# Patient Record
Sex: FEMALE | Race: WHITE | NOT HISPANIC OR LATINO | Employment: FULL TIME | URBAN - METROPOLITAN AREA
[De-identification: names, ages, dates, MRNs, and addresses within clinical notes are randomized per-mention and may not be internally consistent; named-entity substitution may affect disease eponyms.]

---

## 2017-07-14 ENCOUNTER — OFFICE VISIT (OUTPATIENT)
Dept: URGENT CARE | Facility: CLINIC | Age: 52
End: 2017-07-14
Payer: COMMERCIAL

## 2017-07-14 VITALS
DIASTOLIC BLOOD PRESSURE: 87 MMHG | WEIGHT: 293 LBS | HEART RATE: 84 BPM | OXYGEN SATURATION: 98 % | BODY MASS INDEX: 50.02 KG/M2 | TEMPERATURE: 99 F | SYSTOLIC BLOOD PRESSURE: 133 MMHG | HEIGHT: 64 IN | RESPIRATION RATE: 19 BRPM

## 2017-07-14 DIAGNOSIS — R51.9 FACIAL PAIN: Primary | ICD-10-CM

## 2017-07-14 PROCEDURE — 99213 OFFICE O/P EST LOW 20 MIN: CPT | Mod: S$GLB,,, | Performed by: FAMILY MEDICINE

## 2017-07-14 NOTE — PROGRESS NOTES
Subjective:       Patient ID: Josefina Coon is a 52 y.o. female.    Chief Complaint: Jaw Pain (PATIENT C/O RIGHT SIDE UPPER AND LOWER JAW PAIN x 2 WEEKS. SHE STATES IT IS GETTING PROGRESSIVELY WORSE. SHE ALSO C/O PAIN IN RIGHT EAR AND NECK NOW. SHE IS TAKING OTC MOTRIN EVERY 4 HOURS.)    PATIENT C/O RIGHT SIDED UPPER AND LOWER JAW PAIN x 2 WEEKS THAT IS GETTING PROGRESSIVELY WORSE. SHE NOW ALSO HAS RIGHT EAR AND NECK PAIN. SHE IS TAKING OTC MOTRIN EVERY 4 HOURS.      Review of Systems   Constitution: Negative for chills and fever.   HENT: Positive for ear pain. Negative for headaches and sore throat.    Eyes: Negative for blurred vision.   Cardiovascular: Negative for chest pain.   Respiratory: Negative for shortness of breath.    Skin: Negative for rash.   Musculoskeletal: Positive for joint pain. Negative for back pain.        RIGHT UPPER AND LOWER JAW PAIN x 2 WEEKS   Gastrointestinal: Negative for abdominal pain, diarrhea, nausea and vomiting.   Psychiatric/Behavioral: The patient is not nervous/anxious.        Objective:      Physical Exam   Constitutional: Vital signs are normal.   HENT:   Head: Normocephalic.       Right Ear: Hearing, external ear and ear canal normal.   Left Ear: Hearing, tympanic membrane, external ear and ear canal normal.   Nose: Nose normal.   Mouth/Throat: Uvula is midline, oropharynx is clear and moist and mucous membranes are normal.   Cardiovascular: Regular rhythm.    Pulmonary/Chest: Effort normal and breath sounds normal.   Neurological: She is alert.       Assessment:       1. Facial pain        Plan:         Facial pain    probable right tmj tenderness , ice ,analgesics , fup with pcp and dentist

## 2019-12-10 ENCOUNTER — IMMUNIZATION (OUTPATIENT)
Dept: FAMILY MEDICINE | Facility: CLINIC | Age: 54
End: 2019-12-10
Payer: COMMERCIAL

## 2019-12-10 PROCEDURE — 90471 IMMUNIZATION ADMIN: CPT | Mod: S$GLB,,, | Performed by: NURSE PRACTITIONER

## 2019-12-10 PROCEDURE — 90471 FLU VACCINE (QUAD) GREATER THAN OR EQUAL TO 3YO PRESERVATIVE FREE IM: ICD-10-PCS | Mod: S$GLB,,, | Performed by: NURSE PRACTITIONER

## 2019-12-10 PROCEDURE — 90686 FLU VACCINE (QUAD) GREATER THAN OR EQUAL TO 3YO PRESERVATIVE FREE IM: ICD-10-PCS | Mod: S$GLB,,, | Performed by: NURSE PRACTITIONER

## 2019-12-10 PROCEDURE — 90686 IIV4 VACC NO PRSV 0.5 ML IM: CPT | Mod: S$GLB,,, | Performed by: NURSE PRACTITIONER

## 2020-02-11 ENCOUNTER — OFFICE VISIT (OUTPATIENT)
Dept: CARDIOLOGY | Facility: CLINIC | Age: 55
End: 2020-02-11
Payer: COMMERCIAL

## 2020-02-11 VITALS
BODY MASS INDEX: 50.02 KG/M2 | HEART RATE: 80 BPM | HEIGHT: 64 IN | WEIGHT: 293 LBS | DIASTOLIC BLOOD PRESSURE: 82 MMHG | SYSTOLIC BLOOD PRESSURE: 120 MMHG

## 2020-02-11 DIAGNOSIS — E78.2 MIXED HYPERLIPIDEMIA: Primary | ICD-10-CM

## 2020-02-11 DIAGNOSIS — R07.89 ATYPICAL CHEST PAIN: ICD-10-CM

## 2020-02-11 DIAGNOSIS — I10 ESSENTIAL HYPERTENSION: ICD-10-CM

## 2020-02-11 DIAGNOSIS — R94.31 NONSPECIFIC ABNORMAL ELECTROCARDIOGRAM (ECG) (EKG): ICD-10-CM

## 2020-02-11 DIAGNOSIS — E78.2 MIXED HYPERLIPIDEMIA: ICD-10-CM

## 2020-02-11 DIAGNOSIS — R94.31 ABNORMAL EKG: Primary | ICD-10-CM

## 2020-02-11 PROCEDURE — 99204 OFFICE O/P NEW MOD 45 MIN: CPT | Mod: S$GLB,,, | Performed by: INTERNAL MEDICINE

## 2020-02-11 PROCEDURE — 93000 EKG 12-LEAD: ICD-10-PCS | Mod: S$GLB,,, | Performed by: INTERNAL MEDICINE

## 2020-02-11 PROCEDURE — 93000 ELECTROCARDIOGRAM COMPLETE: CPT | Mod: S$GLB,,, | Performed by: INTERNAL MEDICINE

## 2020-02-11 PROCEDURE — 3008F BODY MASS INDEX DOCD: CPT | Mod: CPTII,S$GLB,, | Performed by: INTERNAL MEDICINE

## 2020-02-11 PROCEDURE — 3074F SYST BP LT 130 MM HG: CPT | Mod: CPTII,S$GLB,, | Performed by: INTERNAL MEDICINE

## 2020-02-11 PROCEDURE — 99204 PR OFFICE/OUTPT VISIT, NEW, LEVL IV, 45-59 MIN: ICD-10-PCS | Mod: S$GLB,,, | Performed by: INTERNAL MEDICINE

## 2020-02-11 PROCEDURE — 3079F DIAST BP 80-89 MM HG: CPT | Mod: CPTII,S$GLB,, | Performed by: INTERNAL MEDICINE

## 2020-02-11 PROCEDURE — 99999 PR PBB SHADOW E&M-EST. PATIENT-LVL III: ICD-10-PCS | Mod: PBBFAC,,, | Performed by: INTERNAL MEDICINE

## 2020-02-11 PROCEDURE — 3079F PR MOST RECENT DIASTOLIC BLOOD PRESSURE 80-89 MM HG: ICD-10-PCS | Mod: CPTII,S$GLB,, | Performed by: INTERNAL MEDICINE

## 2020-02-11 PROCEDURE — 99999 PR PBB SHADOW E&M-EST. PATIENT-LVL III: CPT | Mod: PBBFAC,,, | Performed by: INTERNAL MEDICINE

## 2020-02-11 PROCEDURE — 3008F PR BODY MASS INDEX (BMI) DOCUMENTED: ICD-10-PCS | Mod: CPTII,S$GLB,, | Performed by: INTERNAL MEDICINE

## 2020-02-11 PROCEDURE — 3074F PR MOST RECENT SYSTOLIC BLOOD PRESSURE < 130 MM HG: ICD-10-PCS | Mod: CPTII,S$GLB,, | Performed by: INTERNAL MEDICINE

## 2020-02-11 RX ORDER — METFORMIN HYDROCHLORIDE 500 MG/1
500 TABLET ORAL 2 TIMES DAILY WITH MEALS
COMMUNITY
End: 2021-07-28 | Stop reason: SDUPTHER

## 2020-02-11 RX ORDER — TELMISARTAN AND HYDROCHLORTHIAZIDE 80; 25 MG/1; MG/1
1 TABLET ORAL DAILY
COMMUNITY
End: 2021-05-06

## 2020-02-11 NOTE — PROGRESS NOTES
Subjective:    Patient ID:  Josefina Coon is a 55 y.o. female who presents for evaluation of Consult (Ref by Dr. Raya Echols (PCP)); Abnormal ECG; Family hx (Father; Afib - ); and Leg Swelling (bilateral.  mosly Lt )      Problem List Items Addressed This Visit        Cardiac/Vascular    Essential hypertension    Overview     Dx updated per  IMO Load         Mixed hyperlipidemia      Other Visit Diagnoses     Abnormal EKG    -  Primary          HPI    Referred by Dr. Echols    The patient states that she feels ok in general. Occasionally right sided twinges. Occasionally sharp. Intermittent, a few times a week. Sometimes triggered by stress. No recurrence with exertion.    Palpitations only a few times a week mostly at nigh when it is quiet. Only last for a couple of seconds    Has history of venous insufficiency and leg swelling that persists.    Personal history of heart attack or stroke - None that she is aware of  Family history of heart disease - Father; Afib -     Past Medical History:   Diagnosis Date    Abnormal liver enzymes     Asymptomatic varicose veins     Depressive disorder, not elsewhere classified     Dyslipidemia     Embolism and thrombosis of unspecified site     superficial venous thrombosis    Encounter for long-term (current) use of other medications     Family history of ischemic heart disease     Fatty liver     Generalized anxiety disorder     History of chicken pox     Obstructive sleep apnea (adult) (pediatric)     Type II or unspecified type diabetes mellitus without mention of complication, not stated as uncontrolled     Unspecified essential hypertension     Unspecified venous (peripheral) insufficiency     Unspecified vitamin D deficiency        Past Surgical History:   Procedure Laterality Date     SECTION      VEIN SURGERY      Laser, Dr. Larsen       Family History   Problem Relation Age of Onset    Hyperlipidemia Mother      Hypertension Mother     Diabetes Brother     Cancer Brother         colon    Stroke Maternal Grandmother        Social History     Socioeconomic History    Marital status:      Spouse name: Not on file    Number of children: Not on file    Years of education: Not on file    Highest education level: Not on file   Occupational History    Not on file   Social Needs    Financial resource strain: Not on file    Food insecurity:     Worry: Not on file     Inability: Not on file    Transportation needs:     Medical: Not on file     Non-medical: Not on file   Tobacco Use    Smoking status: Never Smoker    Smokeless tobacco: Never Used   Substance and Sexual Activity    Alcohol use: Yes     Alcohol/week: 0.0 standard drinks     Comment: rare     Drug use: Not on file    Sexual activity: Not on file   Lifestyle    Physical activity:     Days per week: Not on file     Minutes per session: Not on file    Stress: Not on file   Relationships    Social connections:     Talks on phone: Not on file     Gets together: Not on file     Attends Yazdanism service: Not on file     Active member of club or organization: Not on file     Attends meetings of clubs or organizations: Not on file     Relationship status: Not on file   Other Topics Concern    Not on file   Social History Narrative    Not on file       Review of patient's allergies indicates:   Allergen Reactions    Sitagliptin      Other reaction(s): (januvia) abdominal pain    Sulfa (sulfonamide antibiotics)     Byetta  [exenatide] Rash    Lactose        Review of Systems   Constitution: Negative for decreased appetite, fever and malaise/fatigue.   Eyes: Negative for blurred vision.   Cardiovascular: Negative for chest pain, dyspnea on exertion, irregular heartbeat and leg swelling.   Respiratory: Negative for cough, hemoptysis, shortness of breath and wheezing.    Endocrine: Negative for cold intolerance and heat intolerance.  "  Hematologic/Lymphatic: Negative for bleeding problem.   Musculoskeletal: Negative for muscle weakness and myalgias.   Gastrointestinal: Negative for abdominal pain, constipation and diarrhea.   Genitourinary: Negative for bladder incontinence.   Neurological: Negative for dizziness and weakness.   Psychiatric/Behavioral: Negative for depression.        Objective:     Vitals:    02/11/20 1125   BP: 120/82   BP Location: Left arm   Patient Position: Sitting   BP Method: Medium (Manual)   Pulse: 80   Weight: (!) 155.6 kg (343 lb 0.6 oz)   Height: 5' 4" (1.626 m)        Physical Exam   Constitutional: She is oriented to person, place, and time. She appears well-developed and well-nourished. No distress.   HENT:   Head: Normocephalic and atraumatic.   Neck: Neck supple. No JVD present.   Cardiovascular: Normal rate, regular rhythm and normal heart sounds. Exam reveals no gallop and no friction rub.   No murmur heard.  Pulmonary/Chest: Effort normal and breath sounds normal. No respiratory distress. She has no wheezes. She has no rales.   Abdominal: Soft. Bowel sounds are normal. There is no tenderness. There is no rebound and no guarding.   Musculoskeletal: She exhibits no edema or tenderness.   Neurological: She is alert and oriented to person, place, and time.   Skin: Skin is warm and dry.   Psychiatric: Her behavior is normal.           Current Outpatient Medications on File Prior to Visit   Medication Sig    albuterol 90 mcg/actuation inhaler Inhale 2 puffs into the lungs every 6 (six) hours as needed for Wheezing.    dulaglutide (TRULICITY) 1.5 mg/0.5 mL PnIj Inject 1.5 mg into the skin every 7 days.    fexofenadine (ALLEGRA) 180 MG tablet Take 180 mg by mouth once daily.    metFORMIN (GLUCOPHAGE) 500 MG tablet Take 500 mg by mouth 2 (two) times daily with meals.    omeprazole (PRILOSEC OTC) 20 MG tablet Take 1 tablet (20 mg total) by mouth once daily.    pravastatin (PRAVACHOL) 10 MG tablet Take 1 tablet " (10 mg total) by mouth once daily.    telmisartan-hydrochlorothiazide (MICARDIS HCT) 80-25 mg per tablet Take 1 tablet by mouth once daily.    alprazolam (XANAX) 0.25 MG tablet Take 1 tablet (0.25 mg total) by mouth 3 (three) times daily as needed for Anxiety. (Patient not taking: Reported on 2/11/2020)    fluoxetine (PROZAC) 40 MG capsule Take 1 capsule by mouth  once daily (Patient not taking: Reported on 2/11/2020)    lisinopril (PRINIVIL,ZESTRIL) 20 MG tablet Take 1 tablet (20 mg total) by mouth once daily. (Patient not taking: Reported on 2/11/2020)    metformin (GLUCOPHAGE-XR) 750 MG 24 hr tablet Take 1 tablet (750 mg total) by mouth 2 (two) times daily. (Patient not taking: Reported on 2/11/2020)    telmisartan (MICARDIS) 80 MG Tab Take 1 tablet by mouth once daily (Patient not taking: Reported on 2/11/2020)     No current facility-administered medications on file prior to visit.        Lipid Panel:   No results found for: CHOL, HDL, LDLCALC, TRIG, CHOLHDL      The ASCVD Risk score ([place] DC Jr., et al., 2013) failed to calculate for the following reasons:    Cannot find a previous HDL lab    Cannot find a previous total cholesterol lab    All pertinent labs, imaging, and EKGs reviewed.    Assessment:       1. Abnormal EKG    2. Essential hypertension    3. Mixed hyperlipidemia         Plan:     Symptoms of atypical chest pain  BP/Pulse OK today    Echocardiogram   Nuclear stress test   If palpitations worsen, can consider Holter monitor at that time  Educated on pulse checks  Following up for legs in about 6 months with her normal provider, if something changes or there is concern she will let me know  Lipid panel was recently done, will obtain records    Continue other cardiac medications  Mediterranean Diet/Cardiovascular Exercise Program    Patient queried and all questions were answered.    F/u in 6 months to reassess      Signed:    Roni Frias MD  2/11/2020 8:57 AM

## 2020-02-11 NOTE — LETTER
February 11, 2020      Raya Echols MD  [address] [facility] [address]           [facility]  [address] [place] [address]  Phone: [phone]          Patient: Josefina Coon   MR Number: [id number]   YOB: 1965   Date of Visit: 2/11/2020       Dear Dr. Raya Echols:    Thank you for referring Josefina Coon to me for evaluation. Attached you will find relevant portions of my assessment and plan of care.    If you have questions, please do not hesitate to call me. I look forward to following Josefina Coon along with you.    Sincerely,    Roni Frias MD    Enclosure  CC:  No Recipients    If you would like to receive this communication electronically, please contact [email or web address] or [phone] to request more information on [facility] [employer] access.    For providers and/or their staff who would like to refer a patient to [facility], please contact us through our one-stop-shop provider referral line, [facility], at [phone].    If you feel you have received this communication in error or would no longer like to receive these types of communications, please e-mail [email or web address]

## 2020-02-28 ENCOUNTER — TELEPHONE (OUTPATIENT)
Dept: CARDIOLOGY | Facility: CLINIC | Age: 55
End: 2020-02-28

## 2020-02-28 NOTE — TELEPHONE ENCOUNTER
----- Message from Natasha Ng sent at 2/28/2020 12:34 PM CST -----  Contact: Pt   Type: Needs medical advice      Who Called: Pt   Best Call Back Number:  [phone]  Additional Information:  Pt called to get information on the nuclear test she scheduled for on 3/3. Pt wants more details on what to expect after the test is done.       Please advise. Thank you

## 2020-03-03 ENCOUNTER — HOSPITAL ENCOUNTER (OUTPATIENT)
Dept: RADIOLOGY | Facility: HOSPITAL | Age: 55
Discharge: HOME | End: 2020-03-03
Attending: INTERNAL MEDICINE
Payer: COMMERCIAL

## 2020-03-03 ENCOUNTER — CLINICAL SUPPORT (OUTPATIENT)
Dept: CARDIOLOGY | Facility: CLINIC | Age: 55
End: 2020-03-03
Attending: INTERNAL MEDICINE
Payer: COMMERCIAL

## 2020-03-03 VITALS
SYSTOLIC BLOOD PRESSURE: 126 MMHG | BODY MASS INDEX: 50.02 KG/M2 | WEIGHT: 293 LBS | DIASTOLIC BLOOD PRESSURE: 72 MMHG | HEIGHT: 64 IN | HEART RATE: 95 BPM

## 2020-03-03 DIAGNOSIS — R07.89 ATYPICAL CHEST PAIN: ICD-10-CM

## 2020-03-03 DIAGNOSIS — R94.31 ABNORMAL EKG: ICD-10-CM

## 2020-03-03 DIAGNOSIS — I10 ESSENTIAL HYPERTENSION: ICD-10-CM

## 2020-03-03 PROCEDURE — 93306 TTE W/DOPPLER COMPLETE: CPT | Mod: S$GLB,,, | Performed by: INTERNAL MEDICINE

## 2020-03-03 PROCEDURE — 93015 CV STRESS TEST SUPVJ I&R: CPT | Mod: ,,, | Performed by: INTERNAL MEDICINE

## 2020-03-03 PROCEDURE — 78452 STRESS TEST WITH MYOCARDIAL PERFUSION (CUPID ONLY): ICD-10-PCS | Mod: 26,,, | Performed by: INTERNAL MEDICINE

## 2020-03-03 PROCEDURE — 93306 ECHO (CUPID ONLY): ICD-10-PCS | Mod: S$GLB,,, | Performed by: INTERNAL MEDICINE

## 2020-03-03 PROCEDURE — 93015 STRESS TEST WITH MYOCARDIAL PERFUSION (CUPID ONLY): ICD-10-PCS | Mod: ,,, | Performed by: INTERNAL MEDICINE

## 2020-03-03 PROCEDURE — 99999 PR PBB SHADOW E&M-EST. PATIENT-LVL II: ICD-10-PCS | Mod: PBBFAC,,,

## 2020-03-03 PROCEDURE — 99999 PR PBB SHADOW E&M-EST. PATIENT-LVL II: CPT | Mod: PBBFAC,,,

## 2020-03-03 PROCEDURE — 78452 HT MUSCLE IMAGE SPECT MULT: CPT | Mod: 26,,, | Performed by: INTERNAL MEDICINE

## 2020-03-04 LAB
ASCENDING AORTA: 2.44 CM
AV INDEX (PROSTH): 0.55
AV MEAN GRADIENT: 11 MMHG
AV PEAK GRADIENT: 19 MMHG
AV VALVE AREA: 2.07 CM2
AV VELOCITY RATIO: 0.47
BSA FOR ECHO PROCEDURE: 2.65 M2
CV ECHO LV RWT: 0.41 CM
CV STRESS BASE HR: 82 BPM
DIASTOLIC BLOOD PRESSURE: 72 MMHG
DOP CALC AO PEAK VEL: 2.18 M/S
DOP CALC AO VTI: 41.27 CM
DOP CALC LVOT AREA: 3.8 CM2
DOP CALC LVOT DIAMETER: 2.2 CM
DOP CALC LVOT PEAK VEL: 1.02 M/S
DOP CALC LVOT STROKE VOLUME: 85.56 CM3
DOP CALCLVOT PEAK VEL VTI: 22.52 CM
E WAVE DECELERATION TIME: 250.27 MSEC
E/A RATIO: 0.82
E/E' RATIO: 10.2 M/S
ECHO LV POSTERIOR WALL: 1.03 CM (ref 0.6–1.1)
FRACTIONAL SHORTENING: 29 % (ref 28–44)
INTERVENTRICULAR SEPTUM: 0.96 CM (ref 0.6–1.1)
IVRT: 0.1 MSEC
LA MAJOR: 6.14 CM
LA MINOR: 6 CM
LA WIDTH: 3.12 CM
LEFT ATRIUM SIZE: 3.77 CM
LEFT ATRIUM VOLUME INDEX: 24.7 ML/M2
LEFT ATRIUM VOLUME: 60.68 CM3
LEFT INTERNAL DIMENSION IN SYSTOLE: 3.52 CM (ref 2.1–4)
LEFT VENTRICLE DIASTOLIC VOLUME INDEX: 47.58 ML/M2
LEFT VENTRICLE DIASTOLIC VOLUME: 117.04 ML
LEFT VENTRICLE MASS INDEX: 73 G/M2
LEFT VENTRICLE SYSTOLIC VOLUME INDEX: 21 ML/M2
LEFT VENTRICLE SYSTOLIC VOLUME: 51.63 ML
LEFT VENTRICULAR INTERNAL DIMENSION IN DIASTOLE: 4.98 CM (ref 3.5–6)
LEFT VENTRICULAR MASS: 179.57 G
LV LATERAL E/E' RATIO: 10.2 M/S
LV SEPTAL E/E' RATIO: 10.2 M/S
MV PEAK A VEL: 1.24 M/S
MV PEAK E VEL: 1.02 M/S
OHS CV CPX 1 MINUTE RECOVERY HEART RATE: 110 BPM
OHS CV CPX 85 PERCENT MAX PREDICTED HEART RATE MALE: 132
OHS CV CPX MAX PREDICTED HEART RATE: 156
OHS CV CPX PATIENT IS FEMALE: 1
OHS CV CPX PATIENT IS MALE: 0
OHS CV CPX PEAK DIASTOLIC BLOOD PRESSURE: 67 MMHG
OHS CV CPX PEAK HEAR RATE: 123 BPM
OHS CV CPX PEAK RATE PRESSURE PRODUCT: NORMAL
OHS CV CPX PEAK SYSTOLIC BLOOD PRESSURE: 128 MMHG
OHS CV CPX PERCENT MAX PREDICTED HEART RATE ACHIEVED: 79
OHS CV CPX RATE PRESSURE PRODUCT PRESENTING: NORMAL
PISA TR MAX VEL: 2.65 M/S
PULM VEIN S/D RATIO: 1.54
PV PEAK D VEL: 0.46 M/S
PV PEAK S VEL: 0.71 M/S
RA MAJOR: 6.1 CM
RA PRESSURE: 3 MMHG
RA WIDTH: 2.96 CM
RIGHT VENTRICULAR END-DIASTOLIC DIMENSION: 3.66 CM
SINUS: 2.48 CM
STJ: 2.28 CM
STRESS ECHO TARGET HR: 139 BPM
SYSTOLIC BLOOD PRESSURE: 126 MMHG
TDI LATERAL: 0.1 M/S
TDI SEPTAL: 0.1 M/S
TDI: 0.1 M/S
TR MAX PG: 28 MMHG
TRICUSPID ANNULAR PLANE SYSTOLIC EXCURSION: 3.32 CM
TV REST PULMONARY ARTERY PRESSURE: 31 MMHG

## 2021-05-10 ENCOUNTER — PATIENT MESSAGE (OUTPATIENT)
Dept: RESEARCH | Facility: HOSPITAL | Age: 56
End: 2021-05-10

## 2022-09-21 DIAGNOSIS — C50.912 INVASIVE DUCTAL CARCINOMA OF BREAST, FEMALE, LEFT: Primary | ICD-10-CM

## 2022-09-22 NOTE — PROGRESS NOTES
[facility] - Breast Surgery   History and Physical    Subjective:      Josefina Coon is a 57 y.o. female     Menarche at age ***. G***P***. Age at first live birth ***. Did/did not breast feed***. LMP ***. OCP-*** Menopause at ***. HRT-***  Personal history of ovarian/breast. Had/Denies previous breast biopsy. Genetic testing.    Family history cancer:    Smoker Pk/yr quit date  Covid-19 vaccine What arm?   Relevant medical history?    Relevant info       ***      ***Tyrer-Cuzick Score:        Patient Active Problem List   Diagnosis    Obstructive sleep apnea (adult) (pediatric)    Mixed hyperlipidemia    Type II or unspecified type diabetes mellitus without mention of complication, not stated as uncontrolled    Essential hypertension    Depressive disorder, not elsewhere classified    Unspecified venous (peripheral) insufficiency    Asymptomatic varicose veins    Embolism and thrombosis of unspecified site    Abnormal liver enzymes    Fatty liver    Family history of ischemic heart disease    Unspecified vitamin D deficiency    Generalized anxiety disorder    Obesity     Current Outpatient Medications on File Prior to Visit   Medication Sig Dispense Refill    albuterol 90 mcg/actuation inhaler Inhale 2 puffs into the lungs every 6 (six) hours as needed for Wheezing. 18 g 6    buPROPion (WELLBUTRIN XL) 150 MG TB24 tablet TAKE 1 TABLET BY MOUTH EVERY DAY 90 tablet 3    diazePAM (VALIUM) 5 MG tablet Take 1 tablet (5 mg total) by mouth On call Procedure (30 minutes prior to MRI). 2 tablet 0    dulaglutide (TRULICITY) 3 mg/0.5 mL pen injector Inject 3 mg into the skin every 7 days. 4 pen 11    EPINEPHrine (EPIPEN) 0.3 mg/0.3 mL AtIn Inject into the muscle.      ergocalciferol (ERGOCALCIFEROL) 50,000 unit Cap TAKE 1 CAPSULE BY MOUTH ONE TIME PER WEEK 12 capsule 0    metFORMIN (GLUCOPHAGE) 500 MG tablet TAKE 1 TABLET BY MOUTH TWICE A DAY WITH MEALS 180 tablet 1    omeprazole (PRILOSEC OTC) 20 MG tablet Take  1 tablet (20 mg total) by mouth once daily. 30 tablet 6    pravastatin (PRAVACHOL) 10 MG tablet TAKE 1 TABLET BY MOUTH EVERYDAY AT BEDTIME 90 tablet 1    telmisartan-hydrochlorothiazide (MICARDIS HCT) 80-25 mg per tablet TAKE 1 TABLET BY MOUTH EVERY DAY 90 tablet 1     Current Facility-Administered Medications on File Prior to Visit   Medication Dose Route Frequency Provider Last Rate Last Admin    [COMPLETED] LIDOcaine HCL 10 mg/ml (1%) injection 4 mL  4 mL Subcutaneous Once Ellen Douglas MD   4 mL at 22 1023    [COMPLETED] LIDOcaine-EPINEPHrine (PF) 1.5%-1:200,000 injection 10 mL  10 mL Other Once Ellen Douglas MD   10 mL at 22 1023    [COMPLETED] sodium bicarbonate 4.2% 1 mL  1 mL Subcutaneous Once Ellen Douglas MD   1 mL at 22 1022     Review of patient's allergies indicates:   Allergen Reactions    Sitagliptin      Other reaction(s): (januvia) abdominal pain    Sulfa (sulfonamide antibiotics)     Byetta  [exenatide] Rash    Lactose      Past Medical History:   Diagnosis Date    Abnormal liver enzymes     Asymptomatic varicose veins     Depressive disorder, not elsewhere classified     Dyslipidemia     Embolism and thrombosis of unspecified site     superficial venous thrombosis    Encounter for long-term (current) use of other medications     Family history of ischemic heart disease     Fatty liver     Generalized anxiety disorder     History of chicken pox     Obstructive sleep apnea (adult) (pediatric)     Type II or unspecified type diabetes mellitus without mention of complication, not stated as uncontrolled     Unspecified essential hypertension     Unspecified venous (peripheral) insufficiency     Unspecified vitamin D deficiency      Past Surgical History:   Procedure Laterality Date     SECTION      VEIN SURGERY      Laser, Dr. Larsen     Family History   Problem Relation Age of Onset    Hyperlipidemia Mother     Hypertension Mother     Vulvar Cancer Mother      Diabetes Brother     Cancer Brother         colon    Stroke Maternal Grandmother      Social History     Socioeconomic History    Marital status:    Tobacco Use    Smoking status: Never    Smokeless tobacco: Never   Substance and Sexual Activity    Alcohol use: Not Currently     Comment: rare          Review of Systems    ***  No CP, No SOB      Objective:      There were no vitals taken for this visit.    Constitutional: NAD AAOX4  HEENT: EOMI, nonicteric sclera  NECK: no mass, no thyromegaly, no lymphadenopathy  CV: regular rate  PULM: good respiratory effort  ABDOMEN: soft, Nontender, nondistended. No guarding. No rebound.  MUSCULOSKELETAL: Good ROM  SKIN: No rashes or ulcerations seen.   NEUROLOGIC: CN grossly intact. Motor, sensory grossly intact    Breast exam ***    Right: No mass, discharge, dimpling, lymphadenopathy  Left:  No mass, discharge, dimpling, lymphadenopathy      No visits with results within 6 Week(s) from this visit.   Latest known visit with results is:   Lab Visit on 04/08/2022   Component Date Value Ref Range Status    Cholesterol 04/08/2022 207 (H)  120 - 199 mg/dL Final    Comment: The National Cholesterol Education Program (NCEP) has set the  following guidelines (reference ranges) for Cholesterol:  Optimal.....................<200 mg/dL  Borderline High.............200-239 mg/dL  High........................> or = 240 mg/dL      Triglycerides 04/08/2022 294 (H)  30 - 150 mg/dL Final    Comment: The National Cholesterol Education Program (NCEP) has set the  following guidelines (reference values) for triglycerides:  Normal......................<150 mg/dL  Borderline High.............150-199 mg/dL  High........................200-499 mg/dL      HDL 04/08/2022 59  40 - 75 mg/dL Final    Comment: The National Cholesterol Education Program (NCEP) has set the  following guidelines (reference values) for HDL Cholesterol:  Low...............<40 mg/dL  Optimal...........>60 mg/dL      LDL  Cholesterol 04/08/2022 89.2  63.0 - 159.0 mg/dL Final    Comment: The National Cholesterol Education Program (NCEP) has set the  following guidelines (reference values) for LDL Cholesterol:  Optimal.......................<130 mg/dL  Borderline High...............130-159 mg/dL  High..........................160-189 mg/dL  Very High.....................>190 mg/dL      HDL/Cholesterol Ratio 04/08/2022 28.5  20.0 - 50.0 % Final    Total Cholesterol/HDL Ratio 04/08/2022 3.5  2.0 - 5.0 Final    Non-HDL Cholesterol 04/08/2022 148  mg/dL Final    Comment: Risk category and Non-HDL cholesterol goals:  Coronary heart disease (CHD)or equivalent (10-year risk of CHD >20%):  Non-HDL cholesterol goal     <130 mg/dL  Two or more CHD risk factors and 10-year risk of CHD <= 20%:  Non-HDL cholesterol goal     <160 mg/dL  0 to 1 CHD risk factor:  Non-HDL cholesterol goal     <190 mg/dL      WBC 04/08/2022 6.46  3.90 - 12.70 K/uL Final    RBC 04/08/2022 4.18  4.00 - 5.40 M/uL Final    Hemoglobin 04/08/2022 12.4  12.0 - 16.0 g/dL Final    Hematocrit 04/08/2022 38.6  37.0 - 48.5 % Final    MCV 04/08/2022 92  82 - 98 fL Final    MCH 04/08/2022 29.7  27.0 - 31.0 pg Final    MCHC 04/08/2022 32.1  32.0 - 36.0 g/dL Final    RDW 04/08/2022 13.1  11.5 - 14.5 % Final    Platelets 04/08/2022 258  150 - 450 K/uL Final    MPV 04/08/2022 10.5  9.2 - 12.9 fL Final    Sodium 04/08/2022 135 (L)  136 - 145 mmol/L Final    Potassium 04/08/2022 4.4  3.5 - 5.1 mmol/L Final    Chloride 04/08/2022 98  95 - 110 mmol/L Final    CO2 04/08/2022 27  22 - 31 mmol/L Final    Glucose 04/08/2022 287 (H)  70 - 110 mg/dL Final    Comment: The ADA recommends the following guidelines for fasting glucose:    Normal:       less than 100 mg/dL    Prediabetes:  100 mg/dL to 125 mg/dL    Diabetes:     126 mg/dL or higher      BUN 04/08/2022 20 (H)  7 - 18 mg/dL Final    Creatinine 04/08/2022 0.78  0.50 - 1.40 mg/dL Final    Calcium 04/08/2022 9.2  8.4 - 10.2 mg/dL Final     Total Protein 04/08/2022 7.5  6.0 - 8.4 g/dL Final    Albumin 04/08/2022 4.1  3.5 - 5.2 g/dL Final    Total Bilirubin 04/08/2022 0.4  0.2 - 1.3 mg/dL Final    Alkaline Phosphatase 04/08/2022 110  38 - 145 U/L Final    AST 04/08/2022 40 (H)  14 - 36 U/L Final    ALT 04/08/2022 45 (H)  0 - 35 U/L Final    Anion Gap 04/08/2022 10  8 - 16 mmol/L Final    eGFR if African American 04/08/2022 >60  >60 mL/min/1.73 m^2 Final    eGFR if non African American 04/08/2022 >60  >60 mL/min/1.73 m^2 Final    Comment: Calculation used to obtain the estimated glomerular filtration  rate (eGFR) is the CKD-EPI equation.       Hemoglobin A1C 04/08/2022 9.5 (H)  0.0 - 5.6 % Final    Comment: Reference Interval:  5.0 - 5.6 Normal   5.7 - 6.4 High Risk   > 6.5 Diabetic       Hgb A1c results are standardized based on the (NGSP) National   Glycohemoglobin Standardization Program.      Hemoglobin A1C levels are related to mean serum/plasma glucose   during the preceding 2-3 months.          Estimated Avg Glucose 04/08/2022 226 (H)  68 - 131 mg/dL Final    TSH 04/08/2022 1.810  0.400 - 4.000 uIU/mL Final    Comment: Warning:  Heterophilic antibodies in serum or plasma of   certain individuals are known to cause interference with   immunoassays. These antibodies may be present in blood samples   from individuals regularly exposed to animal or who have been   treated with animal products.     Patients taking very high Biotin doses of >300 mcg/day may   cause a negative bias in this assay.      Vit D, 25-Hydroxy 04/08/2022 24 (L)  30 - 96 ng/mL Final    Comment: Vitamin D deficiency.........<10 ng/mL                              Vitamin D insufficiency......10-29 ng/mL       Vitamin D sufficiency........> or equal to 30 ng/mL  Vitamin D toxicity............>100 ng/mL      INSULIN, FASTING 04/08/2022 27 (H)  3 - 25 uIU/mL Final    Comment: INTERPRETIVE INFORMATION: Insulin, Fasting  This test reacts on a nearly equimolar basis with the   analogs  insulin aspart, insulin glargine, and insulin   lispro.  Insulin detemir exhibits approximately 50 percent   cross-reactivity.  Test reactivity with insulin glulisine   is negligible (less than 3 percent). To convert to pmol/L,   multiply uIU/mL by 6.0.  Performed By: [facility]  [address] [place] [address]  : Rhianna Bravo MD      Protein, Urine Random 04/08/2022 6  0 - 11 mg/dL Final    Creatinine, Urine 04/08/2022 98.9  15.0 - 325.0 mg/dL Final    Prot/Creat Ratio, Urine 04/08/2022 60.7  10.0 - 107.0 mg/g Final         Patient Active Problem List   Diagnosis    Obstructive sleep apnea (adult) (pediatric)    Mixed hyperlipidemia    Type II or unspecified type diabetes mellitus without mention of complication, not stated as uncontrolled    Essential hypertension    Depressive disorder, not elsewhere classified    Unspecified venous (peripheral) insufficiency    Asymptomatic varicose veins    Embolism and thrombosis of unspecified site    Abnormal liver enzymes    Fatty liver    Family history of ischemic heart disease    Unspecified vitamin D deficiency    Generalized anxiety disorder    Obesity        High complexity of problems addressed - ***breast cancer, treatment options, expectations, effects of treatment, risks, benefits. Complex data reviewed, independent interpretation of tests, discussion of management with another health care provider.    ***Alternative efficacious methods of treatment of breast cancer were given.  Options including lumpectomy, mastectomy, reconstruction options with advantages/disadvantages of each, provisions assuring coverage of reconstructive surgery costs, how the patient may access reconstructive care including the potential to be transferred to a facility that provides reconstructive care or choosing reconstruction after completion of breast cancer surgery and treatment.  [facility], [facility] breast cancer brochure given.          Imaging and  Chronology:               Assessment/Plan:     ***Cancer Staging   No matching staging information was found for the patient.        ***

## 2022-09-22 NOTE — PROGRESS NOTES
"[facility] - Breast Surgery   History and Physical    Subjective:      Josefina Coon is a 57 y.o. female     No sxs. Found on MMG  HTN DM  BMI 56    Menarche at age 8. . Age at first live birth 34yr. Did not breast feed. LMP 2013. OCP-for a few years in her 20's.Menopause at 48yr, 2013. HRT-None  Personal history of ovarian/breast. Had/Denies previous breast biopsy. Genetic testing.    Family history cancer: Brother: Colon Cancer, "Mom had female cancer in uterus that needed radiation"    Covid-19 vaccine. 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        Patient Active Problem List   Diagnosis    Obstructive sleep apnea (adult) (pediatric)    Mixed hyperlipidemia    Type II or unspecified type diabetes mellitus without mention of complication, not stated as uncontrolled    Essential hypertension    Depressive disorder, not elsewhere classified    Unspecified venous (peripheral) insufficiency    Asymptomatic varicose veins    Embolism and thrombosis of unspecified site    Abnormal liver enzymes    Fatty liver    Family history of ischemic heart disease    Unspecified vitamin D deficiency    Generalized anxiety disorder    Obesity    Invasive ductal carcinoma of breast, female, left     Current Outpatient Medications on File Prior to Visit   Medication Sig Dispense Refill    albuterol 90 mcg/actuation inhaler Inhale 2 puffs into the lungs every 6 (six) hours as needed for Wheezing. 18 g 6    buPROPion (WELLBUTRIN XL) 150 MG TB24 tablet TAKE 1 TABLET BY MOUTH EVERY DAY 90 tablet 3    dulaglutide (TRULICITY) 3 mg/0.5 mL pen injector Inject 3 mg into the skin every 7 days. 4 pen 11    ergocalciferol (ERGOCALCIFEROL) 50,000 unit Cap TAKE 1 CAPSULE BY MOUTH ONE TIME PER WEEK 12 capsule 0    metFORMIN (GLUCOPHAGE) 500 MG tablet TAKE 1 TABLET BY MOUTH TWICE A DAY WITH MEALS 180 tablet 1    omeprazole (PRILOSEC OTC) 20 MG tablet Take 1 tablet (20 mg total) by mouth " once daily. 30 tablet 6    pravastatin (PRAVACHOL) 10 MG tablet TAKE 1 TABLET BY MOUTH EVERYDAY AT BEDTIME 90 tablet 1    telmisartan-hydrochlorothiazide (MICARDIS HCT) 80-25 mg per tablet TAKE 1 TABLET BY MOUTH EVERY DAY 90 tablet 1    diazePAM (VALIUM) 5 MG tablet Take 1 tablet (5 mg total) by mouth On call Procedure (30 minutes prior to MRI). (Patient not taking: No sig reported) 2 tablet 0    EPINEPHrine (EPIPEN) 0.3 mg/0.3 mL AtIn Inject into the muscle.       No current facility-administered medications on file prior to visit.     Review of patient's allergies indicates:   Allergen Reactions    Sitagliptin      Other reaction(s): (januvia) abdominal pain    Sulfa (sulfonamide antibiotics)     Byetta  [exenatide] Rash    Lactose      Past Medical History:   Diagnosis Date    Abnormal liver enzymes     Asymptomatic varicose veins     Depressive disorder, not elsewhere classified     Dyslipidemia     Embolism and thrombosis of unspecified site     superficial venous thrombosis    Encounter for long-term (current) use of other medications     Family history of ischemic heart disease     Fatty liver     Generalized anxiety disorder     History of chicken pox     Obstructive sleep apnea (adult) (pediatric)     Type II or unspecified type diabetes mellitus without mention of complication, not stated as uncontrolled     Unspecified essential hypertension     Unspecified venous (peripheral) insufficiency     Unspecified vitamin D deficiency      Past Surgical History:   Procedure Laterality Date     SECTION      VEIN SURGERY      Laser, Dr. Larsen     Family History   Problem Relation Age of Onset    Hyperlipidemia Mother     Hypertension Mother     Vulvar Cancer Mother     Diabetes Brother     Cancer Brother         colon    Stroke Maternal Grandmother      Social History     Socioeconomic History    Marital status:    Tobacco Use    Smoking status: Never    Smokeless tobacco: Never   Substance and  "Sexual Activity    Alcohol use: Not Currently     Comment: rare          Review of Systems    No CP, No SOB      Objective:      /82 (BP Location: Right arm, Patient Position: Sitting, BP Method: Large (Manual))   Pulse (P) 79   Temp (P) 98.1 °F (36.7 °C) (Temporal)   Resp (P) 18   Ht (P) 5' 4" (1.626 m)   Wt (!) (P) 147.7 kg (325 lb 9.9 oz)   SpO2 (P) 98%   BMI (P) 55.89 kg/m²     Constitutional: NAD AAOX4  HEENT: EOMI, nonicteric sclera  NECK: no mass, no thyromegaly, no lymphadenopathy  CV: regular rate  PULM: good respiratory effort  ABDOMEN: soft, Nontender, nondistended. No guarding. No rebound.  MUSCULOSKELETAL: Good ROM  SKIN: No rashes or ulcerations seen.   NEUROLOGIC: CN grossly intact. Motor, sensory grossly intact    Breast exam   pendulous  Right: No mass, discharge, dimpling, lymphadenopathy  Left:  No mass, discharge, dimpling, lymphadenopathy  Left 0300 lateral posterior, 6cm FN 1.5cm movable area, post biopsy      No visits with results within 6 Week(s) from this visit.   Latest known visit with results is:   Lab Visit on 04/08/2022   Component Date Value Ref Range Status    Cholesterol 04/08/2022 207 (H)  120 - 199 mg/dL Final    Comment: The National Cholesterol Education Program (NCEP) has set the  following guidelines (reference ranges) for Cholesterol:  Optimal.....................<200 mg/dL  Borderline High.............200-239 mg/dL  High........................> or = 240 mg/dL      Triglycerides 04/08/2022 294 (H)  30 - 150 mg/dL Final    Comment: The National Cholesterol Education Program (NCEP) has set the  following guidelines (reference values) for triglycerides:  Normal......................<150 mg/dL  Borderline High.............150-199 mg/dL  High........................200-499 mg/dL      HDL 04/08/2022 59  40 - 75 mg/dL Final    Comment: The National Cholesterol Education Program (NCEP) has set the  following guidelines (reference values) for HDL " Cholesterol:  Low...............<40 mg/dL  Optimal...........>60 mg/dL      LDL Cholesterol 04/08/2022 89.2  63.0 - 159.0 mg/dL Final    Comment: The National Cholesterol Education Program (NCEP) has set the  following guidelines (reference values) for LDL Cholesterol:  Optimal.......................<130 mg/dL  Borderline High...............130-159 mg/dL  High..........................160-189 mg/dL  Very High.....................>190 mg/dL      HDL/Cholesterol Ratio 04/08/2022 28.5  20.0 - 50.0 % Final    Total Cholesterol/HDL Ratio 04/08/2022 3.5  2.0 - 5.0 Final    Non-HDL Cholesterol 04/08/2022 148  mg/dL Final    Comment: Risk category and Non-HDL cholesterol goals:  Coronary heart disease (CHD)or equivalent (10-year risk of CHD >20%):  Non-HDL cholesterol goal     <130 mg/dL  Two or more CHD risk factors and 10-year risk of CHD <= 20%:  Non-HDL cholesterol goal     <160 mg/dL  0 to 1 CHD risk factor:  Non-HDL cholesterol goal     <190 mg/dL      WBC 04/08/2022 6.46  3.90 - 12.70 K/uL Final    RBC 04/08/2022 4.18  4.00 - 5.40 M/uL Final    Hemoglobin 04/08/2022 12.4  12.0 - 16.0 g/dL Final    Hematocrit 04/08/2022 38.6  37.0 - 48.5 % Final    MCV 04/08/2022 92  82 - 98 fL Final    MCH 04/08/2022 29.7  27.0 - 31.0 pg Final    MCHC 04/08/2022 32.1  32.0 - 36.0 g/dL Final    RDW 04/08/2022 13.1  11.5 - 14.5 % Final    Platelets 04/08/2022 258  150 - 450 K/uL Final    MPV 04/08/2022 10.5  9.2 - 12.9 fL Final    Sodium 04/08/2022 135 (L)  136 - 145 mmol/L Final    Potassium 04/08/2022 4.4  3.5 - 5.1 mmol/L Final    Chloride 04/08/2022 98  95 - 110 mmol/L Final    CO2 04/08/2022 27  22 - 31 mmol/L Final    Glucose 04/08/2022 287 (H)  70 - 110 mg/dL Final    Comment: The ADA recommends the following guidelines for fasting glucose:    Normal:       less than 100 mg/dL    Prediabetes:  100 mg/dL to 125 mg/dL    Diabetes:     126 mg/dL or higher      BUN 04/08/2022 20 (H)  7 - 18 mg/dL Final    Creatinine 04/08/2022 0.78   0.50 - 1.40 mg/dL Final    Calcium 04/08/2022 9.2  8.4 - 10.2 mg/dL Final    Total Protein 04/08/2022 7.5  6.0 - 8.4 g/dL Final    Albumin 04/08/2022 4.1  3.5 - 5.2 g/dL Final    Total Bilirubin 04/08/2022 0.4  0.2 - 1.3 mg/dL Final    Alkaline Phosphatase 04/08/2022 110  38 - 145 U/L Final    AST 04/08/2022 40 (H)  14 - 36 U/L Final    ALT 04/08/2022 45 (H)  0 - 35 U/L Final    Anion Gap 04/08/2022 10  8 - 16 mmol/L Final    eGFR if African American 04/08/2022 >60  >60 mL/min/1.73 m^2 Final    eGFR if non African American 04/08/2022 >60  >60 mL/min/1.73 m^2 Final    Comment: Calculation used to obtain the estimated glomerular filtration  rate (eGFR) is the CKD-EPI equation.       Hemoglobin A1C 04/08/2022 9.5 (H)  0.0 - 5.6 % Final    Comment: Reference Interval:  5.0 - 5.6 Normal   5.7 - 6.4 High Risk   > 6.5 Diabetic       Hgb A1c results are standardized based on the (NGSP) National   Glycohemoglobin Standardization Program.      Hemoglobin A1C levels are related to mean serum/plasma glucose   during the preceding 2-3 months.          Estimated Avg Glucose 04/08/2022 226 (H)  68 - 131 mg/dL Final    TSH 04/08/2022 1.810  0.400 - 4.000 uIU/mL Final    Comment: Warning:  Heterophilic antibodies in serum or plasma of   certain individuals are known to cause interference with   immunoassays. These antibodies may be present in blood samples   from individuals regularly exposed to animal or who have been   treated with animal products.     Patients taking very high Biotin doses of >300 mcg/day may   cause a negative bias in this assay.      Vit D, 25-Hydroxy 04/08/2022 24 (L)  30 - 96 ng/mL Final    Comment: Vitamin D deficiency.........<10 ng/mL                              Vitamin D insufficiency......10-29 ng/mL       Vitamin D sufficiency........> or equal to 30 ng/mL  Vitamin D toxicity............>100 ng/mL      INSULIN, FASTING 04/08/2022 27 (H)  3 - 25 uIU/mL Final    Comment: INTERPRETIVE INFORMATION:  Insulin, Fasting  This test reacts on a nearly equimolar basis with the   analogs insulin aspart, insulin glargine, and insulin   lispro.  Insulin detemir exhibits approximately 50 percent   cross-reactivity.  Test reactivity with insulin glulisine   is negligible (less than 3 percent). To convert to pmol/L,   multiply uIU/mL by 6.0.  Performed By: [facility]  [address] [place] [address]  : Rhianna Bravo MD      Protein, Urine Random 04/08/2022 6  0 - 11 mg/dL Final    Creatinine, Urine 04/08/2022 98.9  15.0 - 325.0 mg/dL Final    Prot/Creat Ratio, Urine 04/08/2022 60.7  10.0 - 107.0 mg/g Final         Patient Active Problem List   Diagnosis    Obstructive sleep apnea (adult) (pediatric)    Mixed hyperlipidemia    Type II or unspecified type diabetes mellitus without mention of complication, not stated as uncontrolled    Essential hypertension    Depressive disorder, not elsewhere classified    Unspecified venous (peripheral) insufficiency    Asymptomatic varicose veins    Embolism and thrombosis of unspecified site    Abnormal liver enzymes    Fatty liver    Family history of ischemic heart disease    Unspecified vitamin D deficiency    Generalized anxiety disorder    Obesity    Invasive ductal carcinoma of breast, female, left        High complexity of problems addressed - breast cancer, treatment options, expectations, effects of treatment, risks, benefits. Complex data reviewed, independent interpretation of tests, discussion of management with another health care provider.    Alternative efficacious methods of treatment of breast cancer were given.  Options including lumpectomy, mastectomy, reconstruction options with advantages/disadvantages of each, provisions assuring coverage of reconstructive surgery costs, how the patient may access reconstructive care including the potential to be transferred to a facility that provides reconstructive care or choosing  reconstruction after completion of breast cancer surgery and treatment.  [facility], [facility] breast cancer brochure given.          Imaging and Chronology:     8/24/21 bilat scr MMG  Cat 2    8/25/22 bilat scr MMG  Right neg  Left central post mass    9/8/22 left diag MMG, left US limited   Left 0300 lateral posterior 6cm FN 1.4cm mass  Left axilla LN 2, up to 4mm cortex    9/14/22 left 0300 lateral posterior 6cm FN 1.4cm mass US biopsy  Grade 3 (3,3,2) 1.1cm in biopsy  No LVI  ER 84 AK 28 Her 2 3+ pos Ki 52    Left axilla LN biopsy  Benign fatty tissue, no LN, not concordant      No MRI d/t back    9/21/22 US bilateral complete  Right neg, right LN nml    Left 0300 6cm FN [id number] mass  Borderline LN left axilla    9/21/22 left axilla LN biopsy  Benign LN        Assessment/Plan:      Cancer Staging   Invasive ductal carcinoma of breast, female, left  Staging form: Breast, AJCC 8th Edition  - Clinical stage from 9/23/2022: Stage IA (cT1c, cN0(f), cM0, G3, ER+, AK+, HER2+) - Signed by Maria G Mcduffie MD on 9/23/2022    left 0300 lateral posterior 6cm FN 1.4cm mass US biopsy  Grade 3 (3,3,2) IDC 1.1cm in biopsy  No LVI  ER 84 AK 28 Her 2 3+ pos Ki 52    2 neg left axilla LN biopsy    Triple pos 1.4cm  Discussed with team - plan neoadjuvant chemo    Has all surgical options, BMI 56, DM, HTN  She would like Left mike lump, left SLNB, xrt  Endocrine    Discussed port with NLSA.      Int onc, prehab, genetics    See me midway through chemo with left US.         Saw in [facility] clinic with Dr Jaquez medical oncologist and Dr Trevino radiation oncologist.    I have given her all options - lumpectomy XRT, unilateral or bilateral mastectomy +/- reconstruction. I have explained procedure, risks, benefits, postop expectations. All questions answered. Risks include but are not limited to infection, bleeding, injury to nerves, vessels, numbness, weakness, difficulty lifting arm, swelling of arm (lymphedema), inability to  remove all lesion, residual breast tissue, recurrence of malignancy, need for further procedure, loss of skin flap, seroma (fluid collection), inability to find sentinel lymph node, need for axillary dissection, chronic pain, radioactive substance may be given, allergic reaction, staining of the skin, difficulty with future imaging.    For port:    Have explained procedure, risks, benefits, postop expectations - all questions answered and she wishes to proceed. Risks include but are not limited to - infection, bleeding, malfunction of port, collapse of lung, bleeding/injury to lung, injury to vessel, fragmentation of catheter, clotting of vessel, irregular heartbeat, chronic pain, need to remove.

## 2022-09-23 PROBLEM — C50.912 INVASIVE DUCTAL CARCINOMA OF BREAST, FEMALE, LEFT: Status: ACTIVE | Noted: 2022-09-23

## 2022-09-24 NOTE — PROGRESS NOTES
09/26/2022    [facility]   Radiation Oncology Consultation    ASSESSMENT  This is a 57 y.o. y/o female with with BMI 52 and new diagnosis of Stage IA  (cT1c, N0, M0, Grade 3, ER+/ME+/HER2+) Invasive ductal carcinoma of the LEFT breast. She is seen as part of Multidisciplinary Breast Clinic prior to initiation of therapy for discussion of treatment options.       PLAN    Treatment options were discussed with the patient including adjuvant left breast radiation therapy following surgery and likely neoadjuvant chemotherapy-immunotherapy.  We discussed the goals of treatment to be curative.  The risks, benefits, scheduling, alternatives to and rationale of radiation therapy were explained in detail.    After this discussion, she elected to proceed with breast conservation with lumpectomy and adjuvant RT.    A CT simulation will be performed 3-4 weeks after surgery to begin the planning process for the patient's radiation therapy.    *Sim in bra   She was given our contact information, and she was told that she could call our clinic at any time if she has any questions or concerns.      Radiation Treatment Details:   We plan to treat LEFT breast to a dose of 42.56 Gy in 16 fractions at 2.66 Gy per fraction delivered daily, followed by a boost to the lumpectomy cavity to 10Gy in 4 fractions (high grade)  We will utilize a(n) 3D technique.   We will utilize daily CT or orthogonal image guidance due to the need for accurate daily patient alignment to treat the target volumes accurately and avoid radiation overdose to multiple regional organs at risk since we are treating the patient with complex target volumes with multiple steep isodose gradients.       Chief Complaint   Patient presents with    Breast Cancer       HPI: The patient is a 57 year old female with a new diagnosis of breast cancer. She initially presented due to abnormal mammogram.    8/25/22 Screening mammogram:  LEFT high density  irregularly shaped mass in central region posterior    22 Left Diagnostic mammogram/ultrasound: 14 x 12 x 10mm mass at posterior 3:00 6cmfn; 2 axillary level 1 lymph nodes with slight cortical thickening up to 4mm    22 LEFT 3:00 US-g biopsy: invasive ductal carcinoma, G3, +/+/+, Ki 67 52%  22 LEFT dmitri lymph node excision: negative for lymph node tissue    22 re-biopsy L axillary us-g biopsy: fragments of benign lymph node       Possibility of pregnancy: No  History of prior irradiation: No  History of prior systemic anti-cancer therapy: No  History of collagen vascular disease: No  Implanted electronic device (pacer/defib/nerve stimulator): No      Past Medical History:   Diagnosis Date    Abnormal liver enzymes     Asymptomatic varicose veins     Depressive disorder, not elsewhere classified     Dyslipidemia     Embolism and thrombosis of unspecified site     superficial venous thrombosis    Encounter for long-term (current) use of other medications     Family history of ischemic heart disease     Fatty liver     Generalized anxiety disorder     History of chicken pox     Obstructive sleep apnea (adult) (pediatric)     Type II or unspecified type diabetes mellitus without mention of complication, not stated as uncontrolled     Unspecified essential hypertension     Unspecified venous (peripheral) insufficiency     Unspecified vitamin D deficiency        Past Surgical History:   Procedure Laterality Date     SECTION      VEIN SURGERY      Laser, Dr. Larsen       Social History     Tobacco Use    Smoking status: Never    Smokeless tobacco: Never   Substance Use Topics    Alcohol use: Not Currently     Comment: rare        Cancer-related family history includes Cancer in her brother.    Current Outpatient Medications on File Prior to Visit   Medication Sig Dispense Refill    albuterol 90 mcg/actuation inhaler Inhale 2 puffs into the lungs every 6 (six) hours as needed for Wheezing. 18 g 6     buPROPion (WELLBUTRIN XL) 150 MG TB24 tablet TAKE 1 TABLET BY MOUTH EVERY DAY 90 tablet 3    diazePAM (VALIUM) 5 MG tablet Take 1 tablet (5 mg total) by mouth On call Procedure (30 minutes prior to MRI). (Patient not taking: No sig reported) 2 tablet 0    doxycycline monohydrate 100 mg Tab 1 TABLET BY MOUTH ONCE A DAY FOR 10 DAYS      dulaglutide (TRULICITY) 3 mg/0.5 mL pen injector Inject 3 mg into the skin every 7 days. 4 pen 11    EPINEPHrine (EPIPEN) 0.3 mg/0.3 mL AtIn Inject into the muscle.      ergocalciferol (ERGOCALCIFEROL) 50,000 unit Cap TAKE 1 CAPSULE BY MOUTH ONE TIME PER WEEK 12 capsule 0    metFORMIN (GLUCOPHAGE) 500 MG tablet TAKE 1 TABLET BY MOUTH TWICE A DAY WITH MEALS 180 tablet 1    omeprazole (PRILOSEC OTC) 20 MG tablet Take 1 tablet (20 mg total) by mouth once daily. 30 tablet 6    pravastatin (PRAVACHOL) 10 MG tablet TAKE 1 TABLET BY MOUTH EVERYDAY AT BEDTIME 90 tablet 1    telmisartan-hydrochlorothiazide (MICARDIS HCT) 80-25 mg per tablet TAKE 1 TABLET BY MOUTH EVERY DAY 90 tablet 1    tobramycin-dexamethasone 0.3-0.1% (TOBRADEX) 0.3-0.1 % DrpS INSTILL 1 DROP INTO LEFT EYE 4 TIMES A DAY FOR 10 DAYS       No current facility-administered medications on file prior to visit.       Review of patient's allergies indicates:   Allergen Reactions    Sitagliptin      Other reaction(s): (januvia) abdominal pain    Sulfa (sulfonamide antibiotics)     Byetta  [exenatide] Rash    Lactose        Review of Systems   Constitutional:  Negative for chills, fever and malaise/fatigue.   HENT:  Negative for hearing loss.    Eyes:  Negative for blurred vision and double vision.   Respiratory:  Negative for cough and shortness of breath.    Cardiovascular:  Negative for chest pain.   Gastrointestinal:  Negative for abdominal pain, constipation, diarrhea, nausea and vomiting.   Genitourinary: Negative.    Musculoskeletal:  Negative for back pain, falls and myalgias.   Skin:  Negative for rash.   Neurological:   "Negative for dizziness, sensory change, speech change, focal weakness, loss of consciousness, weakness and headaches.   Endo/Heme/Allergies: Negative.    Psychiatric/Behavioral:  Positive for depression (history). The patient is nervous/anxious (history).       Vital Signs: /82 (BP Location: Right arm, Patient Position: Sitting, BP Method: Large (Manual))   Pulse 78   Temp 98.1 °F (36.7 °C) (Temporal)   Resp 18   Ht 5' 4" (1.626 m)   Wt (!) 147.7 kg (325 lb 9.9 oz)   SpO2 98%   BMI 55.89 kg/m²     ECOG Performance Status: 1 - Ambulates, capable of light work    Physical Exam  Vitals reviewed.   Constitutional:       General: She is not in acute distress.     Appearance: Normal appearance. She is obese. She is not ill-appearing or toxic-appearing.   HENT:      Head: Normocephalic and atraumatic.      Nose: Nose normal.   Eyes:      Extraocular Movements: Extraocular movements intact.      Conjunctiva/sclera: Conjunctivae normal.      Pupils: Pupils are equal, round, and reactive to light.   Pulmonary:      Effort: Pulmonary effort is normal.   Chest:   Breasts:     Right: Normal.      Left: Normal.   Musculoskeletal:         General: Normal range of motion.      Cervical back: Normal range of motion.   Lymphadenopathy:      Upper Body:      Right upper body: No supraclavicular, axillary or pectoral adenopathy.      Left upper body: No supraclavicular, axillary or pectoral adenopathy.   Skin:     General: Skin is warm.   Neurological:      General: No focal deficit present.      Mental Status: She is alert and oriented to person, place, and time. Mental status is at baseline.      Cranial Nerves: No cranial nerve deficit.   Psychiatric:         Mood and Affect: Mood normal.         Behavior: Behavior normal.         Thought Content: Thought content normal.         Judgment: Judgment normal.          Imaging: I have personally reviewed the patient's available images and reports and summarized pertinent " findings above in HPI.     Pathology: I have personally reviewed the patient's available pathology and summarized pertinent findings above in HPI.     This case was discussed with Dr. Mcduffie and Dr. Jaquez.       - Thank you for allowing me to participate in the care of your patient.    Shelia Trevino MD

## 2022-09-26 ENCOUNTER — TUMOR BOARD CONFERENCE (OUTPATIENT)
Dept: SURGERY | Facility: CLINIC | Age: 57
End: 2022-09-26

## 2022-09-26 ENCOUNTER — OFFICE VISIT (OUTPATIENT)
Dept: HEMATOLOGY/ONCOLOGY | Facility: CLINIC | Age: 57
End: 2022-09-26
Payer: COMMERCIAL

## 2022-09-26 ENCOUNTER — PATIENT MESSAGE (OUTPATIENT)
Dept: HEMATOLOGY/ONCOLOGY | Facility: CLINIC | Age: 57
End: 2022-09-26
Payer: COMMERCIAL

## 2022-09-26 ENCOUNTER — OFFICE VISIT (OUTPATIENT)
Dept: RADIATION ONCOLOGY | Facility: CLINIC | Age: 57
End: 2022-09-26
Payer: COMMERCIAL

## 2022-09-26 ENCOUNTER — TELEPHONE (OUTPATIENT)
Dept: HEMATOLOGY/ONCOLOGY | Facility: CLINIC | Age: 57
End: 2022-09-26
Payer: COMMERCIAL

## 2022-09-26 ENCOUNTER — OFFICE VISIT (OUTPATIENT)
Dept: SURGERY | Facility: CLINIC | Age: 57
End: 2022-09-26
Payer: COMMERCIAL

## 2022-09-26 VITALS
TEMPERATURE: 98 F | WEIGHT: 293 LBS | HEART RATE: 78 BPM | OXYGEN SATURATION: 98 % | DIASTOLIC BLOOD PRESSURE: 82 MMHG | HEIGHT: 64 IN | BODY MASS INDEX: 50.02 KG/M2 | SYSTOLIC BLOOD PRESSURE: 122 MMHG | RESPIRATION RATE: 18 BRPM | BODY MASS INDEX: 50.02 KG/M2 | DIASTOLIC BLOOD PRESSURE: 82 MMHG | SYSTOLIC BLOOD PRESSURE: 122 MMHG | SYSTOLIC BLOOD PRESSURE: 122 MMHG | OXYGEN SATURATION: 98 % | WEIGHT: 293 LBS | DIASTOLIC BLOOD PRESSURE: 82 MMHG | TEMPERATURE: 98 F | RESPIRATION RATE: 18 BRPM | HEIGHT: 64 IN | HEART RATE: 78 BPM

## 2022-09-26 DIAGNOSIS — C50.912 INVASIVE DUCTAL CARCINOMA OF BREAST, FEMALE, LEFT: ICD-10-CM

## 2022-09-26 DIAGNOSIS — C50.912 INVASIVE DUCTAL CARCINOMA OF BREAST, FEMALE, LEFT: Primary | ICD-10-CM

## 2022-09-26 DIAGNOSIS — E11.41 DIABETIC MONONEUROPATHY ASSOCIATED WITH TYPE 2 DIABETES MELLITUS: ICD-10-CM

## 2022-09-26 DIAGNOSIS — E66.9 OBESITY, UNSPECIFIED CLASSIFICATION, UNSPECIFIED OBESITY TYPE, UNSPECIFIED WHETHER SERIOUS COMORBIDITY PRESENT: ICD-10-CM

## 2022-09-26 DIAGNOSIS — R74.8 ABNORMAL LIVER ENZYMES: ICD-10-CM

## 2022-09-26 DIAGNOSIS — G47.33 OBSTRUCTIVE SLEEP APNEA (ADULT) (PEDIATRIC): ICD-10-CM

## 2022-09-26 DIAGNOSIS — K76.0 FATTY LIVER: ICD-10-CM

## 2022-09-26 PROCEDURE — 3008F PR BODY MASS INDEX (BMI) DOCUMENTED: ICD-10-PCS | Mod: CPTII,S$GLB,, | Performed by: STUDENT IN AN ORGANIZED HEALTH CARE EDUCATION/TRAINING PROGRAM

## 2022-09-26 PROCEDURE — 3008F BODY MASS INDEX DOCD: CPT | Mod: CPTII,S$GLB,, | Performed by: STUDENT IN AN ORGANIZED HEALTH CARE EDUCATION/TRAINING PROGRAM

## 2022-09-26 PROCEDURE — 3008F PR BODY MASS INDEX (BMI) DOCUMENTED: ICD-10-PCS | Mod: CPTII,S$GLB,, | Performed by: RADIOLOGY

## 2022-09-26 PROCEDURE — 3074F SYST BP LT 130 MM HG: CPT | Mod: CPTII,S$GLB,, | Performed by: SURGERY

## 2022-09-26 PROCEDURE — 3046F PR MOST RECENT HEMOGLOBIN A1C LEVEL > 9.0%: ICD-10-PCS | Mod: CPTII,S$GLB,, | Performed by: STUDENT IN AN ORGANIZED HEALTH CARE EDUCATION/TRAINING PROGRAM

## 2022-09-26 PROCEDURE — 99204 PR OFFICE/OUTPT VISIT, NEW, LEVL IV, 45-59 MIN: ICD-10-PCS | Mod: S$GLB,,, | Performed by: RADIOLOGY

## 2022-09-26 PROCEDURE — 1159F MED LIST DOCD IN RCRD: CPT | Mod: CPTII,S$GLB,, | Performed by: STUDENT IN AN ORGANIZED HEALTH CARE EDUCATION/TRAINING PROGRAM

## 2022-09-26 PROCEDURE — 99999 PR PBB SHADOW E&M-EST. PATIENT-LVL V: CPT | Mod: PBBFAC,,, | Performed by: STUDENT IN AN ORGANIZED HEALTH CARE EDUCATION/TRAINING PROGRAM

## 2022-09-26 PROCEDURE — 3074F PR MOST RECENT SYSTOLIC BLOOD PRESSURE < 130 MM HG: ICD-10-PCS | Mod: CPTII,S$GLB,, | Performed by: SURGERY

## 2022-09-26 PROCEDURE — 99999 PR PBB SHADOW E&M-EST. PATIENT-LVL V: CPT | Mod: PBBFAC,,, | Performed by: SURGERY

## 2022-09-26 PROCEDURE — 3046F HEMOGLOBIN A1C LEVEL >9.0%: CPT | Mod: CPTII,S$GLB,, | Performed by: STUDENT IN AN ORGANIZED HEALTH CARE EDUCATION/TRAINING PROGRAM

## 2022-09-26 PROCEDURE — 3046F HEMOGLOBIN A1C LEVEL >9.0%: CPT | Mod: CPTII,S$GLB,, | Performed by: SURGERY

## 2022-09-26 PROCEDURE — 3079F PR MOST RECENT DIASTOLIC BLOOD PRESSURE 80-89 MM HG: ICD-10-PCS | Mod: CPTII,S$GLB,, | Performed by: SURGERY

## 2022-09-26 PROCEDURE — 3079F PR MOST RECENT DIASTOLIC BLOOD PRESSURE 80-89 MM HG: ICD-10-PCS | Mod: CPTII,S$GLB,, | Performed by: STUDENT IN AN ORGANIZED HEALTH CARE EDUCATION/TRAINING PROGRAM

## 2022-09-26 PROCEDURE — 99999 PR PBB SHADOW E&M-EST. PATIENT-LVL V: ICD-10-PCS | Mod: PBBFAC,,, | Performed by: SURGERY

## 2022-09-26 PROCEDURE — 99204 OFFICE O/P NEW MOD 45 MIN: CPT | Mod: S$GLB,,, | Performed by: RADIOLOGY

## 2022-09-26 PROCEDURE — 1160F PR REVIEW ALL MEDS BY PRESCRIBER/CLIN PHARMACIST DOCUMENTED: ICD-10-PCS | Mod: CPTII,S$GLB,, | Performed by: STUDENT IN AN ORGANIZED HEALTH CARE EDUCATION/TRAINING PROGRAM

## 2022-09-26 PROCEDURE — 99999 PR PBB SHADOW E&M-EST. PATIENT-LVL III: ICD-10-PCS | Mod: PBBFAC,,, | Performed by: RADIOLOGY

## 2022-09-26 PROCEDURE — 99999 PR PBB SHADOW E&M-EST. PATIENT-LVL V: ICD-10-PCS | Mod: PBBFAC,,, | Performed by: STUDENT IN AN ORGANIZED HEALTH CARE EDUCATION/TRAINING PROGRAM

## 2022-09-26 PROCEDURE — 3074F SYST BP LT 130 MM HG: CPT | Mod: CPTII,S$GLB,, | Performed by: STUDENT IN AN ORGANIZED HEALTH CARE EDUCATION/TRAINING PROGRAM

## 2022-09-26 PROCEDURE — 3074F PR MOST RECENT SYSTOLIC BLOOD PRESSURE < 130 MM HG: ICD-10-PCS | Mod: CPTII,S$GLB,, | Performed by: STUDENT IN AN ORGANIZED HEALTH CARE EDUCATION/TRAINING PROGRAM

## 2022-09-26 PROCEDURE — 3046F HEMOGLOBIN A1C LEVEL >9.0%: CPT | Mod: CPTII,S$GLB,, | Performed by: RADIOLOGY

## 2022-09-26 PROCEDURE — 3046F PR MOST RECENT HEMOGLOBIN A1C LEVEL > 9.0%: ICD-10-PCS | Mod: CPTII,S$GLB,, | Performed by: SURGERY

## 2022-09-26 PROCEDURE — 3046F PR MOST RECENT HEMOGLOBIN A1C LEVEL > 9.0%: ICD-10-PCS | Mod: CPTII,S$GLB,, | Performed by: RADIOLOGY

## 2022-09-26 PROCEDURE — 99205 OFFICE O/P NEW HI 60 MIN: CPT | Mod: S$GLB,,, | Performed by: STUDENT IN AN ORGANIZED HEALTH CARE EDUCATION/TRAINING PROGRAM

## 2022-09-26 PROCEDURE — 99205 PR OFFICE/OUTPT VISIT, NEW, LEVL V, 60-74 MIN: ICD-10-PCS | Mod: S$GLB,,, | Performed by: STUDENT IN AN ORGANIZED HEALTH CARE EDUCATION/TRAINING PROGRAM

## 2022-09-26 PROCEDURE — 1160F RVW MEDS BY RX/DR IN RCRD: CPT | Mod: CPTII,S$GLB,, | Performed by: STUDENT IN AN ORGANIZED HEALTH CARE EDUCATION/TRAINING PROGRAM

## 2022-09-26 PROCEDURE — 1159F PR MEDICATION LIST DOCUMENTED IN MEDICAL RECORD: ICD-10-PCS | Mod: CPTII,S$GLB,, | Performed by: STUDENT IN AN ORGANIZED HEALTH CARE EDUCATION/TRAINING PROGRAM

## 2022-09-26 PROCEDURE — 99205 PR OFFICE/OUTPT VISIT, NEW, LEVL V, 60-74 MIN: ICD-10-PCS | Mod: S$GLB,,, | Performed by: SURGERY

## 2022-09-26 PROCEDURE — 99205 OFFICE O/P NEW HI 60 MIN: CPT | Mod: S$GLB,,, | Performed by: SURGERY

## 2022-09-26 PROCEDURE — 99999 PR PBB SHADOW E&M-EST. PATIENT-LVL III: CPT | Mod: PBBFAC,,, | Performed by: RADIOLOGY

## 2022-09-26 PROCEDURE — 3008F BODY MASS INDEX DOCD: CPT | Mod: CPTII,S$GLB,, | Performed by: RADIOLOGY

## 2022-09-26 PROCEDURE — 3074F SYST BP LT 130 MM HG: CPT | Mod: CPTII,S$GLB,, | Performed by: RADIOLOGY

## 2022-09-26 PROCEDURE — 3079F DIAST BP 80-89 MM HG: CPT | Mod: CPTII,S$GLB,, | Performed by: RADIOLOGY

## 2022-09-26 PROCEDURE — 3079F PR MOST RECENT DIASTOLIC BLOOD PRESSURE 80-89 MM HG: ICD-10-PCS | Mod: CPTII,S$GLB,, | Performed by: RADIOLOGY

## 2022-09-26 PROCEDURE — 3079F DIAST BP 80-89 MM HG: CPT | Mod: CPTII,S$GLB,, | Performed by: SURGERY

## 2022-09-26 PROCEDURE — 3074F PR MOST RECENT SYSTOLIC BLOOD PRESSURE < 130 MM HG: ICD-10-PCS | Mod: CPTII,S$GLB,, | Performed by: RADIOLOGY

## 2022-09-26 PROCEDURE — 3079F DIAST BP 80-89 MM HG: CPT | Mod: CPTII,S$GLB,, | Performed by: STUDENT IN AN ORGANIZED HEALTH CARE EDUCATION/TRAINING PROGRAM

## 2022-09-26 RX ORDER — DOXYCYCLINE 100 MG/1
TABLET ORAL
COMMUNITY
Start: 2022-04-21 | End: 2022-09-28

## 2022-09-26 RX ORDER — TOBRAMYCIN AND DEXAMETHASONE 3; 1 MG/ML; MG/ML
SUSPENSION/ DROPS OPHTHALMIC
COMMUNITY
Start: 2022-04-21 | End: 2022-09-28

## 2022-09-26 NOTE — PROGRESS NOTES
Subjective:                                                                                    Consult note   Patient ID:    Name: Josefina Coon  : 1965  MRN: [id number]      HPI:   Josefina Coon is a 57 y.o. female presents for evaluation of Invasive ductal carcinoma of breast, female, left and Breast Cancer    Patient was seen as part of a Multi-D clinic with Radiation Oncology,Surgical Oncology and Medical Oncology. Case was also presented at breast cancer tumor conference.     22 bilateral screening mammogram,,Right neg ,Left central post mass     22 left diag MMG, left US limited .Left 0300 lateral posterior 6cm FN 1.4cm mass , left axilla LN 2, up to 4mm cortex     22 left 0300 lateral posterior 6cm FN 1.4cm mass US biopsy .Grade 3 ,1.1cm in biopsy No LVI , ER 84% MS 28% Her 2 3+ pos Ki 52 %    Left axilla LN biopsy ;Benign fatty tissue, no LN, not concordant No MRI of breasts were done      22 US bilateral complete Right neg, right LN nml , Left 0300 6cm FN [id number] mass Borderline LN left axilla     22 Left axilla LN biopsy benign LN , so eventually Stage IA triple positive Breast cancer    Today, patient presented with her her friend, discussion about neoadjuvant chemotherapy and side effects was done. She denies any symptoms, no breast pain, nipple discharge, no fever or chills, no nausea or vomiting, no back or abdominal pain       Oncology History   Invasive ductal carcinoma of breast, female, left   2022 Initial Diagnosis    Invasive ductal carcinoma of breast, female, left     2022 Cancer Staged    Staging form: Breast, AJCC 8th Edition  - Clinical stage from 2022: Stage IA (cT1c, cN0(f), cM0, G3, ER+, MS+, HER2+)       2022 -  Chemotherapy    Treatment Summary   Plan Name: OP BREAST TRASTUZUMAB PACLITAXEL WEEKLY  Treatment Goal: Curative  Status: Active  Start Date: 2022 (Planned)  End Date: 2023 (Planned)  Provider: Lisa Jaquez  MD  Chemotherapy: PACLitaxeL (TAXOL) 80 mg/m2 = 204 mg in sodium chloride 0.9% 250 mL chemo infusion, 80 mg/m2, Intravenous, Clinic/HOD 1 time, 0 of 12 cycles  pertuzumab (PERJETA) 840 mg in sodium chloride 0.9% 278 mL infusion, 840 mg (original dose ), Intravenous, Clinic/HOD 1 time, 0 of 2 cycles  Dose modification: 840 mg (Cycle 1, Reason: Other (see comments)), 420 mg (Cycle 4, Reason: Other (see comments))  trastuzumab-dkst (OGIVRI) 591 mg in sodium chloride 0.9% 250 mL chemo infusion, 4 mg/kg, Intravenous, Clinic/HOD 1 time, 0 of 25 cycles              Past Medical History:   Diagnosis Date    Abnormal liver enzymes     Asymptomatic varicose veins     Depressive disorder, not elsewhere classified     Dyslipidemia     Embolism and thrombosis of unspecified site     superficial venous thrombosis    Encounter for long-term (current) use of other medications     Family history of ischemic heart disease     Fatty liver     Generalized anxiety disorder     History of chicken pox     Obstructive sleep apnea (adult) (pediatric)     Type II or unspecified type diabetes mellitus without mention of complication, not stated as uncontrolled     Unspecified essential hypertension     Unspecified venous (peripheral) insufficiency     Unspecified vitamin D deficiency        Past Surgical History:   Procedure Laterality Date     SECTION      VEIN SURGERY      Laser, Dr. Larsen       Family History   Problem Relation Age of Onset    Hyperlipidemia Mother     Hypertension Mother     Vulvar Cancer Mother     Diabetes Brother     Cancer Brother         colon    Stroke Maternal Grandmother        Social History     Socioeconomic History    Marital status:    Tobacco Use    Smoking status: Never    Smokeless tobacco: Never   Substance and Sexual Activity    Alcohol use: Not Currently     Comment: rare        Review of patient's allergies indicates:   Allergen Reactions    Sitagliptin      Other reaction(s):  "(januvia) abdominal pain    Sulfa (sulfonamide antibiotics)     Byetta  [exenatide] Rash    Lactose        Review of Systems   Constitutional: Negative for decreased appetite, fever and night sweats.   Eyes:  Negative for blurred vision, double vision, pain and visual disturbance.   Cardiovascular:  Negative for chest pain, leg swelling and palpitations.   Respiratory:  Negative for cough and shortness of breath.    Hematologic/Lymphatic: Negative for adenopathy.   Skin:  Negative for rash.   Musculoskeletal:  Negative for back pain.   Gastrointestinal:  Negative for abdominal pain, diarrhea, melena, nausea and vomiting.   Genitourinary:  Negative for frequency.   Neurological:  Negative for headaches and seizures.   Psychiatric/Behavioral:  The patient is not nervous/anxious.           Objective:     Vitals:    09/26/22 0837   BP: 122/82   BP Location: Right arm   Patient Position: Sitting   BP Method: Large (Manual)   Pulse: 78   Resp: 18   Temp: 98.1 °F (36.7 °C)   TempSrc: Temporal   SpO2: 98%   Weight: (!) 147.7 kg (325 lb 9.9 oz)   Height: 5' 4" (1.626 m)        Physical Exam  Constitutional:       Appearance: Normal appearance.   Eyes:      General: No scleral icterus.  Neck:      Vascular: No carotid bruit.   Cardiovascular:      Rate and Rhythm: Normal rate.      Heart sounds: No murmur heard.  Pulmonary:      Effort: No respiratory distress.   Abdominal:      General: There is no distension.      Palpations: Abdomen is soft.   Musculoskeletal:         General: No swelling or tenderness.      Cervical back: Neck supple.   Lymphadenopathy:      Cervical: No cervical adenopathy.   Skin:     Coloration: Skin is not jaundiced or pale.   Neurological:      General: No focal deficit present.      Mental Status: She is alert and oriented to person, place, and time.   Psychiatric:         Mood and Affect: Mood normal.         Behavior: Behavior normal.             Current Outpatient Medications on File Prior to " Visit   Medication Sig    albuterol 90 mcg/actuation inhaler Inhale 2 puffs into the lungs every 6 (six) hours as needed for Wheezing.    buPROPion (WELLBUTRIN XL) 150 MG TB24 tablet TAKE 1 TABLET BY MOUTH EVERY DAY    diazePAM (VALIUM) 5 MG tablet Take 1 tablet (5 mg total) by mouth On call Procedure (30 minutes prior to MRI). (Patient not taking: No sig reported)    doxycycline monohydrate 100 mg Tab 1 TABLET BY MOUTH ONCE A DAY FOR 10 DAYS    dulaglutide (TRULICITY) 3 mg/0.5 mL pen injector Inject 3 mg into the skin every 7 days.    EPINEPHrine (EPIPEN) 0.3 mg/0.3 mL AtIn Inject into the muscle.    ergocalciferol (ERGOCALCIFEROL) 50,000 unit Cap TAKE 1 CAPSULE BY MOUTH ONE TIME PER WEEK    metFORMIN (GLUCOPHAGE) 500 MG tablet TAKE 1 TABLET BY MOUTH TWICE A DAY WITH MEALS    omeprazole (PRILOSEC OTC) 20 MG tablet Take 1 tablet (20 mg total) by mouth once daily.    pravastatin (PRAVACHOL) 10 MG tablet TAKE 1 TABLET BY MOUTH EVERYDAY AT BEDTIME    telmisartan-hydrochlorothiazide (MICARDIS HCT) 80-25 mg per tablet TAKE 1 TABLET BY MOUTH EVERY DAY    tobramycin-dexamethasone 0.3-0.1% (TOBRADEX) 0.3-0.1 % DrpS INSTILL 1 DROP INTO LEFT EYE 4 TIMES A DAY FOR 10 DAYS     No current facility-administered medications on file prior to visit.       CBC:  Lab Results   Component Value Date    WBC 6.46 04/08/2022    HGB 12.4 04/08/2022    HCT 38.6 04/08/2022    MCV 92 04/08/2022     04/08/2022     CMP:  Sodium   Date Value Ref Range Status   04/08/2022 135 (L) 136 - 145 mmol/L Final   08/08/2015 137 137 - 145 MMOL/L Final     Potassium   Date Value Ref Range Status   04/08/2022 4.4 3.5 - 5.1 mmol/L Final   08/08/2015 4.4 3.5 - 5.1 MMOL/L Final     Chloride   Date Value Ref Range Status   04/08/2022 98 95 - 110 mmol/L Final   08/08/2015 101 98 - 107 MMOL/L Final     CO2   Date Value Ref Range Status   04/08/2022 27 22 - 31 mmol/L Final     Glucose   Date Value Ref Range Status   04/08/2022 287 (H) 70 - 110 mg/dL Final      Comment:     The ADA recommends the following guidelines for fasting glucose:    Normal:       less than 100 mg/dL    Prediabetes:  100 mg/dL to 125 mg/dL    Diabetes:     126 mg/dL or higher       BUN   Date Value Ref Range Status   04/08/2022 20 (H) 7 - 18 mg/dL Final     Creatinine   Date Value Ref Range Status   04/08/2022 0.78 0.50 - 1.40 mg/dL Final   08/08/2015 0.63 0.52 - 1.04 MG/DL Final     Calcium   Date Value Ref Range Status   04/08/2022 9.2 8.4 - 10.2 mg/dL Final     Total Protein   Date Value Ref Range Status   04/08/2022 7.5 6.0 - 8.4 g/dL Final     Albumin   Date Value Ref Range Status   04/08/2022 4.1 3.5 - 5.2 g/dL Final     Total Bilirubin   Date Value Ref Range Status   04/08/2022 0.4 0.2 - 1.3 mg/dL Final     Alkaline Phosphatase   Date Value Ref Range Status   04/08/2022 110 38 - 145 U/L Final     AST   Date Value Ref Range Status   04/08/2022 40 (H) 14 - 36 U/L Final     ALT   Date Value Ref Range Status   04/08/2022 45 (H) 0 - 35 U/L Final     Anion Gap   Date Value Ref Range Status   04/08/2022 10 8 - 16 mmol/L Final     eGFR if    Date Value Ref Range Status   04/08/2022 >60 >60 mL/min/1.73 m^2 Final     eGFR if non    Date Value Ref Range Status   04/08/2022 >60 >60 mL/min/1.73 m^2 Final     Comment:     Calculation used to obtain the estimated glomerular filtration  rate (eGFR) is the CKD-EPI equation.          US Breast Biopsy with Imaging 1st site Left  Addendum: Pathology results demonstrate the following:     LYMPH NODE, LEFT AXILLARY, NEEDLE CORE BIOPSY:    --FRAGMENTS OF BENIGN LYMPH NODE.      This is benign and concordant with imaging findings. Recommend plan per  surgery and oncology teams for known left breast malignancy.  Narrative: Ultrasound Guided Breast Biopsy, LEFT    Patient gave and signed informed consent, and routine time-out confirmed   the procedure.    Site: [LEFT axillary node], infinity clip.    Sterile technique:  ChloraPrep    Anesthesia: Local lidocaine without and with epinephrine    Device: 14-gauge Marquee    3 samples. All samples placed in formalin and sent for pathology   evaluation.    No complications.    Post-biopsy mammogram not performed due to axillary location; ultrasound   showed appropriate position.   Impression:    Successful ultrasound guided biopsy LEFT axillary node with placement of   an infinity-shaped biopsy clip. Pathology pending.        ECOG SCORE    1 - Restricted in strenuous activity-ambulatory and able to carry out work of a light nature          All pertinent labs and imaging reviewed. As well as outside records     Assessment:       1. Invasive ductal carcinoma of breast, female, left    2. Obesity, unspecified classification, unspecified obesity type, unspecified whether serious comorbidity present    3. Abnormal liver enzymes    4. Obstructive sleep apnea (adult) (pediatric)    5. Fatty liver    6. Diabetic mononeuropathy associated with type 2 diabetes mellitus      # Stage IA IDC of Left breast:  -Screening mammogram, no symptoms, has baseline fatty liver with mild elevated LFT's no need for further systematic imaging  - discussion given her cT1c triple positive breast cancer neoadjuvant is consider, given her young age and Ki67%, will proceed with TH, adding P to help with a better pCR, will also plan to get ultrasound mid cycle for monitor   -  will need echocardiogram, navigator, chemo class, IO, port placement this week     # Fatty liver/ elevated LFT: mild elevation, close monitoring while on taxol infusions, might consider holding statins prior   # DM type 2 with neuropathy: baseline neuropathy, will need to monitor closely for worsening during treatment   # Obesity: will monitor closely while starting chemotherapy      Plan:     Invasive ductal carcinoma of breast, female, left  -     Ambulatory referral/consult to Hematology / Oncology  -     Echo; Future  -     Ambulatory  referral/consult to Chemo School; Future; Expected date: 10/03/2022    Obesity, unspecified classification, unspecified obesity type, unspecified whether serious comorbidity present    Abnormal liver enzymes    Obstructive sleep apnea (adult) (pediatric)    Fatty liver    Diabetic mononeuropathy associated with type 2 diabetes mellitus       Patient queried and all questions were answered.    Plan was discussed with the patient  and her at length, and they verbalized understanding.        Recommend COVID guidelines being followed   Recommend  exercise, nutrition and weight management and health maintenance activities and follow-up with PCPs recommendations       F/u in needs echo,port, chemo class and blood work (CBC/CMP/Mag/HbA1c) prior to 1st cycle of chemo     Signed:  Lisa Jaquez MD  Hematology and Oncology  [facility]  [facility] [place] [facility]

## 2022-09-26 NOTE — NURSING
Met with patient and friend in multi disciplinary clinic today.  Explained my role as navigator.  Reviewed plan of care and answered questions. Scheduled Echo.  Will schedule chemo class after port is scheduled.  My contact information was provided and pt was encouraged to call with any questions or concerns.  Patient verbalized understanding.

## 2022-09-26 NOTE — NURSING
Met with the patient to discuss what the patient has learned thus far about her diagnosis and answer all questions.  Follow-up appts made and a folder with NCCN patient guidelines book on Invasive/Noninvasive Breast Cancer given to her along with copies of her imaging, biopsy, and pathology reports. Also given a copy of the [facility] Board of Medical Examiners brochure on Introductory Guide to Breast Cancer Treatment Options.  Contact information given to her for nurse navigation and she was told to call for any questions or concerns.  She verbalized understanding.  Consents signed for left lumpectomy with left sentinel node biopsy.  Post-op instructions given to the patient both written and verbally.  She verbalized understanding.  Referrals placed for IO, Prehab, and mike- ([facility]).  The following was explained in detail. The patient understands that this is a voluntary test.    PURPOSE: This test analyzes a specific gene or genes for genetic changes called mutations. This test will help determine if a person has a significantly increased risk if developing certain tumors due to a mutation in a cancer predisposing gene.    TEST RESULTS & INTERPRETATION: Possible result outcomes include positive, negative, and uncertain. A positive mutation is associated with an increased risk for hereditary cancer. A negative result is interpreted that a mutation was not identified. Uncertain means a genetic change was detected but it is not known if this change is linked to cancer risk.    BENEFITS: The benefits include informed choices about health care such as screening, risk reducing surgeries and preventive medication strategies.    RISKS: Concerns regarding possible health insurance discrimination, most states and the federal government have enacted laws to prohibit genetic discrimination.    LIMITATIONS: This test analyzes only certain important genes associated with a specific  hereditary cancer syndrome. Genetic testing clarifies cancer risks for only those cancers related to the genes analyzed.    FINANCIAL RESPONSIBILITY: Genetic testing of appropriate individuals is typically reimbursed by health insurance. You are responsible for any cost of the genetic test not reimbursed by insurance.     PATIENT INSTRUCTIONS & COLLECTION PROCESS:  Saliva collected.  Place tube into plastic box and sent off through [employer].  Informed patient results can take up to 2-4 weeks.  Will call patient with results.

## 2022-09-27 ENCOUNTER — TELEPHONE (OUTPATIENT)
Dept: SURGERY | Facility: CLINIC | Age: 57
End: 2022-09-27
Payer: COMMERCIAL

## 2022-09-27 NOTE — PROGRESS NOTES
Interdisciplinary Breast Cancer Conference    Josefina Coon    Female    Date Presented to Tumor Board: 09/26/22    Presenting Hospital / Clinic: [facility]    Tumor Laterality: Left    Breast Tumor Site: Central    Presenter: Maria G Mcduffie    Reason for Consultation: Initial Presentation    Specialties Present: Medical Oncology;Radiation Oncology;Surgical Oncology;Pathology;Navigation;Research;Integrative Oncology;Radiology    Patient Status: a new patient              ER: Positive    SD: Positive    Her2: Postive    Cancer Staging   Invasive ductal carcinoma of breast, female, left  Staging form: Breast, AJCC 8th Edition  - Clinical stage from 9/23/2022: Stage IA (cT1c, cN0(f), cM0, G3, ER+, SD+, HER2+) - Signed by Maria G Mcduffie MD on 9/23/2022         Recommended Therapy:  Genetic testing  Neoadjuvant chemotherapy  imaging  Surgery  Radiation   Endocrine Therapy  Recommendations pending final path      Presentation at Cancer Conference: Prospective

## 2022-09-27 NOTE — TELEPHONE ENCOUNTER
Patient called us questioning as to when she needed to have chemo school scheduled.  Instructed her that we will call her as soon as we see when her pre-op nurse appt is.  She will try to have both visits scheduled on the same day, if possible.

## 2022-09-28 PROBLEM — E11.41 DIABETIC MONONEUROPATHY ASSOCIATED WITH TYPE 2 DIABETES MELLITUS: Status: ACTIVE | Noted: 2022-09-28

## 2022-09-29 ENCOUNTER — OFFICE VISIT (OUTPATIENT)
Dept: HEMATOLOGY/ONCOLOGY | Facility: CLINIC | Age: 57
End: 2022-09-29
Payer: COMMERCIAL

## 2022-09-29 ENCOUNTER — TELEPHONE (OUTPATIENT)
Dept: HEMATOLOGY/ONCOLOGY | Facility: CLINIC | Age: 57
End: 2022-09-29
Payer: COMMERCIAL

## 2022-09-29 ENCOUNTER — CLINICAL SUPPORT (OUTPATIENT)
Dept: CARDIOLOGY | Facility: HOSPITAL | Age: 57
End: 2022-09-29
Attending: STUDENT IN AN ORGANIZED HEALTH CARE EDUCATION/TRAINING PROGRAM
Payer: COMMERCIAL

## 2022-09-29 VITALS
BODY MASS INDEX: 48.82 KG/M2 | DIASTOLIC BLOOD PRESSURE: 82 MMHG | SYSTOLIC BLOOD PRESSURE: 121 MMHG | HEART RATE: 70 BPM | WEIGHT: 293 LBS | HEIGHT: 65 IN | TEMPERATURE: 97 F | OXYGEN SATURATION: 100 %

## 2022-09-29 VITALS — BODY MASS INDEX: 47.09 KG/M2 | HEIGHT: 66 IN | WEIGHT: 293 LBS

## 2022-09-29 DIAGNOSIS — F41.1 GENERALIZED ANXIETY DISORDER: ICD-10-CM

## 2022-09-29 DIAGNOSIS — C50.912 INVASIVE DUCTAL CARCINOMA OF BREAST, FEMALE, LEFT: Primary | ICD-10-CM

## 2022-09-29 DIAGNOSIS — R45.89 ANXIETY ABOUT HEALTH: ICD-10-CM

## 2022-09-29 DIAGNOSIS — C50.912 INVASIVE DUCTAL CARCINOMA OF BREAST, FEMALE, LEFT: ICD-10-CM

## 2022-09-29 PROCEDURE — 3079F PR MOST RECENT DIASTOLIC BLOOD PRESSURE 80-89 MM HG: ICD-10-PCS | Mod: CPTII,S$GLB,, | Performed by: NURSE PRACTITIONER

## 2022-09-29 PROCEDURE — 93356 MYOCRD STRAIN IMG SPCKL TRCK: CPT | Mod: PO

## 2022-09-29 PROCEDURE — 3008F PR BODY MASS INDEX (BMI) DOCUMENTED: ICD-10-PCS | Mod: CPTII,S$GLB,, | Performed by: NURSE PRACTITIONER

## 2022-09-29 PROCEDURE — 1160F RVW MEDS BY RX/DR IN RCRD: CPT | Mod: CPTII,S$GLB,, | Performed by: NURSE PRACTITIONER

## 2022-09-29 PROCEDURE — 3079F DIAST BP 80-89 MM HG: CPT | Mod: CPTII,S$GLB,, | Performed by: NURSE PRACTITIONER

## 2022-09-29 PROCEDURE — 93306 ECHO (CUPID ONLY): ICD-10-PCS | Mod: 26,,, | Performed by: INTERNAL MEDICINE

## 2022-09-29 PROCEDURE — 99999 PR PBB SHADOW E&M-EST. PATIENT-LVL IV: ICD-10-PCS | Mod: PBBFAC,,, | Performed by: NURSE PRACTITIONER

## 2022-09-29 PROCEDURE — 1160F PR REVIEW ALL MEDS BY PRESCRIBER/CLIN PHARMACIST DOCUMENTED: ICD-10-PCS | Mod: CPTII,S$GLB,, | Performed by: NURSE PRACTITIONER

## 2022-09-29 PROCEDURE — 3074F PR MOST RECENT SYSTOLIC BLOOD PRESSURE < 130 MM HG: ICD-10-PCS | Mod: CPTII,S$GLB,, | Performed by: NURSE PRACTITIONER

## 2022-09-29 PROCEDURE — 99999 PR PBB SHADOW E&M-EST. PATIENT-LVL IV: CPT | Mod: PBBFAC,,, | Performed by: NURSE PRACTITIONER

## 2022-09-29 PROCEDURE — 93356 MYOCRD STRAIN IMG SPCKL TRCK: CPT | Mod: ,,, | Performed by: INTERNAL MEDICINE

## 2022-09-29 PROCEDURE — 1159F MED LIST DOCD IN RCRD: CPT | Mod: CPTII,S$GLB,, | Performed by: NURSE PRACTITIONER

## 2022-09-29 PROCEDURE — 3008F BODY MASS INDEX DOCD: CPT | Mod: CPTII,S$GLB,, | Performed by: NURSE PRACTITIONER

## 2022-09-29 PROCEDURE — 93356 ECHO (CUPID ONLY): ICD-10-PCS | Mod: ,,, | Performed by: INTERNAL MEDICINE

## 2022-09-29 PROCEDURE — 3074F SYST BP LT 130 MM HG: CPT | Mod: CPTII,S$GLB,, | Performed by: NURSE PRACTITIONER

## 2022-09-29 PROCEDURE — 3046F PR MOST RECENT HEMOGLOBIN A1C LEVEL > 9.0%: ICD-10-PCS | Mod: CPTII,S$GLB,, | Performed by: NURSE PRACTITIONER

## 2022-09-29 PROCEDURE — 93306 TTE W/DOPPLER COMPLETE: CPT | Mod: 26,,, | Performed by: INTERNAL MEDICINE

## 2022-09-29 PROCEDURE — 99215 PR OFFICE/OUTPT VISIT, EST, LEVL V, 40-54 MIN: ICD-10-PCS | Mod: S$GLB,,, | Performed by: NURSE PRACTITIONER

## 2022-09-29 PROCEDURE — 3046F HEMOGLOBIN A1C LEVEL >9.0%: CPT | Mod: CPTII,S$GLB,, | Performed by: NURSE PRACTITIONER

## 2022-09-29 PROCEDURE — 99215 OFFICE O/P EST HI 40 MIN: CPT | Mod: S$GLB,,, | Performed by: NURSE PRACTITIONER

## 2022-09-29 PROCEDURE — 1159F PR MEDICATION LIST DOCUMENTED IN MEDICAL RECORD: ICD-10-PCS | Mod: CPTII,S$GLB,, | Performed by: NURSE PRACTITIONER

## 2022-09-29 NOTE — PROGRESS NOTES
Josefina Coon  57 y.o. is here to seek an integrative approach to discuss side effects related to breast cancer treatment. Josefina Coon  was referred by Dr. 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 She reports she has a lot of anxiety regarding getting her port and starting chemo and she asked about getting xanax to take before her first few chemo's due to anxiety.   She reports she struggles going to bed early due to having things to do around the house. She goes to be around 11 or 12 and then wakes up around 6:30. She reports since the diagnosis she really has started avoiding sugar. She has been trying to eat healthy for a few months and has lost 15 pounds. She has also started walking.   She is  and has twin 23 year olds. One of her sons has autism and the second son is going to come with her to chemo class and help as needed. She has a good support system with her friends. She reports for years she has been so busy and she is now trying to focus on taking time for herself.       7 pillars Assessment    Sleep  How many hours of sleep per night?  7 1/2 hours  Do you have trouble falling asleep, staying asleep or waking up earlier than you need to? yes  Do you have daytime fatigue? no  Do you need medication for sleep? no  Do you use any supplements or other interventions for sleep? yes Magnesium and a Unisom occasionally    Resilience  Rate your current level of stress- high  How do you manage stress? She is trying to make herself walk 20-30 minutes a day    Purpose  Do you feel you have a vision or a life purpose? Yes her kids.     Environment  Any exposures:no known exposures    Spirituality- yes, Rastafari and Scientology    Nutrition   Food allergies or sensitivities: yes Oysters and clams  Do you adhere to a particular type of diet? yes She is gearing towards a Mediterranean diet.   Do you have any concerns with your eating habits? no    Exercise  How would you  describe your physical activity level? moderate  Do you work at a sedentary job? no  What do you do for physical activity? Walk    Past Medical History  Past Medical History:   Diagnosis Date    Abnormal liver enzymes     Asymptomatic varicose veins     Depressive disorder, not elsewhere classified     Dyslipidemia     Embolism and thrombosis of unspecified site     superficial venous thrombosis    Encounter for long-term (current) use of other medications     Family history of ischemic heart disease     Fatty liver     Generalized anxiety disorder     History of chicken pox     Obstructive sleep apnea (adult) (pediatric)     Type II or unspecified type diabetes mellitus without mention of complication, not stated as uncontrolled     Unspecified essential hypertension     Unspecified venous (peripheral) insufficiency     Unspecified vitamin D deficiency       Past Surgical History   Past Surgical History:   Procedure Laterality Date     SECTION      VEIN SURGERY      Laser, Dr. Larsen      Family History   Family History   Problem Relation Age of Onset    Hyperlipidemia Mother     Hypertension Mother     Vulvar Cancer Mother     Diabetes Brother     Cancer Brother         colon    Stroke Maternal Grandmother       Social History  Social History     Socioeconomic History    Marital status:    Tobacco Use    Smoking status: Never    Smokeless tobacco: Never   Substance and Sexual Activity    Alcohol use: Not Currently     Comment: rare     Drug use: Never    Sexual activity: Not Currently        Allergies  Review of patient's allergies indicates:   Allergen Reactions    Sitagliptin      Other reaction(s): (januvia) abdominal pain    Sulfa (sulfonamide antibiotics)     Byetta  [exenatide] Rash    Lactose         Current Medications:    Current Outpatient Medications:     albuterol 90 mcg/actuation inhaler, Inhale 2 puffs into the lungs every 6 (six) hours as needed for Wheezing., Disp: 18 g, Rfl: 6     buPROPion (WELLBUTRIN XL) 150 MG TB24 tablet, TAKE 1 TABLET BY MOUTH EVERY DAY, Disp: 90 tablet, Rfl: 3    dulaglutide (TRULICITY) 3 mg/0.5 mL pen injector, Inject 3 mg into the skin every 7 days., Disp: 4 pen, Rfl: 11    ergocalciferol (ERGOCALCIFEROL) 50,000 unit Cap, TAKE 1 CAPSULE BY MOUTH ONE TIME PER WEEK, Disp: 12 capsule, Rfl: 0    metFORMIN (GLUCOPHAGE) 500 MG tablet, TAKE 1 TABLET BY MOUTH TWICE A DAY WITH MEALS, Disp: 180 tablet, Rfl: 1    omeprazole (PRILOSEC OTC) 20 MG tablet, Take 1 tablet (20 mg total) by mouth once daily., Disp: 30 tablet, Rfl: 6    pravastatin (PRAVACHOL) 10 MG tablet, TAKE 1 TABLET BY MOUTH EVERYDAY AT BEDTIME, Disp: 90 tablet, Rfl: 1    telmisartan-hydrochlorothiazide (MICARDIS HCT) 80-25 mg per tablet, TAKE 1 TABLET BY MOUTH EVERY DAY, Disp: 90 tablet, Rfl: 1    EPINEPHrine (EPIPEN) 0.3 mg/0.3 mL AtIn, Inject into the muscle., Disp: , Rfl:      Review of Systems  Review of Systems   Constitutional: Negative.    Eyes: Negative.    Respiratory: Negative.     Cardiovascular: Negative.    Gastrointestinal: Negative.    Genitourinary: Negative.    Musculoskeletal: Negative.    Skin: Negative.    Neurological: Negative.    Endo/Heme/Allergies: Negative.    Psychiatric/Behavioral:  The patient is nervous/anxious.       Physical Exam      Vitals:    09/29/22 1111   BP: 121/82   Pulse: 70   Temp: 97.1 °F (36.2 °C)     Body mass index is 55.03 kg/m².  Physical Exam  Vitals reviewed.   Constitutional:       Appearance: Normal appearance.   Neurological:      Mental Status: She is alert.   Psychiatric:         Mood and Affect: Mood normal.         Behavior: Behavior normal.      ASSESSMENT :  1. Invasive ductal carcinoma of breast, female, left    2. Anxiety about health    3. Generalized anxiety disorder        PLAN:  Reviewed all information discussed at today's visit and all questions were answered.    Counseled on healthy lifestyle and behavior modifications: Continue to walk, ,  Maintaining a healthy weight, . Also encouraged wide variety of fruits and vegetables  Referral to PT placed by Dr. Mcduffie  Referral to Oncology Psychology  I discussed and recommended the following support services:  Marvin [employer] and Yoga   Music and Relaxation therapy   Meditation  Social workers notified Mrs. Rocha would like the [facility]     Follow up with Integrative Services in 1 month chairside    I spent a total of 46 minutes on the day of the visit.This includes face to face time and non-face to face time preparing to see the patient (eg, review of tests), obtaining and/or reviewing separately obtained history, documenting clinical information in the electronic or other health record, independently interpreting results and communicating results to the patient/family/caregiver, or care coordinator.

## 2022-09-29 NOTE — PROGRESS NOTES
PATIENT: Josefina Coon  MRN: [id number]  DATE: 9/30/2022      Diagnosis:   1. Invasive ductal carcinoma of breast, female, left    2. Diabetic mononeuropathy associated with type 2 diabetes mellitus    3. Abnormal liver enzymes    4. Anxiety about health        Chief Complaint: Breast cancer, chemo school     Subjective:   HPI: Ms. Coon is a 57 y.o. female with HTN, HLD, depression, diabetes type 2 with neuropathy, obesity, fatty liver, who has established care with Dr. Jaquez for a diagnosis of invasive ductal carcinoma of the left breast, triple positivity. She is here today today for discussion/education prior to starting treatment with Paclitaxel/Trastuzumab/Pertuzumab D1, D8, D15 of a 21 day cycle. Accompanied by her son.     Echo was done yesterday, 9/29, awaiting reading. Plans for port placement on 10/4/2022.  Patient is feeling anxious about upcoming appointments, requesting something to use PRN for anxiety. She is compliant with her Wellbutrin daily.   Endorses chronic neuropathy and lower extremity swelling, episodic diarrhea.    Oncologic History:   8/25/22 bilateral screening mammogram,,Right neg ,Left central post mass     9/8/22 left diag MMG, left US limited .Left 0300 lateral posterior 6cm FN 1.4cm mass , left axilla LN 2, up to 4mm cortex     9/14/22 left 0300 lateral posterior 6cm FN 1.4cm mass US biopsy .Grade 3 ,1.1cm in biopsy No LVI , ER 84% OH 28% Her 2 3+ pos Ki 52 %    Left axilla LN biopsy ;Benign fatty tissue, no LN, not concordant No MRI of breasts were done      9/21/22 US bilateral complete Right neg, right LN nml , Left 0300 6cm FN [id number] mass Borderline LN left axilla     9/21/22 Left axilla LN biopsy benign LN , so eventually Stage IA triple positive Breast cancer     Past Medical History:   Past Medical History:   Diagnosis Date    Abnormal liver enzymes     Asymptomatic varicose veins     Cancer     left breast cancer    Depressive disorder, not elsewhere classified      Dyslipidemia     Embolism and thrombosis of unspecified site     superficial venous thrombosis    Encounter for long-term (current) use of other medications     Family history of ischemic heart disease     Fatty liver     Generalized anxiety disorder     History of chicken pox     Obstructive sleep apnea (adult) (pediatric)     Type II or unspecified type diabetes mellitus without mention of complication, not stated as uncontrolled     Unspecified essential hypertension     Unspecified venous (peripheral) insufficiency     Unspecified vitamin D deficiency        Past Surgical HIstory:   Past Surgical History:   Procedure Laterality Date     SECTION      VEIN SURGERY      Laser, Dr. Larsen       Family History:   Family History   Problem Relation Age of Onset    Hyperlipidemia Mother     Hypertension Mother     Vulvar Cancer Mother     Diabetes Brother     Cancer Brother         colon    Stroke Maternal Grandmother        Social History:  reports that she has never smoked. She has never used smokeless tobacco. She reports that she does not currently use alcohol. She reports that she does not use drugs.    Allergies:  Review of patient's allergies indicates:   Allergen Reactions    Sitagliptin      Other reaction(s): (januvia) abdominal pain    Sulfa (sulfonamide antibiotics) Hives    Byetta  [exenatide] Rash    Lactose        Medications:  Current Outpatient Medications   Medication Sig Dispense Refill    albuterol 90 mcg/actuation inhaler Inhale 2 puffs into the lungs every 6 (six) hours as needed for Wheezing. 18 g 6    buPROPion (WELLBUTRIN XL) 150 MG TB24 tablet TAKE 1 TABLET BY MOUTH EVERY DAY 90 tablet 3    [START ON 10/2/2022] chlorhexidine (PERIDEX) 0.12 % solution Use as directed 10 mLs in the mouth or throat 2 (two) times daily.      dulaglutide (TRULICITY) 3 mg/0.5 mL pen injector Inject 3 mg into the skin every 7 days. 4 pen 11    ergocalciferol (ERGOCALCIFEROL) 50,000 unit Cap TAKE 1 CAPSULE BY  MOUTH ONE TIME PER WEEK 12 capsule 0    magnesium 30 mg Tab Take 30 mg by mouth once.      metFORMIN (GLUCOPHAGE) 500 MG tablet TAKE 1 TABLET BY MOUTH TWICE A DAY WITH MEALS 180 tablet 1    [START ON 10/2/2022] mupirocin (BACTROBAN) 2 % ointment 1 g by Nasal route 2 (two) times daily.      omeprazole (PRILOSEC OTC) 20 MG tablet Take 1 tablet (20 mg total) by mouth once daily. 30 tablet 6    pravastatin (PRAVACHOL) 10 MG tablet TAKE 1 TABLET BY MOUTH EVERYDAY AT BEDTIME 90 tablet 1    telmisartan-hydrochlorothiazide (MICARDIS HCT) 80-25 mg per tablet TAKE 1 TABLET BY MOUTH EVERY DAY 90 tablet 1     No current facility-administered medications for this visit.       Review of Systems   Constitutional:  Negative for fatigue and fever.   Cardiovascular:  Positive for leg swelling.   Gastrointestinal:  Negative for blood in stool, constipation and nausea.   Genitourinary:  Negative for difficulty urinating.   Skin:  Negative for rash.   Neurological:  Negative for headaches.   Psychiatric/Behavioral:  The patient is nervous/anxious.      ECOG Performance Status:   ECOG SCORE    0 - Fully active-able to carry on all pre-disease performance without restriction         Objective:      Vitals:   Vitals:    09/30/22 1112   BP: 118/78   Pulse: 75   Resp: 18   Temp: 96.6 °F (35.9 °C)   SpO2: 99%   Weight: (!) 148.5 kg (327 lb 6.1 oz)     BMI: Body mass index is 53.65 kg/m².    Physical Exam  Vitals reviewed.   Constitutional:       General: She is not in acute distress.     Appearance: She is obese. She is not diaphoretic.   HENT:      Head: Normocephalic and atraumatic.   Eyes:      General: No scleral icterus.        Right eye: No discharge.         Left eye: No discharge.   Pulmonary:      Effort: Pulmonary effort is normal. No respiratory distress.   Skin:     Coloration: Skin is not jaundiced.      Findings: No rash.   Neurological:      Mental Status: She is alert and oriented to person, place, and time.   Psychiatric:          Behavior: Behavior normal.       Laboratory Data:  Lab Results   Component Value Date    WBC 5.82 09/30/2022    HGB 12.9 09/30/2022    HCT 39.1 09/30/2022    MCV 91 09/30/2022     09/30/2022        Assessment:       1. Invasive ductal carcinoma of breast, female, left    2. Diabetic mononeuropathy associated with type 2 diabetes mellitus    3. Abnormal liver enzymes    4. Anxiety about health         Plan:   Invasive ductal carcinoma of the breast, left  - cT1c triple positive breast cancer  (ER 84% [place] 28% Her 2 3+ pos Ki 52 %), given her young age and Ki67%, will proceed with TH, adding P to help with a better pCR, will also plan to get ultrasound mid cycle for monitor   -CBC and CMP done today   -Plans to start treatment with THP on 10/6/2022, keep appointments as planned     Chemotherapy education   -Treatment plan of Paclitaxel/Trastuzumab/Pertuzumab D1, D8, D15 of a 21 day cycle discussed with patient and family.  -Handouts provided from [email or web address] on chemotherapy agents.    -Discussed the mechanism of action, potential side effects of this treatment as well as ways to manage them at home. Some of these side effects include but not limited to fever, nausea, vomiting, decreased appetite, fatigue, weakness, cytopenia's, myalgia/arthralgia, constipation, diarrhea, bleeding, headache, nail changes, taste change, hair thinning/loss, peripheral neuropathy, kidney toxicity, or ototoxicity.   -Dietary modifications discussed.  Detail instructions to be provided by dietician.   -Chemotherapy Binder supplied with contact information.   -Discussed follow-up with the physician for toxicity monitoring throughout treatment.    -No refill requests at this time; Prescription for EMLA cream to use one hour prior to port access, prescriptions for Ondansetron and Phenergan sent to pharmacy as well   -The patient and family verbalized understanding. All questions were answered and an informed consent was  obtained.  -Chemo  referral entered today     Diabetes type 2 with neuropathy   -Taking Metformin, Trulicity   -Will need to monitor for worsening neuropathy while undergoing treatment     Abnormal liver enzymes  -Currently taking Pravastatin, may consider holding while getting Taxol  -Monitor closely while on therapy     Anxiety  -Taking Wellbutrin  -Has anticipatory anxiety related to treatment and upcoming appointments  -Will send in Rx for Xanax 0.25 mg po to use for upcoming appointments  -Referral to Onc Psych    Advance Care Planning     Date: 09/30/2022    Power of   I initiated the process of advance care planning today and explained the importance of this process to the patient.  I introduced the concept of advance directives to the patient, as well. Then the patient received detailed information about the importance of designating a Health Care Power of  (HCPOA). She was also instructed to communicate with this person about their wishes for future healthcare, should she become sick and lose decision-making capacity. The patient has not previously appointed a HCPOA. After our discussion, the patient has agreed to take the information packet to review, will bring back to future appointment.  I spent a total time of 5 minutes discussing this issue with the patient.            Route Chart for Scheduling  Med Onc Route Chart for Scheduling    Treatment Plan Information   OP BREAST PACLITAXEL TRASTUZUMAB WEEKLY WITH PERTUZUMAB Q3W (THP)   Lisa Jaquez MD   Upcoming Treatment Dates - OP BREAST PACLITAXEL TRASTUZUMAB WEEKLY WITH PERTUZUMAB Q3W (THP)    10/4/2022       Pre-Medications       diphenhydrAMINE (BENADRYL) 50 mg in sodium chloride 0.9% 50 mL IVPB       famotidine (PF) injection 20 mg       dexamethasone (DECADRON) 10 mg in sodium chloride 0.9% 50 mL IVPB       Chemotherapy       pertuzumab (PERJETA) 840 mg in sodium chloride 0.9% 278 mL infusion       trastuzumab-dkst (OGIVRI)  in sodium chloride 0.9% chemo infusion       PACLitaxeL (TAXOL) 80 mg/m2 in sodium chloride 0.9% 250 mL chemo infusion  10/11/2022       Pre-Medications       diphenhydrAMINE (BENADRYL) 50 mg in sodium chloride 0.9% 50 mL IVPB       famotidine (PF) injection 20 mg       dexamethasone (DECADRON) 10 mg in sodium chloride 0.9% 50 mL IVPB       Chemotherapy       PACLitaxeL (TAXOL) in sodium chloride 0.9% 250 mL chemo infusion       trastuzumab-dkst (OGIVRI) in sodium chloride 0.9% chemo infusion  10/18/2022       Pre-Medications       diphenhydrAMINE (BENADRYL) 50 mg in sodium chloride 0.9% 50 mL IVPB       famotidine (PF) injection 20 mg       dexamethasone (DECADRON) 10 mg in sodium chloride 0.9% 50 mL IVPB       Chemotherapy       trastuzumab-dkst (OGIVRI) in sodium chloride 0.9% chemo infusion       PACLitaxeL (TAXOL) in sodium chloride 0.9% 250 mL chemo infusion  10/25/2022       Pre-Medications       diphenhydrAMINE (BENADRYL) 50 mg in sodium chloride 0.9% 50 mL IVPB       famotidine (PF) injection 20 mg       dexamethasone (DECADRON) 10 mg in sodium chloride 0.9% 50 mL IVPB       Chemotherapy       pertuzumab (PERJETA) 420 mg in sodium chloride 0.9% 264 mL infusion       trastuzumab-dkst (OGIVRI) in sodium chloride 0.9% chemo infusion       PACLitaxeL (TAXOL) 80 mg/m2 in sodium chloride 0.9% 250 mL chemo infusion

## 2022-09-30 ENCOUNTER — DOCUMENTATION ONLY (OUTPATIENT)
Dept: INFUSION THERAPY | Facility: HOSPITAL | Age: 57
End: 2022-09-30
Payer: COMMERCIAL

## 2022-09-30 ENCOUNTER — LAB VISIT (OUTPATIENT)
Dept: LAB | Facility: HOSPITAL | Age: 57
End: 2022-09-30
Attending: STUDENT IN AN ORGANIZED HEALTH CARE EDUCATION/TRAINING PROGRAM
Payer: COMMERCIAL

## 2022-09-30 ENCOUNTER — CLINICAL SUPPORT (OUTPATIENT)
Dept: HEMATOLOGY/ONCOLOGY | Facility: CLINIC | Age: 57
End: 2022-09-30
Payer: COMMERCIAL

## 2022-09-30 DIAGNOSIS — E11.41 DIABETIC MONONEUROPATHY ASSOCIATED WITH TYPE 2 DIABETES MELLITUS: ICD-10-CM

## 2022-09-30 DIAGNOSIS — C50.912 INVASIVE DUCTAL CARCINOMA OF BREAST, FEMALE, LEFT: Primary | ICD-10-CM

## 2022-09-30 DIAGNOSIS — R74.8 ABNORMAL LIVER ENZYMES: ICD-10-CM

## 2022-09-30 DIAGNOSIS — R45.89 ANXIETY ABOUT HEALTH: ICD-10-CM

## 2022-09-30 DIAGNOSIS — C50.912 INVASIVE DUCTAL CARCINOMA OF BREAST, FEMALE, LEFT: ICD-10-CM

## 2022-09-30 DIAGNOSIS — G47.33 OBSTRUCTIVE SLEEP APNEA (ADULT) (PEDIATRIC): ICD-10-CM

## 2022-09-30 DIAGNOSIS — E66.9 OBESITY, UNSPECIFIED CLASSIFICATION, UNSPECIFIED OBESITY TYPE, UNSPECIFIED WHETHER SERIOUS COMORBIDITY PRESENT: ICD-10-CM

## 2022-09-30 DIAGNOSIS — K76.0 FATTY LIVER: ICD-10-CM

## 2022-09-30 LAB
ALBUMIN SERPL BCP-MCNC: 3.7 G/DL (ref 3.5–5.2)
ALP SERPL-CCNC: 89 U/L (ref 55–135)
ALT SERPL W/O P-5'-P-CCNC: 26 U/L (ref 10–44)
ANION GAP SERPL CALC-SCNC: 11 MMOL/L (ref 8–16)
ASCENDING AORTA: 3.36 CM
AST SERPL-CCNC: 19 U/L (ref 10–40)
AV INDEX (PROSTH): 0.61
AV MEAN GRADIENT: 7 MMHG
AV PEAK GRADIENT: 13 MMHG
AV VALVE AREA: 2.4 CM2
AV VELOCITY RATIO: 0.61
BILIRUB SERPL-MCNC: 0.5 MG/DL (ref 0.1–1)
BSA FOR ECHO PROCEDURE: 2.61 M2
BUN SERPL-MCNC: 15 MG/DL (ref 6–20)
CALCIUM SERPL-MCNC: 9.7 MG/DL (ref 8.7–10.5)
CHLORIDE SERPL-SCNC: 102 MMOL/L (ref 95–110)
CO2 SERPL-SCNC: 26 MMOL/L (ref 23–29)
CREAT SERPL-MCNC: 0.8 MG/DL (ref 0.5–1.4)
CV ECHO LV RWT: 0.35 CM
DOP CALC AO PEAK VEL: 1.8 M/S
DOP CALC AO VTI: 38.7 CM
DOP CALC LVOT AREA: 3.9 CM2
DOP CALC LVOT DIAMETER: 2.23 CM
DOP CALC LVOT PEAK VEL: 1.09 M/S
DOP CALC LVOT STROKE VOLUME: 92.91 CM3
DOP CALCLVOT PEAK VEL VTI: 23.8 CM
E WAVE DECELERATION TIME: 145.37 MSEC
E/A RATIO: 0.82
E/E' RATIO: 9.53 M/S
ECHO LV POSTERIOR WALL: 0.93 CM (ref 0.6–1.1)
EJECTION FRACTION: 60 %
ERYTHROCYTE [DISTWIDTH] IN BLOOD BY AUTOMATED COUNT: 13.2 % (ref 11.5–14.5)
EST. GFR  (NO RACE VARIABLE): >60 ML/MIN/1.73 M^2
FRACTIONAL SHORTENING: 30 % (ref 28–44)
GLUCOSE SERPL-MCNC: 147 MG/DL (ref 70–110)
HCT VFR BLD AUTO: 39.1 % (ref 37–48.5)
HGB BLD-MCNC: 12.9 G/DL (ref 12–16)
IMM GRANULOCYTES # BLD AUTO: 0.02 K/UL (ref 0–0.04)
INTERVENTRICULAR SEPTUM: 0.8 CM (ref 0.6–1.1)
LA MAJOR: 5.04 CM
LA MINOR: 4.92 CM
LA WIDTH: 3.8 CM
LEFT ATRIUM SIZE: 3.51 CM
LEFT ATRIUM VOLUME INDEX: 23 ML/M2
LEFT ATRIUM VOLUME: 56.45 CM3
LEFT INTERNAL DIMENSION IN SYSTOLE: 3.79 CM (ref 2.1–4)
LEFT VENTRICLE DIASTOLIC VOLUME INDEX: 57.53 ML/M2
LEFT VENTRICLE DIASTOLIC VOLUME: 140.94 ML
LEFT VENTRICLE MASS INDEX: 70 G/M2
LEFT VENTRICLE SYSTOLIC VOLUME INDEX: 25.1 ML/M2
LEFT VENTRICLE SYSTOLIC VOLUME: 61.57 ML
LEFT VENTRICULAR INTERNAL DIMENSION IN DIASTOLE: 5.39 CM (ref 3.5–6)
LEFT VENTRICULAR MASS: 170.62 G
LV LATERAL E/E' RATIO: 9 M/S
LV SEPTAL E/E' RATIO: 10.13 M/S
LVOT MG: 2.53 MMHG
LVOT MV: 0.74 CM/S
MCH RBC QN AUTO: 30 PG (ref 27–31)
MCHC RBC AUTO-ENTMCNC: 33 G/DL (ref 32–36)
MCV RBC AUTO: 91 FL (ref 82–98)
MV PEAK A VEL: 0.99 M/S
MV PEAK E VEL: 0.81 M/S
MV STENOSIS PRESSURE HALF TIME: 42.16 MS
MV VALVE AREA P 1/2 METHOD: 5.22 CM2
NEUTROPHILS # BLD AUTO: 3.3 K/UL (ref 1.8–7.7)
PISA TR MAX VEL: 2.5 M/S
PLATELET # BLD AUTO: 310 K/UL (ref 150–450)
PMV BLD AUTO: 11.4 FL (ref 9.2–12.9)
POTASSIUM SERPL-SCNC: 4.4 MMOL/L (ref 3.5–5.1)
PROT SERPL-MCNC: 8.1 G/DL (ref 6–8.4)
RA MAJOR: 4.71 CM
RA PRESSURE: 3 MMHG
RBC # BLD AUTO: 4.3 M/UL (ref 4–5.4)
RIGHT VENTRICULAR END-DIASTOLIC DIMENSION: 3.56 CM
RIGHT VENTRICULAR LENGTH IN DIASTOLE (APICAL 4-CHAMBER VIEW): 5.95 CM
RV MID DIAMA: 17.77 CM
RV TISSUE DOPPLER FREE WALL SYSTOLIC VELOCITY 1 (APICAL 4 CHAMBER VIEW): 0.01 CM/S
SINUS: 3.17 CM
SODIUM SERPL-SCNC: 139 MMOL/L (ref 136–145)
STJ: 3.26 CM
TDI LATERAL: 0.09 M/S
TDI SEPTAL: 0.08 M/S
TDI: 0.09 M/S
TR MAX PG: 25 MMHG
TRICUSPID ANNULAR PLANE SYSTOLIC EXCURSION: 2.52 CM
TV REST PULMONARY ARTERY PRESSURE: 28 MMHG
WBC # BLD AUTO: 5.82 K/UL (ref 3.9–12.7)

## 2022-09-30 PROCEDURE — 36415 COLL VENOUS BLD VENIPUNCTURE: CPT | Mod: PN | Performed by: STUDENT IN AN ORGANIZED HEALTH CARE EDUCATION/TRAINING PROGRAM

## 2022-09-30 PROCEDURE — 85027 COMPLETE CBC AUTOMATED: CPT | Mod: PN | Performed by: STUDENT IN AN ORGANIZED HEALTH CARE EDUCATION/TRAINING PROGRAM

## 2022-09-30 PROCEDURE — 99999 PR PBB SHADOW E&M-EST. PATIENT-LVL V: ICD-10-PCS | Mod: PBBFAC,,,

## 2022-09-30 PROCEDURE — 99999 PR PBB SHADOW E&M-EST. PATIENT-LVL V: CPT | Mod: PBBFAC,,,

## 2022-09-30 PROCEDURE — 99215 PR OFFICE/OUTPT VISIT, EST, LEVL V, 40-54 MIN: ICD-10-PCS | Mod: S$GLB,,,

## 2022-09-30 PROCEDURE — 99215 OFFICE O/P EST HI 40 MIN: CPT | Mod: S$GLB,,,

## 2022-09-30 PROCEDURE — 80053 COMPREHEN METABOLIC PANEL: CPT | Mod: PN | Performed by: STUDENT IN AN ORGANIZED HEALTH CARE EDUCATION/TRAINING PROGRAM

## 2022-09-30 RX ORDER — PROMETHAZINE HYDROCHLORIDE 25 MG/1
25 TABLET ORAL EVERY 6 HOURS PRN
Qty: 30 TABLET | Refills: 0 | Status: SHIPPED | OUTPATIENT
Start: 2022-09-30 | End: 2023-07-07

## 2022-09-30 RX ORDER — ALPRAZOLAM 0.25 MG/1
TABLET ORAL
Qty: 15 TABLET | Refills: 0 | Status: SHIPPED | OUTPATIENT
Start: 2022-09-30 | End: 2022-12-19

## 2022-09-30 RX ORDER — ONDANSETRON HYDROCHLORIDE 8 MG/1
8 TABLET, FILM COATED ORAL EVERY 8 HOURS PRN
Qty: 30 TABLET | Refills: 2 | Status: SHIPPED | OUTPATIENT
Start: 2022-09-30 | End: 2023-07-07

## 2022-09-30 RX ORDER — LIDOCAINE AND PRILOCAINE 25; 25 MG/G; MG/G
CREAM TOPICAL
Qty: 30 G | Refills: 3 | Status: SHIPPED | OUTPATIENT
Start: 2022-09-30 | End: 2023-07-07 | Stop reason: SDUPTHER

## 2022-09-30 NOTE — PROGRESS NOTES
Oncology   Chemotherapy School Education  Josefina Coon   1965    Social Service Education   This is a 57 y.o.female with a medical diagnosis of breast cancer.     Current Support System: Pt reports she lives with her two 23 yr old twin boys. Her son Kris was with her today. He was helpful and took notes for his mom today during chemo school. Pt reports she has a lot of friends. 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 Pt also report she has a counselor she has been seeing and will continue to see.     Met with pt for new pt education. Reviewed role of  during cancer treatment. Educated on role of SW on care team and resources available at the cancer center.     Answered all psychosocial/socioeconomic related questions. 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 She is hoping to work as much as she can. She does have short term and long term disability if needed. SW informed of facial counselor here [place]. SW provided her [employer] card.     Patient provided with social contact number and advised to call with questions or make future appointment if further intervention is needed. ADOLFO to follow throughout tx.    Crystal Rosas, AMANDA  09/30/2022  3:06 PM

## 2022-09-30 NOTE — PROGRESS NOTES
Oncology Nutrition   New Patient Education  Josefina Coon   1965    Nutrition Education   This is a 57 y.o.female with a medical diagnosis of breast cancer.     Met w/ pt to discuss current nutritional status and nutrition as it relates to cancer and cancer treatment. Pt currently low nutrition risk. Pt son, Kris, present at time of education. Pt scheduled to start next week, paclitaxel/Trastuzumab and Pertuzumab q3 weeks.     Wt Readings from Last 10 Encounters:   09/30/22 (!) 148.5 kg (327 lb 6.1 oz)   09/29/22 (!) 147.4 kg (325 lb)   09/29/22 (!) 147.7 kg (325 lb 9.9 oz)   09/26/22 (!) 147.7 kg (325 lb 9.9 oz)   09/26/22 (!) 147.7 kg (325 lb 9.9 oz)   09/26/22 (!) (P) 147.7 kg (325 lb 9.9 oz)   09/21/22 (!) 148.3 kg (327 lb)   09/14/22 (!) 148.3 kg (327 lb)   07/18/22 (!) 148.3 kg (327 lb)   04/12/22 (!) 154.2 kg (340 lb)      [x] PMHx reviewed  [x] Labs reviewed    Educated on food safety and common nutrition impact symptoms associated with chemotherapy treatment. Reinforced the importance of good hydration. Handouts provided.    Answered all nutrition related questions.     Patient provided with dietitian contact number and advised to call with questions or make future appointment if further intervention is needed. RD to follow throughout tx prn.    Jerri Capellan RDN, LDN  09/30/2022  3:12 PM

## 2022-10-02 ENCOUNTER — PATIENT MESSAGE (OUTPATIENT)
Dept: HEMATOLOGY/ONCOLOGY | Facility: CLINIC | Age: 57
End: 2022-10-02
Payer: COMMERCIAL

## 2022-10-03 ENCOUNTER — PATIENT MESSAGE (OUTPATIENT)
Dept: PSYCHIATRY | Facility: CLINIC | Age: 57
End: 2022-10-03
Payer: COMMERCIAL

## 2022-10-05 ENCOUNTER — TELEPHONE (OUTPATIENT)
Dept: HEMATOLOGY/ONCOLOGY | Facility: CLINIC | Age: 57
End: 2022-10-05
Payer: COMMERCIAL

## 2022-10-05 ENCOUNTER — TELEPHONE (OUTPATIENT)
Dept: SURGERY | Facility: CLINIC | Age: 57
End: 2022-10-05
Payer: COMMERCIAL

## 2022-10-05 ENCOUNTER — TUMOR BOARD CONFERENCE (OUTPATIENT)
Dept: SURGERY | Facility: CLINIC | Age: 57
End: 2022-10-05
Payer: COMMERCIAL

## 2022-10-05 NOTE — TELEPHONE ENCOUNTER
Called the patient and gave her results of her diagnostic genetic testing from Paul Canela NP/Dr Maria G Mcduffie's instruction.  She verbalized understanding.  Explained that she was negative in all 47 genes tested in the Common Hereditary Cancers group.  Informed her that we will be mailing her a copy of her report, and uploading the results into her medical record.

## 2022-10-05 NOTE — TELEPHONE ENCOUNTER
----- Message from Niesha Ornelas, Patient Care Assistant sent at 10/5/2022  7:59 AM CDT -----  Regarding: raoul neal  Type: Needs Medical Advice  Who Called:  carrington Santos Call Back Number: [phone]    Additional Information: patient is wanting to speak to raoul , please call to further discuss, thank you

## 2022-10-05 NOTE — NURSING
Spoke to pt.  She was asking about her [place] papers.  I told her Alee, Dr. Jaquez's nurse, has them and will give them to her tomorrow at her appt.  Pt verbalized understanding.  Encouraged her to call with any other questions or concerns.

## 2022-10-05 NOTE — PROGRESS NOTES
Interdisciplinary Breast Cancer Conference    Josefina Coon    Female                                                 Clinical Trial Eligibility: Potentially eligible                   Cancer Staging   Invasive ductal carcinoma of breast, female, left  Staging form: Breast, AJCC 8th Edition  - Clinical stage from 9/23/2022: Stage IA (cT1c, cN0(f), cM0, G3, ER+, IN+, HER2+) - Signed by Maria G Mcduffie MD on 9/23/2022

## 2022-10-06 ENCOUNTER — OFFICE VISIT (OUTPATIENT)
Dept: HEMATOLOGY/ONCOLOGY | Facility: CLINIC | Age: 57
End: 2022-10-06
Payer: COMMERCIAL

## 2022-10-06 ENCOUNTER — DOCUMENTATION ONLY (OUTPATIENT)
Dept: PHARMACY | Facility: AMBULARY SURGERY CENTER | Age: 57
End: 2022-10-06
Payer: COMMERCIAL

## 2022-10-06 VITALS
BODY MASS INDEX: 50.02 KG/M2 | HEIGHT: 64 IN | SYSTOLIC BLOOD PRESSURE: 114 MMHG | TEMPERATURE: 97 F | OXYGEN SATURATION: 96 % | WEIGHT: 293 LBS | DIASTOLIC BLOOD PRESSURE: 76 MMHG | HEART RATE: 68 BPM | RESPIRATION RATE: 16 BRPM

## 2022-10-06 DIAGNOSIS — I74.9 EMBOLISM AND THROMBOSIS: ICD-10-CM

## 2022-10-06 DIAGNOSIS — Z79.4 TYPE 2 DIABETES MELLITUS WITH CHRONIC KIDNEY DISEASE, WITH LONG-TERM CURRENT USE OF INSULIN, UNSPECIFIED CKD STAGE: ICD-10-CM

## 2022-10-06 DIAGNOSIS — C50.912 INVASIVE DUCTAL CARCINOMA OF BREAST, FEMALE, LEFT: Primary | ICD-10-CM

## 2022-10-06 DIAGNOSIS — F41.1 GENERALIZED ANXIETY DISORDER: ICD-10-CM

## 2022-10-06 DIAGNOSIS — Z82.49 FAMILY HISTORY OF ISCHEMIC HEART DISEASE: ICD-10-CM

## 2022-10-06 DIAGNOSIS — E11.22 TYPE 2 DIABETES MELLITUS WITH CHRONIC KIDNEY DISEASE, WITH LONG-TERM CURRENT USE OF INSULIN, UNSPECIFIED CKD STAGE: ICD-10-CM

## 2022-10-06 DIAGNOSIS — E11.41 DIABETIC MONONEUROPATHY ASSOCIATED WITH TYPE 2 DIABETES MELLITUS: ICD-10-CM

## 2022-10-06 PROCEDURE — 3078F DIAST BP <80 MM HG: CPT | Mod: CPTII,S$GLB,, | Performed by: STUDENT IN AN ORGANIZED HEALTH CARE EDUCATION/TRAINING PROGRAM

## 2022-10-06 PROCEDURE — 99999 PR PBB SHADOW E&M-EST. PATIENT-LVL III: CPT | Mod: PBBFAC,,, | Performed by: STUDENT IN AN ORGANIZED HEALTH CARE EDUCATION/TRAINING PROGRAM

## 2022-10-06 PROCEDURE — 3078F PR MOST RECENT DIASTOLIC BLOOD PRESSURE < 80 MM HG: ICD-10-PCS | Mod: CPTII,S$GLB,, | Performed by: STUDENT IN AN ORGANIZED HEALTH CARE EDUCATION/TRAINING PROGRAM

## 2022-10-06 PROCEDURE — 3074F SYST BP LT 130 MM HG: CPT | Mod: CPTII,S$GLB,, | Performed by: STUDENT IN AN ORGANIZED HEALTH CARE EDUCATION/TRAINING PROGRAM

## 2022-10-06 PROCEDURE — 3051F PR MOST RECENT HEMOGLOBIN A1C LEVEL 7.0 - < 8.0%: ICD-10-PCS | Mod: CPTII,S$GLB,, | Performed by: STUDENT IN AN ORGANIZED HEALTH CARE EDUCATION/TRAINING PROGRAM

## 2022-10-06 PROCEDURE — 99215 OFFICE O/P EST HI 40 MIN: CPT | Mod: S$GLB,,, | Performed by: STUDENT IN AN ORGANIZED HEALTH CARE EDUCATION/TRAINING PROGRAM

## 2022-10-06 PROCEDURE — 3051F HG A1C>EQUAL 7.0%<8.0%: CPT | Mod: CPTII,S$GLB,, | Performed by: STUDENT IN AN ORGANIZED HEALTH CARE EDUCATION/TRAINING PROGRAM

## 2022-10-06 PROCEDURE — 3008F BODY MASS INDEX DOCD: CPT | Mod: CPTII,S$GLB,, | Performed by: STUDENT IN AN ORGANIZED HEALTH CARE EDUCATION/TRAINING PROGRAM

## 2022-10-06 PROCEDURE — 3074F PR MOST RECENT SYSTOLIC BLOOD PRESSURE < 130 MM HG: ICD-10-PCS | Mod: CPTII,S$GLB,, | Performed by: STUDENT IN AN ORGANIZED HEALTH CARE EDUCATION/TRAINING PROGRAM

## 2022-10-06 PROCEDURE — 99215 PR OFFICE/OUTPT VISIT, EST, LEVL V, 40-54 MIN: ICD-10-PCS | Mod: S$GLB,,, | Performed by: STUDENT IN AN ORGANIZED HEALTH CARE EDUCATION/TRAINING PROGRAM

## 2022-10-06 PROCEDURE — 3008F PR BODY MASS INDEX (BMI) DOCUMENTED: ICD-10-PCS | Mod: CPTII,S$GLB,, | Performed by: STUDENT IN AN ORGANIZED HEALTH CARE EDUCATION/TRAINING PROGRAM

## 2022-10-06 PROCEDURE — 99999 PR PBB SHADOW E&M-EST. PATIENT-LVL III: ICD-10-PCS | Mod: PBBFAC,,, | Performed by: STUDENT IN AN ORGANIZED HEALTH CARE EDUCATION/TRAINING PROGRAM

## 2022-10-06 RX ORDER — DULAGLUTIDE 1.5 MG/.5ML
1.5 INJECTION, SOLUTION SUBCUTANEOUS
COMMUNITY
Start: 2022-10-04 | End: 2022-11-04

## 2022-10-06 NOTE — PROGRESS NOTES
Pharmacy Treatment Plan Review    Patient  Josefina Coon, 57 y.o.  1965    Indication: Breast Cancer     History:  Stage IA IDC of Left breast  cT1c triple positive breast cancer   Neoadjuvant THP    Labs:  CBC  Lab Results   Component Value Date    WBC 5.82 09/30/2022    HGB 12.9 09/30/2022    HCT 39.1 09/30/2022    MCV 91 09/30/2022     09/30/2022       BMP  Lab Results   Component Value Date     09/30/2022     09/30/2022    K 4.3 09/30/2022    K 4.4 09/30/2022    CL 99 09/30/2022     09/30/2022    CO2 25 09/30/2022    CO2 26 09/30/2022    BUN 17 09/30/2022    BUN 15 09/30/2022    CREATININE 0.67 09/30/2022    CREATININE 0.8 09/30/2022    CALCIUM 9.6 09/30/2022    CALCIUM 9.7 09/30/2022    ANIONGAP 14 09/30/2022    ANIONGAP 11 09/30/2022    ESTGFRAFRICA >60 04/08/2022    EGFRNONAA >60 04/08/2022       LFTs  Lab Results   Component Value Date    ALT 31 09/30/2022    ALT 26 09/30/2022    AST 28 09/30/2022    AST 19 09/30/2022    ALKPHOS 94 09/30/2022    ALKPHOS 89 09/30/2022    BILITOT 0.6 09/30/2022    BILITOT 0.5 09/30/2022       The patient is scheduled to start the following infusion:   OP BREAST PACLITAXEL TRASTUZUMAB WEEKLY WITH PERTUZUMAB Q3W (THP)    21-day cycle for 6 cycles of:    -Pertuzumab 840 mg in sodium chloride 0.9% 278 mL, infusion, intravneous, on day 1 of cycle 1; subsequent cycles maintenance dose of 420 mg    -Trastuzumab-dkst 4 mg/kg in sodium chloride 0.9% 250 mL, infusion, intravneous, on day 1 of cycle 1; maintenance dose of 2 mg/kg on days 8 and 15 of cycle 1 followed by days 1, 8, 15 of subsequent cycles    -Paclitaxel 80 mg/m2 in sodium chloride 0.9% 250 mL, infusion, intravneous, on day 1, 8, and 15      Premeds  -Dexamethasone 10 mg IVPB  -Diphenhydramine 50 mg IVPB  -Famotidine 20 mg IV     The treatment plan is per [facility] guidelines    Denis PedrozaD

## 2022-10-06 NOTE — PROGRESS NOTES
Subjective:     Patient ID:    Name: Josefina Coon  : 1965  MRN: [id number]    HPI:   Josefina Coon is a 57 y.o. female presents for evaluation of Follow-up and Breast Cancer    Patient was seen as part of a Multi-D clinic with Radiation Oncology,Surgical Oncology and Medical Oncology. Case was also presented at breast cancer tumor conference.     22 bilateral screening mammogram,,Right neg ,Left central post mass     22 left diag MMG, left US limited .Left 0300 lateral posterior 6cm FN 1.4cm mass , left axilla LN 2, up to 4mm cortex     22 left 0300 lateral posterior 6cm FN 1.4cm mass US biopsy .Grade 3 ,1.1cm in biopsy No LVI , ER 84% GA 28% Her 2 3+ pos Ki 52 %    Left axilla LN biopsy ;Benign fatty tissue, no LN, not concordant No MRI of breasts were done      22 US bilateral complete Right neg, right LN nml , Left 0300 6cm FN [id number] mass Borderline LN left axilla     22 Left axilla LN biopsy benign LN , so eventually Stage IA triple positive Breast cancer    Discussion about neoadjuvant chemotherapy and side effects was done.    Today,patient had some questions about the cold cap therapy and her chemo after chemo class. Ready for the infusion. We discussed that we will need ultrasound mid way since its hard to feel the mass.  She denies any symptoms, no breast pain, nipple discharge, no fever or chills, no nausea or vomiting, no back or abdominal pain     Oncology History   Invasive ductal carcinoma of breast, female, left   2022 Initial Diagnosis    Invasive ductal carcinoma of breast, female, left     2022 Cancer Staged    Staging form: Breast, AJCC 8th Edition  - Clinical stage from 2022: Stage IA (cT1c, cN0(f), cM0, G3, ER+, GA+, HER2+)       2022 - 2022 Chemotherapy    Treatment Summary   Plan Name: OP BREAST TRASTUZUMAB PACLITAXEL WEEKLY  Treatment Goal: Curative  Status: Inactive  Start Date:   End Date:   Provider: Lisa Jaquez  MD  Chemotherapy: PACLitaxeL (TAXOL) 80 mg/m2 = 204 mg in sodium chloride 0.9% 250 mL chemo infusion, 80 mg/m2 = 204 mg, Intravenous, Clinic/HOD 1 time, 0 of 12 cycles  pertuzumab (PERJETA) 840 mg in sodium chloride 0.9% 278 mL infusion, 840 mg (original dose ), Intravenous, Clinic/HOD 1 time, 0 of 17 cycles  Dose modification: 840 mg (Cycle 1, Reason: Other (see comments)), 420 mg (Cycle 4, Reason: Other (see comments))  trastuzumab-dkst (OGIVRI) 591 mg in sodium chloride 0.9% 250 mL chemo infusion, 4 mg/kg = 591 mg, Intravenous, Clinic/HOD 1 time, 0 of 25 cycles       10/7/2022 -  Chemotherapy    Treatment Summary   Plan Name: OP BREAST PACLITAXEL TRASTUZUMAB WEEKLY WITH PERTUZUMAB Q3W (THP)  Treatment Goal: Control  Status: Active  Start Date: 10/7/2022  End Date: 2/3/2023 (Planned)  Provider: Lisa Jaquez MD  Chemotherapy: PACLitaxeL (TAXOL) 80 mg/m2 = 210 mg in sodium chloride 0.9% 250 mL chemo infusion, 80 mg/m2 = 210 mg, Intravenous, Clinic/HOD 1 time, 1 of 6 cycles  Administration: 210 mg (10/7/2022)  pertuzumab (PERJETA) 840 mg in sodium chloride 0.9% 278 mL infusion, 840 mg, Intravenous, Clinic/HOD 1 time, 1 of 6 cycles  Administration: 840 mg (10/7/2022)  trastuzumab-dkst (OGIVRI) 570 mg in sodium chloride 0.9% 250 mL chemo infusion, 593 mg (100 % of original dose 4 mg/kg), Intravenous, Clinic/HOD 1 time, 1 of 6 cycles  Dose modification: 4 mg/kg (original dose 4 mg/kg, Cycle 1, Reason: Other (see comments), Comment: weekly trastuzumab), 2 mg/kg (original dose 2 mg/kg, Cycle 2, Reason: Other (see comments), Comment: weekly maintenance dose), 2 mg/kg (original dose 2 mg/kg, Cycle 1, Reason: Other (see comments), Comment: maintenance weekly dose)  Administration: 570 mg (10/7/2022)              Past Medical History:   Diagnosis Date    Abnormal liver enzymes     Asymptomatic varicose veins     Cancer     left breast cancer    Depressive disorder, not elsewhere classified     Dyslipidemia     Embolism and  thrombosis of unspecified site     superficial venous thrombosis    Encounter for long-term (current) use of other medications     Family history of ischemic heart disease     Fatty liver     Generalized anxiety disorder     History of chicken pox     Obstructive sleep apnea (adult) (pediatric)     Type II or unspecified type diabetes mellitus without mention of complication, not stated as uncontrolled     Unspecified essential hypertension     Unspecified venous (peripheral) insufficiency     Unspecified vitamin D deficiency        Past Surgical History:   Procedure Laterality Date     SECTION      INSERTION OF TUNNELED CENTRAL VENOUS CATHETER (CVC) WITH SUBCUTANEOUS PORT Right 10/4/2022    Procedure: [id number]-M-CATH;  Surgeon: Dominick Ng MD;  Location: [facility] OR;  Service: General;  Laterality: Right;    VEIN SURGERY      Laser, Dr. Larsen       Family History   Problem Relation Age of Onset    Hyperlipidemia Mother     Hypertension Mother     Vulvar Cancer Mother     Diabetes Brother     Cancer Brother         colon    Stroke Maternal Grandmother        Social History     Socioeconomic History    Marital status:    Tobacco Use    Smoking status: Never    Smokeless tobacco: Never   Substance and Sexual Activity    Alcohol use: Not Currently     Comment: rare     Drug use: Never    Sexual activity: Not Currently       Review of patient's allergies indicates:   Allergen Reactions    Sitagliptin      Other reaction(s): (januvia) abdominal pain    Sulfa (sulfonamide antibiotics) Hives    Byetta  [exenatide] Rash    Lactose        Review of Systems   Constitutional: Negative for decreased appetite, fever and night sweats.   Eyes:  Negative for blurred vision, double vision, pain and visual disturbance.   Cardiovascular:  Negative for chest pain, leg swelling and palpitations.   Respiratory:  Negative for cough and shortness of breath.    Hematologic/Lymphatic: Negative for adenopathy.   Skin:  " Negative for rash.   Musculoskeletal:  Negative for back pain.   Gastrointestinal:  Negative for abdominal pain, diarrhea, melena, nausea and vomiting.   Genitourinary:  Negative for frequency.   Neurological:  Negative for headaches and seizures.   Psychiatric/Behavioral:  The patient is not nervous/anxious.           Objective:     Vitals:    10/06/22 1009   BP: 114/76   BP Location: Left arm   Patient Position: Sitting   BP Method: Large (Automatic)   Pulse: 68   Resp: 16   Temp: 97.4 °F (36.3 °C)   TempSrc: Temporal   SpO2: 96%   Weight: (!) 149.7 kg (330 lb 0.5 oz)   Height: 5' 4" (1.626 m)        Physical Exam  Constitutional:       Appearance: Normal appearance.   Eyes:      General: No scleral icterus.  Neck:      Vascular: No carotid bruit.   Cardiovascular:      Rate and Rhythm: Normal rate.      Heart sounds: No murmur heard.  Pulmonary:      Effort: No respiratory distress.   Abdominal:      General: There is no distension.      Palpations: Abdomen is soft.   Musculoskeletal:         General: No swelling or tenderness.      Cervical back: Neck supple.   Lymphadenopathy:      Cervical: No cervical adenopathy.   Skin:     Coloration: Skin is not jaundiced or pale.   Neurological:      General: No focal deficit present.      Mental Status: She is alert and oriented to person, place, and time.   Psychiatric:         Mood and Affect: Mood normal.         Behavior: Behavior normal.             Current Outpatient Medications on File Prior to Visit   Medication Sig    albuterol 90 mcg/actuation inhaler Inhale 2 puffs into the lungs every 6 (six) hours as needed for Wheezing.    ALPRAZolam (XANAX) 0.25 MG tablet Take one tab po 1-2 times daily PRN anxiety    buPROPion (WELLBUTRIN XL) 150 MG TB24 tablet TAKE 1 TABLET BY MOUTH EVERY DAY    dulaglutide (TRULICITY) 3 mg/0.5 mL pen injector Inject 3 mg into the skin every 7 days.    ergocalciferol (ERGOCALCIFEROL) 50,000 unit Cap TAKE 1 CAPSULE BY MOUTH ONE TIME PER " WEEK    HYDROcodone-acetaminophen (NORCO) 5-325 mg per tablet Take 1 tablet by mouth every 6 (six) hours as needed for Pain.    LIDOcaine-prilocaine (EMLA) cream Apply to port site 1 hour prior to port access and cover    magnesium 30 mg Tab Take 30 mg by mouth once.    metFORMIN (GLUCOPHAGE) 500 MG tablet TAKE 1 TABLET BY MOUTH TWICE A DAY WITH MEALS    omeprazole (PRILOSEC OTC) 20 MG tablet Take 1 tablet (20 mg total) by mouth once daily.    ondansetron (ZOFRAN) 8 MG tablet Take 1 tablet (8 mg total) by mouth every 8 (eight) hours as needed for Nausea.    ondansetron (ZOFRAN-ODT) 8 MG TbDL Take 1 tablet (8 mg total) by mouth every 6 (six) hours as needed (nausea).    pravastatin (PRAVACHOL) 10 MG tablet TAKE 1 TABLET BY MOUTH EVERYDAY AT BEDTIME    promethazine (PHENERGAN) 25 MG tablet Take 1 tablet (25 mg total) by mouth every 6 (six) hours as needed for Nausea.    telmisartan-hydrochlorothiazide (MICARDIS HCT) 80-25 mg per tablet TAKE 1 TABLET BY MOUTH EVERY DAY    TRULICITY 1.5 mg/0.5 mL pen injector Inject 1.5 mg into the skin every 7 days.     No current facility-administered medications on file prior to visit.       CBC:  Lab Results   Component Value Date    WBC 5.82 09/30/2022    HGB 12.9 09/30/2022    HCT 39.1 09/30/2022    MCV 91 09/30/2022     09/30/2022     CMP:  Sodium   Date Value Ref Range Status   09/30/2022 138 136 - 145 mmol/L Final   09/30/2022 139 136 - 145 mmol/L Final   08/08/2015 137 137 - 145 MMOL/L Final     Potassium   Date Value Ref Range Status   09/30/2022 4.3 3.5 - 5.1 mmol/L Final   09/30/2022 4.4 3.5 - 5.1 mmol/L Final   08/08/2015 4.4 3.5 - 5.1 MMOL/L Final     Chloride   Date Value Ref Range Status   09/30/2022 99 95 - 110 mmol/L Final   09/30/2022 102 95 - 110 mmol/L Final   08/08/2015 101 98 - 107 MMOL/L Final     CO2   Date Value Ref Range Status   09/30/2022 25 22 - 31 mmol/L Final   09/30/2022 26 23 - 29 mmol/L Final     Glucose   Date Value Ref Range Status    09/30/2022 151 (H) 70 - 110 mg/dL Final     Comment:     The ADA recommends the following guidelines for fasting glucose:    Normal:       less than 100 mg/dL    Prediabetes:  100 mg/dL to 125 mg/dL    Diabetes:     126 mg/dL or higher     09/30/2022 147 (H) 70 - 110 mg/dL Final     BUN   Date Value Ref Range Status   09/30/2022 17 7 - 18 mg/dL Final   09/30/2022 15 6 - 20 mg/dL Final     Creatinine   Date Value Ref Range Status   09/30/2022 0.67 0.50 - 1.40 mg/dL Final   09/30/2022 0.8 0.5 - 1.4 mg/dL Final   08/08/2015 0.63 0.52 - 1.04 MG/DL Final     Calcium   Date Value Ref Range Status   09/30/2022 9.6 8.4 - 10.2 mg/dL Final   09/30/2022 9.7 8.7 - 10.5 mg/dL Final     Total Protein   Date Value Ref Range Status   09/30/2022 7.8 6.0 - 8.4 g/dL Final   09/30/2022 8.1 6.0 - 8.4 g/dL Final     Albumin   Date Value Ref Range Status   09/30/2022 4.3 3.5 - 5.2 g/dL Final   09/30/2022 3.7 3.5 - 5.2 g/dL Final     Total Bilirubin   Date Value Ref Range Status   09/30/2022 0.6 0.2 - 1.3 mg/dL Final   09/30/2022 0.5 0.1 - 1.0 mg/dL Final     Comment:     For infants and newborns, interpretation of results should be based  on gestational age, weight and in agreement with clinical  observations.    Premature Infant recommended reference ranges:  Up to 24 hours.............<8.0 mg/dL  Up to 48 hours............<12.0 mg/dL  3-5 days..................<15.0 mg/dL  6-29 days.................<15.0 mg/dL       Alkaline Phosphatase   Date Value Ref Range Status   09/30/2022 94 38 - 145 U/L Final   09/30/2022 89 55 - 135 U/L Final     AST   Date Value Ref Range Status   09/30/2022 28 14 - 36 U/L Final   09/30/2022 19 10 - 40 U/L Final     ALT   Date Value Ref Range Status   09/30/2022 31 0 - 35 U/L Final   09/30/2022 26 10 - 44 U/L Final     Anion Gap   Date Value Ref Range Status   09/30/2022 14 8 - 16 mmol/L Final   09/30/2022 11 8 - 16 mmol/L Final     eGFR if    Date Value Ref Range Status   04/08/2022 >60 >60  mL/min/1.73 m^2 Final     eGFR if non    Date Value Ref Range Status   04/08/2022 >60 >60 mL/min/1.73 m^2 Final     Comment:     Calculation used to obtain the estimated glomerular filtration  rate (eGFR) is the CKD-EPI equation.          X-Ray Chest AP Portable  Narrative: EXAMINATION:  XR CHEST AP PORTABLE    CLINICAL HISTORY:  s/p Port a Cath placement.    TECHNIQUE:  Single frontal view of the chest was performed.    COMPARISON:  PA and lateral chest radiograph, 09/18/2009.    FINDINGS:  Right-sided port with tip projecting over the mid-lower SVC.    No consolidation, pleural effusion or pneumothorax.    Cardiomediastinal silhouette is within normal limits for size.    No acute osseous abnormality.  Impression: Right-sided port with tip projecting over the mid-lower SVC.    Electronically signed by: Gentry Cain MD  Date:    10/04/2022  Time:    10:04  SURG FL Surgery Fluoro Usage  See OP Notes for results.     IMPRESSION: See OP Notes for results.     This procedure was auto-finalized by: Virtual Radiologist       ECOG SCORE    1 - Restricted in strenuous activity-ambulatory and able to carry out work of a light nature          All pertinent labs and imaging reviewed. As well as outside records     Assessment:       1. Invasive ductal carcinoma of breast, female, left    2. Diabetic mononeuropathy associated with type 2 diabetes mellitus    3. Family history of ischemic heart disease    4. Generalized anxiety disorder    5. Embolism and thrombosis of unspecified site    6. Type 2 diabetes mellitus with chronic kidney disease, with long-term current use of insulin, unspecified CKD stage      # Stage IA IDC of Left breast:  -Screening mammogram, no symptoms, has baseline fatty liver with mild elevated LFT's no need for further systematic imaging  - discussion given her cT1c triple positive breast cancer neoadjuvant is consider, given her young age and Ki67%, will proceed with TH, adding P to  help with a better pCR, will also plan to get ultrasound mid cycle for monitor (3 months)  - echocardiogram with good EF and  port placement , ready for chemo, will monitor closely for neuropathy     # Fatty liver/ elevated LFT: mild elevation, close monitoring while on taxol infusions, might consider holding statins prior   # DM type 2 with neuropathy: baseline neuropathy, will need to monitor closely for worsening during treatment   # Obesity: will monitor closely while starting chemotherapy    # MANUELA: will need to see Dr Long     Plan:     Invasive ductal carcinoma of breast, female, left  -     CBC Oncology; Future; Expected date: 10/06/2022  -     CMP; Future; Expected date: 10/06/2022  -     Magnesium; Future; Expected date: 10/06/2022  -     Cancel: pertuzumab (PERJETA) 840 mg in sodium chloride 0.9% 278 mL infusion  -     Cancel: trastuzumab-dkst (OGIVRI) 599 mg in sodium chloride 0.9% 250 mL chemo infusion  -     Cancel: diphenhydramine (BENADRYL) 50 mg in NS 50 mL IVPB  -     Cancel: famotidine (PF) injection 20 mg  -     Cancel: dexamethasone (DECADRON) 10 mg in sodium chloride 0.9% 50 mL IVPB  -     Cancel: PACLitaxeL (TAXOL) 80 mg/m2 = 210 mg in sodium chloride 0.9% 250 mL chemo infusion  -     Cancel: EPINEPHrine (EPIPEN) 0.3 mg/0.3 mL pen injection 0.3 mg  -     Cancel: diphenhydrAMINE injection 50 mg  -     Cancel: hydrocortisone sodium succinate injection 100 mg  -     Cancel: sodium chloride 0.9% 250 mL flush bag  -     Cancel: sodium chloride 0.9% flush 10 mL    Diabetic mononeuropathy associated with type 2 diabetes mellitus    Family history of ischemic heart disease    Generalized anxiety disorder    Embolism and thrombosis of unspecified site    Type 2 diabetes mellitus with chronic kidney disease, with long-term current use of insulin, unspecified CKD stage    Other orders  -     Cancel: heparin, porcine (PF) 100 unit/mL injection flush 500 Units  -     Cancel: alteplase injection 2 mg  -      trastuzumab-dkst (OGIVRI) 299 mg in sodium chloride 0.9% 250 mL chemo infusion  -     diphenhydramine (BENADRYL) 50 mg in NS 50 mL IVPB  -     famotidine (PF) injection 20 mg  -     dexamethasone (DECADRON) 10 mg in sodium chloride 0.9% 50 mL IVPB  -     PACLitaxeL (TAXOL) 80 mg/m2 = 210 mg in sodium chloride 0.9% 250 mL chemo infusion  -     EPINEPHrine (EPIPEN) 0.3 mg/0.3 mL pen injection 0.3 mg  -     diphenhydrAMINE injection 50 mg  -     hydrocortisone sodium succinate injection 100 mg  -     sodium chloride 0.9% 250 mL flush bag  -     sodium chloride 0.9% flush 10 mL  -     heparin, porcine (PF) 100 unit/mL injection flush 500 Units  -     alteplase injection 2 mg  -     trastuzumab-dkst (OGIVRI) 299 mg in sodium chloride 0.9% 250 mL chemo infusion  -     diphenhydramine (BENADRYL) 50 mg in NS 50 mL IVPB  -     famotidine (PF) injection 20 mg  -     dexamethasone (DECADRON) 10 mg in sodium chloride 0.9% 50 mL IVPB  -     PACLitaxeL (TAXOL) 80 mg/m2 = 210 mg in sodium chloride 0.9% 250 mL chemo infusion  -     EPINEPHrine (EPIPEN) 0.3 mg/0.3 mL pen injection 0.3 mg  -     diphenhydrAMINE injection 50 mg  -     hydrocortisone sodium succinate injection 100 mg  -     sodium chloride 0.9% 250 mL flush bag  -     sodium chloride 0.9% flush 10 mL  -     heparin, porcine (PF) 100 unit/mL injection flush 500 Units  -     alteplase injection 2 mg         Patient queried and all questions were answered.    Plan was discussed with the patient  and her at length, and they verbalized understanding.        Recommend COVID guidelines being followed   Recommend  exercise, nutrition and weight management and health maintenance activities and follow-up with PCPs recommendations     Route Chart for Scheduling    Med Onc Chart Routing      Follow up with physician Other. procced with chemo tomorrow, keep schedules as it is   Follow up with BRANDIE    Infusion scheduling note    Injection scheduling note    Labs CBC, CMP and  magnesium   Lab interval:     Imaging    Pharmacy appointment    Other referrals        Treatment Plan Information   OP BREAST PACLITAXEL TRASTUZUMAB WEEKLY WITH PERTUZUMAB Q3W (THP)   Lisa Jaquez MD   Upcoming Treatment Dates - OP BREAST PACLITAXEL TRASTUZUMAB WEEKLY WITH PERTUZUMAB Q3W (THP)    10/14/2022       Pre-Medications       diphenhydramine (BENADRYL) 50 mg in NS 50 mL IVPB       famotidine (PF) injection 20 mg       dexamethasone (DECADRON) 10 mg in sodium chloride 0.9% 50 mL IVPB       Chemotherapy       PACLitaxeL (TAXOL) 80 mg/m2 = 210 mg in sodium chloride 0.9% 250 mL chemo infusion       trastuzumab-dkst (OGIVRI) 299 mg in sodium chloride 0.9% 250 mL chemo infusion  10/21/2022       Pre-Medications       diphenhydramine (BENADRYL) 50 mg in NS 50 mL IVPB       famotidine (PF) injection 20 mg       dexamethasone (DECADRON) 10 mg in sodium chloride 0.9% 50 mL IVPB       Chemotherapy       trastuzumab-dkst (OGIVRI) 299 mg in sodium chloride 0.9% 250 mL chemo infusion       PACLitaxeL (TAXOL) 80 mg/m2 = 210 mg in sodium chloride 0.9% 250 mL chemo infusion  10/28/2022       Pre-Medications       diphenhydrAMINE (BENADRYL) 50 mg in sodium chloride 0.9% 50 mL IVPB       famotidine (PF) injection 20 mg       dexamethasone (DECADRON) 10 mg in sodium chloride 0.9% 50 mL IVPB       Chemotherapy       pertuzumab (PERJETA) 420 mg in sodium chloride 0.9% 264 mL infusion       trastuzumab-dkst (OGIVRI) in sodium chloride 0.9% chemo infusion       PACLitaxeL (TAXOL) in sodium chloride 0.9% 250 mL chemo infusion  11/4/2022       Pre-Medications       diphenhydrAMINE (BENADRYL) 50 mg in sodium chloride 0.9% 50 mL IVPB       famotidine (PF) injection 20 mg       dexamethasone (DECADRON) 10 mg in sodium chloride 0.9% 50 mL IVPB       Chemotherapy       PACLitaxeL (TAXOL) in sodium chloride 0.9% 250 mL chemo infusion       trastuzumab-dkst (OGIVRI) in sodium chloride 0.9% chemo infusion        Signed:  Lisa Jaquez  MD  Hematology and Oncology  [facility]  [facility] [place] [facility]

## 2022-10-07 ENCOUNTER — PATIENT MESSAGE (OUTPATIENT)
Dept: PSYCHIATRY | Facility: CLINIC | Age: 57
End: 2022-10-07
Payer: COMMERCIAL

## 2022-10-07 ENCOUNTER — INFUSION (OUTPATIENT)
Dept: INFUSION THERAPY | Facility: HOSPITAL | Age: 57
End: 2022-10-07
Attending: STUDENT IN AN ORGANIZED HEALTH CARE EDUCATION/TRAINING PROGRAM
Payer: COMMERCIAL

## 2022-10-07 ENCOUNTER — TELEPHONE (OUTPATIENT)
Dept: PSYCHIATRY | Facility: CLINIC | Age: 57
End: 2022-10-07
Payer: COMMERCIAL

## 2022-10-07 VITALS
SYSTOLIC BLOOD PRESSURE: 117 MMHG | HEART RATE: 90 BPM | DIASTOLIC BLOOD PRESSURE: 75 MMHG | HEIGHT: 64 IN | RESPIRATION RATE: 16 BRPM | BODY MASS INDEX: 50.02 KG/M2 | WEIGHT: 293 LBS

## 2022-10-07 VITALS
TEMPERATURE: 97 F | OXYGEN SATURATION: 99 % | RESPIRATION RATE: 18 BRPM | DIASTOLIC BLOOD PRESSURE: 78 MMHG | SYSTOLIC BLOOD PRESSURE: 118 MMHG | WEIGHT: 293 LBS | HEIGHT: 64 IN | BODY MASS INDEX: 50.02 KG/M2 | HEART RATE: 75 BPM

## 2022-10-07 DIAGNOSIS — C50.912 INVASIVE DUCTAL CARCINOMA OF BREAST, FEMALE, LEFT: Primary | ICD-10-CM

## 2022-10-07 PROCEDURE — 96367 TX/PROPH/DG ADDL SEQ IV INF: CPT | Mod: PN

## 2022-10-07 PROCEDURE — 96417 CHEMO IV INFUS EACH ADDL SEQ: CPT | Mod: PN

## 2022-10-07 PROCEDURE — 96413 CHEMO IV INFUSION 1 HR: CPT | Mod: PN

## 2022-10-07 PROCEDURE — 96415 CHEMO IV INFUSION ADDL HR: CPT | Mod: PN

## 2022-10-07 PROCEDURE — 96375 TX/PRO/DX INJ NEW DRUG ADDON: CPT | Mod: PN

## 2022-10-07 PROCEDURE — 63600175 PHARM REV CODE 636 W HCPCS: Mod: JG,PN | Performed by: STUDENT IN AN ORGANIZED HEALTH CARE EDUCATION/TRAINING PROGRAM

## 2022-10-07 PROCEDURE — 25000003 PHARM REV CODE 250: Mod: PN | Performed by: STUDENT IN AN ORGANIZED HEALTH CARE EDUCATION/TRAINING PROGRAM

## 2022-10-07 RX ORDER — EPINEPHRINE 0.3 MG/.3ML
0.3 INJECTION SUBCUTANEOUS ONCE AS NEEDED
Status: DISCONTINUED | OUTPATIENT
Start: 2022-10-07 | End: 2022-10-07 | Stop reason: HOSPADM

## 2022-10-07 RX ORDER — FAMOTIDINE 10 MG/ML
20 INJECTION INTRAVENOUS
Status: COMPLETED | OUTPATIENT
Start: 2022-10-07 | End: 2022-10-07

## 2022-10-07 RX ORDER — DIPHENHYDRAMINE HYDROCHLORIDE 50 MG/ML
50 INJECTION INTRAMUSCULAR; INTRAVENOUS ONCE AS NEEDED
Status: CANCELLED | OUTPATIENT
Start: 2022-10-21

## 2022-10-07 RX ORDER — FAMOTIDINE 10 MG/ML
20 INJECTION INTRAVENOUS
Status: CANCELLED | OUTPATIENT
Start: 2022-10-21

## 2022-10-07 RX ORDER — HEPARIN 100 UNIT/ML
500 SYRINGE INTRAVENOUS
Status: CANCELLED | OUTPATIENT
Start: 2022-10-07

## 2022-10-07 RX ORDER — DIPHENHYDRAMINE HYDROCHLORIDE 50 MG/ML
50 INJECTION INTRAMUSCULAR; INTRAVENOUS ONCE AS NEEDED
Status: CANCELLED | OUTPATIENT
Start: 2022-10-14

## 2022-10-07 RX ORDER — SODIUM CHLORIDE 0.9 % (FLUSH) 0.9 %
10 SYRINGE (ML) INJECTION
Status: CANCELLED | OUTPATIENT
Start: 2022-10-21

## 2022-10-07 RX ORDER — DIPHENHYDRAMINE HYDROCHLORIDE 50 MG/ML
50 INJECTION INTRAMUSCULAR; INTRAVENOUS ONCE AS NEEDED
Status: CANCELLED | OUTPATIENT
Start: 2022-10-07

## 2022-10-07 RX ORDER — EPINEPHRINE 0.3 MG/.3ML
0.3 INJECTION SUBCUTANEOUS ONCE AS NEEDED
Status: CANCELLED | OUTPATIENT
Start: 2022-10-07

## 2022-10-07 RX ORDER — EPINEPHRINE 0.3 MG/.3ML
0.3 INJECTION SUBCUTANEOUS ONCE AS NEEDED
Status: CANCELLED | OUTPATIENT
Start: 2022-10-21

## 2022-10-07 RX ORDER — FAMOTIDINE 10 MG/ML
20 INJECTION INTRAVENOUS
Status: CANCELLED | OUTPATIENT
Start: 2022-10-07

## 2022-10-07 RX ORDER — HEPARIN 100 UNIT/ML
500 SYRINGE INTRAVENOUS
Status: CANCELLED | OUTPATIENT
Start: 2022-10-14

## 2022-10-07 RX ORDER — SODIUM CHLORIDE 0.9 % (FLUSH) 0.9 %
10 SYRINGE (ML) INJECTION
Status: DISCONTINUED | OUTPATIENT
Start: 2022-10-07 | End: 2022-10-07 | Stop reason: HOSPADM

## 2022-10-07 RX ORDER — SODIUM CHLORIDE 0.9 % (FLUSH) 0.9 %
10 SYRINGE (ML) INJECTION
Status: CANCELLED | OUTPATIENT
Start: 2022-10-07

## 2022-10-07 RX ORDER — FAMOTIDINE 10 MG/ML
20 INJECTION INTRAVENOUS
Status: CANCELLED | OUTPATIENT
Start: 2022-10-14

## 2022-10-07 RX ORDER — SODIUM CHLORIDE 0.9 % (FLUSH) 0.9 %
10 SYRINGE (ML) INJECTION
Status: CANCELLED | OUTPATIENT
Start: 2022-10-14

## 2022-10-07 RX ORDER — HEPARIN 100 UNIT/ML
500 SYRINGE INTRAVENOUS
Status: CANCELLED | OUTPATIENT
Start: 2022-10-21

## 2022-10-07 RX ORDER — EPINEPHRINE 0.3 MG/.3ML
0.3 INJECTION SUBCUTANEOUS ONCE AS NEEDED
Status: CANCELLED | OUTPATIENT
Start: 2022-10-14

## 2022-10-07 RX ORDER — DIPHENHYDRAMINE HYDROCHLORIDE 50 MG/ML
50 INJECTION INTRAMUSCULAR; INTRAVENOUS ONCE AS NEEDED
Status: DISCONTINUED | OUTPATIENT
Start: 2022-10-07 | End: 2022-10-07 | Stop reason: HOSPADM

## 2022-10-07 RX ADMIN — DIPHENHYDRAMINE HYDROCHLORIDE 50 MG: 50 INJECTION, SOLUTION INTRAMUSCULAR; INTRAVENOUS at 02:10

## 2022-10-07 RX ADMIN — FAMOTIDINE 20 MG: 10 INJECTION INTRAVENOUS at 02:10

## 2022-10-07 RX ADMIN — PACLITAXEL 210 MG: 6 INJECTION, SOLUTION, CONCENTRATE INTRAVENOUS at 03:10

## 2022-10-07 RX ADMIN — DEXAMETHASONE SODIUM PHOSPHATE 10 MG: 4 INJECTION INTRA-ARTICULAR; INTRALESIONAL; INTRAMUSCULAR; INTRAVENOUS; SOFT TISSUE at 03:10

## 2022-10-07 RX ADMIN — PERTUZUMAB 840 MG: 30 INJECTION, SOLUTION, CONCENTRATE INTRAVENOUS at 11:10

## 2022-10-07 RX ADMIN — TRASTUZUMAB 570 MG: 150 INJECTION, POWDER, LYOPHILIZED, FOR SOLUTION INTRAVENOUS at 01:10

## 2022-10-07 NOTE — PLAN OF CARE
Problem: Adult Inpatient Plan of Care  Goal: Plan of Care Review  Outcome: Ongoing, Progressing  Flowsheets (Taken 10/7/2022 1034)  Plan of Care Reviewed With:   patient   friend  Goal: Patient-Specific Goal (Individualized)  Outcome: Ongoing, Progressing  Flowsheets (Taken 10/7/2022 1034)  Anxieties, Fears or Concerns: First day of treatment anxiety  Individualized Care Needs: Recliner, warm blanket, education, encouragement  Patient-Specific Goals (Include Timeframe): Free of S/S of reaction with treatment.     Problem: Fatigue  Goal: Improved Activity Tolerance  Outcome: Ongoing, Progressing    Patient to Infusion for Perjeta, Ogivri and Taxol, accompanied by a friend. Treatment plan reviewed with patient. VSS. Cryotherapy initiated with Taxol. Tolerated infusions. Provided with copy of upcoming appointment schedule. Escorted to the front lobby for discharge to home.        CM continuing to follow.

## 2022-10-11 ENCOUNTER — PATIENT MESSAGE (OUTPATIENT)
Dept: HEMATOLOGY/ONCOLOGY | Facility: CLINIC | Age: 57
End: 2022-10-11
Payer: COMMERCIAL

## 2022-10-11 ENCOUNTER — TELEPHONE (OUTPATIENT)
Dept: HEMATOLOGY/ONCOLOGY | Facility: CLINIC | Age: 57
End: 2022-10-11
Payer: COMMERCIAL

## 2022-10-11 NOTE — TELEPHONE ENCOUNTER
----- Message from Livier Cox sent at 10/11/2022 12:15 PM CDT -----  Type: Needs Medical Advice  Who Called:  Patient   Symptoms (please be specific):    How long has patient had these symptoms:    Pharmacy name and phone #:    Best Call Back Number: [phone]  Additional Information: Patient is requesting a call back from Anamaria.

## 2022-10-11 NOTE — NURSING
Spoke to pt.  She has been having pain in her legs and fett mostly at night since she had chemo on 10/7/22.  She has tried Tylenol and Ibuprofen.  She does have diabetes and neuropathy.  She is asking for gabapentin.   Encouraged her to stay hydrated.  Message sent to Dr. Jaquez.  I told her I would get back with her tomorrow.  Pt verbalized understanding.

## 2022-10-12 DIAGNOSIS — E11.41 DIABETIC MONONEUROPATHY ASSOCIATED WITH TYPE 2 DIABETES MELLITUS: ICD-10-CM

## 2022-10-12 DIAGNOSIS — L27.0 DRUG-INDUCED SKIN RASH: ICD-10-CM

## 2022-10-12 DIAGNOSIS — C50.912 INVASIVE DUCTAL CARCINOMA OF BREAST, FEMALE, LEFT: Primary | ICD-10-CM

## 2022-10-12 RX ORDER — TRIAMCINOLONE ACETONIDE 0.25 MG/G
CREAM TOPICAL 2 TIMES DAILY
Qty: 15 G | Refills: 0 | Status: SHIPPED | OUTPATIENT
Start: 2022-10-12 | End: 2022-11-08

## 2022-10-12 RX ORDER — TRAMADOL HYDROCHLORIDE 50 MG/1
50 TABLET ORAL EVERY 12 HOURS PRN
Qty: 60 TABLET | Refills: 0 | Status: SHIPPED | OUTPATIENT
Start: 2022-10-12 | End: 2022-11-11

## 2022-10-14 ENCOUNTER — DOCUMENTATION ONLY (OUTPATIENT)
Dept: INFUSION THERAPY | Facility: HOSPITAL | Age: 57
End: 2022-10-14

## 2022-10-14 ENCOUNTER — OFFICE VISIT (OUTPATIENT)
Dept: HEMATOLOGY/ONCOLOGY | Facility: CLINIC | Age: 57
End: 2022-10-14
Payer: COMMERCIAL

## 2022-10-14 ENCOUNTER — LAB VISIT (OUTPATIENT)
Dept: LAB | Facility: HOSPITAL | Age: 57
End: 2022-10-14
Attending: STUDENT IN AN ORGANIZED HEALTH CARE EDUCATION/TRAINING PROGRAM
Payer: COMMERCIAL

## 2022-10-14 ENCOUNTER — INFUSION (OUTPATIENT)
Dept: INFUSION THERAPY | Facility: HOSPITAL | Age: 57
End: 2022-10-14
Attending: STUDENT IN AN ORGANIZED HEALTH CARE EDUCATION/TRAINING PROGRAM
Payer: COMMERCIAL

## 2022-10-14 VITALS
HEART RATE: 90 BPM | TEMPERATURE: 97 F | DIASTOLIC BLOOD PRESSURE: 74 MMHG | SYSTOLIC BLOOD PRESSURE: 105 MMHG | OXYGEN SATURATION: 98 % | WEIGHT: 293 LBS | RESPIRATION RATE: 18 BRPM | HEIGHT: 64 IN | BODY MASS INDEX: 50.02 KG/M2

## 2022-10-14 VITALS
OXYGEN SATURATION: 98 % | BODY MASS INDEX: 54.57 KG/M2 | DIASTOLIC BLOOD PRESSURE: 76 MMHG | RESPIRATION RATE: 18 BRPM | SYSTOLIC BLOOD PRESSURE: 110 MMHG | TEMPERATURE: 97 F | WEIGHT: 293 LBS | HEART RATE: 83 BPM

## 2022-10-14 DIAGNOSIS — C50.912 INVASIVE DUCTAL CARCINOMA OF BREAST, FEMALE, LEFT: Primary | ICD-10-CM

## 2022-10-14 DIAGNOSIS — C50.912 INVASIVE DUCTAL CARCINOMA OF BREAST, FEMALE, LEFT: ICD-10-CM

## 2022-10-14 DIAGNOSIS — E11.41 DIABETIC MONONEUROPATHY ASSOCIATED WITH TYPE 2 DIABETES MELLITUS: ICD-10-CM

## 2022-10-14 LAB
ALBUMIN SERPL BCP-MCNC: 3.6 G/DL (ref 3.5–5.2)
ALP SERPL-CCNC: 78 U/L (ref 55–135)
ALT SERPL W/O P-5'-P-CCNC: 34 U/L (ref 10–44)
ANION GAP SERPL CALC-SCNC: 8 MMOL/L (ref 8–16)
AST SERPL-CCNC: 22 U/L (ref 10–40)
BILIRUB SERPL-MCNC: 0.6 MG/DL (ref 0.1–1)
BUN SERPL-MCNC: 19 MG/DL (ref 6–20)
CALCIUM SERPL-MCNC: 9.1 MG/DL (ref 8.7–10.5)
CHLORIDE SERPL-SCNC: 103 MMOL/L (ref 95–110)
CO2 SERPL-SCNC: 23 MMOL/L (ref 23–29)
CREAT SERPL-MCNC: 0.8 MG/DL (ref 0.5–1.4)
ERYTHROCYTE [DISTWIDTH] IN BLOOD BY AUTOMATED COUNT: 12.7 % (ref 11.5–14.5)
EST. GFR  (NO RACE VARIABLE): >60 ML/MIN/1.73 M^2
GLUCOSE SERPL-MCNC: 170 MG/DL (ref 70–110)
HCT VFR BLD AUTO: 39.6 % (ref 37–48.5)
HGB BLD-MCNC: 13.2 G/DL (ref 12–16)
IMM GRANULOCYTES # BLD AUTO: 0.01 K/UL (ref 0–0.04)
MAGNESIUM SERPL-MCNC: 1.3 MG/DL (ref 1.6–2.6)
MCH RBC QN AUTO: 30.3 PG (ref 27–31)
MCHC RBC AUTO-ENTMCNC: 33.3 G/DL (ref 32–36)
MCV RBC AUTO: 91 FL (ref 82–98)
NEUTROPHILS # BLD AUTO: 3.3 K/UL (ref 1.8–7.7)
PLATELET # BLD AUTO: 248 K/UL (ref 150–450)
PMV BLD AUTO: 10 FL (ref 9.2–12.9)
POTASSIUM SERPL-SCNC: 4.1 MMOL/L (ref 3.5–5.1)
PROT SERPL-MCNC: 7.6 G/DL (ref 6–8.4)
RBC # BLD AUTO: 4.35 M/UL (ref 4–5.4)
SODIUM SERPL-SCNC: 134 MMOL/L (ref 136–145)
WBC # BLD AUTO: 5.62 K/UL (ref 3.9–12.7)

## 2022-10-14 PROCEDURE — 99999 PR PBB SHADOW E&M-EST. PATIENT-LVL IV: CPT | Mod: PBBFAC,,, | Performed by: NURSE PRACTITIONER

## 2022-10-14 PROCEDURE — 63600175 PHARM REV CODE 636 W HCPCS: Mod: PN | Performed by: STUDENT IN AN ORGANIZED HEALTH CARE EDUCATION/TRAINING PROGRAM

## 2022-10-14 PROCEDURE — 63600175 PHARM REV CODE 636 W HCPCS: Mod: TB,PN | Performed by: NURSE PRACTITIONER

## 2022-10-14 PROCEDURE — 96413 CHEMO IV INFUSION 1 HR: CPT | Mod: PN

## 2022-10-14 PROCEDURE — 3078F PR MOST RECENT DIASTOLIC BLOOD PRESSURE < 80 MM HG: ICD-10-PCS | Mod: CPTII,S$GLB,, | Performed by: NURSE PRACTITIONER

## 2022-10-14 PROCEDURE — 3051F HG A1C>EQUAL 7.0%<8.0%: CPT | Mod: CPTII,S$GLB,, | Performed by: NURSE PRACTITIONER

## 2022-10-14 PROCEDURE — 3051F PR MOST RECENT HEMOGLOBIN A1C LEVEL 7.0 - < 8.0%: ICD-10-PCS | Mod: CPTII,S$GLB,, | Performed by: NURSE PRACTITIONER

## 2022-10-14 PROCEDURE — 96375 TX/PRO/DX INJ NEW DRUG ADDON: CPT | Mod: PN

## 2022-10-14 PROCEDURE — 25000003 PHARM REV CODE 250: Mod: PN | Performed by: NURSE PRACTITIONER

## 2022-10-14 PROCEDURE — 80053 COMPREHEN METABOLIC PANEL: CPT | Mod: PN | Performed by: STUDENT IN AN ORGANIZED HEALTH CARE EDUCATION/TRAINING PROGRAM

## 2022-10-14 PROCEDURE — 96417 CHEMO IV INFUS EACH ADDL SEQ: CPT | Mod: PN

## 2022-10-14 PROCEDURE — 83735 ASSAY OF MAGNESIUM: CPT | Mod: PN | Performed by: STUDENT IN AN ORGANIZED HEALTH CARE EDUCATION/TRAINING PROGRAM

## 2022-10-14 PROCEDURE — 99214 OFFICE O/P EST MOD 30 MIN: CPT | Mod: S$GLB,,, | Performed by: NURSE PRACTITIONER

## 2022-10-14 PROCEDURE — 25000003 PHARM REV CODE 250: Mod: PN | Performed by: STUDENT IN AN ORGANIZED HEALTH CARE EDUCATION/TRAINING PROGRAM

## 2022-10-14 PROCEDURE — 99214 PR OFFICE/OUTPT VISIT, EST, LEVL IV, 30-39 MIN: ICD-10-PCS | Mod: S$GLB,,, | Performed by: NURSE PRACTITIONER

## 2022-10-14 PROCEDURE — 3074F SYST BP LT 130 MM HG: CPT | Mod: CPTII,S$GLB,, | Performed by: NURSE PRACTITIONER

## 2022-10-14 PROCEDURE — 96367 TX/PROPH/DG ADDL SEQ IV INF: CPT | Mod: PN

## 2022-10-14 PROCEDURE — 3078F DIAST BP <80 MM HG: CPT | Mod: CPTII,S$GLB,, | Performed by: NURSE PRACTITIONER

## 2022-10-14 PROCEDURE — 99999 PR PBB SHADOW E&M-EST. PATIENT-LVL IV: ICD-10-PCS | Mod: PBBFAC,,, | Performed by: NURSE PRACTITIONER

## 2022-10-14 PROCEDURE — 85027 COMPLETE CBC AUTOMATED: CPT | Mod: PN | Performed by: STUDENT IN AN ORGANIZED HEALTH CARE EDUCATION/TRAINING PROGRAM

## 2022-10-14 PROCEDURE — 36415 COLL VENOUS BLD VENIPUNCTURE: CPT | Mod: PN | Performed by: STUDENT IN AN ORGANIZED HEALTH CARE EDUCATION/TRAINING PROGRAM

## 2022-10-14 PROCEDURE — 3074F PR MOST RECENT SYSTOLIC BLOOD PRESSURE < 130 MM HG: ICD-10-PCS | Mod: CPTII,S$GLB,, | Performed by: NURSE PRACTITIONER

## 2022-10-14 RX ORDER — EPINEPHRINE 0.3 MG/.3ML
0.3 INJECTION SUBCUTANEOUS ONCE AS NEEDED
Status: DISCONTINUED | OUTPATIENT
Start: 2022-10-14 | End: 2022-10-14 | Stop reason: HOSPADM

## 2022-10-14 RX ORDER — FAMOTIDINE 10 MG/ML
20 INJECTION INTRAVENOUS
Status: COMPLETED | OUTPATIENT
Start: 2022-10-14 | End: 2022-10-14

## 2022-10-14 RX ORDER — SODIUM CHLORIDE 0.9 % (FLUSH) 0.9 %
10 SYRINGE (ML) INJECTION
Status: DISCONTINUED | OUTPATIENT
Start: 2022-10-14 | End: 2022-10-14 | Stop reason: HOSPADM

## 2022-10-14 RX ORDER — MAGNESIUM SULFATE HEPTAHYDRATE 40 MG/ML
2 INJECTION, SOLUTION INTRAVENOUS ONCE
Status: COMPLETED | OUTPATIENT
Start: 2022-10-14 | End: 2022-10-14

## 2022-10-14 RX ORDER — HEPARIN 100 UNIT/ML
500 SYRINGE INTRAVENOUS
Status: DISCONTINUED | OUTPATIENT
Start: 2022-10-14 | End: 2022-10-14 | Stop reason: HOSPADM

## 2022-10-14 RX ORDER — DIPHENHYDRAMINE HYDROCHLORIDE 50 MG/ML
50 INJECTION INTRAMUSCULAR; INTRAVENOUS ONCE AS NEEDED
Status: DISCONTINUED | OUTPATIENT
Start: 2022-10-14 | End: 2022-10-14 | Stop reason: HOSPADM

## 2022-10-14 RX ADMIN — FAMOTIDINE 20 MG: 10 INJECTION INTRAVENOUS at 02:10

## 2022-10-14 RX ADMIN — DIPHENHYDRAMINE HYDROCHLORIDE 50 MG: 50 INJECTION, SOLUTION INTRAMUSCULAR; INTRAVENOUS at 02:10

## 2022-10-14 RX ADMIN — PACLITAXEL 204 MG: 6 INJECTION, SOLUTION INTRAVENOUS at 03:10

## 2022-10-14 RX ADMIN — TRASTUZUMAB 288 MG: 150 INJECTION, POWDER, LYOPHILIZED, FOR SOLUTION INTRAVENOUS at 01:10

## 2022-10-14 RX ADMIN — DEXAMETHASONE SODIUM PHOSPHATE 10 MG: 10 INJECTION, SOLUTION INTRAMUSCULAR; INTRAVENOUS at 02:10

## 2022-10-14 RX ADMIN — MAGNESIUM SULFATE IN WATER 2 G: 40 INJECTION, SOLUTION INTRAVENOUS at 12:10

## 2022-10-14 NOTE — PLAN OF CARE
Problem: Adult Inpatient Plan of Care  Goal: Plan of Care Review  Outcome: Ongoing, Progressing  Flowsheets (Taken 10/14/2022 6202)  Plan of Care Reviewed With: patient   Pt tolerated Ogivri and Taxol infusion well.   No adverse reaction noted.  PAC flushed with NS and de-accessed per protocol.   Pt left clinic in no acute distress.

## 2022-10-14 NOTE — PROGRESS NOTES
ONCOLOGY NUTRITION   FOLLOW UP VISIT        Josefina Coon is a 57 y.o. female.  DATE: 10/14/2022        Oncology Diagnosis: Breast Cancer     REFERRAL FROM:   [] Integrative Oncology   [] Med/Heme Oncology  [] Radiation Oncology  [] Surgical Oncology   [] Infusion Nurse    [x] Routine Nutrition follow up    TREATMENT PLAN:   [] Full treatment plan pending  [x] Chemotherapy  [] Immunotherapy  [] Radiation  [] Concurrent  [] Surgery  [] Treatment complete/post-treatment    ANTHROPOMETRICS:  Wt Readings from Last 10 Encounters:   10/14/22 (!) 144.2 kg (317 lb 14.5 oz)   10/14/22 (!) 144.2 kg (317 lb 14.5 oz)   10/07/22 (!) 148.2 kg (326 lb 11.6 oz)   10/06/22 (!) 149.7 kg (330 lb 0.5 oz)   10/04/22 (!) 147.7 kg (325 lb 9.9 oz)   09/30/22 (!) 148.5 kg (327 lb 6.1 oz)   09/29/22 (!) 147.4 kg (325 lb)   09/29/22 (!) 147.7 kg (325 lb 9.9 oz)   09/26/22 (!) 147.7 kg (325 lb 9.9 oz)   09/26/22 (!) 147.7 kg (325 lb 9.9 oz)      Weight Changes: has decreased 10 pounds over last 2 weeks: 3%, moderate    PHYSICAL EXAM:  Muscle Wasting Observed:  [x] No Deficit   [] Mild Deficit   [] Moderate   [] Severe    INTAKE:  [x] PO Intake [] TF Intake  Current Diet: regular diet  Dietary Patterns:  Eating meals/snacks as tolerated  [] Oral nutritional supplements:     SYMPTOMS/COMPLAINTS:   [] No nutritional concerns at current  [x] Diarrhea                    [] Constipation           [] Nausea                 [] Vomiting                [] Indigestion                [] Reflux              [] Poor Appetite            [] Anorexia                 [] Early Satiety         [] Gas                       [] Bloating                     [] Dry Mouth    [] Mucositis                   [] Mouth Sores           [] Poor Dentition      [] Difficulty chewing  [] Difficulty Swallowing   [] Pain with swallowing [x] Change in taste      [] Change in smell   [] Pain (general)       [] Fatigue                      [] Sleep issues    [] Weight loss  []  other, please specify-     Nutrition Re-Assessment Risk: Low    [x] Labs reviewed   [x] Meds reviewed    Education Provided:   [] No Education Needed at this time  [x] Diarrhea                                              [] Constipation                          [] Nausea/Vomiting  [] Mucositis                [] Dry Mouth    [] Dealing with changes in Taste/Smell  [] Dealing with Poor Appetite   [] Soft/moist Diet      [] Weight Loss/Gain     [] Weight Maintenance                           [] Indigestion/GERD                 [] Gas/Bloating          [] Foods High/ Low in specific nutrients [] Increasing Calories/Protein   [] Milkshake/Smoothies Recipes   [] Nutrition Supplements                        [] Increasing Fluid Intakes         [] Foods that fight cancer    [] Evidence bases resources                 [] Fermented Foods/Probiotics  [] Mediterranean/Plant Based Diet     [] Other, specify                                   [x] Handouts provided: Taste and Smell Changes       [x] Samples provided: Banatrol and Liquid IV     RD NOTE:  RD met w/ pt and pt's son, Kris, at chairside during infusion tx. Pt states she has been eating healthier and has not been snacking which has caused her to lose weight. Pt reports change of taste and diarrhea. Pt states she purchased biotene for change in taste and possible mouth sores. RD encouraged pt to use daily and/or use baking soda mouth wash recipe provided. Pt met w/ NP, Rony Jung, today prior to chemotherapy tx. NP states in her note pt has not been drinking any electrolyte drinks.     RD Goals:   [x] Weight stable                  [] Weight gain                      [] Weight Loss                               [] Continue adequate Kcal/protein   [] Increase Kcal/protein      [] Adjust Tube-feeding Rx   [] Tolerate Tube Feedings             [] Increase tube feedings to goal     [] Tolerate Supplements     [x] Symptom Improvement   [] Understand nutrition Education  [x]  Offer supportive visits   [] other, please specify    RECOMMENDATIONS:  Add 1 packet of Liquid IV to water daily  Add 1-2 packets of banatrol to yogurt, oatmeal, or water for diarrhea  Begin baking soda mouth wash before and after eating  Continue current calorie/protein intake to maintain body weight  Continue using Biotene as needed     Follow up: Next infusion or PRN throughout tx    Jerri Capellan, ESTER, LDN  10/14/2022  3:54 PM

## 2022-10-14 NOTE — PROGRESS NOTES
PATIENT: Josefina Coon  MRN: [id number]  DATE: 10/14/2022      Diagnosis:   1. Invasive ductal carcinoma of breast, female, left    2. Diabetic mononeuropathy associated with type 2 diabetes mellitus        Chief Complaint: Clearance for C1D8    Subjective:   HPI: Ms. Coon is a 57 y.o. female with HTN, HLD, depression, diabetes type 2 with neuropathy, obesity, fatty liver, who has established care with Dr. Jaquez for a diagnosis of invasive ductal carcinoma of the left breast, triple positivity.   She is here today for consideration of C1D8 of Paclitaxel/Trastuzumab/Pertuzumab D1, D8, D15 of a 21 day cycle. Accompanied by her son.   Chemo start:  10/07/22    Echo done, 9/29, EF = 60%      Reports some diarrhea that is easily resolved with Imodium.  Has not been drinking any electrolyte replacement.  Mild arthralgias/myalgias mostly in lower back. Has started Claritin with some benefit; has Tramadol - has not initiated  Chronic neuropathy and lower extremity swelling; advised to establish with Podiatry in light of DM & nail care.  Mild facial rash; improved with triamcinolone.  No fevers, chills, abdominal discomfort, N/V, bleeding, etc.    Oncologic History:   8/25/22 bilateral screening mammogram,,Right neg ,Left central post mass     9/8/22 left diag MMG, left US limited .Left 0300 lateral posterior 6cm FN 1.4cm mass , left axilla LN 2, up to 4mm cortex     9/14/22 left 0300 lateral posterior 6cm FN 1.4cm mass US biopsy .Grade 3 ,1.1cm in biopsy No LVI , ER 84% ME 28% Her 2 3+ pos Ki 52 %    Left axilla LN biopsy ;Benign fatty tissue, no LN, not concordant No MRI of breasts were done      9/21/22 US bilateral complete Right neg, right LN nml , Left 0300 6cm FN [id number] mass Borderline LN left axilla     9/21/22 Left axilla LN biopsy benign LN , so eventually Stage IA triple positive Breast cancer     Past Medical History:   Past Medical History:   Diagnosis Date    Abnormal liver enzymes     Asymptomatic  varicose veins     Cancer     left breast cancer    Depressive disorder, not elsewhere classified     Dyslipidemia     Embolism and thrombosis of unspecified site     superficial venous thrombosis    Encounter for long-term (current) use of other medications     Family history of ischemic heart disease     Fatty liver     Generalized anxiety disorder     History of chicken pox     Obstructive sleep apnea (adult) (pediatric)     Type II or unspecified type diabetes mellitus without mention of complication, not stated as uncontrolled     Unspecified essential hypertension     Unspecified venous (peripheral) insufficiency     Unspecified vitamin D deficiency        Past Surgical HIstory:   Past Surgical History:   Procedure Laterality Date     SECTION      INSERTION OF TUNNELED CENTRAL VENOUS CATHETER (CVC) WITH SUBCUTANEOUS PORT Right 10/4/2022    Procedure: [id number];  Surgeon: Dominick Ng MD;  Location: [facility] OR;  Service: General;  Laterality: Right;    VEIN SURGERY      Laser, Dr. Larsen       Family History:   Family History   Problem Relation Age of Onset    Hyperlipidemia Mother     Hypertension Mother     Vulvar Cancer Mother     Diabetes Brother     Cancer Brother         colon    Stroke Maternal Grandmother        Social History:  reports that she has never smoked. She has never used smokeless tobacco. She reports that she does not currently use alcohol. She reports that she does not use drugs.    Allergies:  Review of patient's allergies indicates:   Allergen Reactions    Sitagliptin      Other reaction(s): (januvia) abdominal pain    Sulfa (sulfonamide antibiotics) Hives    Byetta  [exenatide] Rash    Lactose        Medications:  Current Outpatient Medications   Medication Sig Dispense Refill    albuterol 90 mcg/actuation inhaler Inhale 2 puffs into the lungs every 6 (six) hours as needed for Wheezing. 18 g 6    buPROPion (WELLBUTRIN XL) 150 MG TB24 tablet TAKE 1 TABLET BY MOUTH  EVERY DAY 90 tablet 3    dulaglutide (TRULICITY) 3 mg/0.5 mL pen injector Inject 3 mg into the skin every 7 days. 12 pen 1    ergocalciferol (ERGOCALCIFEROL) 50,000 unit Cap TAKE 1 CAPSULE BY MOUTH ONE TIME PER WEEK 12 capsule 0    LIDOcaine-prilocaine (EMLA) cream Apply to port site 1 hour prior to port access and cover 30 g 3    magnesium 30 mg Tab Take 30 mg by mouth once.      metFORMIN (GLUCOPHAGE) 500 MG tablet TAKE 1 TABLET BY MOUTH TWICE A DAY WITH MEALS 180 tablet 1    omeprazole (PRILOSEC OTC) 20 MG tablet Take 1 tablet (20 mg total) by mouth once daily. 30 tablet 6    pravastatin (PRAVACHOL) 10 MG tablet TAKE 1 TABLET BY MOUTH EVERYDAY AT BEDTIME 90 tablet 1    promethazine (PHENERGAN) 25 MG tablet Take 1 tablet (25 mg total) by mouth every 6 (six) hours as needed for Nausea. 30 tablet 0    telmisartan-hydrochlorothiazide (MICARDIS HCT) 80-25 mg per tablet TAKE 1 TABLET BY MOUTH EVERY DAY 90 tablet 1    triamcinolone acetonide 0.025% (KENALOG) 0.025 % cream Apply topically 2 (two) times daily. Apply to the skin lesions twice a day 15 g 0    ALPRAZolam (XANAX) 0.25 MG tablet Take one tab po 1-2 times daily PRN anxiety (Patient not taking: Reported on 10/14/2022) 15 tablet 0    ondansetron (ZOFRAN) 8 MG tablet Take 1 tablet (8 mg total) by mouth every 8 (eight) hours as needed for Nausea. (Patient not taking: Reported on 10/14/2022) 30 tablet 2    ondansetron (ZOFRAN-ODT) 8 MG TbDL Take 1 tablet (8 mg total) by mouth every 6 (six) hours as needed (nausea). (Patient not taking: Reported on 10/14/2022) 10 tablet 0    traMADoL (ULTRAM) 50 mg tablet Take 1 tablet (50 mg total) by mouth every 12 (twelve) hours as needed for Pain (sever pain). (Patient not taking: Reported on 10/14/2022) 60 tablet 0    TRULICITY 1.5 mg/0.5 mL pen injector Inject 1.5 mg into the skin every 7 days.       No current facility-administered medications for this visit.       Review of Systems   Constitutional:  Negative for fatigue and  fever.   Cardiovascular:  Positive for leg swelling.   Gastrointestinal:  Negative for blood in stool, constipation and nausea.   Genitourinary:  Negative for difficulty urinating.   Musculoskeletal:  Positive for arthralgias and myalgias.   Skin:  Positive for rash.   Neurological:  Negative for headaches.   Psychiatric/Behavioral:  The patient is nervous/anxious.      ECOG Performance Status: 0       Objective:      Vitals:   Weight:  Loss of 12 pounds in 1 week - eating healthy  Vitals:    10/14/22 1007   BP: 110/76   BP Location: Left arm   Patient Position: Sitting   BP Method: Large (Automatic)   Pulse: 83   Resp: 18   Temp: 96.8 °F (36 °C)   TempSrc: Temporal   SpO2: 98%   Weight: (!) 144.2 kg (317 lb 14.5 oz)     BMI: Body mass index is 54.57 kg/m².    Physical Exam  Vitals reviewed.   Constitutional:       General: She is not in acute distress.     Appearance: She is obese. She is not diaphoretic.   HENT:      Head: Normocephalic and atraumatic.      Mouth/Throat:      Pharynx: Oropharynx is clear.   Eyes:      General: No scleral icterus.        Right eye: No discharge.         Left eye: No discharge.      Conjunctiva/sclera: Conjunctivae normal.   Cardiovascular:      Rate and Rhythm: Normal rate and regular rhythm.   Pulmonary:      Effort: Pulmonary effort is normal. No respiratory distress.      Breath sounds: Normal breath sounds.   Abdominal:      General: Bowel sounds are normal.      Palpations: Abdomen is soft.   Musculoskeletal:      Cervical back: Neck supple.      Right lower leg: Edema present.      Left lower leg: Edema present.   Lymphadenopathy:      Cervical: No cervical adenopathy.   Skin:     General: Skin is warm and dry.      Coloration: Skin is not jaundiced.      Findings: Rash (mild, facial) present.   Neurological:      Mental Status: She is alert and oriented to person, place, and time.   Psychiatric:         Behavior: Behavior normal.         Thought Content: Thought content  normal.       Laboratory Data:  Lab Results   Component Value Date    WBC 5.62 10/14/2022    HGB 13.2 10/14/2022    HCT 39.6 10/14/2022    MCV 91 10/14/2022     10/14/2022        Assessment:       1. Invasive ductal carcinoma of breast, female, left    2. Diabetic mononeuropathy associated with type 2 diabetes mellitus         Plan:   Invasive ductal carcinoma of the breast, left  - cT1c triple positive breast cancer  (ER 84% TN 28% Her 2 3+ pos Ki 52 %), given her young age and Ki67%, will proceed with TH, adding P to help with a better pCR, will also plan to get ultrasound mid cycle for monitor   -CBC,CMP, Magnesium done today & reviewed  -Proceed with C1D8 today with magnesium 2 gm IVPB for correction  -Discussed Tx plan with Dr. Jaquez; will discuss with [facility].  -Will discuss Evusheld with Dr. Jaquez.    Diabetes type 2 with neuropathy   -Taking Metformin, Trulicity; follows with Dr. Echols PCP  Lab Results   Component Value Date    HGBA1C 7.8 (H) 09/30/2022   -Will need to monitor for worsening neuropathy while undergoing treatment     Abnormal liver enzymes  -Currently taking Pravastatin   -Monitor closely while on therapy     Anxiety  -Taking Wellbutrin      Advance Care Planning     Date: 09/30/2022    Power of   I initiated the process of advance care planning today and explained the importance of this process to the patient.  I introduced the concept of advance directives to the patient, as well. Then the patient received detailed information about the importance of designating a Health Care Power of  (HCPOA). She was also instructed to communicate with this person about their wishes for future healthcare, should she become sick and lose decision-making capacity. The patient has not previously appointed a HCPOA. After our discussion, the patient has agreed to take the information packet to review, will bring back to future appointment.  I spent a total time of 5 minutes  discussing this issue with the patient.          Route Chart for Scheduling    Med Onc Chart Routing      Follow up with physician . F/u with Dr. valdivia next week 10/21 appt made   Follow up with BRANDIE    Infusion scheduling note Proceed with C1D8 today; added Magnesium today   Injection scheduling note    Labs    Imaging    Pharmacy appointment    Other referrals        Treatment Plan Information   OP BREAST PACLITAXEL TRASTUZUMAB WEEKLY WITH PERTUZUMAB Q3W (THP)   Lisa Valdivia MD   Upcoming Treatment Dates - OP BREAST PACLITAXEL TRASTUZUMAB WEEKLY WITH PERTUZUMAB Q3W (THP)    10/14/2022       Pre-Medications       diphenhydramine (BENADRYL) 50 mg in NS 50 mL IVPB       famotidine (PF) injection 20 mg       dexamethasone (DECADRON) 10 mg in sodium chloride 0.9% 50 mL IVPB       Chemotherapy       PACLitaxeL (TAXOL) 80 mg/m2 = 210 mg in sodium chloride 0.9% 250 mL chemo infusion       trastuzumab-dkst (OGIVRI) in sodium chloride 0.9% chemo infusion       Supportive Care       magnesium sulfate 2 g in sodium chloride 0.9% 50 mL IVPB  10/21/2022       Pre-Medications       diphenhydramine (BENADRYL) 50 mg in NS 50 mL IVPB       famotidine (PF) injection 20 mg       dexamethasone (DECADRON) 10 mg in sodium chloride 0.9% 50 mL IVPB       Chemotherapy       trastuzumab-dkst (OGIVRI) in sodium chloride 0.9% chemo infusion       PACLitaxeL (TAXOL) 80 mg/m2 = 210 mg in sodium chloride 0.9% 250 mL chemo infusion  10/28/2022       Pre-Medications       diphenhydrAMINE (BENADRYL) 50 mg in sodium chloride 0.9% 50 mL IVPB       famotidine (PF) injection 20 mg       dexamethasone (DECADRON) 10 mg in sodium chloride 0.9% 50 mL IVPB       Chemotherapy       pertuzumab (PERJETA) 420 mg in sodium chloride 0.9% 264 mL infusion       trastuzumab-dkst (OGIVRI) in sodium chloride 0.9% chemo infusion       PACLitaxeL (TAXOL) in sodium chloride 0.9% 250 mL chemo infusion  11/4/2022       Pre-Medications       diphenhydrAMINE (BENADRYL) 50 mg  in sodium chloride 0.9% 50 mL IVPB       famotidine (PF) injection 20 mg       dexamethasone (DECADRON) 10 mg in sodium chloride 0.9% 50 mL IVPB       Chemotherapy       PACLitaxeL (TAXOL) in sodium chloride 0.9% 250 mL chemo infusion       trastuzumab-dkst (OGIVRI) in sodium chloride 0.9% chemo infusion

## 2022-10-14 NOTE — PLAN OF CARE
Problem: Adult Inpatient Plan of Care  Goal: Patient-Specific Goal (Individualized)  Outcome: Ongoing, Progressing  Flowsheets (Taken 10/14/2022 1141)  Anxieties, Fears or Concerns: none  Individualized Care Needs: recliner, warm blanket, family at chairside  Patient-Specific Goals (Include Timeframe): no s/sx of rx during tv     Problem: Fatigue  Goal: Improved Activity Tolerance  Intervention: Promote Improved Energy  Flowsheets (Taken 10/14/2022 1141)  Fatigue Management:   frequent rest breaks encouraged   paced activity encouraged  Sleep/Rest Enhancement:   natural light exposure provided   noise level reduced   family presence promoted  Activity Management:   Ambulated -L4   Ambulated to bathroom - L4   Ambulated in rajan - L4   Up in chair - L3

## 2022-10-17 ENCOUNTER — PATIENT MESSAGE (OUTPATIENT)
Dept: HEMATOLOGY/ONCOLOGY | Facility: CLINIC | Age: 57
End: 2022-10-17
Payer: COMMERCIAL

## 2022-10-19 ENCOUNTER — TELEPHONE (OUTPATIENT)
Dept: SURGERY | Facility: CLINIC | Age: 57
End: 2022-10-19
Payer: COMMERCIAL

## 2022-10-19 DIAGNOSIS — C50.912 INVASIVE DUCTAL CARCINOMA OF BREAST, FEMALE, LEFT: Primary | ICD-10-CM

## 2022-10-21 ENCOUNTER — TELEPHONE (OUTPATIENT)
Dept: HEMATOLOGY/ONCOLOGY | Facility: CLINIC | Age: 57
End: 2022-10-21
Payer: COMMERCIAL

## 2022-10-21 ENCOUNTER — LAB VISIT (OUTPATIENT)
Dept: LAB | Facility: HOSPITAL | Age: 57
End: 2022-10-21
Attending: STUDENT IN AN ORGANIZED HEALTH CARE EDUCATION/TRAINING PROGRAM
Payer: COMMERCIAL

## 2022-10-21 ENCOUNTER — INFUSION (OUTPATIENT)
Dept: INFUSION THERAPY | Facility: HOSPITAL | Age: 57
End: 2022-10-21
Attending: STUDENT IN AN ORGANIZED HEALTH CARE EDUCATION/TRAINING PROGRAM
Payer: COMMERCIAL

## 2022-10-21 ENCOUNTER — OFFICE VISIT (OUTPATIENT)
Dept: PSYCHIATRY | Facility: CLINIC | Age: 57
End: 2022-10-21
Payer: COMMERCIAL

## 2022-10-21 ENCOUNTER — OFFICE VISIT (OUTPATIENT)
Dept: HEMATOLOGY/ONCOLOGY | Facility: CLINIC | Age: 57
End: 2022-10-21
Payer: COMMERCIAL

## 2022-10-21 VITALS
HEART RATE: 112 BPM | OXYGEN SATURATION: 98 % | SYSTOLIC BLOOD PRESSURE: 109 MMHG | BODY MASS INDEX: 50.02 KG/M2 | TEMPERATURE: 98 F | WEIGHT: 293 LBS | HEIGHT: 64 IN | RESPIRATION RATE: 18 BRPM | DIASTOLIC BLOOD PRESSURE: 70 MMHG

## 2022-10-21 VITALS
BODY MASS INDEX: 50.02 KG/M2 | DIASTOLIC BLOOD PRESSURE: 73 MMHG | RESPIRATION RATE: 18 BRPM | OXYGEN SATURATION: 98 % | TEMPERATURE: 98 F | SYSTOLIC BLOOD PRESSURE: 107 MMHG | HEART RATE: 99 BPM | WEIGHT: 293 LBS | HEIGHT: 64 IN

## 2022-10-21 DIAGNOSIS — C50.912 INVASIVE DUCTAL CARCINOMA OF BREAST, FEMALE, LEFT: Primary | ICD-10-CM

## 2022-10-21 DIAGNOSIS — R45.89 ANXIETY ABOUT HEALTH: ICD-10-CM

## 2022-10-21 DIAGNOSIS — E11.41 DIABETIC MONONEUROPATHY ASSOCIATED WITH TYPE 2 DIABETES MELLITUS: ICD-10-CM

## 2022-10-21 DIAGNOSIS — F41.1 GENERALIZED ANXIETY DISORDER: Primary | ICD-10-CM

## 2022-10-21 DIAGNOSIS — K76.0 FATTY LIVER: ICD-10-CM

## 2022-10-21 DIAGNOSIS — C50.912 INVASIVE DUCTAL CARCINOMA OF BREAST, FEMALE, LEFT: ICD-10-CM

## 2022-10-21 DIAGNOSIS — E11.22 TYPE 2 DIABETES MELLITUS WITH CHRONIC KIDNEY DISEASE, WITH LONG-TERM CURRENT USE OF INSULIN, UNSPECIFIED CKD STAGE: ICD-10-CM

## 2022-10-21 DIAGNOSIS — R74.8 ABNORMAL LIVER ENZYMES: ICD-10-CM

## 2022-10-21 DIAGNOSIS — L27.0 DRUG-INDUCED SKIN RASH: ICD-10-CM

## 2022-10-21 DIAGNOSIS — Z79.4 TYPE 2 DIABETES MELLITUS WITH CHRONIC KIDNEY DISEASE, WITH LONG-TERM CURRENT USE OF INSULIN, UNSPECIFIED CKD STAGE: ICD-10-CM

## 2022-10-21 DIAGNOSIS — F41.1 GENERALIZED ANXIETY DISORDER: ICD-10-CM

## 2022-10-21 DIAGNOSIS — E83.42 HYPOMAGNESEMIA: ICD-10-CM

## 2022-10-21 LAB
ALBUMIN SERPL BCP-MCNC: 3.5 G/DL (ref 3.5–5.2)
ALP SERPL-CCNC: 77 U/L (ref 55–135)
ALT SERPL W/O P-5'-P-CCNC: 28 U/L (ref 10–44)
ANION GAP SERPL CALC-SCNC: 9 MMOL/L (ref 8–16)
AST SERPL-CCNC: 14 U/L (ref 10–40)
BILIRUB SERPL-MCNC: 0.4 MG/DL (ref 0.1–1)
BUN SERPL-MCNC: 14 MG/DL (ref 6–20)
CALCIUM SERPL-MCNC: 9.5 MG/DL (ref 8.7–10.5)
CHLORIDE SERPL-SCNC: 101 MMOL/L (ref 95–110)
CO2 SERPL-SCNC: 25 MMOL/L (ref 23–29)
CREAT SERPL-MCNC: 0.9 MG/DL (ref 0.5–1.4)
ERYTHROCYTE [DISTWIDTH] IN BLOOD BY AUTOMATED COUNT: 12.9 % (ref 11.5–14.5)
EST. GFR  (NO RACE VARIABLE): >60 ML/MIN/1.73 M^2
GLUCOSE SERPL-MCNC: 172 MG/DL (ref 70–110)
HCT VFR BLD AUTO: 38.7 % (ref 37–48.5)
HGB BLD-MCNC: 13 G/DL (ref 12–16)
IMM GRANULOCYTES # BLD AUTO: 0.03 K/UL (ref 0–0.04)
MAGNESIUM SERPL-MCNC: 1.2 MG/DL (ref 1.6–2.6)
MCH RBC QN AUTO: 30.2 PG (ref 27–31)
MCHC RBC AUTO-ENTMCNC: 33.6 G/DL (ref 32–36)
MCV RBC AUTO: 90 FL (ref 82–98)
NEUTROPHILS # BLD AUTO: 2.2 K/UL (ref 1.8–7.7)
PLATELET # BLD AUTO: 269 K/UL (ref 150–450)
PMV BLD AUTO: 9.9 FL (ref 9.2–12.9)
POTASSIUM SERPL-SCNC: 4.6 MMOL/L (ref 3.5–5.1)
PROT SERPL-MCNC: 7.3 G/DL (ref 6–8.4)
RBC # BLD AUTO: 4.31 M/UL (ref 4–5.4)
SODIUM SERPL-SCNC: 135 MMOL/L (ref 136–145)
WBC # BLD AUTO: 4.72 K/UL (ref 3.9–12.7)

## 2022-10-21 PROCEDURE — 63600175 PHARM REV CODE 636 W HCPCS: Mod: PN | Performed by: STUDENT IN AN ORGANIZED HEALTH CARE EDUCATION/TRAINING PROGRAM

## 2022-10-21 PROCEDURE — 3051F PR MOST RECENT HEMOGLOBIN A1C LEVEL 7.0 - < 8.0%: ICD-10-PCS | Mod: CPTII,S$GLB,, | Performed by: STUDENT IN AN ORGANIZED HEALTH CARE EDUCATION/TRAINING PROGRAM

## 2022-10-21 PROCEDURE — 99999 PR PBB SHADOW E&M-EST. PATIENT-LVL V: ICD-10-PCS | Mod: PBBFAC,,, | Performed by: STUDENT IN AN ORGANIZED HEALTH CARE EDUCATION/TRAINING PROGRAM

## 2022-10-21 PROCEDURE — 96413 CHEMO IV INFUSION 1 HR: CPT | Mod: PN

## 2022-10-21 PROCEDURE — 3074F SYST BP LT 130 MM HG: CPT | Mod: CPTII,S$GLB,, | Performed by: STUDENT IN AN ORGANIZED HEALTH CARE EDUCATION/TRAINING PROGRAM

## 2022-10-21 PROCEDURE — 80053 COMPREHEN METABOLIC PANEL: CPT | Mod: PN | Performed by: STUDENT IN AN ORGANIZED HEALTH CARE EDUCATION/TRAINING PROGRAM

## 2022-10-21 PROCEDURE — 1160F RVW MEDS BY RX/DR IN RCRD: CPT | Mod: CPTII,S$GLB,, | Performed by: STUDENT IN AN ORGANIZED HEALTH CARE EDUCATION/TRAINING PROGRAM

## 2022-10-21 PROCEDURE — 3051F HG A1C>EQUAL 7.0%<8.0%: CPT | Mod: CPTII,S$GLB,, | Performed by: PSYCHOLOGIST

## 2022-10-21 PROCEDURE — 3078F PR MOST RECENT DIASTOLIC BLOOD PRESSURE < 80 MM HG: ICD-10-PCS | Mod: CPTII,S$GLB,, | Performed by: STUDENT IN AN ORGANIZED HEALTH CARE EDUCATION/TRAINING PROGRAM

## 2022-10-21 PROCEDURE — 1160F PR REVIEW ALL MEDS BY PRESCRIBER/CLIN PHARMACIST DOCUMENTED: ICD-10-PCS | Mod: CPTII,S$GLB,, | Performed by: STUDENT IN AN ORGANIZED HEALTH CARE EDUCATION/TRAINING PROGRAM

## 2022-10-21 PROCEDURE — 99999 PR PBB SHADOW E&M-EST. PATIENT-LVL II: ICD-10-PCS | Mod: PBBFAC,,, | Performed by: PSYCHOLOGIST

## 2022-10-21 PROCEDURE — 90791 PSYCH DIAGNOSTIC EVALUATION: CPT | Mod: S$GLB,,, | Performed by: PSYCHOLOGIST

## 2022-10-21 PROCEDURE — 99215 OFFICE O/P EST HI 40 MIN: CPT | Mod: S$GLB,,, | Performed by: STUDENT IN AN ORGANIZED HEALTH CARE EDUCATION/TRAINING PROGRAM

## 2022-10-21 PROCEDURE — 83735 ASSAY OF MAGNESIUM: CPT | Mod: PN | Performed by: STUDENT IN AN ORGANIZED HEALTH CARE EDUCATION/TRAINING PROGRAM

## 2022-10-21 PROCEDURE — 90791 PR PSYCHIATRIC DIAGNOSTIC EVALUATION: ICD-10-PCS | Mod: S$GLB,,, | Performed by: PSYCHOLOGIST

## 2022-10-21 PROCEDURE — 1159F MED LIST DOCD IN RCRD: CPT | Mod: CPTII,S$GLB,, | Performed by: STUDENT IN AN ORGANIZED HEALTH CARE EDUCATION/TRAINING PROGRAM

## 2022-10-21 PROCEDURE — 99215 PR OFFICE/OUTPT VISIT, EST, LEVL V, 40-54 MIN: ICD-10-PCS | Mod: S$GLB,,, | Performed by: STUDENT IN AN ORGANIZED HEALTH CARE EDUCATION/TRAINING PROGRAM

## 2022-10-21 PROCEDURE — 3074F PR MOST RECENT SYSTOLIC BLOOD PRESSURE < 130 MM HG: ICD-10-PCS | Mod: CPTII,S$GLB,, | Performed by: STUDENT IN AN ORGANIZED HEALTH CARE EDUCATION/TRAINING PROGRAM

## 2022-10-21 PROCEDURE — 99999 PR PBB SHADOW E&M-EST. PATIENT-LVL V: CPT | Mod: PBBFAC,,, | Performed by: STUDENT IN AN ORGANIZED HEALTH CARE EDUCATION/TRAINING PROGRAM

## 2022-10-21 PROCEDURE — 3051F PR MOST RECENT HEMOGLOBIN A1C LEVEL 7.0 - < 8.0%: ICD-10-PCS | Mod: CPTII,S$GLB,, | Performed by: PSYCHOLOGIST

## 2022-10-21 PROCEDURE — 99999 PR PBB SHADOW E&M-EST. PATIENT-LVL II: CPT | Mod: PBBFAC,,, | Performed by: PSYCHOLOGIST

## 2022-10-21 PROCEDURE — 3051F HG A1C>EQUAL 7.0%<8.0%: CPT | Mod: CPTII,S$GLB,, | Performed by: STUDENT IN AN ORGANIZED HEALTH CARE EDUCATION/TRAINING PROGRAM

## 2022-10-21 PROCEDURE — 36415 COLL VENOUS BLD VENIPUNCTURE: CPT | Mod: PN | Performed by: STUDENT IN AN ORGANIZED HEALTH CARE EDUCATION/TRAINING PROGRAM

## 2022-10-21 PROCEDURE — 96367 TX/PROPH/DG ADDL SEQ IV INF: CPT | Mod: PN

## 2022-10-21 PROCEDURE — 96417 CHEMO IV INFUS EACH ADDL SEQ: CPT | Mod: PN

## 2022-10-21 PROCEDURE — 85027 COMPLETE CBC AUTOMATED: CPT | Mod: PN | Performed by: STUDENT IN AN ORGANIZED HEALTH CARE EDUCATION/TRAINING PROGRAM

## 2022-10-21 PROCEDURE — 1159F PR MEDICATION LIST DOCUMENTED IN MEDICAL RECORD: ICD-10-PCS | Mod: CPTII,S$GLB,, | Performed by: STUDENT IN AN ORGANIZED HEALTH CARE EDUCATION/TRAINING PROGRAM

## 2022-10-21 PROCEDURE — 96375 TX/PRO/DX INJ NEW DRUG ADDON: CPT | Mod: PN

## 2022-10-21 PROCEDURE — 3078F DIAST BP <80 MM HG: CPT | Mod: CPTII,S$GLB,, | Performed by: STUDENT IN AN ORGANIZED HEALTH CARE EDUCATION/TRAINING PROGRAM

## 2022-10-21 PROCEDURE — 25000003 PHARM REV CODE 250: Mod: PN | Performed by: STUDENT IN AN ORGANIZED HEALTH CARE EDUCATION/TRAINING PROGRAM

## 2022-10-21 RX ORDER — SODIUM CHLORIDE 0.9 % (FLUSH) 0.9 %
10 SYRINGE (ML) INJECTION
Status: DISCONTINUED | OUTPATIENT
Start: 2022-10-21 | End: 2022-10-21 | Stop reason: HOSPADM

## 2022-10-21 RX ORDER — FAMOTIDINE 10 MG/ML
20 INJECTION INTRAVENOUS
Status: COMPLETED | OUTPATIENT
Start: 2022-10-21 | End: 2022-10-21

## 2022-10-21 RX ORDER — EPINEPHRINE 0.3 MG/.3ML
0.3 INJECTION SUBCUTANEOUS ONCE AS NEEDED
Status: DISCONTINUED | OUTPATIENT
Start: 2022-10-21 | End: 2022-10-21 | Stop reason: HOSPADM

## 2022-10-21 RX ORDER — MAGNESIUM SULFATE HEPTAHYDRATE 40 MG/ML
2 INJECTION, SOLUTION INTRAVENOUS ONCE
Status: COMPLETED | OUTPATIENT
Start: 2022-10-21 | End: 2022-10-21

## 2022-10-21 RX ORDER — DIPHENHYDRAMINE HYDROCHLORIDE 50 MG/ML
50 INJECTION INTRAMUSCULAR; INTRAVENOUS ONCE AS NEEDED
Status: DISCONTINUED | OUTPATIENT
Start: 2022-10-21 | End: 2022-10-21 | Stop reason: HOSPADM

## 2022-10-21 RX ADMIN — TRASTUZUMAB 290 MG: 150 INJECTION, POWDER, LYOPHILIZED, FOR SOLUTION INTRAVENOUS at 01:10

## 2022-10-21 RX ADMIN — DEXAMETHASONE SODIUM PHOSPHATE 10 MG: 4 INJECTION INTRA-ARTICULAR; INTRALESIONAL; INTRAMUSCULAR; INTRAVENOUS; SOFT TISSUE at 02:10

## 2022-10-21 RX ADMIN — MAGNESIUM SULFATE IN WATER 2 G: 40 INJECTION, SOLUTION INTRAVENOUS at 12:10

## 2022-10-21 RX ADMIN — PACLITAXEL 204 MG: 6 INJECTION, SOLUTION INTRAVENOUS at 03:10

## 2022-10-21 RX ADMIN — DIPHENHYDRAMINE HYDROCHLORIDE 50 MG: 50 INJECTION, SOLUTION INTRAMUSCULAR; INTRAVENOUS at 02:10

## 2022-10-21 RX ADMIN — FAMOTIDINE 20 MG: 10 INJECTION INTRAVENOUS at 02:10

## 2022-10-21 RX ADMIN — SODIUM CHLORIDE: 0.9 INJECTION, SOLUTION INTRAVENOUS at 12:10

## 2022-10-21 NOTE — PROGRESS NOTES
Dictation #1  MRN:[id number]  CSN:[id number] PSYCHO-ONCOLOGY INTAKE    Diagnostic Interview - CPT 10269    Date: 10/21/2022  Site: [facility]    Evaluation Length (direct face-to-face time):  1 hour    This includes face to face time and non-face to face time preparing to see the patient, obtaining and/or reviewing separately obtained history, documenting clinical information in the electronic or other health record, independently interpreting results and communicating results to the patient/family/caregiver, or care coordinator.     Referral Source: Eugenia Tello FNP-C   Oncologist: Lisa Jaquez MD   PCP: Raya Echols MD    Clinical status of patient: Outpatient    Josefina Coon, a 57 y.o. female, seen for initial evaluation visit.    Josefina Coon reviewed and agreed to informed consent and the limits of confidentiality.    Chief complaint/reason for encounter: adjustment to illness, anxiety    Medical/Surgical History:    Patient Active Problem List   Diagnosis    Obstructive sleep apnea (adult) (pediatric)    Mixed hyperlipidemia    Type 2 diabetes mellitus, with long-term current use of insulin    Essential hypertension    Depressive disorder, not elsewhere classified    Unspecified venous (peripheral) insufficiency    Asymptomatic varicose veins    Embolism and thrombosis of unspecified site    Abnormal liver enzymes    Fatty liver    Family history of ischemic heart disease    Unspecified vitamin D deficiency    Generalized anxiety disorder    Obesity    Invasive ductal carcinoma of breast, female, left    Diabetic mononeuropathy associated with type 2 diabetes mellitus       Health Behaviors:       ETOH Use: Yes (past social)       Tobacco Use: No   Illicit Drug Use:  No     Prescription Misuse:No   Caffeine: minimal   Exercise:The patient engaged in regular activity prior to illness The patient is actively working to return to baseline exercise tolerance.   Advanced  directives:No     Family History:   Psychiatric illness: Yes, brother w/ severe depression, anxiety throughout family     Alcohol/Drug Abuse: No     Suicide: No      Past Psychiatric History:   Inpatient treatment: No     Outpatient treatment: Yes, currently seeing outpt LCSW (since 2018)     Prior substance abuse treatment: No     Suicide Attempts: No     Psychotropic Medications:  Current: Wellbutrin and Xanax       Past: Lexapro     Current medications as per below, allergies reviewed in chart.    Current Outpatient Medications   Medication    albuterol 90 mcg/actuation inhaler    ALPRAZolam (XANAX) 0.25 MG tablet    buPROPion (WELLBUTRIN XL) 150 MG TB24 tablet    dulaglutide (TRULICITY) 3 mg/0.5 mL pen injector    ergocalciferol (ERGOCALCIFEROL) 50,000 unit Cap    LIDOcaine-prilocaine (EMLA) cream    magnesium 30 mg Tab    metFORMIN (GLUCOPHAGE) 500 MG tablet    omeprazole (PRILOSEC OTC) 20 MG tablet    ondansetron (ZOFRAN) 8 MG tablet    ondansetron (ZOFRAN-ODT) 8 MG TbDL    pravastatin (PRAVACHOL) 10 MG tablet    promethazine (PHENERGAN) 25 MG tablet    telmisartan-hydrochlorothiazide (MICARDIS HCT) 80-25 mg per tablet    traMADoL (ULTRAM) 50 mg tablet    triamcinolone acetonide 0.025% (KENALOG) 0.025 % cream    TRULICITY 1.5 mg/0.5 mL pen injector     No current facility-administered medications for this visit.     Facility-Administered Medications Ordered in Other Visits   Medication Frequency    dexAMETHasone (DECADRON) 10 mg in sodium chloride 0.9% 50 mL IVPB 1 time in Clinic/HOD    diphenhydrAMINE (BENADRYL) 50 mg in NS 50 mL IVPB 1 time in Clinic/HOD    diphenhydrAMINE injection 50 mg Once PRN    EPINEPHrine (EPIPEN) 0.3 mg/0.3 mL pen injection 0.3 mg Once PRN    famotidine (PF) injection 20 mg 1 time in Clinic/HOD    hydrocortisone sodium succinate injection 100 mg Once PRN    PACLitaxeL (TAXOL) 80 mg/m2 = 204 mg in sodium chloride 0.9% 250 mL chemo infusion 1 time in Clinic/HOD    sodium chloride 0.9%  flush 10 mL PRN    trastuzumab-dkst (OGIVRI) 290 mg in sodium chloride 0.9% 250 mL chemo infusion 1 time in Clinic/HOD              Social situation/Stressors: Josefina Coon lives with her twin sons (age 23) in [place], [place].  She is a full-time nurse, working w/ infants (presently working remotely).    Josefina Coon is .   The patient reports excellent social support, noting the support of her sister and close friends.  Josefina Coon is an active member of the [employer].  Josefina Coon's hobbies include podcasts and reading.  Additional stressors: brother passed from cancer (2015), mother passed (2017), divorce (2018), concern for loss of hair/body image change    Strengths:Able to vocalize needs, Values and traditions, Motivation, readiness for change, Vocational interests, hobbies and/or talents, Interpersonal relationships and supports available - family, relatives, friends, and Cultural/spiritual/Jehovah's witness and community involvement  Liabilities: N/A    Current Evaluation:     Mental Status Exam: Josefina Coon arrived promptly for the assessment session, meeting during her scheduled infusion.  The patient was fully cooperative throughout the interview and was an adequate historian   Appearance: age appropriate, casually  dressed, well groomed  Behavior/Cooperation: friendly and cooperative  Speech: normal in rate, volume, and tone and appropriate quality, quantity and organization of sentences  Mood: steady  Affect: mood congruent and appropriate  Thought Process: goal-directed, logical  Thought Content: normal, no suicidality, no homicidality, delusions, or paranoia;did not appear to be responding to internal stimuli during the interview.   Orientation: grossly intact  Memory: grossly intact  Attention Span/Concentration: Attends to interview without distraction; reports no difficulty  Fund of Knowledge: above average  Estimate of Intelligence: above average from verbal  skills and history  Cognition: grossly intact  Insight: patient has awareness of illness; good insight into own behavior and behavior of others  Judgment: the patient's behavior is adequate to circumstances      History of present illness:    Oncology History   Invasive ductal carcinoma of breast, female, left   9/23/2022 Initial Diagnosis    Invasive ductal carcinoma of breast, female, left     9/23/2022 Cancer Staged    Staging form: Breast, AJCC 8th Edition  - Clinical stage from 9/23/2022: Stage IA (cT1c, cN0(f), cM0, G3, ER+, AK+, HER2+)       9/29/2022 - 9/29/2022 Chemotherapy    Treatment Summary   Plan Name: OP BREAST TRASTUZUMAB PACLITAXEL WEEKLY  Treatment Goal: Curative  Status: Inactive  Start Date:   End Date:   Provider: Lisa Jaquez MD  Chemotherapy: PACLitaxeL (TAXOL) 80 mg/m2 = 204 mg in sodium chloride 0.9% 250 mL chemo infusion, 80 mg/m2 = 204 mg, Intravenous, Clinic/HOD 1 time, 0 of 12 cycles  pertuzumab (PERJETA) 840 mg in sodium chloride 0.9% 278 mL infusion, 840 mg (original dose ), Intravenous, Clinic/HOD 1 time, 0 of 17 cycles  Dose modification: 840 mg (Cycle 1, Reason: Other (see comments)), 420 mg (Cycle 4, Reason: Other (see comments))  trastuzumab-dkst (OGIVRI) 591 mg in sodium chloride 0.9% 250 mL chemo infusion, 4 mg/kg = 591 mg, Intravenous, Clinic/HOD 1 time, 0 of 25 cycles       10/7/2022 -  Chemotherapy    Treatment Summary   Plan Name: OP BREAST PACLITAXEL TRASTUZUMAB WEEKLY WITH PERTUZUMAB Q3W (THP)  Treatment Goal: Control  Status: Active  Start Date: 10/7/2022  End Date: 12/23/2022 (Planned)  Provider: Lisa Jaquez MD  Chemotherapy: PACLitaxeL (TAXOL) 80 mg/m2 = 210 mg in sodium chloride 0.9% 250 mL chemo infusion, 80 mg/m2 = 210 mg, Intravenous, Clinic/HOD 1 time, 1 of 4 cycles  Administration: 210 mg (10/7/2022), 204 mg (10/14/2022)  pertuzumab (PERJETA) 840 mg in sodium chloride 0.9% 278 mL infusion, 840 mg, Intravenous, Clinic/HOD 1 time, 1 of 4 cycles  Administration:  840 mg (10/7/2022)  trastuzumab-dkst (OGIVRI) 570 mg in sodium chloride 0.9% 250 mL chemo infusion, 593 mg (100 % of original dose 4 mg/kg), Intravenous, Clinic/HOD 1 time, 1 of 4 cycles  Dose modification: 4 mg/kg (original dose 4 mg/kg, Cycle 1, Reason: Other (see comments), Comment: weekly trastuzumab), 2 mg/kg (original dose 2 mg/kg, Cycle 2, Reason: Other (see comments), Comment: weekly maintenance dose), 6 mg/kg (original dose 2 mg/kg, Cycle 2, Reason: MD Discretion, Comment: change to q3w dosing), 2 mg/kg (original dose 2 mg/kg, Cycle 1, Reason: Other (see comments), Comment: maintenance weekly dose)  Administration: 570 mg (10/7/2022), 288 mg (10/14/2022)             Josefina Coon has adjusted to illness with slight difficulty primarily through active coping strategies, focus on alternative activities, focus on work, and focus on family. She has engaged in appropriate information gathering.  The patient has excellent family/friend support.  Her support system is coping well with the diagnosis/treatment/prognosis. Illness-related psychosocial stressors include difficulty meeting family responsibilities and changes in ability to engage in leisure activities.  The patient has a excellent partnership with her [facility] treatment team. The patient reports the following barriers to cancer care:none.     NCCN Distress thermometer:   DISTRESS SCREENING 10/14/2022 10/7/2022 10/6/2022   Distress Score 3 0 - No Distress 1   Practical Problems None of these Work/School;Treatment Decisions None of these   Family Problems None of these - -   Emotional Problems Fears Depression;Fears;Nervousness;Worry Nervousness   Spiritual / Mosque Concerns No No -   Physical Problems Skin Dry/Itchy;Tingling in hands or feet;Pain;Diarrhea Appearance;Eating;Fatigue;Feeling Swollen;Mouth Sores;Nausea;Pain;Tingling in hands or feet None of these        Symptoms:   Mood: denied;  prior depression:situational ; no  SI/HI  Anxiety: Feeling nervous, anxious, or on edge, Restlessness, Irritability, Fear of unknown, and muscle tension; lifelong anxiety  Substance abuse: denied  Cognitive functioning: denied  Health behaviors: noncontributory  Sleep: Time in bed: restful sleep  and no concerns          Assessment - Diagnosis - Goals:       ICD-10-CM ICD-9-CM   1. Generalized anxiety disorder  F41.1 300.02   2. Invasive ductal carcinoma of breast, female, left  C50.912 174.9   3. Anxiety about health  F41.8 300.09         Plan:individual psychotherapy and medication management by physician    Summary and Recommendations  Josefina Coon is a 57 y.o. female referred by Eugenia Tello FNP-C for psychological evaluation and treatment.  Ms. Coon appears to be coping well with her diagnosis and proposed treatment course, at this point in her care.  Patient was encouraged to speak to her primary care physician regarding psychotropic medication options. Mood protective strategies during cancer treatment were discussed.  She is interested in individual therapy for cancer coping skills and will follow up with me for that purpose. She was encouraged to continue with pleasant events scheduling and to continue to adapt coping strategies to  her personal needs.    Return to clinic: 2 months    GOALS:   Adaptive coping  Diaphragmatic breathing  Continued medication management (notified pt of psychiatry NP as needed)  Continued exploration of body image concerns (secondary to tx SE)               Franko Long Psy.D.  Clinical Psychologist  [place] License #[id number]  [place] License #[id number]

## 2022-10-21 NOTE — TELEPHONE ENCOUNTER
LVM for patient to notify her to stop at the lab on the first floor for blood work prior to her appointment with Dr Jaquez this morning. Call back number given.

## 2022-10-21 NOTE — PLAN OF CARE
Problem: Adult Inpatient Plan of Care  Goal: Plan of Care Review  Outcome: Ongoing, Progressing  Flowsheets (Taken 10/21/2022 1305)  Plan of Care Reviewed With:   patient   friend  Goal: Patient-Specific Goal (Individualized)  Outcome: Ongoing, Progressing  Flowsheets (Taken 10/21/2022 1305)  Anxieties, Fears or Concerns: None  Individualized Care Needs: Recliner, warm blanket, conversation  Patient-Specific Goals (Include Timeframe): Free of S/S of reaction with treatment.     Problem: Fatigue  Goal: Improved Activity Tolerance  Outcome: Ongoing, Progressing  Intervention: Promote Improved Energy  Flowsheets (Taken 10/21/2022 1305)  Fatigue Management:   frequent rest breaks encouraged   paced activity encouraged  Sleep/Rest Enhancement: regular sleep/rest pattern promoted  Activity Management: Ambulated -L4       Patient to Infusion for Ogivri, Taxol and Magnesium following appointment with Dr. Jaquez. Accompanied by a friend. Rash to her face. Dr. Jaquez aware. Treatment plan reviewed with patient. VSS. Tolerated treatment. Provided with copy of upcoming appointment schedule. Escorted to the front lobby for discharge to home.

## 2022-10-21 NOTE — Clinical Note
Dionisio Rojas,  Pt requesting info on wig boutique in the next few visits, please.  Have a great weekend,  Jasmeet

## 2022-10-21 NOTE — PROGRESS NOTES
Subjective:     Patient ID:    Name: Josefina Coon  : 1965  MRN: [id number]    HPI:   Josefina Coon is a 57 y.o. female presents for evaluation of Invasive ductal carcinoma of breast, female, left    Patient was initially seen as part of a Multi-D clinic with Radiation Oncology,Surgical Oncology and Medical Oncology. Case was also presented at breast cancer tumor conference.     22 bilateral screening mammogram,,Right neg ,Left central post mass     22 left diag MMG, left US limited .Left 0300 lateral posterior 6cm FN 1.4cm mass , left axilla LN 2, up to 4mm cortex     22 left 0300 lateral posterior 6cm FN 1.4cm mass US biopsy .Grade 3 ,1.1cm in biopsy No LVI , ER 84% NC 28% Her 2 3+ pos Ki 52 %    Left axilla LN biopsy ;Benign fatty tissue, no LN, not concordant No MRI of breasts were done      22 US bilateral complete Right neg, right LN nml , Left 0300 6cm FN [id number] mass Borderline LN left axilla     22 Left axilla LN biopsy benign LN , so eventually Stage IA triple positive Breast cancer    Discussion about neoadjuvant chemotherapy and side effects was done., started Drake-adjuvant chemo TCP on 10/14/22, had skin rash (face and possible scalp) likely taxol related, given creams with improvement.     Today, still has skin rash on her face and lumps in her scalp which improved, proceeding with chemo, will schedule ultrasound after cycle # 3 to monitor her response.    Oncology History   Invasive ductal carcinoma of breast, female, left   2022 Initial Diagnosis    Invasive ductal carcinoma of breast, female, left     2022 Cancer Staged    Staging form: Breast, AJCC 8th Edition  - Clinical stage from 2022: Stage IA (cT1c, cN0(f), cM0, G3, ER+, NC+, HER2+)       2022 - 2022 Chemotherapy    Treatment Summary   Plan Name: OP BREAST TRASTUZUMAB PACLITAXEL WEEKLY  Treatment Goal: Curative  Status: Inactive  Start Date:   End Date:   Provider: Lisa Jaquez  MD  Chemotherapy: PACLitaxeL (TAXOL) 80 mg/m2 = 204 mg in sodium chloride 0.9% 250 mL chemo infusion, 80 mg/m2 = 204 mg, Intravenous, Clinic/HOD 1 time, 0 of 12 cycles  pertuzumab (PERJETA) 840 mg in sodium chloride 0.9% 278 mL infusion, 840 mg (original dose ), Intravenous, Clinic/HOD 1 time, 0 of 17 cycles  Dose modification: 840 mg (Cycle 1, Reason: Other (see comments)), 420 mg (Cycle 4, Reason: Other (see comments))  trastuzumab-dkst (OGIVRI) 591 mg in sodium chloride 0.9% 250 mL chemo infusion, 4 mg/kg = 591 mg, Intravenous, Clinic/HOD 1 time, 0 of 25 cycles       10/7/2022 -  Chemotherapy    Treatment Summary   Plan Name: OP BREAST PACLITAXEL TRASTUZUMAB WEEKLY WITH PERTUZUMAB Q3W (THP)  Treatment Goal: Control  Status: Active  Start Date: 10/7/2022  End Date: 12/23/2022 (Planned)  Provider: Lisa Jaquez MD  Chemotherapy: PACLitaxeL (TAXOL) 80 mg/m2 = 210 mg in sodium chloride 0.9% 250 mL chemo infusion, 80 mg/m2 = 210 mg, Intravenous, Clinic/HOD 1 time, 1 of 4 cycles  Administration: 210 mg (10/7/2022), 204 mg (10/14/2022), 204 mg (10/21/2022)  pertuzumab (PERJETA) 840 mg in sodium chloride 0.9% 278 mL infusion, 840 mg, Intravenous, Clinic/HOD 1 time, 1 of 4 cycles  Administration: 840 mg (10/7/2022)  trastuzumab-dkst (OGIVRI) 570 mg in sodium chloride 0.9% 250 mL chemo infusion, 593 mg (100 % of original dose 4 mg/kg), Intravenous, Clinic/HOD 1 time, 1 of 4 cycles  Dose modification: 4 mg/kg (original dose 4 mg/kg, Cycle 1, Reason: Other (see comments), Comment: weekly trastuzumab), 2 mg/kg (original dose 2 mg/kg, Cycle 2, Reason: Other (see comments), Comment: weekly maintenance dose), 6 mg/kg (original dose 2 mg/kg, Cycle 2, Reason: MD Discretion, Comment: change to q3w dosing), 2 mg/kg (original dose 2 mg/kg, Cycle 1, Reason: Other (see comments), Comment: maintenance weekly dose)  Administration: 570 mg (10/7/2022), 288 mg (10/14/2022), 290 mg (10/21/2022)              Past Medical History:   Diagnosis  Date    Abnormal liver enzymes     Asymptomatic varicose veins     Cancer     left breast cancer    Depressive disorder, not elsewhere classified     Dyslipidemia     Embolism and thrombosis of unspecified site     superficial venous thrombosis    Encounter for long-term (current) use of other medications     Family history of ischemic heart disease     Fatty liver     Generalized anxiety disorder     History of chicken pox     Obstructive sleep apnea (adult) (pediatric)     Type II or unspecified type diabetes mellitus without mention of complication, not stated as uncontrolled     Unspecified essential hypertension     Unspecified venous (peripheral) insufficiency     Unspecified vitamin D deficiency        Past Surgical History:   Procedure Laterality Date     SECTION      INSERTION OF TUNNELED CENTRAL VENOUS CATHETER (CVC) WITH SUBCUTANEOUS PORT Right 10/4/2022    Procedure: [id number];  Surgeon: Dominick Ng MD;  Location: [facility] OR;  Service: General;  Laterality: Right;    VEIN SURGERY      Laser, Dr. Larsen       Family History   Problem Relation Age of Onset    Hyperlipidemia Mother     Hypertension Mother     Vulvar Cancer Mother     Diabetes Brother     Cancer Brother         colon    Stroke Maternal Grandmother        Social History     Socioeconomic History    Marital status:    Tobacco Use    Smoking status: Never    Smokeless tobacco: Never   Substance and Sexual Activity    Alcohol use: Not Currently     Comment: rare     Drug use: Never    Sexual activity: Not Currently       Review of patient's allergies indicates:   Allergen Reactions    Sitagliptin      Other reaction(s): (januvia) abdominal pain    Sulfa (sulfonamide antibiotics) Hives    Byetta  [exenatide] Rash    Lactose        Review of Systems   Constitutional: Negative for decreased appetite, fever and night sweats.   Eyes:  Negative for blurred vision, double vision, pain and visual disturbance.  "  Cardiovascular:  Negative for chest pain, leg swelling and palpitations.   Respiratory:  Negative for cough and shortness of breath.    Hematologic/Lymphatic: Negative for adenopathy.   Skin:  Negative for rash.   Musculoskeletal:  Negative for back pain.   Gastrointestinal:  Negative for abdominal pain, diarrhea, melena, nausea and vomiting.   Genitourinary:  Negative for frequency.   Neurological:  Negative for headaches and seizures.   Psychiatric/Behavioral:  The patient is not nervous/anxious.           Objective:     Vitals:    10/21/22 1117   BP: 109/70   BP Location: Right arm   Patient Position: Sitting   BP Method: Medium (Manual)   Pulse: (!) 112   Resp: 18   Temp: 98.4 °F (36.9 °C)   TempSrc: Temporal   SpO2: 98%   Weight: (!) 145.2 kg (320 lb 1.7 oz)   Height: 5' 4" (1.626 m)        Physical Exam  Constitutional:       Appearance: Normal appearance.   Eyes:      General: No scleral icterus.  Neck:      Vascular: No carotid bruit.   Cardiovascular:      Rate and Rhythm: Normal rate.      Heart sounds: No murmur heard.  Pulmonary:      Effort: No respiratory distress.   Abdominal:      General: There is no distension.      Palpations: Abdomen is soft.   Musculoskeletal:         General: No swelling or tenderness.      Cervical back: Neck supple.   Lymphadenopathy:      Cervical: No cervical adenopathy.   Skin:     Coloration: Skin is not jaundiced or pale.   Neurological:      General: No focal deficit present.      Mental Status: She is alert and oriented to person, place, and time.   Psychiatric:         Mood and Affect: Mood normal.         Behavior: Behavior normal.             Current Outpatient Medications on File Prior to Visit   Medication Sig    albuterol 90 mcg/actuation inhaler Inhale 2 puffs into the lungs every 6 (six) hours as needed for Wheezing.    buPROPion (WELLBUTRIN XL) 150 MG TB24 tablet TAKE 1 TABLET BY MOUTH EVERY DAY    dulaglutide (TRULICITY) 3 mg/0.5 mL pen injector Inject 3 mg " into the skin every 7 days.    ergocalciferol (ERGOCALCIFEROL) 50,000 unit Cap TAKE 1 CAPSULE BY MOUTH ONE TIME PER WEEK    LIDOcaine-prilocaine (EMLA) cream Apply to port site 1 hour prior to port access and cover    magnesium 30 mg Tab Take 30 mg by mouth once.    metFORMIN (GLUCOPHAGE) 500 MG tablet TAKE 1 TABLET BY MOUTH TWICE A DAY WITH MEALS    omeprazole (PRILOSEC OTC) 20 MG tablet Take 1 tablet (20 mg total) by mouth once daily.    pravastatin (PRAVACHOL) 10 MG tablet TAKE 1 TABLET BY MOUTH EVERYDAY AT BEDTIME    promethazine (PHENERGAN) 25 MG tablet Take 1 tablet (25 mg total) by mouth every 6 (six) hours as needed for Nausea.    telmisartan-hydrochlorothiazide (MICARDIS HCT) 80-25 mg per tablet TAKE 1 TABLET BY MOUTH EVERY DAY    triamcinolone acetonide 0.025% (KENALOG) 0.025 % cream Apply topically 2 (two) times daily. Apply to the skin lesions twice a day    ALPRAZolam (XANAX) 0.25 MG tablet Take one tab po 1-2 times daily PRN anxiety (Patient not taking: No sig reported)    ondansetron (ZOFRAN) 8 MG tablet Take 1 tablet (8 mg total) by mouth every 8 (eight) hours as needed for Nausea. (Patient not taking: No sig reported)    ondansetron (ZOFRAN-ODT) 8 MG TbDL Take 1 tablet (8 mg total) by mouth every 6 (six) hours as needed (nausea). (Patient not taking: No sig reported)    traMADoL (ULTRAM) 50 mg tablet Take 1 tablet (50 mg total) by mouth every 12 (twelve) hours as needed for Pain (sever pain). (Patient not taking: No sig reported)    TRULICITY 1.5 mg/0.5 mL pen injector Inject 1.5 mg into the skin every 7 days.     No current facility-administered medications on file prior to visit.       CBC:  Lab Results   Component Value Date    WBC 4.72 10/21/2022    HGB 13.0 10/21/2022    HCT 38.7 10/21/2022    MCV 90 10/21/2022     10/21/2022     CMP:  Sodium   Date Value Ref Range Status   10/21/2022 135 (L) 136 - 145 mmol/L Final   08/08/2015 137 137 - 145 MMOL/L Final     Potassium   Date Value Ref  Range Status   10/21/2022 4.6 3.5 - 5.1 mmol/L Final   08/08/2015 4.4 3.5 - 5.1 MMOL/L Final     Chloride   Date Value Ref Range Status   10/21/2022 101 95 - 110 mmol/L Final   08/08/2015 101 98 - 107 MMOL/L Final     CO2   Date Value Ref Range Status   10/21/2022 25 23 - 29 mmol/L Final     Glucose   Date Value Ref Range Status   10/21/2022 172 (H) 70 - 110 mg/dL Final     BUN   Date Value Ref Range Status   10/21/2022 14 6 - 20 mg/dL Final     Creatinine   Date Value Ref Range Status   10/21/2022 0.9 0.5 - 1.4 mg/dL Final   08/08/2015 0.63 0.52 - 1.04 MG/DL Final     Calcium   Date Value Ref Range Status   10/21/2022 9.5 8.7 - 10.5 mg/dL Final     Total Protein   Date Value Ref Range Status   10/21/2022 7.3 6.0 - 8.4 g/dL Final     Albumin   Date Value Ref Range Status   10/21/2022 3.5 3.5 - 5.2 g/dL Final     Total Bilirubin   Date Value Ref Range Status   10/21/2022 0.4 0.1 - 1.0 mg/dL Final     Comment:     For infants and newborns, interpretation of results should be based  on gestational age, weight and in agreement with clinical  observations.    Premature Infant recommended reference ranges:  Up to 24 hours.............<8.0 mg/dL  Up to 48 hours............<12.0 mg/dL  3-5 days..................<15.0 mg/dL  6-29 days.................<15.0 mg/dL       Alkaline Phosphatase   Date Value Ref Range Status   10/21/2022 77 55 - 135 U/L Final     AST   Date Value Ref Range Status   10/21/2022 14 10 - 40 U/L Final     ALT   Date Value Ref Range Status   10/21/2022 28 10 - 44 U/L Final     Anion Gap   Date Value Ref Range Status   10/21/2022 9 8 - 16 mmol/L Final     eGFR if    Date Value Ref Range Status   04/08/2022 >60 >60 mL/min/1.73 m^2 Final     eGFR if non    Date Value Ref Range Status   04/08/2022 >60 >60 mL/min/1.73 m^2 Final     Comment:     Calculation used to obtain the estimated glomerular filtration  rate (eGFR) is the CKD-EPI equation.          X-Ray Chest AP  Portable  Narrative: EXAMINATION:  XR CHEST AP PORTABLE    CLINICAL HISTORY:  s/p Port a Cath placement.    TECHNIQUE:  Single frontal view of the chest was performed.    COMPARISON:  PA and lateral chest radiograph, 09/18/2009.    FINDINGS:  Right-sided port with tip projecting over the mid-lower SVC.    No consolidation, pleural effusion or pneumothorax.    Cardiomediastinal silhouette is within normal limits for size.    No acute osseous abnormality.  Impression: Right-sided port with tip projecting over the mid-lower SVC.    Electronically signed by: Gentry Cain MD  Date:    10/04/2022  Time:    10:04  SURG FL Surgery Fluoro Usage  See OP Notes for results.     IMPRESSION: See OP Notes for results.     This procedure was auto-finalized by: Virtual Radiologist       ECOG SCORE    1 - Restricted in strenuous activity-ambulatory and able to carry out work of a light nature          All pertinent labs and imaging reviewed. As well as outside records     Assessment:       1. Invasive ductal carcinoma of breast, female, left    2. Diabetic mononeuropathy associated with type 2 diabetes mellitus    3. Drug-induced skin rash    4. Type 2 diabetes mellitus with chronic kidney disease, with long-term current use of insulin, unspecified CKD stage    5. Abnormal liver enzymes    6. Fatty liver    7. Generalized anxiety disorder      # Stage IA IDC of Left breast:  -Screening mammogram, no symptoms, has baseline fatty liver with mild elevated LFT's no need for further systematic imaging  - discussion given her cT1c triple positive breast cancer neoadjuvant is consider, given her young age and Ki67%, will proceed with TH, adding P to help with a better pCR, will also plan to get ultrasound mid cycle for monitor (3 months)  - echocardiogram with good EF and  port placement , ready for chemo,  proceed w D15C1  of THP  - from cycle #2 will be Taxol weekly and HP every 3 weeks     # Fatty liver/ elevated LFT: mild  elevation/resolved, close monitoring while on taxol infusions, might consider holding statins prior   # DM type 2 with neuropathy: baseline neuropathy, will need to monitor closely for worsening during treatment   # Obesity: will monitor closely while starting chemotherapy    # MANUELA: will need to see Dr Long   # Hypomagnesemia: likely due to chemo/diarrhea, cont , will give IV 2g today     Plan:     Invasive ductal carcinoma of breast, female, left  -     US Breast Left Limited; Future; Expected date: 12/05/2022  -     Magnesium; Future; Expected date: 10/21/2022  -     Cancel: trastuzumab-dkst (OGIVRI) 290 mg in sodium chloride 0.9% 250 mL chemo infusion    Diabetic mononeuropathy associated with type 2 diabetes mellitus    Drug-induced skin rash    Type 2 diabetes mellitus with chronic kidney disease, with long-term current use of insulin, unspecified CKD stage    Abnormal liver enzymes    Fatty liver    Generalized anxiety disorder         Patient queried and all questions were answered.    Plan was discussed with the patient  and her friend at length, and they verbalized understanding.        Recommend COVID guidelines being followed     Recommend  exercise, nutrition and weight management and health maintenance activities and follow-up with PCPs recommendations     Route Chart for Scheduling    Med Onc Chart Routing      Follow up with physician 1 week and 3 weeks. with blood work prior, CBC/CMP/mag   Follow up with BRANDIE 2 weeks.   Infusion scheduling note    Injection scheduling note    Labs    Imaging    Pharmacy appointment    Other referrals        Treatment Plan Information   OP BREAST PACLITAXEL TRASTUZUMAB WEEKLY WITH PERTUZUMAB Q3W (THP)   Lisa Jaquez MD   Upcoming Treatment Dates - OP BREAST PACLITAXEL TRASTUZUMAB WEEKLY WITH PERTUZUMAB Q3W (THP)    10/28/2022       Pre-Medications       diphenhydrAMINE (BENADRYL) 50 mg in sodium chloride 0.9% 50 mL IVPB       famotidine (PF) injection 20 mg        dexamethasone (DECADRON) 10 mg in sodium chloride 0.9% 50 mL IVPB       Chemotherapy       pertuzumab (PERJETA) 420 mg in sodium chloride 0.9% 264 mL infusion       trastuzumab-dkst (OGIVRI) in sodium chloride 0.9% chemo infusion       PACLitaxeL (TAXOL) in sodium chloride 0.9% 250 mL chemo infusion  11/4/2022       Pre-Medications       diphenhydrAMINE (BENADRYL) 50 mg in NS 50 mL IVPB       famotidine (PF) injection 20 mg       dexAMETHasone (DECADRON) 10 mg in sodium chloride 0.9% 50 mL IVPB       Chemotherapy       PACLitaxeL (TAXOL) in sodium chloride 0.9% 250 mL chemo infusion  11/11/2022       Pre-Medications       diphenhydrAMINE (BENADRYL) 50 mg in sodium chloride 0.9% 50 mL IVPB       famotidine (PF) injection 20 mg       dexAMETHasone (DECADRON) 10 mg in sodium chloride 0.9% 50 mL IVPB       Chemotherapy       PACLitaxeL (TAXOL) in sodium chloride 0.9% 250 mL chemo infusion  11/18/2022       Pre-Medications       diphenhydrAMINE (BENADRYL) 50 mg in sodium chloride 0.9% 50 mL IVPB       famotidine (PF) injection 20 mg       dexAMETHasone (DECADRON) 10 mg in sodium chloride 0.9% 50 mL IVPB       Chemotherapy       pertuzumab (PERJETA) 420 mg in sodium chloride 0.9% 264 mL infusion       trastuzumab-dkst (OGIVRI) in sodium chloride 0.9% chemo infusion       PACLitaxeL (TAXOL) in sodium chloride 0.9% 250 mL chemo infusion        Signed:  Lisa Jaquez MD  Hematology and Oncology  [facility]  [facility]

## 2022-10-21 NOTE — Clinical Note
Good afternoon,    Pt requesting FU w/ Bernardo'e be moved to match next chemo visit, please  Have a great weekend,  NTT

## 2022-10-23 PROBLEM — L27.0 DRUG-INDUCED SKIN RASH: Status: ACTIVE | Noted: 2022-10-23

## 2022-10-23 PROBLEM — E83.42 HYPOMAGNESEMIA: Status: ACTIVE | Noted: 2022-10-23

## 2022-10-27 ENCOUNTER — TELEPHONE (OUTPATIENT)
Dept: HEMATOLOGY/ONCOLOGY | Facility: CLINIC | Age: 57
End: 2022-10-27
Payer: COMMERCIAL

## 2022-10-27 NOTE — TELEPHONE ENCOUNTER
Lvm to schedule wig fitting as requested.      ----- Message from Livier Cox sent at 10/27/2022 12:24 PM CDT -----  Type: Needs Medical Advice  Who Called:  Patient   Symptoms (please be specific):    How long has patient had these symptoms:    Pharmacy name and phone #:    Best Call Back Number: [phone]  Additional Information: Patient is requesting a call back to schedule an appt. for wig fitting.

## 2022-10-27 NOTE — TELEPHONE ENCOUNTER
LVM for patient to inform NP will not be in clinic tomorrow so I rescheduled her chairside f/u with Eugenia Tello for next chemo cycle and asked her to call me if she has questions.

## 2022-10-28 ENCOUNTER — LAB VISIT (OUTPATIENT)
Dept: LAB | Facility: HOSPITAL | Age: 57
End: 2022-10-28
Attending: STUDENT IN AN ORGANIZED HEALTH CARE EDUCATION/TRAINING PROGRAM
Payer: COMMERCIAL

## 2022-10-28 ENCOUNTER — INFUSION (OUTPATIENT)
Dept: INFUSION THERAPY | Facility: HOSPITAL | Age: 57
End: 2022-10-28
Attending: STUDENT IN AN ORGANIZED HEALTH CARE EDUCATION/TRAINING PROGRAM
Payer: COMMERCIAL

## 2022-10-28 ENCOUNTER — DOCUMENTATION ONLY (OUTPATIENT)
Dept: INFUSION THERAPY | Facility: HOSPITAL | Age: 57
End: 2022-10-28

## 2022-10-28 ENCOUNTER — TELEPHONE (OUTPATIENT)
Dept: HEMATOLOGY/ONCOLOGY | Facility: CLINIC | Age: 57
End: 2022-10-28
Payer: COMMERCIAL

## 2022-10-28 ENCOUNTER — OFFICE VISIT (OUTPATIENT)
Dept: HEMATOLOGY/ONCOLOGY | Facility: CLINIC | Age: 57
End: 2022-10-28
Payer: COMMERCIAL

## 2022-10-28 VITALS
TEMPERATURE: 97 F | OXYGEN SATURATION: 98 % | HEART RATE: 126 BPM | DIASTOLIC BLOOD PRESSURE: 78 MMHG | RESPIRATION RATE: 18 BRPM | SYSTOLIC BLOOD PRESSURE: 110 MMHG | BODY MASS INDEX: 50.02 KG/M2 | HEIGHT: 64 IN | WEIGHT: 293 LBS

## 2022-10-28 VITALS
TEMPERATURE: 98 F | HEIGHT: 64 IN | HEART RATE: 94 BPM | SYSTOLIC BLOOD PRESSURE: 104 MMHG | RESPIRATION RATE: 16 BRPM | DIASTOLIC BLOOD PRESSURE: 73 MMHG | WEIGHT: 293 LBS | BODY MASS INDEX: 50.02 KG/M2 | OXYGEN SATURATION: 98 %

## 2022-10-28 DIAGNOSIS — C50.912 INVASIVE DUCTAL CARCINOMA OF BREAST, FEMALE, LEFT: Primary | ICD-10-CM

## 2022-10-28 DIAGNOSIS — E11.41 DIABETIC MONONEUROPATHY ASSOCIATED WITH TYPE 2 DIABETES MELLITUS: ICD-10-CM

## 2022-10-28 DIAGNOSIS — E11.22 TYPE 2 DIABETES MELLITUS WITH CHRONIC KIDNEY DISEASE, WITH LONG-TERM CURRENT USE OF INSULIN, UNSPECIFIED CKD STAGE: ICD-10-CM

## 2022-10-28 DIAGNOSIS — Z79.4 TYPE 2 DIABETES MELLITUS WITH CHRONIC KIDNEY DISEASE, WITH LONG-TERM CURRENT USE OF INSULIN, UNSPECIFIED CKD STAGE: ICD-10-CM

## 2022-10-28 DIAGNOSIS — L27.0 DRUG-INDUCED SKIN RASH: ICD-10-CM

## 2022-10-28 DIAGNOSIS — C50.912 INVASIVE DUCTAL CARCINOMA OF BREAST, FEMALE, LEFT: ICD-10-CM

## 2022-10-28 DIAGNOSIS — E83.42 HYPOMAGNESEMIA: ICD-10-CM

## 2022-10-28 LAB
ALBUMIN SERPL BCP-MCNC: 3.5 G/DL (ref 3.5–5.2)
ALP SERPL-CCNC: 75 U/L (ref 55–135)
ALT SERPL W/O P-5'-P-CCNC: 33 U/L (ref 10–44)
ANION GAP SERPL CALC-SCNC: 9 MMOL/L (ref 8–16)
AST SERPL-CCNC: 19 U/L (ref 10–40)
BILIRUB SERPL-MCNC: 0.5 MG/DL (ref 0.1–1)
BUN SERPL-MCNC: 13 MG/DL (ref 6–20)
CALCIUM SERPL-MCNC: 9.2 MG/DL (ref 8.7–10.5)
CHLORIDE SERPL-SCNC: 104 MMOL/L (ref 95–110)
CO2 SERPL-SCNC: 20 MMOL/L (ref 23–29)
CREAT SERPL-MCNC: 0.8 MG/DL (ref 0.5–1.4)
ERYTHROCYTE [DISTWIDTH] IN BLOOD BY AUTOMATED COUNT: 13.4 % (ref 11.5–14.5)
EST. GFR  (NO RACE VARIABLE): >60 ML/MIN/1.73 M^2
GLUCOSE SERPL-MCNC: 151 MG/DL (ref 70–110)
HCT VFR BLD AUTO: 37.6 % (ref 37–48.5)
HGB BLD-MCNC: 12.6 G/DL (ref 12–16)
IMM GRANULOCYTES # BLD AUTO: 0.03 K/UL (ref 0–0.04)
MAGNESIUM SERPL-MCNC: 1.5 MG/DL (ref 1.6–2.6)
MCH RBC QN AUTO: 30.4 PG (ref 27–31)
MCHC RBC AUTO-ENTMCNC: 33.5 G/DL (ref 32–36)
MCV RBC AUTO: 91 FL (ref 82–98)
NEUTROPHILS # BLD AUTO: 2.1 K/UL (ref 1.8–7.7)
PLATELET # BLD AUTO: 253 K/UL (ref 150–450)
PMV BLD AUTO: 9.5 FL (ref 9.2–12.9)
POTASSIUM SERPL-SCNC: 3.9 MMOL/L (ref 3.5–5.1)
PROT SERPL-MCNC: 7.3 G/DL (ref 6–8.4)
RBC # BLD AUTO: 4.15 M/UL (ref 4–5.4)
SODIUM SERPL-SCNC: 133 MMOL/L (ref 136–145)
WBC # BLD AUTO: 4.49 K/UL (ref 3.9–12.7)

## 2022-10-28 PROCEDURE — 3008F BODY MASS INDEX DOCD: CPT | Mod: CPTII,S$GLB,, | Performed by: STUDENT IN AN ORGANIZED HEALTH CARE EDUCATION/TRAINING PROGRAM

## 2022-10-28 PROCEDURE — 3051F PR MOST RECENT HEMOGLOBIN A1C LEVEL 7.0 - < 8.0%: ICD-10-PCS | Mod: CPTII,S$GLB,, | Performed by: STUDENT IN AN ORGANIZED HEALTH CARE EDUCATION/TRAINING PROGRAM

## 2022-10-28 PROCEDURE — 96413 CHEMO IV INFUSION 1 HR: CPT | Mod: PN

## 2022-10-28 PROCEDURE — 3074F PR MOST RECENT SYSTOLIC BLOOD PRESSURE < 130 MM HG: ICD-10-PCS | Mod: CPTII,S$GLB,, | Performed by: STUDENT IN AN ORGANIZED HEALTH CARE EDUCATION/TRAINING PROGRAM

## 2022-10-28 PROCEDURE — 1159F MED LIST DOCD IN RCRD: CPT | Mod: CPTII,S$GLB,, | Performed by: STUDENT IN AN ORGANIZED HEALTH CARE EDUCATION/TRAINING PROGRAM

## 2022-10-28 PROCEDURE — 85027 COMPLETE CBC AUTOMATED: CPT | Mod: PN | Performed by: STUDENT IN AN ORGANIZED HEALTH CARE EDUCATION/TRAINING PROGRAM

## 2022-10-28 PROCEDURE — 25000003 PHARM REV CODE 250: Mod: PN | Performed by: STUDENT IN AN ORGANIZED HEALTH CARE EDUCATION/TRAINING PROGRAM

## 2022-10-28 PROCEDURE — 80053 COMPREHEN METABOLIC PANEL: CPT | Mod: PN | Performed by: STUDENT IN AN ORGANIZED HEALTH CARE EDUCATION/TRAINING PROGRAM

## 2022-10-28 PROCEDURE — 3074F SYST BP LT 130 MM HG: CPT | Mod: CPTII,S$GLB,, | Performed by: STUDENT IN AN ORGANIZED HEALTH CARE EDUCATION/TRAINING PROGRAM

## 2022-10-28 PROCEDURE — 3078F DIAST BP <80 MM HG: CPT | Mod: CPTII,S$GLB,, | Performed by: STUDENT IN AN ORGANIZED HEALTH CARE EDUCATION/TRAINING PROGRAM

## 2022-10-28 PROCEDURE — 99999 PR PBB SHADOW E&M-EST. PATIENT-LVL V: ICD-10-PCS | Mod: PBBFAC,,, | Performed by: STUDENT IN AN ORGANIZED HEALTH CARE EDUCATION/TRAINING PROGRAM

## 2022-10-28 PROCEDURE — 96417 CHEMO IV INFUS EACH ADDL SEQ: CPT | Mod: PN

## 2022-10-28 PROCEDURE — 3051F HG A1C>EQUAL 7.0%<8.0%: CPT | Mod: CPTII,S$GLB,, | Performed by: STUDENT IN AN ORGANIZED HEALTH CARE EDUCATION/TRAINING PROGRAM

## 2022-10-28 PROCEDURE — 3078F PR MOST RECENT DIASTOLIC BLOOD PRESSURE < 80 MM HG: ICD-10-PCS | Mod: CPTII,S$GLB,, | Performed by: STUDENT IN AN ORGANIZED HEALTH CARE EDUCATION/TRAINING PROGRAM

## 2022-10-28 PROCEDURE — 96367 TX/PROPH/DG ADDL SEQ IV INF: CPT | Mod: PN

## 2022-10-28 PROCEDURE — 63600175 PHARM REV CODE 636 W HCPCS: Mod: PN | Performed by: STUDENT IN AN ORGANIZED HEALTH CARE EDUCATION/TRAINING PROGRAM

## 2022-10-28 PROCEDURE — 3008F PR BODY MASS INDEX (BMI) DOCUMENTED: ICD-10-PCS | Mod: CPTII,S$GLB,, | Performed by: STUDENT IN AN ORGANIZED HEALTH CARE EDUCATION/TRAINING PROGRAM

## 2022-10-28 PROCEDURE — 1160F PR REVIEW ALL MEDS BY PRESCRIBER/CLIN PHARMACIST DOCUMENTED: ICD-10-PCS | Mod: CPTII,S$GLB,, | Performed by: STUDENT IN AN ORGANIZED HEALTH CARE EDUCATION/TRAINING PROGRAM

## 2022-10-28 PROCEDURE — 83735 ASSAY OF MAGNESIUM: CPT | Mod: PN | Performed by: STUDENT IN AN ORGANIZED HEALTH CARE EDUCATION/TRAINING PROGRAM

## 2022-10-28 PROCEDURE — 99215 PR OFFICE/OUTPT VISIT, EST, LEVL V, 40-54 MIN: ICD-10-PCS | Mod: S$GLB,,, | Performed by: STUDENT IN AN ORGANIZED HEALTH CARE EDUCATION/TRAINING PROGRAM

## 2022-10-28 PROCEDURE — 99215 OFFICE O/P EST HI 40 MIN: CPT | Mod: S$GLB,,, | Performed by: STUDENT IN AN ORGANIZED HEALTH CARE EDUCATION/TRAINING PROGRAM

## 2022-10-28 PROCEDURE — 96375 TX/PRO/DX INJ NEW DRUG ADDON: CPT | Mod: PN

## 2022-10-28 PROCEDURE — 1159F PR MEDICATION LIST DOCUMENTED IN MEDICAL RECORD: ICD-10-PCS | Mod: CPTII,S$GLB,, | Performed by: STUDENT IN AN ORGANIZED HEALTH CARE EDUCATION/TRAINING PROGRAM

## 2022-10-28 PROCEDURE — 99999 PR PBB SHADOW E&M-EST. PATIENT-LVL V: CPT | Mod: PBBFAC,,, | Performed by: STUDENT IN AN ORGANIZED HEALTH CARE EDUCATION/TRAINING PROGRAM

## 2022-10-28 PROCEDURE — 1160F RVW MEDS BY RX/DR IN RCRD: CPT | Mod: CPTII,S$GLB,, | Performed by: STUDENT IN AN ORGANIZED HEALTH CARE EDUCATION/TRAINING PROGRAM

## 2022-10-28 RX ORDER — DIPHENHYDRAMINE HYDROCHLORIDE 50 MG/ML
50 INJECTION INTRAMUSCULAR; INTRAVENOUS ONCE AS NEEDED
Status: CANCELLED | OUTPATIENT
Start: 2022-11-04

## 2022-10-28 RX ORDER — FAMOTIDINE 10 MG/ML
20 INJECTION INTRAVENOUS
Status: CANCELLED | OUTPATIENT
Start: 2022-11-11

## 2022-10-28 RX ORDER — SODIUM CHLORIDE 0.9 % (FLUSH) 0.9 %
10 SYRINGE (ML) INJECTION
Status: DISCONTINUED | OUTPATIENT
Start: 2022-10-28 | End: 2022-10-28 | Stop reason: HOSPADM

## 2022-10-28 RX ORDER — FAMOTIDINE 10 MG/ML
20 INJECTION INTRAVENOUS
Status: CANCELLED | OUTPATIENT
Start: 2022-11-18

## 2022-10-28 RX ORDER — EPINEPHRINE 0.3 MG/.3ML
0.3 INJECTION SUBCUTANEOUS ONCE AS NEEDED
Status: DISCONTINUED | OUTPATIENT
Start: 2022-10-28 | End: 2022-10-28 | Stop reason: HOSPADM

## 2022-10-28 RX ORDER — EPINEPHRINE 0.3 MG/.3ML
0.3 INJECTION SUBCUTANEOUS ONCE AS NEEDED
Status: CANCELLED | OUTPATIENT
Start: 2022-10-28

## 2022-10-28 RX ORDER — DIPHENHYDRAMINE HYDROCHLORIDE 50 MG/ML
50 INJECTION INTRAMUSCULAR; INTRAVENOUS ONCE AS NEEDED
Status: DISCONTINUED | OUTPATIENT
Start: 2022-10-28 | End: 2022-10-28 | Stop reason: HOSPADM

## 2022-10-28 RX ORDER — EPINEPHRINE 0.3 MG/.3ML
0.3 INJECTION SUBCUTANEOUS ONCE AS NEEDED
Status: CANCELLED | OUTPATIENT
Start: 2022-11-18

## 2022-10-28 RX ORDER — DIPHENHYDRAMINE HYDROCHLORIDE 50 MG/ML
50 INJECTION INTRAMUSCULAR; INTRAVENOUS ONCE AS NEEDED
Status: CANCELLED | OUTPATIENT
Start: 2022-11-11

## 2022-10-28 RX ORDER — HEPARIN 100 UNIT/ML
500 SYRINGE INTRAVENOUS
Status: CANCELLED | OUTPATIENT
Start: 2022-10-28

## 2022-10-28 RX ORDER — FAMOTIDINE 10 MG/ML
20 INJECTION INTRAVENOUS
Status: COMPLETED | OUTPATIENT
Start: 2022-10-28 | End: 2022-10-28

## 2022-10-28 RX ORDER — HEPARIN 100 UNIT/ML
500 SYRINGE INTRAVENOUS
Status: CANCELLED | OUTPATIENT
Start: 2022-11-11

## 2022-10-28 RX ORDER — SODIUM CHLORIDE 0.9 % (FLUSH) 0.9 %
10 SYRINGE (ML) INJECTION
Status: CANCELLED | OUTPATIENT
Start: 2022-10-28

## 2022-10-28 RX ORDER — SODIUM CHLORIDE 0.9 % (FLUSH) 0.9 %
10 SYRINGE (ML) INJECTION
Status: CANCELLED | OUTPATIENT
Start: 2022-11-11

## 2022-10-28 RX ORDER — DIPHENHYDRAMINE HYDROCHLORIDE 50 MG/ML
50 INJECTION INTRAMUSCULAR; INTRAVENOUS ONCE AS NEEDED
Status: CANCELLED | OUTPATIENT
Start: 2022-11-18

## 2022-10-28 RX ORDER — SODIUM CHLORIDE 0.9 % (FLUSH) 0.9 %
10 SYRINGE (ML) INJECTION
Status: CANCELLED | OUTPATIENT
Start: 2022-11-18

## 2022-10-28 RX ORDER — HEPARIN 100 UNIT/ML
500 SYRINGE INTRAVENOUS
Status: CANCELLED | OUTPATIENT
Start: 2022-11-04

## 2022-10-28 RX ORDER — FAMOTIDINE 10 MG/ML
20 INJECTION INTRAVENOUS
Status: CANCELLED | OUTPATIENT
Start: 2022-10-28

## 2022-10-28 RX ORDER — HEPARIN 100 UNIT/ML
500 SYRINGE INTRAVENOUS
Status: DISCONTINUED | OUTPATIENT
Start: 2022-10-28 | End: 2022-10-28 | Stop reason: HOSPADM

## 2022-10-28 RX ORDER — FAMOTIDINE 10 MG/ML
20 INJECTION INTRAVENOUS
Status: CANCELLED | OUTPATIENT
Start: 2022-11-04

## 2022-10-28 RX ORDER — EPINEPHRINE 0.3 MG/.3ML
0.3 INJECTION SUBCUTANEOUS ONCE AS NEEDED
Status: CANCELLED | OUTPATIENT
Start: 2022-11-04

## 2022-10-28 RX ORDER — SODIUM CHLORIDE 0.9 % (FLUSH) 0.9 %
10 SYRINGE (ML) INJECTION
Status: CANCELLED | OUTPATIENT
Start: 2022-11-04

## 2022-10-28 RX ORDER — EPINEPHRINE 0.3 MG/.3ML
0.3 INJECTION SUBCUTANEOUS ONCE AS NEEDED
Status: CANCELLED | OUTPATIENT
Start: 2022-11-11

## 2022-10-28 RX ORDER — DIPHENHYDRAMINE HYDROCHLORIDE 50 MG/ML
50 INJECTION INTRAMUSCULAR; INTRAVENOUS ONCE AS NEEDED
Status: CANCELLED | OUTPATIENT
Start: 2022-10-28

## 2022-10-28 RX ORDER — MAGNESIUM SULFATE HEPTAHYDRATE 40 MG/ML
2 INJECTION, SOLUTION INTRAVENOUS ONCE
Status: COMPLETED | OUTPATIENT
Start: 2022-10-28 | End: 2022-10-28

## 2022-10-28 RX ORDER — HEPARIN 100 UNIT/ML
500 SYRINGE INTRAVENOUS
Status: CANCELLED | OUTPATIENT
Start: 2022-11-18

## 2022-10-28 RX ADMIN — SODIUM CHLORIDE: 0.9 INJECTION, SOLUTION INTRAVENOUS at 11:10

## 2022-10-28 RX ADMIN — DIPHENHYDRAMINE HYDROCHLORIDE 50 MG: 50 INJECTION, SOLUTION INTRAMUSCULAR; INTRAVENOUS at 02:10

## 2022-10-28 RX ADMIN — PACLITAXEL 204 MG: 6 INJECTION, SOLUTION INTRAVENOUS at 03:10

## 2022-10-28 RX ADMIN — PERTUZUMAB 420 MG: 30 INJECTION, SOLUTION, CONCENTRATE INTRAVENOUS at 01:10

## 2022-10-28 RX ADMIN — MAGNESIUM SULFATE IN WATER 2 G: 40 INJECTION, SOLUTION INTRAVENOUS at 12:10

## 2022-10-28 RX ADMIN — DEXAMETHASONE SODIUM PHOSPHATE 10 MG: 4 INJECTION INTRA-ARTICULAR; INTRALESIONAL; INTRAMUSCULAR; INTRAVENOUS; SOFT TISSUE at 03:10

## 2022-10-28 RX ADMIN — FAMOTIDINE 20 MG: 10 INJECTION INTRAVENOUS at 02:10

## 2022-10-28 RX ADMIN — TRASTUZUMAB 840 MG: KIT at 02:10

## 2022-10-28 NOTE — PROGRESS NOTES
Subjective:     Patient ID:    Name: Josefina Coon  : 1965  MRN: [id number]    HPI:   Josefina Coon is a 57 y.o. female presents for evaluation of Invasive ductal carcinoma of breast, female, left    Patient was initially seen as part of a Multi-D clinic with Radiation Oncology,Surgical Oncology and Medical Oncology. Case was also presented at breast cancer tumor conference.     22 bilateral screening mammogram,,Right neg ,Left central post mass     22 left diag MMG, left US limited .Left 0300 lateral posterior 6cm FN 1.4cm mass , left axilla LN 2, up to 4mm cortex     22 left 0300 lateral posterior 6cm FN 1.4cm mass US biopsy .Grade 3 ,1.1cm in biopsy No LVI , ER 84% NJ 28% Her 2 3+ pos Ki 52 %    Left axilla LN biopsy ;Benign fatty tissue, no LN, not concordant No MRI of breasts were done      22 US bilateral complete Right neg, right LN nml , Left 0300 6cm FN [id number] mass Borderline LN left axilla     22 Left axilla LN biopsy benign LN , so eventually Stage IA triple positive Breast cancer    Discussion about neoadjuvant chemotherapy and side effects was done., started Drake-adjuvant chemo TCP on 10/14/22, had skin rash (face and possible scalp) likely taxol related, given creams with improvement.     Today, still has skin rash on her face and lumps in her scalp which improved, proceeding with chemo, will schedule ultrasound after cycle # 3 to monitor her response.tolerating chemo well with minimal side effect so far     Oncology History   Invasive ductal carcinoma of breast, female, left   2022 Initial Diagnosis    Invasive ductal carcinoma of breast, female, left     2022 Cancer Staged    Staging form: Breast, AJCC 8th Edition  - Clinical stage from 2022: Stage IA (cT1c, cN0(f), cM0, G3, ER+, NJ+, HER2+)       2022 - 2022 Chemotherapy    Treatment Summary   Plan Name: OP BREAST TRASTUZUMAB PACLITAXEL WEEKLY  Treatment Goal: Curative  Status:  Inactive  Start Date:   End Date:   Provider: Lisa Jaquez MD  Chemotherapy: PACLitaxeL (TAXOL) 80 mg/m2 = 204 mg in sodium chloride 0.9% 250 mL chemo infusion, 80 mg/m2 = 204 mg, Intravenous, Clinic/HOD 1 time, 0 of 12 cycles  pertuzumab (PERJETA) 840 mg in sodium chloride 0.9% 278 mL infusion, 840 mg (original dose ), Intravenous, Clinic/HOD 1 time, 0 of 17 cycles  Dose modification: 840 mg (Cycle 1, Reason: Other (see comments)), 420 mg (Cycle 4, Reason: Other (see comments))  trastuzumab-dkst (OGIVRI) 591 mg in sodium chloride 0.9% 250 mL chemo infusion, 4 mg/kg = 591 mg, Intravenous, Clinic/HOD 1 time, 0 of 25 cycles       10/7/2022 -  Chemotherapy    Treatment Summary   Plan Name: OP BREAST PACLITAXEL TRASTUZUMAB WEEKLY WITH PERTUZUMAB Q3W (THP)  Treatment Goal: Control  Status: Active  Start Date: 10/7/2022  End Date: 12/23/2022 (Planned)  Provider: Lisa Jaquez MD  Chemotherapy: PACLitaxeL (TAXOL) 80 mg/m2 = 210 mg in sodium chloride 0.9% 250 mL chemo infusion, 80 mg/m2 = 210 mg, Intravenous, Clinic/HOD 1 time, 2 of 4 cycles  Administration: 210 mg (10/7/2022), 204 mg (10/14/2022), 204 mg (10/28/2022), 204 mg (10/21/2022)  pertuzumab (PERJETA) 840 mg in sodium chloride 0.9% 278 mL infusion, 840 mg, Intravenous, Clinic/HOD 1 time, 2 of 4 cycles  Administration: 840 mg (10/7/2022), 420 mg (10/28/2022)  trastuzumab-dkst (OGIVRI) 570 mg in sodium chloride 0.9% 250 mL chemo infusion, 593 mg (100 % of original dose 4 mg/kg), Intravenous, Clinic/HOD 1 time, 2 of 4 cycles  Dose modification: 4 mg/kg (original dose 4 mg/kg, Cycle 1, Reason: Other (see comments), Comment: weekly trastuzumab), 2 mg/kg (original dose 2 mg/kg, Cycle 2, Reason: Other (see comments), Comment: weekly maintenance dose), 6 mg/kg (original dose 2 mg/kg, Cycle 2, Reason: MD Discretion, Comment: change to q3w dosing), 2 mg/kg (original dose 2 mg/kg, Cycle 1, Reason: Other (see comments), Comment: maintenance weekly dose)  Administration: 570  mg (10/7/2022), 840 mg (10/28/2022), 288 mg (10/14/2022), 290 mg (10/21/2022)              Past Medical History:   Diagnosis Date    Abnormal liver enzymes     Asymptomatic varicose veins     Cancer     left breast cancer    Depressive disorder, not elsewhere classified     Dyslipidemia     Embolism and thrombosis of unspecified site     superficial venous thrombosis    Encounter for long-term (current) use of other medications     Family history of ischemic heart disease     Fatty liver     Generalized anxiety disorder     History of chicken pox     Obstructive sleep apnea (adult) (pediatric)     Type II or unspecified type diabetes mellitus without mention of complication, not stated as uncontrolled     Unspecified essential hypertension     Unspecified venous (peripheral) insufficiency     Unspecified vitamin D deficiency        Past Surgical History:   Procedure Laterality Date     SECTION      INSERTION OF TUNNELED CENTRAL VENOUS CATHETER (CVC) WITH SUBCUTANEOUS PORT Right 10/4/2022    Procedure: [id number];  Surgeon: Dominick Ng MD;  Location: [facility];  Service: General;  Laterality: Right;    VEIN SURGERY      Laser, Dr. Larsen       Family History   Problem Relation Age of Onset    Hyperlipidemia Mother     Hypertension Mother     Vulvar Cancer Mother     Diabetes Brother     Cancer Brother         colon    Stroke Maternal Grandmother        Social History     Socioeconomic History    Marital status:    Tobacco Use    Smoking status: Never    Smokeless tobacco: Never   Substance and Sexual Activity    Alcohol use: Not Currently     Comment: rare     Drug use: Never    Sexual activity: Not Currently       Review of patient's allergies indicates:   Allergen Reactions    Sitagliptin      Other reaction(s): (januvia) abdominal pain    Sulfa (sulfonamide antibiotics) Hives    Byetta  [exenatide] Rash    Lactose        Review of Systems   Constitutional: Negative for decreased  "appetite, fever and night sweats.   Eyes:  Negative for blurred vision, double vision, pain and visual disturbance.   Cardiovascular:  Negative for chest pain, leg swelling and palpitations.   Respiratory:  Negative for cough and shortness of breath.    Hematologic/Lymphatic: Negative for adenopathy.   Skin:  Negative for rash.   Musculoskeletal:  Negative for back pain.   Gastrointestinal:  Negative for abdominal pain, diarrhea, melena, nausea and vomiting.   Genitourinary:  Negative for frequency.   Neurological:  Negative for headaches and seizures.   Psychiatric/Behavioral:  The patient is not nervous/anxious.           Objective:     Vitals:    10/28/22 1055   BP: 110/78   BP Location: Right arm   Patient Position: Sitting   BP Method: Medium (Manual)   Pulse: (!) 126   Resp: 18   Temp: 97.2 °F (36.2 °C)   TempSrc: Temporal   SpO2: 98%   Weight: (!) 145 kg (319 lb 10.7 oz)   Height: 5' 4" (1.626 m)        Physical Exam  Constitutional:       Appearance: Normal appearance.   Eyes:      General: No scleral icterus.  Neck:      Vascular: No carotid bruit.   Cardiovascular:      Rate and Rhythm: Normal rate.      Heart sounds: No murmur heard.  Pulmonary:      Effort: No respiratory distress.   Abdominal:      General: There is no distension.      Palpations: Abdomen is soft.   Musculoskeletal:         General: No swelling or tenderness.      Cervical back: Neck supple.   Lymphadenopathy:      Cervical: No cervical adenopathy.   Skin:     Coloration: Skin is not jaundiced or pale.   Neurological:      General: No focal deficit present.      Mental Status: She is alert and oriented to person, place, and time.   Psychiatric:         Mood and Affect: Mood normal.         Behavior: Behavior normal.             Current Outpatient Medications on File Prior to Visit   Medication Sig    albuterol 90 mcg/actuation inhaler Inhale 2 puffs into the lungs every 6 (six) hours as needed for Wheezing.    buPROPion (WELLBUTRIN XL) " 150 MG TB24 tablet TAKE 1 TABLET BY MOUTH EVERY DAY    dulaglutide (TRULICITY) 3 mg/0.5 mL pen injector Inject 3 mg into the skin every 7 days.    ergocalciferol (ERGOCALCIFEROL) 50,000 unit Cap TAKE 1 CAPSULE BY MOUTH ONE TIME PER WEEK    LIDOcaine-prilocaine (EMLA) cream Apply to port site 1 hour prior to port access and cover    magnesium 30 mg Tab Take 30 mg by mouth once.    metFORMIN (GLUCOPHAGE) 500 MG tablet TAKE 1 TABLET BY MOUTH TWICE A DAY WITH MEALS    omeprazole (PRILOSEC OTC) 20 MG tablet Take 1 tablet (20 mg total) by mouth once daily.    promethazine (PHENERGAN) 25 MG tablet Take 1 tablet (25 mg total) by mouth every 6 (six) hours as needed for Nausea.    telmisartan-hydrochlorothiazide (MICARDIS HCT) 80-25 mg per tablet TAKE 1 TABLET BY MOUTH EVERY DAY    triamcinolone acetonide 0.025% (KENALOG) 0.025 % cream Apply topically 2 (two) times daily. Apply to the skin lesions twice a day    ALPRAZolam (XANAX) 0.25 MG tablet Take one tab po 1-2 times daily PRN anxiety (Patient not taking: Reported on 10/14/2022)    ondansetron (ZOFRAN) 8 MG tablet Take 1 tablet (8 mg total) by mouth every 8 (eight) hours as needed for Nausea. (Patient not taking: Reported on 10/14/2022)    ondansetron (ZOFRAN-ODT) 8 MG TbDL Take 1 tablet (8 mg total) by mouth every 6 (six) hours as needed (nausea). (Patient not taking: Reported on 10/14/2022)    traMADoL (ULTRAM) 50 mg tablet Take 1 tablet (50 mg total) by mouth every 12 (twelve) hours as needed for Pain (sever pain). (Patient not taking: Reported on 10/14/2022)    TRULICITY 1.5 mg/0.5 mL pen injector Inject 1.5 mg into the skin every 7 days.     No current facility-administered medications on file prior to visit.       CBC:  Lab Results   Component Value Date    WBC 4.49 10/28/2022    HGB 12.6 10/28/2022    HCT 37.6 10/28/2022    MCV 91 10/28/2022     10/28/2022     CMP:  Sodium   Date Value Ref Range Status   10/28/2022 133 (L) 136 - 145 mmol/L Final   08/08/2015  137 137 - 145 MMOL/L Final     Potassium   Date Value Ref Range Status   10/28/2022 3.9 3.5 - 5.1 mmol/L Final   08/08/2015 4.4 3.5 - 5.1 MMOL/L Final     Chloride   Date Value Ref Range Status   10/28/2022 104 95 - 110 mmol/L Final   08/08/2015 101 98 - 107 MMOL/L Final     CO2   Date Value Ref Range Status   10/28/2022 20 (L) 23 - 29 mmol/L Final     Glucose   Date Value Ref Range Status   10/28/2022 151 (H) 70 - 110 mg/dL Final     BUN   Date Value Ref Range Status   10/28/2022 13 6 - 20 mg/dL Final     Creatinine   Date Value Ref Range Status   10/28/2022 0.8 0.5 - 1.4 mg/dL Final   08/08/2015 0.63 0.52 - 1.04 MG/DL Final     Calcium   Date Value Ref Range Status   10/28/2022 9.2 8.7 - 10.5 mg/dL Final     Total Protein   Date Value Ref Range Status   10/28/2022 7.3 6.0 - 8.4 g/dL Final     Albumin   Date Value Ref Range Status   10/28/2022 3.5 3.5 - 5.2 g/dL Final     Total Bilirubin   Date Value Ref Range Status   10/28/2022 0.5 0.1 - 1.0 mg/dL Final     Comment:     For infants and newborns, interpretation of results should be based  on gestational age, weight and in agreement with clinical  observations.    Premature Infant recommended reference ranges:  Up to 24 hours.............<8.0 mg/dL  Up to 48 hours............<12.0 mg/dL  3-5 days..................<15.0 mg/dL  6-29 days.................<15.0 mg/dL       Alkaline Phosphatase   Date Value Ref Range Status   10/28/2022 75 55 - 135 U/L Final     AST   Date Value Ref Range Status   10/28/2022 19 10 - 40 U/L Final     ALT   Date Value Ref Range Status   10/28/2022 33 10 - 44 U/L Final     Anion Gap   Date Value Ref Range Status   10/28/2022 9 8 - 16 mmol/L Final     eGFR if    Date Value Ref Range Status   04/08/2022 >60 >60 mL/min/1.73 m^2 Final     eGFR if non    Date Value Ref Range Status   04/08/2022 >60 >60 mL/min/1.73 m^2 Final     Comment:     Calculation used to obtain the estimated glomerular filtration  rate  (eGFR) is the CKD-EPI equation.          X-Ray Chest AP Portable  Narrative: EXAMINATION:  XR CHEST AP PORTABLE    CLINICAL HISTORY:  s/p Port a Cath placement.    TECHNIQUE:  Single frontal view of the chest was performed.    COMPARISON:  PA and lateral chest radiograph, 09/18/2009.    FINDINGS:  Right-sided port with tip projecting over the mid-lower SVC.    No consolidation, pleural effusion or pneumothorax.    Cardiomediastinal silhouette is within normal limits for size.    No acute osseous abnormality.  Impression: Right-sided port with tip projecting over the mid-lower SVC.    Electronically signed by: Gentry Cain MD  Date:    10/04/2022  Time:    10:04  SURG FL Surgery Fluoro Usage  See OP Notes for results.     IMPRESSION: See OP Notes for results.     This procedure was auto-finalized by: Virtual Radiologist       ECOG SCORE    1 - Restricted in strenuous activity-ambulatory and able to carry out work of a light nature          All pertinent labs and imaging reviewed. As well as outside records     Assessment:       1. Invasive ductal carcinoma of breast, female, left    2. Drug-induced skin rash    3. Hypomagnesemia    4. Diabetic mononeuropathy associated with type 2 diabetes mellitus    5. Type 2 diabetes mellitus with chronic kidney disease, with long-term current use of insulin, unspecified CKD stage        # Stage IA IDC of Left breast:  -Screening mammogram, no symptoms, has baseline fatty liver with mild elevated LFT's no need for further systematic imaging  - discussion given her cT1c triple positive breast cancer neoadjuvant is consider, given her young age and Ki67%, will proceed with TH, adding P to help with a better pCR, will also plan to get ultrasound mid cycle for monitor (3 months)  - echocardiogram with good EF and  port placement   - from cycle #2 will be Taxol weekly and HP every 3 weeks   - proceed w D1C2  of THP, cont with weekly blood work and NP/MD visit     # Fatty liver/  elevated LFT: mild elevation/resolved, close monitoring while on taxol infusions, might consider holding statins   # DM type 2 with neuropathy: baseline neuropathy, will need to monitor closely for worsening during treatment   # Obesity: will monitor closely while starting chemotherapy    # MANUELA: will need to see Dr Long   # Hypomagnesemia: likely due to chemo/diarrhea, cont , will give IV 2g today     Plan:     Invasive ductal carcinoma of breast, female, left  -     US Breast Left Complete; Future; Expected date: 10/28/2022  -     Cancel: pertuzumab (PERJETA) 420 mg in sodium chloride 0.9% 264 mL infusion  -     Cancel: trastuzumab-dkst (OGIVRI) 870 mg in sodium chloride 0.9% 250 mL chemo infusion  -     Cancel: diphenhydrAMINE (BENADRYL) 50 mg in NS 50 mL IVPB  -     Cancel: famotidine (PF) injection 20 mg  -     Cancel: dexAMETHasone (DECADRON) 10 mg in sodium chloride 0.9% 50 mL IVPB  -     Cancel: PACLitaxeL (TAXOL) 80 mg/m2 = 204 mg in sodium chloride 0.9% 250 mL chemo infusion  -     Cancel: EPINEPHrine (EPIPEN) 0.3 mg/0.3 mL pen injection 0.3 mg  -     Cancel: diphenhydrAMINE injection 50 mg  -     Cancel: hydrocortisone sodium succinate injection 100 mg  -     Cancel: sodium chloride 0.9% 250 mL flush bag  -     Cancel: sodium chloride 0.9% flush 10 mL  -     Cancel: heparin, porcine (PF) 100 unit/mL injection flush 500 Units  -     Cancel: alteplase injection 2 mg  -     CBC Oncology; Future; Expected date: 10/28/2022  -     CMP; Future; Expected date: 10/28/2022  -     Magnesium; Future; Expected date: 10/28/2022    Drug-induced skin rash    Hypomagnesemia    Diabetic mononeuropathy associated with type 2 diabetes mellitus    Type 2 diabetes mellitus with chronic kidney disease, with long-term current use of insulin, unspecified CKD stage    Other orders  -     diphenhydrAMINE (BENADRYL) 50 mg in NS 50 mL IVPB  -     famotidine (PF) injection 20 mg  -     dexAMETHasone (DECADRON) 10 mg in sodium  chloride 0.9% 50 mL IVPB  -     PACLitaxeL (TAXOL) 80 mg/m2 = 204 mg in sodium chloride 0.9% 250 mL chemo infusion  -     EPINEPHrine (EPIPEN) 0.3 mg/0.3 mL pen injection 0.3 mg  -     diphenhydrAMINE injection 50 mg  -     hydrocortisone sodium succinate injection 100 mg  -     sodium chloride 0.9% 250 mL flush bag  -     sodium chloride 0.9% flush 10 mL  -     heparin, porcine (PF) 100 unit/mL injection flush 500 Units  -     alteplase injection 2 mg  -     diphenhydrAMINE (BENADRYL) 50 mg in NS 50 mL IVPB  -     famotidine (PF) injection 20 mg  -     dexAMETHasone (DECADRON) 10 mg in sodium chloride 0.9% 50 mL IVPB  -     PACLitaxeL (TAXOL) 80 mg/m2 = 204 mg in sodium chloride 0.9% 250 mL chemo infusion  -     EPINEPHrine (EPIPEN) 0.3 mg/0.3 mL pen injection 0.3 mg  -     diphenhydrAMINE injection 50 mg  -     hydrocortisone sodium succinate injection 100 mg  -     sodium chloride 0.9% 250 mL flush bag  -     sodium chloride 0.9% flush 10 mL  -     heparin, porcine (PF) 100 unit/mL injection flush 500 Units  -     alteplase injection 2 mg  -     magnesium sulfate 2 g in sodium chloride 0.9% 50 mL IVPB  -     pertuzumab (PERJETA) 420 mg in sodium chloride 0.9% 264 mL infusion  -     trastuzumab-dkst (OGIVRI) 870 mg in sodium chloride 0.9% 250 mL chemo infusion  -     diphenhydrAMINE (BENADRYL) 50 mg in NS 50 mL IVPB  -     famotidine (PF) injection 20 mg  -     dexAMETHasone (DECADRON) 10 mg in sodium chloride 0.9% 50 mL IVPB  -     PACLitaxeL (TAXOL) 80 mg/m2 = 204 mg in sodium chloride 0.9% 250 mL chemo infusion  -     EPINEPHrine (EPIPEN) 0.3 mg/0.3 mL pen injection 0.3 mg  -     diphenhydrAMINE injection 50 mg  -     hydrocortisone sodium succinate injection 100 mg  -     sodium chloride 0.9% 250 mL flush bag  -     sodium chloride 0.9% flush 10 mL  -     heparin, porcine (PF) 100 unit/mL injection flush 500 Units  -     alteplase injection 2 mg        Patient queried and all questions were answered.    Plan  was discussed with the patient  and her friend at length, and they verbalized understanding.        Recommend COVID guidelines being followed     Recommend  exercise, nutrition and weight management and health maintenance activities and follow-up with PCPs recommendations     Route Chart for Scheduling    Med Onc Chart Routing      Follow up with physician Other. Schedule ultrasound of the breast prior to 11/18/22 and follow up with me (overbook), blood work weekly (CBC/CMP/Mag)   Follow up with BRANDIE    Infusion scheduling note    Injection scheduling note    Labs    Imaging    Pharmacy appointment    Other referrals        Treatment Plan Information   OP BREAST PACLITAXEL TRASTUZUMAB WEEKLY WITH PERTUZUMAB Q3W (THP)   Lisa Jaquez MD   Upcoming Treatment Dates - OP BREAST PACLITAXEL TRASTUZUMAB WEEKLY WITH PERTUZUMAB Q3W (THP)    11/4/2022       Pre-Medications       diphenhydrAMINE (BENADRYL) 50 mg in NS 50 mL IVPB       famotidine (PF) injection 20 mg       dexAMETHasone (DECADRON) 10 mg in sodium chloride 0.9% 50 mL IVPB       Chemotherapy       PACLitaxeL (TAXOL) 80 mg/m2 = 204 mg in sodium chloride 0.9% 250 mL chemo infusion  11/11/2022       Pre-Medications       diphenhydrAMINE (BENADRYL) 50 mg in NS 50 mL IVPB       famotidine (PF) injection 20 mg       dexAMETHasone (DECADRON) 10 mg in sodium chloride 0.9% 50 mL IVPB       Chemotherapy       PACLitaxeL (TAXOL) 80 mg/m2 = 204 mg in sodium chloride 0.9% 250 mL chemo infusion  11/18/2022       Pre-Medications       diphenhydrAMINE (BENADRYL) 50 mg in NS 50 mL IVPB       famotidine (PF) injection 20 mg       dexAMETHasone (DECADRON) 10 mg in sodium chloride 0.9% 50 mL IVPB       Chemotherapy       pertuzumab (PERJETA) 420 mg in sodium chloride 0.9% 264 mL infusion       trastuzumab-dkst (OGIVRI) 870 mg in sodium chloride 0.9% 250 mL chemo infusion       PACLitaxeL (TAXOL) 80 mg/m2 = 204 mg in sodium chloride 0.9% 250 mL chemo infusion  11/25/2022        Pre-Medications       diphenhydrAMINE (BENADRYL) 50 mg in NS 50 mL IVPB       famotidine (PF) injection 20 mg       dexAMETHasone (DECADRON) 10 mg in sodium chloride 0.9% 50 mL IVPB       Chemotherapy       PACLitaxeL (TAXOL) in sodium chloride 0.9% 250 mL chemo infusion        Signed:  Lisa Jaquez MD  Hematology and Oncology  [facility]  [facility]

## 2022-10-28 NOTE — PROGRESS NOTES
Oncology Nutrition   Chemotherapy Infusion Visit    Nutrition Follow Up   RD met w/ pt at chairside during infusion tx. RD following up from previous visit regarding chemotherapy induced diarrhea and change of taste. Pt reports she has not tried samples of Banatrol RD provided to prevent diarrhea. She states she takes imodium after each loose stool. She reports the imodium does not completely eliminate the diarrhea and she could benefit from taking the Banatrol. Provided pt w/ Banatrol coupon. Pt using biotene for change of taste. She states some foods do not taste good to her and others do. Pt also reports she is not drinking Liquid IV. RD encouraged pt to drink any type of electrolyte drink if she does not like the Liquid IV. Pt VU. Pt weight has been stable for the past few weeks. Pt always pleasant to speak with.     Wt Readings from Last 10 Encounters:   10/28/22 (!) 145 kg (319 lb 10.7 oz)   10/28/22 (!) 145 kg (319 lb 10.7 oz)   10/21/22 (!) 145.2 kg (320 lb 1.7 oz)   10/21/22 (!) 145.2 kg (320 lb 1.7 oz)   10/14/22 (!) 144.2 kg (317 lb 14.5 oz)   10/14/22 (!) 144.2 kg (317 lb 14.5 oz)   10/07/22 (!) 148.2 kg (326 lb 11.6 oz)   10/06/22 (!) 149.7 kg (330 lb 0.5 oz)   10/04/22 (!) 147.7 kg (325 lb 9.9 oz)   09/30/22 (!) 148.5 kg (327 lb 6.1 oz)       All other nutrition questions/concerns addressed as appropriate. Will continue to monitor prn throughout treatment.     Jerri Capellan, THERESAN, LDN  10/28/2022  3:33 PM

## 2022-10-28 NOTE — PLAN OF CARE
Problem: Adult Inpatient Plan of Care  Goal: Plan of Care Review  Outcome: Ongoing, Progressing  Flowsheets (Taken 10/28/2022 1200)  Plan of Care Reviewed With: patient  Goal: Patient-Specific Goal (Individualized)  Outcome: Ongoing, Progressing  Flowsheets (Taken 10/28/2022 1200)  Anxieties, Fears or Concerns: None  Individualized Care Needs: Recliner, warm blanket, conversation  Patient-Specific Goals (Include Timeframe): Free of S/S of reaction with treatment.     Problem: Fatigue  Goal: Improved Activity Tolerance  Outcome: Ongoing, Progressing  Intervention: Promote Improved Energy  Flowsheets (Taken 10/28/2022 1318)  Fatigue Management:   frequent rest breaks encouraged   paced activity encouraged  Sleep/Rest Enhancement: regular sleep/rest pattern promoted  Activity Management: Ambulated -L4    Patient to Infusion for Perjeta, Ogiviri, Taxol and Magnesium following appointment with Dr. Jaquez. Treatment plan reviewed with patient. VSS. Tolerated treatment. Provided with copy of upcoming appointment schedule. Escorted to the front lobby for discharge to home.

## 2022-11-01 ENCOUNTER — TELEPHONE (OUTPATIENT)
Dept: HEMATOLOGY/ONCOLOGY | Facility: CLINIC | Age: 57
End: 2022-11-01
Payer: COMMERCIAL

## 2022-11-01 NOTE — TELEPHONE ENCOUNTER
Return pt call.  Friday pt has med onc appt and infusion and requesting to fit in wig fitting appt.    Moved her lab appt for 8:45 and wig appt for 9am.  Pt confirmed appt times.

## 2022-11-01 NOTE — TELEPHONE ENCOUNTER
----- Message from Livier Cox sent at 11/1/2022 11:15 AM CDT -----  Type: Needs Medical Advice  Who Called:  Patient  Symptoms (please be specific):    How long has patient had these symptoms:    Pharmacy name and phone #:    Best Call Back Number: [phone]  Additional Information: Patient is requesting a call back to schedule an appt. for wig fitting.

## 2022-11-03 ENCOUNTER — DOCUMENTATION ONLY (OUTPATIENT)
Dept: INFUSION THERAPY | Facility: HOSPITAL | Age: 57
End: 2022-11-03
Payer: COMMERCIAL

## 2022-11-03 NOTE — PROGRESS NOTES
PATIENT: Josefina Coon  MRN: [id number]  DATE: 11/4/2022      Diagnosis:   1. Invasive ductal carcinoma of breast, female, left    2. Diabetic mononeuropathy associated with type 2 diabetes mellitus    3. Drug-induced skin rash    4. Anxiety about health    5. Dysuria    6. Hypomagnesemia        Chief Complaint: Establish Care (2 weeks labs, tx)    Subjective:   HPI: Ms. Coon is a 57 y.o. female Ms. Coon is a 57 y.o. female with HTN, HLD, depression, diabetes type 2 with neuropathy, obesity, fatty liver, who has established care with Dr. Jaquez for a diagnosis of invasive ductal carcinoma of the left breast, triple positivity. Undergoing treatment with Paclitaxel/Trastuzumab/Pertuzumab on D1, weekly Paclitaxel on D8, D15 of a 21 day cycle.    She is here today today for consideration of C2D8 of therapy which consists of weekly Paclitaxel.    Endorses chronic neuropathy and lower extremity swelling.   Episodic diarrhea, using Imodium 1-3 times daily with good management.  Rash on face is still present but it does improve some days; using Triamcinolone PRN.   Over the past few weeks she has also noticed occasional burning after urination, unsure if this is external irritation vs. Actual dysuria.   Taking Mg supplements BID at home.   Denies HA, CP, cough, mucositis, abd pain, constipation, N/V, swelling, new lumps or bumps. No fevers, chills.      Oncologic History:   8/25/22 bilateral screening mammogram,,Right neg ,Left central post mass     9/8/22 left diag [facility], left US limited .Left 0300 lateral posterior 6cm FN 1.4cm mass , left axilla LN 2, up to 4mm cortex     9/14/22 left 0300 lateral posterior 6cm FN 1.4cm mass US biopsy .Grade 3 ,1.1cm in biopsy No LVI , ER 84% NE 28% Her 2 3+ pos Ki 52 %    Left axilla LN biopsy ;Benign fatty tissue, no LN, not concordant No MRI of breasts were done      9/21/22 US bilateral complete Right neg, right LN nml , Left 0300 6cm FN [id number] mass Borderline LN left axilla      9/21/22 Left axilla LN biopsy benign LN , so eventually Stage IA triple positive Breast cancer    Oncology History   Invasive ductal carcinoma of breast, female, left   9/23/2022 Initial Diagnosis    Invasive ductal carcinoma of breast, female, left     9/23/2022 Cancer Staged    Staging form: Breast, AJCC 8th Edition  - Clinical stage from 9/23/2022: Stage IA (cT1c, cN0(f), cM0, G3, ER+, OK+, HER2+)       9/29/2022 - 9/29/2022 Chemotherapy    Treatment Summary   Plan Name: OP BREAST TRASTUZUMAB PACLITAXEL WEEKLY  Treatment Goal: Curative  Status: Inactive  Start Date:   End Date:   Provider: Lisa Jaquez MD  Chemotherapy: PACLitaxeL (TAXOL) 80 mg/m2 = 204 mg in sodium chloride 0.9% 250 mL chemo infusion, 80 mg/m2 = 204 mg, Intravenous, Clinic/HOD 1 time, 0 of 12 cycles  pertuzumab (PERJETA) 840 mg in sodium chloride 0.9% 278 mL infusion, 840 mg (original dose ), Intravenous, Clinic/HOD 1 time, 0 of 17 cycles  Dose modification: 840 mg (Cycle 1, Reason: Other (see comments)), 420 mg (Cycle 4, Reason: Other (see comments))  trastuzumab-dkst (OGIVRI) 591 mg in sodium chloride 0.9% 250 mL chemo infusion, 4 mg/kg = 591 mg, Intravenous, Clinic/HOD 1 time, 0 of 25 cycles       10/7/2022 -  Chemotherapy    Treatment Summary   Plan Name: OP BREAST PACLITAXEL TRASTUZUMAB WEEKLY WITH PERTUZUMAB Q3W (THP)  Treatment Goal: Control  Status: Active  Start Date: 10/7/2022  End Date: 12/23/2022 (Planned)  Provider: Lisa Jaquez MD  Chemotherapy: PACLitaxeL (TAXOL) 80 mg/m2 = 210 mg in sodium chloride 0.9% 250 mL chemo infusion, 80 mg/m2 = 210 mg, Intravenous, Clinic/HOD 1 time, 2 of 4 cycles  Administration: 210 mg (10/7/2022), 204 mg (10/14/2022), 204 mg (10/28/2022), 204 mg (10/21/2022)  pertuzumab (PERJETA) 840 mg in sodium chloride 0.9% 278 mL infusion, 840 mg, Intravenous, Clinic/HOD 1 time, 2 of 4 cycles  Administration: 840 mg (10/7/2022), 420 mg (10/28/2022)  trastuzumab-dkst (OGIVRI) 570 mg in sodium chloride 0.9%  250 mL chemo infusion, 593 mg (100 % of original dose 4 mg/kg), Intravenous, Clinic/HOD 1 time, 2 of 4 cycles  Dose modification: 4 mg/kg (original dose 4 mg/kg, Cycle 1, Reason: Other (see comments), Comment: weekly trastuzumab), 2 mg/kg (original dose 2 mg/kg, Cycle 2, Reason: Other (see comments), Comment: weekly maintenance dose), 6 mg/kg (original dose 2 mg/kg, Cycle 2, Reason: MD Discretion, Comment: change to q3w dosing), 2 mg/kg (original dose 2 mg/kg, Cycle 1, Reason: Other (see comments), Comment: maintenance weekly dose)  Administration: 570 mg (10/7/2022), 840 mg (10/28/2022), 288 mg (10/14/2022), 290 mg (10/21/2022)          Past Medical History:   Past Medical History:   Diagnosis Date    Abnormal liver enzymes     Asymptomatic varicose veins     Cancer     left breast cancer    Depressive disorder, not elsewhere classified     Dyslipidemia     Embolism and thrombosis of unspecified site     superficial venous thrombosis    Encounter for long-term (current) use of other medications     Family history of ischemic heart disease     Fatty liver     Generalized anxiety disorder     History of chicken pox     Obstructive sleep apnea (adult) (pediatric)     Type II or unspecified type diabetes mellitus without mention of complication, not stated as uncontrolled     Unspecified essential hypertension     Unspecified venous (peripheral) insufficiency     Unspecified vitamin D deficiency        Past Surgical HIstory:   Past Surgical History:   Procedure Laterality Date     SECTION      INSERTION OF TUNNELED CENTRAL VENOUS CATHETER (CVC) WITH SUBCUTANEOUS PORT Right 10/4/2022    Procedure: [id number];  Surgeon: Dominick Ng MD;  Location: [facility];  Service: General;  Laterality: Right;    VEIN SURGERY      Laser, Dr. aLrsen       Family History:   Family History   Problem Relation Age of Onset    Hyperlipidemia Mother     Hypertension Mother     Vulvar Cancer Mother     Diabetes Brother      Cancer Brother         colon    Stroke Maternal Grandmother        Social History:  reports that she has never smoked. She has never used smokeless tobacco. She reports that she does not currently use alcohol. She reports that she does not use drugs.    Allergies:  Review of patient's allergies indicates:   Allergen Reactions    Sitagliptin      Other reaction(s): (januvia) abdominal pain    Sulfa (sulfonamide antibiotics) Hives    Byetta  [exenatide] Rash    Lactose        Medications:  Current Outpatient Medications   Medication Sig Dispense Refill    albuterol 90 mcg/actuation inhaler Inhale 2 puffs into the lungs every 6 (six) hours as needed for Wheezing. 18 g 6    buPROPion (WELLBUTRIN XL) 150 MG TB24 tablet TAKE 1 TABLET BY MOUTH EVERY DAY 90 tablet 3    dulaglutide (TRULICITY) 3 mg/0.5 mL pen injector Inject 3 mg into the skin every 7 days. 12 pen 1    ergocalciferol (ERGOCALCIFEROL) 50,000 unit Cap TAKE 1 CAPSULE BY MOUTH ONE TIME PER WEEK 12 capsule 0    LIDOcaine-prilocaine (EMLA) cream Apply to port site 1 hour prior to port access and cover 30 g 3    magnesium 30 mg Tab Take 30 mg by mouth once.      metFORMIN (GLUCOPHAGE) 500 MG tablet TAKE 1 TABLET BY MOUTH TWICE A DAY WITH MEALS 180 tablet 1    omeprazole (PRILOSEC OTC) 20 MG tablet Take 1 tablet (20 mg total) by mouth once daily. 30 tablet 6    omeprazole (PRILOSEC) 10 MG capsule omeprazole      pravastatin (PRAVACHOL) 10 MG tablet TAKE 1 TABLET BY MOUTH EVERYDAY AT BEDTIME 90 tablet 1    telmisartan-hydrochlorothiazide (MICARDIS HCT) 80-25 mg per tablet TAKE 1 TABLET BY MOUTH EVERY DAY 90 tablet 1    triamcinolone acetonide 0.025% (KENALOG) 0.025 % cream Apply topically 2 (two) times daily. Apply to the skin lesions twice a day 15 g 0    ALPRAZolam (XANAX) 0.25 MG tablet Take one tab po 1-2 times daily PRN anxiety (Patient not taking: Reported on 10/14/2022) 15 tablet 0    ondansetron (ZOFRAN) 8 MG tablet Take 1 tablet (8 mg total) by mouth  every 8 (eight) hours as needed for Nausea. (Patient not taking: Reported on 10/14/2022) 30 tablet 2    ondansetron (ZOFRAN-ODT) 8 MG TbDL Take 1 tablet (8 mg total) by mouth every 6 (six) hours as needed (nausea). (Patient not taking: Reported on 10/14/2022) 10 tablet 0    promethazine (PHENERGAN) 25 MG tablet Take 1 tablet (25 mg total) by mouth every 6 (six) hours as needed for Nausea. (Patient not taking: Reported on 11/4/2022) 30 tablet 0    traMADoL (ULTRAM) 50 mg tablet Take 1 tablet (50 mg total) by mouth every 12 (twelve) hours as needed for Pain (sever pain). (Patient not taking: Reported on 10/14/2022) 60 tablet 0    TRULICITY 1.5 mg/0.5 mL pen injector Inject 1.5 mg into the skin every 7 days.       No current facility-administered medications for this visit.     Facility-Administered Medications Ordered in Other Visits   Medication Dose Route Frequency Provider Last Rate Last Admin    dexAMETHasone (DECADRON) 10 mg in sodium chloride 0.9% 50 mL IVPB  10 mg Intravenous 1 time in Clinic/HOD Lisa Jaquez MD        diphenhydrAMINE (BENADRYL) 50 mg in NS 50 mL IVPB  50 mg Intravenous 1 time in Clinic/HOD Lisa Jaquez MD        diphenhydrAMINE injection 50 mg  50 mg Intravenous Once PRN Lisa Jaquez MD        EPINEPHrine (EPIPEN) 0.3 mg/0.3 mL pen injection 0.3 mg  0.3 mg Intramuscular Once PRN Lisa Jaquez MD        famotidine (PF) injection 20 mg  20 mg Intravenous 1 time in Clinic/HOD Lisa Jaquez MD        hydrocortisone sodium succinate injection 100 mg  100 mg Intravenous Once PRN Lisa Jaquez MD        magnesium sulfate in dextrose IVPB (premix) 1 g  1 g Intravenous Once Lisa Jaquez MD        PACLitaxeL (TAXOL) 80 mg/m2 = 204 mg in sodium chloride 0.9% 250 mL chemo infusion  80 mg/m2 Intravenous 1 time in Clinic/HOD Lisa Jaquez MD        sodium chloride 0.9% 250 mL flush bag   Intravenous 1 time in Clinic/LAZARA Jaquez MD        sodium chloride 0.9% flush 10 mL  10 mL Intravenous PRN  "Lisa Jaquez MD           Review of Systems   Constitutional:  Negative for chills, fatigue and fever.   HENT:  Negative for trouble swallowing.    Respiratory:  Negative for cough and shortness of breath.    Cardiovascular:  Negative for chest pain and palpitations.   Gastrointestinal:  Positive for diarrhea. Negative for abdominal pain, constipation, nausea and vomiting.   Genitourinary:  Positive for dysuria.   Musculoskeletal:  Negative for arthralgias.   Skin:  Positive for rash.   Neurological:  Negative for headaches.   Hematological:  Negative for adenopathy.     ECOG Performance Status:   ECOG SCORE    0 - Fully active-able to carry on all pre-disease performance without restriction         Objective:      Vitals:   Vitals:    11/04/22 1038   BP: (!) 140/74   BP Location: Left arm   Patient Position: Sitting   BP Method: Medium (Automatic)   Pulse: 81   Resp: 18   SpO2: 97%   Weight: (!) 145.6 kg (320 lb 15.8 oz)   Height: 5' 4" (1.626 m)     BMI: Body mass index is 55.1 kg/m².    Physical Exam  Vitals reviewed.   Constitutional:       General: She is not in acute distress.     Appearance: She is not diaphoretic.   HENT:      Head: Normocephalic and atraumatic.      Mouth/Throat:      Mouth: Mucous membranes are moist.      Pharynx: No oropharyngeal exudate.   Eyes:      General: No scleral icterus.  Cardiovascular:      Rate and Rhythm: Normal rate and regular rhythm.      Heart sounds: Normal heart sounds. No murmur heard.  Pulmonary:      Effort: Pulmonary effort is normal. No respiratory distress.      Breath sounds: No wheezing.   Musculoskeletal:      Cervical back: No tenderness.      Right lower leg: No edema.      Left lower leg: No edema.   Lymphadenopathy:      Cervical: No cervical adenopathy.   Skin:     General: Skin is warm.      Findings: Rash present.   Neurological:      Mental Status: She is alert and oriented to person, place, and time.   Psychiatric:         Behavior: Behavior normal. "       Laboratory Data:  Lab Results   Component Value Date    WBC 5.19 11/04/2022    HGB 12.0 11/04/2022    HCT 35.7 (L) 11/04/2022    MCV 91 11/04/2022     11/04/2022        Assessment:       1. Invasive ductal carcinoma of breast, female, left    2. Diabetic mononeuropathy associated with type 2 diabetes mellitus    3. Drug-induced skin rash    4. Anxiety about health    5. Dysuria    6. Hypomagnesemia         Plan:   Invasive ductal carcinoma of the breast, left  - cT1c triple positive breast cancer  (ER 84% LA 28% Her 2 3+ pos Ki 52 %), given her young age and Ki67%, will proceed with TH, adding P to help with a better pCR, will also plan to get ultrasound mid cycle to monitor (3 months)  -9/26/22 Echo with EF 60%  -Began TH+P on 10/7/2022  -Keep appointment for planned US on 11/14  -Proceed with C2D8 paclitaxel today; add IV mag to infusion   -Follow up with Dr. Jaquez in one and 2 weeks with CBC, CMP, Mg prior to appointment      Diabetes type 2 with neuropathy   -Taking Metformin, Trulicity   -Will need to monitor for worsening neuropathy while undergoing treatment     Anxiety  -Taking Wellbutrin  -Following with Dr. Long     Hypomagnesemia  -Taking po supplements at home  -Mild today; Mg is 1.4  -Will add 1G Mg to infusion today  -Monitor     Drug-induced rash  -Stable to improved  -using Triamcinolone cream PRN  -Monitor     Dysuria   -Will get US and cx today  -Discussed regarding barrier creams, moisturizers, use of susanne bottle for skin irritation   -Will follow up    Patient queried and all questions were answered.  Assessment/Plan reviewed and approved by Dr. Jaquez     Route Chart for Scheduling    Med Onc Chart Routing      Follow up with physician Other. Keep appointment with Dr. Jaquez in 1 and 2 weeks as scheduled   Follow up with BRANDIE    Infusion scheduling note Proceed with C2D8 today; add 1G mag to infusion   Injection scheduling note    Labs   Lab interval:  Get UA/urine cx today for  dysuria   Imaging   US as planned 11/14/22   Pharmacy appointment    Other referrals        Treatment Plan Information   OP BREAST PACLITAXEL TRASTUZUMAB WEEKLY WITH PERTUZUMAB Q3W (THP)   Lisa Jaquez MD   Upcoming Treatment Dates - OP BREAST PACLITAXEL TRASTUZUMAB WEEKLY WITH PERTUZUMAB Q3W (THP)    11/11/2022       Pre-Medications       diphenhydrAMINE (BENADRYL) 50 mg in NS 50 mL IVPB       famotidine (PF) injection 20 mg       dexAMETHasone (DECADRON) 10 mg in sodium chloride 0.9% 50 mL IVPB       Chemotherapy       PACLitaxeL (TAXOL) 80 mg/m2 = 204 mg in sodium chloride 0.9% 250 mL chemo infusion  11/18/2022       Pre-Medications       diphenhydrAMINE (BENADRYL) 50 mg in NS 50 mL IVPB       famotidine (PF) injection 20 mg       dexAMETHasone (DECADRON) 10 mg in sodium chloride 0.9% 50 mL IVPB       Chemotherapy       pertuzumab (PERJETA) 420 mg in sodium chloride 0.9% 264 mL infusion       trastuzumab-dkst (OGIVRI) 870 mg in sodium chloride 0.9% 250 mL chemo infusion       PACLitaxeL (TAXOL) 80 mg/m2 = 204 mg in sodium chloride 0.9% 250 mL chemo infusion  11/25/2022       Pre-Medications       diphenhydrAMINE (BENADRYL) 50 mg in NS 50 mL IVPB       famotidine (PF) injection 20 mg       dexAMETHasone (DECADRON) 10 mg in sodium chloride 0.9% 50 mL IVPB       Chemotherapy       PACLitaxeL (TAXOL) in sodium chloride 0.9% 250 mL chemo infusion  12/2/2022       Pre-Medications       diphenhydrAMINE (BENADRYL) 50 mg in NS 50 mL IVPB       famotidine (PF) injection 20 mg       dexAMETHasone (DECADRON) 10 mg in sodium chloride 0.9% 50 mL IVPB       Chemotherapy       PACLitaxeL (TAXOL) in sodium chloride 0.9% 250 mL chemo infusion

## 2022-11-04 ENCOUNTER — CLINICAL SUPPORT (OUTPATIENT)
Dept: HEMATOLOGY/ONCOLOGY | Facility: CLINIC | Age: 57
End: 2022-11-04
Payer: COMMERCIAL

## 2022-11-04 ENCOUNTER — DOCUMENTATION ONLY (OUTPATIENT)
Dept: INFUSION THERAPY | Facility: HOSPITAL | Age: 57
End: 2022-11-04

## 2022-11-04 ENCOUNTER — LAB VISIT (OUTPATIENT)
Dept: LAB | Facility: HOSPITAL | Age: 57
End: 2022-11-04
Attending: STUDENT IN AN ORGANIZED HEALTH CARE EDUCATION/TRAINING PROGRAM
Payer: COMMERCIAL

## 2022-11-04 ENCOUNTER — INFUSION (OUTPATIENT)
Dept: INFUSION THERAPY | Facility: HOSPITAL | Age: 57
End: 2022-11-04
Attending: STUDENT IN AN ORGANIZED HEALTH CARE EDUCATION/TRAINING PROGRAM
Payer: COMMERCIAL

## 2022-11-04 ENCOUNTER — OFFICE VISIT (OUTPATIENT)
Dept: HEMATOLOGY/ONCOLOGY | Facility: CLINIC | Age: 57
End: 2022-11-04
Payer: COMMERCIAL

## 2022-11-04 VITALS
HEART RATE: 81 BPM | RESPIRATION RATE: 18 BRPM | DIASTOLIC BLOOD PRESSURE: 74 MMHG | BODY MASS INDEX: 50.02 KG/M2 | WEIGHT: 293 LBS | HEIGHT: 64 IN | SYSTOLIC BLOOD PRESSURE: 140 MMHG | OXYGEN SATURATION: 97 %

## 2022-11-04 VITALS
WEIGHT: 293 LBS | SYSTOLIC BLOOD PRESSURE: 98 MMHG | BODY MASS INDEX: 50.02 KG/M2 | OXYGEN SATURATION: 97 % | HEART RATE: 92 BPM | DIASTOLIC BLOOD PRESSURE: 66 MMHG | HEIGHT: 64 IN | RESPIRATION RATE: 18 BRPM

## 2022-11-04 VITALS
SYSTOLIC BLOOD PRESSURE: 98 MMHG | DIASTOLIC BLOOD PRESSURE: 66 MMHG | OXYGEN SATURATION: 97 % | HEART RATE: 92 BPM | RESPIRATION RATE: 16 BRPM

## 2022-11-04 DIAGNOSIS — R30.0 DYSURIA: ICD-10-CM

## 2022-11-04 DIAGNOSIS — C50.912 INVASIVE DUCTAL CARCINOMA OF BREAST, FEMALE, LEFT: ICD-10-CM

## 2022-11-04 DIAGNOSIS — E11.41 DIABETIC MONONEUROPATHY ASSOCIATED WITH TYPE 2 DIABETES MELLITUS: ICD-10-CM

## 2022-11-04 DIAGNOSIS — C50.912 INVASIVE DUCTAL CARCINOMA OF BREAST, FEMALE, LEFT: Primary | ICD-10-CM

## 2022-11-04 DIAGNOSIS — L27.0 DRUG-INDUCED SKIN RASH: ICD-10-CM

## 2022-11-04 DIAGNOSIS — E83.42 HYPOMAGNESEMIA: ICD-10-CM

## 2022-11-04 DIAGNOSIS — F41.1 GENERALIZED ANXIETY DISORDER: ICD-10-CM

## 2022-11-04 DIAGNOSIS — T45.1X5A CHEMOTHERAPY INDUCED DIARRHEA: Primary | ICD-10-CM

## 2022-11-04 DIAGNOSIS — R45.89 ANXIETY ABOUT HEALTH: ICD-10-CM

## 2022-11-04 DIAGNOSIS — K52.1 CHEMOTHERAPY INDUCED DIARRHEA: Primary | ICD-10-CM

## 2022-11-04 LAB
ALBUMIN SERPL BCP-MCNC: 3.3 G/DL (ref 3.5–5.2)
ALP SERPL-CCNC: 78 U/L (ref 55–135)
ALT SERPL W/O P-5'-P-CCNC: 29 U/L (ref 10–44)
ANION GAP SERPL CALC-SCNC: 9 MMOL/L (ref 8–16)
AST SERPL-CCNC: 17 U/L (ref 10–40)
BILIRUB SERPL-MCNC: 0.5 MG/DL (ref 0.1–1)
BUN SERPL-MCNC: 19 MG/DL (ref 6–20)
CALCIUM SERPL-MCNC: 9.2 MG/DL (ref 8.7–10.5)
CHLORIDE SERPL-SCNC: 104 MMOL/L (ref 95–110)
CO2 SERPL-SCNC: 24 MMOL/L (ref 23–29)
CREAT SERPL-MCNC: 0.9 MG/DL (ref 0.5–1.4)
ERYTHROCYTE [DISTWIDTH] IN BLOOD BY AUTOMATED COUNT: 13.6 % (ref 11.5–14.5)
EST. GFR  (NO RACE VARIABLE): >60 ML/MIN/1.73 M^2
GLUCOSE SERPL-MCNC: 155 MG/DL (ref 70–110)
HCT VFR BLD AUTO: 35.7 % (ref 37–48.5)
HGB BLD-MCNC: 12 G/DL (ref 12–16)
IMM GRANULOCYTES # BLD AUTO: 0.05 K/UL (ref 0–0.04)
MAGNESIUM SERPL-MCNC: 1.4 MG/DL (ref 1.6–2.6)
MCH RBC QN AUTO: 30.6 PG (ref 27–31)
MCHC RBC AUTO-ENTMCNC: 33.6 G/DL (ref 32–36)
MCV RBC AUTO: 91 FL (ref 82–98)
NEUTROPHILS # BLD AUTO: 2.2 K/UL (ref 1.8–7.7)
PLATELET # BLD AUTO: 227 K/UL (ref 150–450)
PMV BLD AUTO: 9.4 FL (ref 9.2–12.9)
POTASSIUM SERPL-SCNC: 4.1 MMOL/L (ref 3.5–5.1)
PROT SERPL-MCNC: 6.7 G/DL (ref 6–8.4)
RBC # BLD AUTO: 3.92 M/UL (ref 4–5.4)
SODIUM SERPL-SCNC: 137 MMOL/L (ref 136–145)
WBC # BLD AUTO: 5.19 K/UL (ref 3.9–12.7)

## 2022-11-04 PROCEDURE — 3078F DIAST BP <80 MM HG: CPT | Mod: CPTII,S$GLB,, | Performed by: NURSE PRACTITIONER

## 2022-11-04 PROCEDURE — 1160F PR REVIEW ALL MEDS BY PRESCRIBER/CLIN PHARMACIST DOCUMENTED: ICD-10-PCS | Mod: CPTII,S$GLB,, | Performed by: NURSE PRACTITIONER

## 2022-11-04 PROCEDURE — 3078F PR MOST RECENT DIASTOLIC BLOOD PRESSURE < 80 MM HG: ICD-10-PCS | Mod: CPTII,S$GLB,, | Performed by: NURSE PRACTITIONER

## 2022-11-04 PROCEDURE — 3074F SYST BP LT 130 MM HG: CPT | Mod: CPTII,S$GLB,, | Performed by: NURSE PRACTITIONER

## 2022-11-04 PROCEDURE — 3078F DIAST BP <80 MM HG: CPT | Mod: CPTII,S$GLB,,

## 2022-11-04 PROCEDURE — 85027 COMPLETE CBC AUTOMATED: CPT | Mod: PN | Performed by: STUDENT IN AN ORGANIZED HEALTH CARE EDUCATION/TRAINING PROGRAM

## 2022-11-04 PROCEDURE — 1159F PR MEDICATION LIST DOCUMENTED IN MEDICAL RECORD: ICD-10-PCS | Mod: CPTII,S$GLB,, | Performed by: NURSE PRACTITIONER

## 2022-11-04 PROCEDURE — 99999 PR PBB SHADOW E&M-EST. PATIENT-LVL III: ICD-10-PCS | Mod: PBBFAC,,,

## 2022-11-04 PROCEDURE — 63600175 PHARM REV CODE 636 W HCPCS: Mod: PN

## 2022-11-04 PROCEDURE — 96375 TX/PRO/DX INJ NEW DRUG ADDON: CPT | Mod: PN

## 2022-11-04 PROCEDURE — 3077F SYST BP >= 140 MM HG: CPT | Mod: CPTII,S$GLB,,

## 2022-11-04 PROCEDURE — 1160F RVW MEDS BY RX/DR IN RCRD: CPT | Mod: CPTII,S$GLB,, | Performed by: NURSE PRACTITIONER

## 2022-11-04 PROCEDURE — 1159F MED LIST DOCD IN RCRD: CPT | Mod: CPTII,S$GLB,, | Performed by: NURSE PRACTITIONER

## 2022-11-04 PROCEDURE — 80053 COMPREHEN METABOLIC PANEL: CPT | Mod: PN | Performed by: STUDENT IN AN ORGANIZED HEALTH CARE EDUCATION/TRAINING PROGRAM

## 2022-11-04 PROCEDURE — 3078F PR MOST RECENT DIASTOLIC BLOOD PRESSURE < 80 MM HG: ICD-10-PCS | Mod: CPTII,S$GLB,,

## 2022-11-04 PROCEDURE — 36415 COLL VENOUS BLD VENIPUNCTURE: CPT | Mod: PN | Performed by: STUDENT IN AN ORGANIZED HEALTH CARE EDUCATION/TRAINING PROGRAM

## 2022-11-04 PROCEDURE — 99999 PR PBB SHADOW E&M-EST. PATIENT-LVL IV: CPT | Mod: PBBFAC,,, | Performed by: NURSE PRACTITIONER

## 2022-11-04 PROCEDURE — 63600175 PHARM REV CODE 636 W HCPCS: Mod: PN | Performed by: STUDENT IN AN ORGANIZED HEALTH CARE EDUCATION/TRAINING PROGRAM

## 2022-11-04 PROCEDURE — 99215 PR OFFICE/OUTPT VISIT, EST, LEVL V, 40-54 MIN: ICD-10-PCS | Mod: S$GLB,,,

## 2022-11-04 PROCEDURE — 3051F PR MOST RECENT HEMOGLOBIN A1C LEVEL 7.0 - < 8.0%: ICD-10-PCS | Mod: CPTII,S$GLB,, | Performed by: NURSE PRACTITIONER

## 2022-11-04 PROCEDURE — 96367 TX/PROPH/DG ADDL SEQ IV INF: CPT | Mod: PN

## 2022-11-04 PROCEDURE — 96413 CHEMO IV INFUSION 1 HR: CPT | Mod: PN

## 2022-11-04 PROCEDURE — 99215 PR OFFICE/OUTPT VISIT, EST, LEVL V, 40-54 MIN: ICD-10-PCS | Mod: S$GLB,,, | Performed by: NURSE PRACTITIONER

## 2022-11-04 PROCEDURE — 3008F PR BODY MASS INDEX (BMI) DOCUMENTED: ICD-10-PCS | Mod: CPTII,S$GLB,,

## 2022-11-04 PROCEDURE — 3008F BODY MASS INDEX DOCD: CPT | Mod: CPTII,S$GLB,,

## 2022-11-04 PROCEDURE — 3051F PR MOST RECENT HEMOGLOBIN A1C LEVEL 7.0 - < 8.0%: ICD-10-PCS | Mod: CPTII,S$GLB,,

## 2022-11-04 PROCEDURE — 99999 PR PBB SHADOW E&M-EST. PATIENT-LVL IV: ICD-10-PCS | Mod: PBBFAC,,, | Performed by: NURSE PRACTITIONER

## 2022-11-04 PROCEDURE — 3074F PR MOST RECENT SYSTOLIC BLOOD PRESSURE < 130 MM HG: ICD-10-PCS | Mod: CPTII,S$GLB,, | Performed by: NURSE PRACTITIONER

## 2022-11-04 PROCEDURE — 3051F HG A1C>EQUAL 7.0%<8.0%: CPT | Mod: CPTII,S$GLB,, | Performed by: NURSE PRACTITIONER

## 2022-11-04 PROCEDURE — 25000003 PHARM REV CODE 250: Mod: PN

## 2022-11-04 PROCEDURE — 99215 OFFICE O/P EST HI 40 MIN: CPT | Mod: S$GLB,,,

## 2022-11-04 PROCEDURE — 3051F HG A1C>EQUAL 7.0%<8.0%: CPT | Mod: CPTII,S$GLB,,

## 2022-11-04 PROCEDURE — 25000003 PHARM REV CODE 250: Mod: PN | Performed by: STUDENT IN AN ORGANIZED HEALTH CARE EDUCATION/TRAINING PROGRAM

## 2022-11-04 PROCEDURE — 3077F PR MOST RECENT SYSTOLIC BLOOD PRESSURE >= 140 MM HG: ICD-10-PCS | Mod: CPTII,S$GLB,,

## 2022-11-04 PROCEDURE — 87086 URINE CULTURE/COLONY COUNT: CPT

## 2022-11-04 PROCEDURE — 99215 OFFICE O/P EST HI 40 MIN: CPT | Mod: S$GLB,,, | Performed by: NURSE PRACTITIONER

## 2022-11-04 PROCEDURE — 99999 PR PBB SHADOW E&M-EST. PATIENT-LVL III: CPT | Mod: PBBFAC,,,

## 2022-11-04 PROCEDURE — 83735 ASSAY OF MAGNESIUM: CPT | Mod: PN | Performed by: STUDENT IN AN ORGANIZED HEALTH CARE EDUCATION/TRAINING PROGRAM

## 2022-11-04 RX ORDER — SODIUM CHLORIDE 0.9 % (FLUSH) 0.9 %
10 SYRINGE (ML) INJECTION
Status: DISCONTINUED | OUTPATIENT
Start: 2022-11-04 | End: 2022-11-04 | Stop reason: HOSPADM

## 2022-11-04 RX ORDER — MAGNESIUM SULFATE 1 G/100ML
1 INJECTION INTRAVENOUS ONCE
Status: DISCONTINUED | OUTPATIENT
Start: 2022-11-04 | End: 2022-11-04 | Stop reason: HOSPADM

## 2022-11-04 RX ORDER — OMEPRAZOLE 10 MG/1
CAPSULE, DELAYED RELEASE ORAL
COMMUNITY
End: 2023-07-03

## 2022-11-04 RX ORDER — EPINEPHRINE 0.3 MG/.3ML
0.3 INJECTION SUBCUTANEOUS ONCE AS NEEDED
Status: DISCONTINUED | OUTPATIENT
Start: 2022-11-04 | End: 2022-11-04 | Stop reason: HOSPADM

## 2022-11-04 RX ORDER — FAMOTIDINE 10 MG/ML
20 INJECTION INTRAVENOUS
Status: COMPLETED | OUTPATIENT
Start: 2022-11-04 | End: 2022-11-04

## 2022-11-04 RX ORDER — DIPHENHYDRAMINE HYDROCHLORIDE 50 MG/ML
50 INJECTION INTRAMUSCULAR; INTRAVENOUS ONCE AS NEEDED
Status: DISCONTINUED | OUTPATIENT
Start: 2022-11-04 | End: 2022-11-04 | Stop reason: HOSPADM

## 2022-11-04 RX ADMIN — FAMOTIDINE 20 MG: 10 INJECTION INTRAVENOUS at 12:11

## 2022-11-04 RX ADMIN — MAGNESIUM SULFATE HEPTAHYDRATE 1 G: 500 INJECTION, SOLUTION INTRAMUSCULAR; INTRAVENOUS at 11:11

## 2022-11-04 RX ADMIN — SODIUM CHLORIDE: 0.9 INJECTION, SOLUTION INTRAVENOUS at 11:11

## 2022-11-04 RX ADMIN — DEXAMETHASONE SODIUM PHOSPHATE 10 MG: 10 INJECTION, SOLUTION INTRAMUSCULAR; INTRAVENOUS at 01:11

## 2022-11-04 RX ADMIN — DIPHENHYDRAMINE HYDROCHLORIDE 50 MG: 50 INJECTION, SOLUTION INTRAMUSCULAR; INTRAVENOUS at 12:11

## 2022-11-04 RX ADMIN — PACLITAXEL 204 MG: 6 INJECTION, SOLUTION, CONCENTRATE INTRAVENOUS at 01:11

## 2022-11-04 NOTE — PLAN OF CARE
Problem: Adult Inpatient Plan of Care  Goal: Plan of Care Review  Outcome: Ongoing, Progressing  Flowsheets (Taken 11/4/2022 1200)  Plan of Care Reviewed With: patient  Goal: Patient-Specific Goal (Individualized)  Outcome: Ongoing, Progressing  Flowsheets (Taken 11/4/2022 1200)  Anxieties, Fears or Concerns: None  Individualized Care Needs: Recliner, warm blanket, conversation  Patient-Specific Goals (Include Timeframe): Free of S/S of reaction with treatment.     Problem: Fatigue  Goal: Improved Activity Tolerance  Outcome: Ongoing, Progressing  Intervention: Promote Improved Energy  Flowsheets (Taken 11/4/2022 1200)  Fatigue Management:   frequent rest breaks encouraged   paced activity encouraged  Sleep/Rest Enhancement: regular sleep/rest pattern promoted  Activity Management: Ambulated -L4    Patient to Infusion for Taxol following appointment with the provider. Treatment plan reviewed with patient. VSS. Tolerated treatment. Provided with copy of upcoming appointment schedule. Escorted to the front lobby for discharge to home.

## 2022-11-04 NOTE — PROGRESS NOTES
Josefina Coon  57 y.o. is here to seek an integrative approach to discuss side effects related to breast cancer treatment.    HPI  Mrs. Rocha is here today with her 2 friends, Salena and Ruth. She reports she is sleeping ok and taking melatonin at times. She does get tired around 3 pm and will take a break from work and take a nap. She states she watches TV at night and then stays up later than she should. She reports food does not taste good right now, but she is eating and drinking protein shakes. She continues to work remotely Mondays-Thursdays and then comes to treatment on Fridays. She takes tramadol occasionally for aching joints. She reports her feet will have a feeling of walking on rocks at times and other times they are numb and tingling intermittently. She went to the [employer] today but states she feels like the wigs look silly. She does plan on styling them with her friends who are encouraging.       Pillars Assessment    Sleep  How many hours of sleep per night? 7 hours  Do you have trouble falling asleep, staying asleep or waking up earlier than you need to? no  Do you have daytime fatigue? yes  Do you need medication for sleep? no  Do you use any supplements or other interventions for sleep? yes melatonin as needed    Nutrition   Food allergies or sensitivities: no  Do you adhere to a particular type of diet? no  Do you have any concerns with your eating habits? no    Exercise  How would you describe your physical activity level? Low to moderate  Do you work at a sedentary job? yes      Past Medical History  Past Medical History:   Diagnosis Date    Abnormal liver enzymes     Asymptomatic varicose veins     Cancer     left breast cancer    Depressive disorder, not elsewhere classified     Dyslipidemia     Embolism and thrombosis of unspecified site     superficial venous thrombosis    Encounter for long-term (current) use of other medications     Family history of ischemic heart disease      Fatty liver     Generalized anxiety disorder     History of chicken pox     Obstructive sleep apnea (adult) (pediatric)     Type II or unspecified type diabetes mellitus without mention of complication, not stated as uncontrolled     Unspecified essential hypertension     Unspecified venous (peripheral) insufficiency     Unspecified vitamin D deficiency         Past Surgical History   Past Surgical History:   Procedure Laterality Date     SECTION      INSERTION OF TUNNELED CENTRAL VENOUS CATHETER (CVC) WITH SUBCUTANEOUS PORT Right 10/4/2022    Procedure: [id number];  Surgeon: Dominick Ng MD;  Location: [email or web address] OR;  Service: General;  Laterality: Right;    VEIN SURGERY      Laser, Dr. Larsen        Family History   Family History   Problem Relation Age of Onset    Hyperlipidemia Mother     Hypertension Mother     Vulvar Cancer Mother     Diabetes Brother     Cancer Brother         colon    Stroke Maternal Grandmother         Social History  Social History     Socioeconomic History    Marital status:    Tobacco Use    Smoking status: Never    Smokeless tobacco: Never   Substance and Sexual Activity    Alcohol use: Not Currently     Comment: rare     Drug use: Never    Sexual activity: Not Currently        Allergies  Review of patient's allergies indicates:   Allergen Reactions    Sitagliptin      Other reaction(s): (januvia) abdominal pain    Sulfa (sulfonamide antibiotics) Hives    Byetta  [exenatide] Rash    Lactose         Current Medications:    Current Outpatient Medications:     albuterol 90 mcg/actuation inhaler, Inhale 2 puffs into the lungs every 6 (six) hours as needed for Wheezing., Disp: 18 g, Rfl: 6    ALPRAZolam (XANAX) 0.25 MG tablet, Take one tab po 1-2 times daily PRN anxiety (Patient not taking: Reported on 10/14/2022), Disp: 15 tablet, Rfl: 0    buPROPion (WELLBUTRIN XL) 150 MG TB24 tablet, TAKE 1 TABLET BY MOUTH EVERY DAY, Disp: 90 tablet, Rfl: 3    dulaglutide  (TRULICITY) 3 mg/0.5 mL pen injector, Inject 3 mg into the skin every 7 days., Disp: 12 pen, Rfl: 1    ergocalciferol (ERGOCALCIFEROL) 50,000 unit Cap, TAKE 1 CAPSULE BY MOUTH ONE TIME PER WEEK, Disp: 12 capsule, Rfl: 0    LIDOcaine-prilocaine (EMLA) cream, Apply to port site 1 hour prior to port access and cover, Disp: 30 g, Rfl: 3    magnesium 30 mg Tab, Take 30 mg by mouth once., Disp: , Rfl:     metFORMIN (GLUCOPHAGE) 500 MG tablet, TAKE 1 TABLET BY MOUTH TWICE A DAY WITH MEALS, Disp: 180 tablet, Rfl: 1    omeprazole (PRILOSEC OTC) 20 MG tablet, Take 1 tablet (20 mg total) by mouth once daily., Disp: 30 tablet, Rfl: 6    omeprazole (PRILOSEC) 10 MG capsule, omeprazole, Disp: , Rfl:     ondansetron (ZOFRAN) 8 MG tablet, Take 1 tablet (8 mg total) by mouth every 8 (eight) hours as needed for Nausea. (Patient not taking: Reported on 10/14/2022), Disp: 30 tablet, Rfl: 2    ondansetron (ZOFRAN-ODT) 8 MG TbDL, Take 1 tablet (8 mg total) by mouth every 6 (six) hours as needed (nausea). (Patient not taking: Reported on 10/14/2022), Disp: 10 tablet, Rfl: 0    pravastatin (PRAVACHOL) 10 MG tablet, TAKE 1 TABLET BY MOUTH EVERYDAY AT BEDTIME, Disp: 90 tablet, Rfl: 1    promethazine (PHENERGAN) 25 MG tablet, Take 1 tablet (25 mg total) by mouth every 6 (six) hours as needed for Nausea. (Patient not taking: Reported on 11/4/2022), Disp: 30 tablet, Rfl: 0    telmisartan-hydrochlorothiazide (MICARDIS HCT) 80-25 mg per tablet, TAKE 1 TABLET BY MOUTH EVERY DAY, Disp: 90 tablet, Rfl: 1    traMADoL (ULTRAM) 50 mg tablet, Take 1 tablet (50 mg total) by mouth every 12 (twelve) hours as needed for Pain (sever pain). (Patient not taking: Reported on 10/14/2022), Disp: 60 tablet, Rfl: 0    triamcinolone acetonide 0.025% (KENALOG) 0.025 % cream, Apply topically 2 (two) times daily. Apply to the skin lesions twice a day, Disp: 15 g, Rfl: 0    TRULICITY 1.5 mg/0.5 mL pen injector, Inject 1.5 mg into the skin every 7 days., Disp: , Rfl:    No current facility-administered medications for this visit.    Facility-Administered Medications Ordered in Other Visits:     diphenhydrAMINE injection 50 mg, 50 mg, Intravenous, Once PRN, Lisa Jaquez MD    EPINEPHrine (EPIPEN) 0.3 mg/0.3 mL pen injection 0.3 mg, 0.3 mg, Intramuscular, Once PRN, Lisa Jaquez MD    hydrocortisone sodium succinate injection 100 mg, 100 mg, Intravenous, Once PRN, Lisa Jaquez MD    magnesium sulfate in dextrose IVPB (premix) 1 g, 1 g, Intravenous, Once, Lisa Jaquez MD    sodium chloride 0.9% flush 10 mL, 10 mL, Intravenous, PRN, Lisa Jaquez MD     Review of Systems  Review of Systems   Constitutional:  Positive for malaise/fatigue.   HENT: Negative.     Eyes: Negative.    Respiratory: Negative.     Cardiovascular: Negative.    Gastrointestinal:  Positive for diarrhea.   Genitourinary: Negative.    Musculoskeletal: Negative.    Skin: Negative.    Neurological: Negative.    Endo/Heme/Allergies: Negative.    Psychiatric/Behavioral: Negative.        Physical Exam      Vitals:    11/04/22 1610   BP: 98/66   Pulse: 92   Resp: 16         Physical Exam  Vitals reviewed.   Constitutional:       Appearance: Normal appearance.   Neurological:      Mental Status: She is alert.   Psychiatric:         Mood and Affect: Mood normal.         Behavior: Behavior normal.        ASSESSMENT :  1. Chemotherapy induced diarrhea    2. Generalized anxiety disorder    3. Invasive ductal carcinoma of breast, female, left      PLAN:  Reviewed all information discussed at today's visit and all questions were answered.    Counseled on healthy lifestyle and behavior modifications   Continue to see Nutrition during treatment  I discussed Enterade with THERESA Conteh who states she will get some for Mrs. Rocha to try   Continue to see therapist as needed  Counseled on sleep hygiene-recommended insight timer.   Recommended a walk at the 3 pm time she wants to take a nap   I discussed and recommended the following  support services:  [employer]vin [employer] and Yoga   Music and Relaxation therapy   Meditation    Follow up with Integrative Services in 6 months    I spent a total of 45 minutes on the day of the visit.This includes face to face time and non-face to face time preparing to see the patient (eg, review of tests), obtaining and/or reviewing separately obtained history, documenting clinical information in the electronic or other health record, independently interpreting results and communicating results to the patient/family/caregiver, or care coordinator.

## 2022-11-04 NOTE — PROGRESS NOTES
Oncology   Chemotherapy Infusion Visit    Quick Social Service Status Follow Up   Met w/ pt briefly to follow up on social and emotional needs since initiation of treatment.      SW followed up with pt as planned to provide her with beanies. Pt able to finds beanies she liked. Pt denied any further needs at this time.          Crystal Rosas, KRYSTALW  11/04/2022  2:58 PM

## 2022-11-05 LAB
BACTERIA UR CULT: NORMAL
BACTERIA UR CULT: NORMAL

## 2022-11-11 ENCOUNTER — LAB VISIT (OUTPATIENT)
Dept: LAB | Facility: HOSPITAL | Age: 57
End: 2022-11-11
Attending: STUDENT IN AN ORGANIZED HEALTH CARE EDUCATION/TRAINING PROGRAM
Payer: COMMERCIAL

## 2022-11-11 ENCOUNTER — INFUSION (OUTPATIENT)
Dept: INFUSION THERAPY | Facility: HOSPITAL | Age: 57
End: 2022-11-11
Attending: STUDENT IN AN ORGANIZED HEALTH CARE EDUCATION/TRAINING PROGRAM
Payer: COMMERCIAL

## 2022-11-11 ENCOUNTER — OFFICE VISIT (OUTPATIENT)
Dept: HEMATOLOGY/ONCOLOGY | Facility: CLINIC | Age: 57
End: 2022-11-11
Payer: COMMERCIAL

## 2022-11-11 ENCOUNTER — DOCUMENTATION ONLY (OUTPATIENT)
Dept: INFUSION THERAPY | Facility: HOSPITAL | Age: 57
End: 2022-11-11

## 2022-11-11 VITALS
HEIGHT: 64 IN | SYSTOLIC BLOOD PRESSURE: 92 MMHG | DIASTOLIC BLOOD PRESSURE: 65 MMHG | RESPIRATION RATE: 16 BRPM | TEMPERATURE: 98 F | OXYGEN SATURATION: 97 % | BODY MASS INDEX: 50.02 KG/M2 | HEART RATE: 97 BPM | WEIGHT: 293 LBS

## 2022-11-11 VITALS
BODY MASS INDEX: 50.02 KG/M2 | HEIGHT: 64 IN | RESPIRATION RATE: 16 BRPM | SYSTOLIC BLOOD PRESSURE: 97 MMHG | HEART RATE: 135 BPM | WEIGHT: 293 LBS | TEMPERATURE: 98 F | DIASTOLIC BLOOD PRESSURE: 69 MMHG | OXYGEN SATURATION: 97 %

## 2022-11-11 DIAGNOSIS — K52.1 CHEMOTHERAPY INDUCED DIARRHEA: ICD-10-CM

## 2022-11-11 DIAGNOSIS — F41.1 GENERALIZED ANXIETY DISORDER: ICD-10-CM

## 2022-11-11 DIAGNOSIS — I74.9 EMBOLISM AND THROMBOSIS: ICD-10-CM

## 2022-11-11 DIAGNOSIS — E83.42 HYPOMAGNESEMIA: ICD-10-CM

## 2022-11-11 DIAGNOSIS — E11.41 DIABETIC MONONEUROPATHY ASSOCIATED WITH TYPE 2 DIABETES MELLITUS: ICD-10-CM

## 2022-11-11 DIAGNOSIS — C50.912 INVASIVE DUCTAL CARCINOMA OF BREAST, FEMALE, LEFT: ICD-10-CM

## 2022-11-11 DIAGNOSIS — K76.0 FATTY LIVER: ICD-10-CM

## 2022-11-11 DIAGNOSIS — C50.912 INVASIVE DUCTAL CARCINOMA OF BREAST, FEMALE, LEFT: Primary | ICD-10-CM

## 2022-11-11 DIAGNOSIS — T45.1X5A CHEMOTHERAPY INDUCED DIARRHEA: ICD-10-CM

## 2022-11-11 LAB
ALBUMIN SERPL BCP-MCNC: 3.4 G/DL (ref 3.5–5.2)
ALP SERPL-CCNC: 86 U/L (ref 55–135)
ALT SERPL W/O P-5'-P-CCNC: 38 U/L (ref 10–44)
ANION GAP SERPL CALC-SCNC: 12 MMOL/L (ref 8–16)
AST SERPL-CCNC: 21 U/L (ref 10–40)
BILIRUB SERPL-MCNC: 0.4 MG/DL (ref 0.1–1)
BUN SERPL-MCNC: 19 MG/DL (ref 6–20)
CALCIUM SERPL-MCNC: 9.4 MG/DL (ref 8.7–10.5)
CHLORIDE SERPL-SCNC: 104 MMOL/L (ref 95–110)
CO2 SERPL-SCNC: 21 MMOL/L (ref 23–29)
CREAT SERPL-MCNC: 1.1 MG/DL (ref 0.5–1.4)
ERYTHROCYTE [DISTWIDTH] IN BLOOD BY AUTOMATED COUNT: 13.5 % (ref 11.5–14.5)
EST. GFR  (NO RACE VARIABLE): 57.9 ML/MIN/1.73 M^2
GLUCOSE SERPL-MCNC: 171 MG/DL (ref 70–110)
HCT VFR BLD AUTO: 39 % (ref 37–48.5)
HGB BLD-MCNC: 13.1 G/DL (ref 12–16)
IMM GRANULOCYTES # BLD AUTO: 0.06 K/UL (ref 0–0.04)
MAGNESIUM SERPL-MCNC: 1.3 MG/DL (ref 1.6–2.6)
MCH RBC QN AUTO: 30.3 PG (ref 27–31)
MCHC RBC AUTO-ENTMCNC: 33.6 G/DL (ref 32–36)
MCV RBC AUTO: 90 FL (ref 82–98)
NEUTROPHILS # BLD AUTO: 3.2 K/UL (ref 1.8–7.7)
PLATELET # BLD AUTO: 294 K/UL (ref 150–450)
PMV BLD AUTO: 9.7 FL (ref 9.2–12.9)
POTASSIUM SERPL-SCNC: 4 MMOL/L (ref 3.5–5.1)
PROT SERPL-MCNC: 7 G/DL (ref 6–8.4)
RBC # BLD AUTO: 4.32 M/UL (ref 4–5.4)
SODIUM SERPL-SCNC: 137 MMOL/L (ref 136–145)
WBC # BLD AUTO: 6.72 K/UL (ref 3.9–12.7)

## 2022-11-11 PROCEDURE — 85027 COMPLETE CBC AUTOMATED: CPT | Mod: PN | Performed by: STUDENT IN AN ORGANIZED HEALTH CARE EDUCATION/TRAINING PROGRAM

## 2022-11-11 PROCEDURE — 83735 ASSAY OF MAGNESIUM: CPT | Mod: PN | Performed by: STUDENT IN AN ORGANIZED HEALTH CARE EDUCATION/TRAINING PROGRAM

## 2022-11-11 PROCEDURE — 80053 COMPREHEN METABOLIC PANEL: CPT | Mod: PN | Performed by: STUDENT IN AN ORGANIZED HEALTH CARE EDUCATION/TRAINING PROGRAM

## 2022-11-11 PROCEDURE — 3051F HG A1C>EQUAL 7.0%<8.0%: CPT | Mod: CPTII,S$GLB,, | Performed by: STUDENT IN AN ORGANIZED HEALTH CARE EDUCATION/TRAINING PROGRAM

## 2022-11-11 PROCEDURE — 3008F PR BODY MASS INDEX (BMI) DOCUMENTED: ICD-10-PCS | Mod: CPTII,S$GLB,, | Performed by: STUDENT IN AN ORGANIZED HEALTH CARE EDUCATION/TRAINING PROGRAM

## 2022-11-11 PROCEDURE — 3078F PR MOST RECENT DIASTOLIC BLOOD PRESSURE < 80 MM HG: ICD-10-PCS | Mod: CPTII,S$GLB,, | Performed by: STUDENT IN AN ORGANIZED HEALTH CARE EDUCATION/TRAINING PROGRAM

## 2022-11-11 PROCEDURE — 36415 COLL VENOUS BLD VENIPUNCTURE: CPT | Mod: PN | Performed by: STUDENT IN AN ORGANIZED HEALTH CARE EDUCATION/TRAINING PROGRAM

## 2022-11-11 PROCEDURE — 96367 TX/PROPH/DG ADDL SEQ IV INF: CPT | Mod: PN

## 2022-11-11 PROCEDURE — 96375 TX/PRO/DX INJ NEW DRUG ADDON: CPT | Mod: PN

## 2022-11-11 PROCEDURE — 3074F SYST BP LT 130 MM HG: CPT | Mod: CPTII,S$GLB,, | Performed by: STUDENT IN AN ORGANIZED HEALTH CARE EDUCATION/TRAINING PROGRAM

## 2022-11-11 PROCEDURE — 99215 OFFICE O/P EST HI 40 MIN: CPT | Mod: S$GLB,,, | Performed by: STUDENT IN AN ORGANIZED HEALTH CARE EDUCATION/TRAINING PROGRAM

## 2022-11-11 PROCEDURE — 99999 PR PBB SHADOW E&M-EST. PATIENT-LVL IV: ICD-10-PCS | Mod: PBBFAC,,, | Performed by: STUDENT IN AN ORGANIZED HEALTH CARE EDUCATION/TRAINING PROGRAM

## 2022-11-11 PROCEDURE — 1159F MED LIST DOCD IN RCRD: CPT | Mod: CPTII,S$GLB,, | Performed by: STUDENT IN AN ORGANIZED HEALTH CARE EDUCATION/TRAINING PROGRAM

## 2022-11-11 PROCEDURE — 1159F PR MEDICATION LIST DOCUMENTED IN MEDICAL RECORD: ICD-10-PCS | Mod: CPTII,S$GLB,, | Performed by: STUDENT IN AN ORGANIZED HEALTH CARE EDUCATION/TRAINING PROGRAM

## 2022-11-11 PROCEDURE — 1160F PR REVIEW ALL MEDS BY PRESCRIBER/CLIN PHARMACIST DOCUMENTED: ICD-10-PCS | Mod: CPTII,S$GLB,, | Performed by: STUDENT IN AN ORGANIZED HEALTH CARE EDUCATION/TRAINING PROGRAM

## 2022-11-11 PROCEDURE — 99999 PR PBB SHADOW E&M-EST. PATIENT-LVL IV: CPT | Mod: PBBFAC,,, | Performed by: STUDENT IN AN ORGANIZED HEALTH CARE EDUCATION/TRAINING PROGRAM

## 2022-11-11 PROCEDURE — 25000003 PHARM REV CODE 250: Mod: PN | Performed by: STUDENT IN AN ORGANIZED HEALTH CARE EDUCATION/TRAINING PROGRAM

## 2022-11-11 PROCEDURE — 3008F BODY MASS INDEX DOCD: CPT | Mod: CPTII,S$GLB,, | Performed by: STUDENT IN AN ORGANIZED HEALTH CARE EDUCATION/TRAINING PROGRAM

## 2022-11-11 PROCEDURE — 3078F DIAST BP <80 MM HG: CPT | Mod: CPTII,S$GLB,, | Performed by: STUDENT IN AN ORGANIZED HEALTH CARE EDUCATION/TRAINING PROGRAM

## 2022-11-11 PROCEDURE — 96413 CHEMO IV INFUSION 1 HR: CPT | Mod: PN

## 2022-11-11 PROCEDURE — 1160F RVW MEDS BY RX/DR IN RCRD: CPT | Mod: CPTII,S$GLB,, | Performed by: STUDENT IN AN ORGANIZED HEALTH CARE EDUCATION/TRAINING PROGRAM

## 2022-11-11 PROCEDURE — 99215 PR OFFICE/OUTPT VISIT, EST, LEVL V, 40-54 MIN: ICD-10-PCS | Mod: S$GLB,,, | Performed by: STUDENT IN AN ORGANIZED HEALTH CARE EDUCATION/TRAINING PROGRAM

## 2022-11-11 PROCEDURE — 3051F PR MOST RECENT HEMOGLOBIN A1C LEVEL 7.0 - < 8.0%: ICD-10-PCS | Mod: CPTII,S$GLB,, | Performed by: STUDENT IN AN ORGANIZED HEALTH CARE EDUCATION/TRAINING PROGRAM

## 2022-11-11 PROCEDURE — 63600175 PHARM REV CODE 636 W HCPCS: Mod: PN | Performed by: STUDENT IN AN ORGANIZED HEALTH CARE EDUCATION/TRAINING PROGRAM

## 2022-11-11 PROCEDURE — 3074F PR MOST RECENT SYSTOLIC BLOOD PRESSURE < 130 MM HG: ICD-10-PCS | Mod: CPTII,S$GLB,, | Performed by: STUDENT IN AN ORGANIZED HEALTH CARE EDUCATION/TRAINING PROGRAM

## 2022-11-11 RX ORDER — FAMOTIDINE 10 MG/ML
20 INJECTION INTRAVENOUS
Status: COMPLETED | OUTPATIENT
Start: 2022-11-11 | End: 2022-11-11

## 2022-11-11 RX ORDER — MAGNESIUM SULFATE HEPTAHYDRATE 40 MG/ML
2 INJECTION, SOLUTION INTRAVENOUS ONCE
Status: COMPLETED | OUTPATIENT
Start: 2022-11-11 | End: 2022-11-11

## 2022-11-11 RX ORDER — SODIUM CHLORIDE 0.9 % (FLUSH) 0.9 %
10 SYRINGE (ML) INJECTION
Status: DISCONTINUED | OUTPATIENT
Start: 2022-11-11 | End: 2022-11-11 | Stop reason: HOSPADM

## 2022-11-11 RX ORDER — DIPHENHYDRAMINE HYDROCHLORIDE 50 MG/ML
50 INJECTION INTRAMUSCULAR; INTRAVENOUS ONCE AS NEEDED
Status: DISCONTINUED | OUTPATIENT
Start: 2022-11-11 | End: 2022-11-11 | Stop reason: HOSPADM

## 2022-11-11 RX ORDER — EPINEPHRINE 0.3 MG/.3ML
0.3 INJECTION SUBCUTANEOUS ONCE AS NEEDED
Status: DISCONTINUED | OUTPATIENT
Start: 2022-11-11 | End: 2022-11-11 | Stop reason: HOSPADM

## 2022-11-11 RX ADMIN — FAMOTIDINE 20 MG: 10 INJECTION INTRAVENOUS at 01:11

## 2022-11-11 RX ADMIN — SODIUM CHLORIDE: 0.9 INJECTION, SOLUTION INTRAVENOUS at 11:11

## 2022-11-11 RX ADMIN — PACLITAXEL 204 MG: 6 INJECTION, SOLUTION INTRAVENOUS at 02:11

## 2022-11-11 RX ADMIN — MAGNESIUM SULFATE IN WATER 2 G: 40 INJECTION, SOLUTION INTRAVENOUS at 12:11

## 2022-11-11 RX ADMIN — DIPHENHYDRAMINE HYDROCHLORIDE 50 MG: 50 INJECTION, SOLUTION INTRAMUSCULAR; INTRAVENOUS at 02:11

## 2022-11-11 RX ADMIN — DEXAMETHASONE SODIUM PHOSPHATE 10 MG: 4 INJECTION INTRA-ARTICULAR; INTRALESIONAL; INTRAMUSCULAR; INTRAVENOUS; SOFT TISSUE at 01:11

## 2022-11-11 NOTE — PROGRESS NOTES
Oncology Nutrition   Chemotherapy Infusion Visit    Nutrition Follow Up   RD briefly met w/ pt at chairside during infusion tx. RD received referral from NP, Eugenia Tello, to see pt today due to continual chemotherapy induced diarrhea. Pt continues to have diarrhea despite taking imodium and trying banatrol. RD provided pt with Enterade samples and coupon. RD explained benefits of including Enterade daily. Pt VU. Pt appreciative of RD visit.     Wt Readings from Last 10 Encounters:   11/11/22 (!) 143.9 kg (317 lb 3.9 oz)   11/11/22 (!) 143.9 kg (317 lb 3.9 oz)   11/04/22 (!) 145.6 kg (320 lb 15.8 oz)   11/04/22 (!) 145.6 kg (320 lb 15.8 oz)   10/28/22 (!) 145 kg (319 lb 10.7 oz)   10/28/22 (!) 145 kg (319 lb 10.7 oz)   10/21/22 (!) 145.2 kg (320 lb 1.7 oz)   10/21/22 (!) 145.2 kg (320 lb 1.7 oz)   10/14/22 (!) 144.2 kg (317 lb 14.5 oz)   10/14/22 (!) 144.2 kg (317 lb 14.5 oz)       All other nutrition questions/concerns addressed as appropriate. Will continue to monitor prn throughout treatment.     Jerri Capellan, ESTER, LDN  11/11/2022  2:01 PM

## 2022-11-11 NOTE — PLAN OF CARE
Problem: Adult Inpatient Plan of Care  Goal: Plan of Care Review  Outcome: Ongoing, Progressing  Flowsheets (Taken 11/11/2022 1400)  Plan of Care Reviewed With:   patient   friend  Goal: Patient-Specific Goal (Individualized)  Outcome: Ongoing, Progressing  Flowsheets (Taken 11/11/2022 1400)  Anxieties, Fears or Concerns: None  Individualized Care Needs: Recliner, warm blanket, conversation  Patient-Specific Goals (Include Timeframe): Free of S/S of reaction with treatment.     Problem: Fatigue  Goal: Improved Activity Tolerance  Outcome: Ongoing, Progressing  Intervention: Promote Improved Energy  Flowsheets (Taken 11/11/2022 1400)  Fatigue Management:   frequent rest breaks encouraged   paced activity encouraged  Sleep/Rest Enhancement: regular sleep/rest pattern promoted  Activity Management: Ambulated -L4    Patient to Infusion for Taxol and Magnesium following appointment with the provider. Treatment plan reviewed with patient. VSS. Cryotherapy initiated prior to Taxol. Tolerated treatment. Provided with copy of upcoming appointment schedule. Escorted to the front lobby for discharge to home.

## 2022-11-11 NOTE — PROGRESS NOTES
PATIENT: Josefina Coon  MRN: [id number]  DATE: 11/11/2022      Diagnosis:   1. Invasive ductal carcinoma of breast, female, left    2. Chemotherapy induced diarrhea    3. Diabetic mononeuropathy associated with type 2 diabetes mellitus    4. Hypomagnesemia    5. Generalized anxiety disorder    6. Embolism and thrombosis of unspecified site    7. Fatty liver          Chief Complaint: No chief complaint on file.    Subjective:   HPI: Ms. Coon is a 57 y.o. female Ms. Coon is a 57 y.o. female with HTN, HLD, depression, diabetes type 2 with neuropathy, obesity, fatty liver,  for a diagnosis of invasive ductal carcinoma of the left breast, triple positivity. Undergoing treatment with Paclitaxel/Trastuzumab/Pertuzumab on D1, weekly Paclitaxel on D8, D15 of a 21 day cycle.    She is here today today for consideration of C2D15 of therapy which consists of weekly Paclitaxel.    Episodic diarrhea, using Imodium 1-3 times daily with good management.  Rash on face is still present but it does improve some days; using Triamcinolone PRN.   Over the past few weeks she has also noticed occasional burning after urination, likely external irritation Taking Mg supplements BID at home.   Denies HA, CP, cough, mucositis, abd pain, constipation, N/V, swelling, new lumps or bumps. No fevers, chills.      Oncologic History:   8/25/22 bilateral screening mammogram,,Right neg ,Left central post mass     9/8/22 left diag [facility], left US limited .Left 0300 lateral posterior 6cm FN 1.4cm mass , left axilla LN 2, up to 4mm cortex     9/14/22 left 0300 lateral posterior 6cm FN 1.4cm mass US biopsy .Grade 3 ,1.1cm in biopsy No LVI , ER 84% OK 28% Her 2 3+ pos Ki 52 %    Left axilla LN biopsy ;Benign fatty tissue, no LN, not concordant No MRI of breasts were done      9/21/22 US bilateral complete Right neg, right LN nml , Left 0300 6cm FN [id number] mass Borderline LN left axilla     9/21/22 Left axilla LN biopsy benign LN , so eventually Stage  IA triple positive Breast cancer    Oncology History   Invasive ductal carcinoma of breast, female, left   9/23/2022 Initial Diagnosis    Invasive ductal carcinoma of breast, female, left     9/23/2022 Cancer Staged    Staging form: Breast, AJCC 8th Edition  - Clinical stage from 9/23/2022: Stage IA (cT1c, cN0(f), cM0, G3, ER+, NJ+, HER2+)       9/29/2022 - 9/29/2022 Chemotherapy    Treatment Summary   Plan Name: OP BREAST TRASTUZUMAB PACLITAXEL WEEKLY  Treatment Goal: Curative  Status: Inactive  Start Date:   End Date:   Provider: Lisa Jaquez MD  Chemotherapy: PACLitaxeL (TAXOL) 80 mg/m2 = 204 mg in sodium chloride 0.9% 250 mL chemo infusion, 80 mg/m2 = 204 mg, Intravenous, Clinic/HOD 1 time, 0 of 12 cycles  pertuzumab (PERJETA) 840 mg in sodium chloride 0.9% 278 mL infusion, 840 mg (original dose ), Intravenous, Clinic/HOD 1 time, 0 of 17 cycles  Dose modification: 840 mg (Cycle 1, Reason: Other (see comments)), 420 mg (Cycle 4, Reason: Other (see comments))  trastuzumab-dkst (OGIVRI) 591 mg in sodium chloride 0.9% 250 mL chemo infusion, 4 mg/kg = 591 mg, Intravenous, Clinic/HOD 1 time, 0 of 25 cycles       10/7/2022 -  Chemotherapy    Treatment Summary   Plan Name: OP BREAST PACLITAXEL TRASTUZUMAB WEEKLY WITH PERTUZUMAB Q3W (THP)  Treatment Goal: Control  Status: Active  Start Date: 10/7/2022  End Date: 12/23/2022 (Planned)  Provider: Lisa Jaquez MD  Chemotherapy: PACLitaxeL (TAXOL) 80 mg/m2 = 210 mg in sodium chloride 0.9% 250 mL chemo infusion, 80 mg/m2 = 210 mg, Intravenous, Clinic/HOD 1 time, 2 of 4 cycles  Administration: 210 mg (10/7/2022), 204 mg (10/14/2022), 204 mg (10/28/2022), 204 mg (11/4/2022), 204 mg (10/21/2022)  pertuzumab (PERJETA) 840 mg in sodium chloride 0.9% 278 mL infusion, 840 mg, Intravenous, Clinic/HOD 1 time, 2 of 4 cycles  Administration: 840 mg (10/7/2022), 420 mg (10/28/2022)  trastuzumab-dkst (OGIVRI) 570 mg in sodium chloride 0.9% 250 mL chemo infusion, 593 mg (100 % of original  dose 4 mg/kg), Intravenous, Clinic/HOD 1 time, 2 of 4 cycles  Dose modification: 4 mg/kg (original dose 4 mg/kg, Cycle 1, Reason: Other (see comments), Comment: weekly trastuzumab), 2 mg/kg (original dose 2 mg/kg, Cycle 2, Reason: Other (see comments), Comment: weekly maintenance dose), 6 mg/kg (original dose 2 mg/kg, Cycle 2, Reason: MD Discretion, Comment: change to q3w dosing), 2 mg/kg (original dose 2 mg/kg, Cycle 1, Reason: Other (see comments), Comment: maintenance weekly dose)  Administration: 570 mg (10/7/2022), 840 mg (10/28/2022), 288 mg (10/14/2022), 290 mg (10/21/2022)          Past Medical History:   Past Medical History:   Diagnosis Date    Abnormal liver enzymes     Asymptomatic varicose veins     Cancer     left breast cancer    Depressive disorder, not elsewhere classified     Dyslipidemia     Embolism and thrombosis of unspecified site     superficial venous thrombosis    Encounter for long-term (current) use of other medications     Family history of ischemic heart disease     Fatty liver     Generalized anxiety disorder     History of chicken pox     Obstructive sleep apnea (adult) (pediatric)     Type II or unspecified type diabetes mellitus without mention of complication, not stated as uncontrolled     Unspecified essential hypertension     Unspecified venous (peripheral) insufficiency     Unspecified vitamin D deficiency        Past Surgical HIstory:   Past Surgical History:   Procedure Laterality Date     SECTION      INSERTION OF TUNNELED CENTRAL VENOUS CATHETER (CVC) WITH SUBCUTANEOUS PORT Right 10/4/2022    Procedure: [id number]-CATH;  Surgeon: Dominick Ng MD;  Location: [facility];  Service: General;  Laterality: Right;    VEIN SURGERY      Laser, Dr. Larsen       Family History:   Family History   Problem Relation Age of Onset    Hyperlipidemia Mother     Hypertension Mother     Vulvar Cancer Mother     Diabetes Brother     Cancer Brother         colon    Stroke  Maternal Grandmother        Social History:  reports that she has never smoked. She has never used smokeless tobacco. She reports that she does not currently use alcohol. She reports that she does not use drugs.    Allergies:  Review of patient's allergies indicates:   Allergen Reactions    Sitagliptin      Other reaction(s): (januvia) abdominal pain    Sulfa (sulfonamide antibiotics) Hives    Byetta  [exenatide] Rash    Lactose        Medications:  Current Outpatient Medications   Medication Sig Dispense Refill    albuterol 90 mcg/actuation inhaler Inhale 2 puffs into the lungs every 6 (six) hours as needed for Wheezing. 18 g 6    ALPRAZolam (XANAX) 0.25 MG tablet Take one tab po 1-2 times daily PRN anxiety 15 tablet 0    buPROPion (WELLBUTRIN XL) 150 MG TB24 tablet TAKE 1 TABLET BY MOUTH EVERY DAY 90 tablet 3    dulaglutide (TRULICITY) 3 mg/0.5 mL pen injector Inject 3 mg into the skin every 7 days. 12 pen 1    ergocalciferol (ERGOCALCIFEROL) 50,000 unit Cap TAKE 1 CAPSULE BY MOUTH ONE TIME PER WEEK 12 capsule 0    LIDOcaine-prilocaine (EMLA) cream Apply to port site 1 hour prior to port access and cover 30 g 3    magnesium 30 mg Tab Take 30 mg by mouth once.      metFORMIN (GLUCOPHAGE) 500 MG tablet TAKE 1 TABLET BY MOUTH TWICE A DAY WITH MEALS 180 tablet 1    omeprazole (PRILOSEC OTC) 20 MG tablet Take 1 tablet (20 mg total) by mouth once daily. 30 tablet 6    omeprazole (PRILOSEC) 10 MG capsule omeprazole      ondansetron (ZOFRAN) 8 MG tablet Take 1 tablet (8 mg total) by mouth every 8 (eight) hours as needed for Nausea. 30 tablet 2    pravastatin (PRAVACHOL) 10 MG tablet TAKE 1 TABLET BY MOUTH EVERYDAY AT BEDTIME 90 tablet 1    promethazine (PHENERGAN) 25 MG tablet Take 1 tablet (25 mg total) by mouth every 6 (six) hours as needed for Nausea. 30 tablet 0    telmisartan-hydrochlorothiazide (MICARDIS HCT) 80-25 mg per tablet TAKE 1 TABLET BY MOUTH EVERY DAY 90 tablet 1    traMADoL (ULTRAM) 50 mg tablet Take 1  tablet (50 mg total) by mouth every 12 (twelve) hours as needed for Pain (sever pain). 60 tablet 0    triamcinolone acetonide 0.025% (KENALOG) 0.025 % cream APPLY TOPICALLY 2 (TWO) TIMES DAILY. APPLY TO THE SKIN LESIONS TWICE A DAY 15 g 0     No current facility-administered medications for this visit.     Facility-Administered Medications Ordered in Other Visits   Medication Dose Route Frequency Provider Last Rate Last Admin    dexAMETHasone (DECADRON) 10 mg in sodium chloride 0.9% 50 mL IVPB  10 mg Intravenous 1 time in Clinic/HOD Lisa Jaquez  mL/hr at 11/11/22 1327 10 mg at 11/11/22 1327    diphenhydrAMINE (BENADRYL) 50 mg in NS 50 mL IVPB  50 mg Intravenous 1 time in Clinic/HOD Lisa Jaquez MD        diphenhydrAMINE injection 50 mg  50 mg Intravenous Once PRN Lisa Jaquez MD        EPINEPHrine (EPIPEN) 0.3 mg/0.3 mL pen injection 0.3 mg  0.3 mg Intramuscular Once PRN Lisa Jaquez MD        hydrocortisone sodium succinate injection 100 mg  100 mg Intravenous Once PRN Lisa Jaquez MD        PACLitaxeL (TAXOL) 80 mg/m2 = 204 mg in sodium chloride 0.9% 250 mL chemo infusion  80 mg/m2 Intravenous 1 time in Clinic/HOD Lisa Jaquez MD        sodium chloride 0.9% flush 10 mL  10 mL Intravenous PRN Lisa Jaquez MD           Review of Systems   Constitutional:  Negative for chills, fatigue and fever.   HENT:  Negative for trouble swallowing.    Respiratory:  Negative for cough and shortness of breath.    Cardiovascular:  Negative for chest pain and palpitations.   Gastrointestinal:  Positive for diarrhea. Negative for abdominal pain, constipation, nausea and vomiting.   Genitourinary:  Positive for dysuria.   Musculoskeletal:  Negative for arthralgias.   Skin:  Positive for rash.   Neurological:  Negative for headaches.   Hematological:  Negative for adenopathy.     ECOG Performance Status:   ECOG SCORE    1 - Restricted in strenuous activity-ambulatory and able to carry out work of a light nature    "      Objective:      Vitals:   Vitals:    11/11/22 1053   BP: 92/65   BP Location: Left arm   Patient Position: Sitting   BP Method: Large (Automatic)   Pulse: 97   Resp: 16   Temp: 97.7 °F (36.5 °C)   TempSrc: Temporal   SpO2: 97%   Weight: (!) 143.9 kg (317 lb 3.9 oz)   Height: 5' 4" (1.626 m)     BMI: Body mass index is 54.45 kg/m².    Physical Exam  Vitals reviewed.   Constitutional:       General: She is not in acute distress.     Appearance: She is not diaphoretic.   HENT:      Head: Normocephalic and atraumatic.      Mouth/Throat:      Mouth: Mucous membranes are moist.      Pharynx: No oropharyngeal exudate.   Eyes:      General: No scleral icterus.  Cardiovascular:      Rate and Rhythm: Normal rate and regular rhythm.      Heart sounds: Normal heart sounds. No murmur heard.  Pulmonary:      Effort: Pulmonary effort is normal. No respiratory distress.      Breath sounds: No wheezing.   Musculoskeletal:      Cervical back: No tenderness.      Right lower leg: No edema.      Left lower leg: No edema.   Lymphadenopathy:      Cervical: No cervical adenopathy.   Skin:     General: Skin is warm.      Findings: Rash present.   Neurological:      Mental Status: She is alert and oriented to person, place, and time.   Psychiatric:         Behavior: Behavior normal.       Laboratory Data:  Lab Results   Component Value Date    WBC 6.72 11/11/2022    HGB 13.1 11/11/2022    HCT 39.0 11/11/2022    MCV 90 11/11/2022     11/11/2022        Assessment:       1. Invasive ductal carcinoma of breast, female, left    2. Chemotherapy induced diarrhea    3. Diabetic mononeuropathy associated with type 2 diabetes mellitus    4. Hypomagnesemia    5. Generalized anxiety disorder    6. Embolism and thrombosis of unspecified site    7. Fatty liver      Plan:   Invasive ductal carcinoma of the breast, left  - cT1c triple positive breast cancer  (ER 84% OH 28% Her 2 3+ pos Ki 52 %), given her young age and Ki67%, will proceed with " TH, adding P to help with a better pCR, will also plan to get ultrasound mid cycle to monitor (3 months)  -9/26/22 Echo with EF 60%  -Began TH+P on 10/7/2022  -Schedule US on 11/14  -Proceed with C2D15 paclitaxel today; doing well will cont monitor ultrasound for improvement      Diabetes type 2 with neuropathy   -Taking Metformin, Trulicity   -Will need to monitor for worsening neuropathy while undergoing treatment     Anxiety  -Taking Wellbutrin  -Following with Dr. Long     Hypomagnesemia  -Taking po supplements at home  - will give 2g of Mg today     Drug-induced rash  -Stable to improved  -using Triamcinolone cream PRN  -Monitor     Patient queried and all questions were answered.    Route Chart for Scheduling    Med Onc Chart Routing      Follow up with physician 1 week. proceed with chemo today, needs 2g of Mg   Follow up with BRANDIE    Infusion scheduling note    Injection scheduling note    Labs CBC, CMP and magnesium   Lab interval:  prior to next chemo   Imaging    Pharmacy appointment    Other referrals        Treatment Plan Information   OP BREAST PACLITAXEL TRASTUZUMAB WEEKLY WITH PERTUZUMAB Q3W (THP)   Lisa Jaquez MD   Upcoming Treatment Dates - OP BREAST PACLITAXEL TRASTUZUMAB WEEKLY WITH PERTUZUMAB Q3W (THP)    11/18/2022       Pre-Medications       diphenhydrAMINE (BENADRYL) 50 mg in NS 50 mL IVPB       famotidine (PF) injection 20 mg       dexAMETHasone (DECADRON) 10 mg in sodium chloride 0.9% 50 mL IVPB       Chemotherapy       pertuzumab (PERJETA) 420 mg in sodium chloride 0.9% 264 mL infusion       trastuzumab-dkst (OGIVRI) 870 mg in sodium chloride 0.9% 250 mL chemo infusion       PACLitaxeL (TAXOL) 80 mg/m2 = 204 mg in sodium chloride 0.9% 250 mL chemo infusion  11/25/2022       Pre-Medications       diphenhydrAMINE (BENADRYL) 50 mg in NS 50 mL IVPB       famotidine (PF) injection 20 mg       dexAMETHasone (DECADRON) 10 mg in sodium chloride 0.9% 50 mL IVPB       Chemotherapy        PACLitaxeL (TAXOL) in sodium chloride 0.9% 250 mL chemo infusion  12/2/2022       Pre-Medications       diphenhydrAMINE (BENADRYL) 50 mg in NS 50 mL IVPB       famotidine (PF) injection 20 mg       dexAMETHasone (DECADRON) 10 mg in sodium chloride 0.9% 50 mL IVPB       Chemotherapy       PACLitaxeL (TAXOL) in sodium chloride 0.9% 250 mL chemo infusion  12/9/2022       Pre-Medications       diphenhydrAMINE (BENADRYL) 50 mg in NS 50 mL IVPB       famotidine (PF) injection 20 mg       dexAMETHasone (DECADRON) 10 mg in sodium chloride 0.9% 50 mL IVPB       Chemotherapy       pertuzumab (PERJETA) 420 mg in sodium chloride 0.9% 264 mL infusion       trastuzumab-dkst (OGIVRI) in sodium chloride 0.9% chemo infusion       PACLitaxeL (TAXOL) in sodium chloride 0.9% 250 mL chemo infusion

## 2022-11-18 ENCOUNTER — LAB VISIT (OUTPATIENT)
Dept: LAB | Facility: HOSPITAL | Age: 57
End: 2022-11-18
Attending: STUDENT IN AN ORGANIZED HEALTH CARE EDUCATION/TRAINING PROGRAM
Payer: COMMERCIAL

## 2022-11-18 ENCOUNTER — OFFICE VISIT (OUTPATIENT)
Dept: HEMATOLOGY/ONCOLOGY | Facility: CLINIC | Age: 57
End: 2022-11-18
Payer: COMMERCIAL

## 2022-11-18 ENCOUNTER — INFUSION (OUTPATIENT)
Dept: INFUSION THERAPY | Facility: HOSPITAL | Age: 57
End: 2022-11-18
Attending: STUDENT IN AN ORGANIZED HEALTH CARE EDUCATION/TRAINING PROGRAM
Payer: COMMERCIAL

## 2022-11-18 VITALS
TEMPERATURE: 98 F | HEART RATE: 97 BPM | OXYGEN SATURATION: 100 % | BODY MASS INDEX: 50.02 KG/M2 | WEIGHT: 293 LBS | RESPIRATION RATE: 18 BRPM | DIASTOLIC BLOOD PRESSURE: 76 MMHG | HEIGHT: 64 IN | SYSTOLIC BLOOD PRESSURE: 108 MMHG

## 2022-11-18 VITALS
OXYGEN SATURATION: 100 % | TEMPERATURE: 98 F | HEIGHT: 64 IN | RESPIRATION RATE: 18 BRPM | HEART RATE: 96 BPM | BODY MASS INDEX: 50.02 KG/M2 | SYSTOLIC BLOOD PRESSURE: 114 MMHG | DIASTOLIC BLOOD PRESSURE: 79 MMHG | WEIGHT: 293 LBS

## 2022-11-18 DIAGNOSIS — C50.912 INVASIVE DUCTAL CARCINOMA OF BREAST, FEMALE, LEFT: Primary | ICD-10-CM

## 2022-11-18 DIAGNOSIS — C50.912 INVASIVE DUCTAL CARCINOMA OF BREAST, FEMALE, LEFT: ICD-10-CM

## 2022-11-18 DIAGNOSIS — E83.42 HYPOMAGNESEMIA: ICD-10-CM

## 2022-11-18 DIAGNOSIS — L27.0 DRUG-INDUCED SKIN RASH: ICD-10-CM

## 2022-11-18 DIAGNOSIS — F41.1 GENERALIZED ANXIETY DISORDER: ICD-10-CM

## 2022-11-18 DIAGNOSIS — K52.1 CHEMOTHERAPY INDUCED DIARRHEA: Primary | ICD-10-CM

## 2022-11-18 DIAGNOSIS — E11.41 DIABETIC MONONEUROPATHY ASSOCIATED WITH TYPE 2 DIABETES MELLITUS: ICD-10-CM

## 2022-11-18 DIAGNOSIS — R45.89 ANXIETY ABOUT HEALTH: ICD-10-CM

## 2022-11-18 DIAGNOSIS — T45.1X5A CHEMOTHERAPY INDUCED DIARRHEA: Primary | ICD-10-CM

## 2022-11-18 LAB
ALBUMIN SERPL BCP-MCNC: 3.6 G/DL (ref 3.5–5.2)
ALP SERPL-CCNC: 90 U/L (ref 55–135)
ALT SERPL W/O P-5'-P-CCNC: 37 U/L (ref 10–44)
ANION GAP SERPL CALC-SCNC: 11 MMOL/L (ref 8–16)
AST SERPL-CCNC: 19 U/L (ref 10–40)
BILIRUB SERPL-MCNC: 0.4 MG/DL (ref 0.1–1)
BUN SERPL-MCNC: 13 MG/DL (ref 6–20)
CALCIUM SERPL-MCNC: 9.3 MG/DL (ref 8.7–10.5)
CHLORIDE SERPL-SCNC: 107 MMOL/L (ref 95–110)
CO2 SERPL-SCNC: 19 MMOL/L (ref 23–29)
CREAT SERPL-MCNC: 0.9 MG/DL (ref 0.5–1.4)
ERYTHROCYTE [DISTWIDTH] IN BLOOD BY AUTOMATED COUNT: 14.1 % (ref 11.5–14.5)
EST. GFR  (NO RACE VARIABLE): >60 ML/MIN/1.73 M^2
GLUCOSE SERPL-MCNC: 149 MG/DL (ref 70–110)
HCT VFR BLD AUTO: 35.7 % (ref 37–48.5)
HGB BLD-MCNC: 12.2 G/DL (ref 12–16)
IMM GRANULOCYTES # BLD AUTO: 0.05 K/UL (ref 0–0.04)
MAGNESIUM SERPL-MCNC: 1.3 MG/DL (ref 1.6–2.6)
MCH RBC QN AUTO: 31.1 PG (ref 27–31)
MCHC RBC AUTO-ENTMCNC: 34.2 G/DL (ref 32–36)
MCV RBC AUTO: 91 FL (ref 82–98)
NEUTROPHILS # BLD AUTO: 2.9 K/UL (ref 1.8–7.7)
PLATELET # BLD AUTO: 271 K/UL (ref 150–450)
PMV BLD AUTO: 9.7 FL (ref 9.2–12.9)
POTASSIUM SERPL-SCNC: 4.3 MMOL/L (ref 3.5–5.1)
PROT SERPL-MCNC: 7.3 G/DL (ref 6–8.4)
RBC # BLD AUTO: 3.92 M/UL (ref 4–5.4)
SODIUM SERPL-SCNC: 137 MMOL/L (ref 136–145)
WBC # BLD AUTO: 5.62 K/UL (ref 3.9–12.7)

## 2022-11-18 PROCEDURE — 3008F PR BODY MASS INDEX (BMI) DOCUMENTED: ICD-10-PCS | Mod: CPTII,S$GLB,, | Performed by: STUDENT IN AN ORGANIZED HEALTH CARE EDUCATION/TRAINING PROGRAM

## 2022-11-18 PROCEDURE — 3078F DIAST BP <80 MM HG: CPT | Mod: CPTII,S$GLB,, | Performed by: STUDENT IN AN ORGANIZED HEALTH CARE EDUCATION/TRAINING PROGRAM

## 2022-11-18 PROCEDURE — 99999 PR PBB SHADOW E&M-EST. PATIENT-LVL IV: CPT | Mod: PBBFAC,,, | Performed by: STUDENT IN AN ORGANIZED HEALTH CARE EDUCATION/TRAINING PROGRAM

## 2022-11-18 PROCEDURE — 1159F PR MEDICATION LIST DOCUMENTED IN MEDICAL RECORD: ICD-10-PCS | Mod: CPTII,S$GLB,, | Performed by: STUDENT IN AN ORGANIZED HEALTH CARE EDUCATION/TRAINING PROGRAM

## 2022-11-18 PROCEDURE — 63600175 PHARM REV CODE 636 W HCPCS: Mod: PN | Performed by: STUDENT IN AN ORGANIZED HEALTH CARE EDUCATION/TRAINING PROGRAM

## 2022-11-18 PROCEDURE — 1160F PR REVIEW ALL MEDS BY PRESCRIBER/CLIN PHARMACIST DOCUMENTED: ICD-10-PCS | Mod: CPTII,S$GLB,, | Performed by: STUDENT IN AN ORGANIZED HEALTH CARE EDUCATION/TRAINING PROGRAM

## 2022-11-18 PROCEDURE — 3051F PR MOST RECENT HEMOGLOBIN A1C LEVEL 7.0 - < 8.0%: ICD-10-PCS | Mod: CPTII,S$GLB,, | Performed by: STUDENT IN AN ORGANIZED HEALTH CARE EDUCATION/TRAINING PROGRAM

## 2022-11-18 PROCEDURE — 25000003 PHARM REV CODE 250: Mod: PN | Performed by: STUDENT IN AN ORGANIZED HEALTH CARE EDUCATION/TRAINING PROGRAM

## 2022-11-18 PROCEDURE — 83735 ASSAY OF MAGNESIUM: CPT | Mod: PN | Performed by: STUDENT IN AN ORGANIZED HEALTH CARE EDUCATION/TRAINING PROGRAM

## 2022-11-18 PROCEDURE — 3008F BODY MASS INDEX DOCD: CPT | Mod: CPTII,S$GLB,, | Performed by: STUDENT IN AN ORGANIZED HEALTH CARE EDUCATION/TRAINING PROGRAM

## 2022-11-18 PROCEDURE — 3074F SYST BP LT 130 MM HG: CPT | Mod: CPTII,S$GLB,, | Performed by: STUDENT IN AN ORGANIZED HEALTH CARE EDUCATION/TRAINING PROGRAM

## 2022-11-18 PROCEDURE — 3074F PR MOST RECENT SYSTOLIC BLOOD PRESSURE < 130 MM HG: ICD-10-PCS | Mod: CPTII,S$GLB,, | Performed by: STUDENT IN AN ORGANIZED HEALTH CARE EDUCATION/TRAINING PROGRAM

## 2022-11-18 PROCEDURE — 36415 COLL VENOUS BLD VENIPUNCTURE: CPT | Mod: PN | Performed by: STUDENT IN AN ORGANIZED HEALTH CARE EDUCATION/TRAINING PROGRAM

## 2022-11-18 PROCEDURE — 3078F PR MOST RECENT DIASTOLIC BLOOD PRESSURE < 80 MM HG: ICD-10-PCS | Mod: CPTII,S$GLB,, | Performed by: STUDENT IN AN ORGANIZED HEALTH CARE EDUCATION/TRAINING PROGRAM

## 2022-11-18 PROCEDURE — 85027 COMPLETE CBC AUTOMATED: CPT | Mod: PN | Performed by: STUDENT IN AN ORGANIZED HEALTH CARE EDUCATION/TRAINING PROGRAM

## 2022-11-18 PROCEDURE — 3051F HG A1C>EQUAL 7.0%<8.0%: CPT | Mod: CPTII,S$GLB,, | Performed by: STUDENT IN AN ORGANIZED HEALTH CARE EDUCATION/TRAINING PROGRAM

## 2022-11-18 PROCEDURE — 99215 PR OFFICE/OUTPT VISIT, EST, LEVL V, 40-54 MIN: ICD-10-PCS | Mod: S$GLB,,, | Performed by: STUDENT IN AN ORGANIZED HEALTH CARE EDUCATION/TRAINING PROGRAM

## 2022-11-18 PROCEDURE — 99999 PR PBB SHADOW E&M-EST. PATIENT-LVL IV: ICD-10-PCS | Mod: PBBFAC,,, | Performed by: STUDENT IN AN ORGANIZED HEALTH CARE EDUCATION/TRAINING PROGRAM

## 2022-11-18 PROCEDURE — 96375 TX/PRO/DX INJ NEW DRUG ADDON: CPT | Mod: PN

## 2022-11-18 PROCEDURE — 96417 CHEMO IV INFUS EACH ADDL SEQ: CPT | Mod: PN

## 2022-11-18 PROCEDURE — 1159F MED LIST DOCD IN RCRD: CPT | Mod: CPTII,S$GLB,, | Performed by: STUDENT IN AN ORGANIZED HEALTH CARE EDUCATION/TRAINING PROGRAM

## 2022-11-18 PROCEDURE — 96367 TX/PROPH/DG ADDL SEQ IV INF: CPT | Mod: PN

## 2022-11-18 PROCEDURE — 99215 OFFICE O/P EST HI 40 MIN: CPT | Mod: S$GLB,,, | Performed by: STUDENT IN AN ORGANIZED HEALTH CARE EDUCATION/TRAINING PROGRAM

## 2022-11-18 PROCEDURE — 80053 COMPREHEN METABOLIC PANEL: CPT | Mod: PN | Performed by: STUDENT IN AN ORGANIZED HEALTH CARE EDUCATION/TRAINING PROGRAM

## 2022-11-18 PROCEDURE — 96413 CHEMO IV INFUSION 1 HR: CPT | Mod: PN

## 2022-11-18 PROCEDURE — 1160F RVW MEDS BY RX/DR IN RCRD: CPT | Mod: CPTII,S$GLB,, | Performed by: STUDENT IN AN ORGANIZED HEALTH CARE EDUCATION/TRAINING PROGRAM

## 2022-11-18 RX ORDER — DIPHENHYDRAMINE HYDROCHLORIDE 50 MG/ML
50 INJECTION INTRAMUSCULAR; INTRAVENOUS ONCE AS NEEDED
Status: DISCONTINUED | OUTPATIENT
Start: 2022-11-18 | End: 2022-11-18 | Stop reason: HOSPADM

## 2022-11-18 RX ORDER — HEPARIN 100 UNIT/ML
500 SYRINGE INTRAVENOUS
Status: CANCELLED | OUTPATIENT
Start: 2022-11-25

## 2022-11-18 RX ORDER — MAGNESIUM SULFATE HEPTAHYDRATE 40 MG/ML
2 INJECTION, SOLUTION INTRAVENOUS ONCE
Status: CANCELLED
Start: 2022-11-25 | End: 2022-11-25

## 2022-11-18 RX ORDER — DIPHENHYDRAMINE HYDROCHLORIDE 50 MG/ML
50 INJECTION INTRAMUSCULAR; INTRAVENOUS ONCE AS NEEDED
Status: CANCELLED | OUTPATIENT
Start: 2022-11-25

## 2022-11-18 RX ORDER — FAMOTIDINE 10 MG/ML
20 INJECTION INTRAVENOUS
Status: CANCELLED | OUTPATIENT
Start: 2022-12-02

## 2022-11-18 RX ORDER — EPINEPHRINE 0.3 MG/.3ML
0.3 INJECTION SUBCUTANEOUS ONCE AS NEEDED
Status: DISCONTINUED | OUTPATIENT
Start: 2022-11-18 | End: 2022-11-18 | Stop reason: HOSPADM

## 2022-11-18 RX ORDER — EPINEPHRINE 0.3 MG/.3ML
0.3 INJECTION SUBCUTANEOUS ONCE AS NEEDED
Status: CANCELLED | OUTPATIENT
Start: 2022-11-25

## 2022-11-18 RX ORDER — FAMOTIDINE 10 MG/ML
20 INJECTION INTRAVENOUS
Status: CANCELLED | OUTPATIENT
Start: 2022-11-25

## 2022-11-18 RX ORDER — SODIUM CHLORIDE 0.9 % (FLUSH) 0.9 %
10 SYRINGE (ML) INJECTION
Status: CANCELLED | OUTPATIENT
Start: 2022-12-02

## 2022-11-18 RX ORDER — DIPHENHYDRAMINE HYDROCHLORIDE 50 MG/ML
50 INJECTION INTRAMUSCULAR; INTRAVENOUS ONCE AS NEEDED
Status: CANCELLED | OUTPATIENT
Start: 2022-12-02

## 2022-11-18 RX ORDER — SODIUM CHLORIDE 0.9 % (FLUSH) 0.9 %
10 SYRINGE (ML) INJECTION
Status: CANCELLED | OUTPATIENT
Start: 2022-11-25

## 2022-11-18 RX ORDER — MAGNESIUM SULFATE HEPTAHYDRATE 40 MG/ML
2 INJECTION, SOLUTION INTRAVENOUS ONCE
Status: COMPLETED | OUTPATIENT
Start: 2022-11-18 | End: 2022-11-18

## 2022-11-18 RX ORDER — FAMOTIDINE 10 MG/ML
20 INJECTION INTRAVENOUS
Status: COMPLETED | OUTPATIENT
Start: 2022-11-18 | End: 2022-11-18

## 2022-11-18 RX ORDER — MAGNESIUM SULFATE HEPTAHYDRATE 40 MG/ML
2 INJECTION, SOLUTION INTRAVENOUS ONCE
Status: CANCELLED
Start: 2022-12-02 | End: 2022-12-02

## 2022-11-18 RX ORDER — SODIUM CHLORIDE 0.9 % (FLUSH) 0.9 %
10 SYRINGE (ML) INJECTION
Status: DISCONTINUED | OUTPATIENT
Start: 2022-11-18 | End: 2022-11-18 | Stop reason: HOSPADM

## 2022-11-18 RX ORDER — EPINEPHRINE 0.3 MG/.3ML
0.3 INJECTION SUBCUTANEOUS ONCE AS NEEDED
Status: CANCELLED | OUTPATIENT
Start: 2022-12-02

## 2022-11-18 RX ORDER — HEPARIN 100 UNIT/ML
500 SYRINGE INTRAVENOUS
Status: CANCELLED | OUTPATIENT
Start: 2022-12-02

## 2022-11-18 RX ORDER — TRIAMCINOLONE ACETONIDE 0.25 MG/G
CREAM TOPICAL 2 TIMES DAILY
Qty: 15 G | Refills: 0 | Status: SHIPPED | OUTPATIENT
Start: 2022-11-18 | End: 2023-07-07

## 2022-11-18 RX ADMIN — SODIUM CHLORIDE: 0.9 INJECTION, SOLUTION INTRAVENOUS at 11:11

## 2022-11-18 RX ADMIN — DIPHENHYDRAMINE HYDROCHLORIDE 50 MG: 50 INJECTION, SOLUTION INTRAMUSCULAR; INTRAVENOUS at 02:11

## 2022-11-18 RX ADMIN — TRASTUZUMAB 840 MG: KIT at 02:11

## 2022-11-18 RX ADMIN — PERTUZUMAB 420 MG: 30 INJECTION, SOLUTION, CONCENTRATE INTRAVENOUS at 01:11

## 2022-11-18 RX ADMIN — PACLITAXEL 204 MG: 6 INJECTION, SOLUTION INTRAVENOUS at 03:11

## 2022-11-18 RX ADMIN — DEXAMETHASONE SODIUM PHOSPHATE 10 MG: 4 INJECTION INTRA-ARTICULAR; INTRALESIONAL; INTRAMUSCULAR; INTRAVENOUS; SOFT TISSUE at 03:11

## 2022-11-18 RX ADMIN — FAMOTIDINE 20 MG: 10 INJECTION INTRAVENOUS at 03:11

## 2022-11-18 RX ADMIN — MAGNESIUM SULFATE IN WATER 2 G: 40 INJECTION, SOLUTION INTRAVENOUS at 12:11

## 2022-11-18 NOTE — PLAN OF CARE
Problem: Adult Inpatient Plan of Care  Goal: Plan of Care Review  Outcome: Ongoing, Progressing  Flowsheets (Taken 11/18/2022 1200)  Plan of Care Reviewed With:   patient   son  Goal: Patient-Specific Goal (Individualized)  Outcome: Ongoing, Progressing  Flowsheets (Taken 11/18/2022 1200)  Anxieties, Fears or Concerns: None  Individualized Care Needs: Recliner, warm blanket, conversation  Patient-Specific Goals (Include Timeframe): Free of S/S of reaction with treatment.     Problem: Fatigue  Goal: Improved Activity Tolerance  Outcome: Ongoing, Progressing  Intervention: Promote Improved Energy  Flowsheets (Taken 11/18/2022 1200)  Fatigue Management:   frequent rest breaks encouraged   paced activity encouraged  Sleep/Rest Enhancement: regular sleep/rest pattern promoted  Activity Management: Ambulated -L4    Patient to Infusion for Magnesium, Taxol, Perjeta and Herceptin following appointment with Dr. Jaquez. Accompanied by her son. Treatment plan reviewed with patient. VSS. Tolerated treatment. Provided with copy of upcoming appointment schedule. Escorted to the front lobby for discharge to home.

## 2022-11-18 NOTE — PROGRESS NOTES
PATIENT: Josefina Coon  MRN: [id number]  DATE: 11/19/2022      Diagnosis:   1. Chemotherapy induced diarrhea    2. Invasive ductal carcinoma of breast, female, left    3. Diabetic mononeuropathy associated with type 2 diabetes mellitus    4. Drug-induced skin rash    5. Hypomagnesemia    6. Generalized anxiety disorder    7. Anxiety about health      Chief Complaint: Invasive ductal carcinoma of breast, female, left    Subjective:   HPI: Ms. Coon is a 57 y.o. female Ms. Coon is a 57 y.o. female with HTN, HLD, depression, diabetes type 2 with neuropathy, obesity, fatty liver,  for a diagnosis of invasive ductal carcinoma of the left breast, triple positivity. Undergoing treatment with Paclitaxel/Trastuzumab/Pertuzumab on D1, weekly Paclitaxel on D8, D15 of a 21 day cycle.      She is here today today for consideration of C3D1 of chemotherapy     Improved diarrhea, using Imodium 1-3 times daily with good management.  Rash on face improving, using Triamcinolone PRN.   Denies HA, CP, cough, mucositis, abd pain, constipation, N/V, swelling, new lumps or bumps. No fevers, chills.   Having some nose bleeding, likely due to mucositis/allergy,sinuses   Ultrasound 11/14/22 with decrease in size of the breast mass, reaching out to Dr MORLEY about  moving her schedule and follow amaris earlier      Oncologic History:   8/25/22 bilateral screening mammogram,,Right neg ,Left central post mass     9/8/22 left diag MMG, left US limited .Left 0300 lateral posterior 6cm FN 1.4cm mass , left axilla LN 2, up to 4mm cortex     9/14/22 left 0300 lateral posterior 6cm FN 1.4cm mass US biopsy .Grade 3 ,1.1cm in biopsy No LVI , ER 84% MT 28% Her 2 3+ pos Ki 52 %    Left axilla LN biopsy ;Benign fatty tissue, no LN, not concordant No MRI of breasts were done      9/21/22 US bilateral complete Right neg, right LN nml , Left 0300 6cm FN [id number] mass Borderline LN left axilla     9/21/22 Left axilla LN biopsy benign LN , so eventually Stage IA  triple positive Breast cancer        Oncology History   Invasive ductal carcinoma of breast, female, left   9/23/2022 Initial Diagnosis    Invasive ductal carcinoma of breast, female, left     9/23/2022 Cancer Staged    Staging form: Breast, AJCC 8th Edition  - Clinical stage from 9/23/2022: Stage IA (cT1c, cN0(f), cM0, G3, ER+, IA+, HER2+)       9/29/2022 - 9/29/2022 Chemotherapy    Treatment Summary   Plan Name: OP BREAST TRASTUZUMAB PACLITAXEL WEEKLY  Treatment Goal: Curative  Status: Inactive  Start Date:   End Date:   Provider: Lisa Jaquez MD  Chemotherapy: PACLitaxeL (TAXOL) 80 mg/m2 = 204 mg in sodium chloride 0.9% 250 mL chemo infusion, 80 mg/m2 = 204 mg, Intravenous, Clinic/[facility] 1 time, 0 of 12 cycles  pertuzumab (PERJETA) 840 mg in sodium chloride 0.9% 278 mL infusion, 840 mg (original dose ), Intravenous, Clinic/[facility] 1 time, 0 of 17 cycles  Dose modification: 840 mg (Cycle 1, Reason: Other (see comments)), 420 mg (Cycle 4, Reason: Other (see comments))  trastuzumab-dkst (OGIVRI) 591 mg in sodium chloride 0.9% 250 mL chemo infusion, 4 mg/kg = 591 mg, Intravenous, Clinic/[facility] 1 time, 0 of 25 cycles       10/7/2022 -  Chemotherapy    Treatment Summary   Plan Name: OP BREAST PACLITAXEL TRASTUZUMAB WEEKLY WITH PERTUZUMAB Q3W (THP)  Treatment Goal: Control  Status: Active  Start Date: 10/7/2022  End Date: 12/23/2022 (Planned)  Provider: Lisa Jaquez MD  Chemotherapy: PACLitaxeL (TAXOL) 80 mg/m2 = 210 mg in sodium chloride 0.9% 250 mL chemo infusion, 80 mg/m2 = 210 mg, Intravenous, Clinic/[facility] 1 time, 3 of 4 cycles  Administration: 210 mg (10/7/2022), 204 mg (10/14/2022), 204 mg (10/28/2022), 204 mg (11/4/2022), 204 mg (10/21/2022), 204 mg (11/11/2022), 204 mg (11/18/2022)  pertuzumab (PERJETA) 840 mg in sodium chloride 0.9% 278 mL infusion, 840 mg, Intravenous, Clinic/[facility] 1 time, 3 of 4 cycles  Administration: 840 mg (10/7/2022), 420 mg (10/28/2022), 420 mg (11/18/2022)  trastuzumab-dkst (OGIVRI) 570 mg in  sodium chloride 0.9% 250 mL chemo infusion, 593 mg (100 % of original dose 4 mg/kg), Intravenous, Clinic/[facility] 1 time, 3 of 4 cycles  Dose modification: 4 mg/kg (original dose 4 mg/kg, Cycle 1, Reason: Other (see comments), Comment: weekly trastuzumab), 2 mg/kg (original dose 2 mg/kg, Cycle 2, Reason: Other (see comments), Comment: weekly maintenance dose), 6 mg/kg (original dose 2 mg/kg, Cycle 2, Reason: MD Discretion, Comment: change to q3w dosing), 2 mg/kg (original dose 2 mg/kg, Cycle 1, Reason: Other (see comments), Comment: maintenance weekly dose)  Administration: 570 mg (10/7/2022), 840 mg (10/28/2022), 288 mg (10/14/2022), 290 mg (10/21/2022), 840 mg (2022)          Past Medical History:   Past Medical History:   Diagnosis Date    Abnormal liver enzymes     Asymptomatic varicose veins     Breast cancer 2022    left idc    Cancer     left breast cancer    Depressive disorder, not elsewhere classified     Dyslipidemia     Embolism and thrombosis of unspecified site     superficial venous thrombosis    Encounter for long-term (current) use of other medications     Family history of ischemic heart disease     Fatty liver     Generalized anxiety disorder     History of chicken pox     Obstructive sleep apnea (adult) (pediatric)     Type II or unspecified type diabetes mellitus without mention of complication, not stated as uncontrolled     Unspecified essential hypertension     Unspecified venous (peripheral) insufficiency     Unspecified vitamin D deficiency        Past Surgical HIstory:   Past Surgical History:   Procedure Laterality Date    BREAST BIOPSY Left 2022    idc    BREAST BIOPSY Left 2022    neg ln    BREAST BIOPSY Left 2022    neg ln     SECTION      INSERTION OF TUNNELED CENTRAL VENOUS CATHETER (CVC) WITH SUBCUTANEOUS PORT Right 10/04/2022    Procedure: [id number];  Surgeon: Dominick Ng MD;  Location: [facility] OR;  Service: General;  Laterality:  Right;    VEIN SURGERY      Laser, Dr. Larsen       Family History:   Family History   Problem Relation Age of Onset    Hyperlipidemia Mother     Hypertension Mother     Vulvar Cancer Mother     Diabetes Brother     Cancer Brother         colon    Stroke Maternal Grandmother        Social History:  reports that she has never smoked. She has never used smokeless tobacco. She reports that she does not currently use alcohol. She reports that she does not use drugs.    Allergies:  Review of patient's allergies indicates:   Allergen Reactions    Sitagliptin      Other reaction(s): (januvia) abdominal pain    Sulfa (sulfonamide antibiotics) Hives    Byetta  [exenatide] Rash    Lactose        Medications:  Current Outpatient Medications   Medication Sig Dispense Refill    albuterol 90 mcg/actuation inhaler Inhale 2 puffs into the lungs every 6 (six) hours as needed for Wheezing. 18 g 6    ALPRAZolam (XANAX) 0.25 MG tablet Take one tab po 1-2 times daily PRN anxiety 15 tablet 0    buPROPion (WELLBUTRIN XL) 150 MG TB24 tablet TAKE 1 TABLET BY MOUTH EVERY DAY 90 tablet 3    dulaglutide (TRULICITY) 3 mg/0.5 mL pen injector Inject 3 mg into the skin every 7 days. 12 pen 1    ergocalciferol (ERGOCALCIFEROL) 50,000 unit Cap TAKE 1 CAPSULE BY MOUTH ONE TIME PER WEEK 12 capsule 0    LIDOcaine-prilocaine (EMLA) cream Apply to port site 1 hour prior to port access and cover 30 g 3    magnesium 30 mg Tab Take 30 mg by mouth once.      metFORMIN (GLUCOPHAGE) 500 MG tablet TAKE 1 TABLET BY MOUTH TWICE A DAY WITH MEALS 180 tablet 1    omeprazole (PRILOSEC OTC) 20 MG tablet Take 1 tablet (20 mg total) by mouth once daily. 30 tablet 6    omeprazole (PRILOSEC) 10 MG capsule omeprazole      ondansetron (ZOFRAN) 8 MG tablet Take 1 tablet (8 mg total) by mouth every 8 (eight) hours as needed for Nausea. 30 tablet 2    pravastatin (PRAVACHOL) 10 MG tablet TAKE 1 TABLET BY MOUTH EVERYDAY AT BEDTIME 90 tablet 1    promethazine (PHENERGAN) 25 MG  "tablet Take 1 tablet (25 mg total) by mouth every 6 (six) hours as needed for Nausea. 30 tablet 0    telmisartan-hydrochlorothiazide (MICARDIS HCT) 80-25 mg per tablet TAKE 1 TABLET BY MOUTH EVERY DAY 90 tablet 1    triamcinolone acetonide 0.025% (KENALOG) 0.025 % cream Apply topically 2 (two) times daily. Apply to the skin lesions twice a day 15 g 0     No current facility-administered medications for this visit.       Review of Systems   Constitutional:  Negative for chills, fatigue and fever.   HENT:  Negative for trouble swallowing.    Respiratory:  Negative for cough and shortness of breath.    Cardiovascular:  Negative for chest pain and palpitations.   Gastrointestinal:  Positive for diarrhea. Negative for abdominal pain, constipation, nausea and vomiting.   Genitourinary:  Positive for dysuria.   Musculoskeletal:  Negative for arthralgias.   Skin:  Positive for rash.   Neurological:  Negative for headaches.   Hematological:  Negative for adenopathy.     ECOG Performance Status:   ECOG SCORE    0 - Fully active-able to carry on all pre-disease performance without restriction, 1 - Restricted in strenuous activity-ambulatory and able to carry out work of a light nature         Objective:      Vitals:   Vitals:    11/18/22 1031   BP: 108/76   BP Location: Right arm   Patient Position: Sitting   BP Method: Medium (Manual)   Pulse: 97   Resp: 18   Temp: 97.9 °F (36.6 °C)   TempSrc: Temporal   SpO2: 100%   Weight: (!) 143 kg (315 lb 4.1 oz)   Height: 5' 4" (1.626 m)     BMI: Body mass index is 54.11 kg/m².    Physical Exam  Vitals reviewed.   Constitutional:       General: She is not in acute distress.     Appearance: She is not diaphoretic.   HENT:      Head: Normocephalic and atraumatic.      Mouth/Throat:      Mouth: Mucous membranes are moist.      Pharynx: No oropharyngeal exudate.   Eyes:      General: No scleral icterus.  Cardiovascular:      Rate and Rhythm: Normal rate and regular rhythm.      Heart " sounds: Normal heart sounds. No murmur heard.  Pulmonary:      Effort: Pulmonary effort is normal. No respiratory distress.      Breath sounds: No wheezing.   Musculoskeletal:      Cervical back: No tenderness.      Right lower leg: No edema.      Left lower leg: No edema.   Lymphadenopathy:      Cervical: No cervical adenopathy.   Skin:     General: Skin is warm.      Findings: Rash present.   Neurological:      Mental Status: She is alert and oriented to person, place, and time.   Psychiatric:         Behavior: Behavior normal.       Laboratory Data:  Lab Results   Component Value Date    WBC 5.62 11/18/2022    HGB 12.2 11/18/2022    HCT 35.7 (L) 11/18/2022    MCV 91 11/18/2022     11/18/2022        Assessment:       1. Chemotherapy induced diarrhea    2. Invasive ductal carcinoma of breast, female, left    3. Diabetic mononeuropathy associated with type 2 diabetes mellitus    4. Drug-induced skin rash    5. Hypomagnesemia    6. Generalized anxiety disorder    7. Anxiety about health      Plan:   Invasive ductal carcinoma of the breast, left  - cT1c triple positive breast cancer  (ER 84% DE 28% Her 2 3+ pos Ki 52 %), given her young age and Ki67%, will proceed with TH, adding P to help with a better pCR, will also plan to get ultrasound mid cycle to monitor (3 months)  -9/26/22 Echo with EF 60%  -Began TH+P on 10/7/2022, US on 11/14 with decrease size, good respond, cont   -Proceed with C3D1 chemotherapy  today; doing well will cont monitor ultrasound for improvement      Diabetes type 2 with neuropathy   -Taking Metformin, Trulicity   -Will need to monitor for worsening neuropathy while undergoing treatment     Anxiety  -Taking Wellbutrin  -Following with Dr. Long     Hypomagnesemia  -Taking po supplements at home  - IV 2g of Mg today     Drug-induced rash  -Stable to improved  -using Triamcinolone cream PRN  -Monitor     Patient queried and all questions were answered.    Route Chart for  Scheduling    Med Onc Chart Routing      Follow up with physician 2 weeks and 4 weeks. Alternating bewteen MD/NP, blood work prior   Follow up with BRANDIE 1 week and 3 weeks.   Infusion scheduling note    Injection scheduling note    Labs CBC and CMP   Lab interval:     Imaging    Pharmacy appointment    Other referrals        Treatment Plan Information   OP BREAST PACLITAXEL TRASTUZUMAB WEEKLY WITH PERTUZUMAB Q3W (THP)   Lisa Jaquez MD   Upcoming Treatment Dates - OP BREAST PACLITAXEL TRASTUZUMAB WEEKLY WITH PERTUZUMAB Q3W (THP)    11/25/2022       Pre-Medications       diphenhydrAMINE (BENADRYL) 50 mg in NS 50 mL IVPB       famotidine (PF) injection 20 mg       dexAMETHasone (DECADRON) 10 mg in sodium chloride 0.9% 50 mL IVPB       Chemotherapy       PACLitaxeL (TAXOL) 80 mg/m2 = 204 mg in sodium chloride 0.9% 250 mL chemo infusion  12/2/2022       Pre-Medications       diphenhydrAMINE (BENADRYL) 50 mg in NS 50 mL IVPB       famotidine (PF) injection 20 mg       dexAMETHasone (DECADRON) 10 mg in sodium chloride 0.9% 50 mL IVPB       Chemotherapy       PACLitaxeL (TAXOL) 80 mg/m2 = 204 mg in sodium chloride 0.9% 250 mL chemo infusion  12/9/2022       Pre-Medications       diphenhydrAMINE (BENADRYL) 50 mg in NS 50 mL IVPB       famotidine (PF) injection 20 mg       dexAMETHasone (DECADRON) 10 mg in sodium chloride 0.9% 50 mL IVPB       Chemotherapy       pertuzumab (PERJETA) 420 mg in sodium chloride 0.9% 264 mL infusion       trastuzumab-dkst (OGIVRI) in sodium chloride 0.9% chemo infusion       PACLitaxeL (TAXOL) in sodium chloride 0.9% 250 mL chemo infusion  12/16/2022       Pre-Medications       diphenhydrAMINE (BENADRYL) 50 mg in NS 50 mL IVPB       famotidine (PF) injection 20 mg       dexAMETHasone (DECADRON) 10 mg in sodium chloride 0.9% 50 mL IVPB       Chemotherapy       PACLitaxeL (TAXOL) in sodium chloride 0.9% 250 mL chemo infusion

## 2022-11-19 PROBLEM — R45.89 ANXIETY ABOUT HEALTH: Status: ACTIVE | Noted: 2022-11-19

## 2022-11-19 PROBLEM — F41.8 ANXIETY ABOUT HEALTH: Status: ACTIVE | Noted: 2022-11-19

## 2022-11-21 ENCOUNTER — TELEPHONE (OUTPATIENT)
Dept: HEMATOLOGY/ONCOLOGY | Facility: CLINIC | Age: 57
End: 2022-11-21
Payer: COMMERCIAL

## 2022-11-21 NOTE — TELEPHONE ENCOUNTER
----- Message from Niesha Ornelas, Patient Care Assistant sent at 11/21/2022 11:59 AM CST -----  Type: Needs Medical Advice  Who Called:  Josefina Santos Call Back Number: [phone]    Additional Information: patient states she has infusion on 11/25 and no labs or visit with provider,  she also states that she don't know if the lab order in chart or for regular visit with provider or  the infusion labs,  please call patient to set up with provider as well as labs for infusion, please call to further discuss,. Thank you      LVM for patient to let her know of scheduled lab and md appt for Friday 11/25. Labs at 9:45 am and MD appt at 10:40 am. Call back number given.

## 2022-11-25 ENCOUNTER — OFFICE VISIT (OUTPATIENT)
Dept: HEMATOLOGY/ONCOLOGY | Facility: CLINIC | Age: 57
End: 2022-11-25
Payer: COMMERCIAL

## 2022-11-25 ENCOUNTER — DOCUMENTATION ONLY (OUTPATIENT)
Dept: INFUSION THERAPY | Facility: HOSPITAL | Age: 57
End: 2022-11-25

## 2022-11-25 ENCOUNTER — INFUSION (OUTPATIENT)
Dept: INFUSION THERAPY | Facility: HOSPITAL | Age: 57
End: 2022-11-25
Attending: STUDENT IN AN ORGANIZED HEALTH CARE EDUCATION/TRAINING PROGRAM
Payer: COMMERCIAL

## 2022-11-25 ENCOUNTER — LAB VISIT (OUTPATIENT)
Dept: LAB | Facility: HOSPITAL | Age: 57
End: 2022-11-25
Attending: INTERNAL MEDICINE
Payer: COMMERCIAL

## 2022-11-25 VITALS
SYSTOLIC BLOOD PRESSURE: 112 MMHG | HEIGHT: 64 IN | OXYGEN SATURATION: 97 % | DIASTOLIC BLOOD PRESSURE: 65 MMHG | RESPIRATION RATE: 18 BRPM | TEMPERATURE: 99 F | BODY MASS INDEX: 50.02 KG/M2 | HEART RATE: 90 BPM | WEIGHT: 293 LBS

## 2022-11-25 VITALS
SYSTOLIC BLOOD PRESSURE: 120 MMHG | DIASTOLIC BLOOD PRESSURE: 80 MMHG | HEIGHT: 64 IN | OXYGEN SATURATION: 97 % | TEMPERATURE: 99 F | WEIGHT: 293 LBS | RESPIRATION RATE: 18 BRPM | BODY MASS INDEX: 50.02 KG/M2 | HEART RATE: 111 BPM

## 2022-11-25 DIAGNOSIS — E11.22 TYPE 2 DIABETES MELLITUS WITH CHRONIC KIDNEY DISEASE, WITH LONG-TERM CURRENT USE OF INSULIN, UNSPECIFIED CKD STAGE: ICD-10-CM

## 2022-11-25 DIAGNOSIS — Z79.4 TYPE 2 DIABETES MELLITUS WITH CHRONIC KIDNEY DISEASE, WITH LONG-TERM CURRENT USE OF INSULIN, UNSPECIFIED CKD STAGE: ICD-10-CM

## 2022-11-25 DIAGNOSIS — Z79.899 IMMUNODEFICIENCY DUE TO DRUGS: ICD-10-CM

## 2022-11-25 DIAGNOSIS — C50.912 INVASIVE DUCTAL CARCINOMA OF BREAST, FEMALE, LEFT: ICD-10-CM

## 2022-11-25 DIAGNOSIS — E83.42 HYPOMAGNESEMIA: ICD-10-CM

## 2022-11-25 DIAGNOSIS — K76.0 FATTY LIVER: ICD-10-CM

## 2022-11-25 DIAGNOSIS — G47.33 OBSTRUCTIVE SLEEP APNEA (ADULT) (PEDIATRIC): ICD-10-CM

## 2022-11-25 DIAGNOSIS — R74.8 ABNORMAL LIVER ENZYMES: ICD-10-CM

## 2022-11-25 DIAGNOSIS — C50.912 INVASIVE DUCTAL CARCINOMA OF BREAST, FEMALE, LEFT: Primary | ICD-10-CM

## 2022-11-25 DIAGNOSIS — D84.821 IMMUNODEFICIENCY DUE TO DRUGS: ICD-10-CM

## 2022-11-25 DIAGNOSIS — E11.41 DIABETIC MONONEUROPATHY ASSOCIATED WITH TYPE 2 DIABETES MELLITUS: ICD-10-CM

## 2022-11-25 DIAGNOSIS — L27.0 DRUG-INDUCED SKIN RASH: ICD-10-CM

## 2022-11-25 DIAGNOSIS — E66.9 OBESITY, UNSPECIFIED CLASSIFICATION, UNSPECIFIED OBESITY TYPE, UNSPECIFIED WHETHER SERIOUS COMORBIDITY PRESENT: ICD-10-CM

## 2022-11-25 LAB
ALBUMIN SERPL BCP-MCNC: 3.4 G/DL (ref 3.5–5.2)
ALP SERPL-CCNC: 80 U/L (ref 55–135)
ALT SERPL W/O P-5'-P-CCNC: 32 U/L (ref 10–44)
ANION GAP SERPL CALC-SCNC: 12 MMOL/L (ref 8–16)
AST SERPL-CCNC: 16 U/L (ref 10–40)
BILIRUB SERPL-MCNC: 0.5 MG/DL (ref 0.1–1)
BUN SERPL-MCNC: 12 MG/DL (ref 6–20)
CALCIUM SERPL-MCNC: 9.3 MG/DL (ref 8.7–10.5)
CHLORIDE SERPL-SCNC: 105 MMOL/L (ref 95–110)
CO2 SERPL-SCNC: 21 MMOL/L (ref 23–29)
CREAT SERPL-MCNC: 1 MG/DL (ref 0.5–1.4)
ERYTHROCYTE [DISTWIDTH] IN BLOOD BY AUTOMATED COUNT: 14.4 % (ref 11.5–14.5)
EST. GFR  (NO RACE VARIABLE): >60 ML/MIN/1.73 M^2
GLUCOSE SERPL-MCNC: 144 MG/DL (ref 70–110)
HCT VFR BLD AUTO: 34.9 % (ref 37–48.5)
HGB BLD-MCNC: 11.9 G/DL (ref 12–16)
IMM GRANULOCYTES # BLD AUTO: 0.07 K/UL (ref 0–0.04)
MAGNESIUM SERPL-MCNC: 1.1 MG/DL (ref 1.6–2.6)
MCH RBC QN AUTO: 30.9 PG (ref 27–31)
MCHC RBC AUTO-ENTMCNC: 34.1 G/DL (ref 32–36)
MCV RBC AUTO: 91 FL (ref 82–98)
NEUTROPHILS # BLD AUTO: 2.9 K/UL (ref 1.8–7.7)
PLATELET # BLD AUTO: 251 K/UL (ref 150–450)
PMV BLD AUTO: 9.2 FL (ref 9.2–12.9)
POTASSIUM SERPL-SCNC: 3.9 MMOL/L (ref 3.5–5.1)
PROT SERPL-MCNC: 7.1 G/DL (ref 6–8.4)
RBC # BLD AUTO: 3.85 M/UL (ref 4–5.4)
SODIUM SERPL-SCNC: 138 MMOL/L (ref 136–145)
WBC # BLD AUTO: 5.86 K/UL (ref 3.9–12.7)

## 2022-11-25 PROCEDURE — 3008F BODY MASS INDEX DOCD: CPT | Mod: CPTII,S$GLB,, | Performed by: INTERNAL MEDICINE

## 2022-11-25 PROCEDURE — 83735 ASSAY OF MAGNESIUM: CPT | Mod: PN | Performed by: STUDENT IN AN ORGANIZED HEALTH CARE EDUCATION/TRAINING PROGRAM

## 2022-11-25 PROCEDURE — 25000003 PHARM REV CODE 250: Mod: PN | Performed by: STUDENT IN AN ORGANIZED HEALTH CARE EDUCATION/TRAINING PROGRAM

## 2022-11-25 PROCEDURE — 99999 PR PBB SHADOW E&M-EST. PATIENT-LVL IV: ICD-10-PCS | Mod: PBBFAC,,, | Performed by: INTERNAL MEDICINE

## 2022-11-25 PROCEDURE — 80053 COMPREHEN METABOLIC PANEL: CPT | Mod: PN | Performed by: STUDENT IN AN ORGANIZED HEALTH CARE EDUCATION/TRAINING PROGRAM

## 2022-11-25 PROCEDURE — 3051F PR MOST RECENT HEMOGLOBIN A1C LEVEL 7.0 - < 8.0%: ICD-10-PCS | Mod: CPTII,S$GLB,, | Performed by: INTERNAL MEDICINE

## 2022-11-25 PROCEDURE — 99999 PR PBB SHADOW E&M-EST. PATIENT-LVL IV: CPT | Mod: PBBFAC,,, | Performed by: INTERNAL MEDICINE

## 2022-11-25 PROCEDURE — 99215 PR OFFICE/OUTPT VISIT, EST, LEVL V, 40-54 MIN: ICD-10-PCS | Mod: S$GLB,,, | Performed by: INTERNAL MEDICINE

## 2022-11-25 PROCEDURE — 96368 THER/DIAG CONCURRENT INF: CPT | Mod: PN

## 2022-11-25 PROCEDURE — 3008F PR BODY MASS INDEX (BMI) DOCUMENTED: ICD-10-PCS | Mod: CPTII,S$GLB,, | Performed by: INTERNAL MEDICINE

## 2022-11-25 PROCEDURE — 3074F SYST BP LT 130 MM HG: CPT | Mod: CPTII,S$GLB,, | Performed by: INTERNAL MEDICINE

## 2022-11-25 PROCEDURE — 3079F PR MOST RECENT DIASTOLIC BLOOD PRESSURE 80-89 MM HG: ICD-10-PCS | Mod: CPTII,S$GLB,, | Performed by: INTERNAL MEDICINE

## 2022-11-25 PROCEDURE — 3074F PR MOST RECENT SYSTOLIC BLOOD PRESSURE < 130 MM HG: ICD-10-PCS | Mod: CPTII,S$GLB,, | Performed by: INTERNAL MEDICINE

## 2022-11-25 PROCEDURE — 99215 OFFICE O/P EST HI 40 MIN: CPT | Mod: S$GLB,,, | Performed by: INTERNAL MEDICINE

## 2022-11-25 PROCEDURE — 36415 COLL VENOUS BLD VENIPUNCTURE: CPT | Mod: PN | Performed by: STUDENT IN AN ORGANIZED HEALTH CARE EDUCATION/TRAINING PROGRAM

## 2022-11-25 PROCEDURE — 96367 TX/PROPH/DG ADDL SEQ IV INF: CPT | Mod: PN

## 2022-11-25 PROCEDURE — 63600175 PHARM REV CODE 636 W HCPCS: Mod: PN | Performed by: STUDENT IN AN ORGANIZED HEALTH CARE EDUCATION/TRAINING PROGRAM

## 2022-11-25 PROCEDURE — 3051F HG A1C>EQUAL 7.0%<8.0%: CPT | Mod: CPTII,S$GLB,, | Performed by: INTERNAL MEDICINE

## 2022-11-25 PROCEDURE — 96416 CHEMO PROLONG INFUSE W/PUMP: CPT | Mod: PN

## 2022-11-25 PROCEDURE — 96375 TX/PRO/DX INJ NEW DRUG ADDON: CPT | Mod: PN

## 2022-11-25 PROCEDURE — 85027 COMPLETE CBC AUTOMATED: CPT | Mod: PN | Performed by: STUDENT IN AN ORGANIZED HEALTH CARE EDUCATION/TRAINING PROGRAM

## 2022-11-25 PROCEDURE — 3079F DIAST BP 80-89 MM HG: CPT | Mod: CPTII,S$GLB,, | Performed by: INTERNAL MEDICINE

## 2022-11-25 RX ORDER — EPINEPHRINE 0.3 MG/.3ML
0.3 INJECTION SUBCUTANEOUS ONCE AS NEEDED
Status: DISCONTINUED | OUTPATIENT
Start: 2022-11-25 | End: 2022-11-25 | Stop reason: HOSPADM

## 2022-11-25 RX ORDER — SODIUM CHLORIDE 0.9 % (FLUSH) 0.9 %
10 SYRINGE (ML) INJECTION
Status: DISCONTINUED | OUTPATIENT
Start: 2022-11-25 | End: 2022-11-25 | Stop reason: HOSPADM

## 2022-11-25 RX ORDER — DIPHENHYDRAMINE HYDROCHLORIDE 50 MG/ML
50 INJECTION INTRAMUSCULAR; INTRAVENOUS ONCE AS NEEDED
Status: DISCONTINUED | OUTPATIENT
Start: 2022-11-25 | End: 2022-11-25 | Stop reason: HOSPADM

## 2022-11-25 RX ORDER — MAGNESIUM SULFATE HEPTAHYDRATE 40 MG/ML
2 INJECTION, SOLUTION INTRAVENOUS ONCE
Status: COMPLETED | OUTPATIENT
Start: 2022-11-25 | End: 2022-11-25

## 2022-11-25 RX ORDER — FAMOTIDINE 10 MG/ML
20 INJECTION INTRAVENOUS
Status: COMPLETED | OUTPATIENT
Start: 2022-11-25 | End: 2022-11-25

## 2022-11-25 RX ADMIN — PACLITAXEL 204 MG: 6 INJECTION, SOLUTION INTRAVENOUS at 02:11

## 2022-11-25 RX ADMIN — MAGNESIUM SULFATE HEPTAHYDRATE 2 G: 40 INJECTION, SOLUTION INTRAVENOUS at 12:11

## 2022-11-25 RX ADMIN — DEXAMETHASONE SODIUM PHOSPHATE 10 MG: 4 INJECTION INTRA-ARTICULAR; INTRALESIONAL; INTRAMUSCULAR; INTRAVENOUS; SOFT TISSUE at 01:11

## 2022-11-25 RX ADMIN — MAGNESIUM SULFATE IN WATER 2 G: 40 INJECTION, SOLUTION INTRAVENOUS at 11:11

## 2022-11-25 RX ADMIN — DIPHENHYDRAMINE HYDROCHLORIDE 50 MG: 50 INJECTION, SOLUTION INTRAMUSCULAR; INTRAVENOUS at 01:11

## 2022-11-25 RX ADMIN — FAMOTIDINE 20 MG: 10 INJECTION INTRAVENOUS at 02:11

## 2022-11-25 RX ADMIN — SODIUM CHLORIDE: 0.9 INJECTION, SOLUTION INTRAVENOUS at 11:11

## 2022-11-25 NOTE — PROGRESS NOTES
"Subjective:       Patient ID: Josefina Coon is a 57 y.o. female.    Chief Complaint: Invasive ductal carcinoma of breast, female, left  Diagnosis:   1. Chemotherapy induced diarrhea    2. Invasive ductal carcinoma of breast, female, left    3. Diabetic mononeuropathy associated with type 2 diabetes mellitus    4. Drug-induced skin rash    5. Hypomagnesemia    6. Generalized anxiety disorder    7. Anxiety about health         HPIMs. Coon is a 57 y.o. female Ms. Coon is a 57 y.o. female with HTN, HLD, depression, diabetes type 2 with neuropathy, obesity, fatty liver,  for a diagnosis of invasive ductal carcinoma of the left breast, triple positivity. Undergoing treatment with Paclitaxel/Trastuzumab/Pertuzumab on D1, weekly Paclitaxel on D8, D15 of a 21 day cycle.      She is here today today for consideration of C3D8 of chemotherapy with Taxol   She notes rash on her face and upper chest, and hands, it itches on the hands  Notes diarrhea, She notes that she ate restaurant food on 11/22 and noted some blood in stool on 11/23, Cramps resolved, no more blood in stool, stools are pasty  Taste is off   She denies any nausea, vomiting, constipation, abdominal pain, weight loss or loss of appetite, chest pain, shortness of breath, leg swelling, fatigue, pain, headache, dizziness, or mood changes. Her ECOG PS is zero, she is by herself    She notes that she has tingling and numbness which is intermittent in fingers and feet but not interfering with ADL's  Ultrasound breast on 11/14/2022 revealed  "Decrease in size of left breast 5 mm x 3 mm x 6 mm biopsy-proven malignancy at the 3 o'clock position, consistent with a favorable response to treatment"    Review of Systems   Constitutional:  Negative for appetite change, fatigue and unexpected weight change.   HENT:  Negative for mouth sores.    Eyes:  Negative for visual disturbance.   Respiratory:  Negative for cough and shortness of breath.    Cardiovascular:  Negative " for chest pain.   Gastrointestinal:  Positive for diarrhea. Negative for abdominal pain.   Genitourinary:  Negative for frequency.   Musculoskeletal:  Negative for back pain.   Integumentary:  Negative for rash.   Neurological:  Positive for numbness. Negative for headaches.   Hematological:  Negative for adenopathy.   Psychiatric/Behavioral:  The patient is not nervous/anxious.    All other systems reviewed and are negative.      Objective:      Physical Exam  Vitals reviewed.   Constitutional:       Appearance: She is well-developed.   HENT:      Mouth/Throat:      Pharynx: No oropharyngeal exudate.   Cardiovascular:      Rate and Rhythm: Normal rate.      Heart sounds: Normal heart sounds.   Pulmonary:      Effort: Pulmonary effort is normal.      Breath sounds: Normal breath sounds. No wheezing.   Abdominal:      General: Bowel sounds are normal.      Palpations: Abdomen is soft.      Tenderness: There is no abdominal tenderness.   Musculoskeletal:         General: No tenderness.   Lymphadenopathy:      Cervical: No cervical adenopathy.   Skin:     General: Skin is warm and dry.      Findings: No rash.   Neurological:      Mental Status: She is alert and oriented to person, place, and time.      Coordination: Coordination normal.   Psychiatric:         Thought Content: Thought content normal.         Judgment: Judgment normal.         LABS:  WBC   Date Value Ref Range Status   11/25/2022 5.86 3.90 - 12.70 K/uL Final     Hemoglobin   Date Value Ref Range Status   11/25/2022 11.9 (L) 12.0 - 16.0 g/dL Final     Hematocrit   Date Value Ref Range Status   11/25/2022 34.9 (L) 37.0 - 48.5 % Final     Platelets   Date Value Ref Range Status   11/25/2022 251 150 - 450 K/uL Final     Gran # (ANC)   Date Value Ref Range Status   11/25/2022 2.9 1.8 - 7.7 K/uL Final     Comment:     The ANC is based on a white cell differential from an   automated cell counter. It has not been microscopically   reviewed for the presence of  abnormal cells. Clinical   correlation is required.         Chemistry        Component Value Date/Time     2022 0940     2015 0805    K 3.9 2022 0940    K 4.4 2015 0805     2022 0940     2015 0805    CO2 21 (L) 2022 0940    BUN 12 2022 0940    CREATININE 1.0 2022 0940    CREATININE 0.63 2015 0805     (H) 2022 0940        Component Value Date/Time    CALCIUM 9.3 2022 0940    ALKPHOS 80 2022 0940    AST 16 2022 0940    ALT 32 2022 0940    BILITOT 0.5 2022 0940    ESTGFRAFRICA >60 2022 0834    EGFRNONAA >60 2022 0834        Ma.1    Assessment:       Problem List Items Addressed This Visit       Hypomagnesemia    Immunodeficiency due to drugs    Invasive ductal carcinoma of breast, female, left - Primary    Type 2 diabetes mellitus with chronic kidney disease, with long-term current use of insulin       Plan:              Route Chart for Scheduling    Med Onc Chart Routing      Follow up with physician . As scheduled, but please sure Mag is drawn with labs every week   Follow up with BRANDIE    Infusion scheduling note    Injection scheduling note    Labs    Imaging    Pharmacy appointment    Other referrals          Treatment Plan Information   OP BREAST PACLITAXEL TRASTUZUMAB WEEKLY WITH PERTUZUMAB Q3W (THP)   Lisa Jaquez MD   Upcoming Treatment Dates - OP BREAST PACLITAXEL TRASTUZUMAB WEEKLY WITH PERTUZUMAB Q3W (THP)    2022       Pre-Medications       diphenhydrAMINE (BENADRYL) 50 mg in NS 50 mL IVPB       famotidine (PF) injection 20 mg       dexAMETHasone (DECADRON) 10 mg in sodium chloride 0.9% 50 mL IVPB       Chemotherapy       PACLitaxeL (TAXOL) 80 mg/m2 = 204 mg in sodium chloride 0.9% 250 mL chemo infusion  2022       Pre-Medications       diphenhydrAMINE (BENADRYL) 50 mg in NS 50 mL IVPB       famotidine (PF) injection 20 mg       dexAMETHasone (DECADRON) 10  mg in sodium chloride 0.9% 50 mL IVPB       Chemotherapy       PACLitaxeL (TAXOL) 80 mg/m2 = 204 mg in sodium chloride 0.9% 250 mL chemo infusion  12/9/2022       Pre-Medications       diphenhydrAMINE (BENADRYL) 50 mg in NS 50 mL IVPB       famotidine (PF) injection 20 mg       dexAMETHasone (DECADRON) 10 mg in sodium chloride 0.9% 50 mL IVPB       Chemotherapy       pertuzumab (PERJETA) 420 mg in sodium chloride 0.9% 264 mL infusion       trastuzumab-dkst (OGIVRI) in sodium chloride 0.9% chemo infusion       PACLitaxeL (TAXOL) in sodium chloride 0.9% 250 mL chemo infusion  12/16/2022       Pre-Medications       diphenhydrAMINE (BENADRYL) 50 mg in NS 50 mL IVPB       famotidine (PF) injection 20 mg       dexAMETHasone (DECADRON) 10 mg in sodium chloride 0.9% 50 mL IVPB       Chemotherapy       PACLitaxeL (TAXOL) in sodium chloride 0.9% 250 mL chemo infusion    She will proceed with cycle 3, day 8 of Taxol chemo. Recent ultrasound breast reveals that the tumor is smaller in size. She is scheduled to see Dr. Mcduffie on 11/29/2022   She is scheduled for next treatment of cycle 3, day 15 of Taxol on 12/2/2022    Hypomagnesemia: replace IV to prevent worsening of diarrhea. 4 grams today. Recheck Mag in 1 week      Above care plan was discussed with patient and all questions were addressed to her satisfaction

## 2022-11-25 NOTE — PROGRESS NOTES
Oncology Nutrition   Chemotherapy Infusion Visit    Nutrition Follow Up   RD met with pt at chairside during infusion tx. RD following up with pt regarding Enterade. Pt states she began drinking Enterade and felt like it was helping. Pt goes on to say that she and her family had eaten food from a restaurant one evening and the next day she had diarrhea with blood in her stools. The rest of her family had diarrhea from the meal as well. Pt decided to stop taking the Enterade after this episode but informed RD she purchased some from [employer]. Pt reports she continues to have diarrhea and her Mg levels are low. Pt states she has not been diligent drinking Enterade and Liquid IV daily. She reports her son encourages her to drink these supplements at home. RD discussed ways to incorporate these supplements into her daily regimen.   RD provided pt with handout on foods high in Mg, diarrhea, and fiber. Encouraged pt to choose soluble fiber to help bulk stool. Pt VU.   Pt denies any other nutrition related side effects. Pt states she has an aversion to meat. Pt with 1.8% weight loss in one month, not significant. Pt weight is trending downward.    Wt Readings from Last 10 Encounters:   11/25/22 (!) 142 kg (313 lb 0.9 oz)   11/25/22 (!) 142 kg (313 lb 0.9 oz)   11/18/22 (!) 143 kg (315 lb 4.1 oz)   11/18/22 (!) 143 kg (315 lb 4.1 oz)   11/11/22 (!) 143.9 kg (317 lb 3.9 oz)   11/11/22 (!) 143.9 kg (317 lb 3.9 oz)   11/04/22 (!) 145.6 kg (320 lb 15.8 oz)   11/04/22 (!) 145.6 kg (320 lb 15.8 oz)   10/28/22 (!) 145 kg (319 lb 10.7 oz)   10/28/22 (!) 145 kg (319 lb 10.7 oz)       All other nutrition questions/concerns addressed as appropriate. Will continue to monitor prn throughout treatment.     Jerri Capellan, THERESAN, LDN  11/25/2022  3:53 PM

## 2022-11-25 NOTE — PLAN OF CARE
Tolerated Taxol/Mg infusions well.  No reactions noted.  No questions or concerns at this time.  Ambulated off unit in NAD.

## 2022-11-28 ENCOUNTER — OFFICE VISIT (OUTPATIENT)
Dept: SURGERY | Facility: CLINIC | Age: 57
End: 2022-11-28
Payer: COMMERCIAL

## 2022-11-28 DIAGNOSIS — C50.912 INVASIVE DUCTAL CARCINOMA OF BREAST, FEMALE, LEFT: Primary | ICD-10-CM

## 2022-11-28 PROCEDURE — 3051F PR MOST RECENT HEMOGLOBIN A1C LEVEL 7.0 - < 8.0%: ICD-10-PCS | Mod: CPTII,S$GLB,, | Performed by: SURGERY

## 2022-11-28 PROCEDURE — 99215 OFFICE O/P EST HI 40 MIN: CPT | Mod: S$GLB,,, | Performed by: SURGERY

## 2022-11-28 PROCEDURE — 3051F HG A1C>EQUAL 7.0%<8.0%: CPT | Mod: CPTII,S$GLB,, | Performed by: SURGERY

## 2022-11-28 PROCEDURE — 99215 PR OFFICE/OUTPT VISIT, EST, LEVL V, 40-54 MIN: ICD-10-PCS | Mod: S$GLB,,, | Performed by: SURGERY

## 2022-11-28 PROCEDURE — 1159F MED LIST DOCD IN RCRD: CPT | Mod: CPTII,S$GLB,, | Performed by: SURGERY

## 2022-11-28 PROCEDURE — 1159F PR MEDICATION LIST DOCUMENTED IN MEDICAL RECORD: ICD-10-PCS | Mod: CPTII,S$GLB,, | Performed by: SURGERY

## 2022-11-28 PROCEDURE — 99999 PR PBB SHADOW E&M-EST. PATIENT-LVL V: ICD-10-PCS | Mod: PBBFAC,,, | Performed by: SURGERY

## 2022-11-28 PROCEDURE — 99999 PR PBB SHADOW E&M-EST. PATIENT-LVL V: CPT | Mod: PBBFAC,,, | Performed by: SURGERY

## 2022-11-28 NOTE — NURSING
Consents signed for left mike-lumpectomy with left sentinel node biopsy.  Date set for 2/8/23 if chemo schedule doesn't change. Post op surgery instructions given to her both written and verbally, and she verbalized understanding.  Gave her the phone number and location of the OPP and told her to call for a preop nurse appt.  Referral to Prehab made, and told her it will be done at the [facility] or the [facility].  She currently has bilat lower extremity edema as well and told her to tell the  when they call to schedule her so they can be sure to put her in the right place.  Message relayed to Dr Jaquez regarding her request for an ECHO, and told her she has some SOB and bilat lower extremity edema.  She sees her this Friday for an appt and will discuss.

## 2022-11-30 ENCOUNTER — TELEPHONE (OUTPATIENT)
Dept: HEMATOLOGY/ONCOLOGY | Facility: CLINIC | Age: 57
End: 2022-11-30
Payer: COMMERCIAL

## 2022-11-30 NOTE — TELEPHONE ENCOUNTER
Spoke with patient to confirm appointment with Dr Jaquez on 12/1/22. Patient also reminded that labs are scheduled prior to office visit.

## 2022-12-01 ENCOUNTER — OFFICE VISIT (OUTPATIENT)
Dept: HEMATOLOGY/ONCOLOGY | Facility: CLINIC | Age: 57
End: 2022-12-01
Payer: COMMERCIAL

## 2022-12-01 ENCOUNTER — LAB VISIT (OUTPATIENT)
Dept: LAB | Facility: HOSPITAL | Age: 57
End: 2022-12-01
Attending: STUDENT IN AN ORGANIZED HEALTH CARE EDUCATION/TRAINING PROGRAM
Payer: COMMERCIAL

## 2022-12-01 VITALS
OXYGEN SATURATION: 96 % | HEIGHT: 64 IN | BODY MASS INDEX: 50.02 KG/M2 | TEMPERATURE: 98 F | HEART RATE: 97 BPM | RESPIRATION RATE: 16 BRPM | SYSTOLIC BLOOD PRESSURE: 106 MMHG | WEIGHT: 293 LBS | DIASTOLIC BLOOD PRESSURE: 73 MMHG

## 2022-12-01 DIAGNOSIS — K52.1 CHEMOTHERAPY INDUCED DIARRHEA: ICD-10-CM

## 2022-12-01 DIAGNOSIS — E11.22 TYPE 2 DIABETES MELLITUS WITH CHRONIC KIDNEY DISEASE, WITH LONG-TERM CURRENT USE OF INSULIN, UNSPECIFIED CKD STAGE: ICD-10-CM

## 2022-12-01 DIAGNOSIS — D84.821 IMMUNODEFICIENCY DUE TO DRUGS: ICD-10-CM

## 2022-12-01 DIAGNOSIS — C50.912 INVASIVE DUCTAL CARCINOMA OF BREAST, FEMALE, LEFT: ICD-10-CM

## 2022-12-01 DIAGNOSIS — Z79.4 TYPE 2 DIABETES MELLITUS WITH CHRONIC KIDNEY DISEASE, WITH LONG-TERM CURRENT USE OF INSULIN, UNSPECIFIED CKD STAGE: ICD-10-CM

## 2022-12-01 DIAGNOSIS — C50.912 INVASIVE DUCTAL CARCINOMA OF BREAST, FEMALE, LEFT: Primary | ICD-10-CM

## 2022-12-01 DIAGNOSIS — E11.41 DIABETIC MONONEUROPATHY ASSOCIATED WITH TYPE 2 DIABETES MELLITUS: ICD-10-CM

## 2022-12-01 DIAGNOSIS — L27.0 DRUG-INDUCED SKIN RASH: ICD-10-CM

## 2022-12-01 DIAGNOSIS — Z79.899 IMMUNODEFICIENCY DUE TO DRUGS: ICD-10-CM

## 2022-12-01 DIAGNOSIS — E83.42 HYPOMAGNESEMIA: ICD-10-CM

## 2022-12-01 DIAGNOSIS — T45.1X5A CHEMOTHERAPY INDUCED DIARRHEA: ICD-10-CM

## 2022-12-01 LAB
ALBUMIN SERPL BCP-MCNC: 3.6 G/DL (ref 3.5–5.2)
ALP SERPL-CCNC: 79 U/L (ref 55–135)
ALT SERPL W/O P-5'-P-CCNC: 31 U/L (ref 10–44)
ANION GAP SERPL CALC-SCNC: 11 MMOL/L (ref 8–16)
AST SERPL-CCNC: 18 U/L (ref 10–40)
BILIRUB SERPL-MCNC: 0.5 MG/DL (ref 0.1–1)
BUN SERPL-MCNC: 17 MG/DL (ref 6–20)
CALCIUM SERPL-MCNC: 9.5 MG/DL (ref 8.7–10.5)
CHLORIDE SERPL-SCNC: 102 MMOL/L (ref 95–110)
CO2 SERPL-SCNC: 23 MMOL/L (ref 23–29)
CREAT SERPL-MCNC: 1 MG/DL (ref 0.5–1.4)
ERYTHROCYTE [DISTWIDTH] IN BLOOD BY AUTOMATED COUNT: 14.4 % (ref 11.5–14.5)
EST. GFR  (NO RACE VARIABLE): >60 ML/MIN/1.73 M^2
GLUCOSE SERPL-MCNC: 152 MG/DL (ref 70–110)
HCT VFR BLD AUTO: 35.1 % (ref 37–48.5)
HGB BLD-MCNC: 11.9 G/DL (ref 12–16)
IMM GRANULOCYTES # BLD AUTO: 0.03 K/UL (ref 0–0.04)
MAGNESIUM SERPL-MCNC: 1.3 MG/DL (ref 1.6–2.6)
MCH RBC QN AUTO: 31.3 PG (ref 27–31)
MCHC RBC AUTO-ENTMCNC: 33.9 G/DL (ref 32–36)
MCV RBC AUTO: 92 FL (ref 82–98)
NEUTROPHILS # BLD AUTO: 2.7 K/UL (ref 1.8–7.7)
PLATELET # BLD AUTO: 273 K/UL (ref 150–450)
PMV BLD AUTO: 10 FL (ref 9.2–12.9)
POTASSIUM SERPL-SCNC: 4.1 MMOL/L (ref 3.5–5.1)
PROT SERPL-MCNC: 7.2 G/DL (ref 6–8.4)
RBC # BLD AUTO: 3.8 M/UL (ref 4–5.4)
SODIUM SERPL-SCNC: 136 MMOL/L (ref 136–145)
WBC # BLD AUTO: 5.34 K/UL (ref 3.9–12.7)

## 2022-12-01 PROCEDURE — 1159F PR MEDICATION LIST DOCUMENTED IN MEDICAL RECORD: ICD-10-PCS | Mod: CPTII,S$GLB,, | Performed by: STUDENT IN AN ORGANIZED HEALTH CARE EDUCATION/TRAINING PROGRAM

## 2022-12-01 PROCEDURE — 1160F RVW MEDS BY RX/DR IN RCRD: CPT | Mod: CPTII,S$GLB,, | Performed by: STUDENT IN AN ORGANIZED HEALTH CARE EDUCATION/TRAINING PROGRAM

## 2022-12-01 PROCEDURE — 1159F MED LIST DOCD IN RCRD: CPT | Mod: CPTII,S$GLB,, | Performed by: STUDENT IN AN ORGANIZED HEALTH CARE EDUCATION/TRAINING PROGRAM

## 2022-12-01 PROCEDURE — 3074F SYST BP LT 130 MM HG: CPT | Mod: CPTII,S$GLB,, | Performed by: STUDENT IN AN ORGANIZED HEALTH CARE EDUCATION/TRAINING PROGRAM

## 2022-12-01 PROCEDURE — 3078F PR MOST RECENT DIASTOLIC BLOOD PRESSURE < 80 MM HG: ICD-10-PCS | Mod: CPTII,S$GLB,, | Performed by: STUDENT IN AN ORGANIZED HEALTH CARE EDUCATION/TRAINING PROGRAM

## 2022-12-01 PROCEDURE — 3051F PR MOST RECENT HEMOGLOBIN A1C LEVEL 7.0 - < 8.0%: ICD-10-PCS | Mod: CPTII,S$GLB,, | Performed by: STUDENT IN AN ORGANIZED HEALTH CARE EDUCATION/TRAINING PROGRAM

## 2022-12-01 PROCEDURE — 3074F PR MOST RECENT SYSTOLIC BLOOD PRESSURE < 130 MM HG: ICD-10-PCS | Mod: CPTII,S$GLB,, | Performed by: STUDENT IN AN ORGANIZED HEALTH CARE EDUCATION/TRAINING PROGRAM

## 2022-12-01 PROCEDURE — 85027 COMPLETE CBC AUTOMATED: CPT | Mod: PN | Performed by: STUDENT IN AN ORGANIZED HEALTH CARE EDUCATION/TRAINING PROGRAM

## 2022-12-01 PROCEDURE — 80053 COMPREHEN METABOLIC PANEL: CPT | Mod: PN | Performed by: STUDENT IN AN ORGANIZED HEALTH CARE EDUCATION/TRAINING PROGRAM

## 2022-12-01 PROCEDURE — 99999 PR PBB SHADOW E&M-EST. PATIENT-LVL IV: ICD-10-PCS | Mod: PBBFAC,,, | Performed by: STUDENT IN AN ORGANIZED HEALTH CARE EDUCATION/TRAINING PROGRAM

## 2022-12-01 PROCEDURE — 99999 PR PBB SHADOW E&M-EST. PATIENT-LVL IV: CPT | Mod: PBBFAC,,, | Performed by: STUDENT IN AN ORGANIZED HEALTH CARE EDUCATION/TRAINING PROGRAM

## 2022-12-01 PROCEDURE — 99215 OFFICE O/P EST HI 40 MIN: CPT | Mod: S$GLB,,, | Performed by: STUDENT IN AN ORGANIZED HEALTH CARE EDUCATION/TRAINING PROGRAM

## 2022-12-01 PROCEDURE — 3008F BODY MASS INDEX DOCD: CPT | Mod: CPTII,S$GLB,, | Performed by: STUDENT IN AN ORGANIZED HEALTH CARE EDUCATION/TRAINING PROGRAM

## 2022-12-01 PROCEDURE — 99215 PR OFFICE/OUTPT VISIT, EST, LEVL V, 40-54 MIN: ICD-10-PCS | Mod: S$GLB,,, | Performed by: STUDENT IN AN ORGANIZED HEALTH CARE EDUCATION/TRAINING PROGRAM

## 2022-12-01 PROCEDURE — 3051F HG A1C>EQUAL 7.0%<8.0%: CPT | Mod: CPTII,S$GLB,, | Performed by: STUDENT IN AN ORGANIZED HEALTH CARE EDUCATION/TRAINING PROGRAM

## 2022-12-01 PROCEDURE — 83735 ASSAY OF MAGNESIUM: CPT | Mod: PN | Performed by: STUDENT IN AN ORGANIZED HEALTH CARE EDUCATION/TRAINING PROGRAM

## 2022-12-01 PROCEDURE — 36415 COLL VENOUS BLD VENIPUNCTURE: CPT | Mod: PN | Performed by: STUDENT IN AN ORGANIZED HEALTH CARE EDUCATION/TRAINING PROGRAM

## 2022-12-01 PROCEDURE — 3078F DIAST BP <80 MM HG: CPT | Mod: CPTII,S$GLB,, | Performed by: STUDENT IN AN ORGANIZED HEALTH CARE EDUCATION/TRAINING PROGRAM

## 2022-12-01 PROCEDURE — 1160F PR REVIEW ALL MEDS BY PRESCRIBER/CLIN PHARMACIST DOCUMENTED: ICD-10-PCS | Mod: CPTII,S$GLB,, | Performed by: STUDENT IN AN ORGANIZED HEALTH CARE EDUCATION/TRAINING PROGRAM

## 2022-12-01 PROCEDURE — 3008F PR BODY MASS INDEX (BMI) DOCUMENTED: ICD-10-PCS | Mod: CPTII,S$GLB,, | Performed by: STUDENT IN AN ORGANIZED HEALTH CARE EDUCATION/TRAINING PROGRAM

## 2022-12-01 NOTE — PROGRESS NOTES
PATIENT: Josefina Coon  MRN: [id number]  DATE: 12/2/2022      Diagnosis:   1. Invasive ductal carcinoma of breast, female, left    2. Immunodeficiency due to drugs    3. Type 2 diabetes mellitus with chronic kidney disease, with long-term current use of insulin, unspecified CKD stage    4. Hypomagnesemia    5. Diabetic mononeuropathy associated with type 2 diabetes mellitus    6. Drug-induced skin rash    7. Chemotherapy induced diarrhea      Chief Complaint: Follow-up and Breast Cancer    Subjective:   HPI: Ms. Coon is a 57 y.o. female Ms. Coon is a 57 y.o. female with HTN, HLD, depression, diabetes type 2 with neuropathy, obesity, fatty liver,  for a diagnosis of invasive ductal carcinoma of the left breast, triple positivity. Undergoing treatment with Paclitaxel/Trastuzumab/Pertuzumab on D1, weekly Paclitaxel on D8, D15 of a 21 day cycle.      She is here today today for consideration of C3D15 of chemotherapy     Improved diarrhea, did noticed some fresh blood per rectum on/off  Rash on face improving, using Triamcinolone PRN.   Having some nose bleeding, likely due to mucositis/allergy,sinuses   Ultrasound 11/14/22 with decrease in size of the breast mass, Surgery in Feb/2023   Neuropathy in bilateral hands 5/10, on /off, cont monitoring      Oncologic History:   8/25/22 bilateral screening mammogram,,Right neg ,Left central post mass     9/8/22 left diag MMG, left US limited .Left 0300 lateral posterior 6cm FN 1.4cm mass , left axilla LN 2, up to 4mm cortex     9/14/22 left 0300 lateral posterior 6cm FN 1.4cm mass US biopsy .Grade 3 ,1.1cm in biopsy No LVI , ER 84% VA 28% Her 2 3+ pos Ki 52 %    Left axilla LN biopsy ;Benign fatty tissue, no LN, not concordant No MRI of breasts were done      9/21/22 US bilateral complete Right neg, right LN nml , Left 0300 6cm FN [id number] mass Borderline LN left axilla     9/21/22 Left axilla LN biopsy benign LN , so eventually Stage IA triple positive Breast  cancer    10/7/22: started neoadjuvant chemo with Taxol + dual HER-2 (tranztuzumab and pertuzumab)     Oncology History   Invasive ductal carcinoma of breast, female, left   9/23/2022 Initial Diagnosis    Invasive ductal carcinoma of breast, female, left     9/23/2022 Cancer Staged    Staging form: Breast, AJCC 8th Edition  - Clinical stage from 9/23/2022: Stage IA (cT1c, cN0(f), cM0, G3, ER+, CO+, HER2+)       9/29/2022 - 9/29/2022 Chemotherapy    Treatment Summary   Plan Name: OP BREAST TRASTUZUMAB PACLITAXEL WEEKLY  Treatment Goal: Curative  Status: Inactive  Start Date:   End Date:   Provider: Lisa Jaquez MD  Chemotherapy: PACLitaxeL (TAXOL) 80 mg/m2 = 204 mg in sodium chloride 0.9% 250 mL chemo infusion, 80 mg/m2 = 204 mg, Intravenous, Clinic/[facility] 1 time, 0 of 12 cycles  pertuzumab (PERJETA) 840 mg in sodium chloride 0.9% 278 mL infusion, 840 mg (original dose ), Intravenous, Clinic/[facility] 1 time, 0 of 17 cycles  Dose modification: 840 mg (Cycle 1, Reason: Other (see comments)), 420 mg (Cycle 4, Reason: Other (see comments))  trastuzumab-dkst (OGIVRI) 591 mg in sodium chloride 0.9% 250 mL chemo infusion, 4 mg/kg = 591 mg, Intravenous, Clinic/[facility] 1 time, 0 of 25 cycles       10/7/2022 -  Chemotherapy    Treatment Summary   Plan Name: OP BREAST PACLITAXEL TRASTUZUMAB WEEKLY WITH PERTUZUMAB Q3W (THP)  Treatment Goal: Control  Status: Active  Start Date: 10/7/2022  End Date: 12/23/2022 (Planned)  Provider: Lisa Jaquez MD  Chemotherapy: PACLitaxeL (TAXOL) 80 mg/m2 = 210 mg in sodium chloride 0.9% 250 mL chemo infusion, 80 mg/m2 = 210 mg, Intravenous, Clinic/[facility] 1 time, 3 of 4 cycles  Administration: 210 mg (10/7/2022), 204 mg (10/14/2022), 204 mg (10/28/2022), 204 mg (11/4/2022), 204 mg (10/21/2022), 204 mg (11/11/2022), 204 mg (11/18/2022), 204 mg (11/25/2022)  pertuzumab (PERJETA) 840 mg in sodium chloride 0.9% 278 mL infusion, 840 mg, Intravenous, [facility] 1 time, 3 of 4 cycles  Administration: 840 mg  (10/7/2022), 420 mg (10/28/2022), 420 mg (2022)  trastuzumab-dkst (OGIVRI) 570 mg in sodium chloride 0.9% 250 mL chemo infusion, 593 mg (100 % of original dose 4 mg/kg), Intravenous, Clinic/[facility] 1 time, 3 of 4 cycles  Dose modification: 4 mg/kg (original dose 4 mg/kg, Cycle 1, Reason: Other (see comments), Comment: weekly trastuzumab), 2 mg/kg (original dose 2 mg/kg, Cycle 2, Reason: Other (see comments), Comment: weekly maintenance dose), 6 mg/kg (original dose 2 mg/kg, Cycle 2, Reason: MD Discretion, Comment: change to q3w dosing), 2 mg/kg (original dose 2 mg/kg, Cycle 1, Reason: Other (see comments), Comment: maintenance weekly dose)  Administration: 570 mg (10/7/2022), 840 mg (10/28/2022), 288 mg (10/14/2022), 290 mg (10/21/2022), 840 mg (2022)          Past Medical History:   Past Medical History:   Diagnosis Date    Abnormal liver enzymes     Asymptomatic varicose veins     Breast cancer 2022    left idc    Cancer     left breast cancer    Depressive disorder, not elsewhere classified     Dyslipidemia     Embolism and thrombosis of unspecified site     superficial venous thrombosis    Encounter for long-term (current) use of other medications     Family history of ischemic heart disease     Fatty liver     Generalized anxiety disorder     History of chicken pox     Obstructive sleep apnea (adult) (pediatric)     Type II or unspecified type diabetes mellitus without mention of complication, not stated as uncontrolled     Unspecified essential hypertension     Unspecified venous (peripheral) insufficiency     Unspecified vitamin D deficiency        Past Surgical HIstory:   Past Surgical History:   Procedure Laterality Date    BREAST BIOPSY Left 2022    idc    BREAST BIOPSY Left 2022    neg ln    BREAST BIOPSY Left 2022    neg ln     SECTION      INSERTION OF TUNNELED CENTRAL VENOUS CATHETER (CVC) WITH SUBCUTANEOUS PORT Right 10/04/2022    Procedure:  [id number];  Surgeon: Dominick Ng MD;  Location: [facility] OR;  Service: General;  Laterality: Right;    VEIN SURGERY      Laser, Dr. Larsen       Family History:   Family History   Problem Relation Age of Onset    Hyperlipidemia Mother     Hypertension Mother     Vulvar Cancer Mother     Diabetes Brother     Cancer Brother         colon    Stroke Maternal Grandmother        Social History:  reports that she has never smoked. She has never used smokeless tobacco. She reports that she does not currently use alcohol. She reports that she does not use drugs.    Allergies:  Review of patient's allergies indicates:   Allergen Reactions    Sitagliptin      Other reaction(s): (januvia) abdominal pain    Sulfa (sulfonamide antibiotics) Hives    Byetta  [exenatide] Rash    Lactose        Medications:  Current Outpatient Medications   Medication Sig Dispense Refill    albuterol 90 mcg/actuation inhaler Inhale 2 puffs into the lungs every 6 (six) hours as needed for Wheezing. 18 g 6    ALPRAZolam (XANAX) 0.25 MG tablet Take one tab po 1-2 times daily PRN anxiety 15 tablet 0    buPROPion (WELLBUTRIN XL) 150 MG TB24 tablet TAKE 1 TABLET BY MOUTH EVERY DAY 90 tablet 3    dulaglutide (TRULICITY) 3 mg/0.5 mL pen injector Inject 3 mg into the skin every 7 days. 12 pen 1    ergocalciferol (ERGOCALCIFEROL) 50,000 unit Cap TAKE 1 CAPSULE BY MOUTH ONE TIME PER WEEK 12 capsule 0    LIDOcaine-prilocaine (EMLA) cream Apply to port site 1 hour prior to port access and cover 30 g 3    magnesium 30 mg Tab Take 30 mg by mouth once.      metFORMIN (GLUCOPHAGE) 500 MG tablet TAKE 1 TABLET BY MOUTH TWICE A DAY WITH MEALS 180 tablet 1    omeprazole (PRILOSEC OTC) 20 MG tablet Take 1 tablet (20 mg total) by mouth once daily. 30 tablet 6    omeprazole (PRILOSEC) 10 MG capsule omeprazole      ondansetron (ZOFRAN) 8 MG tablet Take 1 tablet (8 mg total) by mouth every 8 (eight) hours as needed for Nausea. 30 tablet 2    pravastatin  "(PRAVACHOL) 10 MG tablet TAKE 1 TABLET BY MOUTH EVERYDAY AT BEDTIME 90 tablet 1    promethazine (PHENERGAN) 25 MG tablet Take 1 tablet (25 mg total) by mouth every 6 (six) hours as needed for Nausea. 30 tablet 0    telmisartan-hydrochlorothiazide (MICARDIS HCT) 80-25 mg per tablet TAKE 1 TABLET BY MOUTH EVERY DAY 90 tablet 1    triamcinolone acetonide 0.025% (KENALOG) 0.025 % cream Apply topically 2 (two) times daily. Apply to the skin lesions twice a day 15 g 0     No current facility-administered medications for this visit.       Review of Systems   Constitutional:  Negative for chills, fatigue and fever.   HENT:  Negative for trouble swallowing.    Respiratory:  Negative for cough and shortness of breath.    Cardiovascular:  Negative for chest pain and palpitations.   Gastrointestinal:  Positive for diarrhea. Negative for abdominal pain, constipation, nausea and vomiting.   Genitourinary:  Negative for dysuria.   Musculoskeletal:  Negative for arthralgias.   Skin:  Positive for rash.   Neurological:  Negative for headaches.   Hematological:  Negative for adenopathy.     ECOG Performance Status:   ECOG SCORE    0 - Fully active-able to carry on all pre-disease performance without restriction         Objective:      Vitals:   Vitals:    12/01/22 1603   BP: 106/73   BP Location: Right arm   Patient Position: Sitting   BP Method: Medium (Automatic)   Pulse: 97   Resp: 16   Temp: 98.2 °F (36.8 °C)   TempSrc: Temporal   SpO2: 96%   Weight: (!) 140.7 kg (310 lb 3 oz)   Height: 5' 4" (1.626 m)     BMI: Body mass index is 53.24 kg/m².    Physical Exam  Vitals reviewed.   Constitutional:       General: She is not in acute distress.     Appearance: She is not diaphoretic.   HENT:      Head: Normocephalic and atraumatic.      Mouth/Throat:      Mouth: Mucous membranes are moist.      Pharynx: No oropharyngeal exudate.   Eyes:      General: No scleral icterus.  Cardiovascular:      Rate and Rhythm: Normal rate and regular " rhythm.      Heart sounds: Normal heart sounds. No murmur heard.  Pulmonary:      Effort: Pulmonary effort is normal. No respiratory distress.      Breath sounds: No wheezing.   Musculoskeletal:      Cervical back: No tenderness.      Right lower leg: No edema.      Left lower leg: No edema.   Lymphadenopathy:      Cervical: No cervical adenopathy.   Skin:     General: Skin is warm.      Findings: Rash present.   Neurological:      Mental Status: She is alert and oriented to person, place, and time.   Psychiatric:         Behavior: Behavior normal.       Laboratory Data:  Lab Results   Component Value Date    WBC 5.34 12/01/2022    HGB 11.9 (L) 12/01/2022    HCT 35.1 (L) 12/01/2022    MCV 92 12/01/2022     12/01/2022        Assessment:       1. Invasive ductal carcinoma of breast, female, left    2. Immunodeficiency due to drugs    3. Type 2 diabetes mellitus with chronic kidney disease, with long-term current use of insulin, unspecified CKD stage    4. Hypomagnesemia    5. Diabetic mononeuropathy associated with type 2 diabetes mellitus    6. Drug-induced skin rash    7. Chemotherapy induced diarrhea      Plan:   Invasive ductal carcinoma of the breast, left  - cT1c triple positive breast cancer  (ER 84% NC 28% Her 2 3+ pos Ki 52 %), given her young age and Ki67%, will proceed with TH, adding P to help with a better pCR, mid cycle ultrasound with response to chemo, cont   -9/26/22 Echo with EF 60%, repeating in 3 months (Jan/2023)    -Proceed with C3D15 chemotherapy  today; side effect as above      Diabetes type 2 with neuropathy   -Taking Metformin, Trulicity   -Will need to monitor for worsening neuropathy while undergoing treatment  5/10 currently on/off     Anxiety  -Taking Wellbutrin  -Following with Dr. Long     Hypomagnesemia  -Taking po supplements at home  - IV 4g of Mg tomorrow     Drug-induced rash  -Stable to improved  -using Triamcinolone cream PRN  -Monitor     Normocytic anemia: likely due  to chemo and slightly nose/rectal bleeding (mucositis)  - cont monitoring , no indication for transfusion     Patient queried and all questions were answered.    Route Chart for Scheduling    Med Onc Chart Routing      Follow up with physician 2 weeks and 3 weeks. Echo in Jan/2023,   Follow up with BRANDIE 1 week.   Infusion scheduling note Proceed with infusion today   Injection scheduling note    Labs CBC, CMP and magnesium   Lab interval:  weekly   Imaging    Pharmacy appointment    Other referrals        Treatment Plan Information   OP BREAST PACLITAXEL TRASTUZUMAB WEEKLY WITH PERTUZUMAB Q3W (THP)   Lisa Jaquez MD   Upcoming Treatment Dates - OP BREAST PACLITAXEL TRASTUZUMAB WEEKLY WITH PERTUZUMAB Q3W (THP)    12/2/2022       Pre-Medications       diphenhydrAMINE (BENADRYL) 50 mg in NS 50 mL IVPB       famotidine (PF) injection 20 mg       dexAMETHasone (DECADRON) 10 mg in sodium chloride 0.9% 50 mL IVPB       Chemotherapy       PACLitaxeL (TAXOL) 80 mg/m2 = 204 mg in sodium chloride 0.9% 250 mL chemo infusion  12/9/2022       Pre-Medications       diphenhydrAMINE (BENADRYL) 50 mg in NS 50 mL IVPB       famotidine (PF) injection 20 mg       dexAMETHasone (DECADRON) 10 mg in sodium chloride 0.9% 50 mL IVPB       Chemotherapy       pertuzumab (PERJETA) 420 mg in sodium chloride 0.9% 264 mL infusion       trastuzumab-dkst (OGIVRI) in sodium chloride 0.9% chemo infusion       PACLitaxeL (TAXOL) in sodium chloride 0.9% 250 mL chemo infusion  12/16/2022       Pre-Medications       diphenhydrAMINE (BENADRYL) 50 mg in NS 50 mL IVPB       famotidine (PF) injection 20 mg       dexAMETHasone (DECADRON) 10 mg in sodium chloride 0.9% 50 mL IVPB       Chemotherapy       PACLitaxeL (TAXOL) in sodium chloride 0.9% 250 mL chemo infusion  12/23/2022       Pre-Medications       diphenhydrAMINE (BENADRYL) 50 mg in NS 50 mL IVPB       famotidine (PF) injection 20 mg       dexAMETHasone (DECADRON) 10 mg in sodium chloride 0.9% 50 mL  IVPB       Chemotherapy       PACLitaxeL (TAXOL) in sodium chloride 0.9% 250 mL chemo infusion

## 2022-12-02 ENCOUNTER — INFUSION (OUTPATIENT)
Dept: INFUSION THERAPY | Facility: HOSPITAL | Age: 57
End: 2022-12-02
Attending: STUDENT IN AN ORGANIZED HEALTH CARE EDUCATION/TRAINING PROGRAM
Payer: COMMERCIAL

## 2022-12-02 VITALS
HEART RATE: 90 BPM | BODY MASS INDEX: 50.02 KG/M2 | HEIGHT: 64 IN | WEIGHT: 293 LBS | OXYGEN SATURATION: 96 % | TEMPERATURE: 98 F | DIASTOLIC BLOOD PRESSURE: 62 MMHG | SYSTOLIC BLOOD PRESSURE: 111 MMHG | RESPIRATION RATE: 16 BRPM

## 2022-12-02 DIAGNOSIS — E83.42 HYPOMAGNESEMIA: ICD-10-CM

## 2022-12-02 DIAGNOSIS — T45.1X5A CHEMOTHERAPY INDUCED DIARRHEA: ICD-10-CM

## 2022-12-02 DIAGNOSIS — D84.821 IMMUNODEFICIENCY DUE TO DRUGS: ICD-10-CM

## 2022-12-02 DIAGNOSIS — C50.912 INVASIVE DUCTAL CARCINOMA OF BREAST, FEMALE, LEFT: Primary | ICD-10-CM

## 2022-12-02 DIAGNOSIS — K52.1 CHEMOTHERAPY INDUCED DIARRHEA: ICD-10-CM

## 2022-12-02 DIAGNOSIS — Z79.899 IMMUNODEFICIENCY DUE TO DRUGS: ICD-10-CM

## 2022-12-02 DIAGNOSIS — L27.0 DRUG-INDUCED SKIN RASH: ICD-10-CM

## 2022-12-02 PROCEDURE — 63600175 PHARM REV CODE 636 W HCPCS: Mod: PN | Performed by: STUDENT IN AN ORGANIZED HEALTH CARE EDUCATION/TRAINING PROGRAM

## 2022-12-02 PROCEDURE — 96375 TX/PRO/DX INJ NEW DRUG ADDON: CPT | Mod: PN

## 2022-12-02 PROCEDURE — 96366 THER/PROPH/DIAG IV INF ADDON: CPT | Mod: PN

## 2022-12-02 PROCEDURE — 96413 CHEMO IV INFUSION 1 HR: CPT | Mod: PN

## 2022-12-02 PROCEDURE — 96367 TX/PROPH/DG ADDL SEQ IV INF: CPT | Mod: PN

## 2022-12-02 PROCEDURE — 25000003 PHARM REV CODE 250: Mod: PN | Performed by: STUDENT IN AN ORGANIZED HEALTH CARE EDUCATION/TRAINING PROGRAM

## 2022-12-02 RX ORDER — SODIUM CHLORIDE 0.9 % (FLUSH) 0.9 %
10 SYRINGE (ML) INJECTION
Status: DISCONTINUED | OUTPATIENT
Start: 2022-12-02 | End: 2022-12-02 | Stop reason: HOSPADM

## 2022-12-02 RX ORDER — MAGNESIUM SULFATE HEPTAHYDRATE 40 MG/ML
4 INJECTION, SOLUTION INTRAVENOUS ONCE
Status: COMPLETED | OUTPATIENT
Start: 2022-12-02 | End: 2022-12-02

## 2022-12-02 RX ORDER — FAMOTIDINE 10 MG/ML
20 INJECTION INTRAVENOUS
Status: COMPLETED | OUTPATIENT
Start: 2022-12-02 | End: 2022-12-02

## 2022-12-02 RX ORDER — CHOLESTYRAMINE 4 G/9G
4 POWDER, FOR SUSPENSION ORAL
Qty: 270 PACKET | Refills: 3 | Status: SHIPPED | OUTPATIENT
Start: 2022-12-02 | End: 2022-12-30

## 2022-12-02 RX ORDER — MAGNESIUM SULFATE HEPTAHYDRATE 40 MG/ML
4 INJECTION, SOLUTION INTRAVENOUS ONCE
Status: CANCELLED
Start: 2022-12-02 | End: 2022-12-02

## 2022-12-02 RX ORDER — DIPHENHYDRAMINE HYDROCHLORIDE 50 MG/ML
50 INJECTION INTRAMUSCULAR; INTRAVENOUS ONCE AS NEEDED
Status: DISCONTINUED | OUTPATIENT
Start: 2022-12-02 | End: 2022-12-02 | Stop reason: HOSPADM

## 2022-12-02 RX ORDER — EPINEPHRINE 0.3 MG/.3ML
0.3 INJECTION SUBCUTANEOUS ONCE AS NEEDED
Status: DISCONTINUED | OUTPATIENT
Start: 2022-12-02 | End: 2022-12-02 | Stop reason: HOSPADM

## 2022-12-02 RX ADMIN — DIPHENHYDRAMINE HYDROCHLORIDE 50 MG: 50 INJECTION, SOLUTION INTRAMUSCULAR; INTRAVENOUS at 02:12

## 2022-12-02 RX ADMIN — DEXAMETHASONE SODIUM PHOSPHATE 10 MG: 4 INJECTION INTRA-ARTICULAR; INTRALESIONAL; INTRAMUSCULAR; INTRAVENOUS; SOFT TISSUE at 02:12

## 2022-12-02 RX ADMIN — PACLITAXEL 204 MG: 6 INJECTION, SOLUTION INTRAVENOUS at 03:12

## 2022-12-02 RX ADMIN — SODIUM CHLORIDE: 0.9 INJECTION, SOLUTION INTRAVENOUS at 11:12

## 2022-12-02 RX ADMIN — MAGNESIUM SULFATE IN WATER 4 G: 40 INJECTION, SOLUTION INTRAVENOUS at 12:12

## 2022-12-02 RX ADMIN — FAMOTIDINE 20 MG: 10 INJECTION INTRAVENOUS at 02:12

## 2022-12-02 NOTE — PLAN OF CARE
Problem: Adult Inpatient Plan of Care  Goal: Plan of Care Review  Outcome: Ongoing, Progressing  Flowsheets (Taken 12/2/2022 1202)  Plan of Care Reviewed With:   patient   friend  Goal: Patient-Specific Goal (Individualized)  Outcome: Ongoing, Progressing  Flowsheets (Taken 12/2/2022 1202)  Anxieties, Fears or Concerns: Rash, and diarrhea  Individualized Care Needs: Recliner, warm blanket, conversation  Patient-Specific Goals (Include Timeframe): Free of S/S of reaction with treatment.     Problem: Fatigue  Goal: Improved Activity Tolerance  Outcome: Ongoing, Progressing  Intervention: Promote Improved Energy  Flowsheets (Taken 12/2/2022 1202)  Fatigue Management:   frequent rest breaks encouraged   paced activity encouraged  Sleep/Rest Enhancement: regular sleep/rest pattern promoted  Activity Management: Ambulated -L4       Patient to Infusion for Taxol and Magnesium, accompanied by her friend. Treatment plan reviewed with patient. VSS. Tolerated infusion. Provided with copy of upcoming appointment schedule. Escorted to the front lobby for discharge to home.

## 2022-12-09 ENCOUNTER — DOCUMENTATION ONLY (OUTPATIENT)
Dept: INFUSION THERAPY | Facility: HOSPITAL | Age: 57
End: 2022-12-09

## 2022-12-09 ENCOUNTER — OFFICE VISIT (OUTPATIENT)
Dept: HEMATOLOGY/ONCOLOGY | Facility: CLINIC | Age: 57
End: 2022-12-09
Payer: COMMERCIAL

## 2022-12-09 ENCOUNTER — LAB VISIT (OUTPATIENT)
Dept: LAB | Facility: HOSPITAL | Age: 57
End: 2022-12-09
Attending: STUDENT IN AN ORGANIZED HEALTH CARE EDUCATION/TRAINING PROGRAM
Payer: COMMERCIAL

## 2022-12-09 ENCOUNTER — INFUSION (OUTPATIENT)
Dept: INFUSION THERAPY | Facility: HOSPITAL | Age: 57
End: 2022-12-09
Attending: STUDENT IN AN ORGANIZED HEALTH CARE EDUCATION/TRAINING PROGRAM
Payer: COMMERCIAL

## 2022-12-09 VITALS
SYSTOLIC BLOOD PRESSURE: 109 MMHG | DIASTOLIC BLOOD PRESSURE: 81 MMHG | HEIGHT: 64 IN | BODY MASS INDEX: 50.02 KG/M2 | RESPIRATION RATE: 18 BRPM | OXYGEN SATURATION: 97 % | TEMPERATURE: 98 F | HEART RATE: 108 BPM | WEIGHT: 293 LBS

## 2022-12-09 VITALS
SYSTOLIC BLOOD PRESSURE: 99 MMHG | WEIGHT: 293 LBS | DIASTOLIC BLOOD PRESSURE: 70 MMHG | OXYGEN SATURATION: 97 % | RESPIRATION RATE: 18 BRPM | BODY MASS INDEX: 50.02 KG/M2 | TEMPERATURE: 98 F | HEIGHT: 64 IN | HEART RATE: 93 BPM

## 2022-12-09 DIAGNOSIS — E83.42 HYPOMAGNESEMIA: ICD-10-CM

## 2022-12-09 DIAGNOSIS — K52.1 CHEMOTHERAPY INDUCED DIARRHEA: ICD-10-CM

## 2022-12-09 DIAGNOSIS — D84.821 IMMUNODEFICIENCY DUE TO DRUGS: ICD-10-CM

## 2022-12-09 DIAGNOSIS — C50.912 INVASIVE DUCTAL CARCINOMA OF BREAST, FEMALE, LEFT: ICD-10-CM

## 2022-12-09 DIAGNOSIS — E11.9 TYPE 2 DIABETES MELLITUS WITHOUT COMPLICATION, WITHOUT LONG-TERM CURRENT USE OF INSULIN: ICD-10-CM

## 2022-12-09 DIAGNOSIS — T45.1X5A CHEMOTHERAPY INDUCED DIARRHEA: ICD-10-CM

## 2022-12-09 DIAGNOSIS — L27.0 DRUG-INDUCED SKIN RASH: ICD-10-CM

## 2022-12-09 DIAGNOSIS — C50.912 INVASIVE DUCTAL CARCINOMA OF BREAST, FEMALE, LEFT: Primary | ICD-10-CM

## 2022-12-09 DIAGNOSIS — Z79.899 IMMUNODEFICIENCY DUE TO DRUGS: ICD-10-CM

## 2022-12-09 LAB
ALBUMIN SERPL BCP-MCNC: 3.6 G/DL (ref 3.5–5.2)
ALP SERPL-CCNC: 87 U/L (ref 55–135)
ALT SERPL W/O P-5'-P-CCNC: 28 U/L (ref 10–44)
ANION GAP SERPL CALC-SCNC: 12 MMOL/L (ref 8–16)
AST SERPL-CCNC: 16 U/L (ref 10–40)
BILIRUB SERPL-MCNC: 0.6 MG/DL (ref 0.1–1)
BUN SERPL-MCNC: 12 MG/DL (ref 6–20)
CALCIUM SERPL-MCNC: 9.8 MG/DL (ref 8.7–10.5)
CHLORIDE SERPL-SCNC: 103 MMOL/L (ref 95–110)
CO2 SERPL-SCNC: 21 MMOL/L (ref 23–29)
CREAT SERPL-MCNC: 1.3 MG/DL (ref 0.5–1.4)
ERYTHROCYTE [DISTWIDTH] IN BLOOD BY AUTOMATED COUNT: 14.8 % (ref 11.5–14.5)
EST. GFR  (NO RACE VARIABLE): 47.4 ML/MIN/1.73 M^2
GLUCOSE SERPL-MCNC: 154 MG/DL (ref 70–110)
HCT VFR BLD AUTO: 34.7 % (ref 37–48.5)
HGB BLD-MCNC: 11.8 G/DL (ref 12–16)
IMM GRANULOCYTES # BLD AUTO: 0.05 K/UL (ref 0–0.04)
MAGNESIUM SERPL-MCNC: 1.2 MG/DL (ref 1.6–2.6)
MCH RBC QN AUTO: 31.1 PG (ref 27–31)
MCHC RBC AUTO-ENTMCNC: 34 G/DL (ref 32–36)
MCV RBC AUTO: 92 FL (ref 82–98)
NEUTROPHILS # BLD AUTO: 2.8 K/UL (ref 1.8–7.7)
PLATELET # BLD AUTO: 279 K/UL (ref 150–450)
PMV BLD AUTO: 9.7 FL (ref 9.2–12.9)
POTASSIUM SERPL-SCNC: 3.9 MMOL/L (ref 3.5–5.1)
PROT SERPL-MCNC: 7.2 G/DL (ref 6–8.4)
RBC # BLD AUTO: 3.79 M/UL (ref 4–5.4)
SODIUM SERPL-SCNC: 136 MMOL/L (ref 136–145)
WBC # BLD AUTO: 5.59 K/UL (ref 3.9–12.7)

## 2022-12-09 PROCEDURE — 3079F DIAST BP 80-89 MM HG: CPT | Mod: CPTII,S$GLB,, | Performed by: NURSE PRACTITIONER

## 2022-12-09 PROCEDURE — 3074F PR MOST RECENT SYSTOLIC BLOOD PRESSURE < 130 MM HG: ICD-10-PCS | Mod: CPTII,S$GLB,, | Performed by: NURSE PRACTITIONER

## 2022-12-09 PROCEDURE — 36415 COLL VENOUS BLD VENIPUNCTURE: CPT | Mod: PN | Performed by: STUDENT IN AN ORGANIZED HEALTH CARE EDUCATION/TRAINING PROGRAM

## 2022-12-09 PROCEDURE — 63600175 PHARM REV CODE 636 W HCPCS: Mod: PN | Performed by: NURSE PRACTITIONER

## 2022-12-09 PROCEDURE — 96413 CHEMO IV INFUSION 1 HR: CPT | Mod: PN

## 2022-12-09 PROCEDURE — 3051F PR MOST RECENT HEMOGLOBIN A1C LEVEL 7.0 - < 8.0%: ICD-10-PCS | Mod: CPTII,S$GLB,, | Performed by: NURSE PRACTITIONER

## 2022-12-09 PROCEDURE — 99214 OFFICE O/P EST MOD 30 MIN: CPT | Mod: S$GLB,,, | Performed by: NURSE PRACTITIONER

## 2022-12-09 PROCEDURE — 3074F SYST BP LT 130 MM HG: CPT | Mod: CPTII,S$GLB,, | Performed by: NURSE PRACTITIONER

## 2022-12-09 PROCEDURE — 3051F HG A1C>EQUAL 7.0%<8.0%: CPT | Mod: CPTII,S$GLB,, | Performed by: NURSE PRACTITIONER

## 2022-12-09 PROCEDURE — 80053 COMPREHEN METABOLIC PANEL: CPT | Mod: PN | Performed by: STUDENT IN AN ORGANIZED HEALTH CARE EDUCATION/TRAINING PROGRAM

## 2022-12-09 PROCEDURE — 96417 CHEMO IV INFUS EACH ADDL SEQ: CPT | Mod: PN

## 2022-12-09 PROCEDURE — 96375 TX/PRO/DX INJ NEW DRUG ADDON: CPT | Mod: PN

## 2022-12-09 PROCEDURE — 99999 PR PBB SHADOW E&M-EST. PATIENT-LVL IV: CPT | Mod: PBBFAC,,, | Performed by: NURSE PRACTITIONER

## 2022-12-09 PROCEDURE — 3008F BODY MASS INDEX DOCD: CPT | Mod: CPTII,S$GLB,, | Performed by: NURSE PRACTITIONER

## 2022-12-09 PROCEDURE — 99214 PR OFFICE/OUTPT VISIT, EST, LEVL IV, 30-39 MIN: ICD-10-PCS | Mod: S$GLB,,, | Performed by: NURSE PRACTITIONER

## 2022-12-09 PROCEDURE — 96367 TX/PROPH/DG ADDL SEQ IV INF: CPT | Mod: PN

## 2022-12-09 PROCEDURE — 83735 ASSAY OF MAGNESIUM: CPT | Mod: PN | Performed by: STUDENT IN AN ORGANIZED HEALTH CARE EDUCATION/TRAINING PROGRAM

## 2022-12-09 PROCEDURE — 3008F PR BODY MASS INDEX (BMI) DOCUMENTED: ICD-10-PCS | Mod: CPTII,S$GLB,, | Performed by: NURSE PRACTITIONER

## 2022-12-09 PROCEDURE — 99999 PR PBB SHADOW E&M-EST. PATIENT-LVL IV: ICD-10-PCS | Mod: PBBFAC,,, | Performed by: NURSE PRACTITIONER

## 2022-12-09 PROCEDURE — 25000003 PHARM REV CODE 250: Mod: PN | Performed by: NURSE PRACTITIONER

## 2022-12-09 PROCEDURE — 85027 COMPLETE CBC AUTOMATED: CPT | Mod: PN | Performed by: STUDENT IN AN ORGANIZED HEALTH CARE EDUCATION/TRAINING PROGRAM

## 2022-12-09 PROCEDURE — 96366 THER/PROPH/DIAG IV INF ADDON: CPT | Mod: PN

## 2022-12-09 PROCEDURE — 3079F PR MOST RECENT DIASTOLIC BLOOD PRESSURE 80-89 MM HG: ICD-10-PCS | Mod: CPTII,S$GLB,, | Performed by: NURSE PRACTITIONER

## 2022-12-09 RX ORDER — FAMOTIDINE 10 MG/ML
20 INJECTION INTRAVENOUS
Status: COMPLETED | OUTPATIENT
Start: 2022-12-09 | End: 2022-12-09

## 2022-12-09 RX ORDER — EPINEPHRINE 0.3 MG/.3ML
0.3 INJECTION SUBCUTANEOUS ONCE AS NEEDED
Status: CANCELLED | OUTPATIENT
Start: 2022-12-09

## 2022-12-09 RX ORDER — HEPARIN 100 UNIT/ML
500 SYRINGE INTRAVENOUS
Status: DISCONTINUED | OUTPATIENT
Start: 2022-12-09 | End: 2022-12-09 | Stop reason: HOSPADM

## 2022-12-09 RX ORDER — HEPARIN 100 UNIT/ML
500 SYRINGE INTRAVENOUS
Status: CANCELLED | OUTPATIENT
Start: 2022-12-09

## 2022-12-09 RX ORDER — DIPHENHYDRAMINE HYDROCHLORIDE 50 MG/ML
50 INJECTION INTRAMUSCULAR; INTRAVENOUS ONCE AS NEEDED
Status: DISCONTINUED | OUTPATIENT
Start: 2022-12-09 | End: 2022-12-09 | Stop reason: HOSPADM

## 2022-12-09 RX ORDER — EPINEPHRINE 0.3 MG/.3ML
0.3 INJECTION SUBCUTANEOUS ONCE AS NEEDED
Status: DISCONTINUED | OUTPATIENT
Start: 2022-12-09 | End: 2022-12-09 | Stop reason: HOSPADM

## 2022-12-09 RX ORDER — MAGNESIUM SULFATE HEPTAHYDRATE 40 MG/ML
4 INJECTION, SOLUTION INTRAVENOUS ONCE
Status: CANCELLED
Start: 2022-12-09 | End: 2022-12-09

## 2022-12-09 RX ORDER — FAMOTIDINE 10 MG/ML
20 INJECTION INTRAVENOUS
Status: CANCELLED | OUTPATIENT
Start: 2022-12-09

## 2022-12-09 RX ORDER — SODIUM CHLORIDE 0.9 % (FLUSH) 0.9 %
10 SYRINGE (ML) INJECTION
Status: CANCELLED | OUTPATIENT
Start: 2022-12-09

## 2022-12-09 RX ORDER — MAGNESIUM SULFATE HEPTAHYDRATE 40 MG/ML
4 INJECTION, SOLUTION INTRAVENOUS ONCE
Status: COMPLETED | OUTPATIENT
Start: 2022-12-09 | End: 2022-12-09

## 2022-12-09 RX ORDER — DIPHENHYDRAMINE HYDROCHLORIDE 50 MG/ML
50 INJECTION INTRAMUSCULAR; INTRAVENOUS ONCE AS NEEDED
Status: CANCELLED | OUTPATIENT
Start: 2022-12-09

## 2022-12-09 RX ORDER — SODIUM CHLORIDE 0.9 % (FLUSH) 0.9 %
10 SYRINGE (ML) INJECTION
Status: DISCONTINUED | OUTPATIENT
Start: 2022-12-09 | End: 2022-12-09 | Stop reason: HOSPADM

## 2022-12-09 RX ADMIN — FAMOTIDINE 20 MG: 10 INJECTION INTRAVENOUS at 03:12

## 2022-12-09 RX ADMIN — PERTUZUMAB 420 MG: 30 INJECTION, SOLUTION, CONCENTRATE INTRAVENOUS at 01:12

## 2022-12-09 RX ADMIN — SODIUM CHLORIDE: 9 INJECTION, SOLUTION INTRAVENOUS at 11:12

## 2022-12-09 RX ADMIN — TRASTUZUMAB 831 MG: KIT at 02:12

## 2022-12-09 RX ADMIN — MAGNESIUM SULFATE IN WATER 4 G: 40 INJECTION, SOLUTION INTRAVENOUS at 11:12

## 2022-12-09 RX ADMIN — PACLITAXEL 198 MG: 6 INJECTION, SOLUTION INTRAVENOUS at 04:12

## 2022-12-09 RX ADMIN — DEXAMETHASONE SODIUM PHOSPHATE 10 MG: 4 INJECTION INTRA-ARTICULAR; INTRALESIONAL; INTRAMUSCULAR; INTRAVENOUS; SOFT TISSUE at 03:12

## 2022-12-09 RX ADMIN — DIPHENHYDRAMINE HYDROCHLORIDE 50 MG: 50 INJECTION, SOLUTION INTRAMUSCULAR; INTRAVENOUS at 04:12

## 2022-12-09 NOTE — PROGRESS NOTES
ONCOLOGY NUTRITION   FOLLOW UP VISIT        Josefina Coon is a 57 y.o. female.  DATE: 12/09/2022        Oncology Diagnosis: Breast Cancer     REFERRAL FROM:   [] Integrative Oncology   [] Med/Heme Oncology  [] Radiation Oncology  [] Surgical Oncology   [] Infusion Nurse    [x] Routine Nutrition follow up    TREATMENT PLAN:   [] Full treatment plan pending  [x] Chemotherapy  [] Immunotherapy  [] Radiation  [] Concurrent  [] Surgery  [] Treatment complete/post-treatment    ANTHROPOMETRICS:  Wt Readings from Last 10 Encounters:   12/09/22 (!) 138.5 kg (305 lb 5.4 oz)   12/09/22 (!) 138.5 kg (305 lb 5.4 oz)   12/02/22 (!) 140.7 kg (310 lb 3 oz)   12/01/22 (!) 140.7 kg (310 lb 3 oz)   11/28/22 (!) (P) 141.4 kg (311 lb 11.7 oz)   11/25/22 (!) 142 kg (313 lb 0.9 oz)   11/25/22 (!) 142 kg (313 lb 0.9 oz)   11/18/22 (!) 143 kg (315 lb 4.1 oz)   11/18/22 (!) 143 kg (315 lb 4.1 oz)   11/11/22 (!) 143.9 kg (317 lb 3.9 oz)      Weight Changes: has decreased 12 pounds over last 1 month  (3.7%)    PHYSICAL EXAM:  Muscle Wasting Observed:  [x] No Deficit   [] Mild Deficit   [] Moderate   [] Severe    INTAKE:  [x] PO Intake [] TF Intake  Current Diet: regular diet  Dietary Patterns:  Eating meals/snacks as tolerated  [] Oral nutritional supplements: none reported     SYMPTOMS/COMPLAINTS:   [] No nutritional concerns at current  [x] Diarrhea                    [] Constipation           [] Nausea                 [] Vomiting                [] Indigestion                [x] Reflux              [] Poor Appetite            [] Anorexia                 [] Early Satiety         [] Gas                       [] Bloating                     [] Dry Mouth    [] Mucositis                   [] Mouth Sores           [] Poor Dentition      [] Difficulty chewing  [] Difficulty Swallowing   [] Pain with swallowing [] Change in taste      [] Change in smell   [] Pain (general)       [] Fatigue                      [] Sleep issues    [] Weight  loss  [] other, please specify    Nutrition Re-Assessment Risk: Mild    [x] Labs reviewed   [x] Meds reviewed    Education Provided:  [] No Education Needed at this time  [x] Diarrhea                                              [] Constipation                          [] Nausea/Vomiting  [] Mucositis                [] Dry Mouth    [] Dealing with changes in Taste/Smell  [] Dealing with Poor Appetite   [] Soft/moist Diet      [] Weight Loss/Gain     [] Weight Maintenance                           [] Indigestion/GERD                 [] Gas/Bloating          [] Foods High/ Low in specific nutrients [] Increasing Calories/Protein   [] Milkshake/Smoothies Recipes   [] Nutrition Supplements                        [] Increasing Fluid Intakes         [] Foods that fight cancer    [] Evidence bases resources                 [] Fermented Foods/Probiotics  [] Mediterranean/Plant Based Diet     [] Other, specify                                   [] Handouts provided      [] Samples provided     RD NOTE:  RD met with patient and friend at chairside during infusion treatment. Pt continues to struggle with diarrhea and low magnesium despite multiple interventions. Pt states she is not doing the Enterade anymore and could not handle the Banatrol. She does report trying to include more foods from the BRAT diet. Pt is doing Liquid IV. She has magnesium supplements but is scared to take them d/t potential of making diarrhea worse. Discussed medications provided by MD for diarrhea management.     Pt with questions on how to take metamucil for diarrhea and not constipation. Encouraged patient to take Metamucil with 2 ounces of water for help with diarrhea.    Pt asking about a peppermint oil for diarrhea. Explained how peppermint can make acid reflux worse- which patient states she has. RD did ask Dr. Jaquez's nurse if it was okay and she said it was fine. Let patient know.     RD Goals:   [x] Weight stable                  [] Weight  gain                      [] Weight Loss                               [] Continue adequate Kcal/protein   [] Increase Kcal/protein      [] Adjust Tube-feeding Rx   [] Tolerate Tube Feedings             [] Increase tube feedings to goal     [] Tolerate Supplements     [x] Symptom Improvement   [x] Understand nutrition Education  [] Offer supportive visits   [] other, please specify    RECOMMENDATIONS:  BRAT diet  Medication mgmt per MD regarding diarrhea   Metamucil PRN  Include high magnesium foods in the diet as able    Follow up: Next infusion or PRN throughout tx    Eloisa Perales, MS, RD, LDN  12/09/2022  2:50 PM

## 2022-12-09 NOTE — PLAN OF CARE
Problem: Adult Inpatient Plan of Care  Goal: Plan of Care Review  Outcome: Ongoing, Progressing  Flowsheets (Taken 12/9/2022 1314)  Plan of Care Reviewed With:   patient   friend  Goal: Patient-Specific Goal (Individualized)  Outcome: Ongoing, Progressing  Flowsheets (Taken 12/9/2022 1314)  Anxieties, Fears or Concerns: Rash, diarrhea  Individualized Care Needs: Recliner, warm blanket, conversation  Patient-Specific Goals (Include Timeframe): Free of S/S of reaction with treatment.     Problem: Fatigue  Goal: Improved Activity Tolerance  Outcome: Ongoing, Progressing  Intervention: Promote Improved Energy  Flowsheets (Taken 12/9/2022 1130)  Fatigue Management:   frequent rest breaks encouraged   paced activity encouraged  Sleep/Rest Enhancement: regular sleep/rest pattern promoted  Activity Management: Ambulated -L4    Patient to Infusion for Magnesium, Perjeta Ogivri, and Taxol following appointment with the provider. Accompanied by a friend. Treatment plan reviewed with patient. VSS. Tolerated treatment. Provided with copy of upcoming appointment schedule. Escorted to the front lobby for discharge to home.

## 2022-12-09 NOTE — PROGRESS NOTES
PATIENT: Josefina Coon  MRN: [id number]  DATE: 12/9/2022    Diagnosis:   1. Invasive ductal carcinoma of breast, female, left    2. Type 2 diabetes mellitus without complication, without long-term current use of insulin    3. Immunodeficiency due to drugs    4. Hypomagnesemia    5. Drug-induced skin rash    6. Chemotherapy induced diarrhea        Chief Complaint: Treatment clearance; C4D1    Subjective:   HPI: Ms. Coon is a 57 y.o. female with HTN, HLD, depression, diabetes type 2 with neuropathy, obesity, fatty liver, who has established care with Dr. Jaquez for a diagnosis of invasive ductal carcinoma of the left breast, triple positivity.   Undergoing treatment with Paclitaxel/Trastuzumab/Pertuzumab on D1, weekly Paclitaxel on D8, D15 of a 21 day cycle.    She is here today today for consideration of C4D1 of therapy.    Endorses chronic neuropathy and lower extremity swelling.   Episodic diarrhea, using Imodium 1-3 times daily without good results.  12/02 cholestyramine (QUESTRAN) 4 gram packet called in; used once.  Rash on face is still present but it is improving; using Triamcinolone PRN & Aveeno.  Taking Mg supplements BID at home.   Denies HA, CP, cough, mucositis, abd pain, constipation, N/V, swelling, new lumps or bumps. No fevers, chills.      Oncologic History:   8/25/22 bilateral screening mammogram,,Right neg ,Left central post mass     9/8/22 left diag [facility], left US limited .Left 0300 lateral posterior 6cm FN 1.4cm mass , left axilla LN 2, up to 4mm cortex     9/14/22 left 0300 lateral posterior 6cm FN 1.4cm mass US biopsy .Grade 3 ,1.1cm in biopsy No LVI , ER 84% ID 28% Her 2 3+ pos Ki 52 %    Left axilla LN biopsy ;Benign fatty tissue, no LN, not concordant No MRI of breasts were done      9/21/22 US bilateral complete Right neg, right LN nml , Left 0300 6cm FN [id number] mass Borderline LN left axilla     9/21/22 Left axilla LN biopsy benign LN , so eventually Stage IA triple positive Breast  cancer    Oncology History   Invasive ductal carcinoma of breast, female, left   9/23/2022 Initial Diagnosis    Invasive ductal carcinoma of breast, female, left     9/23/2022 Cancer Staged    Staging form: Breast, AJCC 8th Edition  - Clinical stage from 9/23/2022: Stage IA (cT1c, cN0(f), cM0, G3, ER+, OK+, HER2+)     9/29/2022 - 9/29/2022 Chemotherapy    Treatment Summary   Plan Name: OP BREAST TRASTUZUMAB PACLITAXEL WEEKLY  Treatment Goal: Curative  Status: Inactive  Start Date:   End Date:   Provider: Lisa Jaquez MD  Chemotherapy: PACLitaxeL (TAXOL) 80 mg/m2 = 204 mg in sodium chloride 0.9% 250 mL chemo infusion, 80 mg/m2 = 204 mg, Intravenous, Clinic/H[facility] 1 time, 0 of 12 cycles  pertuzumab (PERJETA) 840 mg in sodium chloride 0.9% 278 mL infusion, 840 mg (original dose ), Intravenous, Clinic/H[facility] 1 time, 0 of 17 cycles  Dose modification: 840 mg (Cycle 1, Reason: Other (see comments)), 420 mg (Cycle 4, Reason: Other (see comments))  trastuzumab-dkst (OGIVRI) 591 mg in sodium chloride 0.9% 250 mL chemo infusion, 4 mg/kg = 591 mg, Intravenous, Clinic/HOD 1 time, 0 of 25 cycles     10/7/2022 -  Chemotherapy    Treatment Summary   Plan Name: OP BREAST PACLITAXEL TRASTUZUMAB WEEKLY WITH PERTUZUMAB Q3W (THP)  Treatment Goal: Control  Status: Active  Start Date: 10/7/2022  End Date: 9/29/2023 (Planned)  Provider: Lisa Jaquez MD  Chemotherapy: PACLitaxeL (TAXOL) 80 mg/m2 = 210 mg in sodium chloride 0.9% 250 mL chemo infusion, 80 mg/m2 = 210 mg, Intravenous, Clinic/H[facility] 1 time, 4 of 4 cycles  Administration: 210 mg (10/7/2022), 204 mg (10/14/2022), 204 mg (10/28/2022), 204 mg (11/4/2022), 204 mg (10/21/2022), 204 mg (11/11/2022), 204 mg (11/18/2022), 204 mg (11/25/2022), 204 mg (12/2/2022)  pertuzumab (PERJETA) 840 mg in sodium chloride 0.9% 278 mL infusion, 840 mg, Intravenous, Clinic/[facility] 1 time, 4 of 18 cycles  Administration: 840 mg (10/7/2022), 420 mg (10/28/2022), 420 mg (11/18/2022)  trastuzumab-dkst (OGIVRI) 570  mg in sodium chloride 0.9% 250 mL chemo infusion, 593 mg (100 % of original dose 4 mg/kg), Intravenous, Clinic/HOD 1 time, 4 of 18 cycles  Dose modification: 4 mg/kg (original dose 4 mg/kg, Cycle 1, Reason: Other (see comments), Comment: weekly trastuzumab), 2 mg/kg (original dose 2 mg/kg, Cycle 2, Reason: Other (see comments), Comment: weekly maintenance dose), 6 mg/kg (original dose 2 mg/kg, Cycle 2, Reason: MD Discretion, Comment: change to q3w dosing), 2 mg/kg (original dose 2 mg/kg, Cycle 1, Reason: Other (see comments), Comment: maintenance weekly dose)  Administration: 570 mg (10/7/2022), 840 mg (10/28/2022), 288 mg (10/14/2022), 290 mg (10/21/2022), 840 mg (2022)        Past Medical History:   Past Medical History:   Diagnosis Date    Abnormal liver enzymes     Asymptomatic varicose veins     Breast cancer 2022    left idc    Cancer     left breast cancer    Depressive disorder, not elsewhere classified     Dyslipidemia     Embolism and thrombosis of unspecified site     superficial venous thrombosis    Encounter for long-term (current) use of other medications     Family history of ischemic heart disease     Fatty liver     Generalized anxiety disorder     History of chicken pox     Obstructive sleep apnea (adult) (pediatric)     Type II or unspecified type diabetes mellitus without mention of complication, not stated as uncontrolled     Unspecified essential hypertension     Unspecified venous (peripheral) insufficiency     Unspecified vitamin D deficiency        Past Surgical HIstory:   Past Surgical History:   Procedure Laterality Date    BREAST BIOPSY Left 2022    idc    BREAST BIOPSY Left 2022    neg ln    BREAST BIOPSY Left 2022    neg ln     SECTION      INSERTION OF TUNNELED CENTRAL VENOUS CATHETER (CVC) WITH SUBCUTANEOUS PORT Right 10/04/2022    Procedure: [id number];  Surgeon: Dominick Ng MD;  Location: [facility];  Service: General;  Laterality:  Right;    VEIN SURGERY      Laser, Dr. Larsen       Family History:   Family History   Problem Relation Age of Onset    Hyperlipidemia Mother     Hypertension Mother     Vulvar Cancer Mother     Diabetes Brother     Cancer Brother         colon    Stroke Maternal Grandmother        Social History:  reports that she has never smoked. She has never used smokeless tobacco. She reports that she does not currently use alcohol. She reports that she does not use drugs.    Allergies:  Review of patient's allergies indicates:   Allergen Reactions    Sitagliptin      Other reaction(s): (januvia) abdominal pain    Sulfa (sulfonamide antibiotics) Hives    Byetta  [exenatide] Rash    Lactose        Medications:  Current Outpatient Medications   Medication Sig Dispense Refill    albuterol 90 mcg/actuation inhaler Inhale 2 puffs into the lungs every 6 (six) hours as needed for Wheezing. 18 g 6    ALPRAZolam (XANAX) 0.25 MG tablet Take one tab po 1-2 times daily PRN anxiety 15 tablet 0    buPROPion (WELLBUTRIN XL) 150 MG TB24 tablet TAKE 1 TABLET BY MOUTH EVERY DAY 90 tablet 3    dulaglutide (TRULICITY) 3 mg/0.5 mL pen injector Inject 3 mg into the skin every 7 days. 12 pen 1    ergocalciferol (ERGOCALCIFEROL) 50,000 unit Cap TAKE 1 CAPSULE BY MOUTH ONE TIME PER WEEK 12 capsule 0    LIDOcaine-prilocaine (EMLA) cream Apply to port site 1 hour prior to port access and cover 30 g 3    magnesium 30 mg Tab Take 30 mg by mouth once.      metFORMIN (GLUCOPHAGE) 500 MG tablet TAKE 1 TABLET BY MOUTH TWICE A DAY WITH MEALS 180 tablet 1    omeprazole (PRILOSEC OTC) 20 MG tablet Take 1 tablet (20 mg total) by mouth once daily. 30 tablet 6    omeprazole (PRILOSEC) 10 MG capsule omeprazole      ondansetron (ZOFRAN) 8 MG tablet Take 1 tablet (8 mg total) by mouth every 8 (eight) hours as needed for Nausea. 30 tablet 2    pravastatin (PRAVACHOL) 10 MG tablet TAKE 1 TABLET BY MOUTH EVERYDAY AT BEDTIME 90 tablet 1    promethazine (PHENERGAN) 25 MG  tablet Take 1 tablet (25 mg total) by mouth every 6 (six) hours as needed for Nausea. 30 tablet 0    telmisartan-hydrochlorothiazide (MICARDIS HCT) 80-25 mg per tablet TAKE 1 TABLET BY MOUTH EVERY DAY 90 tablet 1    triamcinolone acetonide 0.025% (KENALOG) 0.025 % cream Apply topically 2 (two) times daily. Apply to the skin lesions twice a day 15 g 0    cholestyramine (QUESTRAN) 4 gram packet Take 1 packet (4 g total) by mouth 3 (three) times daily with meals. (Patient not taking: Reported on 12/9/2022) 270 packet 3     No current facility-administered medications for this visit.     Facility-Administered Medications Ordered in Other Visits   Medication Dose Route Frequency Provider Last Rate Last Admin    alteplase injection 2 mg  2 mg Intra-Catheter PRN Rony Jung NP        dexAMETHasone (DECADRON) 10 mg in sodium chloride 0.9% 50 mL IVPB  10 mg Intravenous 1 time in Clinic/HOD Rony Jung NP        diphenhydrAMINE (BENADRYL) 50 mg in NS 50 mL IVPB  50 mg Intravenous 1 time in Clinic/HOD Rony Jung NP        diphenhydrAMINE injection 50 mg  50 mg Intravenous Once PRN Rony Jung NP        EPINEPHrine (EPIPEN) 0.3 mg/0.3 mL pen injection 0.3 mg  0.3 mg Intramuscular Once PRN Rony Jung NP        famotidine (PF) injection 20 mg  20 mg Intravenous 1 time in Clinic/HOD Rony Jung NP        heparin, porcine (PF) 100 unit/mL injection flush 500 Units  500 Units Intravenous PRN Rony Jung NP        hydrocortisone sodium succinate injection 100 mg  100 mg Intravenous Once PRN Rony Jung NP        magnesium sulfate 4g in water 100 mL IVPB  4 g Intravenous Once Rony Jung NP 50 mL/hr at 12/09/22 1125 4 g at 12/09/22 1125    PACLitaxeL (TAXOL) 80 mg/m2 = 198 mg in sodium chloride 0.9% 250 mL chemo infusion  80 mg/m2 Intravenous 1 time in Clinic/HOD Rony Jung NP        pertuzumab (PERJETA) 420 mg in sodium chloride 0.9% 264 mL infusion  420 mg Intravenous 1 time in  "Clinic/HOD Rony Jung, NENO        sodium chloride 0.9% flush 10 mL  10 mL Intravenous PRN Rony Jung NP        trastuzumab-dkst (OGIVRI) 831 mg in sodium chloride 0.9% 250 mL chemo infusion  6 mg/kg Intravenous 1 time in Clinic/HOD Rony Jung NP           Review of Systems   Constitutional:  Negative for chills, fatigue and fever.   HENT:  Negative for trouble swallowing.    Respiratory:  Negative for cough and shortness of breath.    Cardiovascular:  Negative for chest pain and palpitations.   Gastrointestinal:  Positive for diarrhea. Negative for abdominal pain, constipation, nausea and vomiting.   Musculoskeletal:  Negative for arthralgias.   Skin:  Positive for rash.   Neurological:  Negative for headaches.   Hematological:  Negative for adenopathy.   All other systems reviewed and are negative.    Objective:      Vitals:   Weight:  Loss of 5 pounds in 1 week  Vitals:    12/09/22 1026   BP: 109/81   BP Location: Left arm   Patient Position: Sitting   BP Method: Medium (Automatic)   Pulse: 108   Resp: 18   Temp: 98.1 °F (36.7 °C)   TempSrc: Temporal   SpO2: 97%   Weight: (!) 138.5 kg (305 lb 5.4 oz)   Height: 5' 4" (1.626 m)     BMI: Body mass index is 52.41 kg/m².    Physical Exam  Vitals reviewed.   Constitutional:       General: She is not in acute distress.     Appearance: She is not diaphoretic.   HENT:      Head: Normocephalic and atraumatic.      Mouth/Throat:      Mouth: Mucous membranes are moist.      Pharynx: No oropharyngeal exudate.   Eyes:      General: No scleral icterus.  Cardiovascular:      Rate and Rhythm: Normal rate and regular rhythm.      Heart sounds: Normal heart sounds. No murmur heard.  Pulmonary:      Effort: Pulmonary effort is normal. No respiratory distress.      Breath sounds: No wheezing.   Musculoskeletal:      Cervical back: No tenderness.      Right lower leg: No edema.      Left lower leg: No edema.   Lymphadenopathy:      Cervical: No cervical adenopathy. "   Skin:     General: Skin is warm.      Findings: Rash present.   Neurological:      Mental Status: She is alert and oriented to person, place, and time.   Psychiatric:         Behavior: Behavior normal.       Laboratory Data:  Lab Results   Component Value Date    WBC 5.59 12/09/2022    HGB 11.8 (L) 12/09/2022    HCT 34.7 (L) 12/09/2022    MCV 92 12/09/2022     12/09/2022        Assessment:       1. Invasive ductal carcinoma of breast, female, left    2. Type 2 diabetes mellitus without complication, without long-term current use of insulin    3. Immunodeficiency due to drugs    4. Hypomagnesemia    5. Drug-induced skin rash    6. Chemotherapy induced diarrhea         Plan:   Invasive ductal carcinoma of the breast, left  - cT1c triple positive breast cancer  (ER 84% AR 28% Her 2 3+ pos Ki 52 %), given her young age and Ki67%, will proceed with TH, adding P to help with a better pCR, will also plan to get ultrasound mid cycle to monitor (3 months)  -9/26/22 Echo with EF 60%  -Began TH+P on 10/7/2022    -Proceed with C4D1 today with IV magnesium 4 gm   -continue to use gentle soap & lotion to rash areas  -Follow up with Dr. Jaquez in one week with CBC, CMP, Mg prior to appointment - already scheduled for 12/16/22  -Echo scheduled for 01/18/23, pt aware     Diabetes type 2 with neuropathy   -Taking Metformin, Trulicity   -Will need to monitor for worsening neuropathy while undergoing treatment     Anxiety  -Taking Wellbutrin  -Following with Dr. Long     Hypomagnesemia  -Taking po supplements at home  -Mg is 1.3  -Will add 4G Mg to infusion today  -Monitor     Drug-induced rash  -Stable to improved  -using Triamcinolone cream PRN  -Monitor     Patient queried and all questions were answered.  Assessment/Plan reviewed and approved by Dr. Jaquez     Route Chart for Scheduling    Med Onc Chart Routing      Follow up with physician 1 week. f/u with Dr. Jaquez in 1 week - already made for 12/16 with labs prior    Follow up with BRANDIE    Infusion scheduling note    Injection scheduling note    Labs    Imaging    Pharmacy appointment    Other referrals        Treatment Plan Information   OP BREAST PACLITAXEL TRASTUZUMAB WEEKLY WITH PERTUZUMAB Q3W (THP)   Lisa Jaquez MD   Upcoming Treatment Dates - OP BREAST PACLITAXEL TRASTUZUMAB WEEKLY WITH PERTUZUMAB Q3W (THP)    12/16/2022       Pre-Medications       diphenhydrAMINE (BENADRYL) 50 mg in NS 50 mL IVPB       famotidine (PF) injection 20 mg       dexAMETHasone (DECADRON) 10 mg in sodium chloride 0.9% 50 mL IVPB       Chemotherapy       PACLitaxeL (TAXOL) in sodium chloride 0.9% 250 mL chemo infusion       Supportive Care       magnesium sulfate 2g in water 50mL IVPB (premix)  12/23/2022       Pre-Medications       diphenhydrAMINE (BENADRYL) 50 mg in NS 50 mL IVPB       famotidine (PF) injection 20 mg       dexAMETHasone (DECADRON) 10 mg in sodium chloride 0.9% 50 mL IVPB       Chemotherapy       PACLitaxeL (TAXOL) in sodium chloride 0.9% 250 mL chemo infusion       Supportive Care       magnesium sulfate 2g in water 50mL IVPB (premix)  12/30/2022       Chemotherapy       pertuzumab (PERJETA) 420 mg in sodium chloride 0.9% 264 mL infusion       trastuzumab-dkst (OGIVRI) in sodium chloride 0.9% chemo infusion       Supportive Care       magnesium sulfate 2g in water 50mL IVPB (premix)  1/20/2023       Chemotherapy       pertuzumab (PERJETA) 420 mg in sodium chloride 0.9% 264 mL infusion       trastuzumab-dkst (OGIVRI) in sodium chloride 0.9% chemo infusion       Supportive Care       magnesium sulfate 2g in water 50mL IVPB (premix)

## 2022-12-11 ENCOUNTER — PATIENT MESSAGE (OUTPATIENT)
Dept: HEMATOLOGY/ONCOLOGY | Facility: CLINIC | Age: 57
End: 2022-12-11
Payer: COMMERCIAL

## 2022-12-12 DIAGNOSIS — B37.2 FLEXURAL CANDIDOSIS: Primary | ICD-10-CM

## 2022-12-12 RX ORDER — NYSTATIN 100000 [USP'U]/G
POWDER TOPICAL
Qty: 60 G | Refills: 1 | Status: SHIPPED | OUTPATIENT
Start: 2022-12-12 | End: 2022-12-30 | Stop reason: SDUPTHER

## 2022-12-12 NOTE — TELEPHONE ENCOUNTER
Yes, she needs nystatin powder and if no improvement oral fluconazole. I can start with sending the nystatin powder and then Dr. Jaquez can reassess. Thanks

## 2022-12-15 ENCOUNTER — PATIENT MESSAGE (OUTPATIENT)
Dept: HEMATOLOGY/ONCOLOGY | Facility: CLINIC | Age: 57
End: 2022-12-15
Payer: COMMERCIAL

## 2022-12-16 ENCOUNTER — INFUSION (OUTPATIENT)
Dept: INFUSION THERAPY | Facility: HOSPITAL | Age: 57
End: 2022-12-16
Attending: STUDENT IN AN ORGANIZED HEALTH CARE EDUCATION/TRAINING PROGRAM
Payer: COMMERCIAL

## 2022-12-16 ENCOUNTER — OFFICE VISIT (OUTPATIENT)
Dept: HEMATOLOGY/ONCOLOGY | Facility: CLINIC | Age: 57
End: 2022-12-16
Payer: COMMERCIAL

## 2022-12-16 ENCOUNTER — LAB VISIT (OUTPATIENT)
Dept: LAB | Facility: HOSPITAL | Age: 57
End: 2022-12-16
Attending: STUDENT IN AN ORGANIZED HEALTH CARE EDUCATION/TRAINING PROGRAM
Payer: COMMERCIAL

## 2022-12-16 VITALS
OXYGEN SATURATION: 96 % | HEART RATE: 102 BPM | DIASTOLIC BLOOD PRESSURE: 85 MMHG | HEIGHT: 64 IN | BODY MASS INDEX: 50.02 KG/M2 | SYSTOLIC BLOOD PRESSURE: 120 MMHG | WEIGHT: 293 LBS | TEMPERATURE: 98 F | RESPIRATION RATE: 16 BRPM

## 2022-12-16 VITALS
DIASTOLIC BLOOD PRESSURE: 77 MMHG | WEIGHT: 293 LBS | BODY MASS INDEX: 50.02 KG/M2 | RESPIRATION RATE: 16 BRPM | HEART RATE: 95 BPM | HEIGHT: 64 IN | SYSTOLIC BLOOD PRESSURE: 111 MMHG | OXYGEN SATURATION: 96 % | TEMPERATURE: 98 F

## 2022-12-16 DIAGNOSIS — C50.912 INVASIVE DUCTAL CARCINOMA OF BREAST, FEMALE, LEFT: ICD-10-CM

## 2022-12-16 DIAGNOSIS — C50.912 INVASIVE DUCTAL CARCINOMA OF BREAST, FEMALE, LEFT: Primary | ICD-10-CM

## 2022-12-16 DIAGNOSIS — L27.0 DRUG-INDUCED SKIN RASH: ICD-10-CM

## 2022-12-16 DIAGNOSIS — E11.9 TYPE 2 DIABETES MELLITUS WITHOUT COMPLICATION, WITHOUT LONG-TERM CURRENT USE OF INSULIN: ICD-10-CM

## 2022-12-16 DIAGNOSIS — Z79.899 IMMUNODEFICIENCY DUE TO DRUGS: ICD-10-CM

## 2022-12-16 DIAGNOSIS — E83.42 HYPOMAGNESEMIA: ICD-10-CM

## 2022-12-16 DIAGNOSIS — E11.41 DIABETIC MONONEUROPATHY ASSOCIATED WITH TYPE 2 DIABETES MELLITUS: Primary | ICD-10-CM

## 2022-12-16 DIAGNOSIS — K76.0 FATTY LIVER: ICD-10-CM

## 2022-12-16 DIAGNOSIS — D84.821 IMMUNODEFICIENCY DUE TO DRUGS: ICD-10-CM

## 2022-12-16 LAB
ALBUMIN SERPL BCP-MCNC: 3.3 G/DL (ref 3.5–5.2)
ALP SERPL-CCNC: 90 U/L (ref 55–135)
ALT SERPL W/O P-5'-P-CCNC: 30 U/L (ref 10–44)
ANION GAP SERPL CALC-SCNC: 9 MMOL/L (ref 8–16)
AST SERPL-CCNC: 17 U/L (ref 10–40)
BILIRUB SERPL-MCNC: 0.5 MG/DL (ref 0.1–1)
BUN SERPL-MCNC: 18 MG/DL (ref 6–20)
CALCIUM SERPL-MCNC: 9.5 MG/DL (ref 8.7–10.5)
CHLORIDE SERPL-SCNC: 105 MMOL/L (ref 95–110)
CO2 SERPL-SCNC: 22 MMOL/L (ref 23–29)
CREAT SERPL-MCNC: 1.2 MG/DL (ref 0.5–1.4)
ERYTHROCYTE [DISTWIDTH] IN BLOOD BY AUTOMATED COUNT: 15.3 % (ref 11.5–14.5)
EST. GFR  (NO RACE VARIABLE): 52.1 ML/MIN/1.73 M^2
GLUCOSE SERPL-MCNC: 166 MG/DL (ref 70–110)
HCT VFR BLD AUTO: 33.1 % (ref 37–48.5)
HGB BLD-MCNC: 11.2 G/DL (ref 12–16)
IMM GRANULOCYTES # BLD AUTO: 0.05 K/UL (ref 0–0.04)
MAGNESIUM SERPL-MCNC: 1.4 MG/DL (ref 1.6–2.6)
MCH RBC QN AUTO: 31.5 PG (ref 27–31)
MCHC RBC AUTO-ENTMCNC: 33.8 G/DL (ref 32–36)
MCV RBC AUTO: 93 FL (ref 82–98)
NEUTROPHILS # BLD AUTO: 2.4 K/UL (ref 1.8–7.7)
PLATELET # BLD AUTO: 255 K/UL (ref 150–450)
PMV BLD AUTO: 9.9 FL (ref 9.2–12.9)
POTASSIUM SERPL-SCNC: 4.1 MMOL/L (ref 3.5–5.1)
PROT SERPL-MCNC: 6.8 G/DL (ref 6–8.4)
RBC # BLD AUTO: 3.56 M/UL (ref 4–5.4)
SODIUM SERPL-SCNC: 136 MMOL/L (ref 136–145)
WBC # BLD AUTO: 4.76 K/UL (ref 3.9–12.7)

## 2022-12-16 PROCEDURE — 1159F MED LIST DOCD IN RCRD: CPT | Mod: CPTII,S$GLB,, | Performed by: STUDENT IN AN ORGANIZED HEALTH CARE EDUCATION/TRAINING PROGRAM

## 2022-12-16 PROCEDURE — 3079F PR MOST RECENT DIASTOLIC BLOOD PRESSURE 80-89 MM HG: ICD-10-PCS | Mod: CPTII,S$GLB,, | Performed by: STUDENT IN AN ORGANIZED HEALTH CARE EDUCATION/TRAINING PROGRAM

## 2022-12-16 PROCEDURE — 85027 COMPLETE CBC AUTOMATED: CPT | Mod: PN | Performed by: STUDENT IN AN ORGANIZED HEALTH CARE EDUCATION/TRAINING PROGRAM

## 2022-12-16 PROCEDURE — 1159F PR MEDICATION LIST DOCUMENTED IN MEDICAL RECORD: ICD-10-PCS | Mod: CPTII,S$GLB,, | Performed by: STUDENT IN AN ORGANIZED HEALTH CARE EDUCATION/TRAINING PROGRAM

## 2022-12-16 PROCEDURE — 3074F PR MOST RECENT SYSTOLIC BLOOD PRESSURE < 130 MM HG: ICD-10-PCS | Mod: CPTII,S$GLB,, | Performed by: STUDENT IN AN ORGANIZED HEALTH CARE EDUCATION/TRAINING PROGRAM

## 2022-12-16 PROCEDURE — 1160F PR REVIEW ALL MEDS BY PRESCRIBER/CLIN PHARMACIST DOCUMENTED: ICD-10-PCS | Mod: CPTII,S$GLB,, | Performed by: STUDENT IN AN ORGANIZED HEALTH CARE EDUCATION/TRAINING PROGRAM

## 2022-12-16 PROCEDURE — 96375 TX/PRO/DX INJ NEW DRUG ADDON: CPT | Mod: PN

## 2022-12-16 PROCEDURE — 25000003 PHARM REV CODE 250: Mod: PN | Performed by: STUDENT IN AN ORGANIZED HEALTH CARE EDUCATION/TRAINING PROGRAM

## 2022-12-16 PROCEDURE — 99215 PR OFFICE/OUTPT VISIT, EST, LEVL V, 40-54 MIN: ICD-10-PCS | Mod: S$GLB,,, | Performed by: STUDENT IN AN ORGANIZED HEALTH CARE EDUCATION/TRAINING PROGRAM

## 2022-12-16 PROCEDURE — 3079F DIAST BP 80-89 MM HG: CPT | Mod: CPTII,S$GLB,, | Performed by: STUDENT IN AN ORGANIZED HEALTH CARE EDUCATION/TRAINING PROGRAM

## 2022-12-16 PROCEDURE — 83735 ASSAY OF MAGNESIUM: CPT | Mod: PN | Performed by: STUDENT IN AN ORGANIZED HEALTH CARE EDUCATION/TRAINING PROGRAM

## 2022-12-16 PROCEDURE — 96367 TX/PROPH/DG ADDL SEQ IV INF: CPT | Mod: PN

## 2022-12-16 PROCEDURE — 99999 PR PBB SHADOW E&M-EST. PATIENT-LVL IV: ICD-10-PCS | Mod: PBBFAC,,, | Performed by: STUDENT IN AN ORGANIZED HEALTH CARE EDUCATION/TRAINING PROGRAM

## 2022-12-16 PROCEDURE — 3051F PR MOST RECENT HEMOGLOBIN A1C LEVEL 7.0 - < 8.0%: ICD-10-PCS | Mod: CPTII,S$GLB,, | Performed by: STUDENT IN AN ORGANIZED HEALTH CARE EDUCATION/TRAINING PROGRAM

## 2022-12-16 PROCEDURE — 63600175 PHARM REV CODE 636 W HCPCS: Mod: PN | Performed by: STUDENT IN AN ORGANIZED HEALTH CARE EDUCATION/TRAINING PROGRAM

## 2022-12-16 PROCEDURE — 99999 PR PBB SHADOW E&M-EST. PATIENT-LVL IV: CPT | Mod: PBBFAC,,, | Performed by: STUDENT IN AN ORGANIZED HEALTH CARE EDUCATION/TRAINING PROGRAM

## 2022-12-16 PROCEDURE — 80053 COMPREHEN METABOLIC PANEL: CPT | Mod: PN | Performed by: STUDENT IN AN ORGANIZED HEALTH CARE EDUCATION/TRAINING PROGRAM

## 2022-12-16 PROCEDURE — 3008F PR BODY MASS INDEX (BMI) DOCUMENTED: ICD-10-PCS | Mod: CPTII,S$GLB,, | Performed by: STUDENT IN AN ORGANIZED HEALTH CARE EDUCATION/TRAINING PROGRAM

## 2022-12-16 PROCEDURE — 3074F SYST BP LT 130 MM HG: CPT | Mod: CPTII,S$GLB,, | Performed by: STUDENT IN AN ORGANIZED HEALTH CARE EDUCATION/TRAINING PROGRAM

## 2022-12-16 PROCEDURE — 99215 OFFICE O/P EST HI 40 MIN: CPT | Mod: S$GLB,,, | Performed by: STUDENT IN AN ORGANIZED HEALTH CARE EDUCATION/TRAINING PROGRAM

## 2022-12-16 PROCEDURE — 96413 CHEMO IV INFUSION 1 HR: CPT | Mod: PN

## 2022-12-16 PROCEDURE — 3051F HG A1C>EQUAL 7.0%<8.0%: CPT | Mod: CPTII,S$GLB,, | Performed by: STUDENT IN AN ORGANIZED HEALTH CARE EDUCATION/TRAINING PROGRAM

## 2022-12-16 PROCEDURE — 3008F BODY MASS INDEX DOCD: CPT | Mod: CPTII,S$GLB,, | Performed by: STUDENT IN AN ORGANIZED HEALTH CARE EDUCATION/TRAINING PROGRAM

## 2022-12-16 PROCEDURE — 1160F RVW MEDS BY RX/DR IN RCRD: CPT | Mod: CPTII,S$GLB,, | Performed by: STUDENT IN AN ORGANIZED HEALTH CARE EDUCATION/TRAINING PROGRAM

## 2022-12-16 RX ORDER — DIPHENHYDRAMINE HYDROCHLORIDE 50 MG/ML
50 INJECTION INTRAMUSCULAR; INTRAVENOUS ONCE AS NEEDED
Status: CANCELLED | OUTPATIENT
Start: 2022-12-16

## 2022-12-16 RX ORDER — HEPARIN 100 UNIT/ML
500 SYRINGE INTRAVENOUS
Status: CANCELLED | OUTPATIENT
Start: 2022-12-16

## 2022-12-16 RX ORDER — EPINEPHRINE 0.3 MG/.3ML
0.3 INJECTION SUBCUTANEOUS ONCE AS NEEDED
Status: CANCELLED | OUTPATIENT
Start: 2022-12-16

## 2022-12-16 RX ORDER — HEPARIN 100 UNIT/ML
500 SYRINGE INTRAVENOUS
Status: DISCONTINUED | OUTPATIENT
Start: 2022-12-16 | End: 2022-12-16 | Stop reason: HOSPADM

## 2022-12-16 RX ORDER — MAGNESIUM SULFATE HEPTAHYDRATE 40 MG/ML
2 INJECTION, SOLUTION INTRAVENOUS ONCE
Status: COMPLETED | OUTPATIENT
Start: 2022-12-16 | End: 2022-12-16

## 2022-12-16 RX ORDER — EPINEPHRINE 0.3 MG/.3ML
0.3 INJECTION SUBCUTANEOUS ONCE AS NEEDED
Status: DISCONTINUED | OUTPATIENT
Start: 2022-12-16 | End: 2022-12-16 | Stop reason: HOSPADM

## 2022-12-16 RX ORDER — SODIUM CHLORIDE 0.9 % (FLUSH) 0.9 %
10 SYRINGE (ML) INJECTION
Status: DISCONTINUED | OUTPATIENT
Start: 2022-12-16 | End: 2022-12-16 | Stop reason: HOSPADM

## 2022-12-16 RX ORDER — FAMOTIDINE 10 MG/ML
20 INJECTION INTRAVENOUS
Status: CANCELLED | OUTPATIENT
Start: 2022-12-16

## 2022-12-16 RX ORDER — DIPHENHYDRAMINE HYDROCHLORIDE 50 MG/ML
50 INJECTION INTRAMUSCULAR; INTRAVENOUS ONCE AS NEEDED
Status: DISCONTINUED | OUTPATIENT
Start: 2022-12-16 | End: 2022-12-16 | Stop reason: HOSPADM

## 2022-12-16 RX ORDER — MAGNESIUM SULFATE HEPTAHYDRATE 40 MG/ML
2 INJECTION, SOLUTION INTRAVENOUS ONCE
Status: CANCELLED
Start: 2022-12-16 | End: 2022-12-16

## 2022-12-16 RX ORDER — MUPIROCIN 20 MG/G
OINTMENT TOPICAL 3 TIMES DAILY
Qty: 30 G | Refills: 2 | Status: SHIPPED | OUTPATIENT
Start: 2022-12-16 | End: 2023-01-15

## 2022-12-16 RX ORDER — FAMOTIDINE 10 MG/ML
20 INJECTION INTRAVENOUS
Status: COMPLETED | OUTPATIENT
Start: 2022-12-16 | End: 2022-12-16

## 2022-12-16 RX ORDER — SODIUM CHLORIDE 0.9 % (FLUSH) 0.9 %
10 SYRINGE (ML) INJECTION
Status: CANCELLED | OUTPATIENT
Start: 2022-12-16

## 2022-12-16 RX ADMIN — FAMOTIDINE 20 MG: 10 INJECTION INTRAVENOUS at 12:12

## 2022-12-16 RX ADMIN — SODIUM CHLORIDE: 9 INJECTION, SOLUTION INTRAVENOUS at 11:12

## 2022-12-16 RX ADMIN — DIPHENHYDRAMINE HYDROCHLORIDE 50 MG: 50 INJECTION, SOLUTION INTRAMUSCULAR; INTRAVENOUS at 01:12

## 2022-12-16 RX ADMIN — DEXAMETHASONE SODIUM PHOSPHATE 10 MG: 4 INJECTION INTRA-ARTICULAR; INTRALESIONAL; INTRAMUSCULAR; INTRAVENOUS; SOFT TISSUE at 01:12

## 2022-12-16 RX ADMIN — MAGNESIUM SULFATE IN WATER 2 G: 40 INJECTION, SOLUTION INTRAVENOUS at 11:12

## 2022-12-16 RX ADMIN — PACLITAXEL 198 MG: 6 INJECTION, SOLUTION INTRAVENOUS at 01:12

## 2022-12-16 NOTE — PROGRESS NOTES
PATIENT: Josefina Coon  MRN: [id number]  DATE: 12/24/2022      Diagnosis:   1. Diabetic mononeuropathy associated with type 2 diabetes mellitus    2. Drug-induced skin rash    3. Invasive ductal carcinoma of breast, female, left    4. Type 2 diabetes mellitus without complication, without long-term current use of insulin    5. Immunodeficiency due to drugs    6. Hypomagnesemia    7. Fatty liver        Chief Complaint: Follow-up and Breast Cancer      Subjective:   HPI: Ms. Coon is a 57 y.o. female Ms. Coon is a 57 y.o. female with HTN, HLD, depression, diabetes type 2 with neuropathy, obesity, fatty liver,  for a diagnosis of invasive ductal carcinoma of the left breast, triple positivity. Undergoing treatment with Paclitaxel/Trastuzumab/Pertuzumab on D1, weekly Paclitaxel on D8, D15 of a 21 day cycle.      She is here today today for consideration of C4D8 of chemotherapy     Improved diarrhea, did noticed some fresh blood per rectum on/off  Rash on face improving, using Triamcinolone PRN. But now has rash in her abdomen on nystatin powder   Ultrasound 11/14/22 with decrease in size of the breast mass, Surgery in Feb/2023   Neuropathy in bilateral hands 6/10, on /off, cont monitoring     Oncologic History:   8/25/22 bilateral screening mammogram,,Right neg ,Left central post mass     9/8/22 left diag [facility], left US limited .Left 0300 lateral posterior 6cm FN 1.4cm mass , left axilla LN 2, up to 4mm cortex     9/14/22 left 0300 lateral posterior 6cm FN 1.4cm mass US biopsy .Grade 3 ,1.1cm in biopsy No LVI , ER 84% CT 28% Her 2 3+ pos Ki 52 %    Left axilla LN biopsy ;Benign fatty tissue, no LN, not concordant No MRI of breasts were done      9/21/22 US bilateral complete Right neg, right LN nml , Left 0300 6cm FN [id number] mass Borderline LN left axilla     9/21/22 Left axilla LN biopsy benign LN , so eventually Stage IA triple positive Breast cancer    10/7/22: started neoadjuvant chemo with Taxol + dual HER-2  (tranztuzumab and pertuzumab)     Oncology History   Invasive ductal carcinoma of breast, female, left   9/23/2022 Initial Diagnosis    Invasive ductal carcinoma of breast, female, left     9/23/2022 Cancer Staged    Staging form: Breast, AJCC 8th Edition  - Clinical stage from 9/23/2022: Stage IA (cT1c, cN0(f), cM0, G3, ER+, AZ+, HER2+)       9/29/2022 - 9/29/2022 Chemotherapy    Treatment Summary   Plan Name: OP BREAST TRASTUZUMAB PACLITAXEL WEEKLY  Treatment Goal: Curative  Status: Inactive  Start Date:   End Date:   Provider: Lisa Jaquez MD  Chemotherapy: PACLitaxeL (TAXOL) 80 mg/m2 = 204 mg in sodium chloride 0.9% 250 mL chemo infusion, 80 mg/m2 = 204 mg, Intravenous, Clinic/[facility] 1 time, 0 of 12 cycles  pertuzumab (PERJETA) 840 mg in sodium chloride 0.9% 278 mL infusion, 840 mg (original dose ), Intravenous, Clinic/[facility] 1 time, 0 of 17 cycles  Dose modification: 840 mg (Cycle 1, Reason: Other (see comments)), 420 mg (Cycle 4, Reason: Other (see comments))  trastuzumab-dkst (OGIVRI) 591 mg in sodium chloride 0.9% 250 mL chemo infusion, 4 mg/kg = 591 mg, Intravenous, Clinic/[facility] 1 time, 0 of 25 cycles       10/7/2022 -  Chemotherapy    Treatment Summary   Plan Name: OP BREAST PACLITAXEL TRASTUZUMAB WEEKLY WITH PERTUZUMAB Q3W (THP)  Treatment Goal: Control  Status: Active  Start Date: 10/7/2022  End Date: 9/29/2023 (Planned)  Provider: Lisa Jaquez MD  Chemotherapy: PACLitaxeL (TAXOL) 80 mg/m2 = 210 mg in sodium chloride 0.9% 250 mL chemo infusion, 80 mg/m2 = 210 mg, Intravenous, Clinic/[facility] 1 time, 4 of 4 cycles  Administration: 210 mg (10/7/2022), 204 mg (10/14/2022), 204 mg (10/28/2022), 204 mg (11/4/2022), 204 mg (10/21/2022), 204 mg (11/11/2022), 204 mg (11/18/2022), 204 mg (11/25/2022), 198 mg (12/9/2022), 204 mg (12/2/2022), 198 mg (12/16/2022), 198 mg (12/23/2022)  pertuzumab (PERJETA) 840 mg in sodium chloride 0.9% 278 mL infusion, 840 mg, Intravenous, [facility] 1 time, 4 of 18 cycles  Administration: 840  mg (10/7/2022), 420 mg (10/28/2022), 420 mg (2022), 420 mg (2022)  trastuzumab-dkst (OGIVRI) 570 mg in sodium chloride 0.9% 250 mL chemo infusion, 593 mg (100 % of original dose 4 mg/kg), Intravenous, Clinic/[facility] 1 time, 4 of 18 cycles  Dose modification: 4 mg/kg (original dose 4 mg/kg, Cycle 1, Reason: Other (see comments), Comment: weekly trastuzumab), 2 mg/kg (original dose 2 mg/kg, Cycle 2, Reason: Other (see comments), Comment: weekly maintenance dose), 6 mg/kg (original dose 2 mg/kg, Cycle 2, Reason: MD Discretion, Comment: change to q3w dosing), 2 mg/kg (original dose 2 mg/kg, Cycle 1, Reason: Other (see comments), Comment: maintenance weekly dose)  Administration: 570 mg (10/7/2022), 840 mg (10/28/2022), 288 mg (10/14/2022), 290 mg (10/21/2022), 840 mg (2022), 831 mg (2022)          Past Medical History:   Past Medical History:   Diagnosis Date    Abnormal liver enzymes     Asymptomatic varicose veins     Breast cancer 2022    left idc    Cancer     left breast cancer    Depressive disorder, not elsewhere classified     Dyslipidemia     Embolism and thrombosis of unspecified site     superficial venous thrombosis    Encounter for long-term (current) use of other medications     Family history of ischemic heart disease     Fatty liver     Generalized anxiety disorder     History of chicken pox     Obstructive sleep apnea (adult) (pediatric)     Type II or unspecified type diabetes mellitus without mention of complication, not stated as uncontrolled     Unspecified essential hypertension     Unspecified venous (peripheral) insufficiency     Unspecified vitamin D deficiency        Past Surgical HIstory:   Past Surgical History:   Procedure Laterality Date    BREAST BIOPSY Left 2022    idc    BREAST BIOPSY Left 2022    neg ln    BREAST BIOPSY Left 2022    neg ln     SECTION      INSERTION OF TUNNELED CENTRAL VENOUS CATHETER (CVC) WITH SUBCUTANEOUS PORT Right  10/04/2022    Procedure: [id number];  Surgeon: Dominick Ng MD;  Location: [facility] OR;  Service: General;  Laterality: Right;    VEIN SURGERY      Laser, Dr. Larsen       Family History:   Family History   Problem Relation Age of Onset    Hyperlipidemia Mother     Hypertension Mother     Vulvar Cancer Mother     Diabetes Brother     Cancer Brother         colon    Stroke Maternal Grandmother        Social History:  reports that she has never smoked. She has never used smokeless tobacco. She reports that she does not currently use alcohol. She reports that she does not use drugs.    Allergies:  Review of patient's allergies indicates:   Allergen Reactions    Sitagliptin      Other reaction(s): (januvia) abdominal pain    Sulfa (sulfonamide antibiotics) Hives    Byetta  [exenatide] Rash    Lactose        Medications:  Current Outpatient Medications   Medication Sig Dispense Refill    albuterol 90 mcg/actuation inhaler Inhale 2 puffs into the lungs every 6 (six) hours as needed for Wheezing. 18 g 6    buPROPion (WELLBUTRIN XL) 150 MG TB24 tablet TAKE 1 TABLET BY MOUTH EVERY DAY 90 tablet 3    dulaglutide (TRULICITY) 3 mg/0.5 mL pen injector Inject 3 mg into the skin every 7 days. 12 pen 1    LIDOcaine-prilocaine (EMLA) cream Apply to port site 1 hour prior to port access and cover 30 g 3    magnesium 30 mg Tab Take 30 mg by mouth once.      metFORMIN (GLUCOPHAGE) 500 MG tablet TAKE 1 TABLET BY MOUTH TWICE A DAY WITH MEALS 180 tablet 1    nystatin (MYCOSTATIN) powder Apply to affected area 3 times daily 60 g 1    omeprazole (PRILOSEC OTC) 20 MG tablet Take 1 tablet (20 mg total) by mouth once daily. 30 tablet 6    omeprazole (PRILOSEC) 10 MG capsule omeprazole      ondansetron (ZOFRAN) 8 MG tablet Take 1 tablet (8 mg total) by mouth every 8 (eight) hours as needed for Nausea. 30 tablet 2    pravastatin (PRAVACHOL) 10 MG tablet TAKE 1 TABLET BY MOUTH EVERYDAY AT BEDTIME 90 tablet 1    promethazine  (PHENERGAN) 25 MG tablet Take 1 tablet (25 mg total) by mouth every 6 (six) hours as needed for Nausea. 30 tablet 0    triamcinolone acetonide 0.025% (KENALOG) 0.025 % cream Apply topically 2 (two) times daily. Apply to the skin lesions twice a day 15 g 0    ALPRAZolam (XANAX) 0.25 MG tablet TAKE ONE TABLET BY MOUTH 1-2 TIMES DAILY AS NEEDED ANXIETY 15 tablet 0    cholestyramine (QUESTRAN) 4 gram packet Take 1 packet (4 g total) by mouth 3 (three) times daily with meals. (Patient not taking: Reported on 12/9/2022) 270 packet 3    ergocalciferol (ERGOCALCIFEROL) 50,000 unit Cap TAKE 1 CAPSULE BY MOUTH ONE TIME PER WEEK 12 capsule 0    mupirocin (BACTROBAN) 2 % ointment Apply topically 3 (three) times daily. 30 g 2    nystatin (MYCOSTATIN) ointment Apply topically 3 (three) times daily. 30 g 2    telmisartan-hydrochlorothiazide (MICARDIS HCT) 80-25 mg per tablet TAKE 1 TABLET BY MOUTH EVERY DAY 90 tablet 1     No current facility-administered medications for this visit.       Review of Systems   Constitutional:  Negative for chills, fatigue and fever.   HENT:  Negative for trouble swallowing.    Respiratory:  Negative for cough and shortness of breath.    Cardiovascular:  Negative for chest pain and palpitations.   Gastrointestinal:  Positive for diarrhea. Negative for abdominal pain, constipation, nausea and vomiting.   Genitourinary:  Negative for dysuria.   Musculoskeletal:  Negative for arthralgias.   Skin:  Positive for rash.   Neurological:  Negative for headaches.   Hematological:  Negative for adenopathy.     ECOG Performance Status:   ECOG SCORE    1 - Restricted in strenuous activity-ambulatory and able to carry out work of a light nature         Objective:      Vitals:   Vitals:    12/16/22 1005   BP: 120/85   BP Location: Left arm   Patient Position: Sitting   BP Method: Large (Automatic)   Pulse: 102   Resp: 16   Temp: 98.1 °F (36.7 °C)   TempSrc: Temporal   SpO2: 96%   Weight: (!) 138.9 kg (306 lb 3.5  "oz)   Height: 5' 4" (1.626 m)     BMI: Body mass index is 52.56 kg/m².    Physical Exam  Vitals reviewed.   Constitutional:       General: She is not in acute distress.     Appearance: She is not diaphoretic.   HENT:      Head: Normocephalic and atraumatic.      Mouth/Throat:      Mouth: Mucous membranes are moist.      Pharynx: No oropharyngeal exudate.   Eyes:      General: No scleral icterus.  Cardiovascular:      Rate and Rhythm: Normal rate and regular rhythm.      Heart sounds: Normal heart sounds. No murmur heard.  Pulmonary:      Effort: Pulmonary effort is normal. No respiratory distress.      Breath sounds: No wheezing.   Musculoskeletal:      Cervical back: No tenderness.      Right lower leg: No edema.      Left lower leg: No edema.   Lymphadenopathy:      Cervical: No cervical adenopathy.   Skin:     General: Skin is warm.      Findings: Rash present.   Neurological:      Mental Status: She is alert and oriented to person, place, and time.   Psychiatric:         Behavior: Behavior normal.       Laboratory Data:  Lab Results   Component Value Date    WBC 5.08 12/23/2022    HGB 10.6 (L) 12/23/2022    HCT 30.9 (L) 12/23/2022    MCV 93 12/23/2022     12/23/2022        Assessment:       1. Diabetic mononeuropathy associated with type 2 diabetes mellitus    2. Drug-induced skin rash    3. Invasive ductal carcinoma of breast, female, left    4. Type 2 diabetes mellitus without complication, without long-term current use of insulin    5. Immunodeficiency due to drugs    6. Hypomagnesemia    7. Fatty liver        Plan:   Invasive ductal carcinoma of the breast, left  - cT1c triple positive breast cancer  (ER 84% MS 28% Her 2 3+ pos Ki 52 %), given her young age and Ki67%, will proceed with TH, adding P to help with a better pCR, mid cycle ultrasound with response to chemo, cont   -9/26/22 Echo with EF 60%, repeating in 3 months (Jan/2023)    -Proceed with C4D8 chemotherapy  today; side effect as above "      Diabetes type 2 with neuropathy   -Taking Metformin, Trulicity   -Will need to monitor for worsening neuropathy while undergoing treatment  5/10 currently on/off     Anxiety  -Taking Wellbutrin  -Following with Dr. Long     Hypomagnesemia  -Taking po supplements at home  - IV 2g of Mg prn    Drug-induced rash  -Stable to improved  -using Triamcinolone cream PRN  -Monitor     Normocytic anemia: likely due to chemo and slightly nose/rectal bleeding (mucositis)  - cont monitoring , no indication for transfusion     Patient queried and all questions were answered.    Route Chart for Scheduling    Med Onc Chart Routing      Follow up with physician 1 week. CBC/CMP/Mag   Follow up with BRANDIE 2 weeks.   Infusion scheduling note Proceed with infusion and 2g of Mag   Injection scheduling note    Labs    Imaging    Pharmacy appointment    Other referrals        Treatment Plan Information   OP BREAST PACLITAXEL TRASTUZUMAB WEEKLY WITH PERTUZUMAB Q3W (THP)   Lisa Jaquez MD   Upcoming Treatment Dates - OP BREAST PACLITAXEL TRASTUZUMAB WEEKLY WITH PERTUZUMAB Q3W (THP)    12/30/2022       Chemotherapy       pertuzumab (PERJETA) 420 mg in sodium chloride 0.9% 264 mL infusion       trastuzumab-dkst (OGIVRI) in sodium chloride 0.9% chemo infusion       Supportive Care       magnesium sulfate 2g in water 50mL IVPB (premix)  1/20/2023       Chemotherapy       pertuzumab (PERJETA) 420 mg in sodium chloride 0.9% 264 mL infusion       trastuzumab-dkst (OGIVRI) in sodium chloride 0.9% chemo infusion       Supportive Care       magnesium sulfate 2g in water 50mL IVPB (premix)  2/10/2023       Chemotherapy       pertuzumab (PERJETA) 420 mg in sodium chloride 0.9% 264 mL infusion       trastuzumab-dkst (OGIVRI) in sodium chloride 0.9% chemo infusion       Supportive Care       magnesium sulfate 2g in water 50mL IVPB (premix)  3/3/2023       Chemotherapy       pertuzumab (PERJETA) 420 mg in sodium chloride 0.9% 264 mL infusion        trastuzumab-dkst (OGIVRI) in sodium chloride 0.9% chemo infusion       Supportive Care       magnesium sulfate 2g in water 50mL IVPB (premix)

## 2022-12-16 NOTE — PLAN OF CARE
Problem: Adult Inpatient Plan of Care  Goal: Plan of Care Review  Outcome: Ongoing, Progressing  Flowsheets (Taken 12/16/2022 1130)  Plan of Care Reviewed With:   patient   son  Goal: Patient-Specific Goal (Individualized)  Outcome: Ongoing, Progressing  Flowsheets (Taken 12/16/2022 1130)  Anxieties, Fears or Concerns: None  Individualized Care Needs: Recliner, warm blanket, conversation  Patient-Specific Goals (Include Timeframe): Free of S/S of reaction with treatment.     Problem: Fatigue  Goal: Improved Activity Tolerance  Outcome: Ongoing, Progressing  Intervention: Promote Improved Energy  Flowsheets (Taken 12/16/2022 1130)  Fatigue Management:   frequent rest breaks encouraged   paced activity encouraged  Sleep/Rest Enhancement: regular sleep/rest pattern promoted  Activity Management: Ambulated -L4    Patient to Infusion for Magnesium and Taxol following appointment with the provider. Accompanied by her son. Treatment plan reviewed with patient. VSS. Tolerated treatment. Provided with copy of upcoming appointment schedule. Escorted to the front lobby for discharge to home.

## 2022-12-19 DIAGNOSIS — C50.912 INVASIVE DUCTAL CARCINOMA OF BREAST, FEMALE, LEFT: Primary | ICD-10-CM

## 2022-12-19 DIAGNOSIS — Z91.89 AT RISK FOR LYMPHEDEMA: ICD-10-CM

## 2022-12-19 DIAGNOSIS — R45.89 ANXIETY ABOUT HEALTH: ICD-10-CM

## 2022-12-19 RX ORDER — ALPRAZOLAM 0.25 MG/1
TABLET ORAL
Qty: 15 TABLET | Refills: 0 | Status: SHIPPED | OUTPATIENT
Start: 2022-12-19 | End: 2023-02-14 | Stop reason: SDUPTHER

## 2022-12-20 ENCOUNTER — PATIENT MESSAGE (OUTPATIENT)
Dept: HEMATOLOGY/ONCOLOGY | Facility: CLINIC | Age: 57
End: 2022-12-20
Payer: COMMERCIAL

## 2022-12-20 ENCOUNTER — TELEPHONE (OUTPATIENT)
Dept: HEMATOLOGY/ONCOLOGY | Facility: CLINIC | Age: 57
End: 2022-12-20
Payer: COMMERCIAL

## 2022-12-20 NOTE — TELEPHONE ENCOUNTER
I spoke with the patient and informed her of the need to schedule a pre op PT appt, recommended by Dr. Mcduffie. I explained what the appt is designed to treat and what the consult will consist of. She voiced understanding and verbalized acceptance of appt. Location was reviewed and message sent to portal if she has any further questions.

## 2022-12-21 ENCOUNTER — PATIENT MESSAGE (OUTPATIENT)
Dept: HEMATOLOGY/ONCOLOGY | Facility: CLINIC | Age: 57
End: 2022-12-21
Payer: COMMERCIAL

## 2022-12-21 DIAGNOSIS — C50.912 INVASIVE DUCTAL CARCINOMA OF BREAST, FEMALE, LEFT: ICD-10-CM

## 2022-12-21 DIAGNOSIS — B37.2 FLEXURAL CANDIDOSIS: Primary | ICD-10-CM

## 2022-12-21 RX ORDER — NYSTATIN 100000 U/G
OINTMENT TOPICAL 3 TIMES DAILY
Qty: 30 G | Refills: 2 | Status: SHIPPED | OUTPATIENT
Start: 2022-12-21 | End: 2022-12-30 | Stop reason: SDUPTHER

## 2022-12-23 ENCOUNTER — OFFICE VISIT (OUTPATIENT)
Dept: HEMATOLOGY/ONCOLOGY | Facility: CLINIC | Age: 57
End: 2022-12-23
Payer: COMMERCIAL

## 2022-12-23 ENCOUNTER — INFUSION (OUTPATIENT)
Dept: INFUSION THERAPY | Facility: HOSPITAL | Age: 57
End: 2022-12-23
Attending: STUDENT IN AN ORGANIZED HEALTH CARE EDUCATION/TRAINING PROGRAM
Payer: COMMERCIAL

## 2022-12-23 ENCOUNTER — LAB VISIT (OUTPATIENT)
Dept: LAB | Facility: HOSPITAL | Age: 57
End: 2022-12-23
Attending: STUDENT IN AN ORGANIZED HEALTH CARE EDUCATION/TRAINING PROGRAM
Payer: COMMERCIAL

## 2022-12-23 VITALS
TEMPERATURE: 98 F | RESPIRATION RATE: 16 BRPM | HEIGHT: 64 IN | HEART RATE: 115 BPM | OXYGEN SATURATION: 95 % | SYSTOLIC BLOOD PRESSURE: 118 MMHG | DIASTOLIC BLOOD PRESSURE: 70 MMHG | BODY MASS INDEX: 50.02 KG/M2 | WEIGHT: 293 LBS

## 2022-12-23 VITALS
RESPIRATION RATE: 16 BRPM | HEART RATE: 89 BPM | OXYGEN SATURATION: 95 % | TEMPERATURE: 98 F | SYSTOLIC BLOOD PRESSURE: 103 MMHG | BODY MASS INDEX: 50.02 KG/M2 | HEIGHT: 64 IN | WEIGHT: 293 LBS | DIASTOLIC BLOOD PRESSURE: 75 MMHG

## 2022-12-23 DIAGNOSIS — C50.912 INVASIVE DUCTAL CARCINOMA OF BREAST, FEMALE, LEFT: Primary | ICD-10-CM

## 2022-12-23 DIAGNOSIS — K52.1 CHEMOTHERAPY INDUCED DIARRHEA: ICD-10-CM

## 2022-12-23 DIAGNOSIS — E83.42 HYPOMAGNESEMIA: ICD-10-CM

## 2022-12-23 DIAGNOSIS — T45.1X5A CHEMOTHERAPY INDUCED DIARRHEA: ICD-10-CM

## 2022-12-23 DIAGNOSIS — I74.9 EMBOLISM AND THROMBOSIS: ICD-10-CM

## 2022-12-23 DIAGNOSIS — Z79.899 IMMUNODEFICIENCY DUE TO DRUGS: ICD-10-CM

## 2022-12-23 DIAGNOSIS — F41.1 GENERALIZED ANXIETY DISORDER: ICD-10-CM

## 2022-12-23 DIAGNOSIS — E11.41 DIABETIC MONONEUROPATHY ASSOCIATED WITH TYPE 2 DIABETES MELLITUS: ICD-10-CM

## 2022-12-23 DIAGNOSIS — C50.912 INVASIVE DUCTAL CARCINOMA OF BREAST, FEMALE, LEFT: ICD-10-CM

## 2022-12-23 DIAGNOSIS — D84.821 IMMUNODEFICIENCY DUE TO DRUGS: ICD-10-CM

## 2022-12-23 DIAGNOSIS — L27.0 DRUG-INDUCED SKIN RASH: ICD-10-CM

## 2022-12-23 LAB
ALBUMIN SERPL BCP-MCNC: 3.2 G/DL (ref 3.5–5.2)
ALP SERPL-CCNC: 92 U/L (ref 55–135)
ALT SERPL W/O P-5'-P-CCNC: 29 U/L (ref 10–44)
ANION GAP SERPL CALC-SCNC: 11 MMOL/L (ref 8–16)
AST SERPL-CCNC: 18 U/L (ref 10–40)
BILIRUB SERPL-MCNC: 0.6 MG/DL (ref 0.1–1)
BUN SERPL-MCNC: 16 MG/DL (ref 6–20)
CALCIUM SERPL-MCNC: 9.2 MG/DL (ref 8.7–10.5)
CHLORIDE SERPL-SCNC: 106 MMOL/L (ref 95–110)
CO2 SERPL-SCNC: 21 MMOL/L (ref 23–29)
CREAT SERPL-MCNC: 1 MG/DL (ref 0.5–1.4)
ERYTHROCYTE [DISTWIDTH] IN BLOOD BY AUTOMATED COUNT: 15.7 % (ref 11.5–14.5)
EST. GFR  (NO RACE VARIABLE): >60 ML/MIN/1.73 M^2
GLUCOSE SERPL-MCNC: 149 MG/DL (ref 70–110)
HCT VFR BLD AUTO: 30.9 % (ref 37–48.5)
HGB BLD-MCNC: 10.6 G/DL (ref 12–16)
IMM GRANULOCYTES # BLD AUTO: 0.08 K/UL (ref 0–0.04)
MAGNESIUM SERPL-MCNC: 1.2 MG/DL (ref 1.6–2.6)
MCH RBC QN AUTO: 31.7 PG (ref 27–31)
MCHC RBC AUTO-ENTMCNC: 34.3 G/DL (ref 32–36)
MCV RBC AUTO: 93 FL (ref 82–98)
NEUTROPHILS # BLD AUTO: 2.5 K/UL (ref 1.8–7.7)
PLATELET # BLD AUTO: 251 K/UL (ref 150–450)
PMV BLD AUTO: 9.4 FL (ref 9.2–12.9)
POTASSIUM SERPL-SCNC: 3.8 MMOL/L (ref 3.5–5.1)
PROT SERPL-MCNC: 6.6 G/DL (ref 6–8.4)
RBC # BLD AUTO: 3.34 M/UL (ref 4–5.4)
SODIUM SERPL-SCNC: 138 MMOL/L (ref 136–145)
WBC # BLD AUTO: 5.08 K/UL (ref 3.9–12.7)

## 2022-12-23 PROCEDURE — 85027 COMPLETE CBC AUTOMATED: CPT | Mod: PN | Performed by: STUDENT IN AN ORGANIZED HEALTH CARE EDUCATION/TRAINING PROGRAM

## 2022-12-23 PROCEDURE — 99215 PR OFFICE/OUTPT VISIT, EST, LEVL V, 40-54 MIN: ICD-10-PCS | Mod: S$GLB,,, | Performed by: STUDENT IN AN ORGANIZED HEALTH CARE EDUCATION/TRAINING PROGRAM

## 2022-12-23 PROCEDURE — 99999 PR PBB SHADOW E&M-EST. PATIENT-LVL V: ICD-10-PCS | Mod: PBBFAC,,, | Performed by: STUDENT IN AN ORGANIZED HEALTH CARE EDUCATION/TRAINING PROGRAM

## 2022-12-23 PROCEDURE — 96367 TX/PROPH/DG ADDL SEQ IV INF: CPT | Mod: PN

## 2022-12-23 PROCEDURE — 3078F DIAST BP <80 MM HG: CPT | Mod: CPTII,S$GLB,, | Performed by: STUDENT IN AN ORGANIZED HEALTH CARE EDUCATION/TRAINING PROGRAM

## 2022-12-23 PROCEDURE — 96375 TX/PRO/DX INJ NEW DRUG ADDON: CPT | Mod: PN

## 2022-12-23 PROCEDURE — 1160F RVW MEDS BY RX/DR IN RCRD: CPT | Mod: CPTII,S$GLB,, | Performed by: STUDENT IN AN ORGANIZED HEALTH CARE EDUCATION/TRAINING PROGRAM

## 2022-12-23 PROCEDURE — 80053 COMPREHEN METABOLIC PANEL: CPT | Mod: PN | Performed by: STUDENT IN AN ORGANIZED HEALTH CARE EDUCATION/TRAINING PROGRAM

## 2022-12-23 PROCEDURE — 3051F PR MOST RECENT HEMOGLOBIN A1C LEVEL 7.0 - < 8.0%: ICD-10-PCS | Mod: CPTII,S$GLB,, | Performed by: STUDENT IN AN ORGANIZED HEALTH CARE EDUCATION/TRAINING PROGRAM

## 2022-12-23 PROCEDURE — 99215 OFFICE O/P EST HI 40 MIN: CPT | Mod: S$GLB,,, | Performed by: STUDENT IN AN ORGANIZED HEALTH CARE EDUCATION/TRAINING PROGRAM

## 2022-12-23 PROCEDURE — 1160F PR REVIEW ALL MEDS BY PRESCRIBER/CLIN PHARMACIST DOCUMENTED: ICD-10-PCS | Mod: CPTII,S$GLB,, | Performed by: STUDENT IN AN ORGANIZED HEALTH CARE EDUCATION/TRAINING PROGRAM

## 2022-12-23 PROCEDURE — 1159F PR MEDICATION LIST DOCUMENTED IN MEDICAL RECORD: ICD-10-PCS | Mod: CPTII,S$GLB,, | Performed by: STUDENT IN AN ORGANIZED HEALTH CARE EDUCATION/TRAINING PROGRAM

## 2022-12-23 PROCEDURE — 1159F MED LIST DOCD IN RCRD: CPT | Mod: CPTII,S$GLB,, | Performed by: STUDENT IN AN ORGANIZED HEALTH CARE EDUCATION/TRAINING PROGRAM

## 2022-12-23 PROCEDURE — 25000003 PHARM REV CODE 250: Mod: PN | Performed by: STUDENT IN AN ORGANIZED HEALTH CARE EDUCATION/TRAINING PROGRAM

## 2022-12-23 PROCEDURE — 3074F SYST BP LT 130 MM HG: CPT | Mod: CPTII,S$GLB,, | Performed by: STUDENT IN AN ORGANIZED HEALTH CARE EDUCATION/TRAINING PROGRAM

## 2022-12-23 PROCEDURE — 3078F PR MOST RECENT DIASTOLIC BLOOD PRESSURE < 80 MM HG: ICD-10-PCS | Mod: CPTII,S$GLB,, | Performed by: STUDENT IN AN ORGANIZED HEALTH CARE EDUCATION/TRAINING PROGRAM

## 2022-12-23 PROCEDURE — 99999 PR PBB SHADOW E&M-EST. PATIENT-LVL V: CPT | Mod: PBBFAC,,, | Performed by: STUDENT IN AN ORGANIZED HEALTH CARE EDUCATION/TRAINING PROGRAM

## 2022-12-23 PROCEDURE — 3074F PR MOST RECENT SYSTOLIC BLOOD PRESSURE < 130 MM HG: ICD-10-PCS | Mod: CPTII,S$GLB,, | Performed by: STUDENT IN AN ORGANIZED HEALTH CARE EDUCATION/TRAINING PROGRAM

## 2022-12-23 PROCEDURE — 3008F PR BODY MASS INDEX (BMI) DOCUMENTED: ICD-10-PCS | Mod: CPTII,S$GLB,, | Performed by: STUDENT IN AN ORGANIZED HEALTH CARE EDUCATION/TRAINING PROGRAM

## 2022-12-23 PROCEDURE — 96366 THER/PROPH/DIAG IV INF ADDON: CPT | Mod: PN

## 2022-12-23 PROCEDURE — 63600175 PHARM REV CODE 636 W HCPCS: Mod: PN | Performed by: STUDENT IN AN ORGANIZED HEALTH CARE EDUCATION/TRAINING PROGRAM

## 2022-12-23 PROCEDURE — 83735 ASSAY OF MAGNESIUM: CPT | Mod: PN | Performed by: STUDENT IN AN ORGANIZED HEALTH CARE EDUCATION/TRAINING PROGRAM

## 2022-12-23 PROCEDURE — 96413 CHEMO IV INFUSION 1 HR: CPT | Mod: PN

## 2022-12-23 PROCEDURE — 36415 COLL VENOUS BLD VENIPUNCTURE: CPT | Mod: PN | Performed by: STUDENT IN AN ORGANIZED HEALTH CARE EDUCATION/TRAINING PROGRAM

## 2022-12-23 PROCEDURE — 3051F HG A1C>EQUAL 7.0%<8.0%: CPT | Mod: CPTII,S$GLB,, | Performed by: STUDENT IN AN ORGANIZED HEALTH CARE EDUCATION/TRAINING PROGRAM

## 2022-12-23 PROCEDURE — 3008F BODY MASS INDEX DOCD: CPT | Mod: CPTII,S$GLB,, | Performed by: STUDENT IN AN ORGANIZED HEALTH CARE EDUCATION/TRAINING PROGRAM

## 2022-12-23 RX ORDER — EPINEPHRINE 0.3 MG/.3ML
0.3 INJECTION SUBCUTANEOUS ONCE AS NEEDED
Status: CANCELLED | OUTPATIENT
Start: 2022-12-23

## 2022-12-23 RX ORDER — SODIUM CHLORIDE 0.9 % (FLUSH) 0.9 %
10 SYRINGE (ML) INJECTION
Status: CANCELLED | OUTPATIENT
Start: 2022-12-23

## 2022-12-23 RX ORDER — FAMOTIDINE 10 MG/ML
20 INJECTION INTRAVENOUS
Status: CANCELLED | OUTPATIENT
Start: 2022-12-23

## 2022-12-23 RX ORDER — DIPHENHYDRAMINE HYDROCHLORIDE 50 MG/ML
50 INJECTION INTRAMUSCULAR; INTRAVENOUS ONCE AS NEEDED
Status: CANCELLED | OUTPATIENT
Start: 2022-12-23

## 2022-12-23 RX ORDER — MAGNESIUM SULFATE HEPTAHYDRATE 40 MG/ML
4 INJECTION, SOLUTION INTRAVENOUS ONCE
Status: CANCELLED
Start: 2022-12-23 | End: 2022-12-23

## 2022-12-23 RX ORDER — MAGNESIUM SULFATE HEPTAHYDRATE 40 MG/ML
4 INJECTION, SOLUTION INTRAVENOUS ONCE
Status: COMPLETED | OUTPATIENT
Start: 2022-12-23 | End: 2022-12-23

## 2022-12-23 RX ORDER — SODIUM CHLORIDE 0.9 % (FLUSH) 0.9 %
10 SYRINGE (ML) INJECTION
Status: DISCONTINUED | OUTPATIENT
Start: 2022-12-23 | End: 2022-12-23 | Stop reason: HOSPADM

## 2022-12-23 RX ORDER — DIPHENHYDRAMINE HYDROCHLORIDE 50 MG/ML
50 INJECTION INTRAMUSCULAR; INTRAVENOUS ONCE AS NEEDED
Status: DISCONTINUED | OUTPATIENT
Start: 2022-12-23 | End: 2022-12-23 | Stop reason: HOSPADM

## 2022-12-23 RX ORDER — HEPARIN 100 UNIT/ML
500 SYRINGE INTRAVENOUS
Status: CANCELLED | OUTPATIENT
Start: 2022-12-23

## 2022-12-23 RX ORDER — EPINEPHRINE 0.3 MG/.3ML
0.3 INJECTION SUBCUTANEOUS ONCE AS NEEDED
Status: DISCONTINUED | OUTPATIENT
Start: 2022-12-23 | End: 2022-12-23 | Stop reason: HOSPADM

## 2022-12-23 RX ORDER — FAMOTIDINE 10 MG/ML
20 INJECTION INTRAVENOUS
Status: COMPLETED | OUTPATIENT
Start: 2022-12-23 | End: 2022-12-23

## 2022-12-23 RX ADMIN — MAGNESIUM SULFATE 4 G: 4 INJECTION INTRAVENOUS at 11:12

## 2022-12-23 RX ADMIN — PACLITAXEL 198 MG: 6 INJECTION, SOLUTION INTRAVENOUS at 02:12

## 2022-12-23 RX ADMIN — SODIUM CHLORIDE: 9 INJECTION, SOLUTION INTRAVENOUS at 11:12

## 2022-12-23 RX ADMIN — DIPHENHYDRAMINE HYDROCHLORIDE 50 MG: 50 INJECTION, SOLUTION INTRAMUSCULAR; INTRAVENOUS at 02:12

## 2022-12-23 RX ADMIN — FAMOTIDINE 20 MG: 10 INJECTION INTRAVENOUS at 01:12

## 2022-12-23 RX ADMIN — DEXAMETHASONE SODIUM PHOSPHATE 10 MG: 4 INJECTION INTRA-ARTICULAR; INTRALESIONAL; INTRAMUSCULAR; INTRAVENOUS; SOFT TISSUE at 01:12

## 2022-12-23 NOTE — PLAN OF CARE
Problem: Adult Inpatient Plan of Care  Goal: Plan of Care Review  Outcome: Ongoing, Progressing  Flowsheets (Taken 12/23/2022 1226)  Plan of Care Reviewed With:   patient   friend  Goal: Patient-Specific Goal (Individualized)  Outcome: Ongoing, Progressing  Flowsheets (Taken 12/23/2022 1226)  Anxieties, Fears or Concerns: Last day of Taxol  Individualized Care Needs: Recliner, warm blanket, conversation  Patient-Specific Goals (Include Timeframe): Free of S/S of reaction with treatment.     Problem: Fatigue  Goal: Improved Activity Tolerance  Outcome: Ongoing, Progressing  Intervention: Promote Improved Energy  Flowsheets (Taken 12/23/2022 1226)  Fatigue Management:   frequent rest breaks encouraged   paced activity encouraged  Sleep/Rest Enhancement: regular sleep/rest pattern promoted  Activity Management: Ambulated -L4    Patient to Infusion for Taxol and Magnesium following appointment with the provider. Accompanied by her friend. Treatment plan reviewed with patient. VSS. Tolerated treatment. Provided with copy of upcoming appointment schedule. Escorted to the front lobby to [facility] [place] [facility] then discharge to home.

## 2022-12-23 NOTE — PROGRESS NOTES
PATIENT: Josefina Coon  MRN: [id number]  DATE: 12/24/2022      Diagnosis:   1. Invasive ductal carcinoma of breast, female, left    2. Diabetic mononeuropathy associated with type 2 diabetes mellitus    3. Drug-induced skin rash    4. Generalized anxiety disorder    5. Hypomagnesemia    6. Immunodeficiency due to drugs    7. Embolism and thrombosis of unspecified site    8. Chemotherapy induced diarrhea        Chief Complaint: Follow-up and Breast Cancer    Subjective:   HPI: Ms. Coon is a 57 y.o. female Ms. Coon is a 57 y.o. female with HTN, HLD, depression, diabetes type 2 with neuropathy, obesity, fatty liver,  for a diagnosis of invasive ductal carcinoma of the left breast, triple positivity. Undergoing treatment with Paclitaxel/Trastuzumab/Pertuzumab on D1, weekly Paclitaxel on D8, D15 of a 21 day cycle.      She is here today today for consideration of C4D15 of chemotherapy     Improved diarrhea,   Rash on face improving, using Triamcinolone PRN. Abdominal rash getting nystatin poweder and cream   Having some nose bleeding, likely due to mucositis/allergy,sinuses   Ultrasound 11/14/22 with decrease in size of the breast mass, Surgery in Feb/2023   Neuropathy in bilateral hands 6/10, on /off, cont monitoring     Oncologic History:   8/25/22 bilateral screening mammogram,,Right neg ,Left central post mass     9/8/22 left diag [facility], left US limited .Left 0300 lateral posterior 6cm FN 1.4cm mass , left axilla LN 2, up to 4mm cortex     9/14/22 left 0300 lateral posterior 6cm FN 1.4cm mass US biopsy .Grade 3 ,1.1cm in biopsy No LVI , ER 84% AK 28% Her 2 3+ pos Ki 52 %    Left axilla LN biopsy ;Benign fatty tissue, no LN, not concordant No MRI of breasts were done      9/21/22 US bilateral complete Right neg, right LN nml , Left 0300 6cm FN [id number] mass Borderline LN left axilla     9/21/22 Left axilla LN biopsy benign LN , so eventually Stage IA triple positive Breast cancer    10/7/22: started neoadjuvant  chemo with Taxol + dual HER-2 (tranztuzumab and pertuzumab)     Oncology History   Invasive ductal carcinoma of breast, female, left   9/23/2022 Initial Diagnosis    Invasive ductal carcinoma of breast, female, left     9/23/2022 Cancer Staged    Staging form: Breast, AJCC 8th Edition  - Clinical stage from 9/23/2022: Stage IA (cT1c, cN0(f), cM0, G3, ER+, TN+, HER2+)       9/29/2022 - 9/29/2022 Chemotherapy    Treatment Summary   Plan Name: OP BREAST TRASTUZUMAB PACLITAXEL WEEKLY  Treatment Goal: Curative  Status: Inactive  Start Date:   End Date:   Provider: Lisa Jaquez MD  Chemotherapy: PACLitaxeL (TAXOL) 80 mg/m2 = 204 mg in sodium chloride 0.9% 250 mL chemo infusion, 80 mg/m2 = 204 mg, Intravenous, Clinic/[facility] 1 time, 0 of 12 cycles  pertuzumab (PERJETA) 840 mg in sodium chloride 0.9% 278 mL infusion, 840 mg (original dose ), Intravenous, Clinic/[facility] 1 time, 0 of 17 cycles  Dose modification: 840 mg (Cycle 1, Reason: Other (see comments)), 420 mg (Cycle 4, Reason: Other (see comments))  trastuzumab-dkst (OGIVRI) 591 mg in sodium chloride 0.9% 250 mL chemo infusion, 4 mg/kg = 591 mg, Intravenous, Clinic/[facility] 1 time, 0 of 25 cycles       10/7/2022 -  Chemotherapy    Treatment Summary   Plan Name: OP BREAST PACLITAXEL TRASTUZUMAB WEEKLY WITH PERTUZUMAB Q3W (THP)  Treatment Goal: Control  Status: Active  Start Date: 10/7/2022  End Date: 9/29/2023 (Planned)  Provider: Lisa Jaquez MD  Chemotherapy: PACLitaxeL (TAXOL) 80 mg/m2 = 210 mg in sodium chloride 0.9% 250 mL chemo infusion, 80 mg/m2 = 210 mg, Intravenous, Clinic/[facility] 1 time, 4 of 4 cycles  Administration: 210 mg (10/7/2022), 204 mg (10/14/2022), 204 mg (10/28/2022), 204 mg (11/4/2022), 204 mg (10/21/2022), 204 mg (11/11/2022), 204 mg (11/18/2022), 204 mg (11/25/2022), 198 mg (12/9/2022), 204 mg (12/2/2022), 198 mg (12/16/2022), 198 mg (12/23/2022)  pertuzumab (PERJETA) 840 mg in sodium chloride 0.9% 278 mL infusion, 840 mg, Intravenous, Clinic/\[facility] [place] [facility]\"" 1 time, 4 of  18 cycles  Administration: 840 mg (10/7/2022), 420 mg (10/28/2022), 420 mg (2022), 420 mg (2022)  trastuzumab-dkst (OGIVRI) 570 mg in sodium chloride 0.9% 250 mL chemo infusion, 593 mg (100 % of original dose 4 mg/kg), Intravenous, Clinic/H[facility] 1 time, 4 of 18 cycles  Dose modification: 4 mg/kg (original dose 4 mg/kg, Cycle 1, Reason: Other (see comments), Comment: weekly trastuzumab), 2 mg/kg (original dose 2 mg/kg, Cycle 2, Reason: Other (see comments), Comment: weekly maintenance dose), 6 mg/kg (original dose 2 mg/kg, Cycle 2, Reason: MD Discretion, Comment: change to q3w dosing), 2 mg/kg (original dose 2 mg/kg, Cycle 1, Reason: Other (see comments), Comment: maintenance weekly dose)  Administration: 570 mg (10/7/2022), 840 mg (10/28/2022), 288 mg (10/14/2022), 290 mg (10/21/2022), 840 mg (2022), 831 mg (2022)          Past Medical History:   Past Medical History:   Diagnosis Date    Abnormal liver enzymes     Asymptomatic varicose veins     Breast cancer 2022    left idc    Cancer     left breast cancer    Depressive disorder, not elsewhere classified     Dyslipidemia     Embolism and thrombosis of unspecified site     superficial venous thrombosis    Encounter for long-term (current) use of other medications     Family history of ischemic heart disease     Fatty liver     Generalized anxiety disorder     History of chicken pox     Obstructive sleep apnea (adult) (pediatric)     Type II or unspecified type diabetes mellitus without mention of complication, not stated as uncontrolled     Unspecified essential hypertension     Unspecified venous (peripheral) insufficiency     Unspecified vitamin D deficiency        Past Surgical HIstory:   Past Surgical History:   Procedure Laterality Date    BREAST BIOPSY Left 2022    idc    BREAST BIOPSY Left 2022    neg ln    BREAST BIOPSY Left 2022    neg ln     SECTION      INSERTION OF TUNNELED CENTRAL VENOUS CATHETER (CVC)  WITH SUBCUTANEOUS PORT Right 10/04/2022    Procedure: [id number];  Surgeon: Dominick Ng MD;  Location: [facility] OR;  Service: General;  Laterality: Right;    VEIN SURGERY      Laser, Dr. Larsen       Family History:   Family History   Problem Relation Age of Onset    Hyperlipidemia Mother     Hypertension Mother     Vulvar Cancer Mother     Diabetes Brother     Cancer Brother         colon    Stroke Maternal Grandmother        Social History:  reports that she has never smoked. She has never used smokeless tobacco. She reports that she does not currently use alcohol. She reports that she does not use drugs.    Allergies:  Review of patient's allergies indicates:   Allergen Reactions    Sitagliptin      Other reaction(s): (januvia) abdominal pain    Sulfa (sulfonamide antibiotics) Hives    Byetta  [exenatide] Rash    Lactose        Medications:  Current Outpatient Medications   Medication Sig Dispense Refill    albuterol 90 mcg/actuation inhaler Inhale 2 puffs into the lungs every 6 (six) hours as needed for Wheezing. 18 g 6    ALPRAZolam (XANAX) 0.25 MG tablet TAKE ONE TABLET BY MOUTH 1-2 TIMES DAILY AS NEEDED ANXIETY 15 tablet 0    buPROPion (WELLBUTRIN XL) 150 MG TB24 tablet TAKE 1 TABLET BY MOUTH EVERY DAY 90 tablet 3    dulaglutide (TRULICITY) 3 mg/0.5 mL pen injector Inject 3 mg into the skin every 7 days. 12 pen 1    ergocalciferol (ERGOCALCIFEROL) 50,000 unit Cap TAKE 1 CAPSULE BY MOUTH ONE TIME PER WEEK 12 capsule 0    LIDOcaine-prilocaine (EMLA) cream Apply to port site 1 hour prior to port access and cover 30 g 3    magnesium 30 mg Tab Take 30 mg by mouth once.      metFORMIN (GLUCOPHAGE) 500 MG tablet TAKE 1 TABLET BY MOUTH TWICE A DAY WITH MEALS 180 tablet 1    mupirocin (BACTROBAN) 2 % ointment Apply topically 3 (three) times daily. 30 g 2    nystatin (MYCOSTATIN) ointment Apply topically 3 (three) times daily. 30 g 2    nystatin (MYCOSTATIN) powder Apply to affected area 3 times daily 60 g  1    omeprazole (PRILOSEC OTC) 20 MG tablet Take 1 tablet (20 mg total) by mouth once daily. 30 tablet 6    omeprazole (PRILOSEC) 10 MG capsule omeprazole      ondansetron (ZOFRAN) 8 MG tablet Take 1 tablet (8 mg total) by mouth every 8 (eight) hours as needed for Nausea. 30 tablet 2    pravastatin (PRAVACHOL) 10 MG tablet TAKE 1 TABLET BY MOUTH EVERYDAY AT BEDTIME 90 tablet 1    promethazine (PHENERGAN) 25 MG tablet Take 1 tablet (25 mg total) by mouth every 6 (six) hours as needed for Nausea. 30 tablet 0    telmisartan-hydrochlorothiazide (MICARDIS HCT) 80-25 mg per tablet TAKE 1 TABLET BY MOUTH EVERY DAY 90 tablet 1    triamcinolone acetonide 0.025% (KENALOG) 0.025 % cream Apply topically 2 (two) times daily. Apply to the skin lesions twice a day 15 g 0    cholestyramine (QUESTRAN) 4 gram packet Take 1 packet (4 g total) by mouth 3 (three) times daily with meals. (Patient not taking: Reported on 12/9/2022) 270 packet 3     No current facility-administered medications for this visit.       Review of Systems   Constitutional:  Negative for chills, fatigue and fever.   HENT:  Negative for trouble swallowing.    Respiratory:  Negative for cough and shortness of breath.    Cardiovascular:  Negative for chest pain and palpitations.   Gastrointestinal:  Positive for diarrhea. Negative for abdominal pain, constipation, nausea and vomiting.   Genitourinary:  Negative for dysuria.   Musculoskeletal:  Negative for arthralgias.   Skin:  Positive for rash.   Neurological:  Negative for headaches.   Hematological:  Negative for adenopathy.     ECOG Performance Status:   ECOG SCORE    1 - Restricted in strenuous activity-ambulatory and able to carry out work of a light nature, 0 - Fully active-able to carry on all pre-disease performance without restriction         Objective:      Vitals:   Vitals:    12/23/22 1021   BP: 118/70   BP Location: Right arm   Patient Position: Sitting   BP Method: Large (Manual)   Pulse: (!) 115  "  Resp: 16   Temp: 97.9 °F (36.6 °C)   TempSrc: Temporal   SpO2: 95%   Weight: (!) 138.5 kg (305 lb 5.4 oz)   Height: 5' 4" (1.626 m)     BMI: Body mass index is 52.41 kg/m².    Physical Exam  Vitals reviewed.   Constitutional:       General: She is not in acute distress.     Appearance: She is not diaphoretic.   HENT:      Head: Normocephalic and atraumatic.      Mouth/Throat:      Mouth: Mucous membranes are moist.      Pharynx: No oropharyngeal exudate.   Eyes:      General: No scleral icterus.  Cardiovascular:      Rate and Rhythm: Normal rate and regular rhythm.      Heart sounds: Normal heart sounds. No murmur heard.  Pulmonary:      Effort: Pulmonary effort is normal. No respiratory distress.      Breath sounds: No wheezing.   Musculoskeletal:      Cervical back: No tenderness.      Right lower leg: No edema.      Left lower leg: No edema.   Lymphadenopathy:      Cervical: No cervical adenopathy.   Skin:     General: Skin is warm.      Findings: Rash present.   Neurological:      Mental Status: She is alert and oriented to person, place, and time.   Psychiatric:         Behavior: Behavior normal.       Laboratory Data:  Lab Results   Component Value Date    WBC 5.08 12/23/2022    HGB 10.6 (L) 12/23/2022    HCT 30.9 (L) 12/23/2022    MCV 93 12/23/2022     12/23/2022        Assessment:       1. Invasive ductal carcinoma of breast, female, left    2. Diabetic mononeuropathy associated with type 2 diabetes mellitus    3. Drug-induced skin rash    4. Generalized anxiety disorder    5. Hypomagnesemia    6. Immunodeficiency due to drugs    7. Embolism and thrombosis of unspecified site    8. Chemotherapy induced diarrhea        Plan:   Invasive ductal carcinoma of the breast, left  - cT1c triple positive breast cancer  (ER 84% MN 28% Her 2 3+ pos Ki 52 %), given her young age and Ki67%, will proceed with TH, adding P to help with a better pCR, mid cycle ultrasound with response to chemo, cont   -9/26/22 Echo " with EF 60%, repeating in 3 months (Jan/2023)    -Proceed with C3D15 chemotherapy  today; side effect as above      Diabetes type 2 with neuropathy   -Taking Metformin, Trulicity   -Will need to monitor for worsening neuropathy while undergoing treatment  5/10 currently on/off     Anxiety  -Taking Wellbutrin  -Following with Dr. Long     Hypomagnesemia  -Taking po supplements at home  - IV 4g of Mg    Drug-induced rash  -Stable to improved  -using Triamcinolone cream PRN  -Monitor     Normocytic anemia: likely due to chemo and slightly nose/rectal bleeding (mucositis)  - cont monitoring , no indication for transfusion     Patient queried and all questions were answered.    Route Chart for Scheduling    Med Onc Chart Routing      Follow up with physician Other. 1/20/23 (alternating MD/NP)   Follow up with BRANDIE Other. 2/10/23   Infusion scheduling note    Injection scheduling note    Labs CBC, CMP and magnesium   Lab interval:  prior to each visit   Imaging    Pharmacy appointment    Other referrals          Treatment Plan Information   OP BREAST PACLITAXEL TRASTUZUMAB WEEKLY WITH PERTUZUMAB Q3W (THP)   Lisa Jaquez MD   Upcoming Treatment Dates - OP BREAST PACLITAXEL TRASTUZUMAB WEEKLY WITH PERTUZUMAB Q3W (THP)    12/30/2022       Chemotherapy       pertuzumab (PERJETA) 420 mg in sodium chloride 0.9% 264 mL infusion       trastuzumab-dkst (OGIVRI) in sodium chloride 0.9% chemo infusion       Supportive Care       magnesium sulfate 2g in water 50mL IVPB (premix)  1/20/2023       Chemotherapy       pertuzumab (PERJETA) 420 mg in sodium chloride 0.9% 264 mL infusion       trastuzumab-dkst (OGIVRI) in sodium chloride 0.9% chemo infusion       Supportive Care       magnesium sulfate 2g in water 50mL IVPB (premix)  2/10/2023       Chemotherapy       pertuzumab (PERJETA) 420 mg in sodium chloride 0.9% 264 mL infusion       trastuzumab-dkst (OGIVRI) in sodium chloride 0.9% chemo infusion       Supportive Care        magnesium sulfate 2g in water 50mL IVPB (premix)  3/3/2023       Chemotherapy       pertuzumab (PERJETA) 420 mg in sodium chloride 0.9% 264 mL infusion       trastuzumab-dkst (OGIVRI) in sodium chloride 0.9% chemo infusion       Supportive Care       magnesium sulfate 2g in water 50mL IVPB (premix)

## 2022-12-29 ENCOUNTER — PATIENT MESSAGE (OUTPATIENT)
Dept: HEMATOLOGY/ONCOLOGY | Facility: CLINIC | Age: 57
End: 2022-12-29
Payer: COMMERCIAL

## 2022-12-30 ENCOUNTER — OFFICE VISIT (OUTPATIENT)
Dept: HEMATOLOGY/ONCOLOGY | Facility: CLINIC | Age: 57
End: 2022-12-30
Payer: COMMERCIAL

## 2022-12-30 ENCOUNTER — LAB VISIT (OUTPATIENT)
Dept: LAB | Facility: HOSPITAL | Age: 57
End: 2022-12-30
Attending: STUDENT IN AN ORGANIZED HEALTH CARE EDUCATION/TRAINING PROGRAM
Payer: COMMERCIAL

## 2022-12-30 ENCOUNTER — INFUSION (OUTPATIENT)
Dept: INFUSION THERAPY | Facility: HOSPITAL | Age: 57
End: 2022-12-30
Attending: STUDENT IN AN ORGANIZED HEALTH CARE EDUCATION/TRAINING PROGRAM
Payer: COMMERCIAL

## 2022-12-30 VITALS
HEART RATE: 85 BPM | HEIGHT: 65 IN | BODY MASS INDEX: 48.82 KG/M2 | SYSTOLIC BLOOD PRESSURE: 104 MMHG | DIASTOLIC BLOOD PRESSURE: 66 MMHG | TEMPERATURE: 96 F | WEIGHT: 293 LBS | OXYGEN SATURATION: 99 %

## 2022-12-30 VITALS
RESPIRATION RATE: 18 BRPM | TEMPERATURE: 96 F | SYSTOLIC BLOOD PRESSURE: 108 MMHG | BODY MASS INDEX: 48.82 KG/M2 | WEIGHT: 293 LBS | HEIGHT: 65 IN | HEART RATE: 84 BPM | DIASTOLIC BLOOD PRESSURE: 63 MMHG

## 2022-12-30 DIAGNOSIS — C50.912 INVASIVE DUCTAL CARCINOMA OF BREAST, FEMALE, LEFT: Primary | ICD-10-CM

## 2022-12-30 DIAGNOSIS — Z79.899 IMMUNODEFICIENCY DUE TO DRUGS: ICD-10-CM

## 2022-12-30 DIAGNOSIS — F41.1 GENERALIZED ANXIETY DISORDER: ICD-10-CM

## 2022-12-30 DIAGNOSIS — E11.41 DIABETIC MONONEUROPATHY ASSOCIATED WITH TYPE 2 DIABETES MELLITUS: ICD-10-CM

## 2022-12-30 DIAGNOSIS — L27.0 DRUG-INDUCED SKIN RASH: ICD-10-CM

## 2022-12-30 DIAGNOSIS — B37.2 FLEXURAL CANDIDOSIS: ICD-10-CM

## 2022-12-30 DIAGNOSIS — E83.42 HYPOMAGNESEMIA: ICD-10-CM

## 2022-12-30 DIAGNOSIS — B37.2 YEAST INFECTION OF THE SKIN: ICD-10-CM

## 2022-12-30 DIAGNOSIS — C50.912 INVASIVE DUCTAL CARCINOMA OF BREAST, FEMALE, LEFT: ICD-10-CM

## 2022-12-30 DIAGNOSIS — D84.821 IMMUNODEFICIENCY DUE TO DRUGS: ICD-10-CM

## 2022-12-30 LAB
ALBUMIN SERPL BCP-MCNC: 3.1 G/DL (ref 3.5–5.2)
ALP SERPL-CCNC: 81 U/L (ref 55–135)
ALT SERPL W/O P-5'-P-CCNC: 25 U/L (ref 10–44)
ANION GAP SERPL CALC-SCNC: 12 MMOL/L (ref 8–16)
AST SERPL-CCNC: 16 U/L (ref 10–40)
BILIRUB SERPL-MCNC: 0.6 MG/DL (ref 0.1–1)
BUN SERPL-MCNC: 12 MG/DL (ref 6–20)
CALCIUM SERPL-MCNC: 9.1 MG/DL (ref 8.7–10.5)
CHLORIDE SERPL-SCNC: 105 MMOL/L (ref 95–110)
CO2 SERPL-SCNC: 20 MMOL/L (ref 23–29)
CREAT SERPL-MCNC: 1.3 MG/DL (ref 0.5–1.4)
ERYTHROCYTE [DISTWIDTH] IN BLOOD BY AUTOMATED COUNT: 16.1 % (ref 11.5–14.5)
EST. GFR  (NO RACE VARIABLE): 47.4 ML/MIN/1.73 M^2
GLUCOSE SERPL-MCNC: 140 MG/DL (ref 70–110)
HCT VFR BLD AUTO: 28.3 % (ref 37–48.5)
HGB BLD-MCNC: 9.6 G/DL (ref 12–16)
IMM GRANULOCYTES # BLD AUTO: 0.07 K/UL (ref 0–0.04)
MAGNESIUM SERPL-MCNC: 1.2 MG/DL (ref 1.6–2.6)
MCH RBC QN AUTO: 31.8 PG (ref 27–31)
MCHC RBC AUTO-ENTMCNC: 33.9 G/DL (ref 32–36)
MCV RBC AUTO: 94 FL (ref 82–98)
NEUTROPHILS # BLD AUTO: 2.5 K/UL (ref 1.8–7.7)
PLATELET # BLD AUTO: 288 K/UL (ref 150–450)
PMV BLD AUTO: 9.7 FL (ref 9.2–12.9)
POTASSIUM SERPL-SCNC: 3.7 MMOL/L (ref 3.5–5.1)
PROT SERPL-MCNC: 6.5 G/DL (ref 6–8.4)
RBC # BLD AUTO: 3.02 M/UL (ref 4–5.4)
SODIUM SERPL-SCNC: 137 MMOL/L (ref 136–145)
WBC # BLD AUTO: 4.72 K/UL (ref 3.9–12.7)

## 2022-12-30 PROCEDURE — 99215 PR OFFICE/OUTPT VISIT, EST, LEVL V, 40-54 MIN: ICD-10-PCS | Mod: S$GLB,,,

## 2022-12-30 PROCEDURE — 1159F MED LIST DOCD IN RCRD: CPT | Mod: CPTII,S$GLB,,

## 2022-12-30 PROCEDURE — 25000003 PHARM REV CODE 250: Mod: PN

## 2022-12-30 PROCEDURE — 3074F SYST BP LT 130 MM HG: CPT | Mod: CPTII,S$GLB,,

## 2022-12-30 PROCEDURE — 1159F PR MEDICATION LIST DOCUMENTED IN MEDICAL RECORD: ICD-10-PCS | Mod: CPTII,S$GLB,,

## 2022-12-30 PROCEDURE — 96417 CHEMO IV INFUS EACH ADDL SEQ: CPT | Mod: PN

## 2022-12-30 PROCEDURE — 3008F BODY MASS INDEX DOCD: CPT | Mod: CPTII,S$GLB,,

## 2022-12-30 PROCEDURE — 96413 CHEMO IV INFUSION 1 HR: CPT | Mod: PN

## 2022-12-30 PROCEDURE — 83735 ASSAY OF MAGNESIUM: CPT | Mod: PN | Performed by: STUDENT IN AN ORGANIZED HEALTH CARE EDUCATION/TRAINING PROGRAM

## 2022-12-30 PROCEDURE — 96367 TX/PROPH/DG ADDL SEQ IV INF: CPT | Mod: PN

## 2022-12-30 PROCEDURE — 99999 PR PBB SHADOW E&M-EST. PATIENT-LVL IV: ICD-10-PCS | Mod: PBBFAC,,,

## 2022-12-30 PROCEDURE — 99999 PR PBB SHADOW E&M-EST. PATIENT-LVL IV: CPT | Mod: PBBFAC,,,

## 2022-12-30 PROCEDURE — A4216 STERILE WATER/SALINE, 10 ML: HCPCS | Mod: PN

## 2022-12-30 PROCEDURE — 3078F PR MOST RECENT DIASTOLIC BLOOD PRESSURE < 80 MM HG: ICD-10-PCS | Mod: CPTII,S$GLB,,

## 2022-12-30 PROCEDURE — 80053 COMPREHEN METABOLIC PANEL: CPT | Mod: PN | Performed by: STUDENT IN AN ORGANIZED HEALTH CARE EDUCATION/TRAINING PROGRAM

## 2022-12-30 PROCEDURE — 85027 COMPLETE CBC AUTOMATED: CPT | Mod: PN | Performed by: STUDENT IN AN ORGANIZED HEALTH CARE EDUCATION/TRAINING PROGRAM

## 2022-12-30 PROCEDURE — 3078F DIAST BP <80 MM HG: CPT | Mod: CPTII,S$GLB,,

## 2022-12-30 PROCEDURE — 99215 OFFICE O/P EST HI 40 MIN: CPT | Mod: S$GLB,,,

## 2022-12-30 PROCEDURE — 3008F PR BODY MASS INDEX (BMI) DOCUMENTED: ICD-10-PCS | Mod: CPTII,S$GLB,,

## 2022-12-30 PROCEDURE — 3051F PR MOST RECENT HEMOGLOBIN A1C LEVEL 7.0 - < 8.0%: ICD-10-PCS | Mod: CPTII,S$GLB,,

## 2022-12-30 PROCEDURE — 3074F PR MOST RECENT SYSTOLIC BLOOD PRESSURE < 130 MM HG: ICD-10-PCS | Mod: CPTII,S$GLB,,

## 2022-12-30 PROCEDURE — 63600175 PHARM REV CODE 636 W HCPCS: Mod: TB,PN

## 2022-12-30 PROCEDURE — 3051F HG A1C>EQUAL 7.0%<8.0%: CPT | Mod: CPTII,S$GLB,,

## 2022-12-30 PROCEDURE — 36415 COLL VENOUS BLD VENIPUNCTURE: CPT | Mod: PN | Performed by: STUDENT IN AN ORGANIZED HEALTH CARE EDUCATION/TRAINING PROGRAM

## 2022-12-30 RX ORDER — SODIUM CHLORIDE 0.9 % (FLUSH) 0.9 %
10 SYRINGE (ML) INJECTION
Status: CANCELLED | OUTPATIENT
Start: 2022-12-30

## 2022-12-30 RX ORDER — DIPHENHYDRAMINE HYDROCHLORIDE 50 MG/ML
50 INJECTION INTRAMUSCULAR; INTRAVENOUS ONCE AS NEEDED
Status: CANCELLED | OUTPATIENT
Start: 2022-12-30

## 2022-12-30 RX ORDER — MAGNESIUM SULFATE HEPTAHYDRATE 40 MG/ML
2 INJECTION, SOLUTION INTRAVENOUS ONCE
Status: CANCELLED
Start: 2022-12-30 | End: 2022-12-30

## 2022-12-30 RX ORDER — NYSTATIN 100000 [USP'U]/G
POWDER TOPICAL
Qty: 60 G | Refills: 1 | Status: SHIPPED | OUTPATIENT
Start: 2022-12-30 | End: 2023-07-07 | Stop reason: SDUPTHER

## 2022-12-30 RX ORDER — SODIUM CHLORIDE 0.9 % (FLUSH) 0.9 %
10 SYRINGE (ML) INJECTION
Status: DISCONTINUED | OUTPATIENT
Start: 2022-12-30 | End: 2022-12-30 | Stop reason: HOSPADM

## 2022-12-30 RX ORDER — FLUCONAZOLE 150 MG/1
150 TABLET ORAL DAILY
Qty: 1 TABLET | Refills: 0 | Status: SHIPPED | OUTPATIENT
Start: 2022-12-30 | End: 2022-12-31

## 2022-12-30 RX ORDER — NYSTATIN 100000 U/G
OINTMENT TOPICAL 3 TIMES DAILY
Qty: 30 G | Refills: 2 | Status: SHIPPED | OUTPATIENT
Start: 2022-12-30 | End: 2023-07-14

## 2022-12-30 RX ORDER — EPINEPHRINE 0.3 MG/.3ML
0.3 INJECTION SUBCUTANEOUS ONCE AS NEEDED
Status: DISCONTINUED | OUTPATIENT
Start: 2022-12-30 | End: 2022-12-30 | Stop reason: HOSPADM

## 2022-12-30 RX ORDER — MAGNESIUM SULFATE HEPTAHYDRATE 40 MG/ML
2 INJECTION, SOLUTION INTRAVENOUS ONCE
Status: COMPLETED | OUTPATIENT
Start: 2022-12-30 | End: 2022-12-30

## 2022-12-30 RX ORDER — DIPHENHYDRAMINE HYDROCHLORIDE 50 MG/ML
50 INJECTION INTRAMUSCULAR; INTRAVENOUS ONCE AS NEEDED
Status: DISCONTINUED | OUTPATIENT
Start: 2022-12-30 | End: 2022-12-30 | Stop reason: HOSPADM

## 2022-12-30 RX ORDER — EPINEPHRINE 0.3 MG/.3ML
0.3 INJECTION SUBCUTANEOUS ONCE AS NEEDED
Status: CANCELLED | OUTPATIENT
Start: 2022-12-30

## 2022-12-30 RX ORDER — HEPARIN 100 UNIT/ML
500 SYRINGE INTRAVENOUS
Status: CANCELLED | OUTPATIENT
Start: 2022-12-30

## 2022-12-30 RX ADMIN — PERTUZUMAB 420 MG: 30 INJECTION, SOLUTION, CONCENTRATE INTRAVENOUS at 01:12

## 2022-12-30 RX ADMIN — MAGNESIUM SULFATE IN WATER 2 G: 40 INJECTION, SOLUTION INTRAVENOUS at 11:12

## 2022-12-30 RX ADMIN — Medication 10 ML: at 02:12

## 2022-12-30 RX ADMIN — SODIUM CHLORIDE: 9 INJECTION, SOLUTION INTRAVENOUS at 11:12

## 2022-12-30 RX ADMIN — TRASTUZUMAB 831 MG: KIT at 02:12

## 2022-12-30 NOTE — PLAN OF CARE
Problem: Fatigue  Goal: Improved Activity Tolerance  Intervention: Promote Improved Energy  Flowsheets (Taken 12/30/2022 1200)  Fatigue Management:   activity schedule adjusted   frequent rest breaks encouraged   paced activity encouraged   fatigue-related activity identified  Sleep/Rest Enhancement:   relaxation techniques promoted   regular sleep/rest pattern promoted   natural light exposure provided  Activity Management:   Ambulated -L4   Ambulated to bathroom - L4   Ambulated in rajan - L4   Up in lounge/playroom - L4   Ambulated in room - L4   Up in stretcher chair - L1     Problem: Adult Inpatient Plan of Care  Goal: Patient-Specific Goal (Individualized)  Outcome: Ongoing, Progressing  Flowsheets (Taken 12/30/2022 1200)  Anxieties, Fears or Concerns: none  Individualized Care Needs: recliner, arm blanket, pillow, snacks, tv, dimmed lights  Patient-Specific Goals (Include Timeframe): no s/s of reaction during treatment

## 2022-12-30 NOTE — PROGRESS NOTES
PATIENT: Josefina Coon  MRN: [id number]  DATE: 12/30/2022      Diagnosis:   1. Invasive ductal carcinoma of breast, female, left    2. Diabetic mononeuropathy associated with type 2 diabetes mellitus    3. Drug-induced skin rash    4. Generalized anxiety disorder    5. Hypomagnesemia    6. Immunodeficiency due to drugs    7. Flexural candidosis    8. Yeast infection of the skin          Chief Complaint: Breast Cancer (2 week follow up )      Subjective:   HPI: Ms. Coon is a 57 y.o. female Ms. Coon is a 57 y.o. female with HTN, HLD, depression, diabetes type 2 with neuropathy, obesity, fatty liver, who has established care with Dr. Jaquez for a diagnosis of invasive ductal carcinoma of the left breast, triple positivity. Receiving treatment with Paclitaxel/Trastuzumab/Pertuzumab on D1, weekly Paclitaxel on D8, D15 of a 21 day cycle. Completed weekly Paclitaxel on 12/23/22.  She is here today today for consideration of C5D1 of therapy which consists of Trastuzumab/Pertuzumab every 21 days.    Plans for repeat ECHO 1/18/23  Plans for breast surgery per Dr. Mcduffie in February.   Endorses chronic neuropathy and lower extremity swelling.   Episodic diarrhea, using Imodium 1-3 times daily with good management.  Rash on face is still present but it does improve some days; using Triamcinolone PRN.   Taking OTC Mg supplements at home  Is requesting refills for Nystatin ointment and powder for yeast infection abd fold area.   Denies HA, CP, cough, mucositis, abd pain, constipation, N/V, swelling, new lumps or bumps. No fevers, chills.      Oncologic History:   8/25/22 bilateral screening mammogram,,Right neg ,Left central post mass     9/8/22 left diag [facility], left US limited .Left 0300 lateral posterior 6cm FN 1.4cm mass , left axilla LN 2, up to 4mm cortex     9/14/22 left 0300 lateral posterior 6cm FN 1.4cm mass US biopsy .Grade 3 ,1.1cm in biopsy No LVI , ER 84% FL 28% Her 2 3+ pos Ki 52 %    Left axilla LN biopsy  ;Benign fatty tissue, no LN, not concordant No MRI of breasts were done      9/21/22 US bilateral complete Right neg, right LN nml , Left 0300 6cm FN [id number] mass Borderline LN left axilla     9/21/22 Left axilla LN biopsy benign LN , so eventually Stage IA triple positive Breast cancer    10/7/22: started neoadjuvant chemo with Taxol + dual HER-2 (tranztuzumab and pertuzumab)    Oncology History   Invasive ductal carcinoma of breast, female, left   9/23/2022 Initial Diagnosis    Invasive ductal carcinoma of breast, female, left     9/23/2022 Cancer Staged    Staging form: Breast, AJCC 8th Edition  - Clinical stage from 9/23/2022: Stage IA (cT1c, cN0(f), cM0, G3, ER+, NE+, HER2+)       9/29/2022 - 9/29/2022 Chemotherapy    Treatment Summary   Plan Name: OP BREAST TRASTUZUMAB PACLITAXEL WEEKLY  Treatment Goal: Curative  Status: Inactive  Start Date:   End Date:   Provider: Lisa Jaquez MD  Chemotherapy: PACLitaxeL (TAXOL) 80 mg/m2 = 204 mg in sodium chloride 0.9% 250 mL chemo infusion, 80 mg/m2 = 204 mg, Intravenous, Clinic/HOD 1 time, 0 of 12 cycles  pertuzumab (PERJETA) 840 mg in sodium chloride 0.9% 278 mL infusion, 840 mg (original dose ), Intravenous, Clinic/HOD 1 time, 0 of 17 cycles  Dose modification: 840 mg (Cycle 1, Reason: Other (see comments)), 420 mg (Cycle 4, Reason: Other (see comments))  trastuzumab-dkst (OGIVRI) 591 mg in sodium chloride 0.9% 250 mL chemo infusion, 4 mg/kg = 591 mg, Intravenous, Clinic/HOD 1 time, 0 of 25 cycles       10/7/2022 -  Chemotherapy    Treatment Summary   Plan Name: OP BREAST PACLITAXEL TRASTUZUMAB WEEKLY WITH PERTUZUMAB Q3W (THP)  Treatment Goal: Control  Status: Active  Start Date: 10/7/2022  End Date: 9/29/2023 (Planned)  Provider: Lisa Jaquez MD  Chemotherapy: PACLitaxeL (TAXOL) 80 mg/m2 = 210 mg in sodium chloride 0.9% 250 mL chemo infusion, 80 mg/m2 = 210 mg, Intravenous, Clinic/HOD 1 time, 4 of 4 cycles  Administration: 210 mg (10/7/2022), 204 mg (10/14/2022),  204 mg (10/28/2022), 204 mg (11/4/2022), 204 mg (10/21/2022), 204 mg (11/11/2022), 204 mg (11/18/2022), 204 mg (11/25/2022), 198 mg (12/9/2022), 204 mg (12/2/2022), 198 mg (12/16/2022), 198 mg (12/23/2022)  pertuzumab (PERJETA) 840 mg in sodium chloride 0.9% 278 mL infusion, 840 mg, Intravenous, Clinic/HOD 1 time, 4 of 18 cycles  Administration: 840 mg (10/7/2022), 420 mg (10/28/2022), 420 mg (11/18/2022), 420 mg (12/9/2022)  trastuzumab-dkst (OGIVRI) 570 mg in sodium chloride 0.9% 250 mL chemo infusion, 593 mg (100 % of original dose 4 mg/kg), Intravenous, Clinic/HOD 1 time, 4 of 18 cycles  Dose modification: 4 mg/kg (original dose 4 mg/kg, Cycle 1, Reason: Other (see comments), Comment: weekly trastuzumab), 2 mg/kg (original dose 2 mg/kg, Cycle 2, Reason: Other (see comments), Comment: weekly maintenance dose), 6 mg/kg (original dose 2 mg/kg, Cycle 2, Reason: MD Discretion, Comment: change to q3w dosing), 2 mg/kg (original dose 2 mg/kg, Cycle 1, Reason: Other (see comments), Comment: maintenance weekly dose)  Administration: 570 mg (10/7/2022), 840 mg (10/28/2022), 288 mg (10/14/2022), 290 mg (10/21/2022), 840 mg (11/18/2022), 831 mg (12/9/2022)          Past Medical History:   Past Medical History:   Diagnosis Date    Abnormal liver enzymes     Asymptomatic varicose veins     Breast cancer 09/14/2022    left idc    Cancer     left breast cancer    Depressive disorder, not elsewhere classified     Dyslipidemia     Embolism and thrombosis of unspecified site     superficial venous thrombosis    Encounter for long-term (current) use of other medications     Family history of ischemic heart disease     Fatty liver     Generalized anxiety disorder     History of chicken pox     Obstructive sleep apnea (adult) (pediatric)     Type II or unspecified type diabetes mellitus without mention of complication, not stated as uncontrolled     Unspecified essential hypertension     Unspecified venous (peripheral) insufficiency      Unspecified vitamin D deficiency        Past Surgical HIstory:   Past Surgical History:   Procedure Laterality Date    BREAST BIOPSY Left 2022    idc    BREAST BIOPSY Left 2022    neg ln    BREAST BIOPSY Left 2022    neg ln     SECTION      INSERTION OF TUNNELED CENTRAL VENOUS CATHETER (CVC) WITH SUBCUTANEOUS PORT Right 10/04/2022    Procedure: BEMELCESO-KHTS-M-CATH;  Surgeon: Dominick Ng MD;  Location: [facility] OR;  Service: General;  Laterality: Right;    VEIN SURGERY      Laser, Dr. Larsen       Family History:   Family History   Problem Relation Age of Onset    Hyperlipidemia Mother     Hypertension Mother     Vulvar Cancer Mother     Diabetes Brother     Cancer Brother         colon    Stroke Maternal Grandmother        Social History:  reports that she has never smoked. She has never used smokeless tobacco. She reports that she does not currently use alcohol. She reports that she does not use drugs.    Allergies:  Review of patient's allergies indicates:   Allergen Reactions    Sitagliptin      Other reaction(s): (januvia) abdominal pain    Sulfa (sulfonamide antibiotics) Hives    Byetta  [exenatide] Rash    Lactose        Medications:  Current Outpatient Medications   Medication Sig Dispense Refill    albuterol 90 mcg/actuation inhaler Inhale 2 puffs into the lungs every 6 (six) hours as needed for Wheezing. 18 g 6    ALPRAZolam (XANAX) 0.25 MG tablet TAKE ONE TABLET BY MOUTH 1-2 TIMES DAILY AS NEEDED ANXIETY 15 tablet 0    buPROPion (WELLBUTRIN XL) 150 MG TB24 tablet TAKE 1 TABLET BY MOUTH EVERY DAY 90 tablet 3    dulaglutide (TRULICITY) 3 mg/0.5 mL pen injector Inject 3 mg into the skin every 7 days. 12 pen 1    ergocalciferol (ERGOCALCIFEROL) 50,000 unit Cap TAKE 1 CAPSULE BY MOUTH ONE TIME PER WEEK 12 capsule 0    LIDOcaine-prilocaine (EMLA) cream Apply to port site 1 hour prior to port access and cover 30 g 3    magnesium 30 mg Tab Take 30 mg by mouth once.      metFORMIN  (GLUCOPHAGE) 500 MG tablet TAKE 1 TABLET BY MOUTH TWICE A DAY WITH MEALS 180 tablet 1    mupirocin (BACTROBAN) 2 % ointment Apply topically 3 (three) times daily. 30 g 2    omeprazole (PRILOSEC OTC) 20 MG tablet Take 1 tablet (20 mg total) by mouth once daily. 30 tablet 6    omeprazole (PRILOSEC) 10 MG capsule omeprazole      ondansetron (ZOFRAN) 8 MG tablet Take 1 tablet (8 mg total) by mouth every 8 (eight) hours as needed for Nausea. 30 tablet 2    pravastatin (PRAVACHOL) 10 MG tablet TAKE 1 TABLET BY MOUTH EVERYDAY AT BEDTIME 90 tablet 1    promethazine (PHENERGAN) 25 MG tablet Take 1 tablet (25 mg total) by mouth every 6 (six) hours as needed for Nausea. 30 tablet 0    telmisartan-hydrochlorothiazide (MICARDIS HCT) 80-25 mg per tablet TAKE 1 TABLET BY MOUTH EVERY DAY 90 tablet 1    triamcinolone acetonide 0.025% (KENALOG) 0.025 % cream Apply topically 2 (two) times daily. Apply to the skin lesions twice a day 15 g 0    fluconazole (DIFLUCAN) 150 MG Tab Take 1 tablet (150 mg total) by mouth once daily. for 1 day 1 tablet 0    nystatin (MYCOSTATIN) ointment Apply topically 3 (three) times daily. 30 g 2    nystatin (MYCOSTATIN) powder Apply to affected area 3 times daily 60 g 1     No current facility-administered medications for this visit.       Review of Systems   Constitutional:  Negative for chills, fatigue and fever.   HENT:  Negative for trouble swallowing.    Respiratory:  Negative for cough and shortness of breath.    Cardiovascular:  Negative for chest pain and palpitations.   Gastrointestinal:  Positive for diarrhea. Negative for abdominal pain, constipation, nausea and vomiting.   Genitourinary:  Negative for dysuria.   Musculoskeletal:  Negative for arthralgias.   Skin:  Positive for rash.   Neurological:  Negative for headaches.   Hematological:  Negative for adenopathy.     ECOG Performance Status:   ECOG SCORE    0 - Fully active-able to carry on all pre-disease performance without restriction    "      Objective:      Vitals:   Vitals:    12/30/22 1027   BP: 104/66   BP Location: Left arm   Patient Position: Sitting   BP Method: Large (Manual)   Pulse: 85   Temp: 96.4 °F (35.8 °C)   TempSrc: Temporal   SpO2: 99%   Weight: (!) 138.5 kg (305 lb 3.6 oz)   Height: 5' 4.5" (1.638 m)       BMI: Body mass index is 51.58 kg/m².    Physical Exam  Vitals reviewed.   Constitutional:       General: She is not in acute distress.     Appearance: She is not diaphoretic.   HENT:      Head: Normocephalic and atraumatic.      Mouth/Throat:      Mouth: Mucous membranes are moist.      Pharynx: No oropharyngeal exudate.   Eyes:      General: No scleral icterus.  Cardiovascular:      Rate and Rhythm: Normal rate and regular rhythm.      Heart sounds: Normal heart sounds. No murmur heard.  Pulmonary:      Effort: Pulmonary effort is normal. No respiratory distress.      Breath sounds: No wheezing.   Musculoskeletal:      Cervical back: No tenderness.      Right lower leg: No edema.      Left lower leg: No edema.   Lymphadenopathy:      Cervical: No cervical adenopathy.   Skin:     General: Skin is warm.      Findings: Rash present.   Neurological:      Mental Status: She is alert and oriented to person, place, and time.   Psychiatric:         Behavior: Behavior normal.       Laboratory Data:  Lab Results   Component Value Date    WBC 4.72 12/30/2022    HGB 9.6 (L) 12/30/2022    HCT 28.3 (L) 12/30/2022    MCV 94 12/30/2022     12/30/2022        Assessment:       1. Invasive ductal carcinoma of breast, female, left    2. Diabetic mononeuropathy associated with type 2 diabetes mellitus    3. Drug-induced skin rash    4. Generalized anxiety disorder    5. Hypomagnesemia    6. Immunodeficiency due to drugs    7. Flexural candidosis    8. Yeast infection of the skin         Plan:   Invasive ductal carcinoma of the breast, left  - cT1c triple positive breast cancer  (ER 84% LA 28% Her 2 3+ pos Ki 52 %), given her young age and " Ki67%, will proceed with TH, adding P to help with a better pCR, will also plan to get ultrasound mid cycle to monitor (3 months)  -9/26/22 Echo with EF 60%; next is scheduled for 01/18/23  -Began TH+P on 10/7/2022; completed 12 weeks of Paclitaxel 12/23/22  -Proceed with Trastuzumab/Pertuzumab add IV mag to infusion today    -Plan for surgery per Dr. Mcduffie 2/8/22  -Plan to meet with Rad onc 3/7, Dr. Trevino and Simulation that day   -Follow up with Dr. Jaquez 1/20/23 with CBC, CMP, Mg prior to appointment      Diabetes type 2 with neuropathy   -Taking Metformin, Trulicity   -Per PCP, Monitor     Anxiety  -Taking Wellbutrin  -Following with Dr. Long     Hypomagnesemia  -Taking po supplements at home; will hold off for diarrhea if needed   -Mg is 1.2  -Will add 2G Mg to infusion today  -Monitor     Drug-induced rash  -Stable to improved  -using Triamcinolone cream PRN  -Monitor     Normocytic anemia  -likely due to chemo and slightly nose/rectal bleeding (mucositis)  -Hgb 9.6 g/dL today  -cont monitoring , no indication for transfusion    Yeast infection, skin   -will refill Nystatin powder, ointment today  -Rx for Diflucan 150 mg po x 1 dose     Patient queried and all questions were answered.  Assessment/Plan reviewed and approved by Dr. Jaquez       Route Chart for Scheduling    Med Onc Chart Routing      Follow up with physician 3 weeks. Dr. Jaquez on 1/20/23 with labs prior, treatment same day   Follow up with BRANDIE    Infusion scheduling note Proceed with Trastuzumab/Pertuzumab; Mg 2 gm today   Injection scheduling note    Labs CBC, CMP and magnesium   Lab interval:  on 1/20/23 prior to MD appointment   Imaging    Pharmacy appointment    Other referrals        Treatment Plan Information   OP BREAST PACLITAXEL TRASTUZUMAB WEEKLY WITH PERTUZUMAB Q3W (THP)   Lisa Jaquez MD   Upcoming Treatment Dates - OP BREAST PACLITAXEL TRASTUZUMAB WEEKLY WITH PERTUZUMAB Q3W (THP)    12/30/2022       Chemotherapy        pertuzumab (PERJETA) 420 mg in sodium chloride 0.9% 264 mL infusion       trastuzumab-dkst (OGIVRI) 831 mg in sodium chloride 0.9% 250 mL chemo infusion       Supportive Care       magnesium sulfate 2g in water 50mL IVPB (premix)  1/20/2023       Chemotherapy       pertuzumab (PERJETA) 420 mg in sodium chloride 0.9% 264 mL infusion       trastuzumab-dkst (OGIVRI) in sodium chloride 0.9% chemo infusion       Supportive Care       magnesium sulfate 2g in water 50mL IVPB (premix)  2/10/2023       Chemotherapy       pertuzumab (PERJETA) 420 mg in sodium chloride 0.9% 264 mL infusion       trastuzumab-dkst (OGIVRI) in sodium chloride 0.9% chemo infusion       Supportive Care       magnesium sulfate 2g in water 50mL IVPB (premix)  3/3/2023       Chemotherapy       pertuzumab (PERJETA) 420 mg in sodium chloride 0.9% 264 mL infusion       trastuzumab-dkst (OGIVRI) in sodium chloride 0.9% chemo infusion       Supportive Care       magnesium sulfate 2g in water 50mL IVPB (premix)

## 2022-12-30 NOTE — PLAN OF CARE
Problem: Adult Inpatient Plan of Care  Goal: Plan of Care Review  12/30/2022 1613 by Lilliam Vasquez, RN  Outcome: Ongoing, Progressing  Flowsheets (Taken 12/30/2022 1455)  Plan of Care Reviewed With: patient  12/30/2022 1419 by Lilliam Vasquez, RN  Outcome: Ongoing, Progressing   Pt tolerated her Perjeta and Ogiviri infusion well, NAD. No new c/o voiced. Pt given a schedule and reviewed, pt verbalized understanding. Pt ambulated out of the clinic without difficulty.

## 2023-01-18 ENCOUNTER — CLINICAL SUPPORT (OUTPATIENT)
Dept: CARDIOLOGY | Facility: HOSPITAL | Age: 58
End: 2023-01-18
Attending: STUDENT IN AN ORGANIZED HEALTH CARE EDUCATION/TRAINING PROGRAM
Payer: COMMERCIAL

## 2023-01-18 VITALS — WEIGHT: 293 LBS | HEIGHT: 64 IN | BODY MASS INDEX: 50.02 KG/M2

## 2023-01-18 DIAGNOSIS — C50.912 INVASIVE DUCTAL CARCINOMA OF BREAST, FEMALE, LEFT: ICD-10-CM

## 2023-01-18 LAB
ASCENDING AORTA: 3.29 CM
AV INDEX (PROSTH): 0.71
AV MEAN GRADIENT: 10 MMHG
AV PEAK GRADIENT: 17 MMHG
AV VALVE AREA: 2.15 CM2
AV VELOCITY RATIO: 0.63
BSA FOR ECHO PROCEDURE: 2.5 M2
CV ECHO LV RWT: 0.29 CM
DOP CALC AO PEAK VEL: 2.06 M/S
DOP CALC AO VTI: 37.2 CM
DOP CALC LVOT AREA: 3 CM2
DOP CALC LVOT DIAMETER: 1.97 CM
DOP CALC LVOT PEAK VEL: 1.3 M/S
DOP CALC LVOT STROKE VOLUME: 80.12 CM3
DOP CALCLVOT PEAK VEL VTI: 26.3 CM
E WAVE DECELERATION TIME: 151.99 MSEC
E/A RATIO: 0.84
E/E' RATIO: 11.41 M/S
ECHO LV POSTERIOR WALL: 0.78 CM (ref 0.6–1.1)
EJECTION FRACTION: 65 %
FRACTIONAL SHORTENING: 28 % (ref 28–44)
INTERVENTRICULAR SEPTUM: 0.93 CM (ref 0.6–1.1)
LA MAJOR: 5.9 CM
LA MINOR: 5.94 CM
LA WIDTH: 4.3 CM
LEFT ATRIUM SIZE: 4.25 CM
LEFT ATRIUM VOLUME INDEX: 39.3 ML/M2
LEFT ATRIUM VOLUME: 91.96 CM3
LEFT INTERNAL DIMENSION IN SYSTOLE: 3.85 CM (ref 2.1–4)
LEFT VENTRICLE DIASTOLIC VOLUME INDEX: 59.56 ML/M2
LEFT VENTRICLE DIASTOLIC VOLUME: 139.36 ML
LEFT VENTRICLE MASS INDEX: 71 G/M2
LEFT VENTRICLE SYSTOLIC VOLUME INDEX: 27.3 ML/M2
LEFT VENTRICLE SYSTOLIC VOLUME: 63.91 ML
LEFT VENTRICULAR INTERNAL DIMENSION IN DIASTOLE: 5.37 CM (ref 3.5–6)
LEFT VENTRICULAR MASS: 167.03 G
LV LATERAL E/E' RATIO: 10.78 M/S
LV SEPTAL E/E' RATIO: 12.13 M/S
LVOT MG: 3.76 MMHG
LVOT MV: 0.92 CM/S
MV PEAK A VEL: 1.16 M/S
MV PEAK E VEL: 0.97 M/S
MV STENOSIS PRESSURE HALF TIME: 44.08 MS
MV VALVE AREA P 1/2 METHOD: 4.99 CM2
PISA MRMAX VEL: 4.74 M/S
PISA TR MAX VEL: 2.65 M/S
RA MAJOR: 5.03 CM
RA PRESSURE: 3 MMHG
RA WIDTH: 3.51 CM
RIGHT VENTRICULAR END-DIASTOLIC DIMENSION: 3.5 CM
RIGHT VENTRICULAR LENGTH IN DIASTOLE (APICAL 4-CHAMBER VIEW): 7.73 CM
RV MID DIAMA: 2.17 CM
RV TISSUE DOPPLER FREE WALL SYSTOLIC VELOCITY 1 (APICAL 4 CHAMBER VIEW): 0.01 CM/S
SINUS: 2.8 CM
STJ: 2.69 CM
TDI LATERAL: 0.09 M/S
TDI SEPTAL: 0.08 M/S
TDI: 0.09 M/S
TR MAX PG: 28 MMHG
TRICUSPID ANNULAR PLANE SYSTOLIC EXCURSION: 2.83 CM
TV REST PULMONARY ARTERY PRESSURE: 31 MMHG

## 2023-01-18 PROCEDURE — 93306 TTE W/DOPPLER COMPLETE: CPT | Mod: 26,,, | Performed by: INTERNAL MEDICINE

## 2023-01-18 PROCEDURE — 93306 ECHO (CUPID ONLY): ICD-10-PCS | Mod: 26,,, | Performed by: INTERNAL MEDICINE

## 2023-01-18 PROCEDURE — 93356 ECHO (CUPID ONLY): ICD-10-PCS | Mod: ,,, | Performed by: INTERNAL MEDICINE

## 2023-01-18 PROCEDURE — 93306 TTE W/DOPPLER COMPLETE: CPT | Mod: PO

## 2023-01-18 PROCEDURE — 93356 MYOCRD STRAIN IMG SPCKL TRCK: CPT | Mod: ,,, | Performed by: INTERNAL MEDICINE

## 2023-01-20 ENCOUNTER — CLINICAL SUPPORT (OUTPATIENT)
Dept: REHABILITATION | Facility: HOSPITAL | Age: 58
End: 2023-01-20
Payer: COMMERCIAL

## 2023-01-20 ENCOUNTER — INFUSION (OUTPATIENT)
Dept: INFUSION THERAPY | Facility: HOSPITAL | Age: 58
End: 2023-01-20
Attending: STUDENT IN AN ORGANIZED HEALTH CARE EDUCATION/TRAINING PROGRAM
Payer: COMMERCIAL

## 2023-01-20 ENCOUNTER — OFFICE VISIT (OUTPATIENT)
Dept: HEMATOLOGY/ONCOLOGY | Facility: CLINIC | Age: 58
End: 2023-01-20
Payer: COMMERCIAL

## 2023-01-20 ENCOUNTER — LAB VISIT (OUTPATIENT)
Dept: LAB | Facility: HOSPITAL | Age: 58
End: 2023-01-20
Attending: STUDENT IN AN ORGANIZED HEALTH CARE EDUCATION/TRAINING PROGRAM
Payer: COMMERCIAL

## 2023-01-20 ENCOUNTER — PATIENT MESSAGE (OUTPATIENT)
Dept: REHABILITATION | Facility: HOSPITAL | Age: 58
End: 2023-01-20

## 2023-01-20 VITALS
OXYGEN SATURATION: 97 % | BODY MASS INDEX: 49.57 KG/M2 | TEMPERATURE: 97 F | HEART RATE: 83 BPM | RESPIRATION RATE: 16 BRPM | HEIGHT: 64 IN | WEIGHT: 290.38 LBS | DIASTOLIC BLOOD PRESSURE: 53 MMHG | SYSTOLIC BLOOD PRESSURE: 92 MMHG

## 2023-01-20 VITALS
DIASTOLIC BLOOD PRESSURE: 69 MMHG | HEIGHT: 64 IN | BODY MASS INDEX: 49.57 KG/M2 | OXYGEN SATURATION: 97 % | SYSTOLIC BLOOD PRESSURE: 107 MMHG | RESPIRATION RATE: 16 BRPM | HEART RATE: 72 BPM | WEIGHT: 290.38 LBS | TEMPERATURE: 97 F

## 2023-01-20 DIAGNOSIS — D84.821 IMMUNODEFICIENCY DUE TO DRUGS: ICD-10-CM

## 2023-01-20 DIAGNOSIS — C50.912 INVASIVE DUCTAL CARCINOMA OF BREAST, FEMALE, LEFT: ICD-10-CM

## 2023-01-20 DIAGNOSIS — Z79.899 IMMUNODEFICIENCY DUE TO DRUGS: ICD-10-CM

## 2023-01-20 DIAGNOSIS — C50.912 INVASIVE DUCTAL CARCINOMA OF BREAST, FEMALE, LEFT: Primary | ICD-10-CM

## 2023-01-20 DIAGNOSIS — Z91.89 AT RISK FOR LYMPHEDEMA: ICD-10-CM

## 2023-01-20 DIAGNOSIS — T45.1X5A CHEMOTHERAPY INDUCED DIARRHEA: ICD-10-CM

## 2023-01-20 DIAGNOSIS — K52.1 CHEMOTHERAPY INDUCED DIARRHEA: ICD-10-CM

## 2023-01-20 DIAGNOSIS — I74.9 EMBOLISM AND THROMBOSIS: ICD-10-CM

## 2023-01-20 DIAGNOSIS — E11.9 TYPE 2 DIABETES MELLITUS WITHOUT COMPLICATION, WITHOUT LONG-TERM CURRENT USE OF INSULIN: ICD-10-CM

## 2023-01-20 LAB
ALBUMIN SERPL BCP-MCNC: 3.5 G/DL (ref 3.5–5.2)
ALP SERPL-CCNC: 109 U/L (ref 55–135)
ALT SERPL W/O P-5'-P-CCNC: 18 U/L (ref 10–44)
ANION GAP SERPL CALC-SCNC: 14 MMOL/L (ref 8–16)
AST SERPL-CCNC: 13 U/L (ref 10–40)
BILIRUB SERPL-MCNC: 0.4 MG/DL (ref 0.1–1)
BUN SERPL-MCNC: 27 MG/DL (ref 6–20)
CALCIUM SERPL-MCNC: 9.6 MG/DL (ref 8.7–10.5)
CHLORIDE SERPL-SCNC: 108 MMOL/L (ref 95–110)
CO2 SERPL-SCNC: 16 MMOL/L (ref 23–29)
CREAT SERPL-MCNC: 2 MG/DL (ref 0.5–1.4)
ERYTHROCYTE [DISTWIDTH] IN BLOOD BY AUTOMATED COUNT: 15.9 % (ref 11.5–14.5)
EST. GFR  (NO RACE VARIABLE): 28.1 ML/MIN/1.73 M^2
GLUCOSE SERPL-MCNC: 144 MG/DL (ref 70–110)
HCT VFR BLD AUTO: 30.6 % (ref 37–48.5)
HGB BLD-MCNC: 10.6 G/DL (ref 12–16)
IMM GRANULOCYTES # BLD AUTO: 0.05 K/UL (ref 0–0.04)
MAGNESIUM SERPL-MCNC: 1.1 MG/DL (ref 1.6–2.6)
MCH RBC QN AUTO: 32.5 PG (ref 27–31)
MCHC RBC AUTO-ENTMCNC: 34.6 G/DL (ref 32–36)
MCV RBC AUTO: 94 FL (ref 82–98)
NEUTROPHILS # BLD AUTO: 4.6 K/UL (ref 1.8–7.7)
PLATELET # BLD AUTO: 244 K/UL (ref 150–450)
PMV BLD AUTO: 9.8 FL (ref 9.2–12.9)
POTASSIUM SERPL-SCNC: 3.5 MMOL/L (ref 3.5–5.1)
PROT SERPL-MCNC: 7.4 G/DL (ref 6–8.4)
RBC # BLD AUTO: 3.26 M/UL (ref 4–5.4)
SODIUM SERPL-SCNC: 138 MMOL/L (ref 136–145)
WBC # BLD AUTO: 7.5 K/UL (ref 3.9–12.7)

## 2023-01-20 PROCEDURE — 1160F RVW MEDS BY RX/DR IN RCRD: CPT | Mod: CPTII,S$GLB,, | Performed by: STUDENT IN AN ORGANIZED HEALTH CARE EDUCATION/TRAINING PROGRAM

## 2023-01-20 PROCEDURE — 1160F PR REVIEW ALL MEDS BY PRESCRIBER/CLIN PHARMACIST DOCUMENTED: ICD-10-PCS | Mod: CPTII,S$GLB,, | Performed by: STUDENT IN AN ORGANIZED HEALTH CARE EDUCATION/TRAINING PROGRAM

## 2023-01-20 PROCEDURE — 96413 CHEMO IV INFUSION 1 HR: CPT | Mod: PN

## 2023-01-20 PROCEDURE — 3008F BODY MASS INDEX DOCD: CPT | Mod: CPTII,S$GLB,, | Performed by: STUDENT IN AN ORGANIZED HEALTH CARE EDUCATION/TRAINING PROGRAM

## 2023-01-20 PROCEDURE — 1159F MED LIST DOCD IN RCRD: CPT | Mod: CPTII,S$GLB,, | Performed by: STUDENT IN AN ORGANIZED HEALTH CARE EDUCATION/TRAINING PROGRAM

## 2023-01-20 PROCEDURE — 97110 THERAPEUTIC EXERCISES: CPT | Mod: PN

## 2023-01-20 PROCEDURE — 97162 PT EVAL MOD COMPLEX 30 MIN: CPT | Mod: PN

## 2023-01-20 PROCEDURE — 96417 CHEMO IV INFUS EACH ADDL SEQ: CPT | Mod: PN

## 2023-01-20 PROCEDURE — 99999 PR PBB SHADOW E&M-EST. PATIENT-LVL IV: CPT | Mod: PBBFAC,,, | Performed by: STUDENT IN AN ORGANIZED HEALTH CARE EDUCATION/TRAINING PROGRAM

## 2023-01-20 PROCEDURE — 99215 PR OFFICE/OUTPT VISIT, EST, LEVL V, 40-54 MIN: ICD-10-PCS | Mod: S$GLB,,, | Performed by: STUDENT IN AN ORGANIZED HEALTH CARE EDUCATION/TRAINING PROGRAM

## 2023-01-20 PROCEDURE — 63600175 PHARM REV CODE 636 W HCPCS: Mod: PN | Performed by: STUDENT IN AN ORGANIZED HEALTH CARE EDUCATION/TRAINING PROGRAM

## 2023-01-20 PROCEDURE — 3074F PR MOST RECENT SYSTOLIC BLOOD PRESSURE < 130 MM HG: ICD-10-PCS | Mod: CPTII,S$GLB,, | Performed by: STUDENT IN AN ORGANIZED HEALTH CARE EDUCATION/TRAINING PROGRAM

## 2023-01-20 PROCEDURE — 96368 THER/DIAG CONCURRENT INF: CPT | Mod: PN

## 2023-01-20 PROCEDURE — 1159F PR MEDICATION LIST DOCUMENTED IN MEDICAL RECORD: ICD-10-PCS | Mod: CPTII,S$GLB,, | Performed by: STUDENT IN AN ORGANIZED HEALTH CARE EDUCATION/TRAINING PROGRAM

## 2023-01-20 PROCEDURE — 83735 ASSAY OF MAGNESIUM: CPT | Mod: PN | Performed by: STUDENT IN AN ORGANIZED HEALTH CARE EDUCATION/TRAINING PROGRAM

## 2023-01-20 PROCEDURE — 3078F DIAST BP <80 MM HG: CPT | Mod: CPTII,S$GLB,, | Performed by: STUDENT IN AN ORGANIZED HEALTH CARE EDUCATION/TRAINING PROGRAM

## 2023-01-20 PROCEDURE — 3008F PR BODY MASS INDEX (BMI) DOCUMENTED: ICD-10-PCS | Mod: CPTII,S$GLB,, | Performed by: STUDENT IN AN ORGANIZED HEALTH CARE EDUCATION/TRAINING PROGRAM

## 2023-01-20 PROCEDURE — 3078F PR MOST RECENT DIASTOLIC BLOOD PRESSURE < 80 MM HG: ICD-10-PCS | Mod: CPTII,S$GLB,, | Performed by: STUDENT IN AN ORGANIZED HEALTH CARE EDUCATION/TRAINING PROGRAM

## 2023-01-20 PROCEDURE — 36415 COLL VENOUS BLD VENIPUNCTURE: CPT | Mod: PN | Performed by: STUDENT IN AN ORGANIZED HEALTH CARE EDUCATION/TRAINING PROGRAM

## 2023-01-20 PROCEDURE — 85027 COMPLETE CBC AUTOMATED: CPT | Mod: PN | Performed by: STUDENT IN AN ORGANIZED HEALTH CARE EDUCATION/TRAINING PROGRAM

## 2023-01-20 PROCEDURE — 80053 COMPREHEN METABOLIC PANEL: CPT | Mod: PN | Performed by: STUDENT IN AN ORGANIZED HEALTH CARE EDUCATION/TRAINING PROGRAM

## 2023-01-20 PROCEDURE — 3074F SYST BP LT 130 MM HG: CPT | Mod: CPTII,S$GLB,, | Performed by: STUDENT IN AN ORGANIZED HEALTH CARE EDUCATION/TRAINING PROGRAM

## 2023-01-20 PROCEDURE — 99999 PR PBB SHADOW E&M-EST. PATIENT-LVL IV: ICD-10-PCS | Mod: PBBFAC,,, | Performed by: STUDENT IN AN ORGANIZED HEALTH CARE EDUCATION/TRAINING PROGRAM

## 2023-01-20 PROCEDURE — 99215 OFFICE O/P EST HI 40 MIN: CPT | Mod: S$GLB,,, | Performed by: STUDENT IN AN ORGANIZED HEALTH CARE EDUCATION/TRAINING PROGRAM

## 2023-01-20 PROCEDURE — 97535 SELF CARE MNGMENT TRAINING: CPT | Mod: PN

## 2023-01-20 PROCEDURE — 25000003 PHARM REV CODE 250: Mod: PN | Performed by: STUDENT IN AN ORGANIZED HEALTH CARE EDUCATION/TRAINING PROGRAM

## 2023-01-20 RX ORDER — HEPARIN 100 UNIT/ML
500 SYRINGE INTRAVENOUS
Status: CANCELLED | OUTPATIENT
Start: 2023-01-20

## 2023-01-20 RX ORDER — SODIUM CHLORIDE 0.9 % (FLUSH) 0.9 %
10 SYRINGE (ML) INJECTION
Status: CANCELLED | OUTPATIENT
Start: 2023-01-23

## 2023-01-20 RX ORDER — DIPHENHYDRAMINE HYDROCHLORIDE 50 MG/ML
50 INJECTION INTRAMUSCULAR; INTRAVENOUS ONCE AS NEEDED
Status: CANCELLED | OUTPATIENT
Start: 2023-01-20

## 2023-01-20 RX ORDER — MAGNESIUM SULFATE HEPTAHYDRATE 40 MG/ML
4 INJECTION, SOLUTION INTRAVENOUS ONCE
Status: DISCONTINUED | OUTPATIENT
Start: 2023-01-20 | End: 2023-01-20

## 2023-01-20 RX ORDER — HEPARIN 100 UNIT/ML
500 SYRINGE INTRAVENOUS
Status: CANCELLED | OUTPATIENT
Start: 2023-01-23

## 2023-01-20 RX ORDER — DIPHENHYDRAMINE HYDROCHLORIDE 50 MG/ML
50 INJECTION INTRAMUSCULAR; INTRAVENOUS ONCE AS NEEDED
Status: DISCONTINUED | OUTPATIENT
Start: 2023-01-20 | End: 2023-01-20 | Stop reason: HOSPADM

## 2023-01-20 RX ORDER — SODIUM CHLORIDE 0.9 % (FLUSH) 0.9 %
10 SYRINGE (ML) INJECTION
Status: DISCONTINUED | OUTPATIENT
Start: 2023-01-20 | End: 2023-01-20 | Stop reason: HOSPADM

## 2023-01-20 RX ORDER — SODIUM CHLORIDE 0.9 % (FLUSH) 0.9 %
10 SYRINGE (ML) INJECTION
Status: CANCELLED | OUTPATIENT
Start: 2023-01-20

## 2023-01-20 RX ORDER — EPINEPHRINE 0.3 MG/.3ML
0.3 INJECTION SUBCUTANEOUS ONCE AS NEEDED
Status: DISCONTINUED | OUTPATIENT
Start: 2023-01-20 | End: 2023-01-20 | Stop reason: HOSPADM

## 2023-01-20 RX ORDER — EPINEPHRINE 0.3 MG/.3ML
0.3 INJECTION SUBCUTANEOUS ONCE AS NEEDED
Status: CANCELLED | OUTPATIENT
Start: 2023-01-20

## 2023-01-20 RX ORDER — MAGNESIUM SULFATE HEPTAHYDRATE 40 MG/ML
4 INJECTION, SOLUTION INTRAVENOUS ONCE
Status: CANCELLED
Start: 2023-01-20 | End: 2023-01-20

## 2023-01-20 RX ADMIN — PERTUZUMAB 420 MG: 30 INJECTION, SOLUTION, CONCENTRATE INTRAVENOUS at 03:01

## 2023-01-20 RX ADMIN — TRASTUZUMAB 720 MG: 150 INJECTION, POWDER, LYOPHILIZED, FOR SOLUTION INTRAVENOUS at 04:01

## 2023-01-20 RX ADMIN — MAGNESIUM SULFATE HEPTAHYDRATE: 500 INJECTION, SOLUTION INTRAMUSCULAR; INTRAVENOUS at 03:01

## 2023-01-20 NOTE — PROGRESS NOTES
[facility]  Physical Therapy Initial Evaluation PRE-OP  Lymphedema Therapy    Visit Date: 1/20/2023     Name: Josefina Coon  Clinic Number: [id number]  Therapy Diagnosis:   Encounter Diagnoses   Name Primary?    Invasive ductal carcinoma of breast, female, left     At risk for lymphedema      Physician: Maria G Mcduffie MD  Physician Orders: PT Eval and Treat  Medical Diagnosis from Referral: Invasive Carcinoma of breast, at risk lymphedema  Chart review pertaining to cancer hx:    Cancer Staging   Invasive ductal carcinoma of breast, female, left  Staging form: Breast, AJCC 8th Edition  - Clinical stage from 9/23/2022: Stage IA (cT1c, cN0(f), cM0, G3, ER+, NM+, HER2+) - Signed by Maria G Mcduffie MD on 9/23/2022     left 0300 lateral posterior 6cm FN 1.4cm mass US biopsy  Grade 3 (3,3,2) IDC 1.1cm in biopsy  No LVI  ER 84 NM 28 Her 2 3+ pos Ki 52     2 neg left axilla LN biopsy     Triple pos 1.4cm  Undergoing THP NACT  Last taxol 12/23/22     Has all surgical options, BMI 56, DM, HTN  She would like Left mike lump, left SLNB plan 2/8/23 main OR (pt requested after 2/4/22)     xrt  Endocrine     Pt requests Echo preop. On HP. Mild PAULSON     Prehab for lymphedema prevention and BLE edema (pt request)     2/8/23 main OR left mike lump, left SLNB  Evaluation Date: 1/20/2023  Authorization: pending  Plan of Care Expiration: PT f/u 6-8 weeks post-op  Reassessment Due: 8 weeks post surgery    Visit: 1 / 2  PTA Visit: -- / 5  Time In: 01:00 PM  Time Out: 01:45 PM  Total Billable Time: 45 minutes    Precautions: Standard, cancer and anticipating radiation    Subjective     Pt reports: I have another appointment at 1:40 after this. Hoping you can help me with suggestions with the swelling in my legs too.   Dx: left breast cancer  Surgery date: 02/08/2023  Radiation: 03/07/2023 simulation, start date not determined as of yet.  Chemotherapy: 12 weeks of taxol ended a month ago, doing  immunotherapy every 3 weeks.    Pain  Location: discomfort in hands and feet from neuropathy  Current 3/10, Worst 3/10, Best 3/10   Description:ache and pains.       Past Medical History:   Past Medical History:   Diagnosis Date    Abnormal liver enzymes     Asymptomatic varicose veins     Breast cancer 2022    left idc    Cancer     left breast cancer    Depressive disorder, not elsewhere classified     Dyslipidemia     Embolism and thrombosis of unspecified site     superficial venous thrombosis    Encounter for long-term (current) use of other medications     Family history of ischemic heart disease     Fatty liver     Generalized anxiety disorder     History of chicken pox     Obstructive sleep apnea (adult) (pediatric)     Type II or unspecified type diabetes mellitus without mention of complication, not stated as uncontrolled     Unspecified essential hypertension     Unspecified venous (peripheral) insufficiency     Unspecified vitamin D deficiency        Past Surgical History:  has a past surgical history that includes  section; Vein Surgery; Insertion of tunneled central venous catheter (CVC) with subcutaneous port (Right, 10/04/2022); Breast biopsy (Left, 2022); Breast biopsy (Left, 2022); and Breast biopsy (Left, 2022).    Medications: has a current medication list which includes the following prescription(s): albuterol, alprazolam, bupropion, trulicity, ergocalciferol, lidocaine-prilocaine, magnesium, metformin, nystatin, nystatin, omeprazole, omeprazole, ondansetron, pravastatin, promethazine, telmisartan-hydrochlorothiazide, and triamcinolone acetonide 0.025%.    Allergies:   Review of patient's allergies indicates:   Allergen Reactions    Sitagliptin      Other reaction(s): (januvia) abdominal pain    Sulfa (sulfonamide antibiotics) Hives    Byetta  [exenatide] Rash    Lactose           Hand Dominance: Right  Diet:following recommended diets since cancer. Unable to  tolerate meat currently. Diabetic and avoids sugar  Habitus: overweight    Prior Therapy/Previous treatment included: No PT this calendar year.   DME owned: none  Social History: lives with her sons and her dog  Place of Residence (Steps/Adaptations): 4-5 steps into home with rails.   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  Patient's Goals: what to expect after surgery, learn how can help swelling in legs as well (have had multiple vein procedures)    Objective     Mental Status: Alert/Oriented    Observations  Posture: rounded shoulders posture  Joint Integrity: WFLs bilateral  Skin Integrity: intact bilateral  Edema: none bilateral    Sensation  Light Touch: intact bilateral  Proprioception: intact bilateral    A/PROM  (L) UE: WFLs  (R) UE: WFLs  Limitations:  none    STRENGTH  (L) UE: WFLs  (R) UE: WFLs  Limitations:  none    Baseline Measurements of BUEs  LANDMARK LEFT UE (cm) RIGHT UE (cm)   W + 16 inches 47.0  46.5   W + 12 inches 34.1 36.3   Elbow 27.0 27.1   W + 6 inches 24.9 25.8   W + 4 inches 21.6 22.5   Wrist 16.8 16.8   DPC 19.2 19.9   IP Thumb 6.5 6.6   Chest circumference    Axillary circumference    Arm Length 40.0 cm   Garments recommended:   Jobst  Lite Arm Sleeve with Silicone band, 15-20mmHg size Large/Regular length  Jobst  Lite Gauntlet 15-20mmHg size Medium  Pt to wear compression garments for the arm 2 weeks after sx.     Post Surgical Bra with compression with high axillary support.  (Example would be the Amoena Post Surgical Bra, looks like base on the band girth measure you would be a Size 44-46 )   PT to request orders from referring MD to be placed in your chart for you to print or pick  up from the [facility].     Functional Mobility   Bed mobility: independent   Roll to left: independent   Roll to right: independent   Supine to prone: independent   Scooting to edge of bed: independent   Supine to sit: independent   Sit to supine: independent   Transfers to bed: independent   Transfers to toilet: independent   Sit to stand: independent   Stand pivot: independent   Car transfers: independent     Gait Assessment  AD used: none  Assistance: independent  Distance: community distances  Endurance: WFL     Gait Pattern: WFL       Treatment/Education     Treatment Time In: 01:00 PM  Treatment Time Out: 01:45 PM  Total Treatment time separate from Evaluation: 30 minutes      Self-Care/Home Management to improve behavioral/activity modifications related to ADLs, compensatory training, safety procedures, and adaptive equipment for 20 minutes including:  Garments: PT recommend wearing garments for one year per guidelines for prophylactical assist to decrease risk for lymphedema  Skin care  Weight management  Sleep  Nutrition  Infection prevention      Therapeutic Exercise to develop ROM, flexibility, and posture for 10 minutes including:  Surgical protocol for position recommendations  Diaphragmatic Breathing  Exercises by day, complete with pictures of exercises and description for safe progression of motion    Education: Instructed on general anatomy/physiology, lymphedema information (definitions, signs, symptoms, precautions), role of therapy in multi-disciplinary team, purpose of lymphedema physical therapy and the benefits/risks of treatment, risks of refusing treatment, POC, and goals for therapy were discussed with the pt.    Written Home Exercises Provided: yes.   Exercises were reviewed and Josefina was able to demonstrate them prior to the end of the session. Josefina demonstrated good  understanding of the education provided.     See EMR under Patient Instructions for exercises provided  1/20/2023.    Assessment     Josefina is a 58 y.o. female referred to outpatient physical therapy with a medical diagnosis of left breast cancer with surgical procedure planned on 02/08/2023, radiation simulation scheduled for 03/07/2023. Pt was seen today pre-operatively to assess strength and ROM of BUEs, to take baseline circumferential measurements of BUEs to aid in the early detection of lymphedema post-operatively, and to provide pt education on exercises/precautions post-operative. Pt does not exhibit any UE ROM impairments currently.   Due to time constraint as pt needing to leave early for another appointment, LE assessment not fully performed. Patient would benefit form LE lymphedema assessment, education on management and measurement for compression garments.     This pt will benefit from skilled PT for reassessment of baseline measurements 6-8 week post-operatively and to address future impairments following surgery such as pain, limited ROM, or decreased mobility. Will need to wait until pt is medically cleared to determine if pt is safe for MLD, pneumatic compression pump, or lymphatouch treatments.          Plan of care discussed with patient: Yes  Pt's spiritual, cultural and educational needs considered and patient is agreeable to the plan of care and goals as stated below:     Anticipated barriers for therapy:  pt anticipating radiation after surgery    Medical Necessity is demonstrated by the following:  History  Co-morbidities and personal factors that may impact the plan of care Co-morbidities:   diabetes, high BMI, history of cancer, immunosuppression, and level of undertstanding of current condition    Personal Factors:   level of understanding of current condition     moderate   Examination  Body Structures and Functions, activity limitations and participation restrictions that may impact the plan of care Body Systems:    No UE deficits    Activity limitations:   Mobility  Limited  endurance    Self care  no deficits    Domestic Life  no deficits    Participation Restrictions:   none         low   Clinical Presentation evolving clinical presentation with changing clinical characteristics moderate   Decision Making/ Complexity Score: moderate       GOALS  Short Term Goals: 3 months  Pt to be seen for reassessment in 6-8 weeks after surgery.  Pt will demonstrate 100% knowledge of lymphedema precautions and signs of infection.  Pt to obtain compression garments for prophylactic concerns according to APTA clinical guidelines published in Journal of Physical Therapy.    Long Term Goals: deferred    Plan   PT to request orders for compression garments. PT to send pt message via in basket regarding recommended compression garments.   Plan of Care: 1/20/2023 to 04/20/2023.    Patient to be seen in 6-8 weeks following surgical date for 1-2 visits for reassessment. Patient will benefit from Outpatient Physical Therapy services which may include the following interventions: patient education, HEP, therapeutic exercises, neuromuscular re-education, therapeutic activity, manual therapy, self care/home management, modalities, gait training, decongestive massage, multi-layered bandaging, self massage, self bandaging, and assistance in obtaining appropriate compression garment.      If pt is to undergo XRT, POC must exclude MLD, pneumatic compression pump, and lymphatouch to any radiated area.       Catalina Cerda, PT , CLT

## 2023-01-20 NOTE — PLAN OF CARE
Problem: Adult Inpatient Plan of Care  Goal: Plan of Care Review  Outcome: Ongoing, Progressing  Flowsheets (Taken 1/20/2023 1515)  Plan of Care Reviewed With: patient  Goal: Patient-Specific Goal (Individualized)  Outcome: Ongoing, Progressing  Flowsheets (Taken 1/20/2023 1515)  Anxieties, Fears or Concerns: None  Individualized Care Needs: Recliner, warm blanket, pillow, snacks, tv, dimmed lights, conversation     Problem: Fatigue  Goal: Improved Activity Tolerance  Outcome: Ongoing, Progressing  Intervention: Promote Improved Energy  Flowsheets (Taken 1/20/2023 1515)  Fatigue Management:   frequent rest breaks encouraged   paced activity encouraged  Sleep/Rest Enhancement: regular sleep/rest pattern promoted  Activity Management: Ambulated -L4   Patient to Infusion for Perjeta, Ogivri, and Magnesium w IVFs following appointment with the provider. Treatment plan reviewed with patient. VSS. Tolerated treatment. Provided with copy of upcoming appointment schedule. Escorted to the front lobby for discharge to home.

## 2023-01-20 NOTE — PATIENT INSTRUCTIONS
[facility]  [facility]  (need appointment)  [address] [place] [address] [phone]    [facility]  [address], [place] [address]  [phone]    Garments recommended:   Jobst  Lite Arm Sleeve with Silicone band, 15-20mmHg size Large/Regular length  Jobst  Lite Gauntlet 15-20mmHg size Medium  Pt to wear compression garments for the arm 2 weeks after sx.   Recommend to wear prophylatically up to one year.     Post Surgical Bra with compression with high axillary support.    (Example would be the Amoena Post Surgical Bra, looks like base on the band girth measure you would be a Size 44-46 )       Post Operative Breast Cancer Surgery Exercises    After surgery your shoulder and chest may feel tight and sore. Movement may cause stretching across your chest, axilla (armpit), and the incision site limiting your ability to raise your arm. Exercise will be extremely important in preventing swelling and in helping you regain movement, strength, and use of your arm.     Your post-operative exercise program can be divided into three stages. Your surgeon will inform you when to move to the next stage.     STAGE TIME PURPOSE EXERCISE   I Post-op day 1 to 4  (Drains are in) To prevent and/or reduce swelling - Positioning  - Hand and arm precautions   II Post-op day 5 to 14  (After drains are removed) To provide gentle movement without much stretching  - Shoulder shrugs  - Shoulder retraction   III Post-op day 15-6 wks  (After suture are removed) To stretch and regain full motion - Wall ladder  - Range of motion exercises  - Wand exercises       These exercises are general guideline for use following a mastectomy. Please consult your physician for additional information. Of a tissue expander has been placed, or if you have had reconstruction, you must get approval from your physician before beginning exercises.   Check with  your physician prior to beginning a new stage.  Avoid elbows above shoulders until after post-op day 14.    STAGE 1      Abdominal Breathing Exercises      Get comfortable and relax your neck and shoulders. You can sit or lie down. Place one hand on your upper chest and place the other hand on your belly button. Use your hands to feel the movements as you breathe in and out.  Take a deep breath in through your nose and feel the hand on your stomach move out. Do not let your shoulders move up. The hand on your chest should not move.   Breathe out slow and gentle through your mouth. Pucker your lips as if you were going to whistle or blow out a candle. The hand on your stomach should move in as you breathe out. Breathe out as long as you can until all the air is gone.   To help keep the lymphatic system moving well, practice two breaths every hour using the steps for abdominal breathing exercises.        Positioning    Several times a day, elevate your arm on pillows so your hand is above the level of your heart. This position will allow gravity to drain excess fluids out of your hand and arm. Try to place pillows under involved arm while in sitting position or while laying down.                STAGE 2    Shoulder Shrugs Shoulder Retraction    While sitting with arms supported, shrug both of your shoulder and then relax. Repeat 10 times, 3 times a day.   While sitting or standing, pull both shoulder back, bringing shoulder blades together. Repeat 10 times,   3 times a day.        STAGE 3  Range of Motion Exercises    To retain full motion of your shoulder, it must be moved in all planes, several times a day. Begin arm range of motion exercises with 10 reps of each, 2 times a day. Progress to 3 x 10 reps, as tolerated 2 times a day.    Wand - Shoulder Flexion       Lay on your back in a comfortable position. Raise both arms overhead, then bring back down by your side.        Wand - Shoulder Abduction       Lay on your  back in a comfortable position. Raise both arms out to your side, then bring back down by your side.        Wand - Shoulder External Rotation      Lay on your back in a comfortable position. Position your arms as pictured below, with your elbows bent and your hand in the arm. Slowly lower your hand down, then bring back up.        Wand - Shoulder Flexion      Hold onto a cane or broom with both hands approximately 14 inches apart with arms straight at your side. Raise the cane forward and upwards as far as you can go or until your elbow is near your ear. Then gradually return to the original position.        Wand - Shoulder Abduction      Hold onto the ends of a cane with both hands, then raise the involved arm sideways and upward over your head with the aid of the cane and the good arm. Keep trunk straight! Then gradually return to original position.        Wand - Shoulder External Rotation      Holding onto a cane with both hands approximately 14 inches apart at shoulder level, turn the cane clockwise and then counterclockwise as far as possible.        Wall Climbing - Shoulder Flexion      Stand facing the wall and extend the involved arm directly in front of you so that your fingertips touch the wall (at arm's length away from the wall). Creep up the side of the wall with your fingertips, taking a step toward the wall as you reach higher and higher up the wall. Repeat this procedure going down the wall, but taking a step backward this time. Begin with 5 wall climbs, then progress to 10 reps at a time, 1-2 times a day.        Wall Climbing - Shoulder Abduction     ([email or web address]  [email or web address]-[email or web address]-[email or web address]/) Repeat the above procedure, but this time position yourself perpendicular (at a right angle) to the wall so that the involved arm will extend sideways up the wall. Keep your trunk straight, not leaning toward the wall or shrugging your shoulder. Place a desean (tape) on the wall  each week to see if you are making progress. Begin with 5 wall climbs, then progress to 10 reps at a time, 1-2 times a day.        PRECAUTIONS    As a result of the removal of lymph nodes and vessels, your arm is susceptible to infection and swelling. A hot, reddened or swollen arm denotes the presence of infection and should be brought to the attention of your physician immediately. Try to avoid cuts, scratches, pinpricks, hangnails, sunburns, insect bites, chemical irritation, and irritating detergent.    The following are helpful hints:    Avoid injections, blood draws, blood pressure, and IVs to be done to your involved arm. If you have undergone axillary dissection, then consider using the opposite only for injections, blood draws, blood pressure, and IVs.  Prevent chapping of your hand and arm by applying lotion liberally several times a day. Recommend Eucerin lotion, No massages to affected upper extremity if you have had lymph nodes dissection or radiation to lymph nodes.   Do not cut nails too close to the quick. Do not cut or pick your cuticles. Use cuticle cream or remover.  Avoid carrying heavy objects (over 5 lbs) with your involved arm.   Protect your arm from burns with small electrical appliances such as irons, curling irons, and frying pans.   Wear sunscreen in the sun.  Apply insect repellent when going to areas where you might be exposed to insect bites.   Use an electric razor for shaving.   Wear loose fitting work/rubber gloves when washing dishes, using , or using steel wool.  Wear garden gloves when gardening or arranging thorn flowers.  Do not wear tight jewelry on your affected arm.  Do not wear narrow tight straps on clothing or under garments.    IMPORTANT    If you do cut, burn, or estes the skin, wash the area and use an antiseptic. If it becomes red, warm, or unusually hard or swollen, contact your physician immediately.     If there is swelling without redness, increased  warmth or hardness, the positioning and pumping exercises as described above can help decrease the swelling. Position your hand higher than the elbow, elbow higher than the shoulder, and shoulder higher than your heart. Maintain this position for 30 minutes. Repeat as necessary.     OCCUPATIONAL AND PHYSICAL THERAPY    Most women have enough motion in their involved arm to perform normal activities within 2 months after surgery. Any post-operative swelling or pain has probably disappeared. However; some women may develop persistent stiffness, weakness, pain, or swelling. If this is your experience, call your physician. He or she may recommend that a physical or occupational therapist work with you to minimize your problems.     CONCLUSION    Besides your exercise program, your daily routine at home can be continued and will be beneficial toward your recovery:  Getting dressed every day  Daily grooming  Cooking and light housework  Being active with family and friends    REMINDER  Pace yourself!  Strive for a balance between work and relaxation  Avoid excessive pulling and lifting which may strain your shoulder

## 2023-01-20 NOTE — PROGRESS NOTES
PATIENT: Josefina Coon  MRN: [id number]  DATE: 1/28/2023      Diagnosis:   1. Invasive ductal carcinoma of breast, female, left    2. Immunodeficiency due to drugs    3. Embolism and thrombosis of unspecified site    4. Type 2 diabetes mellitus without complication, without long-term current use of insulin    5. Chemotherapy induced diarrhea        Chief Complaint: Invasive ductal carcinoma of breast (3 week follow up with labs) and Breast Cancer      Subjective:   HPI: Ms. Coon is a 58 y.o. female Ms. Coon is a 57 y.o. female with HTN, HLD, depression, diabetes type 2 with neuropathy, obesity, fatty liver, who has established care with Dr. Jaquez for a diagnosis of invasive ductal carcinoma of the left breast, triple positivity. Receiving treatment with Paclitaxel/Trastuzumab/Pertuzumab on D1, weekly Paclitaxel on D8, D15 of a 21 day cycle. Completed weekly Paclitaxel on 12/23/22.  She is here today today for consideration of C6D1 of therapy which consists of Trastuzumab/Pertuzumab every 21 days.      Plans for breast surgery per Dr. Mcduffie in February.   Endorses chronic neuropathy and lower extremity swelling.   Episodic diarrhea, using Imodium 1-3 times daily with good management.  Rash on face is still present but it does improve some days; using Triamcinolone PRN.   Taking OTC Mg supplements at home  Is requesting refills for Nystatin ointment and powder for yeast infection abd fold area.   Denies HA, CP, cough, mucositis, abd pain, constipation, N/V, swelling, new lumps or bumps. No fevers, chills.      Oncologic History:   8/25/22 bilateral screening mammogram,,Right neg ,Left central post mass     9/8/22 left diag [facility], left US limited .Left 0300 lateral posterior 6cm FN 1.4cm mass , left axilla LN 2, up to 4mm cortex     9/14/22 left 0300 lateral posterior 6cm FN 1.4cm mass US biopsy .Grade 3 ,1.1cm in biopsy No LVI , ER 84% AK 28% Her 2 3+ pos Ki 52 %    Left axilla LN biopsy ;Benign fatty tissue,  no LN, not concordant No MRI of breasts were done      9/21/22 US bilateral complete Right neg, right LN nml , Left 0300 6cm FN [id number] mass Borderline LN left axilla     9/21/22 Left axilla LN biopsy benign LN , so eventually Stage IA triple positive Breast cancer    10/7/22: started neoadjuvant chemo with Taxol + dual HER-2 (tranztuzumab and pertuzumab)    Oncology History   Invasive ductal carcinoma of breast, female, left   9/23/2022 Initial Diagnosis    Invasive ductal carcinoma of breast, female, left     9/23/2022 Cancer Staged    Staging form: Breast, AJCC 8th Edition  - Clinical stage from 9/23/2022: Stage IA (cT1c, cN0(f), cM0, G3, ER+, AL+, HER2+)       9/29/2022 - 9/29/2022 Chemotherapy    Treatment Summary   Plan Name: OP BREAST TRASTUZUMAB PACLITAXEL WEEKLY  Treatment Goal: Curative  Status: Inactive  Start Date:   End Date:   Provider: Lisa Jaquez MD  Chemotherapy: PACLitaxeL (TAXOL) 80 mg/m2 = 204 mg in sodium chloride 0.9% 250 mL chemo infusion, 80 mg/m2 = 204 mg, Intravenous, Clinic/HOD 1 time, 0 of 12 cycles  pertuzumab (PERJETA) 840 mg in sodium chloride 0.9% 278 mL infusion, 840 mg (original dose ), Intravenous, Clinic/HOD 1 time, 0 of 17 cycles  Dose modification: 840 mg (Cycle 1, Reason: Other (see comments)), 420 mg (Cycle 4, Reason: Other (see comments))  trastuzumab-dkst (OGIVRI) 591 mg in sodium chloride 0.9% 250 mL chemo infusion, 4 mg/kg = 591 mg, Intravenous, Clinic/HOD 1 time, 0 of 25 cycles       10/7/2022 -  Chemotherapy    Treatment Summary   Plan Name: OP BREAST PACLITAXEL TRASTUZUMAB WEEKLY WITH PERTUZUMAB Q3W (THP)  Treatment Goal: Control  Status: Active  Start Date: 10/7/2022  End Date: 9/29/2023 (Planned)  Provider: Lisa Jaquez MD  Chemotherapy: PACLitaxeL (TAXOL) 80 mg/m2 = 210 mg in sodium chloride 0.9% 250 mL chemo infusion, 80 mg/m2 = 210 mg, Intravenous, Clinic/HOD 1 time, 4 of 4 cycles  Administration: 210 mg (10/7/2022), 204 mg (10/14/2022), 204 mg (10/28/2022),  204 mg (11/4/2022), 204 mg (10/21/2022), 204 mg (11/11/2022), 204 mg (11/18/2022), 204 mg (11/25/2022), 198 mg (12/9/2022), 204 mg (12/2/2022), 198 mg (12/16/2022), 198 mg (12/23/2022)  pertuzumab (PERJETA) 840 mg in sodium chloride 0.9% 278 mL infusion, 840 mg, Intravenous, Clinic/HOD 1 time, 6 of 18 cycles  Administration: 840 mg (10/7/2022), 420 mg (10/28/2022), 420 mg (11/18/2022), 420 mg (12/9/2022), 420 mg (12/30/2022), 420 mg (1/20/2023)  trastuzumab-dkst (OGIVRI) 570 mg in sodium chloride 0.9% 250 mL chemo infusion, 593 mg (100 % of original dose 4 mg/kg), Intravenous, Clinic/HOD 1 time, 6 of 18 cycles  Dose modification: 4 mg/kg (original dose 4 mg/kg, Cycle 1, Reason: Other (see comments), Comment: weekly trastuzumab), 2 mg/kg (original dose 2 mg/kg, Cycle 2, Reason: Other (see comments), Comment: weekly maintenance dose), 6 mg/kg (original dose 2 mg/kg, Cycle 2, Reason: MD Discretion, Comment: change to q3w dosing), 2 mg/kg (original dose 2 mg/kg, Cycle 1, Reason: Other (see comments), Comment: maintenance weekly dose)  Administration: 570 mg (10/7/2022), 840 mg (10/28/2022), 288 mg (10/14/2022), 290 mg (10/21/2022), 840 mg (11/18/2022), 831 mg (12/9/2022), 831 mg (12/30/2022), 720 mg (1/20/2023)          Past Medical History:   Past Medical History:   Diagnosis Date    Abnormal liver enzymes     Asymptomatic varicose veins     Breast cancer 09/14/2022    left idc    Cancer     left breast cancer    Depressive disorder, not elsewhere classified     Dyslipidemia     Embolism and thrombosis of unspecified site     superficial venous thrombosis    Encounter for long-term (current) use of other medications     Family history of ischemic heart disease     Fatty liver     Generalized anxiety disorder     History of chicken pox     Obstructive sleep apnea (adult) (pediatric)     Type II or unspecified type diabetes mellitus without mention of complication, not stated as uncontrolled     Unspecified essential  hypertension     Unspecified venous (peripheral) insufficiency     Unspecified vitamin D deficiency        Past Surgical HIstory:   Past Surgical History:   Procedure Laterality Date    BREAST BIOPSY Left 2022    idc    BREAST BIOPSY Left 2022    neg ln    BREAST BIOPSY Left 2022    neg ln     SECTION      INSERTION OF TUNNELED CENTRAL VENOUS CATHETER (CVC) WITH SUBCUTANEOUS PORT Right 10/04/2022    Procedure: [id number];  Surgeon: Dominick Ng MD;  Location: [facility] OR;  Service: General;  Laterality: Right;    VEIN SURGERY      Laser, Dr. Larsen       Family History:   Family History   Problem Relation Age of Onset    Hyperlipidemia Mother     Hypertension Mother     Vulvar Cancer Mother     Diabetes Brother     Cancer Brother         colon    Stroke Maternal Grandmother        Social History:  reports that she has never smoked. She has never used smokeless tobacco. She reports that she does not currently use alcohol. She reports that she does not use drugs.    Allergies:  Review of patient's allergies indicates:   Allergen Reactions    Sitagliptin      Other reaction(s): (januvia) abdominal pain    Sulfa (sulfonamide antibiotics) Hives    Byetta  [exenatide] Rash    Lactose        Medications:  Current Outpatient Medications   Medication Sig Dispense Refill    albuterol 90 mcg/actuation inhaler Inhale 2 puffs into the lungs every 6 (six) hours as needed for Wheezing. 18 g 6    ALPRAZolam (XANAX) 0.25 MG tablet TAKE ONE TABLET BY MOUTH 1-2 TIMES DAILY AS NEEDED ANXIETY 15 tablet 0    buPROPion (WELLBUTRIN XL) 150 MG TB24 tablet TAKE 1 TABLET BY MOUTH EVERY DAY 90 tablet 3    ergocalciferol (ERGOCALCIFEROL) 50,000 unit Cap TAKE 1 CAPSULE BY MOUTH ONE TIME PER WEEK 12 capsule 0    LIDOcaine-prilocaine (EMLA) cream Apply to port site 1 hour prior to port access and cover 30 g 3    metFORMIN (GLUCOPHAGE) 500 MG tablet TAKE 1 TABLET BY MOUTH TWICE A DAY WITH MEALS 180 tablet 1     nystatin (MYCOSTATIN) ointment Apply topically 3 (three) times daily. 30 g 2    nystatin (MYCOSTATIN) powder Apply to affected area 3 times daily 60 g 1    omeprazole (PRILOSEC OTC) 20 MG tablet Take 1 tablet (20 mg total) by mouth once daily. 30 tablet 6    pravastatin (PRAVACHOL) 10 MG tablet TAKE 1 TABLET BY MOUTH EVERYDAY AT BEDTIME 90 tablet 1    telmisartan-hydrochlorothiazide (MICARDIS HCT) 80-25 mg per tablet TAKE 1 TABLET BY MOUTH EVERY DAY 90 tablet 1    triamcinolone acetonide 0.025% (KENALOG) 0.025 % cream Apply topically 2 (two) times daily. Apply to the skin lesions twice a day 15 g 0    dulaglutide (TRULICITY) 3 mg/0.5 mL pen injector Inject 3 mg into the skin every 7 days. (Patient not taking: Reported on 1/20/2023) 12 pen 1    magnesium 30 mg Tab Take 30 mg by mouth once.      omeprazole (PRILOSEC) 10 MG capsule omeprazole      ondansetron (ZOFRAN) 8 MG tablet Take 1 tablet (8 mg total) by mouth every 8 (eight) hours as needed for Nausea. (Patient not taking: Reported on 1/20/2023) 30 tablet 2    promethazine (PHENERGAN) 25 MG tablet Take 1 tablet (25 mg total) by mouth every 6 (six) hours as needed for Nausea. (Patient not taking: Reported on 1/20/2023) 30 tablet 0     No current facility-administered medications for this visit.       Review of Systems   Constitutional:  Negative for chills, fatigue and fever.   HENT:  Negative for trouble swallowing.    Respiratory:  Negative for cough and shortness of breath.    Cardiovascular:  Negative for chest pain and palpitations.   Gastrointestinal:  Positive for diarrhea. Negative for abdominal pain, constipation, nausea and vomiting.   Genitourinary:  Negative for dysuria.   Musculoskeletal:  Negative for arthralgias.   Skin:  Positive for rash.   Neurological:  Negative for headaches.   Hematological:  Negative for adenopathy.     ECOG Performance Status:   ECOG SCORE    1 - Restricted in strenuous activity-ambulatory and able to carry out work of a  "light nature         Objective:      Vitals:   Vitals:    01/20/23 1359   BP: 107/69   BP Location: Right arm   Patient Position: Sitting   BP Method: Large (Manual)   Pulse: 72   Resp: 16   Temp: 97 °F (36.1 °C)   TempSrc: Temporal   SpO2: 97%   Weight: 131.7 kg (290 lb 5.5 oz)   Height: 5' 4" (1.626 m)         BMI: Body mass index is 49.84 kg/m².    Physical Exam  Vitals reviewed.   Constitutional:       General: She is not in acute distress.     Appearance: She is not diaphoretic.   HENT:      Head: Normocephalic and atraumatic.      Mouth/Throat:      Mouth: Mucous membranes are moist.      Pharynx: No oropharyngeal exudate.   Eyes:      General: No scleral icterus.  Cardiovascular:      Rate and Rhythm: Normal rate and regular rhythm.      Heart sounds: Normal heart sounds. No murmur heard.  Pulmonary:      Effort: Pulmonary effort is normal. No respiratory distress.      Breath sounds: No wheezing.   Musculoskeletal:      Cervical back: No tenderness.      Right lower leg: No edema.      Left lower leg: No edema.   Lymphadenopathy:      Cervical: No cervical adenopathy.   Skin:     General: Skin is warm.      Findings: Rash present.   Neurological:      Mental Status: She is alert and oriented to person, place, and time.   Psychiatric:         Behavior: Behavior normal.       Laboratory Data:  Lab Results   Component Value Date    WBC 7.50 01/20/2023    HGB 10.6 (L) 01/20/2023    HCT 30.6 (L) 01/20/2023    MCV 94 01/20/2023     01/20/2023        Assessment:       1. Invasive ductal carcinoma of breast, female, left    2. Immunodeficiency due to drugs    3. Embolism and thrombosis of unspecified site    4. Type 2 diabetes mellitus without complication, without long-term current use of insulin    5. Chemotherapy induced diarrhea           Plan:   Invasive ductal carcinoma of the breast, left  - cT1c triple positive breast cancer  (ER 84% OK 28% Her 2 3+ pos Ki 52 %), given her young age and Ki67%, will " proceed with TH, adding P to help with a better pCR, will also plan to get ultrasound mid cycle to monitor (3 months)  -9/26/22 Echo with EF 60%; next is scheduled for 01/18/23  -Began TH+P on 10/7/2022; completed 12 weeks of Paclitaxel 12/23/22  -Proceed with Trastuzumab/Pertuzumab add IV mag to infusion today    -Plan for surgery per Dr. Mcduffie 2/8/22  -Plan to meet with Rad onc 3/7, Dr. Trevino and Simulation that day   - will probably need adjuvant endocrine therapy afterward   -Echo 1/2023 EF: 65% and global longitudinal 17/5%, reach out to Dr Frias, cont monitoring with echo in 3 months      Diabetes type 2 with neuropathy   -Taking Metformin, Trulicity   -Per PCP, Monitor     Anxiety  -Taking Wellbutrin  -Following with Dr. Long     Hypomagnesemia  -Taking po supplements at home; will hold off for diarrhea if needed   -Monitor , replacement as needed     Drug-induced rash  -Stable to improved  -using Triamcinolone cream PRN    Normocytic anemia  -likely due to chemo and slightly nose/rectal bleeding (mucositis)  -Hgb 9.6 g/dL today  -cont monitoring , no indication for transfusion    Yeast infection, skin   -will refill Nystatin powder, ointment today  -Rx for Diflucan 150 mg po x 1 dose     Patient queried and all questions were answered.    Route Chart for Scheduling    Med Onc Chart Routing      Follow up with physician 4 weeks. with next chemo; please push it to 2/17/23, with blood work prior CBC/CMP/Mag, needs blood work on MOnday (CBC/CMP/Mag) and IVF amaris   Follow up with [facility]    Infusion scheduling note Proceed with infusion today, needs IVF (Mag and NL) and repeat blood work on Monday with hydration amaris as well (CBC/CMP/Mag)   Injection scheduling note    Labs    Imaging    Pharmacy appointment    Other referrals        Treatment Plan Information   OP BREAST PACLITAXEL TRASTUZUMAB WEEKLY WITH PERTUZUMAB Q3W (THP)   Lisa Jaquez MD   Upcoming Treatment Dates - OP BREAST PACLITAXEL TRASTUZUMAB  WEEKLY WITH PERTUZUMAB Q3W (THP)    2/10/2023       Chemotherapy       pertuzumab (PERJETA) 420 mg in sodium chloride 0.9% 264 mL infusion       trastuzumab-dkst (OGIVRI) in sodium chloride 0.9% chemo infusion       Supportive Care       magnesium sulfate 2g in water 50mL IVPB (premix)  3/3/2023       Chemotherapy       pertuzumab (PERJETA) 420 mg in sodium chloride 0.9% 264 mL infusion       trastuzumab-dkst (OGIVRI) in sodium chloride 0.9% chemo infusion       Supportive Care       magnesium sulfate 2g in water 50mL IVPB (premix)  3/24/2023       Chemotherapy       pertuzumab (PERJETA) 420 mg in sodium chloride 0.9% 264 mL infusion       trastuzumab-dkst (OGIVRI) in sodium chloride 0.9% chemo infusion       Supportive Care       magnesium sulfate 2g in water 50mL IVPB (premix)  4/14/2023       Chemotherapy       pertuzumab (PERJETA) 420 mg in sodium chloride 0.9% 264 mL infusion       trastuzumab-dkst (OGIVRI) in sodium chloride 0.9% chemo infusion       Supportive Care       magnesium sulfate 2g in water 50mL IVPB (premix)    Supportive Plan Information  IV FLUIDS AND ELECTROLYTES   Lisa Jaquez MD   Upcoming Treatment Dates - IV FLUIDS AND ELECTROLYTES    No upcoming days in selected categories.

## 2023-01-23 ENCOUNTER — DOCUMENTATION ONLY (OUTPATIENT)
Dept: INFUSION THERAPY | Facility: HOSPITAL | Age: 58
End: 2023-01-23

## 2023-01-23 ENCOUNTER — TELEPHONE (OUTPATIENT)
Dept: HEMATOLOGY/ONCOLOGY | Facility: CLINIC | Age: 58
End: 2023-01-23
Payer: COMMERCIAL

## 2023-01-23 ENCOUNTER — PATIENT MESSAGE (OUTPATIENT)
Dept: HEMATOLOGY/ONCOLOGY | Facility: CLINIC | Age: 58
End: 2023-01-23
Payer: COMMERCIAL

## 2023-01-23 ENCOUNTER — INFUSION (OUTPATIENT)
Dept: INFUSION THERAPY | Facility: HOSPITAL | Age: 58
End: 2023-01-23
Attending: STUDENT IN AN ORGANIZED HEALTH CARE EDUCATION/TRAINING PROGRAM
Payer: COMMERCIAL

## 2023-01-23 VITALS
WEIGHT: 290.38 LBS | HEART RATE: 88 BPM | HEIGHT: 64 IN | SYSTOLIC BLOOD PRESSURE: 117 MMHG | RESPIRATION RATE: 16 BRPM | BODY MASS INDEX: 49.57 KG/M2 | DIASTOLIC BLOOD PRESSURE: 62 MMHG

## 2023-01-23 DIAGNOSIS — D84.821 IMMUNODEFICIENCY DUE TO DRUGS: ICD-10-CM

## 2023-01-23 DIAGNOSIS — T45.1X5A CHEMOTHERAPY INDUCED DIARRHEA: ICD-10-CM

## 2023-01-23 DIAGNOSIS — Z79.899 IMMUNODEFICIENCY DUE TO DRUGS: ICD-10-CM

## 2023-01-23 DIAGNOSIS — K52.1 CHEMOTHERAPY INDUCED DIARRHEA: ICD-10-CM

## 2023-01-23 DIAGNOSIS — C50.912 INVASIVE DUCTAL CARCINOMA OF BREAST, FEMALE, LEFT: Primary | ICD-10-CM

## 2023-01-23 PROCEDURE — 63600175 PHARM REV CODE 636 W HCPCS: Mod: PN | Performed by: STUDENT IN AN ORGANIZED HEALTH CARE EDUCATION/TRAINING PROGRAM

## 2023-01-23 PROCEDURE — 25000003 PHARM REV CODE 250: Mod: PN | Performed by: STUDENT IN AN ORGANIZED HEALTH CARE EDUCATION/TRAINING PROGRAM

## 2023-01-23 PROCEDURE — 96365 THER/PROPH/DIAG IV INF INIT: CPT | Mod: PN

## 2023-01-23 RX ORDER — MAGNESIUM SULFATE HEPTAHYDRATE 40 MG/ML
2 INJECTION, SOLUTION INTRAVENOUS ONCE
Status: COMPLETED | OUTPATIENT
Start: 2023-01-23 | End: 2023-01-23

## 2023-01-23 RX ORDER — SODIUM CHLORIDE 0.9 % (FLUSH) 0.9 %
10 SYRINGE (ML) INJECTION
Status: DISCONTINUED | OUTPATIENT
Start: 2023-01-23 | End: 2023-01-23 | Stop reason: HOSPADM

## 2023-01-23 RX ADMIN — MAGNESIUM SULFATE IN WATER 2 G: 40 INJECTION, SOLUTION INTRAVENOUS at 03:01

## 2023-01-23 RX ADMIN — SODIUM CHLORIDE 1000 ML: 9 INJECTION, SOLUTION INTRAVENOUS at 03:01

## 2023-01-23 NOTE — PLAN OF CARE
Problem: Adult Inpatient Plan of Care  Goal: Plan of Care Review  Outcome: Ongoing, Progressing  Flowsheets (Taken 1/23/2023 1528)  Plan of Care Reviewed With: patient  Goal: Patient-Specific Goal (Individualized)  Outcome: Ongoing, Progressing  Flowsheets (Taken 1/23/2023 1528)  Anxieties, Fears or Concerns: NOne  Individualized Care Needs: Recliner, warm blanket, dimmed lights, conversation     Problem: Fatigue  Goal: Improved Activity Tolerance  Outcome: Ongoing, Progressing  Intervention: Promote Improved Energy  Flowsheets (Taken 1/23/2023 1528)  Fatigue Management:   frequent rest breaks encouraged   paced activity encouraged  Sleep/Rest Enhancement: regular sleep/rest pattern promoted  Activity Management: Ambulated -L4   Patient to Infusion for IVFs and Magnesium. Treatment plan reviewed with patient. VSS. Tolerated infusion. Provided with copy of upcoming appointment schedule. Escorted to the front lobby for discharge to home.

## 2023-01-23 NOTE — TELEPHONE ENCOUNTER
Lvm returning pt's call to schedule another lymph assessment for leg. I sent portal message asking her to let me know.       ----- Message from Mir Piper MA sent at 1/20/2023  3:33 PM CST -----  Regarding: lymph  Call patient to see if she want to come 1 more time to address leg lymph before sx, per Catalina.

## 2023-01-23 NOTE — PROGRESS NOTES
Oncology Nutrition   Chemotherapy Infusion Visit    Nutrition Follow Up   RD met with pt at chairside during infusion tx. Pt present for IVF and Mg. Pt continues to experience diarrhea and dehydration. Pt currently using a medication to bulk stool and is also taking imodium. She alternates between medications. RD dicussed, again, the benefits of using Enterade. Pt states she is willing to try it once more. Pt also dining out frequently, encouraged pt to cook at home to prevent any possible causes of food borne illness. Answered all questions to patients satisfaction. Pt with a 15# weight loss in one month.    Wt Readings from Last 10 Encounters:   01/23/23 131.7 kg (290 lb 5.5 oz)   01/20/23 131.7 kg (290 lb 5.5 oz)   01/20/23 131.7 kg (290 lb 5.5 oz)   01/18/23 (!) 138.3 kg (305 lb)   12/30/22 (!) 138.5 kg (305 lb 3.6 oz)   12/30/22 (!) 138.5 kg (305 lb 3.6 oz)   12/23/22 (!) 138.5 kg (305 lb 5.4 oz)   12/23/22 (!) 138.5 kg (305 lb 5.4 oz)   12/16/22 (!) 138.9 kg (306 lb 3.5 oz)   12/16/22 (!) 138.9 kg (306 lb 3.5 oz)       All other nutrition questions/concerns addressed as appropriate. Will continue to monitor prn throughout treatment.     Jerri Capellan, ESTER, LDN  01/23/2023  4:25 PM

## 2023-01-23 NOTE — TELEPHONE ENCOUNTER
I spoke with the patient to see if she wanted to come in 1x more before sx date to address leg lymph and she said she will change her schedule and let me know if she can make 2/6 at 9am. I told her I will hold the slot for her today but will open it tomorrow to others if I don't hear back from her. She voiced understanding.       ----- Message from Mir Piper MA sent at 1/20/2023  3:33 PM CST -----  Regarding: lymph  Call patient to see if she want to come 1 more time to address leg lymph before sx, per Catalina.

## 2023-01-25 DIAGNOSIS — C50.912 INVASIVE DUCTAL CARCINOMA OF BREAST, FEMALE, LEFT: Primary | ICD-10-CM

## 2023-01-25 DIAGNOSIS — Z91.89 AT RISK FOR LYMPHEDEMA: ICD-10-CM

## 2023-01-30 ENCOUNTER — TELEPHONE (OUTPATIENT)
Dept: SURGERY | Facility: CLINIC | Age: 58
End: 2023-01-30
Payer: COMMERCIAL

## 2023-01-30 NOTE — NURSING
Post op instructions given to the patient both verbally and written, for lumpectomy.  She verbalized understanding. Told her about the mike - appt and she already has the appt set up.

## 2023-02-02 ENCOUNTER — DOCUMENTATION ONLY (OUTPATIENT)
Dept: SURGERY | Facility: CLINIC | Age: 58
End: 2023-02-02
Payer: COMMERCIAL

## 2023-02-02 ENCOUNTER — TELEPHONE (OUTPATIENT)
Dept: SURGERY | Facility: CLINIC | Age: 58
End: 2023-02-02
Payer: COMMERCIAL

## 2023-02-02 NOTE — PROGRESS NOTES
Message sent to Dr Roni Frias asking if he can look at her EKG from today and clear her for surgery.

## 2023-02-06 ENCOUNTER — OFFICE VISIT (OUTPATIENT)
Dept: CARDIOLOGY | Facility: CLINIC | Age: 58
End: 2023-02-06
Payer: COMMERCIAL

## 2023-02-06 VITALS
WEIGHT: 293 LBS | SYSTOLIC BLOOD PRESSURE: 109 MMHG | HEART RATE: 82 BPM | HEIGHT: 64 IN | BODY MASS INDEX: 50.02 KG/M2 | DIASTOLIC BLOOD PRESSURE: 71 MMHG

## 2023-02-06 DIAGNOSIS — E78.2 MIXED HYPERLIPIDEMIA: ICD-10-CM

## 2023-02-06 DIAGNOSIS — I10 ESSENTIAL HYPERTENSION: ICD-10-CM

## 2023-02-06 DIAGNOSIS — Z01.810 PRE-OPERATIVE CARDIOVASCULAR EXAMINATION: Primary | ICD-10-CM

## 2023-02-06 DIAGNOSIS — R94.31 ABNORMAL EKG: ICD-10-CM

## 2023-02-06 DIAGNOSIS — Z82.49 FAMILY HISTORY OF ISCHEMIC HEART DISEASE: ICD-10-CM

## 2023-02-06 PROCEDURE — 3044F PR MOST RECENT HEMOGLOBIN A1C LEVEL <7.0%: ICD-10-PCS | Mod: CPTII,S$GLB,, | Performed by: INTERNAL MEDICINE

## 2023-02-06 PROCEDURE — 99999 PR PBB SHADOW E&M-EST. PATIENT-LVL IV: CPT | Mod: PBBFAC,,, | Performed by: INTERNAL MEDICINE

## 2023-02-06 PROCEDURE — 3044F HG A1C LEVEL LT 7.0%: CPT | Mod: CPTII,S$GLB,, | Performed by: INTERNAL MEDICINE

## 2023-02-06 PROCEDURE — 99999 PR PBB SHADOW E&M-EST. PATIENT-LVL IV: ICD-10-PCS | Mod: PBBFAC,,, | Performed by: INTERNAL MEDICINE

## 2023-02-06 PROCEDURE — 99214 OFFICE O/P EST MOD 30 MIN: CPT | Mod: S$GLB,,, | Performed by: INTERNAL MEDICINE

## 2023-02-06 PROCEDURE — 3078F DIAST BP <80 MM HG: CPT | Mod: CPTII,S$GLB,, | Performed by: INTERNAL MEDICINE

## 2023-02-06 PROCEDURE — 1159F PR MEDICATION LIST DOCUMENTED IN MEDICAL RECORD: ICD-10-PCS | Mod: CPTII,S$GLB,, | Performed by: INTERNAL MEDICINE

## 2023-02-06 PROCEDURE — 1160F PR REVIEW ALL MEDS BY PRESCRIBER/CLIN PHARMACIST DOCUMENTED: ICD-10-PCS | Mod: CPTII,S$GLB,, | Performed by: INTERNAL MEDICINE

## 2023-02-06 PROCEDURE — 1160F RVW MEDS BY RX/DR IN RCRD: CPT | Mod: CPTII,S$GLB,, | Performed by: INTERNAL MEDICINE

## 2023-02-06 PROCEDURE — 3008F PR BODY MASS INDEX (BMI) DOCUMENTED: ICD-10-PCS | Mod: CPTII,S$GLB,, | Performed by: INTERNAL MEDICINE

## 2023-02-06 PROCEDURE — 3078F PR MOST RECENT DIASTOLIC BLOOD PRESSURE < 80 MM HG: ICD-10-PCS | Mod: CPTII,S$GLB,, | Performed by: INTERNAL MEDICINE

## 2023-02-06 PROCEDURE — 3074F PR MOST RECENT SYSTOLIC BLOOD PRESSURE < 130 MM HG: ICD-10-PCS | Mod: CPTII,S$GLB,, | Performed by: INTERNAL MEDICINE

## 2023-02-06 PROCEDURE — 1159F MED LIST DOCD IN RCRD: CPT | Mod: CPTII,S$GLB,, | Performed by: INTERNAL MEDICINE

## 2023-02-06 PROCEDURE — 3008F BODY MASS INDEX DOCD: CPT | Mod: CPTII,S$GLB,, | Performed by: INTERNAL MEDICINE

## 2023-02-06 PROCEDURE — 99214 PR OFFICE/OUTPT VISIT, EST, LEVL IV, 30-39 MIN: ICD-10-PCS | Mod: S$GLB,,, | Performed by: INTERNAL MEDICINE

## 2023-02-06 PROCEDURE — 3074F SYST BP LT 130 MM HG: CPT | Mod: CPTII,S$GLB,, | Performed by: INTERNAL MEDICINE

## 2023-02-06 NOTE — PROGRESS NOTES
Subjective:    Patient ID:  Josefina Coon is a 58 y.o. female who presents for evaluation of Hypertension and Hyperlipidemia      Problem List Items Addressed This Visit          Cardiac/Vascular    Essential hypertension    Overview     Dx updated per 2019 IMO Load         Family history of ischemic heart disease    Mixed hyperlipidemia     Other Visit Diagnoses       Pre-operative cardiovascular examination    -  Primary    Abnormal EKG                HPI    Referred by Dr. Mcduffie for preoperative cardiovascular assessment prior to lumpectomy scheduled for Wednesday.    The patient states that she feels OK today.    No chest pain.  No shortness of breath.  Rare palpitations    METs > 4    Past Medical History:   Diagnosis Date    Abnormal liver enzymes     Asymptomatic varicose veins     Breast cancer 2022    left idc    Cancer     left breast cancer    Depressive disorder, not elsewhere classified     Dyslipidemia     Embolism and thrombosis of unspecified site     superficial venous thrombosis    Encounter for long-term (current) use of other medications     Family history of ischemic heart disease     Fatty liver     Generalized anxiety disorder     History of chicken pox     Obstructive sleep apnea (adult) (pediatric)     Type II or unspecified type diabetes mellitus without mention of complication, not stated as uncontrolled     Unspecified essential hypertension     Unspecified venous (peripheral) insufficiency     Unspecified vitamin D deficiency        Past Surgical History:   Procedure Laterality Date    BREAST BIOPSY Left 2022    idc    BREAST BIOPSY Left 2022    neg ln    BREAST BIOPSY Left 2022    neg ln     SECTION      COLONOSCOPY      ESOPHAGOGASTRODUODENOSCOPY      INSERTION OF TUNNELED CENTRAL VENOUS CATHETER (CVC) WITH SUBCUTANEOUS PORT Right 10/04/2022    Procedure: [id number];  Surgeon: Dominick Ng MD;  Location: [facility];  Service: General;  " Laterality: Right;    VEIN SURGERY      Laser, Dr. Larsen       Family History   Problem Relation Age of Onset    Hyperlipidemia Mother     Hypertension Mother     Vulvar Cancer Mother     Dementia Mother     Atrial fibrillation Father     Parkinsonism Father     Diabetes Brother     Cancer Brother         colon    Hypertension Son     Hyperlipidemia Son     Anxiety disorder Son     Stroke Maternal Grandmother        Social History     Socioeconomic History    Marital status:    Tobacco Use    Smoking status: Never    Smokeless tobacco: Never   Substance and Sexual Activity    Alcohol use: Not Currently     Comment: rare     Drug use: Never    Sexual activity: Not Currently       Review of patient's allergies indicates:   Allergen Reactions    Sitagliptin      Other reaction(s): (januvia) abdominal pain    Sulfa (sulfonamide antibiotics) Hives    Byetta  [exenatide] Rash    Lactose        Review of Systems   Constitutional: Negative for decreased appetite, fever and malaise/fatigue.   Eyes:  Negative for blurred vision.   Cardiovascular:  Negative for chest pain, dyspnea on exertion, irregular heartbeat and leg swelling.   Respiratory:  Negative for cough, hemoptysis, shortness of breath and wheezing.    Endocrine: Negative for cold intolerance and heat intolerance.   Hematologic/Lymphatic: Negative for bleeding problem.   Musculoskeletal:  Negative for muscle weakness and myalgias.   Gastrointestinal:  Negative for abdominal pain, constipation and diarrhea.   Genitourinary:  Negative for bladder incontinence.   Neurological:  Negative for dizziness and weakness.   Psychiatric/Behavioral:  Negative for depression.       Objective:     Vitals:    02/06/23 0940   BP: 109/71   BP Location: Right arm   Patient Position: Sitting   BP Method: Large (Automatic)   Pulse: 82   Weight: 134.6 kg (296 lb 11.8 oz)   Height: 5' 4" (1.626 m)        Physical Exam  Vitals and nursing note reviewed.   Constitutional:       " General: She is not in acute distress.     Appearance: She is well-developed.   HENT:      Head: Normocephalic and atraumatic.   Neck:      Vascular: No JVD.   Cardiovascular:      Rate and Rhythm: Normal rate and regular rhythm.      Heart sounds: Normal heart sounds. No murmur heard.    No friction rub. No gallop.   Pulmonary:      Effort: Pulmonary effort is normal. No respiratory distress.      Breath sounds: Normal breath sounds. No wheezing or rales.   Abdominal:      General: Bowel sounds are normal.      Palpations: Abdomen is soft.      Tenderness: There is no abdominal tenderness. There is no guarding or rebound.   Musculoskeletal:         General: No tenderness.      Cervical back: Neck supple.   Skin:     General: Skin is warm and dry.   Neurological:      Mental Status: She is alert and oriented to person, place, and time.   Psychiatric:         Behavior: Behavior normal.           Current Outpatient Medications   Medication Instructions    albuterol 90 mcg/actuation inhaler 2 puffs, Inhalation, Every 6 hours PRN    ALPRAZolam (XANAX) 0.25 MG tablet TAKE ONE TABLET BY MOUTH 1-2 TIMES DAILY AS NEEDED ANXIETY    buPROPion (WELLBUTRIN XL) 150 MG TB24 tablet TAKE 1 TABLET BY MOUTH EVERY DAY    ergocalciferol (ERGOCALCIFEROL) 50,000 unit Cap TAKE 1 CAPSULE BY MOUTH ONE TIME PER WEEK    LIDOcaine-prilocaine (EMLA) cream Apply to port site 1 hour prior to port access and cover    magnesium 30 mg, Oral, Once    metFORMIN (GLUCOPHAGE) 500 MG tablet TAKE 1 TABLET BY MOUTH TWICE A DAY WITH MEALS    nystatin (MYCOSTATIN) ointment Topical (Top), 3 times daily    nystatin (MYCOSTATIN) powder Apply to affected area 3 times daily    omeprazole (PRILOSEC OTC) 20 mg, Oral, Daily    omeprazole (PRILOSEC) 10 MG capsule omeprazole    ondansetron (ZOFRAN) 8 mg, Oral, Every 8 hours PRN    pravastatin (PRAVACHOL) 10 MG tablet TAKE 1 TABLET BY MOUTH EVERYDAY AT BEDTIME    promethazine (PHENERGAN) 25 mg, Oral, Every 6 hours PRN     telmisartan-hydrochlorothiazide (MICARDIS HCT) 80-25 mg per tablet TAKE 1 TABLET BY MOUTH EVERY DAY    triamcinolone acetonide 0.025% (KENALOG) 0.025 % cream Topical (Top), 2 times daily, Apply to the skin lesions twice a day    TRULICITY 3 mg, Subcutaneous, Every 7 days       Lipid Panel:   Lab Results   Component Value Date    CHOL 164 09/30/2022    HDL 47 09/30/2022    LDLCALC 79.4 09/30/2022    TRIG 188 (H) 09/30/2022    CHOLHDL 28.7 09/30/2022         The 10-year ASCVD risk score (Db MIRANDA, et al., 2019) is: 5.9%    Values used to calculate the score:      Age: 58 years      Sex: Female      Is Non- : No      Diabetic: Yes      Tobacco smoker: No      Systolic Blood Pressure: 109 mmHg      Is BP treated: Yes      HDL Cholesterol: 47 mg/dL      Total Cholesterol: 164 mg/dL    All pertinent labs, imaging, and EKGs reviewed.  Patient's most recent EKG tracing was personally interpreted by this provider.    Most Recent Echocardiogram Results  Results for orders placed in visit on 01/18/23    Echo    Interpretation Summary  · The left ventricle is normal in size with normal systolic function.  · The estimated ejection fraction is 65%.  · Indeterminate left ventricular diastolic function.  · Normal right ventricular size with normal right ventricular systolic function.  · Mild left atrial enlargement.  · Mild tricuspid regurgitation.  · Normal central venous pressure (3 mmHg).  · The estimated PA systolic pressure is 31 mmHg.  · The left ventricular global longitudinal strain is -17.5%.        Most Recent EKG  Results for orders placed or performed during the hospital encounter of 02/02/23   EKG 12-lead    Collection Time: 02/02/23 10:14 AM    Narrative    Test Reason : C50.912,    Vent. Rate : 081 BPM     Atrial Rate : 081 BPM     P-R Int : 156 ms          QRS Dur : 104 ms      QT Int : 364 ms       P-R-T Axes : 065 -01 044 degrees     QTc Int : 422 ms    Normal sinus rhythm  Low voltage  QRS  Septal infarct (cited on or before 02-FEB-2023)  Abnormal ECG    Confirmed by Trice Kaufman MD ([id number]) on 2/2/2023 10:47:06 AM    Referred By: VIRGIL SARABIA           Confirmed By:Trice Kaufman MD       No valid procedures specified.   Results for orders placed during the hospital encounter of 03/03/20    Nuclear Stress - Cardiology Interpreted    Interpretation Summary    The perfusion scan is free of evidence from myocardial ischemia or injury.    There is a mild intensity defect in the anteroseptal wall of the left ventricle, secondary to breast attenuation.    Visually estimated ejection fraction is normal at rest.    The EKG portion of this study is negative for ischemia.    The patient reported no chest pain during the stress test.    There are no prior studies for comparison.    No valid procedures specified.    Assessment:       1. Pre-operative cardiovascular examination    2. Essential hypertension    3. Family history of ischemic heart disease    4. Mixed hyperlipidemia    5. Abnormal EKG         Plan:     Symptoms OK today  BP/Pulse OK today  Most recent echocardiogram reviewed personally   METs > 4   No indication for stress testing      Had recent echocardiogram with preserved ejection fraction and no evidence of wall motion abnormality    Recent EKG and recent echocardiogram reviewed personally   Continue telmisartan-hydrochlorothiazide 80-25 mg PO Daily    Continue other cardiac medications  Mediterranean Diet/Cardiovascular Exercise Program    With the above recommendations, the patient is optimized for the above mentioned procedure.    Patient queried and all questions were answered.    F/u in 6-9 months to reassess      Signed:    Roni Frias MD  2/6/2023 7:33 AM

## 2023-02-07 ENCOUNTER — DOCUMENTATION ONLY (OUTPATIENT)
Dept: SURGERY | Facility: CLINIC | Age: 58
End: 2023-02-07
Payer: COMMERCIAL

## 2023-02-17 ENCOUNTER — INFUSION (OUTPATIENT)
Dept: INFUSION THERAPY | Facility: HOSPITAL | Age: 58
End: 2023-02-17
Attending: STUDENT IN AN ORGANIZED HEALTH CARE EDUCATION/TRAINING PROGRAM
Payer: COMMERCIAL

## 2023-02-17 ENCOUNTER — OFFICE VISIT (OUTPATIENT)
Dept: HEMATOLOGY/ONCOLOGY | Facility: CLINIC | Age: 58
End: 2023-02-17
Payer: COMMERCIAL

## 2023-02-17 ENCOUNTER — LAB VISIT (OUTPATIENT)
Dept: LAB | Facility: HOSPITAL | Age: 58
End: 2023-02-17
Attending: STUDENT IN AN ORGANIZED HEALTH CARE EDUCATION/TRAINING PROGRAM
Payer: COMMERCIAL

## 2023-02-17 VITALS
BODY MASS INDEX: 50.02 KG/M2 | SYSTOLIC BLOOD PRESSURE: 115 MMHG | OXYGEN SATURATION: 97 % | HEIGHT: 64 IN | TEMPERATURE: 99 F | RESPIRATION RATE: 16 BRPM | DIASTOLIC BLOOD PRESSURE: 70 MMHG | HEART RATE: 77 BPM | WEIGHT: 293 LBS

## 2023-02-17 VITALS
TEMPERATURE: 98 F | RESPIRATION RATE: 16 BRPM | WEIGHT: 293 LBS | HEART RATE: 73 BPM | BODY MASS INDEX: 50.02 KG/M2 | HEIGHT: 64 IN | OXYGEN SATURATION: 97 % | DIASTOLIC BLOOD PRESSURE: 81 MMHG | SYSTOLIC BLOOD PRESSURE: 113 MMHG

## 2023-02-17 DIAGNOSIS — K76.0 FATTY LIVER: ICD-10-CM

## 2023-02-17 DIAGNOSIS — K52.1 CHEMOTHERAPY INDUCED DIARRHEA: ICD-10-CM

## 2023-02-17 DIAGNOSIS — C50.912 INVASIVE DUCTAL CARCINOMA OF BREAST, FEMALE, LEFT: Primary | ICD-10-CM

## 2023-02-17 DIAGNOSIS — C50.912 INVASIVE DUCTAL CARCINOMA OF BREAST, FEMALE, LEFT: ICD-10-CM

## 2023-02-17 DIAGNOSIS — E11.9 TYPE 2 DIABETES MELLITUS WITHOUT COMPLICATION, WITHOUT LONG-TERM CURRENT USE OF INSULIN: ICD-10-CM

## 2023-02-17 DIAGNOSIS — T45.1X5A CHEMOTHERAPY INDUCED DIARRHEA: ICD-10-CM

## 2023-02-17 DIAGNOSIS — R74.8 ABNORMAL LIVER ENZYMES: ICD-10-CM

## 2023-02-17 LAB
ALBUMIN SERPL BCP-MCNC: 3.3 G/DL (ref 3.5–5.2)
ALP SERPL-CCNC: 135 U/L (ref 55–135)
ALT SERPL W/O P-5'-P-CCNC: 18 U/L (ref 10–44)
ANION GAP SERPL CALC-SCNC: 8 MMOL/L (ref 8–16)
AST SERPL-CCNC: 12 U/L (ref 10–40)
BILIRUB SERPL-MCNC: 0.4 MG/DL (ref 0.1–1)
BUN SERPL-MCNC: 11 MG/DL (ref 6–20)
CALCIUM SERPL-MCNC: 9.2 MG/DL (ref 8.7–10.5)
CHLORIDE SERPL-SCNC: 111 MMOL/L (ref 95–110)
CO2 SERPL-SCNC: 24 MMOL/L (ref 23–29)
CREAT SERPL-MCNC: 1.2 MG/DL (ref 0.5–1.4)
ERYTHROCYTE [DISTWIDTH] IN BLOOD BY AUTOMATED COUNT: 13.4 % (ref 11.5–14.5)
EST. GFR  (NO RACE VARIABLE): 51.8 ML/MIN/1.73 M^2
GLUCOSE SERPL-MCNC: 139 MG/DL (ref 70–110)
HCT VFR BLD AUTO: 31.9 % (ref 37–48.5)
HGB BLD-MCNC: 10.4 G/DL (ref 12–16)
IMM GRANULOCYTES # BLD AUTO: 0.04 K/UL (ref 0–0.04)
MAGNESIUM SERPL-MCNC: 1.3 MG/DL (ref 1.6–2.6)
MCH RBC QN AUTO: 32.7 PG (ref 27–31)
MCHC RBC AUTO-ENTMCNC: 32.6 G/DL (ref 32–36)
MCV RBC AUTO: 100 FL (ref 82–98)
NEUTROPHILS # BLD AUTO: 4.6 K/UL (ref 1.8–7.7)
PLATELET # BLD AUTO: 296 K/UL (ref 150–450)
PMV BLD AUTO: 9.8 FL (ref 9.2–12.9)
POTASSIUM SERPL-SCNC: 3.5 MMOL/L (ref 3.5–5.1)
PROT SERPL-MCNC: 7 G/DL (ref 6–8.4)
RBC # BLD AUTO: 3.18 M/UL (ref 4–5.4)
SODIUM SERPL-SCNC: 143 MMOL/L (ref 136–145)
WBC # BLD AUTO: 7.26 K/UL (ref 3.9–12.7)

## 2023-02-17 PROCEDURE — 96367 TX/PROPH/DG ADDL SEQ IV INF: CPT | Mod: PN

## 2023-02-17 PROCEDURE — 1160F RVW MEDS BY RX/DR IN RCRD: CPT | Mod: CPTII,S$GLB,, | Performed by: STUDENT IN AN ORGANIZED HEALTH CARE EDUCATION/TRAINING PROGRAM

## 2023-02-17 PROCEDURE — 3074F SYST BP LT 130 MM HG: CPT | Mod: CPTII,S$GLB,, | Performed by: STUDENT IN AN ORGANIZED HEALTH CARE EDUCATION/TRAINING PROGRAM

## 2023-02-17 PROCEDURE — 3078F PR MOST RECENT DIASTOLIC BLOOD PRESSURE < 80 MM HG: ICD-10-PCS | Mod: CPTII,S$GLB,, | Performed by: STUDENT IN AN ORGANIZED HEALTH CARE EDUCATION/TRAINING PROGRAM

## 2023-02-17 PROCEDURE — 85027 COMPLETE CBC AUTOMATED: CPT | Mod: PN | Performed by: STUDENT IN AN ORGANIZED HEALTH CARE EDUCATION/TRAINING PROGRAM

## 2023-02-17 PROCEDURE — 96413 CHEMO IV INFUSION 1 HR: CPT | Mod: PN

## 2023-02-17 PROCEDURE — 3044F HG A1C LEVEL LT 7.0%: CPT | Mod: CPTII,S$GLB,, | Performed by: STUDENT IN AN ORGANIZED HEALTH CARE EDUCATION/TRAINING PROGRAM

## 2023-02-17 PROCEDURE — 3008F PR BODY MASS INDEX (BMI) DOCUMENTED: ICD-10-PCS | Mod: CPTII,S$GLB,, | Performed by: STUDENT IN AN ORGANIZED HEALTH CARE EDUCATION/TRAINING PROGRAM

## 2023-02-17 PROCEDURE — 36415 COLL VENOUS BLD VENIPUNCTURE: CPT | Mod: PN | Performed by: STUDENT IN AN ORGANIZED HEALTH CARE EDUCATION/TRAINING PROGRAM

## 2023-02-17 PROCEDURE — 96417 CHEMO IV INFUS EACH ADDL SEQ: CPT | Mod: PN

## 2023-02-17 PROCEDURE — 1159F PR MEDICATION LIST DOCUMENTED IN MEDICAL RECORD: ICD-10-PCS | Mod: CPTII,S$GLB,, | Performed by: STUDENT IN AN ORGANIZED HEALTH CARE EDUCATION/TRAINING PROGRAM

## 2023-02-17 PROCEDURE — 1159F MED LIST DOCD IN RCRD: CPT | Mod: CPTII,S$GLB,, | Performed by: STUDENT IN AN ORGANIZED HEALTH CARE EDUCATION/TRAINING PROGRAM

## 2023-02-17 PROCEDURE — 1160F PR REVIEW ALL MEDS BY PRESCRIBER/CLIN PHARMACIST DOCUMENTED: ICD-10-PCS | Mod: CPTII,S$GLB,, | Performed by: STUDENT IN AN ORGANIZED HEALTH CARE EDUCATION/TRAINING PROGRAM

## 2023-02-17 PROCEDURE — 63600175 PHARM REV CODE 636 W HCPCS: Mod: TB,PN | Performed by: STUDENT IN AN ORGANIZED HEALTH CARE EDUCATION/TRAINING PROGRAM

## 2023-02-17 PROCEDURE — 99215 PR OFFICE/OUTPT VISIT, EST, LEVL V, 40-54 MIN: ICD-10-PCS | Mod: S$GLB,,, | Performed by: STUDENT IN AN ORGANIZED HEALTH CARE EDUCATION/TRAINING PROGRAM

## 2023-02-17 PROCEDURE — 99215 OFFICE O/P EST HI 40 MIN: CPT | Mod: S$GLB,,, | Performed by: STUDENT IN AN ORGANIZED HEALTH CARE EDUCATION/TRAINING PROGRAM

## 2023-02-17 PROCEDURE — 3008F BODY MASS INDEX DOCD: CPT | Mod: CPTII,S$GLB,, | Performed by: STUDENT IN AN ORGANIZED HEALTH CARE EDUCATION/TRAINING PROGRAM

## 2023-02-17 PROCEDURE — 80053 COMPREHEN METABOLIC PANEL: CPT | Mod: PN | Performed by: STUDENT IN AN ORGANIZED HEALTH CARE EDUCATION/TRAINING PROGRAM

## 2023-02-17 PROCEDURE — 25000003 PHARM REV CODE 250: Mod: PN | Performed by: STUDENT IN AN ORGANIZED HEALTH CARE EDUCATION/TRAINING PROGRAM

## 2023-02-17 PROCEDURE — 3078F DIAST BP <80 MM HG: CPT | Mod: CPTII,S$GLB,, | Performed by: STUDENT IN AN ORGANIZED HEALTH CARE EDUCATION/TRAINING PROGRAM

## 2023-02-17 PROCEDURE — 99999 PR PBB SHADOW E&M-EST. PATIENT-LVL V: CPT | Mod: PBBFAC,,, | Performed by: STUDENT IN AN ORGANIZED HEALTH CARE EDUCATION/TRAINING PROGRAM

## 2023-02-17 PROCEDURE — 3074F PR MOST RECENT SYSTOLIC BLOOD PRESSURE < 130 MM HG: ICD-10-PCS | Mod: CPTII,S$GLB,, | Performed by: STUDENT IN AN ORGANIZED HEALTH CARE EDUCATION/TRAINING PROGRAM

## 2023-02-17 PROCEDURE — 96366 THER/PROPH/DIAG IV INF ADDON: CPT | Mod: PN

## 2023-02-17 PROCEDURE — 99999 PR PBB SHADOW E&M-EST. PATIENT-LVL V: ICD-10-PCS | Mod: PBBFAC,,, | Performed by: STUDENT IN AN ORGANIZED HEALTH CARE EDUCATION/TRAINING PROGRAM

## 2023-02-17 PROCEDURE — 3044F PR MOST RECENT HEMOGLOBIN A1C LEVEL <7.0%: ICD-10-PCS | Mod: CPTII,S$GLB,, | Performed by: STUDENT IN AN ORGANIZED HEALTH CARE EDUCATION/TRAINING PROGRAM

## 2023-02-17 PROCEDURE — 83735 ASSAY OF MAGNESIUM: CPT | Mod: PN | Performed by: STUDENT IN AN ORGANIZED HEALTH CARE EDUCATION/TRAINING PROGRAM

## 2023-02-17 RX ORDER — HEPARIN 100 UNIT/ML
500 SYRINGE INTRAVENOUS
Status: CANCELLED | OUTPATIENT
Start: 2023-02-17

## 2023-02-17 RX ORDER — MAGNESIUM SULFATE HEPTAHYDRATE 40 MG/ML
4 INJECTION, SOLUTION INTRAVENOUS ONCE
Status: COMPLETED | OUTPATIENT
Start: 2023-02-17 | End: 2023-02-17

## 2023-02-17 RX ORDER — HEPARIN 100 UNIT/ML
500 SYRINGE INTRAVENOUS
Status: DISCONTINUED | OUTPATIENT
Start: 2023-02-17 | End: 2023-02-17 | Stop reason: HOSPADM

## 2023-02-17 RX ORDER — EPINEPHRINE 0.3 MG/.3ML
0.3 INJECTION SUBCUTANEOUS ONCE AS NEEDED
Status: CANCELLED | OUTPATIENT
Start: 2023-02-17

## 2023-02-17 RX ORDER — MAGNESIUM SULFATE HEPTAHYDRATE 40 MG/ML
4 INJECTION, SOLUTION INTRAVENOUS ONCE
Status: CANCELLED
Start: 2023-02-17 | End: 2023-02-17

## 2023-02-17 RX ORDER — SODIUM CHLORIDE 0.9 % (FLUSH) 0.9 %
10 SYRINGE (ML) INJECTION
Status: CANCELLED | OUTPATIENT
Start: 2023-02-17

## 2023-02-17 RX ORDER — EPINEPHRINE 0.3 MG/.3ML
0.3 INJECTION SUBCUTANEOUS ONCE AS NEEDED
Status: DISCONTINUED | OUTPATIENT
Start: 2023-02-17 | End: 2023-02-17 | Stop reason: HOSPADM

## 2023-02-17 RX ORDER — DIPHENHYDRAMINE HYDROCHLORIDE 50 MG/ML
50 INJECTION INTRAMUSCULAR; INTRAVENOUS ONCE AS NEEDED
Status: DISCONTINUED | OUTPATIENT
Start: 2023-02-17 | End: 2023-02-17 | Stop reason: HOSPADM

## 2023-02-17 RX ORDER — DIPHENHYDRAMINE HYDROCHLORIDE 50 MG/ML
50 INJECTION INTRAMUSCULAR; INTRAVENOUS ONCE AS NEEDED
Status: CANCELLED | OUTPATIENT
Start: 2023-02-17

## 2023-02-17 RX ORDER — SODIUM CHLORIDE 0.9 % (FLUSH) 0.9 %
10 SYRINGE (ML) INJECTION
Status: DISCONTINUED | OUTPATIENT
Start: 2023-02-17 | End: 2023-02-17 | Stop reason: HOSPADM

## 2023-02-17 RX ADMIN — MAGNESIUM SULFATE 4 G: 4 INJECTION INTRAVENOUS at 03:02

## 2023-02-17 RX ADMIN — SODIUM CHLORIDE: 9 INJECTION, SOLUTION INTRAVENOUS at 02:02

## 2023-02-17 RX ADMIN — TRASTUZUMAB 806 MG: KIT at 04:02

## 2023-02-17 RX ADMIN — PERTUZUMAB 420 MG: 30 INJECTION, SOLUTION, CONCENTRATE INTRAVENOUS at 02:02

## 2023-02-17 NOTE — PLAN OF CARE
Pt arrived to clinic today for Perjeta/Ogivri and Potassium infusion and tolerated well. No changes throughout therapy. Pt aware of follow up appointments and side effects of drugs. Discharged to home. NAD.

## 2023-02-17 NOTE — PROGRESS NOTES
PATIENT: Josefina Coon  MRN: [id number]  DATE: 2/26/2023      Diagnosis:   1. Invasive ductal carcinoma of breast, female, left    2. Type 2 diabetes mellitus without complication, without long-term current use of insulin    3. Abnormal liver enzymes    4. Chemotherapy induced diarrhea    5. Fatty liver          Chief Complaint: Follow-up and Breast Cancer      Subjective:   HPI: Ms. Coon is a 58 y.o. female Ms. Coon is a 57 y.o. female with HTN, HLD, depression, diabetes type 2 with neuropathy, obesity, fatty liver, who has established care with Dr. Jaquez for a diagnosis of invasive ductal carcinoma of the left breast, triple positivity. Receiving treatment with Paclitaxel/Trastuzumab/Pertuzumab on D1, weekly Paclitaxel on D8, D15 of a 21 day cycle. Completed weekly Paclitaxel on 12/23/22.  She is here today today for consideration of C7D1 of therapy which consists of Trastuzumab/Pertuzumab every 21 days.      Today, s/p B/L mastectomy with CPR;ypT0,pN0,cM0  discussion about the next step with cont both Transtuzumab/pertuzumab. Patient is doing well, with improvement in a lot of her side effects from chemotherapy      Oncologic History:   8/25/22 bilateral screening mammogram,,Right neg ,Left central post mass     9/8/22 left diag MMG, left US limited .Left 0300 lateral posterior 6cm FN 1.4cm mass , left axilla LN 2, up to 4mm cortex     9/14/22 left 0300 lateral posterior 6cm FN 1.4cm mass US biopsy .Grade 3 ,1.1cm in biopsy No LVI , ER 84% [place] 28% Her 2 3+ pos Ki 52 %    Left axilla LN biopsy ;Benign fatty tissue, no LN, not concordant No MRI of breasts were done      9/21/22 US bilateral complete Right neg, right LN nml , Left 0300 6cm FN [id number] mass Borderline LN left axilla     9/21/22 Left axilla LN biopsy benign LN , so eventually Stage IA triple positive Breast cancer    10/7/22: started neoadjuvant chemo with Taxol + dual HER-2 (tranztuzumab and pertuzumab)  2/2023: s/p B/L mastectomy with  CPR;ypT0,pN0,cM0    Oncology History   Invasive ductal carcinoma of breast, female, left   9/23/2022 Initial Diagnosis    Invasive ductal carcinoma of breast, female, left     9/23/2022 Cancer Staged    Staging form: Breast, AJCC 8th Edition  - Clinical stage from 9/23/2022: Stage IA (cT1c, cN0(f), cM0, G3, ER+, NV+, HER2+)       9/29/2022 - 9/29/2022 Chemotherapy    Treatment Summary   Plan Name: OP BREAST TRASTUZUMAB PACLITAXEL WEEKLY  Treatment Goal: Curative  Status: Inactive  Start Date:   End Date:   Provider: Lisa Jaquez MD  Chemotherapy: PACLitaxeL (TAXOL) 80 mg/m2 = 204 mg in sodium chloride 0.9% 250 mL chemo infusion, 80 mg/m2 = 204 mg, Intravenous, Clinic/HOD 1 time, 0 of 12 cycles  pertuzumab (PERJETA) 840 mg in sodium chloride 0.9% 278 mL infusion, 840 mg (original dose ), Intravenous, Clinic/HOD 1 time, 0 of 17 cycles  Dose modification: 840 mg (Cycle 1, Reason: Other (see comments)), 420 mg (Cycle 4, Reason: Other (see comments))  trastuzumab-dkst (OGIVRI) 591 mg in sodium chloride 0.9% 250 mL chemo infusion, 4 mg/kg = 591 mg, Intravenous, Clinic/HOD 1 time, 0 of 25 cycles       10/7/2022 -  Chemotherapy    Treatment Summary   Plan Name: OP BREAST PACLITAXEL TRASTUZUMAB WEEKLY WITH PERTUZUMAB Q3W (THP)  Treatment Goal: Control  Status: Active  Start Date: 10/7/2022  End Date: 10/6/2023 (Planned)  Provider: Lisa Jaquez MD  Chemotherapy: PACLitaxeL (TAXOL) 80 mg/m2 = 210 mg in sodium chloride 0.9% 250 mL chemo infusion, 80 mg/m2 = 210 mg, Intravenous, Clinic/HOD 1 time, 4 of 4 cycles  Administration: 210 mg (10/7/2022), 204 mg (10/14/2022), 204 mg (10/28/2022), 204 mg (11/4/2022), 204 mg (10/21/2022), 204 mg (11/11/2022), 204 mg (11/18/2022), 204 mg (11/25/2022), 198 mg (12/9/2022), 204 mg (12/2/2022), 198 mg (12/16/2022), 198 mg (12/23/2022)  pertuzumab (PERJETA) 840 mg in sodium chloride 0.9% 278 mL infusion, 840 mg, Intravenous, [facility] 1 time, 7 of 18 cycles  Administration: 840 mg  (10/7/2022), 420 mg (10/28/2022), 420 mg (11/18/2022), 420 mg (12/9/2022), 420 mg (12/30/2022), 420 mg (1/20/2023), 420 mg (2/17/2023)  trastuzumab-dkst (OGIVRI) 570 mg in sodium chloride 0.9% 250 mL chemo infusion, 593 mg (100 % of original dose 4 mg/kg), Intravenous, Clinic/HOD 1 time, 7 of 18 cycles  Dose modification: 4 mg/kg (original dose 4 mg/kg, Cycle 1, Reason: Other (see comments), Comment: weekly trastuzumab), 2 mg/kg (original dose 2 mg/kg, Cycle 2, Reason: Other (see comments), Comment: weekly maintenance dose), 6 mg/kg (original dose 2 mg/kg, Cycle 2, Reason: MD Discretion, Comment: change to q3w dosing), 2 mg/kg (original dose 2 mg/kg, Cycle 1, Reason: Other (see comments), Comment: maintenance weekly dose)  Administration: 570 mg (10/7/2022), 840 mg (10/28/2022), 288 mg (10/14/2022), 290 mg (10/21/2022), 840 mg (11/18/2022), 831 mg (12/9/2022), 831 mg (12/30/2022), 720 mg (1/20/2023), 806 mg (2/17/2023)       2/13/2023 Cancer Staged    Staging form: Breast, AJCC 8th Edition  - Pathologic stage from 2/13/2023: No Stage Recommended (ypT0, pN0(sn), cM0, GX)          Past Medical History:   Past Medical History:   Diagnosis Date    Abnormal liver enzymes     Asymptomatic varicose veins     Breast cancer 09/14/2022    left idc    Cancer     left breast cancer    Depressive disorder, not elsewhere classified     Dyslipidemia     Embolism and thrombosis of unspecified site     superficial venous thrombosis    Encounter for long-term (current) use of other medications     Family history of ischemic heart disease     Fatty liver     Generalized anxiety disorder     History of chicken pox     Obstructive sleep apnea (adult) (pediatric)     Type II or unspecified type diabetes mellitus without mention of complication, not stated as uncontrolled     Unspecified essential hypertension     Unspecified venous (peripheral) insufficiency     Unspecified vitamin D deficiency        Past Surgical HIstory:   Past  Surgical History:   Procedure Laterality Date    BREAST BIOPSY Left 2022    idc    BREAST BIOPSY Left 2022    neg ln    BREAST BIOPSY Left 2022    neg ln     SECTION      COLONOSCOPY      ESOPHAGOGASTRODUODENOSCOPY      INSERTION OF TUNNELED CENTRAL VENOUS CATHETER (CVC) WITH SUBCUTANEOUS PORT Right 10/04/2022    Procedure: [id number];  Surgeon: Dominick Ng MD;  [facility];  Service: General;  Laterality: Right;    LUMPECTOMY, WITH RADAR LOCALIZATION USING TR[place]ON  Left 2023    Procedure: LUMPECTOMY,WITH RADAR LOCALIZATION USING [place] ;  Surgeon: Maria G Mcduffie MD;  Location: [facility] OR;  Service: General;  Laterality: Left;    SENTINEL LYMPH NODE BIOPSY Left 2023    Procedure: BIOPSY, LYMPH NODE, SENTINEL;  Surgeon: Maria G Mcduffie MD;  Location: [facility] OR;  Service: General;  Laterality: Left;    VEIN SURGERY      Laser, Dr. Larsen       Family History:   Family History   Problem Relation Age of Onset    Hyperlipidemia Mother     Hypertension Mother     Vulvar Cancer Mother     Dementia Mother     Atrial fibrillation Father     Parkinsonism Father     Diabetes Brother     Cancer Brother         colon    Hypertension Son     Hyperlipidemia Son     Anxiety disorder Son     Stroke Maternal Grandmother        Social History:  reports that she has never smoked. She has never used smokeless tobacco. She reports that she does not currently use alcohol. She reports that she does not use drugs.    Allergies:  Review of patient's allergies indicates:   Allergen Reactions    Sitagliptin      Other reaction(s): (januvia) abdominal pain    Sulfa (sulfonamide antibiotics) Hives    Byetta  [exenatide] Rash    Lactose        Medications:  Current Outpatient Medications   Medication Sig Dispense Refill    albuterol (PROVENTIL/VENTOLIN HFA) 90 mcg/actuation inhaler Inhale 2 puffs into the lungs every 6 (six) hours as needed for Wheezing. 18 g 6    ALPRAZolam  (XANAX) 0.25 MG tablet TAKE ONE TABLET BY MOUTH 1-2 TIMES DAILY AS NEEDED ANXIETY 15 tablet 0    buPROPion (WELLBUTRIN XL) 150 MG TB24 tablet TAKE 1 TABLET BY MOUTH EVERY DAY 90 tablet 3    dulaglutide (TRULICITY) 0.75 mg/0.5 mL pen injector Inject 0.75 mg into the skin every 7 days. 4 pen 0    dulaglutide (TRULICITY) 3 mg/0.5 mL pen injector Inject 3 mg into the skin every 7 days. (Patient not taking: Reported on 2/20/2023) 12 pen 1    ergocalciferol (ERGOCALCIFEROL) 50,000 unit Cap TAKE 1 CAPSULE BY MOUTH ONE TIME PER WEEK 12 capsule 0    hydroCHLOROthiazide (HYDRODIURIL) 12.5 MG Tab Take 1 tablet (12.5 mg total) by mouth once daily. 90 tablet 0    HYDROcodone-acetaminophen (NORCO)  mg per tablet Take 1 tablet by mouth every 6 (six) hours as needed for Pain. (Patient not taking: Reported on 2/20/2023) 20 tablet 0    LIDOcaine-prilocaine (EMLA) cream Apply to port site 1 hour prior to port access and cover 30 g 3    magnesium 30 mg Tab Take 30 mg by mouth once.      metFORMIN (GLUCOPHAGE) 500 MG tablet TAKE 1 TABLET BY MOUTH TWICE A DAY WITH MEALS 180 tablet 1    nystatin (MYCOSTATIN) ointment Apply topically 3 (three) times daily. (Patient not taking: Reported on 2/20/2023) 30 g 2    nystatin (MYCOSTATIN) powder Apply to affected area 3 times daily (Patient not taking: Reported on 2/20/2023) 60 g 1    omeprazole (PRILOSEC OTC) 20 MG tablet Take 1 tablet (20 mg total) by mouth once daily. 30 tablet 6    omeprazole (PRILOSEC) 10 MG capsule omeprazole      ondansetron (ZOFRAN) 8 MG tablet Take 1 tablet (8 mg total) by mouth every 8 (eight) hours as needed for Nausea. (Patient not taking: Reported on 2/20/2023) 30 tablet 2    pravastatin (PRAVACHOL) 10 MG tablet TAKE 1 TABLET BY MOUTH EVERYDAY AT BEDTIME 90 tablet 1    promethazine (PHENERGAN) 25 MG tablet Take 1 tablet (25 mg total) by mouth every 6 (six) hours as needed for Nausea. (Patient not taking: Reported on 2/20/2023) 30 tablet 0    triamcinolone  "acetonide 0.025% (KENALOG) 0.025 % cream Apply topically 2 (two) times daily. Apply to the skin lesions twice a day (Patient not taking: Reported on 2/20/2023) 15 g 0     No current facility-administered medications for this visit.     Facility-Administered Medications Ordered in Other Visits   Medication Dose Route Frequency Provider Last Rate Last Admin    lactated ringers infusion   Intravenous Continuous Maria G Mcduffie  mL/hr at 02/08/23 0700 New Bag at 02/08/23 0814    midazolam (VERSED) 1 mg/mL injection 2 mg  2 mg Intravenous See admin instructions Maria G Mcduffie MD   2 mg at 02/08/23 0711       Review of Systems   Constitutional:  Negative for chills, fatigue and fever.   HENT:  Negative for trouble swallowing.    Respiratory:  Negative for cough and shortness of breath.    Cardiovascular:  Negative for chest pain and palpitations.   Gastrointestinal:  Positive for diarrhea. Negative for abdominal pain, constipation, nausea and vomiting.   Genitourinary:  Negative for dysuria.   Musculoskeletal:  Negative for arthralgias.   Skin:  Positive for rash.   Neurological:  Negative for headaches.   Hematological:  Negative for adenopathy.     ECOG Performance Status:   ECOG SCORE    1 - Restricted in strenuous activity-ambulatory and able to carry out work of a light nature         Objective:      Vitals:   Vitals:    02/17/23 1331   BP: 115/70   BP Location: Right arm   Patient Position: Sitting   BP Method: Large (Manual)   Pulse: 77   Resp: 16   Temp: 98.6 °F (37 °C)   TempSrc: Temporal   SpO2: 97%   Weight: 134.4 kg (296 lb 4.8 oz)   Height: 5' 3.5" (1.613 m)         BMI: Body mass index is 51.66 kg/m².    Physical Exam  Vitals reviewed.   Constitutional:       General: She is not in acute distress.     Appearance: She is not diaphoretic.   HENT:      Head: Normocephalic and atraumatic.      Mouth/Throat:      Mouth: Mucous membranes are moist.      Pharynx: No oropharyngeal exudate.   Eyes: "      General: No scleral icterus.  Cardiovascular:      Rate and Rhythm: Normal rate and regular rhythm.      Heart sounds: Normal heart sounds. No murmur heard.  Pulmonary:      Effort: Pulmonary effort is normal. No respiratory distress.      Breath sounds: No wheezing.   Chest:      Comments: B/L mastectomy   Musculoskeletal:      Cervical back: No tenderness.      Right lower leg: No edema.      Left lower leg: No edema.   Lymphadenopathy:      Cervical: No cervical adenopathy.   Skin:     General: Skin is warm.      Findings: Rash present.   Neurological:      Mental Status: She is alert and oriented to person, place, and time.   Psychiatric:         Behavior: Behavior normal.       Laboratory Data:  Lab Results   Component Value Date    WBC 7.26 02/17/2023    HGB 10.4 (L) 02/17/2023    HCT 31.9 (L) 02/17/2023     (H) 02/17/2023     02/17/2023        Assessment:       1. Invasive ductal carcinoma of breast, female, left    2. Type 2 diabetes mellitus without complication, without long-term current use of insulin    3. Abnormal liver enzymes    4. Chemotherapy induced diarrhea    5. Fatty liver            Plan:   Invasive ductal carcinoma of the breast, left  - cT1c triple positive breast cancer  (ER 84% NE 28% Her 2 3+ pos Ki 52 %), given her young age and Ki67%, will proceed with TH, adding P to help with a better pCR, will also plan to get ultrasound mid cycle to monitor (3 months)  -9/26/22 Echo with EF 60%; next is scheduled for 01/18/23  -Began TH+P on 10/7/2022; completed 12 weeks of Paclitaxel 12/23/22  - 2/2023; - s/p B/l Mastectom CPR;ypT0,pN0,cM0  -Proceed with Trastuzumab/Pertuzumab add IV mag to infusion today    -Plan to meet with Rad onc 3/7, Dr. Trevino and Simulation that day will probably need adjuvant endocrine therapy after finishing up adjuvant radiation   -Echo 1/2023 EF: 65% and global longitudinal 17.5%, cont monitoring with echo in 3 months      Diabetes type 2 with  neuropathy   -Taking Metformin, Trulicity   -Per PCP, Monitor     Anxiety  -Taking Wellbutrin  -Following with Dr. Long     Hypomagnesemia  -Taking po supplements at home; will hold off for diarrhea if needed   -Monitor , replacement as needed     Drug-induced rash  -Stable to improved  -using Triamcinolone cream PRN    Normocytic anemia  -likely due to chemo and slightly nose/rectal bleeding (mucositis)  -cont monitoring , no indication for transfusion, improving     Patient queried and all questions were answered.    Route Chart for Scheduling    Med Onc Chart Routing      Follow up with physician 3 weeks and 6 weeks. 3/10/23 MD/Chemo, blood work CBC/CMP/Mag 3/7/23 (decoupling), MD only   Follow up with BRANDIE    Infusion scheduling note    Injection scheduling note    Labs CBC, CMP and magnesium   Lab interval: every 3 weeks  few days prior   Imaging    Pharmacy appointment    Other referrals        Treatment Plan Information   OP BREAST PACLITAXEL TRASTUZUMAB WEEKLY WITH PERTUZUMAB Q3W (THP)   Lisa Jaquez MD   Upcoming Treatment Dates - OP BREAST PACLITAXEL TRASTUZUMAB WEEKLY WITH PERTUZUMAB Q3W (THP)    3/10/2023       Chemotherapy       pertuzumab (PERJETA) 420 mg in sodium chloride 0.9% 264 mL infusion       trastuzumab-dkst (OGIVRI) in sodium chloride 0.9% chemo infusion       Supportive Care       magnesium sulfate 2g in water 50mL IVPB (premix)  3/31/2023       Chemotherapy       pertuzumab (PERJETA) 420 mg in sodium chloride 0.9% 264 mL infusion       trastuzumab-dkst (OGIVRI) in sodium chloride 0.9% chemo infusion       Supportive Care       magnesium sulfate 2g in water 50mL IVPB (premix)  4/21/2023       Chemotherapy       pertuzumab (PERJETA) 420 mg in sodium chloride 0.9% 264 mL infusion       trastuzumab-dkst (OGIVRI) in sodium chloride 0.9% chemo infusion       Supportive Care       magnesium sulfate 2g in water 50mL IVPB (premix)  5/12/2023       Chemotherapy       pertuzumab (PERJETA) 420 mg in  sodium chloride 0.9% 264 mL infusion       trastuzumab-dkst (OGIVRI) in sodium chloride 0.9% chemo infusion       Supportive Care       magnesium sulfate 2g in water 50mL IVPB (premix)    Supportive Plan Information  IV FLUIDS AND ELECTROLYTES   Lisa Jaquez MD   Upcoming Treatment Dates - IV FLUIDS AND ELECTROLYTES    No upcoming days in selected categories.    Lisa Jaquez MD  Hematology and Oncology  [facility]  [facility] [place] [facility]

## 2023-02-18 NOTE — PLAN OF CARE
Problem: Adult Inpatient Plan of Care  Goal: Plan of Care Review  Outcome: Ongoing, Progressing  Flowsheets (Taken 2/17/2023 2886)  Plan of Care Reviewed With: patient   Pt tolerated  infusion well.   No adverse reaction noted.  PAC flushed with NS and de-accessed per protocol.   Pt left clinic in no acute distress.

## 2023-02-27 ENCOUNTER — TELEPHONE (OUTPATIENT)
Dept: HEMATOLOGY/ONCOLOGY | Facility: CLINIC | Age: 58
End: 2023-02-27
Payer: COMMERCIAL

## 2023-02-27 DIAGNOSIS — C50.912 INVASIVE DUCTAL CARCINOMA OF BREAST, FEMALE, LEFT: Primary | ICD-10-CM

## 2023-02-27 DIAGNOSIS — Z91.89 AT RISK FOR LYMPHEDEMA: ICD-10-CM

## 2023-02-27 NOTE — TELEPHONE ENCOUNTER
Lvm to schedule post op 8 week PT assessment    ----- Message from Mir Piper MA sent at 1/23/2023  3:27 PM CST -----  Regarding: lymph post op  Call to schedule post op, sx 2/6

## 2023-02-28 ENCOUNTER — TELEPHONE (OUTPATIENT)
Dept: HEMATOLOGY/ONCOLOGY | Facility: CLINIC | Age: 58
End: 2023-02-28
Payer: COMMERCIAL

## 2023-03-01 ENCOUNTER — HOSPITAL ENCOUNTER (OUTPATIENT)
Dept: RADIATION THERAPY | Facility: HOSPITAL | Age: 58
Discharge: HOME | End: 2023-03-01
Attending: RADIOLOGY
Payer: COMMERCIAL

## 2023-03-02 ENCOUNTER — TELEPHONE (OUTPATIENT)
Dept: HEMATOLOGY/ONCOLOGY | Facility: CLINIC | Age: 58
End: 2023-03-02
Payer: COMMERCIAL

## 2023-03-02 NOTE — TELEPHONE ENCOUNTER
I spoke with the patient and informed her of the need to schedule a post op PT appt, recommended by Dr. Mcduffie. I explained what the appt is designed to treat and what the consult will consist of. She voiced understanding and verbalized acceptance of appt. We discussed if she will be getting radiation daily, to ask Rad to work around appt time that 1 day of PT.

## 2023-03-07 ENCOUNTER — HOSPITAL ENCOUNTER (OUTPATIENT)
Dept: RADIATION THERAPY | Facility: HOSPITAL | Age: 58
Discharge: HOME | End: 2023-03-07
Attending: STUDENT IN AN ORGANIZED HEALTH CARE EDUCATION/TRAINING PROGRAM
Payer: COMMERCIAL

## 2023-03-07 ENCOUNTER — OFFICE VISIT (OUTPATIENT)
Dept: RADIATION ONCOLOGY | Facility: CLINIC | Age: 58
End: 2023-03-07
Payer: COMMERCIAL

## 2023-03-07 ENCOUNTER — PATIENT MESSAGE (OUTPATIENT)
Dept: HEMATOLOGY/ONCOLOGY | Facility: CLINIC | Age: 58
End: 2023-03-07
Payer: COMMERCIAL

## 2023-03-07 ENCOUNTER — LAB VISIT (OUTPATIENT)
Dept: LAB | Facility: HOSPITAL | Age: 58
End: 2023-03-07
Attending: STUDENT IN AN ORGANIZED HEALTH CARE EDUCATION/TRAINING PROGRAM
Payer: COMMERCIAL

## 2023-03-07 VITALS
HEART RATE: 73 BPM | HEIGHT: 64 IN | WEIGHT: 289 LBS | OXYGEN SATURATION: 99 % | SYSTOLIC BLOOD PRESSURE: 130 MMHG | RESPIRATION RATE: 20 BRPM | TEMPERATURE: 98 F | DIASTOLIC BLOOD PRESSURE: 73 MMHG | BODY MASS INDEX: 49.34 KG/M2

## 2023-03-07 DIAGNOSIS — C50.912 INVASIVE DUCTAL CARCINOMA OF BREAST, FEMALE, LEFT: Primary | ICD-10-CM

## 2023-03-07 DIAGNOSIS — C50.912 INVASIVE DUCTAL CARCINOMA OF BREAST, FEMALE, LEFT: ICD-10-CM

## 2023-03-07 LAB
ALBUMIN SERPL BCP-MCNC: 3.6 G/DL (ref 3.5–5.2)
ALP SERPL-CCNC: 104 U/L (ref 55–135)
ALT SERPL W/O P-5'-P-CCNC: 11 U/L (ref 10–44)
ANION GAP SERPL CALC-SCNC: 12 MMOL/L (ref 8–16)
AST SERPL-CCNC: 10 U/L (ref 10–40)
BILIRUB SERPL-MCNC: 0.4 MG/DL (ref 0.1–1)
BUN SERPL-MCNC: 20 MG/DL (ref 6–20)
CALCIUM SERPL-MCNC: 9.8 MG/DL (ref 8.7–10.5)
CHLORIDE SERPL-SCNC: 106 MMOL/L (ref 95–110)
CO2 SERPL-SCNC: 21 MMOL/L (ref 23–29)
CREAT SERPL-MCNC: 1.5 MG/DL (ref 0.5–1.4)
ERYTHROCYTE [DISTWIDTH] IN BLOOD BY AUTOMATED COUNT: 12.4 % (ref 11.5–14.5)
EST. GFR  (NO RACE VARIABLE): 39.6 ML/MIN/1.73 M^2
GLUCOSE SERPL-MCNC: 123 MG/DL (ref 70–110)
HCT VFR BLD AUTO: 33.1 % (ref 37–48.5)
HGB BLD-MCNC: 10.8 G/DL (ref 12–16)
IMM GRANULOCYTES # BLD AUTO: 0.04 K/UL (ref 0–0.04)
MAGNESIUM SERPL-MCNC: 1.1 MG/DL (ref 1.6–2.6)
MCH RBC QN AUTO: 32 PG (ref 27–31)
MCHC RBC AUTO-ENTMCNC: 32.6 G/DL (ref 32–36)
MCV RBC AUTO: 98 FL (ref 82–98)
NEUTROPHILS # BLD AUTO: 4.3 K/UL (ref 1.8–7.7)
PLATELET # BLD AUTO: 261 K/UL (ref 150–450)
PMV BLD AUTO: 9.9 FL (ref 9.2–12.9)
POTASSIUM SERPL-SCNC: 3.2 MMOL/L (ref 3.5–5.1)
PROT SERPL-MCNC: 7.4 G/DL (ref 6–8.4)
RBC # BLD AUTO: 3.37 M/UL (ref 4–5.4)
SODIUM SERPL-SCNC: 139 MMOL/L (ref 136–145)
WBC # BLD AUTO: 7.4 K/UL (ref 3.9–12.7)

## 2023-03-07 PROCEDURE — 99999 PR PBB SHADOW E&M-EST. PATIENT-LVL V: CPT | Mod: PBBFAC,,, | Performed by: RADIOLOGY

## 2023-03-07 PROCEDURE — 77334 RADIATION TREATMENT AID(S): CPT | Mod: TC,PN | Performed by: RADIOLOGY

## 2023-03-07 PROCEDURE — 77290 THER RAD SIMULAJ FIELD CPLX: CPT | Mod: TC,PN | Performed by: RADIOLOGY

## 2023-03-07 PROCEDURE — 85027 COMPLETE CBC AUTOMATED: CPT | Mod: PN | Performed by: STUDENT IN AN ORGANIZED HEALTH CARE EDUCATION/TRAINING PROGRAM

## 2023-03-07 PROCEDURE — 3075F SYST BP GE 130 - 139MM HG: CPT | Mod: CPTII,S$GLB,, | Performed by: RADIOLOGY

## 2023-03-07 PROCEDURE — 3008F BODY MASS INDEX DOCD: CPT | Mod: CPTII,S$GLB,, | Performed by: RADIOLOGY

## 2023-03-07 PROCEDURE — 77263 PR  RADIATION THERAPY PLAN COMPLEX: ICD-10-PCS | Mod: ,,, | Performed by: RADIOLOGY

## 2023-03-07 PROCEDURE — 83735 ASSAY OF MAGNESIUM: CPT | Mod: PN | Performed by: STUDENT IN AN ORGANIZED HEALTH CARE EDUCATION/TRAINING PROGRAM

## 2023-03-07 PROCEDURE — 3078F DIAST BP <80 MM HG: CPT | Mod: CPTII,S$GLB,, | Performed by: RADIOLOGY

## 2023-03-07 PROCEDURE — 3075F PR MOST RECENT SYSTOLIC BLOOD PRESS GE 130-139MM HG: ICD-10-PCS | Mod: CPTII,S$GLB,, | Performed by: RADIOLOGY

## 2023-03-07 PROCEDURE — 3044F PR MOST RECENT HEMOGLOBIN A1C LEVEL <7.0%: ICD-10-PCS | Mod: CPTII,S$GLB,, | Performed by: RADIOLOGY

## 2023-03-07 PROCEDURE — 1159F MED LIST DOCD IN RCRD: CPT | Mod: CPTII,S$GLB,, | Performed by: RADIOLOGY

## 2023-03-07 PROCEDURE — 77263 THER RADIOLOGY TX PLNG CPLX: CPT | Mod: ,,, | Performed by: RADIOLOGY

## 2023-03-07 PROCEDURE — 77290 THER RAD SIMULAJ FIELD CPLX: CPT | Mod: 26,,, | Performed by: RADIOLOGY

## 2023-03-07 PROCEDURE — 99214 OFFICE O/P EST MOD 30 MIN: CPT | Mod: 25,S$GLB,, | Performed by: RADIOLOGY

## 2023-03-07 PROCEDURE — 99214 PR OFFICE/OUTPT VISIT, EST, LEVL IV, 30-39 MIN: ICD-10-PCS | Mod: 25,S$GLB,, | Performed by: RADIOLOGY

## 2023-03-07 PROCEDURE — 77290 PR  SET RADN THERAPY FIELD COMPLEX: ICD-10-PCS | Mod: 26,,, | Performed by: RADIOLOGY

## 2023-03-07 PROCEDURE — 77014 HC CT GUIDANCE RADIATION THERAPY FLDS PLACEMENT: CPT | Mod: TC,PN | Performed by: RADIOLOGY

## 2023-03-07 PROCEDURE — 3008F PR BODY MASS INDEX (BMI) DOCUMENTED: ICD-10-PCS | Mod: CPTII,S$GLB,, | Performed by: RADIOLOGY

## 2023-03-07 PROCEDURE — 36415 COLL VENOUS BLD VENIPUNCTURE: CPT | Mod: PN | Performed by: STUDENT IN AN ORGANIZED HEALTH CARE EDUCATION/TRAINING PROGRAM

## 2023-03-07 PROCEDURE — 77334 PR  RADN TREATMENT AID(S) COMPLX: ICD-10-PCS | Mod: 26,,, | Performed by: RADIOLOGY

## 2023-03-07 PROCEDURE — 1159F PR MEDICATION LIST DOCUMENTED IN MEDICAL RECORD: ICD-10-PCS | Mod: CPTII,S$GLB,, | Performed by: RADIOLOGY

## 2023-03-07 PROCEDURE — 77334 RADIATION TREATMENT AID(S): CPT | Mod: 26,,, | Performed by: RADIOLOGY

## 2023-03-07 PROCEDURE — 99999 PR PBB SHADOW E&M-EST. PATIENT-LVL V: ICD-10-PCS | Mod: PBBFAC,,, | Performed by: RADIOLOGY

## 2023-03-07 PROCEDURE — 3044F HG A1C LEVEL LT 7.0%: CPT | Mod: CPTII,S$GLB,, | Performed by: RADIOLOGY

## 2023-03-07 PROCEDURE — 3078F PR MOST RECENT DIASTOLIC BLOOD PRESSURE < 80 MM HG: ICD-10-PCS | Mod: CPTII,S$GLB,, | Performed by: RADIOLOGY

## 2023-03-07 PROCEDURE — 80053 COMPREHEN METABOLIC PANEL: CPT | Mod: PN | Performed by: STUDENT IN AN ORGANIZED HEALTH CARE EDUCATION/TRAINING PROGRAM

## 2023-03-07 NOTE — PROGRESS NOTES
[facility] of Radiation Oncology  Follow Up Visit Note    Diagnosis:  Josefina Coon is a 58 y.o. female with a(n) BMI 52 and diagnosis of Stage IA  (cT1c, N0, M0, Grade 3, ER+/VA+/HER2+) Invasive ductal carcinoma of the LEFT breast. She completed neoadjuvant chemotherapy with THP and lumpectomy/sentinel lymph node biopsy 2/8/23 with pCR.    Oncologic History:  Oncology History   Invasive ductal carcinoma of breast, female, left   9/23/2022 Initial Diagnosis    Invasive ductal carcinoma of breast, female, left     9/23/2022 Cancer Staged    Staging form: Breast, AJCC 8th Edition  - Clinical stage from 9/23/2022: Stage IA (cT1c, cN0(f), cM0, G3, ER+, VA+, HER2+)       9/29/2022 - 9/29/2022 Chemotherapy    Treatment Summary   Plan Name: OP BREAST TRASTUZUMAB PACLITAXEL WEEKLY  Treatment Goal: Curative  Status: Inactive  Start Date:   End Date:   Provider: Lisa Jaquez MD  Chemotherapy: PACLitaxeL (TAXOL) 80 mg/m2 = 204 mg in sodium chloride 0.9% 250 mL chemo infusion, 80 mg/m2 = 204 mg, Intravenous, Clinic/HOD 1 time, 0 of 12 cycles  pertuzumab (PERJETA) 840 mg in sodium chloride 0.9% 278 mL infusion, 840 mg (original dose ), Intravenous, Clinic/HOD 1 time, 0 of 17 cycles  Dose modification: 840 mg (Cycle 1, Reason: Other (see comments)), 420 mg (Cycle 4, Reason: Other (see comments))  trastuzumab-dkst (OGIVRI) 591 mg in sodium chloride 0.9% 250 mL chemo infusion, 4 mg/kg = 591 mg, Intravenous, Clinic/HOD 1 time, 0 of 25 cycles       10/7/2022 -  Chemotherapy    Treatment Summary   Plan Name: OP BREAST PACLITAXEL TRASTUZUMAB WEEKLY WITH PERTUZUMAB Q3W (THP)  Treatment Goal: Control  Status: Active  Start Date: 10/7/2022  End Date: 10/6/2023 (Planned)  Provider: Lisa Jaquez MD  Chemotherapy: PACLitaxeL (TAXOL) 80 mg/m2 = 210 mg in sodium chloride 0.9% 250 mL chemo infusion, 80 mg/m2 = 210 mg, Intravenous, Clinic/HOD 1 time, 4 of 4 cycles  Administration: 210 mg (10/7/2022), 204 mg  (10/14/2022), 204 mg (10/28/2022), 204 mg (11/4/2022), 204 mg (10/21/2022), 204 mg (11/11/2022), 204 mg (11/18/2022), 204 mg (11/25/2022), 198 mg (12/9/2022), 204 mg (12/2/2022), 198 mg (12/16/2022), 198 mg (12/23/2022)  pertuzumab (PERJETA) 840 mg in sodium chloride 0.9% 278 mL infusion, 840 mg, Intravenous, Clinic/HOD 1 time, 7 of 18 cycles  Administration: 840 mg (10/7/2022), 420 mg (10/28/2022), 420 mg (11/18/2022), 420 mg (12/9/2022), 420 mg (12/30/2022), 420 mg (1/20/2023), 420 mg (2/17/2023)  trastuzumab-dkst (OGIVRI) 570 mg in sodium chloride 0.9% 250 mL chemo infusion, 593 mg (100 % of original dose 4 mg/kg), Intravenous, Clinic/HOD 1 time, 7 of 18 cycles  Dose modification: 4 mg/kg (original dose 4 mg/kg, Cycle 1, Reason: Other (see comments), Comment: weekly trastuzumab), 2 mg/kg (original dose 2 mg/kg, Cycle 2, Reason: Other (see comments), Comment: weekly maintenance dose), 6 mg/kg (original dose 2 mg/kg, Cycle 2, Reason: MD Discretion, Comment: change to q3w dosing), 2 mg/kg (original dose 2 mg/kg, Cycle 1, Reason: Other (see comments), Comment: maintenance weekly dose)  Administration: 570 mg (10/7/2022), 840 mg (10/28/2022), 288 mg (10/14/2022), 290 mg (10/21/2022), 840 mg (11/18/2022), 831 mg (12/9/2022), 831 mg (12/30/2022), 720 mg (1/20/2023), 806 mg (2/17/2023)       2/13/2023 Cancer Staged    Staging form: Breast, AJCC 8th Edition  - Pathologic stage from 2/13/2023: No Stage Recommended (ypT0, pN0(sn), cM0, GX)            Interval History  The patient presents today for a regularly scheduled follow up visit.  She was last seen in our clinic on 9/28/22.   Since that time, completed THP with Dr. Jaquez (rash and diarrhea) she underwent definitive surgery on 2/8/23 consisting of lumpectomy and SNLBx.  Pathology demonstrated pCR. She presents today to discuss adjuvant radiation therapy.    Recovering well from systemic therapy and surgery.    Review of Systems   Review of Systems   Constitutional:   Positive for malaise/fatigue. Negative for chills and fever.   Respiratory:  Positive for shortness of breath (mild PAULSON since surgery). Negative for cough.    Gastrointestinal:  Positive for diarrhea (chemo sequelae, episodic). Negative for nausea and vomiting.   Genitourinary:  Positive for dysuria (after urination, +vaginal dryness).     Social History:  Social History     Tobacco Use    Smoking status: Never    Smokeless tobacco: Never   Substance Use Topics    Alcohol use: Not Currently     Comment: rare     Drug use: Never       Family History:  Cancer-related family history includes Cancer in her brother.    Exam:  There were no vitals filed for this visit.  Constitutional: Pleasant 58 y.o. female in no acute distress.  Well nourished. Well groomed.   Physical Exam  Constitutional:       Appearance: Normal appearance. She is obese.   HENT:      Head: Normocephalic and atraumatic.   Pulmonary:      Effort: Pulmonary effort is normal.   Neurological:      General: No focal deficit present.      Mental Status: She is alert and oriented to person, place, and time. Mental status is at baseline.      Cranial Nerves: No cranial nerve deficit.   Psychiatric:         Mood and Affect: Mood normal.         Behavior: Behavior normal.         Thought Content: Thought content normal.         Judgment: Judgment normal.          Assessment:  pCR status post neoadjuvant chemotherapy for left breast cancer  ECOG: (1) Restricted in physically strenuous activity, ambulatory and able to do work of light nature    PLAN  Treatment options were discussed with the patient including adjuvant left breast radiation therapy following surgery and likely neoadjuvant chemotherapy-immunotherapy.  We discussed the goals of treatment to be curative.  The risks, benefits, scheduling, alternatives to and rationale of radiation therapy were explained in detail.    After this discussion, she elected to proceed with breast conservation with  lumpectomy and adjuvant RT.    A CT simulation today to begin the planning process for the patient's radiation therapy.    Informed consent obtained  *Sim in bra   She was given our contact information, and she was told that she could call our clinic at any time if she has any questions or concerns.    Radiation Treatment Details:   We plan to treat LEFT breast to a dose of 42.56 Gy in 16 fractions at 2.66 Gy per fraction delivered daily, followed by a boost to the lumpectomy cavity to 10Gy in 4 fractions (high grade)  We will utilize a(n) 3D technique.   We will utilize daily CT or orthogonal image guidance due to the need for accurate daily patient alignment to treat the target volumes accurately and avoid radiation overdose to multiple regional organs at risk since we are treating the patient with complex target volumes with multiple steep isodose gradients.   Follow up with other providers as directed

## 2023-03-10 ENCOUNTER — INFUSION (OUTPATIENT)
Dept: INFUSION THERAPY | Facility: HOSPITAL | Age: 58
End: 2023-03-10
Attending: STUDENT IN AN ORGANIZED HEALTH CARE EDUCATION/TRAINING PROGRAM
Payer: COMMERCIAL

## 2023-03-10 ENCOUNTER — OFFICE VISIT (OUTPATIENT)
Dept: HEMATOLOGY/ONCOLOGY | Facility: CLINIC | Age: 58
End: 2023-03-10
Payer: COMMERCIAL

## 2023-03-10 VITALS
SYSTOLIC BLOOD PRESSURE: 115 MMHG | TEMPERATURE: 99 F | BODY MASS INDEX: 49.31 KG/M2 | HEIGHT: 64 IN | DIASTOLIC BLOOD PRESSURE: 66 MMHG | WEIGHT: 288.81 LBS | OXYGEN SATURATION: 100 % | RESPIRATION RATE: 16 BRPM | HEART RATE: 70 BPM

## 2023-03-10 VITALS
WEIGHT: 288.81 LBS | OXYGEN SATURATION: 100 % | HEIGHT: 64 IN | DIASTOLIC BLOOD PRESSURE: 79 MMHG | SYSTOLIC BLOOD PRESSURE: 134 MMHG | TEMPERATURE: 95 F | BODY MASS INDEX: 49.31 KG/M2 | HEART RATE: 77 BPM

## 2023-03-10 DIAGNOSIS — D84.821 IMMUNODEFICIENCY DUE TO DRUGS: ICD-10-CM

## 2023-03-10 DIAGNOSIS — L27.0 DRUG-INDUCED SKIN RASH: ICD-10-CM

## 2023-03-10 DIAGNOSIS — E83.42 HYPOMAGNESEMIA: ICD-10-CM

## 2023-03-10 DIAGNOSIS — C50.912 INVASIVE DUCTAL CARCINOMA OF BREAST, FEMALE, LEFT: Primary | ICD-10-CM

## 2023-03-10 DIAGNOSIS — Z79.4 TYPE 2 DIABETES MELLITUS WITH CHRONIC KIDNEY DISEASE, WITH LONG-TERM CURRENT USE OF INSULIN, UNSPECIFIED CKD STAGE: ICD-10-CM

## 2023-03-10 DIAGNOSIS — I42.7 CHEMOTHERAPY INDUCED CARDIOMYOPATHY: ICD-10-CM

## 2023-03-10 DIAGNOSIS — E11.22 TYPE 2 DIABETES MELLITUS WITH CHRONIC KIDNEY DISEASE, WITH LONG-TERM CURRENT USE OF INSULIN, UNSPECIFIED CKD STAGE: ICD-10-CM

## 2023-03-10 DIAGNOSIS — T45.1X5A CHEMOTHERAPY INDUCED CARDIOMYOPATHY: ICD-10-CM

## 2023-03-10 DIAGNOSIS — Z82.49 FAMILY HISTORY OF ISCHEMIC HEART DISEASE: ICD-10-CM

## 2023-03-10 DIAGNOSIS — Z79.899 IMMUNODEFICIENCY DUE TO DRUGS: ICD-10-CM

## 2023-03-10 DIAGNOSIS — Z91.89 AT RISK FOR LYMPHEDEMA: ICD-10-CM

## 2023-03-10 PROCEDURE — 1160F RVW MEDS BY RX/DR IN RCRD: CPT | Mod: CPTII,S$GLB,, | Performed by: STUDENT IN AN ORGANIZED HEALTH CARE EDUCATION/TRAINING PROGRAM

## 2023-03-10 PROCEDURE — 3078F DIAST BP <80 MM HG: CPT | Mod: CPTII,S$GLB,, | Performed by: STUDENT IN AN ORGANIZED HEALTH CARE EDUCATION/TRAINING PROGRAM

## 2023-03-10 PROCEDURE — 3078F PR MOST RECENT DIASTOLIC BLOOD PRESSURE < 80 MM HG: ICD-10-PCS | Mod: CPTII,S$GLB,, | Performed by: STUDENT IN AN ORGANIZED HEALTH CARE EDUCATION/TRAINING PROGRAM

## 2023-03-10 PROCEDURE — 96367 TX/PROPH/DG ADDL SEQ IV INF: CPT | Mod: PN

## 2023-03-10 PROCEDURE — 96417 CHEMO IV INFUS EACH ADDL SEQ: CPT | Mod: PN

## 2023-03-10 PROCEDURE — 1159F PR MEDICATION LIST DOCUMENTED IN MEDICAL RECORD: ICD-10-PCS | Mod: CPTII,S$GLB,, | Performed by: STUDENT IN AN ORGANIZED HEALTH CARE EDUCATION/TRAINING PROGRAM

## 2023-03-10 PROCEDURE — 3008F PR BODY MASS INDEX (BMI) DOCUMENTED: ICD-10-PCS | Mod: CPTII,S$GLB,, | Performed by: STUDENT IN AN ORGANIZED HEALTH CARE EDUCATION/TRAINING PROGRAM

## 2023-03-10 PROCEDURE — 63600175 PHARM REV CODE 636 W HCPCS: Mod: JZ,TB,PN | Performed by: STUDENT IN AN ORGANIZED HEALTH CARE EDUCATION/TRAINING PROGRAM

## 2023-03-10 PROCEDURE — 3075F SYST BP GE 130 - 139MM HG: CPT | Mod: CPTII,S$GLB,, | Performed by: STUDENT IN AN ORGANIZED HEALTH CARE EDUCATION/TRAINING PROGRAM

## 2023-03-10 PROCEDURE — 99999 PR PBB SHADOW E&M-EST. PATIENT-LVL IV: ICD-10-PCS | Mod: PBBFAC,,, | Performed by: STUDENT IN AN ORGANIZED HEALTH CARE EDUCATION/TRAINING PROGRAM

## 2023-03-10 PROCEDURE — 99999 PR PBB SHADOW E&M-EST. PATIENT-LVL IV: CPT | Mod: PBBFAC,,, | Performed by: STUDENT IN AN ORGANIZED HEALTH CARE EDUCATION/TRAINING PROGRAM

## 2023-03-10 PROCEDURE — 96413 CHEMO IV INFUSION 1 HR: CPT | Mod: PN

## 2023-03-10 PROCEDURE — 3008F BODY MASS INDEX DOCD: CPT | Mod: CPTII,S$GLB,, | Performed by: STUDENT IN AN ORGANIZED HEALTH CARE EDUCATION/TRAINING PROGRAM

## 2023-03-10 PROCEDURE — 99215 PR OFFICE/OUTPT VISIT, EST, LEVL V, 40-54 MIN: ICD-10-PCS | Mod: S$GLB,,, | Performed by: STUDENT IN AN ORGANIZED HEALTH CARE EDUCATION/TRAINING PROGRAM

## 2023-03-10 PROCEDURE — 1159F MED LIST DOCD IN RCRD: CPT | Mod: CPTII,S$GLB,, | Performed by: STUDENT IN AN ORGANIZED HEALTH CARE EDUCATION/TRAINING PROGRAM

## 2023-03-10 PROCEDURE — 3044F HG A1C LEVEL LT 7.0%: CPT | Mod: CPTII,S$GLB,, | Performed by: STUDENT IN AN ORGANIZED HEALTH CARE EDUCATION/TRAINING PROGRAM

## 2023-03-10 PROCEDURE — 25000003 PHARM REV CODE 250: Mod: PN | Performed by: STUDENT IN AN ORGANIZED HEALTH CARE EDUCATION/TRAINING PROGRAM

## 2023-03-10 PROCEDURE — 1160F PR REVIEW ALL MEDS BY PRESCRIBER/CLIN PHARMACIST DOCUMENTED: ICD-10-PCS | Mod: CPTII,S$GLB,, | Performed by: STUDENT IN AN ORGANIZED HEALTH CARE EDUCATION/TRAINING PROGRAM

## 2023-03-10 PROCEDURE — 3044F PR MOST RECENT HEMOGLOBIN A1C LEVEL <7.0%: ICD-10-PCS | Mod: CPTII,S$GLB,, | Performed by: STUDENT IN AN ORGANIZED HEALTH CARE EDUCATION/TRAINING PROGRAM

## 2023-03-10 PROCEDURE — 96366 THER/PROPH/DIAG IV INF ADDON: CPT | Mod: PN

## 2023-03-10 PROCEDURE — 99215 OFFICE O/P EST HI 40 MIN: CPT | Mod: S$GLB,,, | Performed by: STUDENT IN AN ORGANIZED HEALTH CARE EDUCATION/TRAINING PROGRAM

## 2023-03-10 PROCEDURE — 3075F PR MOST RECENT SYSTOLIC BLOOD PRESS GE 130-139MM HG: ICD-10-PCS | Mod: CPTII,S$GLB,, | Performed by: STUDENT IN AN ORGANIZED HEALTH CARE EDUCATION/TRAINING PROGRAM

## 2023-03-10 RX ORDER — EPINEPHRINE 0.3 MG/.3ML
0.3 INJECTION SUBCUTANEOUS ONCE AS NEEDED
Status: DISCONTINUED | OUTPATIENT
Start: 2023-03-10 | End: 2023-03-10 | Stop reason: HOSPADM

## 2023-03-10 RX ORDER — SODIUM CHLORIDE 0.9 % (FLUSH) 0.9 %
10 SYRINGE (ML) INJECTION
Status: DISCONTINUED | OUTPATIENT
Start: 2023-03-10 | End: 2023-03-10 | Stop reason: HOSPADM

## 2023-03-10 RX ORDER — DIPHENHYDRAMINE HYDROCHLORIDE 50 MG/ML
50 INJECTION INTRAMUSCULAR; INTRAVENOUS ONCE AS NEEDED
Status: DISCONTINUED | OUTPATIENT
Start: 2023-03-10 | End: 2023-03-10 | Stop reason: HOSPADM

## 2023-03-10 RX ORDER — EPINEPHRINE 0.3 MG/.3ML
0.3 INJECTION SUBCUTANEOUS ONCE AS NEEDED
Status: CANCELLED | OUTPATIENT
Start: 2023-03-10

## 2023-03-10 RX ORDER — SODIUM CHLORIDE 0.9 % (FLUSH) 0.9 %
10 SYRINGE (ML) INJECTION
Status: CANCELLED | OUTPATIENT
Start: 2023-03-10

## 2023-03-10 RX ORDER — HEPARIN 100 UNIT/ML
500 SYRINGE INTRAVENOUS
Status: DISCONTINUED | OUTPATIENT
Start: 2023-03-10 | End: 2023-03-10 | Stop reason: HOSPADM

## 2023-03-10 RX ORDER — HEPARIN 100 UNIT/ML
500 SYRINGE INTRAVENOUS
Status: CANCELLED | OUTPATIENT
Start: 2023-03-10

## 2023-03-10 RX ORDER — DIPHENHYDRAMINE HYDROCHLORIDE 50 MG/ML
50 INJECTION INTRAMUSCULAR; INTRAVENOUS ONCE AS NEEDED
Status: CANCELLED | OUTPATIENT
Start: 2023-03-10

## 2023-03-10 RX ADMIN — POTASSIUM CHLORIDE: 2 INJECTION, SOLUTION, CONCENTRATE INTRAVENOUS at 12:03

## 2023-03-10 RX ADMIN — PERTUZUMAB 420 MG: 30 INJECTION, SOLUTION, CONCENTRATE INTRAVENOUS at 02:03

## 2023-03-10 RX ADMIN — SODIUM CHLORIDE: 9 INJECTION, SOLUTION INTRAVENOUS at 12:03

## 2023-03-10 RX ADMIN — TRASTUZUMAB 750 MG: 150 INJECTION, POWDER, LYOPHILIZED, FOR SOLUTION INTRAVENOUS at 03:03

## 2023-03-10 NOTE — PLAN OF CARE
Problem: Adult Inpatient Plan of Care  Goal: Plan of Care Review  Outcome: Ongoing, Progressing  Flowsheets (Taken 3/10/2023 1200)  Plan of Care Reviewed With: patient  Goal: Patient-Specific Goal (Individualized)  Outcome: Ongoing, Progressing  Flowsheets (Taken 3/10/2023 1200)  Anxieties, Fears or Concerns: None  Individualized Care Needs: Recliner, warm blanket, dimmed lights, conversation     Problem: Fatigue  Goal: Improved Activity Tolerance  Outcome: Ongoing, Progressing  Intervention: Promote Improved Energy  Flowsheets (Taken 3/10/2023 1200)  Fatigue Management:   frequent rest breaks encouraged   paced activity encouraged  Sleep/Rest Enhancement: regular sleep/rest pattern promoted  Activity Management: Ambulated -L4   Patient to Infusion for Perjeta and Ogivri following appointment with Dr. Jaquez. Treatment plan reviewed with patient. VSS. Tolerated treatment. Uses patient portal for upcoming appointment schedule. Escorted to the front lobby for discharge to home.

## 2023-03-10 NOTE — PROGRESS NOTES
PATIENT: Josefina Coon  MRN: [id number]  DATE: 3/18/2023      Diagnosis:   1. Invasive ductal carcinoma of breast, female, left    2. Chemotherapy induced cardiomyopathy    3. At risk for lymphedema    4. Type 2 diabetes mellitus with chronic kidney disease, with long-term current use of insulin, unspecified CKD stage    5. Drug-induced skin rash    6. Family history of ischemic heart disease    7. Hypomagnesemia    8. Immunodeficiency due to drugs          Chief Complaint: Breast Cancer      Subjective:   HPI: Ms. Coon is a 58 y.o. female Ms. Coon is a 57 y.o. female with HTN, HLD, depression, diabetes type 2 with neuropathy, obesity, fatty liver, following up with us for  a diagnosis of invasive ductal carcinoma of the left breast, triple positivity. She is here today today for consideration of C8D1 of therapy which consists of Trastuzumab/Pertuzumab every 21 days.    Today, tolerating Transtuzumab/pertuzumab., well, skin rash improved/resolved, neuropathy mild,   -  Patient is doing well, with improvement in a lot of her side effects from chemotherapy, needs echo every 3 months to monitor cardiomyopathy side effect from chemotherapy       Oncologic History:   8/25/22 bilateral screening mammogram,,Right neg ,Left central post mass     9/8/22 left diag MMG, left US limited .Left 0300 lateral posterior 6cm FN 1.4cm mass , left axilla LN 2, up to 4mm cortex     9/14/22 left 0300 lateral posterior 6cm FN 1.4cm mass US biopsy .Grade 3 ,1.1cm in biopsy No LVI , ER 84% [place] 28% Her 2 3+ pos Ki 52 %    Left axilla LN biopsy ;Benign fatty tissue, no LN, not concordant No MRI of breasts were done      9/21/22 US bilateral complete Right neg, right LN nml , Left 0300 6cm FN [id number] mass Borderline LN left axilla     9/21/22 Left axilla LN biopsy benign LN , so eventually Stage IA triple positive Breast cancer    10/7/22: started neoadjuvant chemo with Taxol + dual HER-2 (tranztuzumab and pertuzumab)    Receiving  treatment with Paclitaxel/Trastuzumab/Pertuzumab on D1, weekly Paclitaxel on D8, D15 of a 21 day cycle. Completed weekly Paclitaxel on 12/23/22.    2/2023: s/p B/L mastectomy with CPR;ypT0,pN0,cM0    Oncology History   Invasive ductal carcinoma of breast, female, left   9/23/2022 Initial Diagnosis    Invasive ductal carcinoma of breast, female, left     9/23/2022 Cancer Staged    Staging form: Breast, AJCC 8th Edition  - Clinical stage from 9/23/2022: Stage IA (cT1c, cN0(f), cM0, G3, ER+, MD+, HER2+)       9/29/2022 - 9/29/2022 Chemotherapy    Treatment Summary   Plan Name: OP BREAST TRASTUZUMAB PACLITAXEL WEEKLY  Treatment Goal: Curative  Status: Inactive  Start Date:   End Date:   Provider: Lisa Jaquez MD  Chemotherapy: PACLitaxeL (TAXOL) 80 mg/m2 = 204 mg in sodium chloride 0.9% 250 mL chemo infusion, 80 mg/m2 = 204 mg, Intravenous, Clinic/HOD 1 time, 0 of 12 cycles  pertuzumab (PERJETA) 840 mg in sodium chloride 0.9% 278 mL infusion, 840 mg (original dose ), Intravenous, Clinic/HOD 1 time, 0 of 17 cycles  Dose modification: 840 mg (Cycle 1, Reason: Other (see comments)), 420 mg (Cycle 4, Reason: Other (see comments))  trastuzumab-dkst (OGIVRI) 591 mg in sodium chloride 0.9% 250 mL chemo infusion, 4 mg/kg = 591 mg, Intravenous, Clinic/HOD 1 time, 0 of 25 cycles       10/7/2022 -  Chemotherapy    Treatment Summary   Plan Name: OP BREAST PACLITAXEL TRASTUZUMAB WEEKLY WITH PERTUZUMAB Q3W (THP)  Treatment Goal: Control  Status: Active  Start Date: 10/7/2022  End Date: 10/6/2023 (Planned)  Provider: Lisa Jaquez MD  Chemotherapy: PACLitaxeL (TAXOL) 80 mg/m2 = 210 mg in sodium chloride 0.9% 250 mL chemo infusion, 80 mg/m2 = 210 mg, Intravenous, Clinic/HOD 1 time, 4 of 4 cycles  Administration: 210 mg (10/7/2022), 204 mg (10/14/2022), 204 mg (10/28/2022), 204 mg (11/4/2022), 204 mg (10/21/2022), 204 mg (11/11/2022), 204 mg (11/18/2022), 204 mg (11/25/2022), 198 mg (12/9/2022), 204 mg (12/2/2022), 198 mg (12/16/2022),  198 mg (12/23/2022)  pertuzumab (PERJETA) 840 mg in sodium chloride 0.9% 278 mL infusion, 840 mg, Intravenous, Clinic/HOD 1 time, 8 of 18 cycles  Administration: 840 mg (10/7/2022), 420 mg (10/28/2022), 420 mg (11/18/2022), 420 mg (12/9/2022), 420 mg (12/30/2022), 420 mg (1/20/2023), 420 mg (2/17/2023), 420 mg (3/10/2023)  trastuzumab-dkst (OGIVRI) 570 mg in sodium chloride 0.9% 250 mL chemo infusion, 593 mg (100 % of original dose 4 mg/kg), Intravenous, Clinic/HOD 1 time, 8 of 18 cycles  Dose modification: 4 mg/kg (original dose 4 mg/kg, Cycle 1, Reason: Other (see comments), Comment: weekly trastuzumab), 2 mg/kg (original dose 2 mg/kg, Cycle 2, Reason: Other (see comments), Comment: weekly maintenance dose), 6 mg/kg (original dose 2 mg/kg, Cycle 2, Reason: MD Discretion, Comment: change to q3w dosing), 2 mg/kg (original dose 2 mg/kg, Cycle 1, Reason: Other (see comments), Comment: maintenance weekly dose)  Administration: 570 mg (10/7/2022), 840 mg (10/28/2022), 288 mg (10/14/2022), 290 mg (10/21/2022), 840 mg (11/18/2022), 831 mg (12/9/2022), 831 mg (12/30/2022), 720 mg (1/20/2023), 806 mg (2/17/2023), 750 mg (3/10/2023)       2/13/2023 Cancer Staged    Staging form: Breast, AJCC 8th Edition  - Pathologic stage from 2/13/2023: No Stage Recommended (ypT0, pN0(sn), cM0, GX)          Past Medical History:   Past Medical History:   Diagnosis Date    Abnormal liver enzymes     Asymptomatic varicose veins     Breast cancer 09/14/2022    left idc    Cancer     left breast cancer    Depressive disorder, not elsewhere classified     Dyslipidemia     Embolism and thrombosis of unspecified site     superficial venous thrombosis    Encounter for long-term (current) use of other medications     Family history of ischemic heart disease     Fatty liver     Generalized anxiety disorder     History of chicken pox     Obstructive sleep apnea (adult) (pediatric)     Type II or unspecified type diabetes mellitus without mention of  complication, not stated as uncontrolled     Unspecified essential hypertension     Unspecified venous (peripheral) insufficiency     Unspecified vitamin D deficiency        Past Surgical HIstory:   Past Surgical History:   Procedure Laterality Date    BREAST BIOPSY Left 2022    idc    BREAST BIOPSY Left 2022    neg ln    BREAST BIOPSY Left 2022    neg ln     SECTION      COLONOSCOPY      ESOPHAGOGASTRODUODENOSCOPY      INSERTION OF TUNNELED CENTRAL VENOUS CATHETER (CVC) WITH SUBCUTANEOUS PORT Right 10/04/2022    Procedure: [id number];  Surgeon: Dominick Ng MD;  Location: [facility] OR;  Service: General;  Laterality: Right;    LUMPECTOMY, WITH RADAR LOCALIZATION USING TR[place]ON  Left 2023    Procedure: LUMPECTOMY,WITH RADAR LOCALIZATION USING TR[place] ;  Surgeon: Maria G Mcduffie MD;  Location: [facility] OR;  Service: General;  Laterality: Left;    SENTINEL LYMPH NODE BIOPSY Left 2023    Procedure: BIOPSY, LYMPH NODE, SENTINEL;  Surgeon: Maria G Mcduffie MD;  Location: [facility] OR;  Service: General;  Laterality: Left;    VEIN SURGERY      Laser, Dr. Larsen       Family History:   Family History   Problem Relation Age of Onset    Hyperlipidemia Mother     Hypertension Mother     Vulvar Cancer Mother     Dementia Mother     Atrial fibrillation Father     Parkinsonism Father     Diabetes Brother     Cancer Brother         colon    Hypertension Son     Hyperlipidemia Son     Anxiety disorder Son     Stroke Maternal Grandmother        Social History:  reports that she has never smoked. She has never used smokeless tobacco. She reports that she does not currently use alcohol. She reports that she does not use drugs.    Allergies:  Review of patient's allergies indicates:   Allergen Reactions    Sitagliptin      Other reaction(s): (januvia) abdominal pain    Sulfa (sulfonamide antibiotics) Hives    Byetta  [exenatide] Rash    Lactose        Medications:  Current Outpatient  Medications   Medication Sig Dispense Refill    albuterol (PROVENTIL/VENTOLIN HFA) 90 mcg/actuation inhaler Inhale 2 puffs into the lungs every 6 (six) hours as needed for Wheezing. 18 g 6    ALPRAZolam (XANAX) 0.25 MG tablet TAKE ONE TABLET BY MOUTH 1-2 TIMES DAILY AS NEEDED ANXIETY 15 tablet 0    buPROPion (WELLBUTRIN XL) 150 MG TB24 tablet TAKE 1 TABLET BY MOUTH EVERY DAY 90 tablet 3    dulaglutide (TRULICITY) 3 mg/0.5 mL pen injector Inject 3 mg into the skin every 7 days. 12 pen 1    ergocalciferol (ERGOCALCIFEROL) 50,000 unit Cap TAKE 1 CAPSULE BY MOUTH ONE TIME PER WEEK 12 capsule 0    hydroCHLOROthiazide (HYDRODIURIL) 12.5 MG Tab Take 1 tablet (12.5 mg total) by mouth once daily. 90 tablet 0    HYDROcodone-acetaminophen (NORCO)  mg per tablet Take 1 tablet by mouth every 6 (six) hours as needed for Pain. 20 tablet 0    LIDOcaine-prilocaine (EMLA) cream Apply to port site 1 hour prior to port access and cover 30 g 3    magnesium 30 mg Tab Take 30 mg by mouth once.      metFORMIN (GLUCOPHAGE) 500 MG tablet TAKE 1 TABLET BY MOUTH TWICE A DAY WITH MEALS 180 tablet 1    nystatin (MYCOSTATIN) ointment Apply topically 3 (three) times daily. 30 g 2    nystatin (MYCOSTATIN) powder Apply to affected area 3 times daily 60 g 1    omeprazole (PRILOSEC OTC) 20 MG tablet Take 1 tablet (20 mg total) by mouth once daily. 30 tablet 6    omeprazole (PRILOSEC) 10 MG capsule omeprazole      ondansetron (ZOFRAN) 8 MG tablet Take 1 tablet (8 mg total) by mouth every 8 (eight) hours as needed for Nausea. 30 tablet 2    pravastatin (PRAVACHOL) 10 MG tablet TAKE 1 TABLET BY MOUTH EVERYDAY AT BEDTIME 90 tablet 1    promethazine (PHENERGAN) 25 MG tablet Take 1 tablet (25 mg total) by mouth every 6 (six) hours as needed for Nausea. 30 tablet 0    triamcinolone acetonide 0.025% (KENALOG) 0.025 % cream Apply topically 2 (two) times daily. Apply to the skin lesions twice a day 15 g 0    dulaglutide (TRULICITY) 1.5 mg/0.5 mL pen  "injector Inject 1.5 mg into the skin every 7 days. 4 pen 0     No current facility-administered medications for this visit.     Facility-Administered Medications Ordered in Other Visits   Medication Dose Route Frequency Provider Last Rate Last Admin    lactated ringers infusion   Intravenous Continuous Maria G Mcduffie  mL/hr at 02/08/23 0700 New Bag at 02/08/23 0814    midazolam (VERSED) 1 mg/mL injection 2 mg  2 mg Intravenous See admin instructions Maria G Mcduffie MD   2 mg at 02/08/23 0711       Review of Systems   Constitutional:  Negative for chills, fatigue and fever.   HENT:  Negative for trouble swallowing.    Respiratory:  Negative for cough and shortness of breath.    Cardiovascular:  Negative for chest pain and palpitations.   Gastrointestinal:  Positive for diarrhea. Negative for abdominal pain, constipation, nausea and vomiting.   Genitourinary:  Negative for dysuria.   Musculoskeletal:  Negative for arthralgias.   Skin:  Positive for rash.   Neurological:  Negative for headaches.   Hematological:  Negative for adenopathy.     ECOG Performance Status:   ECOG SCORE    1 - Restricted in strenuous activity-ambulatory and able to carry out work of a light nature         Objective:      Vitals:   Vitals:    03/10/23 1122   BP: 134/79   BP Location: Left arm   Patient Position: Sitting   BP Method: Medium (Automatic)   Pulse: 77   Temp: (!) 94.8 °F (34.9 °C)   TempSrc: Temporal   SpO2: 100%   Weight: 131 kg (288 lb 12.8 oz)   Height: 5' 4" (1.626 m)         BMI: Body mass index is 49.57 kg/m².    Physical Exam  Vitals reviewed.   Constitutional:       General: She is not in acute distress.     Appearance: She is not diaphoretic.   HENT:      Head: Normocephalic and atraumatic.      Mouth/Throat:      Mouth: Mucous membranes are moist.      Pharynx: No oropharyngeal exudate.   Eyes:      General: No scleral icterus.  Cardiovascular:      Rate and Rhythm: Normal rate and regular rhythm.      " Heart sounds: Normal heart sounds. No murmur heard.  Pulmonary:      Effort: Pulmonary effort is normal. No respiratory distress.      Breath sounds: No wheezing.   Chest:      Comments: B/L mastectomy   Musculoskeletal:      Cervical back: No tenderness.      Right lower leg: No edema.      Left lower leg: No edema.   Lymphadenopathy:      Cervical: No cervical adenopathy.   Skin:     General: Skin is warm.      Findings: Rash present.   Neurological:      Mental Status: She is alert and oriented to person, place, and time.   Psychiatric:         Behavior: Behavior normal.       Laboratory Data:  Lab Results   Component Value Date    WBC 7.40 03/07/2023    HGB 10.8 (L) 03/07/2023    HCT 33.1 (L) 03/07/2023    MCV 98 03/07/2023     03/07/2023        Assessment:       1. Invasive ductal carcinoma of breast, female, left    2. Chemotherapy induced cardiomyopathy    3. At risk for lymphedema    4. Type 2 diabetes mellitus with chronic kidney disease, with long-term current use of insulin, unspecified CKD stage    5. Drug-induced skin rash    6. Family history of ischemic heart disease    7. Hypomagnesemia    8. Immunodeficiency due to drugs              Plan:   Invasive ductal carcinoma of the breast, left  - cT1c triple positive breast cancer  (ER 84% LA 28% Her 2 3+ pos Ki 52 %), given her young age and Ki67%, will proceed with TH, adding P to help with a better pCR, will also plan to get ultrasound mid cycle to monitor (3 months)  -9/26/22 Echo with EF 60%; next is scheduled for 01/18/23  -Began TH+P on 10/7/2022; completed 12 weeks of Paclitaxel 12/23/22  - 2/2023; - s/p B/l Mastectom CPR;ypT0,pN0,cM0  -Proceed with Trastuzumab/Pertuzumab add IV mag to infusion today    - Planning to start adjuvant radiation soon 16 fraction  - will need adjuvant endocrine therapy after finishing up adjuvant radiation   -Echo 1/2023 EF: 65% and global longitudinal 17.5%, cont monitoring with echo in 3 months ; schedule  for  "April /2023     FATIMAH "prerenal" likely due to dehydration: will add IVF to chemo ; close monitoring , off chemo, no adjustment need for HP      Diabetes type 2 with neuropathy   -Taking Metformin, Trulicity , monitoring     Anxiety  -Taking Wellbutrin  -Following with Dr. Long     Hypomagnesemia  -Taking po supplements at home;   -Monitor , replacement as needed     Drug-induced rash: improved     Normocytic anemia  -likely due to chemo and slightly nose/rectal bleeding (mucositis)  -cont monitoring , no indication for transfusion, improving     Patient queried and all questions were answered.    Route Chart for Scheduling  Med Onc Route Chart for Scheduling    Treatment Plan Information   OP BREAST PACLITAXEL TRASTUZUMAB WEEKLY WITH PERTUZUMAB Q3W (THP)   Lisa Jaquez MD   Upcoming Treatment Dates - OP BREAST PACLITAXEL TRASTUZUMAB WEEKLY WITH PERTUZUMAB Q3W (THP)    3/31/2023       Chemotherapy       pertuzumab (PERJETA) 420 mg in sodium chloride 0.9% 264 mL infusion       trastuzumab-dkst (OGIVRI) in sodium chloride 0.9% chemo infusion       Supportive Care       magnesium sulfate 2g in water 50mL IVPB (premix)  4/21/2023       Chemotherapy       pertuzumab (PERJETA) 420 mg in sodium chloride 0.9% 264 mL infusion       trastuzumab-dkst (OGIVRI) in sodium chloride 0.9% chemo infusion       Supportive Care       magnesium sulfate 2g in water 50mL IVPB (premix)  5/12/2023       Chemotherapy       pertuzumab (PERJETA) 420 mg in sodium chloride 0.9% 264 mL infusion       trastuzumab-dkst (OGIVRI) in sodium chloride 0.9% chemo infusion       Supportive Care       magnesium sulfate 2g in water 50mL IVPB (premix)  6/2/2023       Chemotherapy       pertuzumab (PERJETA) 420 mg in sodium chloride 0.9% 264 mL infusion       trastuzumab-dkst (OGIVRI) in sodium chloride 0.9% chemo infusion       Supportive Care       magnesium sulfate 2g in water 50mL IVPB (premix)    Supportive Plan Information  IV FLUIDS AND ELECTROLYTES "   Lisa Jaquez MD   Upcoming Treatment Dates - IV FLUIDS AND ELECTROLYTES    No upcoming days in selected categories.    Lisa Jaquez MD  Hematology and Oncology  [facility]  [facility] [place] [facility]

## 2023-03-13 PROCEDURE — 77293 RESPIRATOR MOTION MGMT SIMUL: CPT | Mod: TC,PN | Performed by: RADIOLOGY

## 2023-03-13 PROCEDURE — 77295 3-D RADIOTHERAPY PLAN: CPT | Mod: TC,PN | Performed by: RADIOLOGY

## 2023-03-13 PROCEDURE — 77300 RADIATION THERAPY DOSE PLAN: CPT | Mod: 26,,, | Performed by: RADIOLOGY

## 2023-03-13 PROCEDURE — 77293 PR RESPIRATORY MOTION MGMT SIMULATION: ICD-10-PCS | Mod: 26,,, | Performed by: RADIOLOGY

## 2023-03-13 PROCEDURE — 77295 3-D RADIOTHERAPY PLAN: CPT | Mod: 26,,, | Performed by: RADIOLOGY

## 2023-03-13 PROCEDURE — 77295 PR 3D RADIOTHERAPY PLAN: ICD-10-PCS | Mod: 26,,, | Performed by: RADIOLOGY

## 2023-03-13 PROCEDURE — 77334 RADIATION TREATMENT AID(S): CPT | Mod: TC,PN | Performed by: RADIOLOGY

## 2023-03-13 PROCEDURE — 77300 PR RADIATION THERAPY,DOSIMETRY PLAN: ICD-10-PCS | Mod: 26,,, | Performed by: RADIOLOGY

## 2023-03-13 PROCEDURE — 77293 RESPIRATOR MOTION MGMT SIMUL: CPT | Mod: 26,,, | Performed by: RADIOLOGY

## 2023-03-13 PROCEDURE — 77300 RADIATION THERAPY DOSE PLAN: CPT | Mod: TC,PN | Performed by: RADIOLOGY

## 2023-03-13 PROCEDURE — 77334 PR  RADN TREATMENT AID(S) COMPLX: ICD-10-PCS | Mod: 26,,, | Performed by: RADIOLOGY

## 2023-03-13 PROCEDURE — 77334 RADIATION TREATMENT AID(S): CPT | Mod: 26,,, | Performed by: RADIOLOGY

## 2023-03-14 ENCOUNTER — DOCUMENTATION ONLY (OUTPATIENT)
Dept: HEMATOLOGY/ONCOLOGY | Facility: CLINIC | Age: 58
End: 2023-03-14
Payer: COMMERCIAL

## 2023-03-14 PROCEDURE — 77412 RADIATION TX DELIVERY LVL 3: CPT | Mod: PN | Performed by: RADIOLOGY

## 2023-03-14 PROCEDURE — 77417 THER RADIOLOGY PORT IMAGE(S): CPT | Mod: PN | Performed by: RADIOLOGY

## 2023-03-14 PROCEDURE — 77387 GUIDANCE FOR RADJ TX DLVR: CPT | Mod: 26,,, | Performed by: RADIOLOGY

## 2023-03-14 PROCEDURE — 77387 PR GUIDANCE FOR RADIATION TREATMENT DELIVERY: ICD-10-PCS | Mod: 26,,, | Performed by: RADIOLOGY

## 2023-03-14 PROCEDURE — 77387 GUIDANCE FOR RADJ TX DLVR: CPT | Mod: TC,PN | Performed by: RADIOLOGY

## 2023-03-14 NOTE — PLAN OF CARE
Completed 1/20 outpatient radiation treatments without difficulty, side effects discussed, miaderm and breast pamphlet discussed. Patient verbalized understanding

## 2023-03-15 PROCEDURE — 77387 GUIDANCE FOR RADJ TX DLVR: CPT | Mod: TC,PN | Performed by: RADIOLOGY

## 2023-03-15 PROCEDURE — 77412 RADIATION TX DELIVERY LVL 3: CPT | Mod: PN | Performed by: RADIOLOGY

## 2023-03-15 PROCEDURE — 77387 GUIDANCE FOR RADJ TX DLVR: CPT | Mod: 26,,, | Performed by: RADIOLOGY

## 2023-03-15 PROCEDURE — 77387 PR GUIDANCE FOR RADIATION TREATMENT DELIVERY: ICD-10-PCS | Mod: 26,,, | Performed by: RADIOLOGY

## 2023-03-17 PROCEDURE — 77387 GUIDANCE FOR RADJ TX DLVR: CPT | Mod: TC,PN | Performed by: RADIOLOGY

## 2023-03-17 PROCEDURE — 77417 THER RADIOLOGY PORT IMAGE(S): CPT | Mod: PN | Performed by: RADIOLOGY

## 2023-03-17 PROCEDURE — 77387 PR GUIDANCE FOR RADIATION TREATMENT DELIVERY: ICD-10-PCS | Mod: 26,,, | Performed by: RADIOLOGY

## 2023-03-17 PROCEDURE — 77387 GUIDANCE FOR RADJ TX DLVR: CPT | Mod: 26,,, | Performed by: RADIOLOGY

## 2023-03-17 PROCEDURE — 77412 RADIATION TX DELIVERY LVL 3: CPT | Mod: PN | Performed by: RADIOLOGY

## 2023-03-18 PROBLEM — Z91.89 AT RISK FOR LYMPHEDEMA: Status: ACTIVE | Noted: 2023-03-18

## 2023-03-18 PROBLEM — I42.7 CHEMOTHERAPY INDUCED CARDIOMYOPATHY: Status: ACTIVE | Noted: 2023-03-18

## 2023-03-18 PROBLEM — T45.1X5A CHEMOTHERAPY INDUCED CARDIOMYOPATHY: Status: ACTIVE | Noted: 2023-03-18

## 2023-03-20 PROCEDURE — 77387 PR GUIDANCE FOR RADIATION TREATMENT DELIVERY: ICD-10-PCS | Mod: 26,,, | Performed by: RADIOLOGY

## 2023-03-20 PROCEDURE — 77412 RADIATION TX DELIVERY LVL 3: CPT | Mod: PN | Performed by: RADIOLOGY

## 2023-03-20 PROCEDURE — 77387 GUIDANCE FOR RADJ TX DLVR: CPT | Mod: 26,,, | Performed by: RADIOLOGY

## 2023-03-20 PROCEDURE — 77387 GUIDANCE FOR RADJ TX DLVR: CPT | Mod: TC,PN | Performed by: RADIOLOGY

## 2023-03-21 PROCEDURE — 77387 GUIDANCE FOR RADJ TX DLVR: CPT | Mod: 26,,, | Performed by: RADIOLOGY

## 2023-03-21 PROCEDURE — 77412 RADIATION TX DELIVERY LVL 3: CPT | Mod: PN | Performed by: RADIOLOGY

## 2023-03-21 PROCEDURE — 77387 PR GUIDANCE FOR RADIATION TREATMENT DELIVERY: ICD-10-PCS | Mod: 26,,, | Performed by: RADIOLOGY

## 2023-03-21 PROCEDURE — 77387 GUIDANCE FOR RADJ TX DLVR: CPT | Mod: TC,PN | Performed by: RADIOLOGY

## 2023-03-21 PROCEDURE — 77336 RADIATION PHYSICS CONSULT: CPT | Mod: PN | Performed by: RADIOLOGY

## 2023-03-22 PROCEDURE — 77387 GUIDANCE FOR RADJ TX DLVR: CPT | Mod: 26,,, | Performed by: RADIOLOGY

## 2023-03-22 PROCEDURE — 77387 PR GUIDANCE FOR RADIATION TREATMENT DELIVERY: ICD-10-PCS | Mod: 26,,, | Performed by: RADIOLOGY

## 2023-03-22 PROCEDURE — 77387 GUIDANCE FOR RADJ TX DLVR: CPT | Mod: TC,PN | Performed by: RADIOLOGY

## 2023-03-22 PROCEDURE — 77412 RADIATION TX DELIVERY LVL 3: CPT | Mod: PN | Performed by: RADIOLOGY

## 2023-03-23 PROCEDURE — 77387 GUIDANCE FOR RADJ TX DLVR: CPT | Mod: 26,,, | Performed by: RADIOLOGY

## 2023-03-23 PROCEDURE — 77412 RADIATION TX DELIVERY LVL 3: CPT | Mod: PN | Performed by: RADIOLOGY

## 2023-03-23 PROCEDURE — 77387 PR GUIDANCE FOR RADIATION TREATMENT DELIVERY: ICD-10-PCS | Mod: 26,,, | Performed by: RADIOLOGY

## 2023-03-23 PROCEDURE — 77387 GUIDANCE FOR RADJ TX DLVR: CPT | Mod: TC,PN | Performed by: RADIOLOGY

## 2023-03-24 ENCOUNTER — DOCUMENTATION ONLY (OUTPATIENT)
Dept: RADIATION ONCOLOGY | Facility: CLINIC | Age: 58
End: 2023-03-24
Payer: COMMERCIAL

## 2023-03-24 PROCEDURE — 77307 TELETHX ISODOSE PLAN CPLX: CPT | Mod: 26,,, | Performed by: RADIOLOGY

## 2023-03-24 PROCEDURE — 77307 TELETHX ISODOSE PLAN CPLX: CPT | Mod: TC,PN | Performed by: RADIOLOGY

## 2023-03-24 PROCEDURE — 77334 RADIATION TREATMENT AID(S): CPT | Mod: TC,PN | Performed by: RADIOLOGY

## 2023-03-24 PROCEDURE — 77412 RADIATION TX DELIVERY LVL 3: CPT | Mod: PN | Performed by: RADIOLOGY

## 2023-03-24 PROCEDURE — 77387 GUIDANCE FOR RADJ TX DLVR: CPT | Mod: TC,PN | Performed by: RADIOLOGY

## 2023-03-24 PROCEDURE — 77334 PR  RADN TREATMENT AID(S) COMPLX: ICD-10-PCS | Mod: 26,,, | Performed by: RADIOLOGY

## 2023-03-24 PROCEDURE — 77387 GUIDANCE FOR RADJ TX DLVR: CPT | Mod: 26,,, | Performed by: RADIOLOGY

## 2023-03-24 PROCEDURE — 77334 RADIATION TREATMENT AID(S): CPT | Mod: 26,,, | Performed by: RADIOLOGY

## 2023-03-24 PROCEDURE — 77307 PR TELETHERAPY ISODOSE PLAN; COMPLEX: ICD-10-PCS | Mod: 26,,, | Performed by: RADIOLOGY

## 2023-03-24 PROCEDURE — 77387 PR GUIDANCE FOR RADIATION TREATMENT DELIVERY: ICD-10-PCS | Mod: 26,,, | Performed by: RADIOLOGY

## 2023-03-24 PROCEDURE — 77417 THER RADIOLOGY PORT IMAGE(S): CPT | Mod: PN | Performed by: RADIOLOGY

## 2023-03-24 NOTE — PLAN OF CARE
Completed 8 of 16 outpatient radiation treatments without difficulty.  All questions/concerns addressed by MD this visit.

## 2023-03-25 ENCOUNTER — PATIENT MESSAGE (OUTPATIENT)
Dept: HEMATOLOGY/ONCOLOGY | Facility: CLINIC | Age: 58
End: 2023-03-25
Payer: COMMERCIAL

## 2023-03-27 DIAGNOSIS — C50.912 INVASIVE DUCTAL CARCINOMA OF BREAST, FEMALE, LEFT: Primary | ICD-10-CM

## 2023-03-27 PROCEDURE — 77387 PR GUIDANCE FOR RADIATION TREATMENT DELIVERY: ICD-10-PCS | Mod: 26,,, | Performed by: RADIOLOGY

## 2023-03-27 PROCEDURE — 77387 GUIDANCE FOR RADJ TX DLVR: CPT | Mod: 26,,, | Performed by: RADIOLOGY

## 2023-03-27 PROCEDURE — 77412 RADIATION TX DELIVERY LVL 3: CPT | Mod: PN | Performed by: RADIOLOGY

## 2023-03-27 PROCEDURE — 77387 GUIDANCE FOR RADJ TX DLVR: CPT | Mod: TC,PN | Performed by: RADIOLOGY

## 2023-03-30 ENCOUNTER — CLINICAL SUPPORT (OUTPATIENT)
Dept: REHABILITATION | Facility: HOSPITAL | Age: 58
End: 2023-03-30
Payer: COMMERCIAL

## 2023-03-30 ENCOUNTER — LAB VISIT (OUTPATIENT)
Dept: LAB | Facility: HOSPITAL | Age: 58
End: 2023-03-30
Attending: STUDENT IN AN ORGANIZED HEALTH CARE EDUCATION/TRAINING PROGRAM
Payer: COMMERCIAL

## 2023-03-30 ENCOUNTER — DOCUMENTATION ONLY (OUTPATIENT)
Dept: RADIATION ONCOLOGY | Facility: CLINIC | Age: 58
End: 2023-03-30
Payer: COMMERCIAL

## 2023-03-30 DIAGNOSIS — C50.912 INVASIVE DUCTAL CARCINOMA OF BREAST, FEMALE, LEFT: ICD-10-CM

## 2023-03-30 DIAGNOSIS — Z91.89 AT RISK FOR LYMPHEDEMA: ICD-10-CM

## 2023-03-30 LAB
ALBUMIN SERPL BCP-MCNC: 3.5 G/DL (ref 3.5–5.2)
ALP SERPL-CCNC: 133 U/L (ref 55–135)
ALT SERPL W/O P-5'-P-CCNC: 18 U/L (ref 10–44)
ANION GAP SERPL CALC-SCNC: 8 MMOL/L (ref 8–16)
AST SERPL-CCNC: 15 U/L (ref 10–40)
BILIRUB SERPL-MCNC: 0.2 MG/DL (ref 0.1–1)
BUN SERPL-MCNC: 13 MG/DL (ref 6–20)
CALCIUM SERPL-MCNC: 9.4 MG/DL (ref 8.7–10.5)
CHLORIDE SERPL-SCNC: 108 MMOL/L (ref 95–110)
CO2 SERPL-SCNC: 23 MMOL/L (ref 23–29)
CREAT SERPL-MCNC: 1.2 MG/DL (ref 0.5–1.4)
ERYTHROCYTE [DISTWIDTH] IN BLOOD BY AUTOMATED COUNT: 12.3 % (ref 11.5–14.5)
EST. GFR  (NO RACE VARIABLE): 51.8 ML/MIN/1.73 M^2
GLUCOSE SERPL-MCNC: 108 MG/DL (ref 70–110)
HCT VFR BLD AUTO: 33.1 % (ref 37–48.5)
HGB BLD-MCNC: 10.8 G/DL (ref 12–16)
IMM GRANULOCYTES # BLD AUTO: 0.02 K/UL (ref 0–0.04)
MAGNESIUM SERPL-MCNC: 1.5 MG/DL (ref 1.6–2.6)
MCH RBC QN AUTO: 31.9 PG (ref 27–31)
MCHC RBC AUTO-ENTMCNC: 32.6 G/DL (ref 32–36)
MCV RBC AUTO: 98 FL (ref 82–98)
NEUTROPHILS # BLD AUTO: 4 K/UL (ref 1.8–7.7)
PLATELET # BLD AUTO: 246 K/UL (ref 150–450)
PMV BLD AUTO: 9.6 FL (ref 9.2–12.9)
POTASSIUM SERPL-SCNC: 3.6 MMOL/L (ref 3.5–5.1)
PROT SERPL-MCNC: 7.5 G/DL (ref 6–8.4)
RBC # BLD AUTO: 3.39 M/UL (ref 4–5.4)
SODIUM SERPL-SCNC: 139 MMOL/L (ref 136–145)
VIT B12 SERPL-MCNC: 211 PG/ML (ref 210–950)
WBC # BLD AUTO: 6.33 K/UL (ref 3.9–12.7)

## 2023-03-30 PROCEDURE — 77387 GUIDANCE FOR RADJ TX DLVR: CPT | Mod: TC,PN | Performed by: RADIOLOGY

## 2023-03-30 PROCEDURE — 85027 COMPLETE CBC AUTOMATED: CPT | Mod: PN | Performed by: STUDENT IN AN ORGANIZED HEALTH CARE EDUCATION/TRAINING PROGRAM

## 2023-03-30 PROCEDURE — 80053 COMPREHEN METABOLIC PANEL: CPT | Mod: PN | Performed by: STUDENT IN AN ORGANIZED HEALTH CARE EDUCATION/TRAINING PROGRAM

## 2023-03-30 PROCEDURE — 77387 PR GUIDANCE FOR RADIATION TREATMENT DELIVERY: ICD-10-PCS | Mod: 26,,, | Performed by: RADIOLOGY

## 2023-03-30 PROCEDURE — 36415 COLL VENOUS BLD VENIPUNCTURE: CPT | Mod: PN | Performed by: STUDENT IN AN ORGANIZED HEALTH CARE EDUCATION/TRAINING PROGRAM

## 2023-03-30 PROCEDURE — 83735 ASSAY OF MAGNESIUM: CPT | Mod: PN | Performed by: STUDENT IN AN ORGANIZED HEALTH CARE EDUCATION/TRAINING PROGRAM

## 2023-03-30 PROCEDURE — 77387 GUIDANCE FOR RADJ TX DLVR: CPT | Mod: 26,,, | Performed by: RADIOLOGY

## 2023-03-30 PROCEDURE — 82607 VITAMIN B-12: CPT | Performed by: STUDENT IN AN ORGANIZED HEALTH CARE EDUCATION/TRAINING PROGRAM

## 2023-03-30 PROCEDURE — 97535 SELF CARE MNGMENT TRAINING: CPT | Mod: PN

## 2023-03-30 PROCEDURE — 77412 RADIATION TX DELIVERY LVL 3: CPT | Mod: PN | Performed by: RADIOLOGY

## 2023-03-30 PROCEDURE — 97164 PT RE-EVAL EST PLAN CARE: CPT | Mod: 59,PN

## 2023-03-30 NOTE — PROGRESS NOTES
[facility]  Physical Therapy  Post-OP Reassessment/Discharge  Lymphedema Therapy    Visit Date: 3/30/2023     Name: Josefina Coon  Clinic Number: [id number]  Therapy Diagnosis:   Encounter Diagnoses   Name Primary?    Invasive ductal carcinoma of breast, female, left     At risk for lymphedema      Physician: Maria G Mcduffie MD  Physician Orders: PT Eval and Treat  Medical Diagnosis from Referral: Invasive Carcinoma of breast, at risk lymphedema  Chart review pertaining to cancer hx:    Cancer Staging   Invasive ductal carcinoma of breast, female, left  Staging form: Breast, AJCC 8th Edition  - Clinical stage from 9/23/2022: Stage IA (cT1c, cN0(f), cM0, G3, ER+, IL+, HER2+) - Signed by Maria G Mcduffie MD on 9/23/2022     left 0300 lateral posterior 6cm FN 1.4cm mass US biopsy  Grade 3 (3,3,2) IDC 1.1cm in biopsy  No LVI  ER 84 IL 28 Her 2 3+ pos Ki 52     2 neg left axilla LN biopsy     Triple pos 1.4cm  Undergoing THP NACT  Last taxol 12/23/22     Has all surgical options, BMI 56, DM, HTN  She would like Left mike lump, left SLNB plan 2/8/23 main OR (pt requested after 2/4/22)     xrt  Endocrine     Pt requests Echo preop. On HP. Mild PAULSON     Prehab for lymphedema prevention and BLE edema (pt request)     2/8/23 main OR left mike lump, left SLNB  Evaluation Date: 01/20/2023  Reassessment: 03/30/2023  Authorization: pending  Plan of Care Expiration: PT f/u 6-8 weeks post-op  Reassessment Due: 8 weeks post surgery    Visit: 1 / 2  PTA Visit: -- / 5  Time In: 10:00 AM  Time Out: 11:00 AM  Total Billable Time: 60 minutes    Precautions: Standard, cancer and undergoing radiation    Subjective     Pt reports: I've been so over whelmed with all the appointments, Still have not received my sleeve from O&P, they said it took a while from waiting on the insurance. Got a call from them, I need to call them back to pick it up. I'm not having any issues with my shoulder motion,  don't feel swollen in my arm.   I would still like to see about treatment for my leg swelling but really want to wait till after radiation. The leg swelling has been there for at least 3 years, has progressively gotten worse, I've seen a vein specialist at [facility] for at least 3 vein procedures to my legs. Tried wearing compression socks but they get so tight and cut into my legs.     Dx: left breast cancer  Surgery date: 2023 Lumpectomy with SLNB 2 nodes  Radiation: Have completed 9 of 20 so far, next appointment 2023  Chemotherapy: 12 weeks of taxol ended a month ago, doing immunotherapy every 3 weeks.    Pain  Location: left breast twinges, sharp pains, incision still a little sensitive, right heel pain started couple weeks ago.   Current 3/10, Worst 3/10, Best 3/10   Description: ache and pains.       Past Medical History:   Past Medical History:   Diagnosis Date    Abnormal liver enzymes     Asymptomatic varicose veins     Breast cancer 2022    left idc    Cancer     left breast cancer    Depressive disorder, not elsewhere classified     Dyslipidemia     Embolism and thrombosis of unspecified site     superficial venous thrombosis    Encounter for long-term (current) use of other medications     Family history of ischemic heart disease     Fatty liver     Generalized anxiety disorder     History of chicken pox     Obstructive sleep apnea (adult) (pediatric)     Type II or unspecified type diabetes mellitus without mention of complication, not stated as uncontrolled     Unspecified essential hypertension     Unspecified venous (peripheral) insufficiency     Unspecified vitamin D deficiency        Past Surgical History:  has a past surgical history that includes  section; Vein Surgery; Insertion of tunneled central venous catheter (CVC) with subcutaneous port (Right, 10/04/2022); Breast biopsy (Left, 2022); Breast biopsy (Left, 2022); Breast biopsy (Left,  09/21/2022); Colonoscopy; Esophagogastroduodenoscopy; lumpectomy, with radar localization using mike  (Left, 2/8/2023); and [place] lymph node biopsy (Left, 2/8/2023).    Medications: has a current medication list which includes the following prescription(s): albuterol, alprazolam, bupropion, trulicity, trulicity, ergocalciferol, hydrochlorothiazide, hydrocodone-acetaminophen, lidocaine-prilocaine, magnesium, metformin, nystatin, nystatin, omeprazole, omeprazole, ondansetron, pravastatin, promethazine, and triamcinolone acetonide 0.025%, and the following Facility-Administered Medications: lactated ringers and midazolam.    Allergies:   Review of patient's allergies indicates:   Allergen Reactions    Sitagliptin      Other reaction(s): (januvia) abdominal pain    Sulfa (sulfonamide antibiotics) Hives    Byetta  [exenatide] Rash    Lactose           Hand Dominance: Right  Diet:following recommended diets since cancer. Unable to tolerate meat currently. Diabetic and avoids sugar  Habitus: overweight    Prior Therapy/Previous treatment included: No PT this calendar year.   DME owned: none  Social History: lives with her sons and her dog  Place of Residence (Steps/Adaptations): 4-5 steps into home with rails.   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    Patient's Goals: Finish radiation and move forward with addressing the swelling in my legs.    Objective   Patient ambulating into clinic with antalgic gait, complaints of new onset heel pain about 3 weeks to R foot.    Mental Status: Alert/Oriented    Observations  Posture: rounded shoulders posture  Joint Integrity:  WFLs bilateral  Skin Integrity: intact bilateral  Edema: none bilateral    Sensation  Light Touch: intact bilateral  Proprioception: intact bilateral    A/PROM  (L) UE: WFLs  (R) UE: WFLs  Limitations:  none    STRENGTH  (L) UE: WFLs  (R) UE: WFLs  Limitations:  none    Baseline Measurements of BUEs  LANDMARK LEFT UE (cm) RIGHT UE (cm) LEFT UE (cm)  03/30/2023  Post-op   Reassess Difference from Prehab   W + 16 inches 47.0  46.5 47.4 +0.4   W + 12 inches 34.1 36.3 34.4 +0.3   Elbow 27.0 27.1 27.5 +0.5   W + 6 inches 24.9 25.8 25.3 +0.4   W + 4 inches 21.6 22.5 22.0 +0.4   Wrist 16.8 16.8 17.2 +0.4   DPC 19.2 19.9 19.5 +0.3   IP Thumb 6.5 6.6 6.9 +0.4   Chest circumference      Axillary circumference      Arm Length 40.0 cm     Garments recommended: Garments ordered, to be picked up from DME provider.  Jobst  Lite Arm Sleeve with Silicone band, 15-20mmHg size Large/Regular length  Jobst  Lite Gauntlet 15-20mmHg size Medium  Pt to wear compression garments for the arm 2 weeks after sx.     Post Surgical Bra with compression with high axillary support.  (Example would be the [place] Post Surgical Bra, looks like base on the band girth measure you would be a Size 44-46 )   PT to request orders from referring MD to be placed in your chart for you to print or  from the Cancer Center.     Functional Mobility   Bed mobility: independent   Roll to left: independent   Roll to right: independent   Supine to prone: independent   Scooting to edge of bed: independent   Supine to sit: independent   Sit to supine: independent   Transfers to bed: independent   Transfers to toilet: independent   Sit to stand: independent   Stand pivot: independent   Car transfers: independent     Gait Assessment  AD used: none  Assistance: independent  Distance: community distances  Endurance: WFL     Gait Pattern: WFL       Treatment/Education     Treatment Time In: 10:00 AM  Treatment Time Out: 11:00 AM  Total Treatment time  separate from Evaluation: 15 minutes      Self-Care/Home Management to improve behavioral/activity modifications related to ADLs, compensatory training, safety procedures, and adaptive equipment for 15 minutes including:  Garments: PT recommend wearing garments for one year per guidelines for prophylactical assist to decrease risk for lymphedema  Skin care  Weight management  Sleep  Nutrition  Infection prevention  Recommended bioflect leggings for LE compression. Provided sizing in writing.    Education: Instructed on general anatomy/physiology, lymphedema information (definitions, signs, symptoms, precautions), role of therapy in multi-disciplinary team, purpose of lymphedema physical therapy and the benefits/risks of treatment, risks of refusing treatment, POC, and goals for therapy were discussed with the pt.    Written Home Exercises Provided: yes.   Exercises were reviewed and Josefina was able to demonstrate them prior to the end of the session. Josefina demonstrated good  understanding of the education provided.     See EMR under Patient Instructions for exercises provided 1/20/2023.    Assessment     Josefina is a 58 y.o. female referred to outpatient physical therapy with a medical diagnosis of left breast cancer with surgical procedure planned on 02/08/2023, undergoing radiation currently. Pt was seen today post-operatively to  re-assess strength and ROM of BUEs, and UE circumferential measurements of BUEs to aid in the early detection of lymphedema post-operatively, and to provide pt education on exercises/precautions post-operative. Pt does not exhibit any UE ROM impairments currently. Pt to receive recommended UE compression garment for prophylatic measures to wear during the day and remove at night while sleeping. Advised pt to continue ROM exercises throughout radiation treatment to maintain tissue pliability. No further recommendations in regards UE lymphedema concerns.    Patient would benefit form LE  lymphedema assessment, education on management and measurement for compression garments however pt wishes to complete radiation treatment prior to beginning treatment to LE's. PT recommending new referral for LE lymphedema when she is ready.        Plan of care discussed with patient: Yes  Pt's spiritual, cultural and educational needs considered and patient is agreeable to the plan of care and goals as stated below:     Anticipated barriers for therapy:  pt anticipating radiation after surgery    Medical Necessity is demonstrated by the following:  History  Co-morbidities and personal factors that may impact the plan of care Co-morbidities:   diabetes, high BMI, history of cancer, immunosuppression, and level of undertstanding of current condition    Personal Factors:   level of understanding of current condition     moderate   Examination  Body Structures and Functions, activity limitations and participation restrictions that may impact the plan of care Body Systems:    No UE deficits    Activity limitations:   Mobility  Limited endurance    Self care  no deficits    Domestic Life  no deficits    Participation Restrictions:   none         low   Clinical Presentation evolving clinical presentation with changing clinical characteristics moderate   Decision Making/ Complexity Score: moderate       GOALS  Short Term Goals: 3 months  MET  Pt to be seen for reassessment in 6-8 weeks after surgery.  Pt will demonstrate 100% knowledge of lymphedema precautions and signs of infection.  Pt to obtain compression garments for prophylactic concerns according to APTA clinical guidelines published in Journal of Physical Therapy. (In progress)    Long Term Goals: deferred    Plan   Discharge PT at this time. Pt to request new orders for PT Lymphedema to address LE swelling after radiation complete.      Catalina Cerda, PT , CLT

## 2023-03-30 NOTE — PLAN OF CARE
Completed 10 of 16 outpatient radiation treatments to the left breast this visit.  No new problems noted.  All questions and concerns addressed by MD this visit.

## 2023-03-30 NOTE — PATIENT INSTRUCTIONS
Recommended compression leggings:  Bioflect leggings   Size 3XL    Can buy on amazon or other online shops.    Recommended called PCP for new PT Lymphedema order to address LE's once she has completed radiation.

## 2023-03-31 ENCOUNTER — OFFICE VISIT (OUTPATIENT)
Dept: HEMATOLOGY/ONCOLOGY | Facility: CLINIC | Age: 58
End: 2023-03-31
Payer: COMMERCIAL

## 2023-03-31 ENCOUNTER — INFUSION (OUTPATIENT)
Dept: INFUSION THERAPY | Facility: HOSPITAL | Age: 58
End: 2023-03-31
Attending: STUDENT IN AN ORGANIZED HEALTH CARE EDUCATION/TRAINING PROGRAM
Payer: COMMERCIAL

## 2023-03-31 VITALS
HEART RATE: 84 BPM | DIASTOLIC BLOOD PRESSURE: 86 MMHG | BODY MASS INDEX: 50.02 KG/M2 | HEIGHT: 64 IN | TEMPERATURE: 97 F | WEIGHT: 293 LBS | SYSTOLIC BLOOD PRESSURE: 131 MMHG | OXYGEN SATURATION: 98 % | RESPIRATION RATE: 16 BRPM

## 2023-03-31 VITALS
SYSTOLIC BLOOD PRESSURE: 157 MMHG | HEIGHT: 64 IN | HEART RATE: 82 BPM | OXYGEN SATURATION: 98 % | BODY MASS INDEX: 50.02 KG/M2 | WEIGHT: 293 LBS | DIASTOLIC BLOOD PRESSURE: 103 MMHG | TEMPERATURE: 97 F | RESPIRATION RATE: 18 BRPM

## 2023-03-31 DIAGNOSIS — I74.9 EMBOLISM AND THROMBOSIS: ICD-10-CM

## 2023-03-31 DIAGNOSIS — C50.912 INVASIVE DUCTAL CARCINOMA OF BREAST, FEMALE, LEFT: Primary | ICD-10-CM

## 2023-03-31 DIAGNOSIS — T45.1X5A CHEMOTHERAPY INDUCED DIARRHEA: ICD-10-CM

## 2023-03-31 DIAGNOSIS — L27.0 DRUG-INDUCED SKIN RASH: ICD-10-CM

## 2023-03-31 DIAGNOSIS — Z79.4 TYPE 2 DIABETES MELLITUS WITH CHRONIC KIDNEY DISEASE, WITH LONG-TERM CURRENT USE OF INSULIN, UNSPECIFIED CKD STAGE: ICD-10-CM

## 2023-03-31 DIAGNOSIS — E83.42 HYPOMAGNESEMIA: ICD-10-CM

## 2023-03-31 DIAGNOSIS — K52.1 CHEMOTHERAPY INDUCED DIARRHEA: ICD-10-CM

## 2023-03-31 DIAGNOSIS — K76.0 FATTY LIVER: ICD-10-CM

## 2023-03-31 DIAGNOSIS — E11.22 TYPE 2 DIABETES MELLITUS WITH CHRONIC KIDNEY DISEASE, WITH LONG-TERM CURRENT USE OF INSULIN, UNSPECIFIED CKD STAGE: ICD-10-CM

## 2023-03-31 DIAGNOSIS — Z79.899 IMMUNODEFICIENCY DUE TO DRUGS: ICD-10-CM

## 2023-03-31 DIAGNOSIS — T45.1X5A CHEMOTHERAPY INDUCED CARDIOMYOPATHY: ICD-10-CM

## 2023-03-31 DIAGNOSIS — I42.7 CHEMOTHERAPY INDUCED CARDIOMYOPATHY: ICD-10-CM

## 2023-03-31 DIAGNOSIS — D84.821 IMMUNODEFICIENCY DUE TO DRUGS: ICD-10-CM

## 2023-03-31 PROCEDURE — 3080F DIAST BP >= 90 MM HG: CPT | Mod: CPTII,S$GLB,, | Performed by: STUDENT IN AN ORGANIZED HEALTH CARE EDUCATION/TRAINING PROGRAM

## 2023-03-31 PROCEDURE — 77387 GUIDANCE FOR RADJ TX DLVR: CPT | Mod: 26,,, | Performed by: RADIOLOGY

## 2023-03-31 PROCEDURE — 3077F SYST BP >= 140 MM HG: CPT | Mod: CPTII,S$GLB,, | Performed by: STUDENT IN AN ORGANIZED HEALTH CARE EDUCATION/TRAINING PROGRAM

## 2023-03-31 PROCEDURE — 3077F PR MOST RECENT SYSTOLIC BLOOD PRESSURE >= 140 MM HG: ICD-10-PCS | Mod: CPTII,S$GLB,, | Performed by: STUDENT IN AN ORGANIZED HEALTH CARE EDUCATION/TRAINING PROGRAM

## 2023-03-31 PROCEDURE — 96367 TX/PROPH/DG ADDL SEQ IV INF: CPT | Mod: PN

## 2023-03-31 PROCEDURE — 77387 PR GUIDANCE FOR RADIATION TREATMENT DELIVERY: ICD-10-PCS | Mod: 26,,, | Performed by: RADIOLOGY

## 2023-03-31 PROCEDURE — 99215 OFFICE O/P EST HI 40 MIN: CPT | Mod: S$GLB,,, | Performed by: STUDENT IN AN ORGANIZED HEALTH CARE EDUCATION/TRAINING PROGRAM

## 2023-03-31 PROCEDURE — 1160F PR REVIEW ALL MEDS BY PRESCRIBER/CLIN PHARMACIST DOCUMENTED: ICD-10-PCS | Mod: CPTII,S$GLB,, | Performed by: STUDENT IN AN ORGANIZED HEALTH CARE EDUCATION/TRAINING PROGRAM

## 2023-03-31 PROCEDURE — 77336 RADIATION PHYSICS CONSULT: CPT | Mod: PN | Performed by: RADIOLOGY

## 2023-03-31 PROCEDURE — 96417 CHEMO IV INFUS EACH ADDL SEQ: CPT | Mod: PN

## 2023-03-31 PROCEDURE — 96413 CHEMO IV INFUSION 1 HR: CPT | Mod: PN

## 2023-03-31 PROCEDURE — 3008F PR BODY MASS INDEX (BMI) DOCUMENTED: ICD-10-PCS | Mod: CPTII,S$GLB,, | Performed by: STUDENT IN AN ORGANIZED HEALTH CARE EDUCATION/TRAINING PROGRAM

## 2023-03-31 PROCEDURE — 63600175 PHARM REV CODE 636 W HCPCS: Mod: PN | Performed by: STUDENT IN AN ORGANIZED HEALTH CARE EDUCATION/TRAINING PROGRAM

## 2023-03-31 PROCEDURE — 3044F HG A1C LEVEL LT 7.0%: CPT | Mod: CPTII,S$GLB,, | Performed by: STUDENT IN AN ORGANIZED HEALTH CARE EDUCATION/TRAINING PROGRAM

## 2023-03-31 PROCEDURE — 1159F PR MEDICATION LIST DOCUMENTED IN MEDICAL RECORD: ICD-10-PCS | Mod: CPTII,S$GLB,, | Performed by: STUDENT IN AN ORGANIZED HEALTH CARE EDUCATION/TRAINING PROGRAM

## 2023-03-31 PROCEDURE — 3044F PR MOST RECENT HEMOGLOBIN A1C LEVEL <7.0%: ICD-10-PCS | Mod: CPTII,S$GLB,, | Performed by: STUDENT IN AN ORGANIZED HEALTH CARE EDUCATION/TRAINING PROGRAM

## 2023-03-31 PROCEDURE — 99999 PR PBB SHADOW E&M-EST. PATIENT-LVL V: CPT | Mod: PBBFAC,,, | Performed by: STUDENT IN AN ORGANIZED HEALTH CARE EDUCATION/TRAINING PROGRAM

## 2023-03-31 PROCEDURE — 3080F PR MOST RECENT DIASTOLIC BLOOD PRESSURE >= 90 MM HG: ICD-10-PCS | Mod: CPTII,S$GLB,, | Performed by: STUDENT IN AN ORGANIZED HEALTH CARE EDUCATION/TRAINING PROGRAM

## 2023-03-31 PROCEDURE — 1159F MED LIST DOCD IN RCRD: CPT | Mod: CPTII,S$GLB,, | Performed by: STUDENT IN AN ORGANIZED HEALTH CARE EDUCATION/TRAINING PROGRAM

## 2023-03-31 PROCEDURE — 99999 PR PBB SHADOW E&M-EST. PATIENT-LVL V: ICD-10-PCS | Mod: PBBFAC,,, | Performed by: STUDENT IN AN ORGANIZED HEALTH CARE EDUCATION/TRAINING PROGRAM

## 2023-03-31 PROCEDURE — 77387 GUIDANCE FOR RADJ TX DLVR: CPT | Mod: TC,PN | Performed by: RADIOLOGY

## 2023-03-31 PROCEDURE — 96372 THER/PROPH/DIAG INJ SC/IM: CPT | Mod: PN,59

## 2023-03-31 PROCEDURE — 99215 PR OFFICE/OUTPT VISIT, EST, LEVL V, 40-54 MIN: ICD-10-PCS | Mod: S$GLB,,, | Performed by: STUDENT IN AN ORGANIZED HEALTH CARE EDUCATION/TRAINING PROGRAM

## 2023-03-31 PROCEDURE — 25000003 PHARM REV CODE 250: Mod: PN | Performed by: STUDENT IN AN ORGANIZED HEALTH CARE EDUCATION/TRAINING PROGRAM

## 2023-03-31 PROCEDURE — 1160F RVW MEDS BY RX/DR IN RCRD: CPT | Mod: CPTII,S$GLB,, | Performed by: STUDENT IN AN ORGANIZED HEALTH CARE EDUCATION/TRAINING PROGRAM

## 2023-03-31 PROCEDURE — 3008F BODY MASS INDEX DOCD: CPT | Mod: CPTII,S$GLB,, | Performed by: STUDENT IN AN ORGANIZED HEALTH CARE EDUCATION/TRAINING PROGRAM

## 2023-03-31 PROCEDURE — 77412 RADIATION TX DELIVERY LVL 3: CPT | Mod: PN | Performed by: RADIOLOGY

## 2023-03-31 RX ORDER — EPINEPHRINE 0.3 MG/.3ML
0.3 INJECTION SUBCUTANEOUS ONCE AS NEEDED
Status: CANCELLED | OUTPATIENT
Start: 2023-03-31

## 2023-03-31 RX ORDER — MAGNESIUM 200 MG
1000 TABLET ORAL DAILY
Qty: 30 TABLET | Refills: 3 | Status: SHIPPED | OUTPATIENT
Start: 2023-03-31 | End: 2023-07-03

## 2023-03-31 RX ORDER — CYANOCOBALAMIN 1000 UG/ML
1000 INJECTION, SOLUTION INTRAMUSCULAR; SUBCUTANEOUS
Status: COMPLETED | OUTPATIENT
Start: 2023-03-31 | End: 2023-03-31

## 2023-03-31 RX ORDER — CYANOCOBALAMIN 1000 UG/ML
1000 INJECTION, SOLUTION INTRAMUSCULAR; SUBCUTANEOUS
Status: DISCONTINUED | OUTPATIENT
Start: 2023-03-31 | End: 2023-07-03

## 2023-03-31 RX ORDER — DIPHENHYDRAMINE HYDROCHLORIDE 50 MG/ML
50 INJECTION INTRAMUSCULAR; INTRAVENOUS ONCE AS NEEDED
Status: DISCONTINUED | OUTPATIENT
Start: 2023-03-31 | End: 2023-03-31 | Stop reason: HOSPADM

## 2023-03-31 RX ORDER — HEPARIN 100 UNIT/ML
500 SYRINGE INTRAVENOUS
Status: CANCELLED | OUTPATIENT
Start: 2023-03-31

## 2023-03-31 RX ORDER — MAGNESIUM SULFATE HEPTAHYDRATE 40 MG/ML
2 INJECTION, SOLUTION INTRAVENOUS ONCE
Status: COMPLETED | OUTPATIENT
Start: 2023-03-31 | End: 2023-03-31

## 2023-03-31 RX ORDER — GABAPENTIN 100 MG/1
100 CAPSULE ORAL NIGHTLY
Qty: 30 CAPSULE | Refills: 0 | Status: SHIPPED | OUTPATIENT
Start: 2023-03-31 | End: 2023-07-14

## 2023-03-31 RX ORDER — DIPHENHYDRAMINE HYDROCHLORIDE 50 MG/ML
50 INJECTION INTRAMUSCULAR; INTRAVENOUS ONCE AS NEEDED
Status: CANCELLED | OUTPATIENT
Start: 2023-03-31

## 2023-03-31 RX ORDER — EPINEPHRINE 0.3 MG/.3ML
0.3 INJECTION SUBCUTANEOUS ONCE AS NEEDED
Status: DISCONTINUED | OUTPATIENT
Start: 2023-03-31 | End: 2023-03-31 | Stop reason: HOSPADM

## 2023-03-31 RX ORDER — MAGNESIUM SULFATE HEPTAHYDRATE 40 MG/ML
2 INJECTION, SOLUTION INTRAVENOUS ONCE
Status: CANCELLED
Start: 2023-03-31 | End: 2023-03-31

## 2023-03-31 RX ORDER — SODIUM CHLORIDE 0.9 % (FLUSH) 0.9 %
10 SYRINGE (ML) INJECTION
Status: DISCONTINUED | OUTPATIENT
Start: 2023-03-31 | End: 2023-03-31 | Stop reason: HOSPADM

## 2023-03-31 RX ORDER — SODIUM CHLORIDE 0.9 % (FLUSH) 0.9 %
10 SYRINGE (ML) INJECTION
Status: CANCELLED | OUTPATIENT
Start: 2023-03-31

## 2023-03-31 RX ADMIN — TRASTUZUMAB 814 MG: KIT at 01:03

## 2023-03-31 RX ADMIN — CYANOCOBALAMIN 1000 MCG: 1000 INJECTION, SOLUTION INTRAMUSCULAR at 01:03

## 2023-03-31 RX ADMIN — SODIUM CHLORIDE: 9 INJECTION, SOLUTION INTRAVENOUS at 10:03

## 2023-03-31 RX ADMIN — MAGNESIUM SULFATE IN WATER 2 G: 40 INJECTION, SOLUTION INTRAVENOUS at 10:03

## 2023-03-31 RX ADMIN — PERTUZUMAB 420 MG: 30 INJECTION, SOLUTION, CONCENTRATE INTRAVENOUS at 12:03

## 2023-03-31 NOTE — PLAN OF CARE
Problem: Adult Inpatient Plan of Care  Goal: Plan of Care Review  Outcome: Ongoing, Progressing  Flowsheets (Taken 3/31/2023 1113)  Plan of Care Reviewed With: patient  Goal: Patient-Specific Goal (Individualized)  Outcome: Ongoing, Progressing  Flowsheets (Taken 3/31/2023 1113)  Anxieties, Fears or Concerns: None  Individualized Care Needs: Recliner, warm blanket, dimmed lights, conversation     Problem: Fatigue (Radiation, External Beam)  Goal: Improved Activity Tolerance  Outcome: Ongoing, Progressing  Intervention: Promote Improved Energy  Flowsheets (Taken 3/31/2023 1113)  Fatigue Management:   frequent rest breaks encouraged   paced activity encouraged  Sleep/Rest Enhancement: regular sleep/rest pattern promoted  Activity Management: Ambulated -L4   Patient to Infusion for Magnesium, Perjeta, and [place] following appointment with the provider. Treatment plan reviewed with patient. VSS. Tolerated treatment. Provided with copy of upcoming appointment schedule. Escorted to the front lobby for discharge to home.

## 2023-03-31 NOTE — PROGRESS NOTES
PATIENT: Josefina Coon  MRN: [id number]  DATE: 3/31/2023      Diagnosis:   1. Invasive ductal carcinoma of breast, female, left    2. Chemotherapy induced cardiomyopathy    3. Type 2 diabetes mellitus with chronic kidney disease, with long-term current use of insulin, unspecified CKD stage    4. Drug-induced skin rash    5. Embolism and thrombosis of unspecified site    6. Immunodeficiency due to drugs    7. Hypomagnesemia    8. Chemotherapy induced diarrhea    9. Fatty liver      Chief Complaint: Invasive ductal carcinoma of breast, female, left (6 week follow up with labs)    Subjective:   HPI: Ms. Coon is a 58 y.o. female Ms. Coon is a 57 y.o. female with HTN, HLD, depression, diabetes type 2 with neuropathy, obesity, fatty liver, following up with us for  a diagnosis of invasive ductal carcinoma of the left breast, triple positivity. She is here today today for consideration of C9D1 of therapy which consists of Trastuzumab/Pertuzumab every 21 days.    Today, tolerating Transtuzumab/pertuzumab.,   - skin rash improved/resolved  - neuropathy 5/10 in feet and finger tips, not improvement, worse at night  - hair is back, slowly, concern about her nutritional status with having diarrhea, spoke about probiotics  - B12 IM injection today and start taking sublingual B12 daily      Oncologic History:   8/25/22 bilateral screening mammogram,,Right neg ,Left central post mass     9/8/22 left diag MMG, left US limited .Left 0300 lateral posterior 6cm FN 1.4cm mass , left axilla LN 2, up to 4mm cortex     9/14/22 left 0300 lateral posterior 6cm FN 1.4cm mass US biopsy .Grade 3 ,1.1cm in biopsy No LVI , ER 84% [place] 28% Her 2 3+ pos Ki 52 %    Left axilla LN biopsy ;Benign fatty tissue, no LN, not concordant No MRI of breasts were done      9/21/22 US bilateral complete Right neg, right LN nml , Left 0300 6cm FN [id number] mass Borderline LN left axilla     9/21/22 Left axilla LN biopsy benign LN , so eventually Stage IA  triple positive Breast cancer    10/7/22: started neoadjuvant chemo with Taxol + dual HER-2 (tranztuzumab and pertuzumab)    Receiving treatment with Paclitaxel/Trastuzumab/Pertuzumab on D1, weekly Paclitaxel on D8, D15 of a 21 day cycle. Completed weekly Paclitaxel on 12/23/22.    2/2023: s/p B/L mastectomy with CPR;ypT0,pN0,cM0    Oncology History   Invasive ductal carcinoma of breast, female, left   9/23/2022 Initial Diagnosis    Invasive ductal carcinoma of breast, female, left     9/23/2022 Cancer Staged    Staging form: Breast, AJCC 8th Edition  - Clinical stage from 9/23/2022: Stage IA (cT1c, cN0(f), cM0, G3, ER+, NJ+, HER2+)       9/29/2022 - 9/29/2022 Chemotherapy    Treatment Summary   Plan Name: OP BREAST TRASTUZUMAB PACLITAXEL WEEKLY  Treatment Goal: Curative  Status: Inactive  Start Date:   End Date:   Provider: Lisa Jaquez MD  Chemotherapy: PACLitaxeL (TAXOL) 80 mg/m2 = 204 mg in sodium chloride 0.9% 250 mL chemo infusion, 80 mg/m2 = 204 mg, Intravenous, Clinic/HOD 1 time, 0 of 12 cycles  pertuzumab (PERJETA) 840 mg in sodium chloride 0.9% 278 mL infusion, 840 mg (original dose ), Intravenous, Clinic/HOD 1 time, 0 of 17 cycles  Dose modification: 840 mg (Cycle 1, Reason: Other (see comments)), 420 mg (Cycle 4, Reason: Other (see comments))  trastuzumab-dkst (OGIVRI) 591 mg in sodium chloride 0.9% 250 mL chemo infusion, 4 mg/kg = 591 mg, Intravenous, Clinic/HOD 1 time, 0 of 25 cycles       10/7/2022 -  Chemotherapy    Treatment Summary   Plan Name: OP BREAST PACLITAXEL TRASTUZUMAB WEEKLY WITH PERTUZUMAB Q3W (THP)  Treatment Goal: Control  Status: Active  Start Date: 10/7/2022  End Date: 10/6/2023 (Planned)  Provider: Lisa Jaquez MD  Chemotherapy: PACLitaxeL (TAXOL) 80 mg/m2 = 210 mg in sodium chloride 0.9% 250 mL chemo infusion, 80 mg/m2 = 210 mg, Intravenous, Clinic/HOD 1 time, 4 of 4 cycles  Administration: 210 mg (10/7/2022), 204 mg (10/14/2022), 204 mg (10/28/2022), 204 mg (11/4/2022), 204 mg  (10/21/2022), 204 mg (11/11/2022), 204 mg (11/18/2022), 204 mg (11/25/2022), 198 mg (12/9/2022), 204 mg (12/2/2022), 198 mg (12/16/2022), 198 mg (12/23/2022)  pertuzumab (PERJETA) 840 mg in sodium chloride 0.9% 278 mL infusion, 840 mg, Intravenous, Clinic/HOD 1 time, 9 of 18 cycles  Administration: 840 mg (10/7/2022), 420 mg (10/28/2022), 420 mg (11/18/2022), 420 mg (12/9/2022), 420 mg (12/30/2022), 420 mg (1/20/2023), 420 mg (2/17/2023), 420 mg (3/10/2023)  trastuzumab-dkst (OGIVRI) 570 mg in sodium chloride 0.9% 250 mL chemo infusion, 593 mg (100 % of original dose 4 mg/kg), Intravenous, Clinic/HOD 1 time, 9 of 18 cycles  Dose modification: 4 mg/kg (original dose 4 mg/kg, Cycle 1, Reason: Other (see comments), Comment: weekly trastuzumab), 2 mg/kg (original dose 2 mg/kg, Cycle 2, Reason: Other (see comments), Comment: weekly maintenance dose), 6 mg/kg (original dose 2 mg/kg, Cycle 2, Reason: MD Discretion, Comment: change to q3w dosing), 2 mg/kg (original dose 2 mg/kg, Cycle 1, Reason: Other (see comments), Comment: maintenance weekly dose)  Administration: 570 mg (10/7/2022), 840 mg (10/28/2022), 288 mg (10/14/2022), 290 mg (10/21/2022), 840 mg (11/18/2022), 831 mg (12/9/2022), 831 mg (12/30/2022), 720 mg (1/20/2023), 806 mg (2/17/2023), 750 mg (3/10/2023)       2/13/2023 Cancer Staged    Staging form: Breast, AJCC 8th Edition  - Pathologic stage from 2/13/2023: No Stage Recommended (ypT0, pN0(sn), cM0, GX)          Past Medical History:   Past Medical History:   Diagnosis Date    Abnormal liver enzymes     Asymptomatic varicose veins     Breast cancer 09/14/2022    left idc    Cancer     left breast cancer    Depressive disorder, not elsewhere classified     Dyslipidemia     Embolism and thrombosis of unspecified site     superficial venous thrombosis    Encounter for long-term (current) use of other medications     Family history of ischemic heart disease     Fatty liver     Generalized anxiety disorder      History of chicken pox     Obstructive sleep apnea (adult) (pediatric)     Type II or unspecified type diabetes mellitus without mention of complication, not stated as uncontrolled     Unspecified essential hypertension     Unspecified venous (peripheral) insufficiency     Unspecified vitamin D deficiency        Past Surgical HIstory:   Past Surgical History:   Procedure Laterality Date    BREAST BIOPSY Left 2022    idc    BREAST BIOPSY Left 2022    neg ln    BREAST BIOPSY Left 2022    neg ln     SECTION      COLONOSCOPY      ESOPHAGOGASTRODUODENOSCOPY      INSERTION OF TUNNELED CENTRAL VENOUS CATHETER (CVC) WITH SUBCUTANEOUS PORT Right 10/04/2022    Procedure: [id number];  Surgeon: Dominick Ng MD;  Location: [facility] OR;  Service: General;  Laterality: Right;    LUMPECTOMY, WITH RADAR LOCALIZATION USING TR[place]ON  Left 2023    Procedure: LUMPECTOMY,WITH RADAR LOCALIZATION USING TR[place] ;  Surgeon: Maria G Mcduffie MD;  Location: [facility] OR;  Service: General;  Laterality: Left;    SENTINEL LYMPH NODE BIOPSY Left 2023    Procedure: BIOPSY, LYMPH NODE, SENTINEL;  Surgeon: Maria G Mcduffie MD;  Location: [facility] OR;  Service: General;  Laterality: Left;    VEIN SURGERY      Laser, Dr. Larsen       Family History:   Family History   Problem Relation Age of Onset    Hyperlipidemia Mother     Hypertension Mother     Vulvar Cancer Mother     Dementia Mother     Atrial fibrillation Father     Parkinsonism Father     Diabetes Brother     Cancer Brother         colon    Hypertension Son     Hyperlipidemia Son     Anxiety disorder Son     Stroke Maternal Grandmother        Social History:  reports that she has never smoked. She has never used smokeless tobacco. She reports that she does not currently use alcohol. She reports that she does not use drugs.    Allergies:  Review of patient's allergies indicates:   Allergen Reactions    Sitagliptin      Other reaction(s):  (januvia) abdominal pain    Sulfa (sulfonamide antibiotics) Hives    Byetta  [exenatide] Rash    Lactose        Medications:  Current Outpatient Medications   Medication Sig Dispense Refill    albuterol (PROVENTIL/VENTOLIN HFA) 90 mcg/actuation inhaler Inhale 2 puffs into the lungs every 6 (six) hours as needed for Wheezing. 18 g 6    ALPRAZolam (XANAX) 0.25 MG tablet TAKE ONE TABLET BY MOUTH 1-2 TIMES DAILY AS NEEDED ANXIETY 15 tablet 0    buPROPion (WELLBUTRIN XL) 150 MG TB24 tablet TAKE 1 TABLET BY MOUTH EVERY DAY 90 tablet 3    dulaglutide (TRULICITY) 1.5 mg/0.5 mL pen injector Inject 1.5 mg into the skin every 7 days. 4 pen 0    dulaglutide (TRULICITY) 3 mg/0.5 mL pen injector Inject 3 mg into the skin every 7 days. 12 pen 1    ergocalciferol (ERGOCALCIFEROL) 50,000 unit Cap TAKE 1 CAPSULE BY MOUTH ONE TIME PER WEEK 12 capsule 0    hydroCHLOROthiazide (HYDRODIURIL) 12.5 MG Tab Take 1 tablet (12.5 mg total) by mouth once daily. 90 tablet 0    HYDROcodone-acetaminophen (NORCO)  mg per tablet Take 1 tablet by mouth every 6 (six) hours as needed for Pain. 20 tablet 0    LIDOcaine-prilocaine (EMLA) cream Apply to port site 1 hour prior to port access and cover 30 g 3    magnesium 30 mg Tab Take 30 mg by mouth once.      metFORMIN (GLUCOPHAGE) 500 MG tablet TAKE 1 TABLET BY MOUTH TWICE A DAY WITH MEALS 180 tablet 1    nystatin (MYCOSTATIN) ointment Apply topically 3 (three) times daily. 30 g 2    nystatin (MYCOSTATIN) powder Apply to affected area 3 times daily 60 g 1    omeprazole (PRILOSEC OTC) 20 MG tablet Take 1 tablet (20 mg total) by mouth once daily. 30 tablet 6    omeprazole (PRILOSEC) 10 MG capsule omeprazole      ondansetron (ZOFRAN) 8 MG tablet Take 1 tablet (8 mg total) by mouth every 8 (eight) hours as needed for Nausea. 30 tablet 2    pravastatin (PRAVACHOL) 10 MG tablet TAKE 1 TABLET BY MOUTH EVERYDAY AT BEDTIME 90 tablet 1    promethazine (PHENERGAN) 25 MG tablet Take 1 tablet (25 mg total) by  mouth every 6 (six) hours as needed for Nausea. 30 tablet 0    triamcinolone acetonide 0.025% (KENALOG) 0.025 % cream Apply topically 2 (two) times daily. Apply to the skin lesions twice a day 15 g 0    cyanocobalamin, vitamin B-12, 1,000 mcg Subl Place 1,000 mcg under the tongue once daily. 30 tablet 3    gabapentin (NEURONTIN) 100 MG capsule Take 1 capsule (100 mg total) by mouth every evening. 30 capsule 0     Current Facility-Administered Medications   Medication Dose Route Frequency Provider Last Rate Last Admin    cyanocobalamin injection 1,000 mcg  1,000 mcg Intramuscular 1 time in Clinic/HOD Lisa Jaquez MD         Facility-Administered Medications Ordered in Other Visits   Medication Dose Route Frequency Provider Last Rate Last Admin    diphenhydrAMINE injection 50 mg  50 mg Intravenous Once PRN Lisa Jaquez MD        EPINEPHrine (EPIPEN) 0.3 mg/0.3 mL pen injection 0.3 mg  0.3 mg Intramuscular Once PRN Lisa Jaquez MD        hydrocortisone sodium succinate injection 100 mg  100 mg Intravenous Once PRN Lisa Jaquez MD        lactated ringers infusion   Intravenous Continuous Maria G Mcduffie  mL/hr at 02/08/23 0700 New Bag at 02/08/23 0814    midazolam (VERSED) 1 mg/mL injection 2 mg  2 mg Intravenous See admin instructions Maria G Mcduffie MD   2 mg at 02/08/23 0711    sodium chloride 0.9% flush 10 mL  10 mL Intravenous PRN Lisa Jaquez MD        trastuzumab-dkst (OGIVRI) 814 mg in sodium chloride 0.9% 323.8 mL chemo infusion  6 mg/kg Intravenous 1 time in Clinic/HOD Lisa Jaquez MD           Review of Systems   Constitutional:  Negative for chills, fatigue and fever.   HENT:  Negative for trouble swallowing.    Respiratory:  Negative for cough and shortness of breath.    Cardiovascular:  Negative for chest pain and palpitations.   Gastrointestinal:  Positive for diarrhea. Negative for abdominal pain, constipation, nausea and vomiting.   Genitourinary:  Negative for dysuria.  "  Musculoskeletal:  Negative for arthralgias.   Skin:  Positive for rash.   Neurological:  Negative for headaches.   Hematological:  Negative for adenopathy.     ECOG Performance Status:   ECOG SCORE    1 - Restricted in strenuous activity-ambulatory and able to carry out work of a light nature, 0 - Fully active-able to carry on all pre-disease performance without restriction         Objective:      Vitals:   Vitals:    03/31/23 0942   BP: (!) 157/103   BP Location: Right arm   Patient Position: Sitting   BP Method: Large (Automatic)   Pulse: 82   Resp: 18   Temp: 97 °F (36.1 °C)   TempSrc: Temporal   SpO2: 98%   Weight: 135.7 kg (299 lb 2.6 oz)   Height: 5' 4" (1.626 m)         BMI: Body mass index is 51.35 kg/m².    Physical Exam  Vitals reviewed.   Constitutional:       General: She is not in acute distress.     Appearance: She is not diaphoretic.   HENT:      Head: Normocephalic and atraumatic.      Mouth/Throat:      Mouth: Mucous membranes are moist.      Pharynx: No oropharyngeal exudate.   Eyes:      General: No scleral icterus.  Cardiovascular:      Rate and Rhythm: Normal rate and regular rhythm.      Heart sounds: Normal heart sounds. No murmur heard.  Pulmonary:      Effort: Pulmonary effort is normal. No respiratory distress.      Breath sounds: No wheezing.   Chest:      Comments: B/L mastectomy   Musculoskeletal:      Cervical back: No tenderness.      Right lower leg: No edema.      Left lower leg: No edema.   Lymphadenopathy:      Cervical: No cervical adenopathy.   Skin:     General: Skin is warm.      Findings: Rash present.   Neurological:      Mental Status: She is alert and oriented to person, place, and time.   Psychiatric:         Behavior: Behavior normal.       Laboratory Data:  Lab Results   Component Value Date    WBC 6.33 03/30/2023    HGB 10.8 (L) 03/30/2023    HCT 33.1 (L) 03/30/2023    MCV 98 03/30/2023     03/30/2023        Assessment:       1. Invasive ductal carcinoma of " breast, female, left    2. Chemotherapy induced cardiomyopathy    3. Type 2 diabetes mellitus with chronic kidney disease, with long-term current use of insulin, unspecified CKD stage    4. Drug-induced skin rash    5. Embolism and thrombosis of unspecified site    6. Immunodeficiency due to drugs    7. Hypomagnesemia    8. Chemotherapy induced diarrhea    9. Fatty liver        Plan:   Invasive ductal carcinoma of the breast, left  - cT1c triple positive breast cancer  (ER 84% ND 28% Her 2 3+ pos Ki 52 %), given her young age and Ki67%, will proceed with TH, adding P to help with a better pCR, will also plan to get ultrasound mid cycle to monitor (3 months)  -9/26/22 Echo with EF 60%; next is scheduled for 01/18/23  -Began TH+P on 10/7/2022; completed 12 weeks of Paclitaxel 12/23/22  - 2/2023; - s/p B/l Mastectom CPR;ypT0,pN0,cM0  -Proceed with Trastuzumab/Pertuzumab and IV mag to infusion   - on adjuvant radiation plan for 16 fraction total   - will need adjuvant endocrine therapy after finishing up adjuvant radiation   -Echo 1/2023 EF: 65% and global longitudinal 17.5%, cont monitoring with echo in 3 months ; schedule for May/2023      Diabetes type 2 with neuropathy   -Taking Metformin, Trulicity  - starting gabapentin 100mg night     Anxiety  -Taking Wellbutrin  -Following with Dr. Long     Hypomagnesemia  -Taking po supplements at home;   -Monitor , replacement as needed     Drug-induced rash: improved     Normocytic anemia  -likely due to chemo and slightly nose/rectal bleeding (mucositis)  -cont monitoring , no indication for transfusion, improving   - B12 low, give IM B12 and will send sublingual     Patient queried and all questions were answered.    Route Chart for Scheduling    Med Onc Chart Routing      Follow up with physician Other. 5/12 with chemo, also needs Echo prior thos 5/12 [facility]   Follow up with [facility] Other. 4/21 with Rony with chemo   Infusion scheduling note    Injection scheduling note     Labs CBC, CMP, ferritin, iron and TIBC, magnesium, vitamin D, TSH, folate and other   Schedulin days prior to infusion  Preferred lab:  Lab interval:     Imaging    Pharmacy appointment    Other referrals           Treatment Plan Information   OP BREAST PACLITAXEL TRASTUZUMAB WEEKLY WITH PERTUZUMAB Q3W (THP)   Lisa Jaquez MD   Upcoming Treatment Dates - OP BREAST PACLITAXEL TRASTUZUMAB WEEKLY WITH PERTUZUMAB Q3W (THP)    2023       Chemotherapy       pertuzumab (PERJETA) 420 mg in sodium chloride 0.9% 264 mL infusion       trastuzumab-dkst (OGIVRI) in sodium chloride 0.9% chemo infusion       Supportive Care       magnesium sulfate 2g in water 50mL IVPB (premix)  2023       Chemotherapy       pertuzumab (PERJETA) 420 mg in sodium chloride 0.9% 264 mL infusion       trastuzumab-dkst (OGIVRI) in sodium chloride 0.9% chemo infusion       Supportive Care       magnesium sulfate 2g in water 50mL IVPB (premix)  2023       Chemotherapy       pertuzumab (PERJETA) 420 mg in sodium chloride 0.9% 264 mL infusion       trastuzumab-dkst (OGIVRI) in sodium chloride 0.9% chemo infusion       Supportive Care       magnesium sulfate 2g in water 50mL IVPB (premix)  2023       Chemotherapy       pertuzumab (PERJETA) 420 mg in sodium chloride 0.9% 264 mL infusion       trastuzumab-dkst (OGIVRI) in sodium chloride 0.9% chemo infusion       Supportive Care       magnesium sulfate 2g in water 50mL IVPB (premix)    Supportive Plan Information  IV FLUIDS AND ELECTROLYTES   Lisa Jaquez MD   Upcoming Treatment Dates - IV FLUIDS AND ELECTROLYTES    No upcoming days in selected categories.    Lisa Jaquez MD  Hematology and Oncology  [facility]  [facility] [place] [facility]

## 2023-04-01 PROCEDURE — 77412 RADIATION TX DELIVERY LVL 3: CPT | Mod: PN | Performed by: RADIOLOGY

## 2023-04-01 PROCEDURE — 77387 GUIDANCE FOR RADJ TX DLVR: CPT | Mod: TC,PN | Performed by: RADIOLOGY

## 2023-04-01 PROCEDURE — 77387 GUIDANCE FOR RADJ TX DLVR: CPT | Mod: 26,,, | Performed by: RADIOLOGY

## 2023-04-01 PROCEDURE — 77387 PR GUIDANCE FOR RADIATION TREATMENT DELIVERY: ICD-10-PCS | Mod: 26,,, | Performed by: RADIOLOGY

## 2023-04-03 ENCOUNTER — HOSPITAL ENCOUNTER (OUTPATIENT)
Dept: RADIATION THERAPY | Facility: HOSPITAL | Age: 58
Discharge: HOME | End: 2023-04-03
Attending: RADIOLOGY
Payer: COMMERCIAL

## 2023-04-03 PROCEDURE — 77417 THER RADIOLOGY PORT IMAGE(S): CPT | Mod: PN | Performed by: RADIOLOGY

## 2023-04-03 PROCEDURE — 77387 GUIDANCE FOR RADJ TX DLVR: CPT | Mod: 26,,, | Performed by: RADIOLOGY

## 2023-04-03 PROCEDURE — 77412 RADIATION TX DELIVERY LVL 3: CPT | Mod: PN | Performed by: RADIOLOGY

## 2023-04-03 PROCEDURE — 77387 GUIDANCE FOR RADJ TX DLVR: CPT | Mod: TC,PN | Performed by: RADIOLOGY

## 2023-04-03 PROCEDURE — 77387 PR GUIDANCE FOR RADIATION TREATMENT DELIVERY: ICD-10-PCS | Mod: 26,,, | Performed by: RADIOLOGY

## 2023-04-04 ENCOUNTER — TELEPHONE (OUTPATIENT)
Dept: HEMATOLOGY/ONCOLOGY | Facility: CLINIC | Age: 58
End: 2023-04-04
Payer: COMMERCIAL

## 2023-04-04 PROCEDURE — 77387 PR GUIDANCE FOR RADIATION TREATMENT DELIVERY: ICD-10-PCS | Mod: 26,,, | Performed by: RADIOLOGY

## 2023-04-04 PROCEDURE — 77387 GUIDANCE FOR RADJ TX DLVR: CPT | Mod: 26,,, | Performed by: RADIOLOGY

## 2023-04-04 PROCEDURE — 77387 GUIDANCE FOR RADJ TX DLVR: CPT | Mod: TC,PN | Performed by: RADIOLOGY

## 2023-04-04 PROCEDURE — 77412 RADIATION TX DELIVERY LVL 3: CPT | Mod: PN | Performed by: RADIOLOGY

## 2023-04-04 NOTE — TELEPHONE ENCOUNTER
Called patient wanting an earlier time,  got an earlier appt scheduled on 04/21 , patient accepted new appt times. ----- Message from Livier Cox sent at 4/4/2023  1:11 PM CDT -----  Regarding: NENO Jung  Type: Needs Medical Advice  Who Called:  Patient   Symptoms (please be specific):    How long has patient had these symptoms:    Pharmacy name and phone #:    Best Call Back Number: [phone]  Additional Information: Patient is requesting a call back to reschedule her appts. on  4/21.

## 2023-04-05 PROCEDURE — 77387 GUIDANCE FOR RADJ TX DLVR: CPT | Mod: TC,PN | Performed by: RADIOLOGY

## 2023-04-05 PROCEDURE — 77387 PR GUIDANCE FOR RADIATION TREATMENT DELIVERY: ICD-10-PCS | Mod: 26,,, | Performed by: RADIOLOGY

## 2023-04-05 PROCEDURE — 77387 GUIDANCE FOR RADJ TX DLVR: CPT | Mod: 26,,, | Performed by: RADIOLOGY

## 2023-04-05 PROCEDURE — 77412 RADIATION TX DELIVERY LVL 3: CPT | Mod: PN | Performed by: RADIOLOGY

## 2023-04-06 ENCOUNTER — DOCUMENTATION ONLY (OUTPATIENT)
Dept: RADIATION ONCOLOGY | Facility: CLINIC | Age: 58
End: 2023-04-06
Payer: COMMERCIAL

## 2023-04-06 PROCEDURE — 77412 RADIATION TX DELIVERY LVL 3: CPT | Mod: PN | Performed by: RADIOLOGY

## 2023-04-06 PROCEDURE — 77387 GUIDANCE FOR RADJ TX DLVR: CPT | Mod: 26,,, | Performed by: RADIOLOGY

## 2023-04-06 PROCEDURE — 77387 PR GUIDANCE FOR RADIATION TREATMENT DELIVERY: ICD-10-PCS | Mod: 26,,, | Performed by: RADIOLOGY

## 2023-04-06 PROCEDURE — 77336 RADIATION PHYSICS CONSULT: CPT | Mod: PN | Performed by: RADIOLOGY

## 2023-04-06 PROCEDURE — 77387 GUIDANCE FOR RADJ TX DLVR: CPT | Mod: TC,PN | Performed by: RADIOLOGY

## 2023-04-06 NOTE — PLAN OF CARE
Completed 16 of 20 outpatient radiation treatments to the left breast without difficulty.  All questions and concerns addressed by MD this visit.

## 2023-04-10 PROCEDURE — 77387 GUIDANCE FOR RADJ TX DLVR: CPT | Mod: 26,,, | Performed by: RADIOLOGY

## 2023-04-10 PROCEDURE — 77412 RADIATION TX DELIVERY LVL 3: CPT | Mod: PN | Performed by: RADIOLOGY

## 2023-04-10 PROCEDURE — 77387 PR GUIDANCE FOR RADIATION TREATMENT DELIVERY: ICD-10-PCS | Mod: 26,,, | Performed by: RADIOLOGY

## 2023-04-10 PROCEDURE — 77417 THER RADIOLOGY PORT IMAGE(S): CPT | Mod: PN | Performed by: RADIOLOGY

## 2023-04-10 PROCEDURE — 77387 GUIDANCE FOR RADJ TX DLVR: CPT | Mod: TC,PN | Performed by: RADIOLOGY

## 2023-04-12 PROCEDURE — 77387 PR GUIDANCE FOR RADIATION TREATMENT DELIVERY: ICD-10-PCS | Mod: 26,,, | Performed by: RADIOLOGY

## 2023-04-12 PROCEDURE — 77387 GUIDANCE FOR RADJ TX DLVR: CPT | Mod: 26,,, | Performed by: RADIOLOGY

## 2023-04-12 PROCEDURE — 77412 RADIATION TX DELIVERY LVL 3: CPT | Mod: PN | Performed by: RADIOLOGY

## 2023-04-12 PROCEDURE — 77387 GUIDANCE FOR RADJ TX DLVR: CPT | Mod: TC,PN | Performed by: RADIOLOGY

## 2023-04-13 ENCOUNTER — DOCUMENTATION ONLY (OUTPATIENT)
Dept: RADIATION ONCOLOGY | Facility: CLINIC | Age: 58
End: 2023-04-13
Payer: COMMERCIAL

## 2023-04-13 PROCEDURE — 77387 PR GUIDANCE FOR RADIATION TREATMENT DELIVERY: ICD-10-PCS | Mod: 26,,, | Performed by: RADIOLOGY

## 2023-04-13 PROCEDURE — 77387 GUIDANCE FOR RADJ TX DLVR: CPT | Mod: 26,,, | Performed by: RADIOLOGY

## 2023-04-13 PROCEDURE — 77412 RADIATION TX DELIVERY LVL 3: CPT | Mod: PN | Performed by: RADIOLOGY

## 2023-04-13 PROCEDURE — 77387 GUIDANCE FOR RADJ TX DLVR: CPT | Mod: TC,PN | Performed by: RADIOLOGY

## 2023-04-13 NOTE — PLAN OF CARE
Completed 19 of 20 outpatient radiation treatments to the left chestwall without difficulty.  Complains of mild to moderate soreness.  All questions/concerns addressed by MD this visit.

## 2023-04-14 ENCOUNTER — DOCUMENTATION ONLY (OUTPATIENT)
Dept: HEMATOLOGY/ONCOLOGY | Facility: CLINIC | Age: 58
End: 2023-04-14
Payer: COMMERCIAL

## 2023-04-14 PROCEDURE — 77387 GUIDANCE FOR RADJ TX DLVR: CPT | Mod: 26,,, | Performed by: RADIOLOGY

## 2023-04-14 PROCEDURE — 77387 GUIDANCE FOR RADJ TX DLVR: CPT | Mod: TC,PN | Performed by: RADIOLOGY

## 2023-04-14 PROCEDURE — 77412 RADIATION TX DELIVERY LVL 3: CPT | Mod: PN | Performed by: RADIOLOGY

## 2023-04-14 PROCEDURE — 77387 PR GUIDANCE FOR RADIATION TREATMENT DELIVERY: ICD-10-PCS | Mod: 26,,, | Performed by: RADIOLOGY

## 2023-04-14 PROCEDURE — 77336 RADIATION PHYSICS CONSULT: CPT | Mod: PN | Performed by: RADIOLOGY

## 2023-04-14 NOTE — PLAN OF CARE
Completed all outpatient radiation treatments without difficulty, denies any questions or concerns. Instructed to call us if needed, patient verbalized understanding

## 2023-04-19 ENCOUNTER — LAB VISIT (OUTPATIENT)
Dept: LAB | Facility: HOSPITAL | Age: 58
End: 2023-04-19
Attending: RADIOLOGY
Payer: COMMERCIAL

## 2023-04-19 DIAGNOSIS — C50.912 INVASIVE DUCTAL CARCINOMA OF BREAST, FEMALE, LEFT: ICD-10-CM

## 2023-04-19 LAB
ALBUMIN SERPL BCP-MCNC: 3.7 G/DL (ref 3.5–5.2)
ALP SERPL-CCNC: 128 U/L (ref 55–135)
ALT SERPL W/O P-5'-P-CCNC: 14 U/L (ref 10–44)
ANION GAP SERPL CALC-SCNC: 12 MMOL/L (ref 8–16)
AST SERPL-CCNC: 11 U/L (ref 10–40)
BILIRUB SERPL-MCNC: 0.3 MG/DL (ref 0.1–1)
BUN SERPL-MCNC: 28 MG/DL (ref 6–20)
CALCIUM SERPL-MCNC: 9.9 MG/DL (ref 8.7–10.5)
CHLORIDE SERPL-SCNC: 107 MMOL/L (ref 95–110)
CO2 SERPL-SCNC: 19 MMOL/L (ref 23–29)
CREAT SERPL-MCNC: 1.7 MG/DL (ref 0.5–1.4)
ERYTHROCYTE [DISTWIDTH] IN BLOOD BY AUTOMATED COUNT: 11.9 % (ref 11.5–14.5)
EST. GFR  (NO RACE VARIABLE): 34.1 ML/MIN/1.73 M^2
GLUCOSE SERPL-MCNC: 134 MG/DL (ref 70–110)
HCT VFR BLD AUTO: 36 % (ref 37–48.5)
HGB BLD-MCNC: 11.8 G/DL (ref 12–16)
IMM GRANULOCYTES # BLD AUTO: 0.02 K/UL (ref 0–0.04)
MAGNESIUM SERPL-MCNC: 1.4 MG/DL (ref 1.6–2.6)
MCH RBC QN AUTO: 30.7 PG (ref 27–31)
MCHC RBC AUTO-ENTMCNC: 32.8 G/DL (ref 32–36)
MCV RBC AUTO: 94 FL (ref 82–98)
NEUTROPHILS # BLD AUTO: 5 K/UL (ref 1.8–7.7)
PLATELET # BLD AUTO: 229 K/UL (ref 150–450)
PMV BLD AUTO: 9.4 FL (ref 9.2–12.9)
POTASSIUM SERPL-SCNC: 3.8 MMOL/L (ref 3.5–5.1)
PROT SERPL-MCNC: 7.8 G/DL (ref 6–8.4)
RBC # BLD AUTO: 3.84 M/UL (ref 4–5.4)
SODIUM SERPL-SCNC: 138 MMOL/L (ref 136–145)
WBC # BLD AUTO: 6.93 K/UL (ref 3.9–12.7)

## 2023-04-19 PROCEDURE — 80053 COMPREHEN METABOLIC PANEL: CPT | Mod: PN | Performed by: STUDENT IN AN ORGANIZED HEALTH CARE EDUCATION/TRAINING PROGRAM

## 2023-04-19 PROCEDURE — 85027 COMPLETE CBC AUTOMATED: CPT | Mod: PN | Performed by: STUDENT IN AN ORGANIZED HEALTH CARE EDUCATION/TRAINING PROGRAM

## 2023-04-19 PROCEDURE — 36415 COLL VENOUS BLD VENIPUNCTURE: CPT | Mod: PN | Performed by: STUDENT IN AN ORGANIZED HEALTH CARE EDUCATION/TRAINING PROGRAM

## 2023-04-19 PROCEDURE — 83735 ASSAY OF MAGNESIUM: CPT | Mod: PN | Performed by: STUDENT IN AN ORGANIZED HEALTH CARE EDUCATION/TRAINING PROGRAM

## 2023-04-21 ENCOUNTER — OFFICE VISIT (OUTPATIENT)
Dept: HEMATOLOGY/ONCOLOGY | Facility: CLINIC | Age: 58
End: 2023-04-21
Payer: COMMERCIAL

## 2023-04-21 ENCOUNTER — OFFICE VISIT (OUTPATIENT)
Dept: RADIATION ONCOLOGY | Facility: CLINIC | Age: 58
End: 2023-04-21
Payer: COMMERCIAL

## 2023-04-21 ENCOUNTER — DOCUMENTATION ONLY (OUTPATIENT)
Dept: INFUSION THERAPY | Facility: HOSPITAL | Age: 58
End: 2023-04-21

## 2023-04-21 ENCOUNTER — INFUSION (OUTPATIENT)
Dept: INFUSION THERAPY | Facility: HOSPITAL | Age: 58
End: 2023-04-21
Attending: STUDENT IN AN ORGANIZED HEALTH CARE EDUCATION/TRAINING PROGRAM
Payer: COMMERCIAL

## 2023-04-21 VITALS
SYSTOLIC BLOOD PRESSURE: 137 MMHG | BODY MASS INDEX: 49.53 KG/M2 | HEART RATE: 80 BPM | RESPIRATION RATE: 16 BRPM | WEIGHT: 290.13 LBS | TEMPERATURE: 97 F | OXYGEN SATURATION: 99 % | DIASTOLIC BLOOD PRESSURE: 94 MMHG | HEIGHT: 64 IN

## 2023-04-21 VITALS
HEART RATE: 81 BPM | RESPIRATION RATE: 16 BRPM | DIASTOLIC BLOOD PRESSURE: 81 MMHG | BODY MASS INDEX: 49.53 KG/M2 | SYSTOLIC BLOOD PRESSURE: 122 MMHG | HEIGHT: 64 IN | WEIGHT: 290.13 LBS | TEMPERATURE: 97 F

## 2023-04-21 VITALS
HEIGHT: 64 IN | SYSTOLIC BLOOD PRESSURE: 137 MMHG | RESPIRATION RATE: 80 BRPM | HEART RATE: 80 BPM | BODY MASS INDEX: 49.53 KG/M2 | OXYGEN SATURATION: 99 % | DIASTOLIC BLOOD PRESSURE: 94 MMHG | WEIGHT: 290.13 LBS | TEMPERATURE: 97 F

## 2023-04-21 DIAGNOSIS — C50.912 INVASIVE DUCTAL CARCINOMA OF BREAST, FEMALE, LEFT: Primary | ICD-10-CM

## 2023-04-21 DIAGNOSIS — T45.1X5A CHEMOTHERAPY INDUCED CARDIOMYOPATHY: ICD-10-CM

## 2023-04-21 DIAGNOSIS — Z79.4 TYPE 2 DIABETES MELLITUS WITH CHRONIC KIDNEY DISEASE, WITH LONG-TERM CURRENT USE OF INSULIN, UNSPECIFIED CKD STAGE: ICD-10-CM

## 2023-04-21 DIAGNOSIS — I42.7 CHEMOTHERAPY INDUCED CARDIOMYOPATHY: ICD-10-CM

## 2023-04-21 DIAGNOSIS — E11.22 TYPE 2 DIABETES MELLITUS WITH CHRONIC KIDNEY DISEASE, WITH LONG-TERM CURRENT USE OF INSULIN, UNSPECIFIED CKD STAGE: ICD-10-CM

## 2023-04-21 PROCEDURE — 63600175 PHARM REV CODE 636 W HCPCS: Mod: PN | Performed by: NURSE PRACTITIONER

## 2023-04-21 PROCEDURE — 3075F PR MOST RECENT SYSTOLIC BLOOD PRESS GE 130-139MM HG: ICD-10-PCS | Mod: CPTII,S$GLB,, | Performed by: NURSE PRACTITIONER

## 2023-04-21 PROCEDURE — 99212 OFFICE O/P EST SF 10 MIN: CPT | Mod: S$GLB,,, | Performed by: RADIOLOGY

## 2023-04-21 PROCEDURE — 3044F PR MOST RECENT HEMOGLOBIN A1C LEVEL <7.0%: ICD-10-PCS | Mod: CPTII,S$GLB,, | Performed by: RADIOLOGY

## 2023-04-21 PROCEDURE — 3080F DIAST BP >= 90 MM HG: CPT | Mod: CPTII,S$GLB,, | Performed by: RADIOLOGY

## 2023-04-21 PROCEDURE — 3080F PR MOST RECENT DIASTOLIC BLOOD PRESSURE >= 90 MM HG: ICD-10-PCS | Mod: CPTII,S$GLB,, | Performed by: RADIOLOGY

## 2023-04-21 PROCEDURE — 99999 PR PBB SHADOW E&M-EST. PATIENT-LVL V: CPT | Mod: PBBFAC,,, | Performed by: NURSE PRACTITIONER

## 2023-04-21 PROCEDURE — 99999 PR PBB SHADOW E&M-EST. PATIENT-LVL V: CPT | Mod: PBBFAC,,, | Performed by: RADIOLOGY

## 2023-04-21 PROCEDURE — 99215 OFFICE O/P EST HI 40 MIN: CPT | Mod: S$GLB,,, | Performed by: NURSE PRACTITIONER

## 2023-04-21 PROCEDURE — 99212 PR OFFICE/OUTPT VISIT, EST, LEVL II, 10-19 MIN: ICD-10-PCS | Mod: S$GLB,,, | Performed by: RADIOLOGY

## 2023-04-21 PROCEDURE — 96367 TX/PROPH/DG ADDL SEQ IV INF: CPT | Mod: PN

## 2023-04-21 PROCEDURE — 3075F SYST BP GE 130 - 139MM HG: CPT | Mod: CPTII,S$GLB,, | Performed by: NURSE PRACTITIONER

## 2023-04-21 PROCEDURE — 96413 CHEMO IV INFUSION 1 HR: CPT | Mod: PN

## 2023-04-21 PROCEDURE — 3044F HG A1C LEVEL LT 7.0%: CPT | Mod: CPTII,S$GLB,, | Performed by: RADIOLOGY

## 2023-04-21 PROCEDURE — 3008F PR BODY MASS INDEX (BMI) DOCUMENTED: ICD-10-PCS | Mod: CPTII,S$GLB,, | Performed by: NURSE PRACTITIONER

## 2023-04-21 PROCEDURE — 3080F PR MOST RECENT DIASTOLIC BLOOD PRESSURE >= 90 MM HG: ICD-10-PCS | Mod: CPTII,S$GLB,, | Performed by: NURSE PRACTITIONER

## 2023-04-21 PROCEDURE — 3044F PR MOST RECENT HEMOGLOBIN A1C LEVEL <7.0%: ICD-10-PCS | Mod: CPTII,S$GLB,, | Performed by: NURSE PRACTITIONER

## 2023-04-21 PROCEDURE — 1159F MED LIST DOCD IN RCRD: CPT | Mod: CPTII,S$GLB,, | Performed by: RADIOLOGY

## 2023-04-21 PROCEDURE — 3008F PR BODY MASS INDEX (BMI) DOCUMENTED: ICD-10-PCS | Mod: CPTII,S$GLB,, | Performed by: RADIOLOGY

## 2023-04-21 PROCEDURE — 3008F BODY MASS INDEX DOCD: CPT | Mod: CPTII,S$GLB,, | Performed by: RADIOLOGY

## 2023-04-21 PROCEDURE — 1159F PR MEDICATION LIST DOCUMENTED IN MEDICAL RECORD: ICD-10-PCS | Mod: CPTII,S$GLB,, | Performed by: RADIOLOGY

## 2023-04-21 PROCEDURE — 99999 PR PBB SHADOW E&M-EST. PATIENT-LVL V: ICD-10-PCS | Mod: PBBFAC,,, | Performed by: RADIOLOGY

## 2023-04-21 PROCEDURE — 99215 PR OFFICE/OUTPT VISIT, EST, LEVL V, 40-54 MIN: ICD-10-PCS | Mod: S$GLB,,, | Performed by: NURSE PRACTITIONER

## 2023-04-21 PROCEDURE — 3075F PR MOST RECENT SYSTOLIC BLOOD PRESS GE 130-139MM HG: ICD-10-PCS | Mod: CPTII,S$GLB,, | Performed by: RADIOLOGY

## 2023-04-21 PROCEDURE — 3075F SYST BP GE 130 - 139MM HG: CPT | Mod: CPTII,S$GLB,, | Performed by: RADIOLOGY

## 2023-04-21 PROCEDURE — 25000003 PHARM REV CODE 250: Mod: PN | Performed by: NURSE PRACTITIONER

## 2023-04-21 PROCEDURE — 3080F DIAST BP >= 90 MM HG: CPT | Mod: CPTII,S$GLB,, | Performed by: NURSE PRACTITIONER

## 2023-04-21 PROCEDURE — 96417 CHEMO IV INFUS EACH ADDL SEQ: CPT | Mod: PN

## 2023-04-21 PROCEDURE — 99999 PR PBB SHADOW E&M-EST. PATIENT-LVL V: ICD-10-PCS | Mod: PBBFAC,,, | Performed by: NURSE PRACTITIONER

## 2023-04-21 PROCEDURE — 3008F BODY MASS INDEX DOCD: CPT | Mod: CPTII,S$GLB,, | Performed by: NURSE PRACTITIONER

## 2023-04-21 PROCEDURE — 3044F HG A1C LEVEL LT 7.0%: CPT | Mod: CPTII,S$GLB,, | Performed by: NURSE PRACTITIONER

## 2023-04-21 RX ORDER — SODIUM CHLORIDE 0.9 % (FLUSH) 0.9 %
10 SYRINGE (ML) INJECTION
Status: CANCELLED | OUTPATIENT
Start: 2023-04-21

## 2023-04-21 RX ORDER — EPINEPHRINE 0.3 MG/.3ML
0.3 INJECTION SUBCUTANEOUS ONCE AS NEEDED
Status: CANCELLED | OUTPATIENT
Start: 2023-04-21

## 2023-04-21 RX ORDER — DIPHENHYDRAMINE HYDROCHLORIDE 50 MG/ML
50 INJECTION INTRAMUSCULAR; INTRAVENOUS ONCE AS NEEDED
Status: DISCONTINUED | OUTPATIENT
Start: 2023-04-21 | End: 2023-04-21 | Stop reason: HOSPADM

## 2023-04-21 RX ORDER — HEPARIN 100 UNIT/ML
500 SYRINGE INTRAVENOUS
Status: CANCELLED | OUTPATIENT
Start: 2023-04-21

## 2023-04-21 RX ORDER — EPINEPHRINE 0.3 MG/.3ML
0.3 INJECTION SUBCUTANEOUS ONCE AS NEEDED
Status: DISCONTINUED | OUTPATIENT
Start: 2023-04-21 | End: 2023-04-21 | Stop reason: HOSPADM

## 2023-04-21 RX ORDER — UBIDECARENONE 75 MG
CAPSULE ORAL
COMMUNITY
Start: 2023-03-31 | End: 2023-07-03

## 2023-04-21 RX ORDER — DIPHENHYDRAMINE HYDROCHLORIDE 50 MG/ML
50 INJECTION INTRAMUSCULAR; INTRAVENOUS ONCE AS NEEDED
Status: CANCELLED | OUTPATIENT
Start: 2023-04-21

## 2023-04-21 RX ORDER — SODIUM CHLORIDE 0.9 % (FLUSH) 0.9 %
10 SYRINGE (ML) INJECTION
Status: DISCONTINUED | OUTPATIENT
Start: 2023-04-21 | End: 2023-04-21 | Stop reason: HOSPADM

## 2023-04-21 RX ADMIN — PERTUZUMAB 420 MG: 30 INJECTION, SOLUTION, CONCENTRATE INTRAVENOUS at 11:04

## 2023-04-21 RX ADMIN — MAGNESIUM SULFATE HEPTAHYDRATE: 500 INJECTION, SOLUTION INTRAMUSCULAR; INTRAVENOUS at 09:04

## 2023-04-21 RX ADMIN — TRASTUZUMAB 750 MG: KIT at 12:04

## 2023-04-21 NOTE — PROGRESS NOTES
PATIENT: Josefina Coon  MRN: [id number]  DATE: 4/21/2023      Diagnosis:   1. Invasive ductal carcinoma of breast, female, left    2. Chemotherapy induced cardiomyopathy    3. Type 2 diabetes mellitus with chronic kidney disease, with long-term current use of insulin, unspecified CKD stage      Chief Complaint: Clearance for C10 HP    Subjective:   HPI: Ms. Coon is a 58 y.o. female Ms. Coon is a 58 y.o. female with HTN, HLD, depression, diabetes type 2 with neuropathy, obesity, fatty liver, following up with us for  a diagnosis of invasive ductal carcinoma of the left breast, triple positivity.   She is here today today for consideration of C10D1 of therapy which consists of Trastuzumab/Pertuzumab every 21 days.    Today, tolerating Transtuzumab/pertuzumab.,   - Diarrhea the last 2 days; states always before coming in - ??? anxiety  - neuropathy 5/10 in feet and finger tips, not improvement, worse at night - antonio right heel  -left breast tenderness where irradiated raw/peeling; Dr. Trevino to evaluate today:  XRT completed 04/13/23  No fevers, chills, abdominal discomfort, N/V, bleeding, no new lumps or bumps, etc.     Oncologic History:   8/25/22 bilateral screening mammogram,,Right neg ,Left central post mass     9/8/22 left diag MMG, left US limited .Left 0300 lateral posterior 6cm FN 1.4cm mass , left axilla LN 2, up to 4mm cortex     9/14/22 left 0300 lateral posterior 6cm FN 1.4cm mass US biopsy .Grade 3 ,1.1cm in biopsy No LVI , ER 84% CA 28% Her 2 3+ pos Ki 52 %    Left axilla LN biopsy ;Benign fatty tissue, no LN, not concordant No MRI of breasts were done      9/21/22 US bilateral complete Right neg, right LN nml , Left 0300 6cm FN [id number] mass Borderline LN left axilla     9/21/22 Left axilla LN biopsy benign LN , so eventually Stage IA triple positive Breast cancer    10/7/22: started neoadjuvant chemo with Taxol + dual HER-2 (tranztuzumab and pertuzumab)    Receiving treatment with  Paclitaxel/Trastuzumab/Pertuzumab on D1, weekly Paclitaxel on D8, D15 of a 21 day cycle. Completed weekly Paclitaxel on 12/23/22.    2/2023: s/p B/L mastectomy with CPR;ypT0,pN0,cM0    Oncology History   Invasive ductal carcinoma of breast, female, left   9/23/2022 Initial Diagnosis    Invasive ductal carcinoma of breast, female, left     9/23/2022 Cancer Staged    Staging form: Breast, AJCC 8th Edition  - Clinical stage from 9/23/2022: Stage IA (cT1c, cN0(f), cM0, G3, ER+, MI+, HER2+)     9/29/2022 - 9/29/2022 Chemotherapy    Treatment Summary   Plan Name: OP BREAST TRASTUZUMAB PACLITAXEL WEEKLY  Treatment Goal: Curative  Status: Inactive  Start Date:   End Date:   Provider: Lisa Jaquez MD  Chemotherapy: PACLitaxeL (TAXOL) 80 mg/m2 = 204 mg in sodium chloride 0.9% 250 mL chemo infusion, 80 mg/m2 = 204 mg, Intravenous, Clinic/HOD 1 time, 0 of 12 cycles  pertuzumab (PERJETA) 840 mg in sodium chloride 0.9% 278 mL infusion, 840 mg (original dose ), Intravenous, Clinic/HOD 1 time, 0 of 17 cycles  Dose modification: 840 mg (Cycle 1, Reason: Other (see comments)), 420 mg (Cycle 4, Reason: Other (see comments))  trastuzumab-dkst (OGIVRI) 591 mg in sodium chloride 0.9% 250 mL chemo infusion, 4 mg/kg = 591 mg, Intravenous, Clinic/HOD 1 time, 0 of 25 cycles     10/7/2022 -  Chemotherapy    Treatment Summary   Plan Name: OP BREAST PACLITAXEL TRASTUZUMAB WEEKLY WITH PERTUZUMAB Q3W (THP)  Treatment Goal: Control  Status: Active  Start Date: 10/7/2022  End Date: 10/6/2023 (Planned)  Provider: Lisa Jaquez MD  Chemotherapy: PACLitaxeL (TAXOL) 80 mg/m2 = 210 mg in sodium chloride 0.9% 250 mL chemo infusion, 80 mg/m2 = 210 mg, Intravenous, Clinic/HOD 1 time, 4 of 4 cycles  Administration: 210 mg (10/7/2022), 204 mg (10/14/2022), 204 mg (10/28/2022), 204 mg (11/4/2022), 204 mg (10/21/2022), 204 mg (11/11/2022), 204 mg (11/18/2022), 204 mg (11/25/2022), 198 mg (12/9/2022), 204 mg (12/2/2022), 198 mg (12/16/2022), 198 mg  (12/23/2022)  pertuzumab (PERJETA) 840 mg in sodium chloride 0.9% 278 mL infusion, 840 mg, Intravenous, Clinic/HOD 1 time, 9 of 18 cycles  Administration: 840 mg (10/7/2022), 420 mg (10/28/2022), 420 mg (11/18/2022), 420 mg (12/9/2022), 420 mg (12/30/2022), 420 mg (1/20/2023), 420 mg (2/17/2023), 420 mg (3/10/2023), 420 mg (3/31/2023)  trastuzumab-dkst (OGIVRI) 570 mg in sodium chloride 0.9% 250 mL chemo infusion, 593 mg (100 % of original dose 4 mg/kg), Intravenous, Clinic/HOD 1 time, 9 of 18 cycles  Dose modification: 4 mg/kg (original dose 4 mg/kg, Cycle 1, Reason: Other (see comments), Comment: weekly trastuzumab), 2 mg/kg (original dose 2 mg/kg, Cycle 2, Reason: Other (see comments), Comment: weekly maintenance dose), 6 mg/kg (original dose 2 mg/kg, Cycle 2, Reason: MD Discretion, Comment: change to q3w dosing), 2 mg/kg (original dose 2 mg/kg, Cycle 1, Reason: Other (see comments), Comment: maintenance weekly dose)  Administration: 570 mg (10/7/2022), 840 mg (10/28/2022), 288 mg (10/14/2022), 290 mg (10/21/2022), 840 mg (11/18/2022), 831 mg (12/9/2022), 831 mg (12/30/2022), 720 mg (1/20/2023), 806 mg (2/17/2023), 750 mg (3/10/2023), 814 mg (3/31/2023)     2/13/2023 Cancer Staged    Staging form: Breast, AJCC 8th Edition  - Pathologic stage from 2/13/2023: No Stage Recommended (ypT0, pN0(sn), cM0, GX)        Past Medical History:   Past Medical History:   Diagnosis Date    Abnormal liver enzymes     Asymptomatic varicose veins     Breast cancer 09/14/2022    left idc    Cancer     left breast cancer    Depressive disorder, not elsewhere classified     Dyslipidemia     Embolism and thrombosis of unspecified site     superficial venous thrombosis    Encounter for long-term (current) use of other medications     Family history of ischemic heart disease     Fatty liver     Generalized anxiety disorder     History of chicken pox     Obstructive sleep apnea (adult) (pediatric)     Type II or unspecified type diabetes  mellitus without mention of complication, not stated as uncontrolled     Unspecified essential hypertension     Unspecified venous (peripheral) insufficiency     Unspecified vitamin D deficiency        Past Surgical HIstory:   Past Surgical History:   Procedure Laterality Date    BREAST BIOPSY Left 2022    idc    BREAST BIOPSY Left 2022    neg ln    BREAST BIOPSY Left 2022    neg ln     SECTION      COLONOSCOPY      ESOPHAGOGASTRODUODENOSCOPY      INSERTION OF TUNNELED CENTRAL VENOUS CATHETER (CVC) WITH SUBCUTANEOUS PORT Right 10/04/2022    Procedure: [id number];  Surgeon: Dominick Ng MD;  Location: [facility] OR;  Service: General;  Laterality: Right;    LUMPECTOMY, WITH RADAR LOCALIZATION USING TR[place]  Left 2023    Procedure: LUMPECTOMY,WITH RADAR LOCALIZATION USING [place] ;  Surgeon: Maria G Mcduffie MD;  Location: [facility] OR;  Service: General;  Laterality: Left;    SENTINEL LYMPH NODE BIOPSY Left 2023    Procedure: BIOPSY, LYMPH NODE, SENTINEL;  Surgeon: Maria G Mcduffie MD;  Location: [facility] OR;  Service: General;  Laterality: Left;    VEIN SURGERY      Laser, Dr. Larsen       Family History:   Family History   Problem Relation Age of Onset    Hyperlipidemia Mother     Hypertension Mother     Vulvar Cancer Mother     Dementia Mother     Atrial fibrillation Father     Parkinsonism Father     Diabetes Brother     Cancer Brother         colon    Hypertension Son     Hyperlipidemia Son     Anxiety disorder Son     Stroke Maternal Grandmother        Social History:  reports that she has never smoked. She has never used smokeless tobacco. She reports that she does not currently use alcohol. She reports that she does not use drugs.    Allergies:  Review of patient's allergies indicates:   Allergen Reactions    Sitagliptin      Other reaction(s): (januvia) abdominal pain    Sulfa (sulfonamide antibiotics) Hives    Byetta  [exenatide] Rash    Lactose         Medications:  Current Outpatient Medications   Medication Sig Dispense Refill    albuterol (PROVENTIL/VENTOLIN HFA) 90 mcg/actuation inhaler Inhale 2 puffs into the lungs every 6 (six) hours as needed for Wheezing. 18 g 6    ALPRAZolam (XANAX) 0.25 MG tablet TAKE ONE TABLET BY MOUTH 1-2 TIMES DAILY AS NEEDED ANXIETY 15 tablet 0    buPROPion (WELLBUTRIN XL) 150 MG TB24 tablet TAKE 1 TABLET BY MOUTH EVERY DAY 90 tablet 3    cyanocobalamin 1,000 mcg/mL injection Inject 1 ml q 2 weeks x 1 month then 1 ml monthly 30 mL 0    cyanocobalamin, vitamin B-12, 1,000 mcg Subl Place 1,000 mcg under the tongue once daily. 30 tablet 3    ergocalciferol (ERGOCALCIFEROL) 50,000 unit Cap TAKE 1 CAPSULE BY MOUTH ONE TIME PER WEEK 12 capsule 0    gabapentin (NEURONTIN) 100 MG capsule Take 1 capsule (100 mg total) by mouth every evening. 30 capsule 0    hydroCHLOROthiazide (HYDRODIURIL) 12.5 MG Tab Take 1 tablet (12.5 mg total) by mouth once daily. 90 tablet 0    HYDROcodone-acetaminophen (NORCO)  mg per tablet Take 1 tablet by mouth every 6 (six) hours as needed for Pain. 20 tablet 0    LIDOcaine-prilocaine (EMLA) cream Apply to port site 1 hour prior to port access and cover 30 g 3    magnesium 30 mg Tab Take 30 mg by mouth once.      metFORMIN (GLUCOPHAGE) 500 MG tablet TAKE 1 TABLET BY MOUTH TWICE A DAY WITH MEALS 180 tablet 1    nystatin (MYCOSTATIN) ointment Apply topically 3 (three) times daily. 30 g 2    nystatin (MYCOSTATIN) powder Apply to affected area 3 times daily 60 g 1    omeprazole (PRILOSEC OTC) 20 MG tablet Take 1 tablet (20 mg total) by mouth once daily. 30 tablet 6    omeprazole (PRILOSEC) 10 MG capsule omeprazole      ondansetron (ZOFRAN) 8 MG tablet Take 1 tablet (8 mg total) by mouth every 8 (eight) hours as needed for Nausea. 30 tablet 2    pravastatin (PRAVACHOL) 10 MG tablet TAKE 1 TABLET BY MOUTH EVERYDAY AT BEDTIME 90 tablet 1    promethazine (PHENERGAN) 25 MG tablet Take 1 tablet (25 mg total) by  "mouth every 6 (six) hours as needed for Nausea. 30 tablet 0    tirzepatide (MOUNJARO) 2.5 mg/0.5 mL PnIj Inject 2.5 mg into the skin every 7 days. 4 pen 0    triamcinolone acetonide 0.025% (KENALOG) 0.025 % cream Apply topically 2 (two) times daily. Apply to the skin lesions twice a day 15 g 0     Current Facility-Administered Medications   Medication Dose Route Frequency Provider Last Rate Last Admin    cyanocobalamin injection 1,000 mcg  1,000 mcg Intramuscular 1 time in Clinic/HOD Lisa Jaquez MD         Facility-Administered Medications Ordered in Other Visits   Medication Dose Route Frequency Provider Last Rate Last Admin    lactated ringers infusion   Intravenous Continuous Maria G Mcduffie  mL/hr at 02/08/23 0700 New Bag at 02/08/23 0814    midazolam (VERSED) 1 mg/mL injection 2 mg  2 mg Intravenous See admin instructions Maria G Mcduffie MD   2 mg at 02/08/23 0711       Review of Systems   Constitutional:  Negative for chills, fatigue and fever.   HENT:  Negative for trouble swallowing.    Respiratory:  Negative for cough and shortness of breath.    Cardiovascular:  Negative for chest pain and palpitations.   Gastrointestinal:  Positive for diarrhea. Negative for abdominal pain, constipation, nausea and vomiting.   Genitourinary:  Negative for dysuria.   Musculoskeletal:  Negative for arthralgias.   Skin:  Negative for rash.   Neurological:  Negative for headaches.   Hematological:  Negative for adenopathy.     Objective:      Vitals:   Vitals:    04/21/23 0845   BP: (!) 137/94   Pulse: 80   Resp: 16   Temp: 97.2 °F (36.2 °C)   TempSrc: Temporal   SpO2: 99%   Weight: 131.6 kg (290 lb 2 oz)   Height: 5' 4" (1.626 m)         BMI: Body mass index is 49.8 kg/m².    Physical Exam  Vitals reviewed.   Constitutional:       General: She is not in acute distress.     Appearance: She is not diaphoretic.   HENT:      Head: Normocephalic and atraumatic.      Mouth/Throat:      Mouth: Mucous membranes are " moist.      Pharynx: No oropharyngeal exudate.   Eyes:      General: No scleral icterus.     Conjunctiva/sclera: Conjunctivae normal.   Cardiovascular:      Rate and Rhythm: Normal rate and regular rhythm.      Heart sounds: Normal heart sounds. No murmur heard.  Pulmonary:      Effort: Pulmonary effort is normal. No respiratory distress.      Breath sounds: No wheezing.   Chest:          Comments: B/L mastectomy   Area of excoriation from irradiation  Abdominal:      General: Bowel sounds are normal.      Palpations: Abdomen is soft.   Musculoskeletal:      Cervical back: Neck supple. No tenderness.      Right lower leg: No edema.      Left lower leg: No edema.   Lymphadenopathy:      Cervical: No cervical adenopathy.      Upper Body:      Right upper body: No supraclavicular or axillary adenopathy.      Left upper body: No supraclavicular or axillary adenopathy.      Lower Body: No right inguinal adenopathy. No left inguinal adenopathy.   Skin:     General: Skin is warm.      Findings: No rash.   Neurological:      Mental Status: She is alert and oriented to person, place, and time.   Psychiatric:         Behavior: Behavior normal.         Thought Content: Thought content normal.       Laboratory Data:  Lab Results   Component Value Date    WBC 6.93 04/19/2023    HGB 11.8 (L) 04/19/2023    HCT 36.0 (L) 04/19/2023    MCV 94 04/19/2023     04/19/2023      CMP  Sodium   Date Value Ref Range Status   04/19/2023 138 136 - 145 mmol/L Final   08/08/2015 137 137 - 145 MMOL/L Final     Potassium   Date Value Ref Range Status   04/19/2023 3.8 3.5 - 5.1 mmol/L Final   08/08/2015 4.4 3.5 - 5.1 MMOL/L Final     Chloride   Date Value Ref Range Status   04/19/2023 107 95 - 110 mmol/L Final   08/08/2015 101 98 - 107 MMOL/L Final     CO2   Date Value Ref Range Status   04/19/2023 19 (L) 23 - 29 mmol/L Final     Glucose   Date Value Ref Range Status   04/19/2023 134 (H) 70 - 110 mg/dL Final     BUN   Date Value Ref Range  Status   04/19/2023 28 (H) 6 - 20 mg/dL Final     Creatinine   Date Value Ref Range Status   04/19/2023 1.7 (H) 0.5 - 1.4 mg/dL Final   08/08/2015 0.63 0.52 - 1.04 MG/DL Final     Calcium   Date Value Ref Range Status   04/19/2023 9.9 8.7 - 10.5 mg/dL Final     Total Protein   Date Value Ref Range Status   04/19/2023 7.8 6.0 - 8.4 g/dL Final     Albumin   Date Value Ref Range Status   04/19/2023 3.7 3.5 - 5.2 g/dL Final     Total Bilirubin   Date Value Ref Range Status   04/19/2023 0.3 0.1 - 1.0 mg/dL Final     Comment:     For infants and newborns, interpretation of results should be based  on gestational age, weight and in agreement with clinical  observations.    Premature Infant recommended reference ranges:  Up to 24 hours.............<8.0 mg/dL  Up to 48 hours............<12.0 mg/dL  3-5 days..................<15.0 mg/dL  6-29 days.................<15.0 mg/dL       Alkaline Phosphatase   Date Value Ref Range Status   04/19/2023 128 55 - 135 U/L Final     AST   Date Value Ref Range Status   04/19/2023 11 10 - 40 U/L Final     ALT   Date Value Ref Range Status   04/19/2023 14 10 - 44 U/L Final     Anion Gap   Date Value Ref Range Status   04/19/2023 12 8 - 16 mmol/L Final     eGFR   Date Value Ref Range Status   04/19/2023 34.1 (A) >60 mL/min/1.73 m^2 Final     Magnesium:  1.4    Assessment:       1. Invasive ductal carcinoma of breast, female, left    2. Chemotherapy induced cardiomyopathy    3. Type 2 diabetes mellitus with chronic kidney disease, with long-term current use of insulin, unspecified CKD stage        Plan:   Invasive ductal carcinoma of the breast, left  - cT1c triple positive breast cancer  (ER 84% [place] 28% Her 2 3+ pos Ki 52 %), given her young age and Ki67%, will proceed with TH, adding P to help with a better pCR, will also plan to get ultrasound mid cycle to monitor (3 months)  -9/26/22 Echo with EF 60%; next is scheduled for 01/18/23  -Began TH+P on 10/7/2022; completed 12 weeks of Paclitaxel  12/23/22  - 2/2023; - s/p B/l Mastectom CPR;ypT0,pN0,cM0  -Proceed with Trastuzumab/Pertuzumab   - on adjuvant radiation plan for 16 fraction total - completed 04/13/23  - will need adjuvant endocrine therapy after finishing up adjuvant radiation   -Echo 1/2023 EF: 65% and global longitudinal 17.5%, cont monitoring with echo in 3 months ; schedule for May 01, 2023   -Proceed with C10 HP today; will add 2 gm Magnesium to 1000 ml NS (hydrate) over 2 hours     Diabetes type 2 with neuropathy   -Taking Metformin, Trulicity  - starting gabapentin 100mg night     Anxiety  -Taking Wellbutrin  -Following with Dr. Long     Hypomagnesemia  -Has not been taking supplements at home due to diarrhea; encouraged decreasing dose to 250 mg  -Monitor, replacement as needed     Drug-induced rash: resolved    Normocytic anemia  -likely due to chemo and slightly nose/rectal bleeding (mucositis)  -cont monitoring , no indication for transfusion, improving   - B12 low, receiving IM    Patient queried and all questions were answered.    Route Chart for Scheduling    Med Onc Chart Routing      Follow up with physician . F/U with Dr. Jaquez on 05/12 with labs on 05/10 already scheduled   Follow up with BRANDIE    Infusion scheduling note Proceed with C10 HP today   Injection scheduling note    Labs    Imaging    Pharmacy appointment    Other referrals           Treatment Plan Information   OP BREAST PACLITAXEL TRASTUZUMAB WEEKLY WITH PERTUZUMAB Q3W (THP)   Lisa Jaquez MD   Upcoming Treatment Dates - OP BREAST PACLITAXEL TRASTUZUMAB WEEKLY WITH PERTUZUMAB Q3W (THP)    4/21/2023       Chemotherapy       pertuzumab (PERJETA) 420 mg in sodium chloride 0.9% 264 mL infusion       trastuzumab-dkst (OGIVRI) in sodium chloride 0.9% chemo infusion       Supportive Care       magnesium sulfate 2g in water 50mL IVPB (premix)  5/12/2023       Chemotherapy       pertuzumab (PERJETA) 420 mg in sodium chloride 0.9% 264 mL infusion       trastuzumab-dkst  (OGIVRI) in sodium chloride 0.9% chemo infusion       Supportive Care       magnesium sulfate 2g in water 50mL IVPB (premix)  6/2/2023       Chemotherapy       pertuzumab (PERJETA) 420 mg in sodium chloride 0.9% 264 mL infusion       trastuzumab-dkst (OGIVRI) in sodium chloride 0.9% chemo infusion       Supportive Care       magnesium sulfate 2g in water 50mL IVPB (premix)  6/23/2023       Chemotherapy       pertuzumab (PERJETA) 420 mg in sodium chloride 0.9% 264 mL infusion       trastuzumab-dkst (OGIVRI) in sodium chloride 0.9% chemo infusion       Supportive Care       magnesium sulfate 2g in water 50mL IVPB (premix)    Supportive Plan Information  IV FLUIDS AND ELECTROLYTES   Lisa Jaquez MD   Upcoming Treatment Dates - IV FLUIDS AND ELECTROLYTES    No upcoming days in selected categories.     46 minutes were spent in coordination of patient's care, record review and counseling.

## 2023-04-21 NOTE — PROGRESS NOTES
[facility] Department of Radiation Oncology  Follow Up Visit Note    Diagnosis:  Josefina Coon is a 58 y.o. female with a(n)  BMI 52 and diagnosis of Stage IA  (cT1c, N0, M0, Grade 3, ER+/NH+/HER2+) Invasive ductal carcinoma of the LEFT breast. She completed neoadjuvant chemotherapy with THP and lumpectomy/sentinel lymph node biopsy 2/8/23 with pCR. On 4/14/23 she completed a course of adjuvant radiation therapy.     Oncologic History:  Oncology History   Invasive ductal carcinoma of breast, female, left   9/23/2022 Initial Diagnosis    Invasive ductal carcinoma of breast, female, left       9/23/2022 Cancer Staged    Staging form: Breast, AJCC 8th Edition  - Clinical stage from 9/23/2022: Stage IA (cT1c, cN0(f), cM0, G3, ER+, NH+, HER2+)       9/29/2022 - 9/29/2022 Chemotherapy    Treatment Summary   Plan Name: OP BREAST TRASTUZUMAB PACLITAXEL WEEKLY  Treatment Goal: Curative  Status: Inactive  Start Date:   End Date:   Provider: Lisa Jaquez MD  Chemotherapy: PACLitaxeL (TAXOL) 80 mg/m2 = 204 mg in sodium chloride 0.9% 250 mL chemo infusion, 80 mg/m2 = 204 mg, Intravenous, Clinic/HOD 1 time, 0 of 12 cycles  pertuzumab (PERJETA) 840 mg in sodium chloride 0.9% 278 mL infusion, 840 mg (original dose ), Intravenous, Clinic/HOD 1 time, 0 of 17 cycles  Dose modification: 840 mg (Cycle 1, Reason: Other (see comments)), 420 mg (Cycle 4, Reason: Other (see comments))  trastuzumab-dkst (OGIVRI) 591 mg in sodium chloride 0.9% 250 mL chemo infusion, 4 mg/kg = 591 mg, Intravenous, Clinic/HOD 1 time, 0 of 25 cycles       10/7/2022 -  Chemotherapy    Treatment Summary   Plan Name: OP BREAST PACLITAXEL TRASTUZUMAB WEEKLY WITH PERTUZUMAB Q3W (THP)  Treatment Goal: Control  Status: Active  Start Date: 10/7/2022  End Date: 10/6/2023 (Planned)  Provider: Lisa Jaquez MD  Chemotherapy: PACLitaxeL (TAXOL) 80 mg/m2 = 210 mg in sodium chloride 0.9% 250 mL chemo infusion, 80 mg/m2 = 210 mg, Intravenous,  Clinic/HOD 1 time, 4 of 4 cycles  Administration: 210 mg (10/7/2022), 204 mg (10/14/2022), 204 mg (10/28/2022), 204 mg (11/4/2022), 204 mg (10/21/2022), 204 mg (11/11/2022), 204 mg (11/18/2022), 204 mg (11/25/2022), 198 mg (12/9/2022), 204 mg (12/2/2022), 198 mg (12/16/2022), 198 mg (12/23/2022)  pertuzumab (PERJETA) 840 mg in sodium chloride 0.9% 278 mL infusion, 840 mg, Intravenous, Clinic/HOD 1 time, 9 of 18 cycles  Administration: 840 mg (10/7/2022), 420 mg (10/28/2022), 420 mg (11/18/2022), 420 mg (12/9/2022), 420 mg (12/30/2022), 420 mg (1/20/2023), 420 mg (2/17/2023), 420 mg (3/10/2023), 420 mg (3/31/2023)  trastuzumab-dkst (OGIVRI) 570 mg in sodium chloride 0.9% 250 mL chemo infusion, 593 mg (100 % of original dose 4 mg/kg), Intravenous, Clinic/HOD 1 time, 9 of 18 cycles  Dose modification: 4 mg/kg (original dose 4 mg/kg, Cycle 1, Reason: Other (see comments), Comment: weekly trastuzumab), 2 mg/kg (original dose 2 mg/kg, Cycle 2, Reason: Other (see comments), Comment: weekly maintenance dose), 6 mg/kg (original dose 2 mg/kg, Cycle 2, Reason: MD Discretion, Comment: change to q3w dosing), 2 mg/kg (original dose 2 mg/kg, Cycle 1, Reason: Other (see comments), Comment: maintenance weekly dose)  Administration: 570 mg (10/7/2022), 840 mg (10/28/2022), 288 mg (10/14/2022), 290 mg (10/21/2022), 840 mg (11/18/2022), 831 mg (12/9/2022), 831 mg (12/30/2022), 720 mg (1/20/2023), 806 mg (2/17/2023), 750 mg (3/10/2023), 814 mg (3/31/2023)       2/13/2023 Cancer Staged    Staging form: Breast, AJCC 8th Edition  - Pathologic stage from 2/13/2023: No Stage Recommended (ypT0, pN0(sn), cM0, GX)            Interval History  The patient presents today for a regularly scheduled follow up visit.  She was last seen in our clinic on 4/13/23 at completion of therapy.   Since that time, she has noted some desquamation in the IM fold and inferior/posterior breast blister which ruptured leaving painful area.      Review of Systems    Review of Systems   Constitutional:  Positive for malaise/fatigue. Negative for chills and fever.   Respiratory:  Negative for cough and shortness of breath.    Cardiovascular:  Positive for chest pain (LL breast).   Skin:  Positive for rash.     Social History:  Social History     Tobacco Use    Smoking status: Never    Smokeless tobacco: Never   Substance Use Topics    Alcohol use: Not Currently     Comment: rare     Drug use: Never       Family History:  Cancer-related family history includes Cancer in her brother.    Exam:  There were no vitals filed for this visit.  Constitutional: Pleasant 58 y.o. female in no acute distress.  Well nourished. Well groomed.   HEENT: Normocephalic and atraumatic   Lungs: No audible wheezing.  Normal effort.   Breast/Chest wall: Treated area with erythema and patches of moist desquamation in the IM fold and inferior breast skin changes, no regional lymphadenopathy.  No s/s infection  Musculoskeletal: No gross MSK deformities. Ambulates   Skin: No rashes appreciated.   Psych: Alert and oriented with appropriate mood and affect.  Neuro:   Grossly normal.      Assessment:  Ongoing acute radiation therapy-related changes w/patches of moist desquamation  No evidence of disease persistence or recurrence in treated region  ECOG: (1) Restricted in physically strenuous activity, ambulatory and able to do work of light nature    Plan:  Continue skincare w/gentle soaps, radiagel sheets, (no silvadene 2/2 allergy)  RTC 1 week for skin check  Follow up with other providers as directed

## 2023-04-21 NOTE — PROGRESS NOTES
Oncology Nutrition   Chemotherapy Infusion Visit    Nutrition Follow Up   Met pt at chairside to f/u on diarrhea/dehydration management. She states it is much more improved - only taking Imodium 1x/week now. She has found what foods are exacerbating diarrhea and avoids those. Usually foods high in sugar are the worst. She enjoys Outshine popsicles which are low in sugar. Her appetite is good and weight is stable. No other questions/concerns.     Wt Readings from Last 10 Encounters:   04/21/23 131.6 kg (290 lb 2 oz)   04/21/23 131.6 kg (290 lb 2 oz)   03/31/23 135.7 kg (299 lb 2.6 oz)   03/31/23 135.7 kg (299 lb 2.6 oz)   03/10/23 131 kg (288 lb 12.8 oz)   03/10/23 131 kg (288 lb 12.8 oz)   03/07/23 131.1 kg (289 lb 0.4 oz)   02/20/23 134.3 kg (296 lb)   02/17/23 134.4 kg (296 lb 4.8 oz)   02/17/23 134.4 kg (296 lb 4.8 oz)       Will continue to monitor prn throughout treatment.     Josefina Leigh, THERESAN, LDN, [facility]  04/21/2023  10:24 AM

## 2023-04-21 NOTE — PLAN OF CARE
Problem: Adult Inpatient Plan of Care  Goal: Plan of Care Review  Outcome: Ongoing, Progressing  Flowsheets (Taken 4/21/2023 0959)  Plan of Care Reviewed With: patient  Goal: Patient-Specific Goal (Individualized)  Outcome: Ongoing, Progressing  Flowsheets (Taken 4/21/2023 0959)  Anxieties, Fears or Concerns: None  Individualized Care Needs: Recliner, warm blanket, dimmed lights, conversation     Problem: Fatigue  Goal: Improved Activity Tolerance  Outcome: Ongoing, Progressing  Intervention: Promote Improved Energy  Flowsheets (Taken 4/21/2023 0959)  Fatigue Management:   frequent rest breaks encouraged   paced activity encouraged  Sleep/Rest Enhancement: regular sleep/rest pattern promoted  Activity Management: Ambulated -L4    Patient to Infusion for Perjeta, Ogivri and Magnesium following appointment with the provider. Treatment plan reviewed with patient. VSS. Tolerated treatment. Provided with copy of upcoming appointment schedule. Escorted to the front lobby for discharge to home.

## 2023-04-27 ENCOUNTER — OFFICE VISIT (OUTPATIENT)
Dept: RADIATION ONCOLOGY | Facility: CLINIC | Age: 58
End: 2023-04-27
Payer: COMMERCIAL

## 2023-04-27 VITALS
DIASTOLIC BLOOD PRESSURE: 88 MMHG | OXYGEN SATURATION: 98 % | RESPIRATION RATE: 18 BRPM | BODY MASS INDEX: 49.57 KG/M2 | WEIGHT: 288.81 LBS | HEART RATE: 80 BPM | SYSTOLIC BLOOD PRESSURE: 128 MMHG | TEMPERATURE: 98 F

## 2023-04-27 DIAGNOSIS — L58.0 ACUTE RADIATION DERMATITIS: ICD-10-CM

## 2023-04-27 DIAGNOSIS — C50.912 INVASIVE DUCTAL CARCINOMA OF BREAST, FEMALE, LEFT: Primary | ICD-10-CM

## 2023-04-27 PROCEDURE — 3079F PR MOST RECENT DIASTOLIC BLOOD PRESSURE 80-89 MM HG: ICD-10-PCS | Mod: CPTII,S$GLB,, | Performed by: RADIOLOGY

## 2023-04-27 PROCEDURE — 3079F DIAST BP 80-89 MM HG: CPT | Mod: CPTII,S$GLB,, | Performed by: RADIOLOGY

## 2023-04-27 PROCEDURE — 3008F BODY MASS INDEX DOCD: CPT | Mod: CPTII,S$GLB,, | Performed by: RADIOLOGY

## 2023-04-27 PROCEDURE — 3008F PR BODY MASS INDEX (BMI) DOCUMENTED: ICD-10-PCS | Mod: CPTII,S$GLB,, | Performed by: RADIOLOGY

## 2023-04-27 PROCEDURE — 99999 PR PBB SHADOW E&M-EST. PATIENT-LVL III: ICD-10-PCS | Mod: PBBFAC,,, | Performed by: RADIOLOGY

## 2023-04-27 PROCEDURE — 99499 UNLISTED E&M SERVICE: CPT | Mod: S$GLB,,, | Performed by: RADIOLOGY

## 2023-04-27 PROCEDURE — 99499 NO LOS: ICD-10-PCS | Mod: S$GLB,,, | Performed by: RADIOLOGY

## 2023-04-27 PROCEDURE — 3074F SYST BP LT 130 MM HG: CPT | Mod: CPTII,S$GLB,, | Performed by: RADIOLOGY

## 2023-04-27 PROCEDURE — 3044F PR MOST RECENT HEMOGLOBIN A1C LEVEL <7.0%: ICD-10-PCS | Mod: CPTII,S$GLB,, | Performed by: RADIOLOGY

## 2023-04-27 PROCEDURE — 3044F HG A1C LEVEL LT 7.0%: CPT | Mod: CPTII,S$GLB,, | Performed by: RADIOLOGY

## 2023-04-27 PROCEDURE — 99999 PR PBB SHADOW E&M-EST. PATIENT-LVL III: CPT | Mod: PBBFAC,,, | Performed by: RADIOLOGY

## 2023-04-27 PROCEDURE — 3074F PR MOST RECENT SYSTOLIC BLOOD PRESSURE < 130 MM HG: ICD-10-PCS | Mod: CPTII,S$GLB,, | Performed by: RADIOLOGY

## 2023-04-27 NOTE — PROGRESS NOTES
Formerly [facility] Department of Radiation Oncology  Follow Up Visit Note    Diagnosis:  Josefina Coon is a 58 y.o. female with a(n)  BMI 52 and diagnosis of Stage IA  (cT1c, N0, M0, Grade 3, ER+/WA+/HER2+) Invasive ductal carcinoma of the LEFT breast. She completed neoadjuvant chemotherapy with THP and lumpectomy/sentinel lymph node biopsy 2/8/23 with pCR. On 4/14/23 she completed a course of adjuvant radiation therapy.     Oncologic History:  Oncology History   Invasive ductal carcinoma of breast, female, left   9/23/2022 Initial Diagnosis    Invasive ductal carcinoma of breast, female, left     9/23/2022 Cancer Staged    Staging form: Breast, AJCC 8th Edition  - Clinical stage from 9/23/2022: Stage IA (cT1c, cN0(f), cM0, G3, ER+, WA+, HER2+)     9/29/2022 - 9/29/2022 Chemotherapy    Treatment Summary   Plan Name: OP BREAST TRASTUZUMAB PACLITAXEL WEEKLY  Treatment Goal: Curative  Status: Inactive  Start Date:   End Date:   Provider: Lisa Jaquez MD  Chemotherapy: PACLitaxeL (TAXOL) 80 mg/m2 = 204 mg in sodium chloride 0.9% 250 mL chemo infusion, 80 mg/m2 = 204 mg, Intravenous, Clinic/HOD 1 time, 0 of 12 cycles  pertuzumab (PERJETA) 840 mg in sodium chloride 0.9% 278 mL infusion, 840 mg (original dose ), Intravenous, Clinic/HOD 1 time, 0 of 17 cycles  Dose modification: 840 mg (Cycle 1, Reason: Other (see comments)), 420 mg (Cycle 4, Reason: Other (see comments))  trastuzumab-dkst (OGIVRI) 591 mg in sodium chloride 0.9% 250 mL chemo infusion, 4 mg/kg = 591 mg, Intravenous, Clinic/HOD 1 time, 0 of 25 cycles     10/7/2022 -  Chemotherapy    Treatment Summary   Plan Name: OP BREAST PACLITAXEL TRASTUZUMAB WEEKLY WITH PERTUZUMAB Q3W (THP)  Treatment Goal: Control  Status: Active  Start Date: 10/7/2022  End Date: 10/6/2023 (Planned)  Provider: Lisa Jaquez MD  Chemotherapy: PACLitaxeL (TAXOL) 80 mg/m2 = 210 mg in sodium chloride 0.9% 250 mL chemo infusion, 80 mg/m2 = 210 mg, Intravenous, Clinic/HOD  1 time, 4 of 4 cycles  Administration: 210 mg (10/7/2022), 204 mg (10/14/2022), 204 mg (10/28/2022), 204 mg (11/4/2022), 204 mg (10/21/2022), 204 mg (11/11/2022), 204 mg (11/18/2022), 204 mg (11/25/2022), 198 mg (12/9/2022), 204 mg (12/2/2022), 198 mg (12/16/2022), 198 mg (12/23/2022)  pertuzumab (PERJETA) 840 mg in sodium chloride 0.9% 278 mL infusion, 840 mg, Intravenous, Clinic/HOD 1 time, 10 of 18 cycles  Administration: 840 mg (10/7/2022), 420 mg (10/28/2022), 420 mg (11/18/2022), 420 mg (12/9/2022), 420 mg (12/30/2022), 420 mg (1/20/2023), 420 mg (2/17/2023), 420 mg (3/10/2023), 420 mg (3/31/2023), 420 mg (4/21/2023)  trastuzumab-dkst (OGIVRI) 570 mg in sodium chloride 0.9% 250 mL chemo infusion, 593 mg (100 % of original dose 4 mg/kg), Intravenous, Clinic/HOD 1 time, 10 of 18 cycles  Dose modification: 4 mg/kg (original dose 4 mg/kg, Cycle 1, Reason: Other (see comments), Comment: weekly trastuzumab), 2 mg/kg (original dose 2 mg/kg, Cycle 2, Reason: Other (see comments), Comment: weekly maintenance dose), 6 mg/kg (original dose 2 mg/kg, Cycle 2, Reason: MD Discretion, Comment: change to q3w dosing), 2 mg/kg (original dose 2 mg/kg, Cycle 1, Reason: Other (see comments), Comment: maintenance weekly dose)  Administration: 570 mg (10/7/2022), 840 mg (10/28/2022), 288 mg (10/14/2022), 290 mg (10/21/2022), 840 mg (11/18/2022), 831 mg (12/9/2022), 831 mg (12/30/2022), 720 mg (1/20/2023), 806 mg (2/17/2023), 750 mg (3/10/2023), 814 mg (3/31/2023), 750 mg (4/21/2023)     2/13/2023 Cancer Staged    Staging form: Breast, AJCC 8th Edition  - Pathologic stage from 2/13/2023: No Stage Recommended (ypT0, pN0(sn), cM0, GX)          Interval History  The patient presents today for a regularly scheduled follow up visit.  She was last seen in our clinic on 4/21/23 at completion of therapy.   At that time, she had some patchy moist desquamation in the IM fold and inferior/posterior breast.  Today, the area is less painful,  though larger.  Using radiagel sheets      Review of Systems   Review of Systems   Constitutional:  Positive for malaise/fatigue. Negative for chills and fever.   Respiratory:  Negative for cough and shortness of breath.    Cardiovascular:  Positive for chest pain (LL breast).   Skin:  Positive for rash.     Social History:  Social History     Tobacco Use    Smoking status: Never    Smokeless tobacco: Never   Substance Use Topics    Alcohol use: Not Currently     Comment: rare     Drug use: Never       Family History:  Cancer-related family history includes Cancer in her brother.    Exam:  Vitals:    04/27/23 1132   BP: 128/88   BP Location: Right arm   Patient Position: Sitting   BP Method: Medium (Automatic)   Pulse: 80   Resp: 18   Temp: 97.9 °F (36.6 °C)   TempSrc: Temporal   SpO2: 98%   Weight: 131 kg (288 lb 12.8 oz)     Constitutional: Pleasant 58 y.o. female in no acute distress.  Well nourished. Well groomed.   HEENT: Normocephalic and atraumatic   Lungs: No audible wheezing.  Normal effort.   Breast/Chest wall: Treated area with erythema and patches of moist desquamation in the IM fold and inferior breast skin changes, all with active re-epithelialization.  No s/s infection  Musculoskeletal: No gross MSK deformities. Ambulates   Skin: No rashes appreciated.   Psych: Alert and oriented with appropriate mood and affect.  Neuro:   Grossly normal.      Assessment:  Ongoing acute radiation therapy-related changes w/healing patches of moist desquamation all with re-epithelialization onging  No evidence of disease persistence or recurrence in treated region  ECOG: (1) Restricted in physically strenuous activity, ambulatory and able to do work of light nature    Plan:  Continue skincare w/gentle soaps, radiagel sheets, (no silvadene 2/2 allergy)  RTC 1 month  Mammogram at 6 months  Follow up with other providers as directed

## 2023-05-01 ENCOUNTER — CLINICAL SUPPORT (OUTPATIENT)
Dept: CARDIOLOGY | Facility: HOSPITAL | Age: 58
End: 2023-05-01
Attending: STUDENT IN AN ORGANIZED HEALTH CARE EDUCATION/TRAINING PROGRAM
Payer: COMMERCIAL

## 2023-05-01 VITALS
SYSTOLIC BLOOD PRESSURE: 128 MMHG | BODY MASS INDEX: 49.17 KG/M2 | DIASTOLIC BLOOD PRESSURE: 88 MMHG | HEIGHT: 64 IN | WEIGHT: 288 LBS

## 2023-05-01 DIAGNOSIS — I42.7 CHEMOTHERAPY INDUCED CARDIOMYOPATHY: ICD-10-CM

## 2023-05-01 DIAGNOSIS — T45.1X5A CHEMOTHERAPY INDUCED CARDIOMYOPATHY: ICD-10-CM

## 2023-05-01 DIAGNOSIS — C50.912 INVASIVE DUCTAL CARCINOMA OF BREAST, FEMALE, LEFT: ICD-10-CM

## 2023-05-01 PROCEDURE — 93306 TTE W/DOPPLER COMPLETE: CPT | Mod: 26,,, | Performed by: INTERNAL MEDICINE

## 2023-05-01 PROCEDURE — 93306 ECHO (CUPID ONLY): ICD-10-PCS | Mod: 26,,, | Performed by: INTERNAL MEDICINE

## 2023-05-01 PROCEDURE — 93356 MYOCRD STRAIN IMG SPCKL TRCK: CPT | Mod: PO

## 2023-05-01 PROCEDURE — 93356 ECHO (CUPID ONLY): ICD-10-PCS | Mod: ,,, | Performed by: INTERNAL MEDICINE

## 2023-05-01 PROCEDURE — 93356 MYOCRD STRAIN IMG SPCKL TRCK: CPT | Mod: ,,, | Performed by: INTERNAL MEDICINE

## 2023-05-02 LAB
ASCENDING AORTA: 3.59 CM
AV INDEX (PROSTH): 0.57
AV MEAN GRADIENT: 8 MMHG
AV PEAK GRADIENT: 15 MMHG
AV VALVE AREA: 2.07 CM2
AV VELOCITY RATIO: 0.54
BSA FOR ECHO PROCEDURE: 2.43 M2
CV ECHO LV RWT: 0.33 CM
DOP CALC AO PEAK VEL: 1.94 M/S
DOP CALC AO VTI: 39 CM
DOP CALC LVOT AREA: 3.6 CM2
DOP CALC LVOT DIAMETER: 2.15 CM
DOP CALC LVOT PEAK VEL: 1.04 M/S
DOP CALC LVOT STROKE VOLUME: 80.56 CM3
DOP CALCLVOT PEAK VEL VTI: 22.2 CM
E WAVE DECELERATION TIME: 209.18 MSEC
E/A RATIO: 0.9
E/E' RATIO: 11.06 M/S
ECHO LV POSTERIOR WALL: 0.92 CM (ref 0.6–1.1)
EJECTION FRACTION: 60 %
FRACTIONAL SHORTENING: 29 % (ref 28–44)
INTERVENTRICULAR SEPTUM: 0.95 CM (ref 0.6–1.1)
IVRT: 119.89 MSEC
LA MAJOR: 6.51 CM
LA MINOR: 6.51 CM
LA WIDTH: 4.7 CM
LEFT ATRIUM SIZE: 4.4 CM
LEFT ATRIUM VOLUME INDEX: 50.2 ML/M2
LEFT ATRIUM VOLUME: 114.43 CM3
LEFT INTERNAL DIMENSION IN SYSTOLE: 3.96 CM (ref 2.1–4)
LEFT VENTRICLE DIASTOLIC VOLUME INDEX: 66.94 ML/M2
LEFT VENTRICLE DIASTOLIC VOLUME: 152.62 ML
LEFT VENTRICLE MASS INDEX: 88 G/M2
LEFT VENTRICLE SYSTOLIC VOLUME INDEX: 30 ML/M2
LEFT VENTRICLE SYSTOLIC VOLUME: 68.39 ML
LEFT VENTRICULAR INTERNAL DIMENSION IN DIASTOLE: 5.58 CM (ref 3.5–6)
LEFT VENTRICULAR MASS: 200.07 G
LV LATERAL E/E' RATIO: 9.4 M/S
LV SEPTAL E/E' RATIO: 13.43 M/S
LVOT MG: 2.33 MMHG
LVOT MV: 0.73 CM/S
MV PEAK A VEL: 1.05 M/S
MV PEAK E VEL: 0.94 M/S
MV STENOSIS PRESSURE HALF TIME: 60.66 MS
MV VALVE AREA P 1/2 METHOD: 3.63 CM2
PISA MRMAX VEL: 5.09 M/S
PISA TR MAX VEL: 2.74 M/S
PULM VEIN S/D RATIO: 1.14
PV PEAK D VEL: 0.42 M/S
PV PEAK S VEL: 0.48 M/S
RA MAJOR: 6.1 CM
RA PRESSURE: 3 MMHG
RA WIDTH: 3.6 CM
RIGHT VENTRICULAR END-DIASTOLIC DIMENSION: 4.63 CM
RIGHT VENTRICULAR LENGTH IN DIASTOLE (APICAL 4-CHAMBER VIEW): 7.52 CM
RV MID DIAMA: 2.92 CM
RV TISSUE DOPPLER FREE WALL SYSTOLIC VELOCITY 1 (APICAL 4 CHAMBER VIEW): 0.02 CM/S
SINUS: 2.47 CM
STJ: 3.5 CM
TDI LATERAL: 0.1 M/S
TDI SEPTAL: 0.07 M/S
TDI: 0.09 M/S
TR MAX PG: 30 MMHG
TRICUSPID ANNULAR PLANE SYSTOLIC EXCURSION: 2.31 CM
TV REST PULMONARY ARTERY PRESSURE: 33 MMHG

## 2023-05-08 ENCOUNTER — PATIENT MESSAGE (OUTPATIENT)
Dept: HEMATOLOGY/ONCOLOGY | Facility: CLINIC | Age: 58
End: 2023-05-08
Payer: COMMERCIAL

## 2023-05-10 ENCOUNTER — LAB VISIT (OUTPATIENT)
Dept: LAB | Facility: HOSPITAL | Age: 58
End: 2023-05-10
Attending: STUDENT IN AN ORGANIZED HEALTH CARE EDUCATION/TRAINING PROGRAM
Payer: COMMERCIAL

## 2023-05-10 DIAGNOSIS — I42.7 CHEMOTHERAPY INDUCED CARDIOMYOPATHY: ICD-10-CM

## 2023-05-10 DIAGNOSIS — C50.912 INVASIVE DUCTAL CARCINOMA OF BREAST, FEMALE, LEFT: ICD-10-CM

## 2023-05-10 DIAGNOSIS — T45.1X5A CHEMOTHERAPY INDUCED CARDIOMYOPATHY: ICD-10-CM

## 2023-05-10 DIAGNOSIS — Z79.4 TYPE 2 DIABETES MELLITUS WITH CHRONIC KIDNEY DISEASE, WITH LONG-TERM CURRENT USE OF INSULIN, UNSPECIFIED CKD STAGE: ICD-10-CM

## 2023-05-10 DIAGNOSIS — L27.0 DRUG-INDUCED SKIN RASH: ICD-10-CM

## 2023-05-10 DIAGNOSIS — E11.22 TYPE 2 DIABETES MELLITUS WITH CHRONIC KIDNEY DISEASE, WITH LONG-TERM CURRENT USE OF INSULIN, UNSPECIFIED CKD STAGE: ICD-10-CM

## 2023-05-10 LAB
25(OH)D3+25(OH)D2 SERPL-MCNC: 24 NG/ML (ref 30–96)
ALBUMIN SERPL BCP-MCNC: 3.3 G/DL (ref 3.5–5.2)
ALP SERPL-CCNC: 114 U/L (ref 55–135)
ALT SERPL W/O P-5'-P-CCNC: 9 U/L (ref 10–44)
ANION GAP SERPL CALC-SCNC: 10 MMOL/L (ref 8–16)
AST SERPL-CCNC: 8 U/L (ref 10–40)
BASOPHILS # BLD AUTO: 0.03 K/UL (ref 0–0.2)
BASOPHILS NFR BLD: 0.4 % (ref 0–1.9)
BILIRUB SERPL-MCNC: 0.7 MG/DL (ref 0.1–1)
BUN SERPL-MCNC: 17 MG/DL (ref 6–20)
CALCIUM SERPL-MCNC: 9.3 MG/DL (ref 8.7–10.5)
CHLORIDE SERPL-SCNC: 103 MMOL/L (ref 95–110)
CO2 SERPL-SCNC: 25 MMOL/L (ref 23–29)
CREAT SERPL-MCNC: 1.5 MG/DL (ref 0.5–1.4)
DIFFERENTIAL METHOD: ABNORMAL
EOSINOPHIL # BLD AUTO: 1.5 K/UL (ref 0–0.5)
EOSINOPHIL NFR BLD: 17.7 % (ref 0–8)
ERYTHROCYTE [DISTWIDTH] IN BLOOD BY AUTOMATED COUNT: 12.2 % (ref 11.5–14.5)
EST. GFR  (NO RACE VARIABLE): 39.6 ML/MIN/1.73 M^2
FERRITIN SERPL-MCNC: 78 NG/ML (ref 20–300)
FOLATE SERPL-MCNC: 5.4 NG/ML (ref 4–24)
GLUCOSE SERPL-MCNC: 127 MG/DL (ref 70–110)
HCT VFR BLD AUTO: 38.8 % (ref 37–48.5)
HGB BLD-MCNC: 12.8 G/DL (ref 12–16)
IMM GRANULOCYTES # BLD AUTO: 0.05 K/UL (ref 0–0.04)
IMM GRANULOCYTES NFR BLD AUTO: 0.6 % (ref 0–0.5)
IRON SERPL-MCNC: 98 UG/DL (ref 30–160)
LYMPHOCYTES # BLD AUTO: 1.7 K/UL (ref 1–4.8)
LYMPHOCYTES NFR BLD: 19.7 % (ref 18–48)
MAGNESIUM SERPL-MCNC: 1.3 MG/DL (ref 1.6–2.6)
MCH RBC QN AUTO: 30.3 PG (ref 27–31)
MCHC RBC AUTO-ENTMCNC: 33 G/DL (ref 32–36)
MCV RBC AUTO: 92 FL (ref 82–98)
MONOCYTES # BLD AUTO: 0.3 K/UL (ref 0.3–1)
MONOCYTES NFR BLD: 3.4 % (ref 4–15)
NEUTROPHILS # BLD AUTO: 5 K/UL (ref 1.8–7.7)
NEUTROPHILS NFR BLD: 58.2 % (ref 38–73)
NRBC BLD-RTO: 0 /100 WBC
PLATELET # BLD AUTO: 230 K/UL (ref 150–450)
PMV BLD AUTO: 9.5 FL (ref 9.2–12.9)
POTASSIUM SERPL-SCNC: 3.3 MMOL/L (ref 3.5–5.1)
PROT SERPL-MCNC: 7.1 G/DL (ref 6–8.4)
RBC # BLD AUTO: 4.22 M/UL (ref 4–5.4)
SATURATED IRON: 25 % (ref 20–50)
SODIUM SERPL-SCNC: 138 MMOL/L (ref 136–145)
TOTAL IRON BINDING CAPACITY: 395 UG/DL (ref 250–450)
TRANSFERRIN SERPL-MCNC: 267 MG/DL (ref 200–375)
WBC # BLD AUTO: 8.57 K/UL (ref 3.9–12.7)

## 2023-05-10 PROCEDURE — 36415 COLL VENOUS BLD VENIPUNCTURE: CPT | Mod: PN | Performed by: STUDENT IN AN ORGANIZED HEALTH CARE EDUCATION/TRAINING PROGRAM

## 2023-05-10 PROCEDURE — 84630 ASSAY OF ZINC: CPT | Performed by: STUDENT IN AN ORGANIZED HEALTH CARE EDUCATION/TRAINING PROGRAM

## 2023-05-10 PROCEDURE — 82306 VITAMIN D 25 HYDROXY: CPT | Performed by: STUDENT IN AN ORGANIZED HEALTH CARE EDUCATION/TRAINING PROGRAM

## 2023-05-10 PROCEDURE — 83735 ASSAY OF MAGNESIUM: CPT | Mod: PN | Performed by: STUDENT IN AN ORGANIZED HEALTH CARE EDUCATION/TRAINING PROGRAM

## 2023-05-10 PROCEDURE — 84466 ASSAY OF TRANSFERRIN: CPT | Performed by: STUDENT IN AN ORGANIZED HEALTH CARE EDUCATION/TRAINING PROGRAM

## 2023-05-10 PROCEDURE — 82746 ASSAY OF FOLIC ACID SERUM: CPT | Performed by: STUDENT IN AN ORGANIZED HEALTH CARE EDUCATION/TRAINING PROGRAM

## 2023-05-10 PROCEDURE — 82728 ASSAY OF FERRITIN: CPT | Performed by: STUDENT IN AN ORGANIZED HEALTH CARE EDUCATION/TRAINING PROGRAM

## 2023-05-10 PROCEDURE — 80053 COMPREHEN METABOLIC PANEL: CPT | Mod: PN | Performed by: STUDENT IN AN ORGANIZED HEALTH CARE EDUCATION/TRAINING PROGRAM

## 2023-05-10 PROCEDURE — 85025 COMPLETE CBC W/AUTO DIFF WBC: CPT | Mod: PN | Performed by: STUDENT IN AN ORGANIZED HEALTH CARE EDUCATION/TRAINING PROGRAM

## 2023-05-12 ENCOUNTER — OFFICE VISIT (OUTPATIENT)
Dept: HEMATOLOGY/ONCOLOGY | Facility: CLINIC | Age: 58
End: 2023-05-12
Payer: COMMERCIAL

## 2023-05-12 ENCOUNTER — INFUSION (OUTPATIENT)
Dept: INFUSION THERAPY | Facility: HOSPITAL | Age: 58
End: 2023-05-12
Attending: STUDENT IN AN ORGANIZED HEALTH CARE EDUCATION/TRAINING PROGRAM
Payer: COMMERCIAL

## 2023-05-12 ENCOUNTER — PATIENT MESSAGE (OUTPATIENT)
Dept: HEMATOLOGY/ONCOLOGY | Facility: CLINIC | Age: 58
End: 2023-05-12

## 2023-05-12 VITALS
TEMPERATURE: 97 F | HEIGHT: 64 IN | DIASTOLIC BLOOD PRESSURE: 82 MMHG | OXYGEN SATURATION: 99 % | SYSTOLIC BLOOD PRESSURE: 118 MMHG | HEART RATE: 83 BPM | WEIGHT: 290.81 LBS | RESPIRATION RATE: 16 BRPM | BODY MASS INDEX: 49.65 KG/M2

## 2023-05-12 VITALS
BODY MASS INDEX: 49.65 KG/M2 | SYSTOLIC BLOOD PRESSURE: 130 MMHG | OXYGEN SATURATION: 99 % | HEIGHT: 64 IN | WEIGHT: 290.81 LBS | HEART RATE: 73 BPM | TEMPERATURE: 97 F | RESPIRATION RATE: 16 BRPM | DIASTOLIC BLOOD PRESSURE: 90 MMHG

## 2023-05-12 DIAGNOSIS — E11.22 TYPE 2 DIABETES MELLITUS WITH CHRONIC KIDNEY DISEASE, WITH LONG-TERM CURRENT USE OF INSULIN, UNSPECIFIED CKD STAGE: ICD-10-CM

## 2023-05-12 DIAGNOSIS — Z79.899 IMMUNODEFICIENCY DUE TO DRUGS: ICD-10-CM

## 2023-05-12 DIAGNOSIS — C50.912 INVASIVE DUCTAL CARCINOMA OF BREAST, FEMALE, LEFT: Primary | ICD-10-CM

## 2023-05-12 DIAGNOSIS — L27.0 DRUG-INDUCED SKIN RASH: ICD-10-CM

## 2023-05-12 DIAGNOSIS — T45.1X5A CHEMOTHERAPY INDUCED CARDIOMYOPATHY: ICD-10-CM

## 2023-05-12 DIAGNOSIS — Z79.4 TYPE 2 DIABETES MELLITUS WITH CHRONIC KIDNEY DISEASE, WITH LONG-TERM CURRENT USE OF INSULIN, UNSPECIFIED CKD STAGE: ICD-10-CM

## 2023-05-12 DIAGNOSIS — I74.9 EMBOLISM AND THROMBOSIS: ICD-10-CM

## 2023-05-12 DIAGNOSIS — D84.821 IMMUNODEFICIENCY DUE TO DRUGS: ICD-10-CM

## 2023-05-12 DIAGNOSIS — I42.7 CHEMOTHERAPY INDUCED CARDIOMYOPATHY: ICD-10-CM

## 2023-05-12 PROCEDURE — 3074F SYST BP LT 130 MM HG: CPT | Mod: CPTII,S$GLB,, | Performed by: STUDENT IN AN ORGANIZED HEALTH CARE EDUCATION/TRAINING PROGRAM

## 2023-05-12 PROCEDURE — 3079F DIAST BP 80-89 MM HG: CPT | Mod: CPTII,S$GLB,, | Performed by: STUDENT IN AN ORGANIZED HEALTH CARE EDUCATION/TRAINING PROGRAM

## 2023-05-12 PROCEDURE — 1159F MED LIST DOCD IN RCRD: CPT | Mod: CPTII,S$GLB,, | Performed by: STUDENT IN AN ORGANIZED HEALTH CARE EDUCATION/TRAINING PROGRAM

## 2023-05-12 PROCEDURE — 96361 HYDRATE IV INFUSION ADD-ON: CPT | Mod: PN

## 2023-05-12 PROCEDURE — 3079F PR MOST RECENT DIASTOLIC BLOOD PRESSURE 80-89 MM HG: ICD-10-PCS | Mod: CPTII,S$GLB,, | Performed by: STUDENT IN AN ORGANIZED HEALTH CARE EDUCATION/TRAINING PROGRAM

## 2023-05-12 PROCEDURE — 96413 CHEMO IV INFUSION 1 HR: CPT | Mod: PN

## 2023-05-12 PROCEDURE — 96417 CHEMO IV INFUS EACH ADDL SEQ: CPT | Mod: PN

## 2023-05-12 PROCEDURE — 63600175 PHARM REV CODE 636 W HCPCS: Mod: PN | Performed by: STUDENT IN AN ORGANIZED HEALTH CARE EDUCATION/TRAINING PROGRAM

## 2023-05-12 PROCEDURE — 1160F PR REVIEW ALL MEDS BY PRESCRIBER/CLIN PHARMACIST DOCUMENTED: ICD-10-PCS | Mod: CPTII,S$GLB,, | Performed by: STUDENT IN AN ORGANIZED HEALTH CARE EDUCATION/TRAINING PROGRAM

## 2023-05-12 PROCEDURE — 25000003 PHARM REV CODE 250: Mod: PN | Performed by: STUDENT IN AN ORGANIZED HEALTH CARE EDUCATION/TRAINING PROGRAM

## 2023-05-12 PROCEDURE — 99999 PR PBB SHADOW E&M-EST. PATIENT-LVL V: ICD-10-PCS | Mod: PBBFAC,,, | Performed by: STUDENT IN AN ORGANIZED HEALTH CARE EDUCATION/TRAINING PROGRAM

## 2023-05-12 PROCEDURE — 1160F RVW MEDS BY RX/DR IN RCRD: CPT | Mod: CPTII,S$GLB,, | Performed by: STUDENT IN AN ORGANIZED HEALTH CARE EDUCATION/TRAINING PROGRAM

## 2023-05-12 PROCEDURE — 3008F BODY MASS INDEX DOCD: CPT | Mod: CPTII,S$GLB,, | Performed by: STUDENT IN AN ORGANIZED HEALTH CARE EDUCATION/TRAINING PROGRAM

## 2023-05-12 PROCEDURE — 3008F PR BODY MASS INDEX (BMI) DOCUMENTED: ICD-10-PCS | Mod: CPTII,S$GLB,, | Performed by: STUDENT IN AN ORGANIZED HEALTH CARE EDUCATION/TRAINING PROGRAM

## 2023-05-12 PROCEDURE — 1159F PR MEDICATION LIST DOCUMENTED IN MEDICAL RECORD: ICD-10-PCS | Mod: CPTII,S$GLB,, | Performed by: STUDENT IN AN ORGANIZED HEALTH CARE EDUCATION/TRAINING PROGRAM

## 2023-05-12 PROCEDURE — 3044F PR MOST RECENT HEMOGLOBIN A1C LEVEL <7.0%: ICD-10-PCS | Mod: CPTII,S$GLB,, | Performed by: STUDENT IN AN ORGANIZED HEALTH CARE EDUCATION/TRAINING PROGRAM

## 2023-05-12 PROCEDURE — 99999 PR PBB SHADOW E&M-EST. PATIENT-LVL V: CPT | Mod: PBBFAC,,, | Performed by: STUDENT IN AN ORGANIZED HEALTH CARE EDUCATION/TRAINING PROGRAM

## 2023-05-12 PROCEDURE — 3074F PR MOST RECENT SYSTOLIC BLOOD PRESSURE < 130 MM HG: ICD-10-PCS | Mod: CPTII,S$GLB,, | Performed by: STUDENT IN AN ORGANIZED HEALTH CARE EDUCATION/TRAINING PROGRAM

## 2023-05-12 PROCEDURE — 99215 OFFICE O/P EST HI 40 MIN: CPT | Mod: S$GLB,,, | Performed by: STUDENT IN AN ORGANIZED HEALTH CARE EDUCATION/TRAINING PROGRAM

## 2023-05-12 PROCEDURE — 99215 PR OFFICE/OUTPT VISIT, EST, LEVL V, 40-54 MIN: ICD-10-PCS | Mod: S$GLB,,, | Performed by: STUDENT IN AN ORGANIZED HEALTH CARE EDUCATION/TRAINING PROGRAM

## 2023-05-12 PROCEDURE — 96367 TX/PROPH/DG ADDL SEQ IV INF: CPT | Mod: PN

## 2023-05-12 PROCEDURE — 3044F HG A1C LEVEL LT 7.0%: CPT | Mod: CPTII,S$GLB,, | Performed by: STUDENT IN AN ORGANIZED HEALTH CARE EDUCATION/TRAINING PROGRAM

## 2023-05-12 RX ORDER — EPINEPHRINE 0.3 MG/.3ML
0.3 INJECTION SUBCUTANEOUS ONCE AS NEEDED
Status: CANCELLED | OUTPATIENT
Start: 2023-05-12

## 2023-05-12 RX ORDER — HEPARIN 100 UNIT/ML
500 SYRINGE INTRAVENOUS
Status: CANCELLED | OUTPATIENT
Start: 2023-05-12

## 2023-05-12 RX ORDER — DIPHENHYDRAMINE HYDROCHLORIDE 50 MG/ML
50 INJECTION INTRAMUSCULAR; INTRAVENOUS ONCE AS NEEDED
Status: CANCELLED | OUTPATIENT
Start: 2023-05-12

## 2023-05-12 RX ORDER — MAGNESIUM SULFATE HEPTAHYDRATE 40 MG/ML
2 INJECTION, SOLUTION INTRAVENOUS ONCE
Status: COMPLETED | OUTPATIENT
Start: 2023-05-12 | End: 2023-05-12

## 2023-05-12 RX ORDER — POTASSIUM CHLORIDE 750 MG/1
10 TABLET, EXTENDED RELEASE ORAL DAILY
Qty: 5 TABLET | Refills: 0 | Status: SHIPPED | OUTPATIENT
Start: 2023-05-12 | End: 2023-05-17

## 2023-05-12 RX ORDER — SODIUM CHLORIDE 0.9 % (FLUSH) 0.9 %
10 SYRINGE (ML) INJECTION
Status: CANCELLED | OUTPATIENT
Start: 2023-05-12

## 2023-05-12 RX ORDER — SODIUM CHLORIDE 0.9 % (FLUSH) 0.9 %
10 SYRINGE (ML) INJECTION
Status: DISCONTINUED | OUTPATIENT
Start: 2023-05-12 | End: 2023-05-12 | Stop reason: HOSPADM

## 2023-05-12 RX ORDER — HEPARIN 100 UNIT/ML
500 SYRINGE INTRAVENOUS
Status: DISCONTINUED | OUTPATIENT
Start: 2023-05-12 | End: 2023-05-12 | Stop reason: HOSPADM

## 2023-05-12 RX ORDER — ANASTROZOLE 1 MG/1
1 TABLET ORAL DAILY
Qty: 90 TABLET | Refills: 3 | Status: SHIPPED | OUTPATIENT
Start: 2023-05-12 | End: 2024-01-22 | Stop reason: SDUPTHER

## 2023-05-12 RX ORDER — EPINEPHRINE 0.3 MG/.3ML
0.3 INJECTION SUBCUTANEOUS ONCE AS NEEDED
Status: DISCONTINUED | OUTPATIENT
Start: 2023-05-12 | End: 2023-05-12 | Stop reason: HOSPADM

## 2023-05-12 RX ORDER — DIPHENHYDRAMINE HYDROCHLORIDE 50 MG/ML
50 INJECTION INTRAMUSCULAR; INTRAVENOUS ONCE AS NEEDED
Status: DISCONTINUED | OUTPATIENT
Start: 2023-05-12 | End: 2023-05-12 | Stop reason: HOSPADM

## 2023-05-12 RX ADMIN — PERTUZUMAB 420 MG: 30 INJECTION, SOLUTION, CONCENTRATE INTRAVENOUS at 12:05

## 2023-05-12 RX ADMIN — SODIUM CHLORIDE 1000 ML: 9 INJECTION, SOLUTION INTRAVENOUS at 11:05

## 2023-05-12 RX ADMIN — MAGNESIUM SULFATE IN WATER 2 G: 40 INJECTION, SOLUTION INTRAVENOUS at 11:05

## 2023-05-12 RX ADMIN — TRASTUZUMAB 750 MG: 150 INJECTION, POWDER, LYOPHILIZED, FOR SOLUTION INTRAVENOUS at 02:05

## 2023-05-12 NOTE — PLAN OF CARE
Problem: Fatigue  Goal: Improved Activity Tolerance  Outcome: Ongoing, Progressing  Intervention: Promote Improved Energy  Flowsheets (Taken 5/12/2023 1130)  Fatigue Management:   frequent rest breaks encouraged   paced activity encouraged  Sleep/Rest Enhancement: regular sleep/rest pattern promoted  Activity Management: Ambulated -L4     Problem: Adult Inpatient Plan of Care  Goal: Plan of Care Review  Outcome: Ongoing, Progressing  Flowsheets (Taken 5/12/2023 1130)  Plan of Care Reviewed With: patient  Goal: Patient-Specific Goal (Individualized)  Outcome: Ongoing, Progressing  Flowsheets (Taken 5/12/2023 1130)  Anxieties, Fears or Concerns: None  Individualized Care Needs: Recliner, warm blanket, conversation   Patient to Infusion for Perjeta, Ogivri and Magnesium following appointment with Dr. Skelton. Treatment plan reviewed with patient. VSS. Tolerated treatment. Provided with copy of upcoming appointment schedule. Escorted to the front lobby for discharge to home in no distress.

## 2023-05-12 NOTE — PROGRESS NOTES
PATIENT: Josefina Coon  MRN: [id number]  DATE: 5/22/2023      Diagnosis:   1. Invasive ductal carcinoma of breast, female, left    2. Chemotherapy induced cardiomyopathy    3. Type 2 diabetes mellitus with chronic kidney disease, with long-term current use of insulin, unspecified CKD stage    4. Drug-induced skin rash    5. Embolism and thrombosis of unspecified site    6. Immunodeficiency due to drugs        Chief Complaint: Clearance for C11 HP    Subjective:   HPI: Ms. Coon is a 58 y.o. female Ms. Coon is a 58 y.o. female with HTN, HLD, depression, diabetes type 2 with neuropathy, obesity, fatty liver, following up with us for  a diagnosis of invasive ductal carcinoma of the left breast, triple positivity.     She is here today today for consideration of C11D1 of therapy which consists of Trastuzumab/Pertuzumab every 21 days.    Today, tolerating Transtuzumab/pertuzumab.,   - neuropathy 5/10 in feet and finger tips,stable   -  XRT completed 04/13/23  No fevers, chills, abdominal discomfort, N/V, bleeding, no new lumps or bumps, etc.  -Tuesday and wed nausae,vomiting for 2 days, diarrhea   - Discussed her Echo results     Oncologic History:   8/25/22 bilateral screening mammogram,,Right neg ,Left central post mass     9/8/22 left diag MMG, left US limited .Left 0300 lateral posterior 6cm FN 1.4cm mass , left axilla LN 2, up to 4mm cortex     9/14/22 left 0300 lateral posterior 6cm FN 1.4cm mass US biopsy .Grade 3 ,1.1cm in biopsy No LVI , ER 84% SD 28% Her 2 3+ pos Ki 52 %    Left axilla LN biopsy ;Benign fatty tissue, no LN, not concordant No MRI of breasts were done      9/21/22 US bilateral complete Right neg, right LN nml , Left 0300 6cm FN [id number] mass Borderline LN left axilla     9/21/22 Left axilla LN biopsy benign LN , so eventually Stage IA triple positive Breast cancer    10/7/22: started neoadjuvant chemo with Taxol + dual HER-2 (tranztuzumab and pertuzumab)    Receiving treatment with  Paclitaxel/Trastuzumab/Pertuzumab on D1, weekly Paclitaxel on D8, D15 of a 21 day cycle. Completed weekly Paclitaxel on 12/23/22.    2/2023: s/p B/L mastectomy with CPR;ypT0,pN0,cM0    Oncology History   Invasive ductal carcinoma of breast, female, left   9/23/2022 Initial Diagnosis    Invasive ductal carcinoma of breast, female, left       9/23/2022 Cancer Staged    Staging form: Breast, AJCC 8th Edition  - Clinical stage from 9/23/2022: Stage IA (cT1c, cN0(f), cM0, G3, ER+, DE+, HER2+)       9/29/2022 - 9/29/2022 Chemotherapy    Treatment Summary   Plan Name: OP BREAST TRASTUZUMAB PACLITAXEL WEEKLY  Treatment Goal: Curative  Status: Inactive  Start Date:   End Date:   Provider: Lisa Jaquez MD  Chemotherapy: PACLitaxeL (TAXOL) 80 mg/m2 = 204 mg in sodium chloride 0.9% 250 mL chemo infusion, 80 mg/m2 = 204 mg, Intravenous, Clinic/HOD 1 time, 0 of 12 cycles  pertuzumab (PERJETA) 840 mg in sodium chloride 0.9% 278 mL infusion, 840 mg (original dose ), Intravenous, Clinic/HOD 1 time, 0 of 17 cycles  Dose modification: 840 mg (Cycle 1, Reason: Other (see comments)), 420 mg (Cycle 4, Reason: Other (see comments))  trastuzumab-dkst (OGIVRI) 591 mg in sodium chloride 0.9% 250 mL chemo infusion, 4 mg/kg = 591 mg, Intravenous, Clinic/HOD 1 time, 0 of 25 cycles       10/7/2022 -  Chemotherapy    Treatment Summary   Plan Name: OP BREAST PACLITAXEL TRASTUZUMAB WEEKLY WITH PERTUZUMAB Q3W (THP)  Treatment Goal: Control  Status: Active  Start Date: 10/7/2022  End Date: 10/6/2023 (Planned)  Provider: Lisa Jaquez MD  Chemotherapy: PACLitaxeL (TAXOL) 80 mg/m2 = 210 mg in sodium chloride 0.9% 250 mL chemo infusion, 80 mg/m2 = 210 mg, Intravenous, Clinic/HOD 1 time, 4 of 4 cycles  Administration: 210 mg (10/7/2022), 204 mg (10/14/2022), 204 mg (10/28/2022), 204 mg (11/4/2022), 204 mg (10/21/2022), 204 mg (11/11/2022), 204 mg (11/18/2022), 204 mg (11/25/2022), 198 mg (12/9/2022), 204 mg (12/2/2022), 198 mg (12/16/2022), 198 mg  (12/23/2022)  pertuzumab (PERJETA) 840 mg in sodium chloride 0.9% 278 mL infusion, 840 mg, Intravenous, Clinic/HOD 1 time, 11 of 18 cycles  Administration: 840 mg (10/7/2022), 420 mg (10/28/2022), 420 mg (11/18/2022), 420 mg (12/9/2022), 420 mg (12/30/2022), 420 mg (1/20/2023), 420 mg (2/17/2023), 420 mg (3/10/2023), 420 mg (3/31/2023), 420 mg (4/21/2023), 420 mg (5/12/2023)  trastuzumab-dkst (OGIVRI) 570 mg in sodium chloride 0.9% 250 mL chemo infusion, 593 mg (100 % of original dose 4 mg/kg), Intravenous, Clinic/HOD 1 time, 11 of 18 cycles  Dose modification: 4 mg/kg (original dose 4 mg/kg, Cycle 1, Reason: Other (see comments), Comment: weekly trastuzumab), 2 mg/kg (original dose 2 mg/kg, Cycle 2, Reason: Other (see comments), Comment: weekly maintenance dose), 6 mg/kg (original dose 2 mg/kg, Cycle 2, Reason: MD Discretion, Comment: change to q3w dosing), 2 mg/kg (original dose 2 mg/kg, Cycle 1, Reason: Other (see comments), Comment: maintenance weekly dose)  Administration: 570 mg (10/7/2022), 840 mg (10/28/2022), 288 mg (10/14/2022), 290 mg (10/21/2022), 840 mg (11/18/2022), 831 mg (12/9/2022), 831 mg (12/30/2022), 720 mg (1/20/2023), 806 mg (2/17/2023), 750 mg (3/10/2023), 814 mg (3/31/2023), 750 mg (4/21/2023), 750 mg (5/12/2023)       2/13/2023 Cancer Staged    Staging form: Breast, AJCC 8th Edition  - Pathologic stage from 2/13/2023: No Stage Recommended (ypT0, pN0(sn), cM0, GX)       3/14/2023 - 4/14/2023 Radiation Therapy    Treating physician: Shelia Trevino  Total Dose: 52.56 Gy (42.56Gy/16 fractions to whole breast; 10Gy/5 fraction boost)  Fractions: 20  Treatment Site Ref. ID Energy Dose/Fx (Gy) #Fx Dose Correction (Gy) Total Dose (Gy) Start Date End Date Elapsed Days   3D Breast_L NormEZCalc 18X 2.66 16 / 16 0 42.56 3/14/2023 4/6/2023 23   3d BrstBst_L NormBst 18X 2.5 4 / 4 0 10 4/10/2023 4/14/2023 4         Past Medical History:   Past Medical History:   Diagnosis Date    Abnormal liver enzymes      Asymptomatic varicose veins     Breast cancer 2022    left idc    Cancer     left breast cancer    Depressive disorder, not elsewhere classified     Dyslipidemia     Embolism and thrombosis of unspecified site     superficial venous thrombosis    Encounter for long-term (current) use of other medications     Family history of ischemic heart disease     Fatty liver     Generalized anxiety disorder     History of chicken pox     Obstructive sleep apnea (adult) (pediatric)     Type II or unspecified type diabetes mellitus without mention of complication, not stated as uncontrolled     Unspecified essential hypertension     Unspecified venous (peripheral) insufficiency     Unspecified vitamin D deficiency        Past Surgical HIstory:   Past Surgical History:   Procedure Laterality Date    BREAST BIOPSY Left 2022    idc    BREAST BIOPSY Left 2022    neg ln    BREAST BIOPSY Left 2022    neg ln     SECTION      COLONOSCOPY      ESOPHAGOGASTRODUODENOSCOPY      INSERTION OF TUNNELED CENTRAL VENOUS CATHETER (CVC) WITH SUBCUTANEOUS PORT Right 10/04/2022    Procedure: [id number]-M-CATH;  Surgeon: Dominick Ng MD;  Location: [facility] OR;  Service: General;  Laterality: Right;    LUMPECTOMY, WITH RADAR LOCALIZATION USING TRENTON  Left 2023    Procedure: LUMPECTOMY,WITH RADAR LOCALIZATION USING TR[place]ON ;  Surgeon: Maria G Mcduffie MD;  Location: [facility] OR;  Service: General;  Laterality: Left;    SENTINEL LYMPH NODE BIOPSY Left 2023    Procedure: BIOPSY, LYMPH NODE, SENTINEL;  Surgeon: Maria G Mcduffie MD;  Location: [facility] OR;  Service: General;  Laterality: Left;    VEIN SURGERY      Laser, Dr. Larsen       Family History:   Family History   Problem Relation Age of Onset    Hyperlipidemia Mother     Hypertension Mother     Vulvar Cancer Mother     Dementia Mother     Atrial fibrillation Father     Parkinsonism Father     Diabetes Brother     Cancer Brother         colon     Hypertension Son     Hyperlipidemia Son     Anxiety disorder Son     Stroke Maternal Grandmother        Social History:  reports that she has never smoked. She has never used smokeless tobacco. She reports that she does not currently use alcohol. She reports that she does not use drugs.    Allergies:  Review of patient's allergies indicates:   Allergen Reactions    Sitagliptin      Other reaction(s): (januvia) abdominal pain    Sulfa (sulfonamide antibiotics) Hives    Byetta  [exenatide] Rash    Lactose        Medications:  Current Outpatient Medications   Medication Sig Dispense Refill    albuterol (PROVENTIL/VENTOLIN HFA) 90 mcg/actuation inhaler Inhale 2 puffs into the lungs every 6 (six) hours as needed for Wheezing. 18 g 6    ALPRAZolam (XANAX) 0.25 MG tablet TAKE ONE TABLET BY MOUTH 1-2 TIMES DAILY AS NEEDED ANXIETY 15 tablet 0    buPROPion (WELLBUTRIN XL) 150 MG TB24 tablet TAKE 1 TABLET BY MOUTH EVERY DAY 90 tablet 3    cyanocobalamin 1,000 mcg/mL injection Inject 1 ml q 2 weeks x 1 month then 1 ml monthly 30 mL 0    dulaglutide (TRULICITY SUBQ) Inject into the skin.      ergocalciferol (ERGOCALCIFEROL) 50,000 unit Cap TAKE 1 CAPSULE BY MOUTH ONE TIME PER WEEK 12 capsule 0    LIDOcaine-prilocaine (EMLA) cream Apply to port site 1 hour prior to port access and cover 30 g 3    metFORMIN (GLUCOPHAGE) 500 MG tablet TAKE 1 TABLET BY MOUTH TWICE A DAY WITH MEALS 180 tablet 1    omeprazole (PRILOSEC OTC) 20 MG tablet Take 1 tablet (20 mg total) by mouth once daily. 30 tablet 6    pravastatin (PRAVACHOL) 10 MG tablet TAKE 1 TABLET BY MOUTH EVERYDAY AT BEDTIME 90 tablet 1    anastrozole (ARIMIDEX) 1 mg Tab Take 1 tablet (1 mg total) by mouth once daily. 90 tablet 3    cyanocobalamin 500 MCG tablet PLACE 1,000 MCG (2 TABLETS) UNDER THE TONGUE ONCE DAILY.      cyanocobalamin, vitamin B-12, 1,000 mcg Subl Place 1,000 mcg under the tongue once daily. (Patient not taking: Reported on 5/12/2023) 30 tablet 3    gabapentin  (NEURONTIN) 100 MG capsule Take 1 capsule (100 mg total) by mouth every evening. (Patient not taking: Reported on 5/12/2023) 30 capsule 0    hydroCHLOROthiazide (HYDRODIURIL) 12.5 MG Tab TAKE 1 TABLET BY MOUTH EVERY DAY 90 tablet 0    HYDROcodone-acetaminophen (NORCO)  mg per tablet Take 1 tablet by mouth every 6 (six) hours as needed for Pain. (Patient not taking: Reported on 5/12/2023) 20 tablet 0    magnesium 30 mg Tab Take 30 mg by mouth once.      nystatin (MYCOSTATIN) ointment Apply topically 3 (three) times daily. (Patient not taking: Reported on 5/12/2023) 30 g 2    nystatin (MYCOSTATIN) powder Apply to affected area 3 times daily (Patient not taking: Reported on 5/12/2023) 60 g 1    omeprazole (PRILOSEC) 10 MG capsule omeprazole      ondansetron (ZOFRAN) 8 MG tablet Take 1 tablet (8 mg total) by mouth every 8 (eight) hours as needed for Nausea. (Patient not taking: Reported on 5/12/2023) 30 tablet 2    promethazine (PHENERGAN) 25 MG tablet Take 1 tablet (25 mg total) by mouth every 6 (six) hours as needed for Nausea. (Patient not taking: Reported on 5/12/2023) 30 tablet 0    tirzepatide (MOUNJARO) 2.5 mg/0.5 mL PnIj Inject 2.5 mg into the skin every 7 days. (Patient not taking: Reported on 5/12/2023) 4 pen 0    triamcinolone acetonide 0.025% (KENALOG) 0.025 % cream Apply topically 2 (two) times daily. Apply to the skin lesions twice a day (Patient not taking: Reported on 5/12/2023) 15 g 0     Current Facility-Administered Medications   Medication Dose Route Frequency Provider Last Rate Last Admin    cyanocobalamin injection 1,000 mcg  1,000 mcg Intramuscular 1 time in Clinic/HOD Lisa Jaquez MD         Facility-Administered Medications Ordered in Other Visits   Medication Dose Route Frequency Provider Last Rate Last Admin    lactated ringers infusion   Intravenous Continuous Maria G Mcduffie  mL/hr at 02/08/23 0700 New Bag at 02/08/23 0814    midazolam (VERSED) 1 mg/mL injection 2 mg  2 mg  "Intravenous See admin instructions Maria G Mcduffie MD   2 mg at 02/08/23 0711       Review of Systems   Constitutional:  Negative for chills, fatigue and fever.   HENT:  Negative for trouble swallowing.    Respiratory:  Negative for cough and shortness of breath.    Cardiovascular:  Negative for chest pain and palpitations.   Gastrointestinal:  Positive for diarrhea. Negative for abdominal pain, constipation, nausea and vomiting.   Genitourinary:  Negative for dysuria.   Musculoskeletal:  Negative for arthralgias.   Skin:  Negative for rash.   Neurological:  Negative for headaches.   Hematological:  Negative for adenopathy.     Objective:      Vitals:   Vitals:    05/12/23 1003   BP: 118/82   BP Location: Right arm   Patient Position: Sitting   BP Method: Large (Manual)   Pulse: 83   Resp: 16   Temp: 96.6 °F (35.9 °C)   TempSrc: Temporal   SpO2: 99%   Weight: 131.9 kg (290 lb 12.6 oz)   Height: 5' 4" (1.626 m)         BMI: Body mass index is 49.91 kg/m².    Physical Exam  Vitals reviewed.   Constitutional:       General: She is not in acute distress.     Appearance: She is not diaphoretic.   HENT:      Head: Normocephalic and atraumatic.      Mouth/Throat:      Mouth: Mucous membranes are moist.      Pharynx: No oropharyngeal exudate.   Eyes:      General: No scleral icterus.     Conjunctiva/sclera: Conjunctivae normal.   Cardiovascular:      Rate and Rhythm: Normal rate and regular rhythm.      Heart sounds: Normal heart sounds. No murmur heard.  Pulmonary:      Effort: Pulmonary effort is normal. No respiratory distress.      Breath sounds: No wheezing.   Chest:          Comments: B/L mastectomy   Area of excoriation from irradiation  Abdominal:      General: Bowel sounds are normal.      Palpations: Abdomen is soft.   Musculoskeletal:      Cervical back: Neck supple. No tenderness.      Right lower leg: No edema.      Left lower leg: No edema.   Lymphadenopathy:      Cervical: No cervical adenopathy.      " Upper Body:      Right upper body: No supraclavicular or axillary adenopathy.      Left upper body: No supraclavicular or axillary adenopathy.      Lower Body: No right inguinal adenopathy. No left inguinal adenopathy.   Skin:     General: Skin is warm.      Findings: No rash.   Neurological:      Mental Status: She is alert and oriented to person, place, and time.   Psychiatric:         Behavior: Behavior normal.         Thought Content: Thought content normal.       Laboratory Data:  Lab Results   Component Value Date    WBC 8.57 05/10/2023    HGB 12.8 05/10/2023    HCT 38.8 05/10/2023    MCV 92 05/10/2023     05/10/2023      CMP  Sodium   Date Value Ref Range Status   05/10/2023 138 136 - 145 mmol/L Final   08/08/2015 137 137 - 145 MMOL/L Final     Potassium   Date Value Ref Range Status   05/10/2023 3.3 (L) 3.5 - 5.1 mmol/L Final   08/08/2015 4.4 3.5 - 5.1 MMOL/L Final     Chloride   Date Value Ref Range Status   05/10/2023 103 95 - 110 mmol/L Final   08/08/2015 101 98 - 107 MMOL/L Final     CO2   Date Value Ref Range Status   05/10/2023 25 23 - 29 mmol/L Final     Glucose   Date Value Ref Range Status   05/10/2023 127 (H) 70 - 110 mg/dL Final     BUN   Date Value Ref Range Status   05/10/2023 17 6 - 20 mg/dL Final     Creatinine   Date Value Ref Range Status   05/10/2023 1.5 (H) 0.5 - 1.4 mg/dL Final   08/08/2015 0.63 0.52 - 1.04 MG/DL Final     Calcium   Date Value Ref Range Status   05/10/2023 9.3 8.7 - 10.5 mg/dL Final     Total Protein   Date Value Ref Range Status   05/10/2023 7.1 6.0 - 8.4 g/dL Final     Albumin   Date Value Ref Range Status   05/10/2023 3.3 (L) 3.5 - 5.2 g/dL Final     Total Bilirubin   Date Value Ref Range Status   05/10/2023 0.7 0.1 - 1.0 mg/dL Final     Comment:     For infants and newborns, interpretation of results should be based  on gestational age, weight and in agreement with clinical  observations.    Premature Infant recommended reference ranges:  Up to 24  hours.............<8.0 mg/dL  Up to 48 hours............<12.0 mg/dL  3-5 days..................<15.0 mg/dL  6-29 days.................<15.0 mg/dL       Alkaline Phosphatase   Date Value Ref Range Status   05/10/2023 114 55 - 135 U/L Final     AST   Date Value Ref Range Status   05/10/2023 8 (L) 10 - 40 U/L Final     ALT   Date Value Ref Range Status   05/10/2023 9 (L) 10 - 44 U/L Final     Anion Gap   Date Value Ref Range Status   05/10/2023 10 8 - 16 mmol/L Final     eGFR   Date Value Ref Range Status   05/10/2023 39.6 (A) >60 mL/min/1.73 m^2 Final       Assessment:       1. Invasive ductal carcinoma of breast, female, left    2. Chemotherapy induced cardiomyopathy    3. Type 2 diabetes mellitus with chronic kidney disease, with long-term current use of insulin, unspecified CKD stage    4. Drug-induced skin rash    5. Embolism and thrombosis of unspecified site    6. Immunodeficiency due to drugs      Plan:   Invasive ductal carcinoma of the breast, left  - cT1c triple positive breast cancer  (ER 84% MS 28% Her 2 3+ pos Ki 52 %), given her young age and Ki67%, will proceed with TH, adding P to help with a better pCR, will also plan to get ultrasound mid cycle to monitor (3 months)  -9/26/22 Echo with EF 60%; next is scheduled for 01/18/23  -Began TH+P on 10/7/2022; completed 12 weeks of Paclitaxel 12/23/22  - 2/2023; - s/p B/l Mastectom CPR;ypT0,pN0,cM0  -Proceed with Trastuzumab/Pertuzumab   - on adjuvant radiation plan for 16 fraction total - completed 04/13/23  - will need adjuvant endocrine therapy after finishing up adjuvant radiation   -Echo 1/2023 EF: 65% and global longitudinal 17.5%, cont monitoring with echo in 3 months ; schedule for May 01, 2023   -Proceed with C11 HP today; will add 2 gm Magnesium to 1000 ml NS (hydrate) over 2 hours  - last period  around~ 50,, post menopausal  , will start anastrazole      Diabetes type 2 with neuropathy   -Taking Metformin, Trulicity  - on gabapentin 100mg night      Anxiety  -Taking Wellbutrin  -Following with Dr. Long     Hypomagnesemia  -Has not been taking supplements at home due to diarrhea; encouraged decreasing dose to 250 mg  -Monitor, replacement as needed     Drug-induced rash: resolved    Normocytic anemia  -likely due to chemo and slightly nose/rectal bleeding (mucositis)  -cont monitoring , no indication for transfusion, improving   - B12 low, receiving IM    Patient queried and all questions were answered.    Route Chart for Scheduling    Med Onc Chart Routing      Follow up with physician 3 weeks and 6 weeks.   Follow up with BRANDIE    Infusion scheduling note Needs IV 2g of Mag   Injection scheduling note    Labs CBC, CMP and magnesium   Schedulin day prior to infusion  Preferred lab:  Lab interval:     Imaging    Pharmacy appointment    Other referrals           Treatment Plan Information   OP BREAST PACLITAXEL TRASTUZUMAB WEEKLY WITH PERTUZUMAB Q3W (THP)   Lisa Jaquez MD   Upcoming Treatment Dates - OP BREAST PACLITAXEL TRASTUZUMAB WEEKLY WITH PERTUZUMAB Q3W (THP)    2023       Chemotherapy       pertuzumab (PERJETA) 420 mg in sodium chloride 0.9% 264 mL infusion       trastuzumab-dkst (OGIVRI) in sodium chloride 0.9% chemo infusion       Supportive Care       magnesium sulfate 2 g in sodium chloride 0.9% 1,000 mL  2023       Chemotherapy       pertuzumab (PERJETA) 420 mg in sodium chloride 0.9% 264 mL infusion       trastuzumab-dkst (OGIVRI) in sodium chloride 0.9% chemo infusion       Supportive Care       magnesium sulfate 2 g in sodium chloride 0.9% 1,000 mL  2023       Chemotherapy       pertuzumab (PERJETA) 420 mg in sodium chloride 0.9% 264 mL infusion       trastuzumab-dkst (OGIVRI) in sodium chloride 0.9% chemo infusion       Supportive Care       magnesium sulfate 2 g in sodium chloride 0.9% 1,000 mL  2023       Chemotherapy       pertuzumab (PERJETA) 420 mg in sodium chloride 0.9% 264 mL infusion       trastuzumab-dkst  (OGIVRI) in sodium chloride 0.9% chemo infusion       Supportive Care       magnesium sulfate 2 g in sodium chloride 0.9% 1,000 mL    Supportive Plan Information  IV FLUIDS AND ELECTROLYTES   Lisa Jaquez MD   Upcoming Treatment Dates - IV FLUIDS AND ELECTROLYTES    No upcoming days in selected categories.     45 minutes were spent in coordination of patient's care, record review and counseling.

## 2023-05-15 LAB — ZINC SERPL-MCNC: 49 UG/DL (ref 60–130)

## 2023-05-29 ENCOUNTER — OFFICE VISIT (OUTPATIENT)
Dept: CARDIOLOGY | Facility: CLINIC | Age: 58
End: 2023-05-29
Payer: COMMERCIAL

## 2023-05-29 VITALS
WEIGHT: 291.69 LBS | BODY MASS INDEX: 49.8 KG/M2 | HEIGHT: 64 IN | HEART RATE: 81 BPM | DIASTOLIC BLOOD PRESSURE: 80 MMHG | SYSTOLIC BLOOD PRESSURE: 125 MMHG

## 2023-05-29 DIAGNOSIS — I83.90 ASYMPTOMATIC VARICOSE VEINS: ICD-10-CM

## 2023-05-29 DIAGNOSIS — R00.2 PALPITATIONS: ICD-10-CM

## 2023-05-29 DIAGNOSIS — C50.912 INVASIVE DUCTAL CARCINOMA OF BREAST, FEMALE, LEFT: ICD-10-CM

## 2023-05-29 DIAGNOSIS — I10 ESSENTIAL HYPERTENSION: ICD-10-CM

## 2023-05-29 DIAGNOSIS — E78.2 MIXED HYPERLIPIDEMIA: ICD-10-CM

## 2023-05-29 DIAGNOSIS — R60.1 GENERALIZED EDEMA: ICD-10-CM

## 2023-05-29 PROBLEM — T45.1X5A CHEMOTHERAPY INDUCED CARDIOMYOPATHY: Status: RESOLVED | Noted: 2023-03-18 | Resolved: 2023-05-29

## 2023-05-29 PROBLEM — I42.7 CHEMOTHERAPY INDUCED CARDIOMYOPATHY: Status: RESOLVED | Noted: 2023-03-18 | Resolved: 2023-05-29

## 2023-05-29 PROCEDURE — 99999 PR PBB SHADOW E&M-EST. PATIENT-LVL IV: ICD-10-PCS | Mod: PBBFAC,,,

## 2023-05-29 PROCEDURE — 3079F DIAST BP 80-89 MM HG: CPT | Mod: CPTII,S$GLB,,

## 2023-05-29 PROCEDURE — 1159F PR MEDICATION LIST DOCUMENTED IN MEDICAL RECORD: ICD-10-PCS | Mod: CPTII,S$GLB,,

## 2023-05-29 PROCEDURE — 3008F PR BODY MASS INDEX (BMI) DOCUMENTED: ICD-10-PCS | Mod: CPTII,S$GLB,,

## 2023-05-29 PROCEDURE — 3044F PR MOST RECENT HEMOGLOBIN A1C LEVEL <7.0%: ICD-10-PCS | Mod: CPTII,S$GLB,,

## 2023-05-29 PROCEDURE — 3008F BODY MASS INDEX DOCD: CPT | Mod: CPTII,S$GLB,,

## 2023-05-29 PROCEDURE — 99214 OFFICE O/P EST MOD 30 MIN: CPT | Mod: S$GLB,,,

## 2023-05-29 PROCEDURE — 3079F PR MOST RECENT DIASTOLIC BLOOD PRESSURE 80-89 MM HG: ICD-10-PCS | Mod: CPTII,S$GLB,,

## 2023-05-29 PROCEDURE — 1159F MED LIST DOCD IN RCRD: CPT | Mod: CPTII,S$GLB,,

## 2023-05-29 PROCEDURE — 3074F SYST BP LT 130 MM HG: CPT | Mod: CPTII,S$GLB,,

## 2023-05-29 PROCEDURE — 3044F HG A1C LEVEL LT 7.0%: CPT | Mod: CPTII,S$GLB,,

## 2023-05-29 PROCEDURE — 3074F PR MOST RECENT SYSTOLIC BLOOD PRESSURE < 130 MM HG: ICD-10-PCS | Mod: CPTII,S$GLB,,

## 2023-05-29 PROCEDURE — 99999 PR PBB SHADOW E&M-EST. PATIENT-LVL IV: CPT | Mod: PBBFAC,,,

## 2023-05-29 PROCEDURE — 99214 PR OFFICE/OUTPT VISIT, EST, LEVL IV, 30-39 MIN: ICD-10-PCS | Mod: S$GLB,,,

## 2023-05-29 RX ORDER — HYDROCHLOROTHIAZIDE 12.5 MG/1
25 TABLET ORAL DAILY
Qty: 90 TABLET | Refills: 0 | Status: SHIPPED | OUTPATIENT
Start: 2023-05-29 | End: 2023-07-03

## 2023-05-29 NOTE — PROGRESS NOTES
Subjective:    Patient ID:  Josefina Coon is a 58 y.o. female patient here for evaluation Hyperlipidemia and Hypertension    History of Present Illness:     Mrs. Coon is a 58 year old F who follows with Dr. Frias here today for her echo results. Her LVEF has been stable, no clinic signs or symptoms of HF. She had mild LAE on echo in January and her most recent echo she had severe LAE. She does report remote history of palpitations on and off for years that have never been identified. Strong family history of afib.   BP has been well controlled.   Taking 25 mg of HCTZ daily for edema of LE due to venous insuff.       Most Recent Echocardiogram Results  Results for orders placed in visit on 05/01/23    Echo    Interpretation Summary  · The left ventricle is normal in size with normal systolic function.  · The estimated ejection fraction is 60%.  · Normal left ventricular diastolic function.  · Normal right ventricular size with normal right ventricular systolic function.  · Severe left atrial enlargement.  · Mild tricuspid regurgitation.  · Normal central venous pressure (3 mmHg).  · The estimated PA systolic pressure is 33 mmHg.  · The left ventricular global longitudinal strain is -17.1%.    Most Recent Nuclear Stress Test Results  Results for orders placed during the hospital encounter of 03/03/20    Nuclear Stress - Cardiology Interpreted    Interpretation Summary    The perfusion scan is free of evidence from myocardial ischemia or injury.    There is a mild intensity defect in the anteroseptal wall of the left ventricle, secondary to breast attenuation.    Visually estimated ejection fraction is normal at rest.    The EKG portion of this study is negative for ischemia.    The patient reported no chest pain during the stress test.    There are no prior studies for comparison.    REVIEW OF SYSTEMS: As noted in HPI   CARDIOVASCULAR: +palps. No recent chest pain, arm, neck, or jaw pain.  RESPIRATORY: No  recent fever, cough chills, SOB.  : No blood in the urine  GI: No nausea, vomiting, or blood in stool.   MUSCULOSKELETAL: No myalgias or falls.   NEURO: No syncope, lightheadedness, or dizziness.  EYES: No sudden changes in vision.     Past Medical History:   Diagnosis Date    Abnormal liver enzymes     Asymptomatic varicose veins     Breast cancer 2022    left idc    Cancer     left breast cancer    Depressive disorder, not elsewhere classified     Dyslipidemia     Embolism and thrombosis of unspecified site     superficial venous thrombosis    Encounter for long-term (current) use of other medications     Family history of ischemic heart disease     Fatty liver     Generalized anxiety disorder     History of chicken pox     Obstructive sleep apnea (adult) (pediatric)     Type II or unspecified type diabetes mellitus without mention of complication, not stated as uncontrolled     Unspecified essential hypertension     Unspecified venous (peripheral) insufficiency     Unspecified vitamin D deficiency      Past Surgical History:   Procedure Laterality Date    BREAST BIOPSY Left 2022    idc    BREAST BIOPSY Left 2022    neg ln    BREAST BIOPSY Left 2022    neg ln     SECTION      COLONOSCOPY      ESOPHAGOGASTRODUODENOSCOPY      INSERTION OF TUNNELED CENTRAL VENOUS CATHETER (CVC) WITH SUBCUTANEOUS PORT Right 10/04/2022    Procedure: [id number];  Surgeon: Dominick Ng MD;  Location: [facility] OR;  Service: General;  Laterality: Right;    LUMPECTOMY, WITH RADAR LOCALIZATION USING [place]  Left 2023    Procedure: LUMPECTOMY,WITH RADAR LOCALIZATION USING [place] ;  Surgeon: Maria G Mcduffie MD;  Location: [facility] OR;  Service: General;  Laterality: Left;    SENTINEL LYMPH NODE BIOPSY Left 2023    Procedure: BIOPSY, LYMPH NODE, SENTINEL;  Surgeon: Maria G Mcduffie MD;  Location: [facility] OR;  Service: General;  Laterality: Left;    VEIN SURGERY      Laser, Dr. Larsen      Social History     Tobacco Use    Smoking status: Never    Smokeless tobacco: Never   Substance Use Topics    Alcohol use: Not Currently     Comment: rare     Drug use: Never         Objective      Vitals:    05/29/23 1427   BP: 125/80   Pulse: 81       LAST EKG  Results for orders placed or performed during the hospital encounter of 02/02/23   EKG 12-lead    Collection Time: 02/02/23 10:14 AM    Narrative    Test Reason : C50.912,    Vent. Rate : 081 BPM     Atrial Rate : 081 BPM     P-R Int : 156 ms          QRS Dur : 104 ms      QT Int : 364 ms       P-R-T Axes : 065 -01 044 degrees     QTc Int : 422 ms    Normal sinus rhythm  Low voltage QRS  Septal infarct (cited on or before 02-FEB-2023)  Abnormal ECG    Confirmed by Trice Kaufman MD ([id number]) on 2/2/2023 10:47:06 AM    Referred By: VIRGIL SARABIA           Confirmed By:Trice Kaufman MD     LIPIDS - LAST 2   Lab Results   Component Value Date    CHOL 164 09/30/2022    CHOL 207 (H) 04/08/2022    HDL 47 09/30/2022    HDL 59 04/08/2022    LDLCALC 79.4 09/30/2022    LDLCALC 89.2 04/08/2022    TRIG 188 (H) 09/30/2022    TRIG 294 (H) 04/08/2022    CHOLHDL 28.7 09/30/2022    CHOLHDL 28.5 04/08/2022     CARDIAC PROFILE - LAST 2  No results found for: BNP, CPK, CPKMB, LDH, TROPONINI   CBC - LAST 2  Lab Results   Component Value Date    WBC 8.57 05/10/2023    WBC 6.93 04/19/2023    RBC 4.22 05/10/2023    RBC 3.84 (L) 04/19/2023    HGB 12.8 05/10/2023    HGB 11.8 (L) 04/19/2023    HCT 38.8 05/10/2023    HCT 36.0 (L) 04/19/2023     05/10/2023     04/19/2023     Lab Results   Component Value Date    LABPT 14.1 02/02/2023    LABPT 13.8 09/30/2022    INR 1.1 02/02/2023    INR 1.1 09/30/2022    APTT 28.2 02/02/2023    APTT 27.9 09/30/2022     CHEMISTRY - LAST 2  Lab Results   Component Value Date     05/10/2023     04/19/2023    K 3.3 (L) 05/10/2023    K 3.8 04/19/2023     05/10/2023     04/19/2023    CO2 25 05/10/2023    CO2 19 (L) 04/19/2023     ANIONGAP 10 05/10/2023    ANIONGAP 12 04/19/2023    BUN 17 05/10/2023    BUN 28 (H) 04/19/2023    CREATININE 1.5 (H) 05/10/2023    CREATININE 1.7 (H) 04/19/2023     (H) 05/10/2023     (H) 04/19/2023    CALCIUM 9.3 05/10/2023    CALCIUM 9.9 04/19/2023    MG 1.3 (L) 05/10/2023    MG 1.4 (L) 04/19/2023    ALBUMIN 3.3 (L) 05/10/2023    ALBUMIN 3.7 04/19/2023    PROT 7.1 05/10/2023    PROT 7.8 04/19/2023    ALKPHOS 114 05/10/2023    ALKPHOS 128 04/19/2023    ALT 9 (L) 05/10/2023    ALT 14 04/19/2023    AST 8 (L) 05/10/2023    AST 11 04/19/2023    BILITOT 0.7 05/10/2023    BILITOT 0.3 04/19/2023      ENDOCRINE - LAST 2  Lab Results   Component Value Date    HGBA1C 6.4 (H) 02/02/2023    HGBA1C 7.8 (H) 09/30/2022    TSH 1.810 04/08/2022    TSH 1.600 07/30/2021      PHYSICAL EXAM  CONSTITUTIONAL: Well built, well nourished in no apparent distress  NECK: no carotid bruit, no JVD  LUNGS: CTA  CHEST WALL: no tenderness  HEART: regular rate and rhythm, S1, S2 normal, no murmur, click, rub or gallop   ABDOMEN: soft, non-tender; bowel sounds normal; no masses,  no organomegaly  EXTREMITIES: Extremities normal, no edema, no calf tenderness noted  NEURO: AAO X 3    I HAVE REVIEWED :    The vital signs, most recent cardiac testing, and most recent pertinent non-cardiology provider notes.    Current Outpatient Medications   Medication Instructions    albuterol (PROVENTIL/VENTOLIN HFA) 90 mcg/actuation inhaler 2 puffs, Inhalation, Every 6 hours PRN    ALPRAZolam (XANAX) 0.25 MG tablet TAKE ONE TABLET BY MOUTH 1-2 TIMES DAILY AS NEEDED ANXIETY    anastrozole (ARIMIDEX) 1 mg, Oral, Daily    buPROPion (WELLBUTRIN XL) 150 MG TB24 tablet TAKE 1 TABLET BY MOUTH EVERY DAY    cyanocobalamin (vitamin B-12) 1,000 mcg, Sublingual, Daily    cyanocobalamin 1,000 mcg/mL injection Inject 1 ml q 2 weeks x 1 month then 1 ml monthly    cyanocobalamin 500 MCG tablet PLACE 1,000 MCG (2 TABLETS) UNDER THE TONGUE ONCE DAILY.    dulaglutide  (TRULICITY SUBQ) Subcutaneous    ergocalciferol (ERGOCALCIFEROL) 50,000 unit Cap TAKE 1 CAPSULE BY MOUTH ONE TIME PER WEEK    gabapentin (NEURONTIN) 100 mg, Oral, Nightly    hydroCHLOROthiazide (HYDRODIURIL) 25 mg, Oral, Daily    HYDROcodone-acetaminophen (NORCO)  mg per tablet 1 tablet, Oral, Every 6 hours PRN    LIDOcaine-prilocaine (EMLA) cream Apply to port site 1 hour prior to port access and cover    magnesium 30 mg, Oral, Once    metFORMIN (GLUCOPHAGE) 500 MG tablet TAKE 1 TABLET BY MOUTH TWICE A DAY WITH MEALS    MOUNJARO 2.5 mg, Subcutaneous, Every 7 days    nystatin (MYCOSTATIN) ointment Topical (Top), 3 times daily    nystatin (MYCOSTATIN) powder Apply to affected area 3 times daily    omeprazole (PRILOSEC OTC) 20 mg, Oral, Daily    omeprazole (PRILOSEC) 10 MG capsule omeprazole    ondansetron (ZOFRAN) 8 mg, Oral, Every 8 hours PRN    pravastatin (PRAVACHOL) 10 MG tablet TAKE 1 TABLET BY MOUTH EVERYDAY AT BEDTIME    promethazine (PHENERGAN) 25 mg, Oral, Every 6 hours PRN    triamcinolone acetonide 0.025% (KENALOG) 0.025 % cream Topical (Top), 2 times daily, Apply to the skin lesions twice a day      Assessment & Plan     Palpitations  Hx of palps   Fam hx of a fib   Now with severe LAE on echo   Will obtain 30 day holter monitor to rule out occult a fib   Keep elytes maintained     Asymptomatic varicose veins  Stable on HCTZ 25 mg daily     Essential hypertension  BP well controlled on HCTZ 25 mg daily   Low Na diet     Mixed hyperlipidemia  Continue pravastatin 10 mg daily     Invasive ductal carcinoma of breast, female, left  Serial echos q3 months due to Trastuzumab/Pertuzumab   LVEF stable           Follow up in about 6 months (around 11/29/2023).     Naomi Peters, PA-C [facility]   Office: [phone]

## 2023-05-29 NOTE — ASSESSMENT & PLAN NOTE
Hx of palps   Fam hx of a fib   Now with severe LAE on echo   Will obtain 30 day holter monitor to rule out occult a fib   Keep elytes maintained

## 2023-05-31 ENCOUNTER — LAB VISIT (OUTPATIENT)
Dept: LAB | Facility: HOSPITAL | Age: 58
End: 2023-05-31
Attending: STUDENT IN AN ORGANIZED HEALTH CARE EDUCATION/TRAINING PROGRAM
Payer: COMMERCIAL

## 2023-05-31 DIAGNOSIS — C50.912 INVASIVE DUCTAL CARCINOMA OF BREAST, FEMALE, LEFT: ICD-10-CM

## 2023-05-31 LAB
ALBUMIN SERPL BCP-MCNC: 3.7 G/DL (ref 3.5–5.2)
ALP SERPL-CCNC: 144 U/L (ref 55–135)
ALT SERPL W/O P-5'-P-CCNC: 13 U/L (ref 10–44)
ANION GAP SERPL CALC-SCNC: 13 MMOL/L (ref 8–16)
AST SERPL-CCNC: 12 U/L (ref 10–40)
BASOPHILS # BLD AUTO: 0.04 K/UL (ref 0–0.2)
BASOPHILS NFR BLD: 0.7 % (ref 0–1.9)
BILIRUB SERPL-MCNC: 0.3 MG/DL (ref 0.1–1)
BUN SERPL-MCNC: 21 MG/DL (ref 6–20)
CALCIUM SERPL-MCNC: 9.8 MG/DL (ref 8.7–10.5)
CHLORIDE SERPL-SCNC: 104 MMOL/L (ref 95–110)
CO2 SERPL-SCNC: 22 MMOL/L (ref 23–29)
CREAT SERPL-MCNC: 1.5 MG/DL (ref 0.5–1.4)
DIFFERENTIAL METHOD: ABNORMAL
EOSINOPHIL # BLD AUTO: 0.5 K/UL (ref 0–0.5)
EOSINOPHIL NFR BLD: 8.4 % (ref 0–8)
ERYTHROCYTE [DISTWIDTH] IN BLOOD BY AUTOMATED COUNT: 12.7 % (ref 11.5–14.5)
EST. GFR  (NO RACE VARIABLE): 39.6 ML/MIN/1.73 M^2
GLUCOSE SERPL-MCNC: 128 MG/DL (ref 70–110)
HCT VFR BLD AUTO: 37.7 % (ref 37–48.5)
HGB BLD-MCNC: 12.2 G/DL (ref 12–16)
IMM GRANULOCYTES # BLD AUTO: 0.03 K/UL (ref 0–0.04)
IMM GRANULOCYTES NFR BLD AUTO: 0.5 % (ref 0–0.5)
LYMPHOCYTES # BLD AUTO: 1.4 K/UL (ref 1–4.8)
LYMPHOCYTES NFR BLD: 23.5 % (ref 18–48)
MAGNESIUM SERPL-MCNC: 1.5 MG/DL (ref 1.6–2.6)
MCH RBC QN AUTO: 30.1 PG (ref 27–31)
MCHC RBC AUTO-ENTMCNC: 32.4 G/DL (ref 32–36)
MCV RBC AUTO: 93 FL (ref 82–98)
MONOCYTES # BLD AUTO: 0.4 K/UL (ref 0.3–1)
MONOCYTES NFR BLD: 6.1 % (ref 4–15)
NEUTROPHILS # BLD AUTO: 3.6 K/UL (ref 1.8–7.7)
NEUTROPHILS NFR BLD: 60.8 % (ref 38–73)
NRBC BLD-RTO: 0 /100 WBC
PLATELET # BLD AUTO: 251 K/UL (ref 150–450)
PMV BLD AUTO: 9.4 FL (ref 9.2–12.9)
POTASSIUM SERPL-SCNC: 3.5 MMOL/L (ref 3.5–5.1)
PROT SERPL-MCNC: 7.8 G/DL (ref 6–8.4)
RBC # BLD AUTO: 4.05 M/UL (ref 4–5.4)
SODIUM SERPL-SCNC: 139 MMOL/L (ref 136–145)
WBC # BLD AUTO: 5.92 K/UL (ref 3.9–12.7)

## 2023-05-31 PROCEDURE — 85025 COMPLETE CBC W/AUTO DIFF WBC: CPT | Mod: PN | Performed by: STUDENT IN AN ORGANIZED HEALTH CARE EDUCATION/TRAINING PROGRAM

## 2023-05-31 PROCEDURE — 36415 COLL VENOUS BLD VENIPUNCTURE: CPT | Mod: PN | Performed by: STUDENT IN AN ORGANIZED HEALTH CARE EDUCATION/TRAINING PROGRAM

## 2023-05-31 PROCEDURE — 80053 COMPREHEN METABOLIC PANEL: CPT | Mod: PN | Performed by: STUDENT IN AN ORGANIZED HEALTH CARE EDUCATION/TRAINING PROGRAM

## 2023-05-31 PROCEDURE — 83735 ASSAY OF MAGNESIUM: CPT | Mod: PN | Performed by: STUDENT IN AN ORGANIZED HEALTH CARE EDUCATION/TRAINING PROGRAM

## 2023-06-02 ENCOUNTER — OFFICE VISIT (OUTPATIENT)
Dept: HEMATOLOGY/ONCOLOGY | Facility: CLINIC | Age: 58
End: 2023-06-02
Payer: COMMERCIAL

## 2023-06-02 ENCOUNTER — INFUSION (OUTPATIENT)
Dept: INFUSION THERAPY | Facility: HOSPITAL | Age: 58
End: 2023-06-02
Attending: STUDENT IN AN ORGANIZED HEALTH CARE EDUCATION/TRAINING PROGRAM
Payer: COMMERCIAL

## 2023-06-02 ENCOUNTER — OFFICE VISIT (OUTPATIENT)
Dept: RADIATION ONCOLOGY | Facility: CLINIC | Age: 58
End: 2023-06-02
Payer: COMMERCIAL

## 2023-06-02 ENCOUNTER — DOCUMENTATION ONLY (OUTPATIENT)
Dept: INFUSION THERAPY | Facility: HOSPITAL | Age: 58
End: 2023-06-02
Payer: COMMERCIAL

## 2023-06-02 VITALS
SYSTOLIC BLOOD PRESSURE: 120 MMHG | OXYGEN SATURATION: 99 % | HEIGHT: 64 IN | HEART RATE: 102 BPM | RESPIRATION RATE: 16 BRPM | DIASTOLIC BLOOD PRESSURE: 80 MMHG | BODY MASS INDEX: 49.04 KG/M2 | TEMPERATURE: 97 F | WEIGHT: 287.25 LBS

## 2023-06-02 VITALS
OXYGEN SATURATION: 99 % | TEMPERATURE: 97 F | RESPIRATION RATE: 16 BRPM | HEART RATE: 74 BPM | WEIGHT: 287.25 LBS | SYSTOLIC BLOOD PRESSURE: 122 MMHG | HEIGHT: 64 IN | BODY MASS INDEX: 49.04 KG/M2 | DIASTOLIC BLOOD PRESSURE: 68 MMHG

## 2023-06-02 DIAGNOSIS — K52.1 CHEMOTHERAPY INDUCED DIARRHEA: ICD-10-CM

## 2023-06-02 DIAGNOSIS — D84.821 IMMUNODEFICIENCY DUE TO DRUGS: ICD-10-CM

## 2023-06-02 DIAGNOSIS — C50.912 INVASIVE DUCTAL CARCINOMA OF BREAST, FEMALE, LEFT: Primary | ICD-10-CM

## 2023-06-02 DIAGNOSIS — Z79.4 TYPE 2 DIABETES MELLITUS WITH CHRONIC KIDNEY DISEASE, WITH LONG-TERM CURRENT USE OF INSULIN, UNSPECIFIED CKD STAGE: ICD-10-CM

## 2023-06-02 DIAGNOSIS — E11.22 TYPE 2 DIABETES MELLITUS WITH CHRONIC KIDNEY DISEASE, WITH LONG-TERM CURRENT USE OF INSULIN, UNSPECIFIED CKD STAGE: ICD-10-CM

## 2023-06-02 DIAGNOSIS — Z79.899 IMMUNODEFICIENCY DUE TO DRUGS: ICD-10-CM

## 2023-06-02 DIAGNOSIS — E83.42 HYPOMAGNESEMIA: ICD-10-CM

## 2023-06-02 DIAGNOSIS — T45.1X5A CHEMOTHERAPY INDUCED DIARRHEA: ICD-10-CM

## 2023-06-02 DIAGNOSIS — L27.0 DRUG-INDUCED SKIN RASH: ICD-10-CM

## 2023-06-02 DIAGNOSIS — I42.7 CHEMOTHERAPY INDUCED CARDIOMYOPATHY: ICD-10-CM

## 2023-06-02 DIAGNOSIS — T45.1X5A CHEMOTHERAPY INDUCED CARDIOMYOPATHY: ICD-10-CM

## 2023-06-02 PROBLEM — L58.0 ACUTE RADIATION DERMATITIS: Status: RESOLVED | Noted: 2023-04-27 | Resolved: 2023-06-02

## 2023-06-02 PROCEDURE — 99999 PR PBB SHADOW E&M-EST. PATIENT-LVL I: CPT | Mod: PBBFAC,,, | Performed by: RADIOLOGY

## 2023-06-02 PROCEDURE — 3044F PR MOST RECENT HEMOGLOBIN A1C LEVEL <7.0%: ICD-10-PCS | Mod: CPTII,S$GLB,, | Performed by: RADIOLOGY

## 2023-06-02 PROCEDURE — 99999 PR PBB SHADOW E&M-EST. PATIENT-LVL V: CPT | Mod: PBBFAC,,, | Performed by: STUDENT IN AN ORGANIZED HEALTH CARE EDUCATION/TRAINING PROGRAM

## 2023-06-02 PROCEDURE — 25000003 PHARM REV CODE 250: Mod: PN | Performed by: STUDENT IN AN ORGANIZED HEALTH CARE EDUCATION/TRAINING PROGRAM

## 2023-06-02 PROCEDURE — 1159F PR MEDICATION LIST DOCUMENTED IN MEDICAL RECORD: ICD-10-PCS | Mod: CPTII,S$GLB,, | Performed by: STUDENT IN AN ORGANIZED HEALTH CARE EDUCATION/TRAINING PROGRAM

## 2023-06-02 PROCEDURE — 63600175 PHARM REV CODE 636 W HCPCS: Mod: JZ,JG,PN | Performed by: STUDENT IN AN ORGANIZED HEALTH CARE EDUCATION/TRAINING PROGRAM

## 2023-06-02 PROCEDURE — 99999 PR PBB SHADOW E&M-EST. PATIENT-LVL V: ICD-10-PCS | Mod: PBBFAC,,, | Performed by: STUDENT IN AN ORGANIZED HEALTH CARE EDUCATION/TRAINING PROGRAM

## 2023-06-02 PROCEDURE — 96417 CHEMO IV INFUS EACH ADDL SEQ: CPT | Mod: PN

## 2023-06-02 PROCEDURE — 3074F PR MOST RECENT SYSTOLIC BLOOD PRESSURE < 130 MM HG: ICD-10-PCS | Mod: CPTII,S$GLB,, | Performed by: STUDENT IN AN ORGANIZED HEALTH CARE EDUCATION/TRAINING PROGRAM

## 2023-06-02 PROCEDURE — 99499 UNLISTED E&M SERVICE: CPT | Mod: S$GLB,,, | Performed by: RADIOLOGY

## 2023-06-02 PROCEDURE — 3008F PR BODY MASS INDEX (BMI) DOCUMENTED: ICD-10-PCS | Mod: CPTII,S$GLB,, | Performed by: STUDENT IN AN ORGANIZED HEALTH CARE EDUCATION/TRAINING PROGRAM

## 2023-06-02 PROCEDURE — 96413 CHEMO IV INFUSION 1 HR: CPT | Mod: PN

## 2023-06-02 PROCEDURE — 99215 PR OFFICE/OUTPT VISIT, EST, LEVL V, 40-54 MIN: ICD-10-PCS | Mod: S$GLB,,, | Performed by: STUDENT IN AN ORGANIZED HEALTH CARE EDUCATION/TRAINING PROGRAM

## 2023-06-02 PROCEDURE — 99215 OFFICE O/P EST HI 40 MIN: CPT | Mod: S$GLB,,, | Performed by: STUDENT IN AN ORGANIZED HEALTH CARE EDUCATION/TRAINING PROGRAM

## 2023-06-02 PROCEDURE — 3074F SYST BP LT 130 MM HG: CPT | Mod: CPTII,S$GLB,, | Performed by: STUDENT IN AN ORGANIZED HEALTH CARE EDUCATION/TRAINING PROGRAM

## 2023-06-02 PROCEDURE — 3044F PR MOST RECENT HEMOGLOBIN A1C LEVEL <7.0%: ICD-10-PCS | Mod: CPTII,S$GLB,, | Performed by: STUDENT IN AN ORGANIZED HEALTH CARE EDUCATION/TRAINING PROGRAM

## 2023-06-02 PROCEDURE — 1160F RVW MEDS BY RX/DR IN RCRD: CPT | Mod: CPTII,S$GLB,, | Performed by: STUDENT IN AN ORGANIZED HEALTH CARE EDUCATION/TRAINING PROGRAM

## 2023-06-02 PROCEDURE — 3044F HG A1C LEVEL LT 7.0%: CPT | Mod: CPTII,S$GLB,, | Performed by: RADIOLOGY

## 2023-06-02 PROCEDURE — 99499 NO LOS: ICD-10-PCS | Mod: S$GLB,,, | Performed by: RADIOLOGY

## 2023-06-02 PROCEDURE — 99999 PR PBB SHADOW E&M-EST. PATIENT-LVL I: ICD-10-PCS | Mod: PBBFAC,,, | Performed by: RADIOLOGY

## 2023-06-02 PROCEDURE — 3008F BODY MASS INDEX DOCD: CPT | Mod: CPTII,S$GLB,, | Performed by: STUDENT IN AN ORGANIZED HEALTH CARE EDUCATION/TRAINING PROGRAM

## 2023-06-02 PROCEDURE — 96367 TX/PROPH/DG ADDL SEQ IV INF: CPT | Mod: PN

## 2023-06-02 PROCEDURE — 3044F HG A1C LEVEL LT 7.0%: CPT | Mod: CPTII,S$GLB,, | Performed by: STUDENT IN AN ORGANIZED HEALTH CARE EDUCATION/TRAINING PROGRAM

## 2023-06-02 PROCEDURE — 1160F PR REVIEW ALL MEDS BY PRESCRIBER/CLIN PHARMACIST DOCUMENTED: ICD-10-PCS | Mod: CPTII,S$GLB,, | Performed by: STUDENT IN AN ORGANIZED HEALTH CARE EDUCATION/TRAINING PROGRAM

## 2023-06-02 PROCEDURE — 1159F MED LIST DOCD IN RCRD: CPT | Mod: CPTII,S$GLB,, | Performed by: STUDENT IN AN ORGANIZED HEALTH CARE EDUCATION/TRAINING PROGRAM

## 2023-06-02 PROCEDURE — 3079F DIAST BP 80-89 MM HG: CPT | Mod: CPTII,S$GLB,, | Performed by: STUDENT IN AN ORGANIZED HEALTH CARE EDUCATION/TRAINING PROGRAM

## 2023-06-02 PROCEDURE — 3079F PR MOST RECENT DIASTOLIC BLOOD PRESSURE 80-89 MM HG: ICD-10-PCS | Mod: CPTII,S$GLB,, | Performed by: STUDENT IN AN ORGANIZED HEALTH CARE EDUCATION/TRAINING PROGRAM

## 2023-06-02 RX ORDER — EPINEPHRINE 0.3 MG/.3ML
0.3 INJECTION SUBCUTANEOUS ONCE AS NEEDED
Status: CANCELLED | OUTPATIENT
Start: 2023-06-02

## 2023-06-02 RX ORDER — DICYCLOMINE HYDROCHLORIDE 10 MG/1
10 CAPSULE ORAL
Qty: 60 CAPSULE | Refills: 0 | Status: SHIPPED | OUTPATIENT
Start: 2023-06-02 | End: 2023-06-19

## 2023-06-02 RX ORDER — HEPARIN 100 UNIT/ML
500 SYRINGE INTRAVENOUS
Status: DISCONTINUED | OUTPATIENT
Start: 2023-06-02 | End: 2023-06-02 | Stop reason: HOSPADM

## 2023-06-02 RX ORDER — DIPHENHYDRAMINE HYDROCHLORIDE 50 MG/ML
50 INJECTION INTRAMUSCULAR; INTRAVENOUS ONCE AS NEEDED
Status: DISCONTINUED | OUTPATIENT
Start: 2023-06-02 | End: 2023-06-02 | Stop reason: HOSPADM

## 2023-06-02 RX ORDER — MAGNESIUM SULFATE HEPTAHYDRATE 40 MG/ML
2 INJECTION, SOLUTION INTRAVENOUS ONCE
Status: COMPLETED | OUTPATIENT
Start: 2023-06-02 | End: 2023-06-02

## 2023-06-02 RX ORDER — EPINEPHRINE 0.3 MG/.3ML
0.3 INJECTION SUBCUTANEOUS ONCE AS NEEDED
Status: DISCONTINUED | OUTPATIENT
Start: 2023-06-02 | End: 2023-06-02 | Stop reason: HOSPADM

## 2023-06-02 RX ORDER — SODIUM CHLORIDE 0.9 % (FLUSH) 0.9 %
10 SYRINGE (ML) INJECTION
Status: DISCONTINUED | OUTPATIENT
Start: 2023-06-02 | End: 2023-06-02 | Stop reason: HOSPADM

## 2023-06-02 RX ORDER — SODIUM CHLORIDE 0.9 % (FLUSH) 0.9 %
10 SYRINGE (ML) INJECTION
Status: CANCELLED | OUTPATIENT
Start: 2023-06-02

## 2023-06-02 RX ORDER — HEPARIN 100 UNIT/ML
500 SYRINGE INTRAVENOUS
Status: CANCELLED | OUTPATIENT
Start: 2023-06-02

## 2023-06-02 RX ORDER — DIPHENHYDRAMINE HYDROCHLORIDE 50 MG/ML
50 INJECTION INTRAMUSCULAR; INTRAVENOUS ONCE AS NEEDED
Status: CANCELLED | OUTPATIENT
Start: 2023-06-02

## 2023-06-02 RX ORDER — SODIUM CHLORIDE 9 MG/ML
INJECTION, SOLUTION INTRAVENOUS ONCE
Status: DISCONTINUED | OUTPATIENT
Start: 2023-06-02 | End: 2023-06-02 | Stop reason: HOSPADM

## 2023-06-02 RX ADMIN — PERTUZUMAB 420 MG: 30 INJECTION, SOLUTION, CONCENTRATE INTRAVENOUS at 12:06

## 2023-06-02 RX ADMIN — MAGNESIUM SULFATE IN WATER 2 G: 40 INJECTION, SOLUTION INTRAVENOUS at 01:06

## 2023-06-02 RX ADMIN — TRASTUZUMAB 750 MG: 150 INJECTION, POWDER, LYOPHILIZED, FOR SOLUTION INTRAVENOUS at 02:06

## 2023-06-02 NOTE — PROGRESS NOTES
[facility] Department of Radiation Oncology  Follow Up Visit Note    Diagnosis:  Josefina Coon is a 58 y.o. female with a(n)  BMI 52 and diagnosis of Stage IA  (cT1c, N0, M0, Grade 3, ER+/AR+/HER2+) Invasive ductal carcinoma of the LEFT breast. She completed neoadjuvant chemotherapy with THP and lumpectomy/sentinel lymph node biopsy 2/8/23 with pCR. On 4/14/23 she completed a course of adjuvant radiation therapy.     Oncologic History:  Oncology History   Invasive ductal carcinoma of breast, female, left   9/23/2022 Initial Diagnosis    Invasive ductal carcinoma of breast, female, left     9/23/2022 Cancer Staged    Staging form: Breast, AJCC 8th Edition  - Clinical stage from 9/23/2022: Stage IA (cT1c, cN0(f), cM0, G3, ER+, AR+, HER2+)     9/29/2022 - 9/29/2022 Chemotherapy    Treatment Summary   Plan Name: OP BREAST TRASTUZUMAB PACLITAXEL WEEKLY  Treatment Goal: Curative  Status: Inactive  Start Date:   End Date:   Provider: Lisa Jaquez MD  Chemotherapy: PACLitaxeL (TAXOL) 80 mg/m2 = 204 mg in sodium chloride 0.9% 250 mL chemo infusion, 80 mg/m2 = 204 mg, Intravenous, Clinic/HOD 1 time, 0 of 12 cycles  pertuzumab (PERJETA) 840 mg in sodium chloride 0.9% 278 mL infusion, 840 mg (original dose ), Intravenous, Clinic/HOD 1 time, 0 of 17 cycles  Dose modification: 840 mg (Cycle 1, Reason: Other (see comments)), 420 mg (Cycle 4, Reason: Other (see comments))  trastuzumab-dkst (OGIVRI) 591 mg in sodium chloride 0.9% 250 mL chemo infusion, 4 mg/kg = 591 mg, Intravenous, Clinic/HOD 1 time, 0 of 25 cycles     10/7/2022 -  Chemotherapy    Treatment Summary   Plan Name: OP BREAST PACLITAXEL TRASTUZUMAB WEEKLY WITH PERTUZUMAB Q3W (THP)  Treatment Goal: Control  Status: Active  Start Date: 10/7/2022  End Date: 10/6/2023 (Planned)  Provider: Lisa Jaquez MD  Chemotherapy: PACLitaxeL (TAXOL) 80 mg/m2 = 210 mg in sodium chloride 0.9% 250 mL chemo infusion, 80 mg/m2 = 210 mg, Intravenous, Clinic/HOD  1 time, 4 of 4 cycles  Administration: 210 mg (10/7/2022), 204 mg (10/14/2022), 204 mg (10/28/2022), 204 mg (11/4/2022), 204 mg (10/21/2022), 204 mg (11/11/2022), 204 mg (11/18/2022), 204 mg (11/25/2022), 198 mg (12/9/2022), 204 mg (12/2/2022), 198 mg (12/16/2022), 198 mg (12/23/2022)  pertuzumab (PERJETA) 840 mg in sodium chloride 0.9% 278 mL infusion, 840 mg, Intravenous, Clinic/HOD 1 time, 11 of 18 cycles  Administration: 840 mg (10/7/2022), 420 mg (10/28/2022), 420 mg (11/18/2022), 420 mg (12/9/2022), 420 mg (12/30/2022), 420 mg (1/20/2023), 420 mg (2/17/2023), 420 mg (3/10/2023), 420 mg (3/31/2023), 420 mg (4/21/2023), 420 mg (5/12/2023)  trastuzumab-dkst (OGIVRI) 570 mg in sodium chloride 0.9% 250 mL chemo infusion, 593 mg (100 % of original dose 4 mg/kg), Intravenous, Clinic/HOD 1 time, 11 of 18 cycles  Dose modification: 4 mg/kg (original dose 4 mg/kg, Cycle 1, Reason: Other (see comments), Comment: weekly trastuzumab), 2 mg/kg (original dose 2 mg/kg, Cycle 2, Reason: Other (see comments), Comment: weekly maintenance dose), 6 mg/kg (original dose 2 mg/kg, Cycle 2, Reason: MD Discretion, Comment: change to q3w dosing), 2 mg/kg (original dose 2 mg/kg, Cycle 1, Reason: Other (see comments), Comment: maintenance weekly dose)  Administration: 570 mg (10/7/2022), 840 mg (10/28/2022), 288 mg (10/14/2022), 290 mg (10/21/2022), 840 mg (11/18/2022), 831 mg (12/9/2022), 831 mg (12/30/2022), 720 mg (1/20/2023), 806 mg (2/17/2023), 750 mg (3/10/2023), 814 mg (3/31/2023), 750 mg (4/21/2023), 750 mg (5/12/2023)     2/13/2023 Cancer Staged    Staging form: Breast, AJCC 8th Edition  - Pathologic stage from 2/13/2023: No Stage Recommended (ypT0, pN0(sn), cM0, GX)     3/14/2023 - 4/14/2023 Radiation Therapy    Treating physician: Shelia Trevino  Total Dose: 52.56 Gy (42.56Gy/16 fractions to whole breast; 10Gy/5 fraction boost)  Fractions: 20  Treatment Site Ref. ID Energy Dose/Fx (Gy) #Fx Dose Correction (Gy) Total Dose  (Gy) Start Date End Date Elapsed Days   3D Breast_L NormEZCalc 18X 2.66 16 / 16 0 42.56 3/14/2023 4/6/2023 23   3d BrstBst_L NormBst 18X 2.5 4 / 4 0 10 4/10/2023 4/14/2023 4           Interval History  The patient presents today for a regularly scheduled follow up visit.  She was last seen in our clinic on 4/27/23.   At that time, she had some patchy moist desquamation in the IM fold and inferior/posterior breast, managed with radiagel sheets.  Skin doing much better/completely healed now. Ongoing Herceptin/Perjeta     Review of Systems   Review of Systems   Constitutional:  Negative for chills, fever and malaise/fatigue.   Eyes:  Negative for double vision.   Respiratory:  Negative for cough and shortness of breath.    Cardiovascular:  Negative for chest pain.   Skin:  Negative for rash.   Neurological:  Positive for dizziness (balance feels off, chronic). Negative for weakness.     Social History:  Social History     Tobacco Use    Smoking status: Never    Smokeless tobacco: Never   Substance Use Topics    Alcohol use: Not Currently     Comment: rare     Drug use: Never       Family History:  Cancer-related family history includes Cancer in her brother.    Exam:  There were no vitals filed for this visit.    Constitutional: Pleasant 58 y.o. female in no acute distress.  Well nourished. Well groomed.   HEENT: Normocephalic and atraumatic   Lungs: No audible wheezing.  Normal effort.   Breast/Chest wall: No significant skin changes within treatment field.  No masses  Musculoskeletal: No gross MSK deformities. Ambulates   Skin: No rashes appreciated.   Psych: Alert and oriented with appropriate mood and affect.  Neuro:   Grossly normal.      Assessment:  Resolution of acute radiation therapy related toxicities  No evidence of disease persistence or recurrence in treated region  ECOG: (1) Restricted in physically strenuous activity, ambulatory and able to do work of light nature    Plan:  RTC after mammogram in  10/2023  Continue skincare/sun protections  Ongoing systemic therapy per Dr. Jaquez  Follow up with other providers as directed

## 2023-06-02 NOTE — PROGRESS NOTES
Oncology Nutrition       Weight Check   Chart reviewed. Weight check completed on pt.     CBW: 287 lbs 4.2oz    Wt Readings from Last 10 Encounters:   06/02/23 130.3 kg (287 lb 4.2 oz)   06/02/23 130.3 kg (287 lb 4.2 oz)   05/29/23 132.3 kg (291 lb 10.7 oz)   05/22/23 131.9 kg (290 lb 12.6 oz)   05/12/23 131.9 kg (290 lb 12.6 oz)   05/12/23 131.9 kg (290 lb 12.6 oz)   05/01/23 130.6 kg (288 lb)   04/27/23 131 kg (288 lb 12.8 oz)   04/21/23 131.6 kg (290 lb 2 oz)   04/21/23 131.6 kg (290 lb 2 oz)        RD plan of care: Weight is currently fluctuating a few pounds but stable. No significant change at this time. Per nursing nutrition risk report, pt denies any nutritional concerns or challenges at this time. RD to continue to monitor prn; no nutritional interventions are needed at this time.     Josefina Leigh, ESTER, LDN, CNSC  06/02/2023  3:23 PM

## 2023-06-02 NOTE — PROGRESS NOTES
PATIENT: Josefina Coon  MRN: [id number]  DATE: 6/18/2023      Diagnosis:   1. Invasive ductal carcinoma of breast, female, left    2. Chemotherapy induced cardiomyopathy    3. Type 2 diabetes mellitus with chronic kidney disease, with long-term current use of insulin, unspecified CKD stage    4. Drug-induced skin rash    5. Immunodeficiency due to drugs    6. Chemotherapy induced diarrhea    7. Hypomagnesemia          Chief Complaint: Clearance for C12 HP    Subjective:   HPI: Ms. Coon is a 58 y.o. female Ms. Coon is a 58 y.o. female with HTN, HLD, depression, diabetes type 2 with neuropathy, obesity, fatty liver, following up with us for  a diagnosis of invasive ductal carcinoma of the left breast, triple positivity.     She is here today today for consideration of C11D1 of therapy which consists of Trastuzumab/Pertuzumab every 21 days.    Today, tolerating Transtuzumab/pertuzumab.,   - neuropathy 5/10 in feet and finger tips,stable   -  XRT completed 04/13/23  No fevers, chills, abdominal discomfort, N/V, bleeding, no new lumps or bumps, etc.    Oncologic History:   8/25/22 bilateral screening mammogram,,Right neg ,Left central post mass     9/8/22 left diag [facility], left US limited .Left 0300 lateral posterior 6cm FN 1.4cm mass , left axilla LN 2, up to 4mm cortex     9/14/22 left 0300 lateral posterior 6cm FN 1.4cm mass US biopsy .Grade 3 ,1.1cm in biopsy No LVI , ER 84% MN 28% Her 2 3+ pos Ki 52 %    Left axilla LN biopsy ;Benign fatty tissue, no LN, not concordant No MRI of breasts were done      9/21/22 US bilateral complete Right neg, right LN nml , Left 0300 6cm FN [id number] mass Borderline LN left axilla     9/21/22 Left axilla LN biopsy benign LN , so eventually Stage IA triple positive Breast cancer    10/7/22: started neoadjuvant chemo with Taxol + dual HER-2 (tranztuzumab and pertuzumab)    Receiving treatment with Paclitaxel/Trastuzumab/Pertuzumab on D1, weekly Paclitaxel on D8, D15 of a 21 day  cycle. Completed weekly Paclitaxel on 12/23/22.    2/2023: s/p B/L mastectomy with CPR;ypT0,pN0,cM0    Oncology History   Invasive ductal carcinoma of breast, female, left   9/23/2022 Initial Diagnosis    Invasive ductal carcinoma of breast, female, left     9/23/2022 Cancer Staged    Staging form: Breast, AJCC 8th Edition  - Clinical stage from 9/23/2022: Stage IA (cT1c, cN0(f), cM0, G3, ER+, MD+, HER2+)     9/29/2022 - 9/29/2022 Chemotherapy    Treatment Summary   Plan Name: OP BREAST TRASTUZUMAB PACLITAXEL WEEKLY  Treatment Goal: Curative  Status: Inactive  Start Date:   End Date:   Provider: Lisa Jaquez MD  Chemotherapy: PACLitaxeL (TAXOL) 80 mg/m2 = 204 mg in sodium chloride 0.9% 250 mL chemo infusion, 80 mg/m2 = 204 mg, Intravenous, Clinic/HOD 1 time, 0 of 12 cycles  pertuzumab (PERJETA) 840 mg in sodium chloride 0.9% 278 mL infusion, 840 mg (original dose ), Intravenous, Clinic/HOD 1 time, 0 of 17 cycles  Dose modification: 840 mg (Cycle 1, Reason: Other (see comments)), 420 mg (Cycle 4, Reason: Other (see comments))  trastuzumab-dkst (OGIVRI) 591 mg in sodium chloride 0.9% 250 mL chemo infusion, 4 mg/kg = 591 mg, Intravenous, Clinic/HOD 1 time, 0 of 25 cycles     10/7/2022 -  Chemotherapy    Treatment Summary   Plan Name: OP BREAST PACLITAXEL TRASTUZUMAB WEEKLY WITH PERTUZUMAB Q3W (THP)  Treatment Goal: Control  Status: Active  Start Date: 10/7/2022  End Date: 10/6/2023 (Planned)  Provider: Lisa Jaquez MD  Chemotherapy: PACLitaxeL (TAXOL) 80 mg/m2 = 210 mg in sodium chloride 0.9% 250 mL chemo infusion, 80 mg/m2 = 210 mg, Intravenous, Clinic/HOD 1 time, 4 of 4 cycles  Administration: 210 mg (10/7/2022), 204 mg (10/14/2022), 204 mg (10/28/2022), 204 mg (11/4/2022), 204 mg (10/21/2022), 204 mg (11/11/2022), 204 mg (11/18/2022), 204 mg (11/25/2022), 198 mg (12/9/2022), 204 mg (12/2/2022), 198 mg (12/16/2022), 198 mg (12/23/2022)  pertuzumab (PERJETA) 840 mg in sodium chloride 0.9% 278 mL infusion, 840 mg,  Intravenous, Clinic/HOD 1 time, 12 of 18 cycles  Administration: 840 mg (10/7/2022), 420 mg (10/28/2022), 420 mg (11/18/2022), 420 mg (12/9/2022), 420 mg (12/30/2022), 420 mg (1/20/2023), 420 mg (2/17/2023), 420 mg (3/10/2023), 420 mg (3/31/2023), 420 mg (4/21/2023), 420 mg (5/12/2023), 420 mg (6/2/2023)  trastuzumab-dkst (OGIVRI) 570 mg in sodium chloride 0.9% 250 mL chemo infusion, 593 mg (100 % of original dose 4 mg/kg), Intravenous, Clinic/HOD 1 time, 12 of 18 cycles  Dose modification: 4 mg/kg (original dose 4 mg/kg, Cycle 1, Reason: Other (see comments), Comment: weekly trastuzumab), 2 mg/kg (original dose 2 mg/kg, Cycle 2, Reason: Other (see comments), Comment: weekly maintenance dose), 6 mg/kg (original dose 2 mg/kg, Cycle 2, Reason: MD Discretion, Comment: change to q3w dosing), 2 mg/kg (original dose 2 mg/kg, Cycle 1, Reason: Other (see comments), Comment: maintenance weekly dose)  Administration: 570 mg (10/7/2022), 840 mg (10/28/2022), 288 mg (10/14/2022), 290 mg (10/21/2022), 840 mg (11/18/2022), 831 mg (12/9/2022), 831 mg (12/30/2022), 720 mg (1/20/2023), 806 mg (2/17/2023), 750 mg (3/10/2023), 814 mg (3/31/2023), 750 mg (4/21/2023), 750 mg (5/12/2023), 750 mg (6/2/2023)     2/13/2023 Cancer Staged    Staging form: Breast, AJCC 8th Edition  - Pathologic stage from 2/13/2023: No Stage Recommended (ypT0, pN0(sn), cM0, GX)     3/14/2023 - 4/14/2023 Radiation Therapy    Treating physician: Shelia Trevino  Total Dose: 52.56 Gy (42.56Gy/16 fractions to whole breast; 10Gy/5 fraction boost)  Fractions: 20  Treatment Site Ref. ID Energy Dose/Fx (Gy) #Fx Dose Correction (Gy) Total Dose (Gy) Start Date End Date Elapsed Days   3D Breast_L NormEZCalc 18X 2.66 16 / 16 0 42.56 3/14/2023 4/6/2023 23   3d BrstBst_L NormBst 18X 2.5 4 / 4 0 10 4/10/2023 4/14/2023 4         Past Medical History:   Past Medical History:   Diagnosis Date    Abnormal liver enzymes     Asymptomatic varicose veins     Breast cancer  2022    left idc    Cancer     left breast cancer    Depressive disorder, not elsewhere classified     Dyslipidemia     Embolism and thrombosis of unspecified site     superficial venous thrombosis    Encounter for long-term (current) use of other medications     Family history of ischemic heart disease     Fatty liver     Generalized anxiety disorder     History of chicken pox     Obstructive sleep apnea (adult) (pediatric)     Type II or unspecified type diabetes mellitus without mention of complication, not stated as uncontrolled     Unspecified essential hypertension     Unspecified venous (peripheral) insufficiency     Unspecified vitamin D deficiency        Past Surgical HIstory:   Past Surgical History:   Procedure Laterality Date    BREAST BIOPSY Left 2022    idc    BREAST BIOPSY Left 2022    neg ln    BREAST BIOPSY Left 2022    neg ln     SECTION      COLONOSCOPY      ESOPHAGOGASTRODUODENOSCOPY      INSERTION OF TUNNELED CENTRAL VENOUS CATHETER (CVC) WITH SUBCUTANEOUS PORT Right 10/04/2022    Procedure: HOCOOLGKO-QHJR-B-CATH;  Surgeon: Dominick Ng MD;  Location: [facility] OR;  Service: General;  Laterality: Right;    LUMPECTOMY, WITH RADAR LOCALIZATION USING TRENTON  Left 2023    Procedure: LUMPECTOMY,WITH RADAR LOCALIZATION USING TR[place]ON ;  Surgeon: Maria G Mcduffie MD;  Location: [facility] OR;  Service: General;  Laterality: Left;    SENTINEL LYMPH NODE BIOPSY Left 2023    Procedure: BIOPSY, LYMPH NODE, SENTINEL;  Surgeon: Maria G Mcduffie MD;  Location: [facility] OR;  Service: General;  Laterality: Left;    VEIN SURGERY      Laser, Dr. Larsen       Family History:   Family History   Problem Relation Age of Onset    Hyperlipidemia Mother     Hypertension Mother     Vulvar Cancer Mother     Dementia Mother     Atrial fibrillation Father     Parkinsonism Father     Diabetes Brother     Cancer Brother         colon    Hypertension Son     Hyperlipidemia Son      Anxiety disorder Son     Stroke Maternal Grandmother        Social History:  reports that she has never smoked. She has never used smokeless tobacco. She reports that she does not currently use alcohol. She reports that she does not use drugs.    Allergies:  Review of patient's allergies indicates:   Allergen Reactions    Sitagliptin      Other reaction(s): (januvia) abdominal pain    Sulfa (sulfonamide antibiotics) Hives    Byetta  [exenatide] Rash    Lactose        Medications:  Current Outpatient Medications   Medication Sig Dispense Refill    albuterol (PROVENTIL/VENTOLIN HFA) 90 mcg/actuation inhaler Inhale 2 puffs into the lungs every 6 (six) hours as needed for Wheezing. 18 g 6    ALPRAZolam (XANAX) 0.25 MG tablet TAKE ONE TABLET BY MOUTH 1-2 TIMES DAILY AS NEEDED ANXIETY 15 tablet 0    anastrozole (ARIMIDEX) 1 mg Tab Take 1 tablet (1 mg total) by mouth once daily. 90 tablet 3    buPROPion (WELLBUTRIN XL) 150 MG TB24 tablet TAKE 1 TABLET BY MOUTH EVERY DAY 90 tablet 3    cyanocobalamin 1,000 mcg/mL injection Inject 1 ml q 2 weeks x 1 month then 1 ml monthly 30 mL 0    cyanocobalamin 500 MCG tablet PLACE 1,000 MCG (2 TABLETS) UNDER THE TONGUE ONCE DAILY.      ergocalciferol (ERGOCALCIFEROL) 50,000 unit Cap TAKE 1 CAPSULE BY MOUTH ONE TIME PER WEEK 12 capsule 0    gabapentin (NEURONTIN) 100 MG capsule Take 1 capsule (100 mg total) by mouth every evening. 30 capsule 0    hydroCHLOROthiazide (HYDRODIURIL) 12.5 MG Tab Take 2 tablets (25 mg total) by mouth once daily. 90 tablet 0    LIDOcaine-prilocaine (EMLA) cream Apply to port site 1 hour prior to port access and cover 30 g 3    magnesium 30 mg Tab Take 30 mg by mouth once.      metFORMIN (GLUCOPHAGE) 500 MG tablet TAKE 1 TABLET BY MOUTH TWICE A DAY WITH MEALS 180 tablet 1    nystatin (MYCOSTATIN) ointment Apply topically 3 (three) times daily. 30 g 2    nystatin (MYCOSTATIN) powder Apply to affected area 3 times daily 60 g 1    omeprazole (PRILOSEC OTC) 20 MG  tablet Take 1 tablet (20 mg total) by mouth once daily. 30 tablet 6    omeprazole (PRILOSEC) 10 MG capsule omeprazole      pravastatin (PRAVACHOL) 10 MG tablet TAKE 1 TABLET BY MOUTH EVERYDAY AT BEDTIME 90 tablet 1    tirzepatide (MOUNJARO) 5 mg/0.5 mL PnIj Inject 5 mg into the skin every 7 days. 4 pen 1    triamcinolone acetonide 0.025% (KENALOG) 0.025 % cream Apply topically 2 (two) times daily. Apply to the skin lesions twice a day 15 g 0    cyanocobalamin, vitamin B-12, 1,000 mcg Subl Place 1,000 mcg under the tongue once daily. (Patient not taking: Reported on 5/29/2023) 30 tablet 3    dicyclomine (BENTYL) 10 MG capsule Take 1 capsule (10 mg total) by mouth 4 (four) times daily before meals and nightly. 60 capsule 0    dulaglutide (TRULICITY SUBQ) Inject into the skin.      HYDROcodone-acetaminophen (NORCO)  mg per tablet Take 1 tablet by mouth every 6 (six) hours as needed for Pain. (Patient not taking: Reported on 6/2/2023) 20 tablet 0    ondansetron (ZOFRAN) 8 MG tablet Take 1 tablet (8 mg total) by mouth every 8 (eight) hours as needed for Nausea. (Patient not taking: Reported on 5/29/2023) 30 tablet 2    promethazine (PHENERGAN) 25 MG tablet Take 1 tablet (25 mg total) by mouth every 6 (six) hours as needed for Nausea. (Patient not taking: Reported on 5/29/2023) 30 tablet 0     Current Facility-Administered Medications   Medication Dose Route Frequency Provider Last Rate Last Admin    cyanocobalamin injection 1,000 mcg  1,000 mcg Intramuscular 1 time in Clinic/HOD Lisa Jaquez MD         Facility-Administered Medications Ordered in Other Visits   Medication Dose Route Frequency Provider Last Rate Last Admin    lactated ringers infusion   Intravenous Continuous Maria G Mcduffie  mL/hr at 02/08/23 0700 New Bag at 02/08/23 0814    midazolam (VERSED) 1 mg/mL injection 2 mg  2 mg Intravenous See admin instructions Maria G Mcduffie MD   2 mg at 02/08/23 0711       Review of Systems  "  Constitutional:  Negative for chills, fatigue and fever.   HENT:  Negative for trouble swallowing.    Respiratory:  Negative for cough and shortness of breath.    Cardiovascular:  Negative for chest pain and palpitations.   Gastrointestinal:  Positive for diarrhea. Negative for abdominal pain, constipation, nausea and vomiting.   Genitourinary:  Negative for dysuria.   Musculoskeletal:  Negative for arthralgias.   Skin:  Negative for rash.   Neurological:  Negative for headaches.   Hematological:  Negative for adenopathy.     Objective:      Vitals:   Vitals:    06/02/23 1008   BP: 120/80   Pulse: 102   Resp: 16   Temp: 97.1 °F (36.2 °C)   TempSrc: Temporal   SpO2: 99%   Weight: 130.3 kg (287 lb 4.2 oz)   Height: 5' 4" (1.626 m)         BMI: Body mass index is 49.31 kg/m².    Physical Exam  Vitals reviewed.   Constitutional:       General: She is not in acute distress.     Appearance: She is not diaphoretic.   HENT:      Head: Normocephalic and atraumatic.      Mouth/Throat:      Mouth: Mucous membranes are moist.      Pharynx: No oropharyngeal exudate.   Eyes:      General: No scleral icterus.     Conjunctiva/sclera: Conjunctivae normal.   Cardiovascular:      Rate and Rhythm: Normal rate and regular rhythm.      Heart sounds: Normal heart sounds. No murmur heard.  Pulmonary:      Effort: Pulmonary effort is normal. No respiratory distress.      Breath sounds: No wheezing.   Chest:          Comments: B/L mastectomy   Area of excoriation from irradiation  Abdominal:      General: Bowel sounds are normal.      Palpations: Abdomen is soft.   Musculoskeletal:      Cervical back: Neck supple. No tenderness.      Right lower leg: No edema.      Left lower leg: No edema.   Lymphadenopathy:      Cervical: No cervical adenopathy.      Upper Body:      Right upper body: No supraclavicular or axillary adenopathy.      Left upper body: No supraclavicular or axillary adenopathy.      Lower Body: No right inguinal adenopathy. " No left inguinal adenopathy.   Skin:     General: Skin is warm.      Findings: No rash.   Neurological:      Mental Status: She is alert and oriented to person, place, and time.   Psychiatric:         Behavior: Behavior normal.         Thought Content: Thought content normal.       Laboratory Data:  Lab Results   Component Value Date    WBC 5.92 05/31/2023    HGB 12.2 05/31/2023    HCT 37.7 05/31/2023    MCV 93 05/31/2023     05/31/2023      CMP  Sodium   Date Value Ref Range Status   05/31/2023 139 136 - 145 mmol/L Final   08/08/2015 137 137 - 145 MMOL/L Final     Potassium   Date Value Ref Range Status   05/31/2023 3.5 3.5 - 5.1 mmol/L Final   08/08/2015 4.4 3.5 - 5.1 MMOL/L Final     Chloride   Date Value Ref Range Status   05/31/2023 104 95 - 110 mmol/L Final   08/08/2015 101 98 - 107 MMOL/L Final     CO2   Date Value Ref Range Status   05/31/2023 22 (L) 23 - 29 mmol/L Final     Glucose   Date Value Ref Range Status   05/31/2023 128 (H) 70 - 110 mg/dL Final     BUN   Date Value Ref Range Status   05/31/2023 21 (H) 6 - 20 mg/dL Final     Creatinine   Date Value Ref Range Status   05/31/2023 1.5 (H) 0.5 - 1.4 mg/dL Final   08/08/2015 0.63 0.52 - 1.04 MG/DL Final     Calcium   Date Value Ref Range Status   05/31/2023 9.8 8.7 - 10.5 mg/dL Final     Total Protein   Date Value Ref Range Status   05/31/2023 7.8 6.0 - 8.4 g/dL Final     Albumin   Date Value Ref Range Status   05/31/2023 3.7 3.5 - 5.2 g/dL Final     Total Bilirubin   Date Value Ref Range Status   05/31/2023 0.3 0.1 - 1.0 mg/dL Final     Comment:     For infants and newborns, interpretation of results should be based  on gestational age, weight and in agreement with clinical  observations.    Premature Infant recommended reference ranges:  Up to 24 hours.............<8.0 mg/dL  Up to 48 hours............<12.0 mg/dL  3-5 days..................<15.0 mg/dL  6-29 days.................<15.0 mg/dL       Alkaline Phosphatase   Date Value Ref Range Status    05/31/2023 144 (H) 55 - 135 U/L Final     AST   Date Value Ref Range Status   05/31/2023 12 10 - 40 U/L Final     ALT   Date Value Ref Range Status   05/31/2023 13 10 - 44 U/L Final     Anion Gap   Date Value Ref Range Status   05/31/2023 13 8 - 16 mmol/L Final     eGFR   Date Value Ref Range Status   05/31/2023 39.6 (A) >60 mL/min/1.73 m^2 Final       Assessment:       1. Invasive ductal carcinoma of breast, female, left    2. Chemotherapy induced cardiomyopathy    3. Type 2 diabetes mellitus with chronic kidney disease, with long-term current use of insulin, unspecified CKD stage    4. Drug-induced skin rash    5. Immunodeficiency due to drugs    6. Chemotherapy induced diarrhea    7. Hypomagnesemia        Plan:   Invasive ductal carcinoma of the breast, left  - cT1c triple positive breast cancer  (ER 84% NM 28% Her 2 3+ pos Ki 52 %), given her young age and Ki67%, will proceed with TH, adding P to help with a better pCR, will also plan to get ultrasound mid cycle to monitor (3 months)  -9/26/22 Echo with EF 60%; next is scheduled for 01/18/23  -Began TH+P on 10/7/2022; completed 12 weeks of Paclitaxel 12/23/22  - 2/2023; - s/p B/l Mastectom CPR;ypT0,pN0,cM0  -Proceed with Trastuzumab/Pertuzumab   - on adjuvant radiation plan for 16 fraction total - completed 04/13/23  - will need adjuvant endocrine therapy after finishing up adjuvant radiation   -Echo 1/2023 EF: 65% and global longitudinal 17.5%, cont monitoring with echo in 3 months ; schedule for May 01, 2023   -Proceed with C12 HP today; will add 2 gm Magnesium to 1000 ml NS (hydrate) over 2 hours  - last period  around~ 50,, post menopausal , on anastrazole      Diabetes type 2 with neuropathy   -Taking Metformin, Trulicity  - on gabapentin 100mg night     Anxiety  -Taking Wellbutrin  -Following with Dr. Long     Hypomagnesemia  -Monitor, replacement as needed     Drug-induced rash: resolved    Normocytic anemia  -likely due to chemo and slightly  nose/rectal bleeding (mucositis)  -cont monitoring , no indication for transfusion, improving   - B12 low, receiving IM    Patient queried and all questions were answered.    Route Chart for Scheduling    Med Onc Chart Routing      Follow up with physician . Keep amaris as they are   Follow up with [facility]    Infusion scheduling note    Injection scheduling note    Labs    Imaging    Pharmacy appointment    Other referrals            Treatment Plan Information   OP BREAST PACLITAXEL TRASTUZUMAB WEEKLY WITH PERTUZUMAB Q3W (THP)   Lisa Jaquez MD   Upcoming Treatment Dates - OP BREAST PACLITAXEL TRASTUZUMAB WEEKLY WITH PERTUZUMAB Q3W (THP)    6/23/2023       Chemotherapy       pertuzumab (PERJETA) 420 mg in sodium chloride 0.9% 264 mL infusion       trastuzumab-dkst (OGIVRI) in sodium chloride 0.9% chemo infusion       Supportive Care       magnesium sulfate 2 g in sodium chloride 0.9% 1,000 mL  7/14/2023       Chemotherapy       pertuzumab (PERJETA) 420 mg in sodium chloride 0.9% 264 mL infusion       trastuzumab-dkst (OGIVRI) in sodium chloride 0.9% chemo infusion       Supportive Care       magnesium sulfate 2 g in sodium chloride 0.9% 1,000 mL  8/4/2023       Chemotherapy       pertuzumab (PERJETA) 420 mg in sodium chloride 0.9% 264 mL infusion       trastuzumab-dkst (OGIVRI) in sodium chloride 0.9% chemo infusion       Supportive Care       magnesium sulfate 2 g in sodium chloride 0.9% 1,000 mL  8/25/2023       Chemotherapy       pertuzumab (PERJETA) 420 mg in sodium chloride 0.9% 264 mL infusion       trastuzumab-dkst (OGIVRI) in sodium chloride 0.9% chemo infusion       Supportive Care       magnesium sulfate 2 g in sodium chloride 0.9% 1,000 mL    Supportive Plan Information  IV FLUIDS AND ELECTROLYTES   Lisa Jaquez MD   Upcoming Treatment Dates - IV FLUIDS AND ELECTROLYTES    No upcoming days in selected categories.     42 minutes were spent in coordination of patient's care, record review and  counseling.

## 2023-06-02 NOTE — PLAN OF CARE
Problem: Fatigue  Goal: Improved Activity Tolerance  Outcome: Ongoing, Progressing  Intervention: Promote Improved Energy  Flowsheets (Taken 6/2/2023 1230)  Fatigue Management:   frequent rest breaks encouraged   paced activity encouraged  Sleep/Rest Enhancement: regular sleep/rest pattern promoted  Activity Management: Ambulated -L4     Problem: Adult Inpatient Plan of Care  Goal: Plan of Care Review  Outcome: Ongoing, Progressing  Flowsheets (Taken 6/2/2023 1230)  Plan of Care Reviewed With: patient  Goal: Patient-Specific Goal (Individualized)  Outcome: Ongoing, Progressing  Flowsheets (Taken 6/2/2023 1230)  Anxieties, Fears or Concerns: None  Individualized Care Needs: Recliner, warm blanket, converstion    Patient to Infusion for Perjeta, Ogivri, and Magnesium following appointment with the provider. Treatment plan reviewed with patient. VSS. Tolerated treatment. Provided with copy of upcoming appointment schedule. Escorted to the front lobby in no distress for discharge to home.

## 2023-06-06 ENCOUNTER — PATIENT MESSAGE (OUTPATIENT)
Dept: DERMATOLOGY | Facility: CLINIC | Age: 58
End: 2023-06-06
Payer: COMMERCIAL

## 2023-06-21 ENCOUNTER — LAB VISIT (OUTPATIENT)
Dept: LAB | Facility: HOSPITAL | Age: 58
End: 2023-06-21
Attending: STUDENT IN AN ORGANIZED HEALTH CARE EDUCATION/TRAINING PROGRAM
Payer: COMMERCIAL

## 2023-06-21 DIAGNOSIS — C50.912 INVASIVE DUCTAL CARCINOMA OF BREAST, FEMALE, LEFT: ICD-10-CM

## 2023-06-21 LAB
ALBUMIN SERPL BCP-MCNC: 3.7 G/DL (ref 3.5–5.2)
ALP SERPL-CCNC: 135 U/L (ref 55–135)
ALT SERPL W/O P-5'-P-CCNC: 13 U/L (ref 10–44)
ANION GAP SERPL CALC-SCNC: 14 MMOL/L (ref 8–16)
AST SERPL-CCNC: 13 U/L (ref 10–40)
BASOPHILS # BLD AUTO: 0.05 K/UL (ref 0–0.2)
BASOPHILS NFR BLD: 0.7 % (ref 0–1.9)
BILIRUB SERPL-MCNC: 0.5 MG/DL (ref 0.1–1)
BUN SERPL-MCNC: 28 MG/DL (ref 6–20)
CALCIUM SERPL-MCNC: 9.9 MG/DL (ref 8.7–10.5)
CHLORIDE SERPL-SCNC: 104 MMOL/L (ref 95–110)
CO2 SERPL-SCNC: 21 MMOL/L (ref 23–29)
CREAT SERPL-MCNC: 2 MG/DL (ref 0.5–1.4)
DIFFERENTIAL METHOD: ABNORMAL
EOSINOPHIL # BLD AUTO: 0.3 K/UL (ref 0–0.5)
EOSINOPHIL NFR BLD: 3.6 % (ref 0–8)
ERYTHROCYTE [DISTWIDTH] IN BLOOD BY AUTOMATED COUNT: 12.7 % (ref 11.5–14.5)
EST. GFR  (NO RACE VARIABLE): 28.1 ML/MIN/1.73 M^2
GLUCOSE SERPL-MCNC: 122 MG/DL (ref 70–110)
HCT VFR BLD AUTO: 36.6 % (ref 37–48.5)
HGB BLD-MCNC: 12.2 G/DL (ref 12–16)
IMM GRANULOCYTES # BLD AUTO: 0.04 K/UL (ref 0–0.04)
IMM GRANULOCYTES NFR BLD AUTO: 0.5 % (ref 0–0.5)
LYMPHOCYTES # BLD AUTO: 1.5 K/UL (ref 1–4.8)
LYMPHOCYTES NFR BLD: 19.9 % (ref 18–48)
MAGNESIUM SERPL-MCNC: 1.4 MG/DL (ref 1.6–2.6)
MCH RBC QN AUTO: 29.9 PG (ref 27–31)
MCHC RBC AUTO-ENTMCNC: 33.3 G/DL (ref 32–36)
MCV RBC AUTO: 90 FL (ref 82–98)
MONOCYTES # BLD AUTO: 0.4 K/UL (ref 0.3–1)
MONOCYTES NFR BLD: 5.8 % (ref 4–15)
NEUTROPHILS # BLD AUTO: 5.2 K/UL (ref 1.8–7.7)
NEUTROPHILS NFR BLD: 69.5 % (ref 38–73)
NRBC BLD-RTO: 0 /100 WBC
PLATELET # BLD AUTO: 236 K/UL (ref 150–450)
PMV BLD AUTO: 9.6 FL (ref 9.2–12.9)
POTASSIUM SERPL-SCNC: 3.2 MMOL/L (ref 3.5–5.1)
PROT SERPL-MCNC: 7.9 G/DL (ref 6–8.4)
RBC # BLD AUTO: 4.08 M/UL (ref 4–5.4)
SODIUM SERPL-SCNC: 139 MMOL/L (ref 136–145)
WBC # BLD AUTO: 7.53 K/UL (ref 3.9–12.7)

## 2023-06-21 PROCEDURE — 36415 COLL VENOUS BLD VENIPUNCTURE: CPT | Mod: PN | Performed by: STUDENT IN AN ORGANIZED HEALTH CARE EDUCATION/TRAINING PROGRAM

## 2023-06-21 PROCEDURE — 80053 COMPREHEN METABOLIC PANEL: CPT | Mod: PN | Performed by: STUDENT IN AN ORGANIZED HEALTH CARE EDUCATION/TRAINING PROGRAM

## 2023-06-21 PROCEDURE — 83735 ASSAY OF MAGNESIUM: CPT | Mod: PN | Performed by: STUDENT IN AN ORGANIZED HEALTH CARE EDUCATION/TRAINING PROGRAM

## 2023-06-21 PROCEDURE — 85025 COMPLETE CBC W/AUTO DIFF WBC: CPT | Mod: PN | Performed by: STUDENT IN AN ORGANIZED HEALTH CARE EDUCATION/TRAINING PROGRAM

## 2023-06-23 ENCOUNTER — HOSPITAL ENCOUNTER (OUTPATIENT)
Dept: RADIOLOGY | Facility: HOSPITAL | Age: 58
Discharge: HOME | End: 2023-06-23
Attending: STUDENT IN AN ORGANIZED HEALTH CARE EDUCATION/TRAINING PROGRAM
Payer: COMMERCIAL

## 2023-06-23 ENCOUNTER — OFFICE VISIT (OUTPATIENT)
Dept: HEMATOLOGY/ONCOLOGY | Facility: CLINIC | Age: 58
End: 2023-06-23
Payer: COMMERCIAL

## 2023-06-23 ENCOUNTER — INFUSION (OUTPATIENT)
Dept: INFUSION THERAPY | Facility: HOSPITAL | Age: 58
End: 2023-06-23
Attending: STUDENT IN AN ORGANIZED HEALTH CARE EDUCATION/TRAINING PROGRAM
Payer: COMMERCIAL

## 2023-06-23 VITALS
RESPIRATION RATE: 16 BRPM | WEIGHT: 286.38 LBS | BODY MASS INDEX: 48.89 KG/M2 | TEMPERATURE: 97 F | HEIGHT: 64 IN | SYSTOLIC BLOOD PRESSURE: 126 MMHG | OXYGEN SATURATION: 96 % | HEART RATE: 77 BPM | DIASTOLIC BLOOD PRESSURE: 74 MMHG

## 2023-06-23 VITALS
HEART RATE: 76 BPM | DIASTOLIC BLOOD PRESSURE: 82 MMHG | TEMPERATURE: 97 F | WEIGHT: 286.38 LBS | RESPIRATION RATE: 16 BRPM | OXYGEN SATURATION: 96 % | SYSTOLIC BLOOD PRESSURE: 130 MMHG | BODY MASS INDEX: 48.89 KG/M2 | HEIGHT: 64 IN

## 2023-06-23 DIAGNOSIS — K52.1 CHEMOTHERAPY INDUCED DIARRHEA: ICD-10-CM

## 2023-06-23 DIAGNOSIS — C50.912 INVASIVE DUCTAL CARCINOMA OF BREAST, FEMALE, LEFT: Primary | ICD-10-CM

## 2023-06-23 DIAGNOSIS — T45.1X5A CHEMOTHERAPY INDUCED DIARRHEA: ICD-10-CM

## 2023-06-23 DIAGNOSIS — Z79.899 IMMUNODEFICIENCY DUE TO DRUGS: ICD-10-CM

## 2023-06-23 DIAGNOSIS — E55.9 VITAMIN D DEFICIENCY: ICD-10-CM

## 2023-06-23 DIAGNOSIS — N17.9 AKI (ACUTE KIDNEY INJURY): ICD-10-CM

## 2023-06-23 DIAGNOSIS — C50.912 INVASIVE DUCTAL CARCINOMA OF BREAST, FEMALE, LEFT: ICD-10-CM

## 2023-06-23 DIAGNOSIS — I42.7 CHEMOTHERAPY INDUCED CARDIOMYOPATHY: ICD-10-CM

## 2023-06-23 DIAGNOSIS — D84.821 IMMUNODEFICIENCY DUE TO DRUGS: ICD-10-CM

## 2023-06-23 DIAGNOSIS — T45.1X5A CHEMOTHERAPY INDUCED CARDIOMYOPATHY: ICD-10-CM

## 2023-06-23 PROCEDURE — 3060F PR POS MICROALBUMINURIA RESULT DOCUMENTED/REVIEW: ICD-10-PCS | Mod: CPTII,S$GLB,, | Performed by: STUDENT IN AN ORGANIZED HEALTH CARE EDUCATION/TRAINING PROGRAM

## 2023-06-23 PROCEDURE — 1159F PR MEDICATION LIST DOCUMENTED IN MEDICAL RECORD: ICD-10-PCS | Mod: CPTII,S$GLB,, | Performed by: STUDENT IN AN ORGANIZED HEALTH CARE EDUCATION/TRAINING PROGRAM

## 2023-06-23 PROCEDURE — 3008F BODY MASS INDEX DOCD: CPT | Mod: CPTII,S$GLB,, | Performed by: STUDENT IN AN ORGANIZED HEALTH CARE EDUCATION/TRAINING PROGRAM

## 2023-06-23 PROCEDURE — 77080 DXA BONE DENSITY AXIAL SKELETON 1 OR MORE SITES: ICD-10-PCS | Mod: 26,,, | Performed by: RADIOLOGY

## 2023-06-23 PROCEDURE — 99999 PR PBB SHADOW E&M-EST. PATIENT-LVL V: CPT | Mod: PBBFAC,,, | Performed by: STUDENT IN AN ORGANIZED HEALTH CARE EDUCATION/TRAINING PROGRAM

## 2023-06-23 PROCEDURE — 96367 TX/PROPH/DG ADDL SEQ IV INF: CPT | Mod: PN

## 2023-06-23 PROCEDURE — 77080 DXA BONE DENSITY AXIAL: CPT | Mod: TC,PO

## 2023-06-23 PROCEDURE — 3074F SYST BP LT 130 MM HG: CPT | Mod: CPTII,S$GLB,, | Performed by: STUDENT IN AN ORGANIZED HEALTH CARE EDUCATION/TRAINING PROGRAM

## 2023-06-23 PROCEDURE — 96413 CHEMO IV INFUSION 1 HR: CPT | Mod: PN

## 2023-06-23 PROCEDURE — 99215 PR OFFICE/OUTPT VISIT, EST, LEVL V, 40-54 MIN: ICD-10-PCS | Mod: S$GLB,,, | Performed by: STUDENT IN AN ORGANIZED HEALTH CARE EDUCATION/TRAINING PROGRAM

## 2023-06-23 PROCEDURE — 3044F PR MOST RECENT HEMOGLOBIN A1C LEVEL <7.0%: ICD-10-PCS | Mod: CPTII,S$GLB,, | Performed by: STUDENT IN AN ORGANIZED HEALTH CARE EDUCATION/TRAINING PROGRAM

## 2023-06-23 PROCEDURE — 3066F PR DOCUMENTATION OF TREATMENT FOR NEPHROPATHY: ICD-10-PCS | Mod: CPTII,S$GLB,, | Performed by: STUDENT IN AN ORGANIZED HEALTH CARE EDUCATION/TRAINING PROGRAM

## 2023-06-23 PROCEDURE — 3074F PR MOST RECENT SYSTOLIC BLOOD PRESSURE < 130 MM HG: ICD-10-PCS | Mod: CPTII,S$GLB,, | Performed by: STUDENT IN AN ORGANIZED HEALTH CARE EDUCATION/TRAINING PROGRAM

## 2023-06-23 PROCEDURE — 1160F RVW MEDS BY RX/DR IN RCRD: CPT | Mod: CPTII,S$GLB,, | Performed by: STUDENT IN AN ORGANIZED HEALTH CARE EDUCATION/TRAINING PROGRAM

## 2023-06-23 PROCEDURE — 3008F PR BODY MASS INDEX (BMI) DOCUMENTED: ICD-10-PCS | Mod: CPTII,S$GLB,, | Performed by: STUDENT IN AN ORGANIZED HEALTH CARE EDUCATION/TRAINING PROGRAM

## 2023-06-23 PROCEDURE — 77080 DXA BONE DENSITY AXIAL: CPT | Mod: 26,,, | Performed by: RADIOLOGY

## 2023-06-23 PROCEDURE — 3044F HG A1C LEVEL LT 7.0%: CPT | Mod: CPTII,S$GLB,, | Performed by: STUDENT IN AN ORGANIZED HEALTH CARE EDUCATION/TRAINING PROGRAM

## 2023-06-23 PROCEDURE — 3066F NEPHROPATHY DOC TX: CPT | Mod: CPTII,S$GLB,, | Performed by: STUDENT IN AN ORGANIZED HEALTH CARE EDUCATION/TRAINING PROGRAM

## 2023-06-23 PROCEDURE — 3060F POS MICROALBUMINURIA REV: CPT | Mod: CPTII,S$GLB,, | Performed by: STUDENT IN AN ORGANIZED HEALTH CARE EDUCATION/TRAINING PROGRAM

## 2023-06-23 PROCEDURE — 63600175 PHARM REV CODE 636 W HCPCS: Mod: JZ,JG,PN | Performed by: STUDENT IN AN ORGANIZED HEALTH CARE EDUCATION/TRAINING PROGRAM

## 2023-06-23 PROCEDURE — 3078F PR MOST RECENT DIASTOLIC BLOOD PRESSURE < 80 MM HG: ICD-10-PCS | Mod: CPTII,S$GLB,, | Performed by: STUDENT IN AN ORGANIZED HEALTH CARE EDUCATION/TRAINING PROGRAM

## 2023-06-23 PROCEDURE — 1159F MED LIST DOCD IN RCRD: CPT | Mod: CPTII,S$GLB,, | Performed by: STUDENT IN AN ORGANIZED HEALTH CARE EDUCATION/TRAINING PROGRAM

## 2023-06-23 PROCEDURE — 25000003 PHARM REV CODE 250: Mod: PN | Performed by: STUDENT IN AN ORGANIZED HEALTH CARE EDUCATION/TRAINING PROGRAM

## 2023-06-23 PROCEDURE — 1160F PR REVIEW ALL MEDS BY PRESCRIBER/CLIN PHARMACIST DOCUMENTED: ICD-10-PCS | Mod: CPTII,S$GLB,, | Performed by: STUDENT IN AN ORGANIZED HEALTH CARE EDUCATION/TRAINING PROGRAM

## 2023-06-23 PROCEDURE — 96417 CHEMO IV INFUS EACH ADDL SEQ: CPT | Mod: PN

## 2023-06-23 PROCEDURE — A4216 STERILE WATER/SALINE, 10 ML: HCPCS | Mod: PN | Performed by: STUDENT IN AN ORGANIZED HEALTH CARE EDUCATION/TRAINING PROGRAM

## 2023-06-23 PROCEDURE — 99999 PR PBB SHADOW E&M-EST. PATIENT-LVL V: ICD-10-PCS | Mod: PBBFAC,,, | Performed by: STUDENT IN AN ORGANIZED HEALTH CARE EDUCATION/TRAINING PROGRAM

## 2023-06-23 PROCEDURE — 3078F DIAST BP <80 MM HG: CPT | Mod: CPTII,S$GLB,, | Performed by: STUDENT IN AN ORGANIZED HEALTH CARE EDUCATION/TRAINING PROGRAM

## 2023-06-23 PROCEDURE — 99215 OFFICE O/P EST HI 40 MIN: CPT | Mod: S$GLB,,, | Performed by: STUDENT IN AN ORGANIZED HEALTH CARE EDUCATION/TRAINING PROGRAM

## 2023-06-23 RX ORDER — SODIUM CHLORIDE 9 MG/ML
INJECTION, SOLUTION INTRAVENOUS
Status: DISCONTINUED | OUTPATIENT
Start: 2023-06-23 | End: 2023-06-23 | Stop reason: HOSPADM

## 2023-06-23 RX ORDER — SODIUM CHLORIDE 0.9 % (FLUSH) 0.9 %
10 SYRINGE (ML) INJECTION
Status: DISCONTINUED | OUTPATIENT
Start: 2023-06-23 | End: 2023-06-23 | Stop reason: HOSPADM

## 2023-06-23 RX ORDER — MAGNESIUM SULFATE HEPTAHYDRATE 40 MG/ML
2 INJECTION, SOLUTION INTRAVENOUS ONCE
Status: COMPLETED | OUTPATIENT
Start: 2023-06-23 | End: 2023-06-23

## 2023-06-23 RX ORDER — EPINEPHRINE 0.3 MG/.3ML
0.3 INJECTION SUBCUTANEOUS ONCE AS NEEDED
Status: CANCELLED | OUTPATIENT
Start: 2023-06-23

## 2023-06-23 RX ORDER — DIPHENHYDRAMINE HYDROCHLORIDE 50 MG/ML
50 INJECTION INTRAMUSCULAR; INTRAVENOUS ONCE AS NEEDED
Status: CANCELLED | OUTPATIENT
Start: 2023-06-23

## 2023-06-23 RX ORDER — DIPHENHYDRAMINE HYDROCHLORIDE 50 MG/ML
50 INJECTION INTRAMUSCULAR; INTRAVENOUS ONCE AS NEEDED
Status: DISCONTINUED | OUTPATIENT
Start: 2023-06-23 | End: 2023-06-23 | Stop reason: HOSPADM

## 2023-06-23 RX ORDER — SODIUM CHLORIDE 9 MG/ML
INJECTION, SOLUTION INTRAVENOUS
Status: CANCELLED
Start: 2023-06-23

## 2023-06-23 RX ORDER — HEPARIN 100 UNIT/ML
500 SYRINGE INTRAVENOUS
Status: CANCELLED | OUTPATIENT
Start: 2023-06-23

## 2023-06-23 RX ORDER — EPINEPHRINE 0.3 MG/.3ML
0.3 INJECTION SUBCUTANEOUS ONCE AS NEEDED
Status: DISCONTINUED | OUTPATIENT
Start: 2023-06-23 | End: 2023-06-23 | Stop reason: HOSPADM

## 2023-06-23 RX ORDER — SODIUM CHLORIDE 0.9 % (FLUSH) 0.9 %
10 SYRINGE (ML) INJECTION
Status: CANCELLED | OUTPATIENT
Start: 2023-06-23

## 2023-06-23 RX ADMIN — TRASTUZUMAB 750 MG: 150 INJECTION, POWDER, LYOPHILIZED, FOR SOLUTION INTRAVENOUS at 12:06

## 2023-06-23 RX ADMIN — SODIUM CHLORIDE: 9 INJECTION, SOLUTION INTRAVENOUS at 11:06

## 2023-06-23 RX ADMIN — Medication 10 ML: at 01:06

## 2023-06-23 RX ADMIN — MAGNESIUM SULFATE IN WATER 2 G: 40 INJECTION, SOLUTION INTRAVENOUS at 11:06

## 2023-06-23 RX ADMIN — PERTUZUMAB 420 MG: 30 INJECTION, SOLUTION, CONCENTRATE INTRAVENOUS at 11:06

## 2023-06-23 NOTE — PROGRESS NOTES
PATIENT: Josefina Coon  MRN: [id number]  DATE: 6/23/2023      Diagnosis:   1. Invasive ductal carcinoma of breast, female, left    2. FATIMAH (acute kidney injury)    3. Vitamin D deficiency    4. Immunodeficiency due to drugs    5. Chemotherapy induced cardiomyopathy    6. Chemotherapy induced diarrhea        Chief Complaint: Clearance for C13 HP    Subjective:   HPI: Ms. Coon is a 58 y.o. female Ms. Coon is a 58 y.o. female with HTN, HLD, depression, diabetes type 2 with neuropathy, obesity, fatty liver, following up with us for  a diagnosis of invasive ductal carcinoma of the left breast, triple positivity.     She is here today today for consideration of C13D1 of therapy which consists of Trastuzumab/Pertuzumab every 21 days.    Today, tolerating Transtuzumab/pertuzumab.,   - XRT completed 04/13/23  - No fevers, chills, abdominal discomfort, N/V, bleeding, no new lumps or bumps, etc.  - Diarrhea consistent, worsening in the morning, we discussed her FATIMAH and concerning     Oncologic History:   8/25/22 bilateral screening mammogram,,Right neg ,Left central post mass     9/8/22 left diag MMG, left US limited .Left 0300 lateral posterior 6cm FN 1.4cm mass , left axilla LN 2, up to 4mm cortex     9/14/22 left 0300 lateral posterior 6cm FN 1.4cm mass US biopsy .Grade 3 ,1.1cm in biopsy No LVI , ER 84% HI 28% Her 2 3+ pos Ki 52 %    Left axilla LN biopsy ;Benign fatty tissue, no LN, not concordant No MRI of breasts were done      9/21/22 US bilateral complete Right neg, right LN nml , Left 0300 6cm FN [id number] mass Borderline LN left axilla     9/21/22 Left axilla LN biopsy benign LN , so eventually Stage IA triple positive Breast cancer    10/7/22: started neoadjuvant chemo with Taxol + dual HER-2 (tranztuzumab and pertuzumab)    Receiving treatment with Paclitaxel/Trastuzumab/Pertuzumab on D1, weekly Paclitaxel on D8, D15 of a 21 day cycle. Completed weekly Paclitaxel on 12/23/22.    2/2023: s/p B/L mastectomy  with CPR;ypT0,pN0,cM0    Oncology History   Invasive ductal carcinoma of breast, female, left   9/23/2022 Initial Diagnosis    Invasive ductal carcinoma of breast, female, left     9/23/2022 Cancer Staged    Staging form: Breast, AJCC 8th Edition  - Clinical stage from 9/23/2022: Stage IA (cT1c, cN0(f), cM0, G3, ER+, SD+, HER2+)     9/29/2022 - 9/29/2022 Chemotherapy    Treatment Summary   Plan Name: OP BREAST TRASTUZUMAB PACLITAXEL WEEKLY  Treatment Goal: Curative  Status: Inactive  Start Date:   End Date:   Provider: Lisa Jaquez MD  Chemotherapy: PACLitaxeL (TAXOL) 80 mg/m2 = 204 mg in sodium chloride 0.9% 250 mL chemo infusion, 80 mg/m2 = 204 mg, Intravenous, Clinic/HOD 1 time, 0 of 12 cycles  pertuzumab (PERJETA) 840 mg in sodium chloride 0.9% 278 mL infusion, 840 mg (original dose ), Intravenous, Clinic/HOD 1 time, 0 of 17 cycles  Dose modification: 840 mg (Cycle 1, Reason: Other (see comments)), 420 mg (Cycle 4, Reason: Other (see comments))  trastuzumab-dkst (OGIVRI) 591 mg in sodium chloride 0.9% 250 mL chemo infusion, 4 mg/kg = 591 mg, Intravenous, Clinic/HOD 1 time, 0 of 25 cycles     10/7/2022 -  Chemotherapy    Treatment Summary   Plan Name: OP BREAST PACLITAXEL TRASTUZUMAB WEEKLY WITH PERTUZUMAB Q3W (THP)  Treatment Goal: Control  Status: Active  Start Date: 10/7/2022  End Date: 10/6/2023 (Planned)  Provider: Lisa Jaquez MD  Chemotherapy: PACLitaxeL (TAXOL) 80 mg/m2 = 210 mg in sodium chloride 0.9% 250 mL chemo infusion, 80 mg/m2 = 210 mg, Intravenous, Clinic/HOD 1 time, 4 of 4 cycles  Administration: 210 mg (10/7/2022), 204 mg (10/14/2022), 204 mg (10/28/2022), 204 mg (11/4/2022), 204 mg (10/21/2022), 204 mg (11/11/2022), 204 mg (11/18/2022), 204 mg (11/25/2022), 198 mg (12/9/2022), 204 mg (12/2/2022), 198 mg (12/16/2022), 198 mg (12/23/2022)  pertuzumab (PERJETA) 840 mg in sodium chloride 0.9% 278 mL infusion, 840 mg, Intravenous, [facility] 1 time, 13 of 18 cycles  Administration: 840 mg  (10/7/2022), 420 mg (10/28/2022), 420 mg (11/18/2022), 420 mg (12/9/2022), 420 mg (12/30/2022), 420 mg (1/20/2023), 420 mg (2/17/2023), 420 mg (3/10/2023), 420 mg (3/31/2023), 420 mg (4/21/2023), 420 mg (5/12/2023), 420 mg (6/2/2023)  trastuzumab-dkst (OGIVRI) 570 mg in sodium chloride 0.9% 250 mL chemo infusion, 593 mg (100 % of original dose 4 mg/kg), Intravenous, Clinic/[facility] 1 time, 13 of 18 cycles  Dose modification: 4 mg/kg (original dose 4 mg/kg, Cycle 1, Reason: Other (see comments), Comment: weekly trastuzumab), 2 mg/kg (original dose 2 mg/kg, Cycle 2, Reason: Other (see comments), Comment: weekly maintenance dose), 6 mg/kg (original dose 2 mg/kg, Cycle 2, Reason: MD Discretion, Comment: change to q3w dosing), 2 mg/kg (original dose 2 mg/kg, Cycle 1, Reason: Other (see comments), Comment: maintenance weekly dose)  Administration: 570 mg (10/7/2022), 840 mg (10/28/2022), 288 mg (10/14/2022), 290 mg (10/21/2022), 840 mg (11/18/2022), 831 mg (12/9/2022), 831 mg (12/30/2022), 720 mg (1/20/2023), 806 mg (2/17/2023), 750 mg (3/10/2023), 814 mg (3/31/2023), 750 mg (4/21/2023), 750 mg (5/12/2023), 750 mg (6/2/2023)     2/13/2023 Cancer Staged    Staging form: Breast, AJCC 8th Edition  - Pathologic stage from 2/13/2023: No Stage Recommended (ypT0, pN0(sn), cM0, GX)     3/14/2023 - 4/14/2023 Radiation Therapy    Treating physician: Shelia Trevino  Total Dose: 52.56 Gy (42.56Gy/16 fractions to whole breast; 10Gy/5 fraction boost)  Fractions: 20  Treatment Site Ref. ID Energy Dose/Fx (Gy) #Fx Dose Correction (Gy) Total Dose (Gy) Start Date End Date Elapsed Days   3D Breast_L NormEZCalc 18X 2.66 16 / 16 0 42.56 3/14/2023 4/6/2023 23   3d BrstBst_L NormBst 18X 2.5 4 / 4 0 10 4/10/2023 4/14/2023 4         Past Medical History:   Past Medical History:   Diagnosis Date    Abnormal liver enzymes     Asymptomatic varicose veins     Breast cancer 09/14/2022    left idc    Cancer     left breast cancer    Depressive disorder,  not elsewhere classified     Dyslipidemia     Embolism and thrombosis of unspecified site     superficial venous thrombosis    Encounter for long-term (current) use of other medications     Family history of ischemic heart disease     Fatty liver     Generalized anxiety disorder     History of chicken pox     Obstructive sleep apnea (adult) (pediatric)     Type II or unspecified type diabetes mellitus without mention of complication, not stated as uncontrolled     Unspecified essential hypertension     Unspecified venous (peripheral) insufficiency     Unspecified vitamin D deficiency        Past Surgical HIstory:   Past Surgical History:   Procedure Laterality Date    BREAST BIOPSY Left 2022    idc    BREAST BIOPSY Left 2022    neg ln    BREAST BIOPSY Left 2022    neg ln     SECTION      COLONOSCOPY      ESOPHAGOGASTRODUODENOSCOPY      INSERTION OF TUNNELED CENTRAL VENOUS CATHETER (CVC) WITH SUBCUTANEOUS PORT Right 10/04/2022    Procedure: [id number];  Surgeon: Dominick Ng MD;  Location: [facility] OR;  Service: General;  Laterality: Right;    LUMPECTOMY, WITH RADAR LOCALIZATION USING TRENTNO  Left 2023    Procedure: LUMPECTOMY,WITH RADAR LOCALIZATION USING TRENTON ;  Surgeon: Maria G Mcduffie MD;  Location: [facility] OR;  Service: General;  Laterality: Left;    SENTINEL LYMPH NODE BIOPSY Left 2023    Procedure: BIOPSY, LYMPH NODE, SENTINEL;  Surgeon: Maria G cMduffie MD;  Location: [facility] OR;  Service: General;  Laterality: Left;    VEIN SURGERY      Laser, Dr. Larsen       Family History:   Family History   Problem Relation Age of Onset    Hyperlipidemia Mother     Hypertension Mother     Vulvar Cancer Mother     Dementia Mother     Atrial fibrillation Father     Parkinsonism Father     Diabetes Brother     Cancer Brother         colon    Hypertension Son     Hyperlipidemia Son     Anxiety disorder Son     Stroke Maternal Grandmother        Social History:  reports  that she has never smoked. She has never used smokeless tobacco. She reports that she does not currently use alcohol. She reports that she does not use drugs.    Allergies:  Review of patient's allergies indicates:   Allergen Reactions    Sitagliptin      Other reaction(s): (januvia) abdominal pain    Sulfa (sulfonamide antibiotics) Hives    Byetta  [exenatide] Rash    Lactose        Medications:  Current Outpatient Medications   Medication Sig Dispense Refill    albuterol (PROVENTIL/VENTOLIN HFA) 90 mcg/actuation inhaler Inhale 2 puffs into the lungs every 6 (six) hours as needed for Wheezing. 18 g 6    ALPRAZolam (XANAX) 0.25 MG tablet TAKE ONE TABLET BY MOUTH 1-2 TIMES DAILY AS NEEDED ANXIETY 15 tablet 0    anastrozole (ARIMIDEX) 1 mg Tab Take 1 tablet (1 mg total) by mouth once daily. 90 tablet 3    buPROPion (WELLBUTRIN XL) 150 MG TB24 tablet TAKE 1 TABLET BY MOUTH EVERY DAY 90 tablet 3    cyanocobalamin 1,000 mcg/mL injection Inject 1 ml q 2 weeks x 1 month then 1 ml monthly 30 mL 0    dicyclomine (BENTYL) 10 MG capsule TAKE 1 CAPSULE BY MOUTH 4 TIMES DAILY BEFORE MEALS AND NIGHTLY. 60 capsule 0    ergocalciferol (ERGOCALCIFEROL) 50,000 unit Cap TAKE 1 CAPSULE BY MOUTH ONE TIME PER WEEK 12 capsule 0    hydroCHLOROthiazide (HYDRODIURIL) 12.5 MG Tab Take 2 tablets (25 mg total) by mouth once daily. 90 tablet 0    LIDOcaine-prilocaine (EMLA) cream Apply to port site 1 hour prior to port access and cover 30 g 3    magnesium 30 mg Tab Take 30 mg by mouth once.      metFORMIN (GLUCOPHAGE) 500 MG tablet TAKE 1 TABLET BY MOUTH TWICE A DAY WITH MEALS 180 tablet 1    omeprazole (PRILOSEC OTC) 20 MG tablet Take 1 tablet (20 mg total) by mouth once daily. 30 tablet 6    pravastatin (PRAVACHOL) 10 MG tablet TAKE 1 TABLET BY MOUTH EVERYDAY AT BEDTIME 90 tablet 1    tirzepatide (MOUNJARO) 5 mg/0.5 mL PnIj Inject 5 mg into the skin every 7 days. 4 pen 1    cyanocobalamin 500 MCG tablet PLACE 1,000 MCG (2 TABLETS) UNDER THE  TONGUE ONCE DAILY.      cyanocobalamin, vitamin B-12, 1,000 mcg Subl Place 1,000 mcg under the tongue once daily. 30 tablet 3    dulaglutide (TRULICITY SUBQ) Inject into the skin.      gabapentin (NEURONTIN) 100 MG capsule Take 1 capsule (100 mg total) by mouth every evening. 30 capsule 0    HYDROcodone-acetaminophen (NORCO)  mg per tablet Take 1 tablet by mouth every 6 (six) hours as needed for Pain. 20 tablet 0    nystatin (MYCOSTATIN) ointment Apply topically 3 (three) times daily. 30 g 2    nystatin (MYCOSTATIN) powder Apply to affected area 3 times daily 60 g 1    omeprazole (PRILOSEC) 10 MG capsule omeprazole      ondansetron (ZOFRAN) 8 MG tablet Take 1 tablet (8 mg total) by mouth every 8 (eight) hours as needed for Nausea. 30 tablet 2    promethazine (PHENERGAN) 25 MG tablet Take 1 tablet (25 mg total) by mouth every 6 (six) hours as needed for Nausea. 30 tablet 0    triamcinolone acetonide 0.025% (KENALOG) 0.025 % cream Apply topically 2 (two) times daily. Apply to the skin lesions twice a day 15 g 0     Current Facility-Administered Medications   Medication Dose Route Frequency Provider Last Rate Last Admin    cyanocobalamin injection 1,000 mcg  1,000 mcg Intramuscular 1 time in Clinic/HOD Lisa Jaquez MD         Facility-Administered Medications Ordered in Other Visits   Medication Dose Route Frequency Provider Last Rate Last Admin    0.9%  NaCl infusion   Intravenous 1 time in Clinic/HOD Lisa Jaquez MD        diphenhydrAMINE injection 50 mg  50 mg Intravenous Once PRN Lisa Jaquez MD        EPINEPHrine (EPIPEN) 0.3 mg/0.3 mL pen injection 0.3 mg  0.3 mg Intramuscular Once PRN Lisa Jaquez MD        hydrocortisone sodium succinate injection 100 mg  100 mg Intravenous Once PRN Lisa Jaquez MD        lactated ringers infusion   Intravenous Continuous Maria G Mcduffie  mL/hr at 02/08/23 0700 New Bag at 02/08/23 0814    midazolam (VERSED) 1 mg/mL injection 2 mg  2 mg Intravenous See  "admin instructions Maria G Mcduffie MD   2 mg at 02/08/23 0711    sodium chloride 0.9% 250 mL flush bag   Intravenous 1 time in Clinic/HOD Lisa Jaquez MD        sodium chloride 0.9% flush 10 mL  10 mL Intravenous PRN Lisa Jaquez MD           Review of Systems   Constitutional:  Negative for chills, fatigue and fever.   HENT:  Negative for trouble swallowing.    Respiratory:  Negative for cough and shortness of breath.    Cardiovascular:  Negative for chest pain and palpitations.   Gastrointestinal:  Positive for diarrhea. Negative for abdominal pain, constipation, nausea and vomiting.   Genitourinary:  Negative for dysuria.   Musculoskeletal:  Negative for arthralgias.   Skin:  Negative for rash.   Neurological:  Negative for headaches.   Hematological:  Negative for adenopathy.     Objective:      Vitals:   Vitals:    06/23/23 1000   BP: 126/74   BP Location: Right arm   Patient Position: Sitting   BP Method: Large (Manual)   Pulse: 77   Resp: 16   Temp: 97 °F (36.1 °C)   TempSrc: Temporal   SpO2: 96%   Weight: 129.9 kg (286 lb 6 oz)   Height: 5' 4" (1.626 m)         BMI: Body mass index is 49.16 kg/m².    Physical Exam  Vitals reviewed.   Constitutional:       General: She is not in acute distress.     Appearance: She is not diaphoretic.   HENT:      Head: Normocephalic and atraumatic.      Mouth/Throat:      Mouth: Mucous membranes are moist.      Pharynx: No oropharyngeal exudate.   Eyes:      General: No scleral icterus.     Conjunctiva/sclera: Conjunctivae normal.   Cardiovascular:      Rate and Rhythm: Normal rate and regular rhythm.      Heart sounds: Normal heart sounds. No murmur heard.  Pulmonary:      Effort: Pulmonary effort is normal. No respiratory distress.      Breath sounds: No wheezing.   Chest:          Comments: B/L mastectomy   Area of excoriation from irradiation  Abdominal:      General: Bowel sounds are normal.      Palpations: Abdomen is soft.   Musculoskeletal:      Cervical back: " Neck supple. No tenderness.      Right lower leg: No edema.      Left lower leg: No edema.   Lymphadenopathy:      Cervical: No cervical adenopathy.      Upper Body:      Right upper body: No supraclavicular or axillary adenopathy.      Left upper body: No supraclavicular or axillary adenopathy.      Lower Body: No right inguinal adenopathy. No left inguinal adenopathy.   Skin:     General: Skin is warm.      Findings: No rash.   Neurological:      Mental Status: She is alert and oriented to person, place, and time.   Psychiatric:         Behavior: Behavior normal.         Thought Content: Thought content normal.       Laboratory Data:  Lab Results   Component Value Date    WBC 7.53 06/21/2023    HGB 12.2 06/21/2023    HCT 36.6 (L) 06/21/2023    MCV 90 06/21/2023     06/21/2023      CMP  Sodium   Date Value Ref Range Status   06/23/2023 138 136 - 145 mmol/L Final   08/08/2015 137 137 - 145 MMOL/L Final     Potassium   Date Value Ref Range Status   06/23/2023 3.2 (L) 3.5 - 5.1 mmol/L Final     Comment:     Anion Gap reference range revised on 4/28/2023 08/08/2015 4.4 3.5 - 5.1 MMOL/L Final     Chloride   Date Value Ref Range Status   06/23/2023 100 95 - 110 mmol/L Final   08/08/2015 101 98 - 107 MMOL/L Final     CO2   Date Value Ref Range Status   06/23/2023 28 22 - 31 mmol/L Final     Glucose   Date Value Ref Range Status   06/23/2023 135 (H) 70 - 110 mg/dL Final     Comment:     The ADA recommends the following guidelines for fasting glucose:    Normal:       less than 100 mg/dL    Prediabetes:  100 mg/dL to 125 mg/dL    Diabetes:     126 mg/dL or higher       BUN   Date Value Ref Range Status   06/23/2023 20 (H) 7 - 18 mg/dL Final     Creatinine   Date Value Ref Range Status   06/23/2023 1.61 (H) 0.50 - 1.40 mg/dL Final   08/08/2015 0.63 0.52 - 1.04 MG/DL Final     Calcium   Date Value Ref Range Status   06/23/2023 9.4 8.4 - 10.2 mg/dL Final     Total Protein   Date Value Ref Range Status   06/23/2023 7.7  6.0 - 8.4 g/dL Final     Albumin   Date Value Ref Range Status   06/23/2023 4.3 3.5 - 5.2 g/dL Final     Total Bilirubin   Date Value Ref Range Status   06/23/2023 0.3 0.2 - 1.3 mg/dL Final     Alkaline Phosphatase   Date Value Ref Range Status   06/23/2023 132 38 - 145 U/L Final     AST   Date Value Ref Range Status   06/23/2023 22 14 - 36 U/L Final     ALT   Date Value Ref Range Status   06/23/2023 21 0 - 35 U/L Final     Anion Gap   Date Value Ref Range Status   06/23/2023 10 mmol/L Final     Comment:     Anion Gap reference range revised on 4/28/2023     eGFR   Date Value Ref Range Status   06/23/2023 36 (A) >60 mL/min/1.73 m^2 Final       Assessment:       1. Invasive ductal carcinoma of breast, female, left    2. FATIMAH (acute kidney injury)    3. Vitamin D deficiency    4. Immunodeficiency due to drugs    5. Chemotherapy induced cardiomyopathy    6. Chemotherapy induced diarrhea          Plan:   Invasive ductal carcinoma of the breast, left  - cT1c triple positive breast cancer  (ER 84% RI 28% Her 2 3+ pos Ki 52 %), given her young age and Ki67%, will proceed with TH, adding P to help with a better pCR, will also plan to get ultrasound mid cycle to monitor (3 months)  -9/26/22 Echo with EF 60%; next is scheduled for 01/18/23  -Began TH+P on 10/7/2022; completed 12 weeks of Paclitaxel 12/23/22  - 2/2023; - s/p B/l Mastectom CPR;ypT0,pN0,cM0  -Proceed with Trastuzumab/Pertuzumab   - on adjuvant radiation plan for 16 fraction total - completed 04/13/23  - will need adjuvant endocrine therapy after finishing up adjuvant radiation   -Echo 5/2023 EF: 60% and global longitudinal 17.0%, cont monitoring with echo in 3 months ;   -Proceed with C13 HP today; will add 2 gm Magnesium to 1000 ml NS (hydrate) over 2 hours  - last period  around~ 50,, post menopausal , on anastrazole  June 2nd /2023 , getting DEXA scan, cont taking Vit D, hold off  calcium for now given her FATIMAH     FATIMAH possible dehydration:  -will give 1L of NS  and schedule a hydration day next week and repeat blood work prior CBC/CMP/Mag  - instruct her to stop HCTZ and Mag to stop the diarrhea      Diabetes type 2 with neuropathy   -Taking Metformin, Trulicity  - on gabapentin 100mg night     Anxiety  -Taking Wellbutrin  -Following with Dr. Long     Hypomagnesemia  -Monitor, replacement as needed     Patient queried and all questions were answered.    Route Chart for Scheduling    Med Onc Chart Routing      Follow up with physician . DEXA scan prior 8/4 and 8/25 with me and 10/5 with Dr Vásquez, please link vit D to any blood work amaris   Follow up with [facility] . 9/15   Infusion scheduling note   proceed with inufion today, need 2g of Mag an 1L of NS, please schedule hydration on 7/3/23, will need CBC/CMP/Mag   Injection scheduling note    Labs CBC, CMP, vitamin D and magnesium   Scheduling:  Preferred lab:  Lab interval:     Imaging    Pharmacy appointment    Other referrals            Treatment Plan Information   OP BREAST PACLITAXEL TRASTUZUMAB WEEKLY WITH PERTUZUMAB Q3W (THP)   Lisa Jaquez MD   Upcoming Treatment Dates - OP BREAST PACLITAXEL TRASTUZUMAB WEEKLY WITH PERTUZUMAB Q3W (THP)    7/14/2023       Chemotherapy       pertuzumab (PERJETA) 420 mg in sodium chloride 0.9% 264 mL infusion       trastuzumab-dkst (OGIVRI) in sodium chloride 0.9% chemo infusion       Supportive Care       magnesium sulfate 2 g in sodium chloride 0.9% 1,000 mL  8/4/2023       Chemotherapy       pertuzumab (PERJETA) 420 mg in sodium chloride 0.9% 264 mL infusion       trastuzumab-dkst (OGIVRI) in sodium chloride 0.9% chemo infusion       Supportive Care       magnesium sulfate 2 g in sodium chloride 0.9% 1,000 mL  8/25/2023       Chemotherapy       pertuzumab (PERJETA) 420 mg in sodium chloride 0.9% 264 mL infusion       trastuzumab-dkst (OGIVRI) in sodium chloride 0.9% chemo infusion       Supportive Care       magnesium sulfate 2 g in sodium chloride 0.9% 1,000 mL  9/15/2023        Chemotherapy       pertuzumab (PERJETA) 420 mg in sodium chloride 0.9% 264 mL infusion       trastuzumab-dkst (OGIVRI) in sodium chloride 0.9% chemo infusion       Supportive Care       magnesium sulfate 2 g in sodium chloride 0.9% 1,000 mL    Supportive Plan Information  IV FLUIDS AND ELECTROLYTES   Lisa Jaquez MD   Upcoming Treatment Dates - IV FLUIDS AND ELECTROLYTES    No upcoming days in selected categories.     42 minutes were spent in coordination of patient's care, record review and counseling.

## 2023-06-23 NOTE — PLAN OF CARE
Problem: Adult Inpatient Plan of Care  Goal: Patient-Specific Goal (Individualized)  6/23/2023 1649 by Lilliam Vasquez, RN  Flowsheets (Taken 6/23/2023 1100)  Anxieties, Fears or Concerns: none  Individualized Care Needs: recliner, warm blanket, conversation     Problem: Adult Inpatient Plan of Care  Goal: Plan of Care Review  6/23/2023 1649 by Lilliam Vasquez, RN  Outcome: Ongoing, Progressing  Flowsheets (Taken 6/23/2023 1350)  Plan of Care Reviewed With: patient   Pt tolerated her Perjeta/Ogivri/Mag infusions well, NAD. No new c/o voiced. Pt given a schedule and reviewed, pt verbalized understanding. Pt ambulated out of the clinic without difficulty accompanied by her son.

## 2023-06-23 NOTE — PLAN OF CARE
Problem: Fatigue  Goal: Improved Activity Tolerance  Intervention: Promote Improved Energy  Flowsheets (Taken 6/23/2023 1100)  Fatigue Management:   activity schedule adjusted   frequent rest breaks encouraged   paced activity encouraged   fatigue-related activity identified  Sleep/Rest Enhancement:   relaxation techniques promoted   natural light exposure provided   regular sleep/rest pattern promoted  Activity Management:   Ambulated -L4   Ambulated to bathroom - L4   Ambulated in rajan - L4   Up in stretcher chair - L1

## 2023-06-26 ENCOUNTER — PATIENT MESSAGE (OUTPATIENT)
Dept: HEMATOLOGY/ONCOLOGY | Facility: CLINIC | Age: 58
End: 2023-06-26
Payer: COMMERCIAL

## 2023-06-27 ENCOUNTER — TELEPHONE (OUTPATIENT)
Dept: HEMATOLOGY/ONCOLOGY | Facility: CLINIC | Age: 58
End: 2023-06-27
Payer: COMMERCIAL

## 2023-06-27 DIAGNOSIS — C50.912 INVASIVE DUCTAL CARCINOMA OF BREAST, FEMALE, LEFT: Primary | ICD-10-CM

## 2023-06-27 DIAGNOSIS — E83.42 HYPOMAGNESEMIA: ICD-10-CM

## 2023-06-27 DIAGNOSIS — N17.9 AKI (ACUTE KIDNEY INJURY): ICD-10-CM

## 2023-06-27 NOTE — TELEPHONE ENCOUNTER
----- Message from Livier Cox sent at 6/27/2023 11:55 AM CDT -----  Type: Needs Medical Advice  Who Called:  Patient   Symptoms (please be specific):    How long has patient had these symptoms:    Pharmacy name and phone #:    Best Call Back Number: [phone]  Additional Information: Patient is requesting a call back regarding orders for labs to be put in before her appt. with Dr. Jaquez on 7/14.     Spoke with patient, labs are scheduled per her request.

## 2023-06-29 ENCOUNTER — HOSPITAL ENCOUNTER (OUTPATIENT)
Dept: RADIOLOGY | Facility: HOSPITAL | Age: 58
Discharge: HOME | End: 2023-06-29
Attending: STUDENT IN AN ORGANIZED HEALTH CARE EDUCATION/TRAINING PROGRAM
Payer: COMMERCIAL

## 2023-06-29 DIAGNOSIS — C50.912 INVASIVE DUCTAL CARCINOMA OF BREAST, FEMALE, LEFT: ICD-10-CM

## 2023-06-29 DIAGNOSIS — N17.9 AKI (ACUTE KIDNEY INJURY): ICD-10-CM

## 2023-06-29 PROCEDURE — 76770 US EXAM ABDO BACK WALL COMP: CPT | Mod: 26,,, | Performed by: RADIOLOGY

## 2023-06-29 PROCEDURE — 76770 US EXAM ABDO BACK WALL COMP: CPT | Mod: TC,PO

## 2023-06-29 PROCEDURE — 76770 US RETROPERITONEAL COMPLETE: ICD-10-PCS | Mod: 26,,, | Performed by: RADIOLOGY

## 2023-07-01 DIAGNOSIS — Z79.4 TYPE 2 DIABETES MELLITUS WITH CHRONIC KIDNEY DISEASE, WITH LONG-TERM CURRENT USE OF INSULIN, UNSPECIFIED CKD STAGE: ICD-10-CM

## 2023-07-01 DIAGNOSIS — C50.912 INVASIVE DUCTAL CARCINOMA OF BREAST, FEMALE, LEFT: ICD-10-CM

## 2023-07-01 DIAGNOSIS — E11.22 TYPE 2 DIABETES MELLITUS WITH CHRONIC KIDNEY DISEASE, WITH LONG-TERM CURRENT USE OF INSULIN, UNSPECIFIED CKD STAGE: ICD-10-CM

## 2023-07-01 DIAGNOSIS — T45.1X5A CHEMOTHERAPY INDUCED CARDIOMYOPATHY: ICD-10-CM

## 2023-07-01 DIAGNOSIS — I42.7 CHEMOTHERAPY INDUCED CARDIOMYOPATHY: ICD-10-CM

## 2023-07-03 ENCOUNTER — CLINICAL SUPPORT (OUTPATIENT)
Dept: CARDIOLOGY | Facility: HOSPITAL | Age: 58
End: 2023-07-03
Payer: COMMERCIAL

## 2023-07-03 ENCOUNTER — INFUSION (OUTPATIENT)
Dept: INFUSION THERAPY | Facility: HOSPITAL | Age: 58
End: 2023-07-03
Attending: STUDENT IN AN ORGANIZED HEALTH CARE EDUCATION/TRAINING PROGRAM
Payer: COMMERCIAL

## 2023-07-03 VITALS
BODY MASS INDEX: 47.38 KG/M2 | SYSTOLIC BLOOD PRESSURE: 116 MMHG | WEIGHT: 284.38 LBS | DIASTOLIC BLOOD PRESSURE: 81 MMHG | TEMPERATURE: 98 F | RESPIRATION RATE: 18 BRPM | HEIGHT: 65 IN | HEART RATE: 74 BPM

## 2023-07-03 DIAGNOSIS — K52.1 CHEMOTHERAPY INDUCED DIARRHEA: ICD-10-CM

## 2023-07-03 DIAGNOSIS — Z79.899 IMMUNODEFICIENCY DUE TO DRUGS: Primary | ICD-10-CM

## 2023-07-03 DIAGNOSIS — R60.1 GENERALIZED EDEMA: ICD-10-CM

## 2023-07-03 DIAGNOSIS — T45.1X5A CHEMOTHERAPY INDUCED DIARRHEA: ICD-10-CM

## 2023-07-03 DIAGNOSIS — D84.821 IMMUNODEFICIENCY DUE TO DRUGS: Primary | ICD-10-CM

## 2023-07-03 DIAGNOSIS — I10 ESSENTIAL HYPERTENSION: ICD-10-CM

## 2023-07-03 PROCEDURE — 96360 HYDRATION IV INFUSION INIT: CPT | Mod: PN

## 2023-07-03 PROCEDURE — 25000003 PHARM REV CODE 250: Mod: PN | Performed by: STUDENT IN AN ORGANIZED HEALTH CARE EDUCATION/TRAINING PROGRAM

## 2023-07-03 PROCEDURE — 93272 ECG/REVIEW INTERPRET ONLY: CPT | Mod: ,,, | Performed by: INTERNAL MEDICINE

## 2023-07-03 PROCEDURE — 93270 REMOTE 30 DAY ECG REV/REPORT: CPT | Mod: PO

## 2023-07-03 PROCEDURE — 63600175 PHARM REV CODE 636 W HCPCS: Mod: PN | Performed by: STUDENT IN AN ORGANIZED HEALTH CARE EDUCATION/TRAINING PROGRAM

## 2023-07-03 PROCEDURE — 93272 CARDIAC EVENT MONITOR (CUPID ONLY): ICD-10-PCS | Mod: ,,, | Performed by: INTERNAL MEDICINE

## 2023-07-03 PROCEDURE — 96361 HYDRATE IV INFUSION ADD-ON: CPT | Mod: PN

## 2023-07-03 RX ORDER — HEPARIN 100 UNIT/ML
500 SYRINGE INTRAVENOUS
Status: DISCONTINUED | OUTPATIENT
Start: 2023-07-03 | End: 2023-07-03 | Stop reason: HOSPADM

## 2023-07-03 RX ORDER — HYDROCHLOROTHIAZIDE 12.5 MG/1
25 TABLET ORAL DAILY
Qty: 90 TABLET | Refills: 0 | Status: SHIPPED | OUTPATIENT
Start: 2023-07-03 | End: 2023-07-07

## 2023-07-03 RX ORDER — SODIUM CHLORIDE 0.9 % (FLUSH) 0.9 %
10 SYRINGE (ML) INJECTION
Status: DISCONTINUED | OUTPATIENT
Start: 2023-07-03 | End: 2023-07-03 | Stop reason: HOSPADM

## 2023-07-03 RX ORDER — UBIDECARENONE 75 MG
CAPSULE ORAL
Qty: 200 TABLET | Refills: 1 | Status: SHIPPED | OUTPATIENT
Start: 2023-07-03 | End: 2023-07-03

## 2023-07-03 RX ADMIN — MAGNESIUM SULFATE HEPTAHYDRATE: 500 INJECTION, SOLUTION INTRAMUSCULAR; INTRAVENOUS at 12:07

## 2023-07-04 DIAGNOSIS — I48.91 ATRIAL FIBRILLATION, UNSPECIFIED TYPE: Primary | ICD-10-CM

## 2023-07-04 DIAGNOSIS — I10 ESSENTIAL HYPERTENSION: ICD-10-CM

## 2023-07-04 DIAGNOSIS — R60.1 GENERALIZED EDEMA: ICD-10-CM

## 2023-07-05 ENCOUNTER — PATIENT MESSAGE (OUTPATIENT)
Dept: CARDIOLOGY | Facility: CLINIC | Age: 58
End: 2023-07-05
Payer: COMMERCIAL

## 2023-07-05 RX ORDER — HYDROCHLOROTHIAZIDE 12.5 MG/1
25 TABLET ORAL DAILY
Qty: 180 TABLET | Refills: 1 | OUTPATIENT
Start: 2023-07-05

## 2023-07-05 NOTE — TELEPHONE ENCOUNTER
"Received alert from monitor company stating pt in new onset afib. Spoke with pt who felt like she was having palpitations, tired and just "felt off." Please advise.  "

## 2023-07-06 ENCOUNTER — PATIENT MESSAGE (OUTPATIENT)
Dept: HEMATOLOGY/ONCOLOGY | Facility: CLINIC | Age: 58
End: 2023-07-06
Payer: COMMERCIAL

## 2023-07-06 NOTE — TELEPHONE ENCOUNTER
Please advise: pt concerned about taking the Eliquis with her reduced kidney function from chemotherapy and with her current treatments of Prejeta and Herceptin. She has been referred to nephrology: she's been told she has 49% function up from 29%

## 2023-07-07 ENCOUNTER — TELEPHONE (OUTPATIENT)
Dept: HEMATOLOGY/ONCOLOGY | Facility: CLINIC | Age: 58
End: 2023-07-07
Payer: COMMERCIAL

## 2023-07-07 ENCOUNTER — OFFICE VISIT (OUTPATIENT)
Dept: NEPHROLOGY | Facility: CLINIC | Age: 58
End: 2023-07-07
Payer: COMMERCIAL

## 2023-07-07 VITALS
HEIGHT: 65 IN | OXYGEN SATURATION: 98 % | HEART RATE: 72 BPM | BODY MASS INDEX: 49.85 KG/M2 | DIASTOLIC BLOOD PRESSURE: 82 MMHG | SYSTOLIC BLOOD PRESSURE: 124 MMHG

## 2023-07-07 DIAGNOSIS — Z79.4 TYPE 2 DIABETES MELLITUS WITH STAGE 3B CHRONIC KIDNEY DISEASE, WITH LONG-TERM CURRENT USE OF INSULIN: ICD-10-CM

## 2023-07-07 DIAGNOSIS — C50.912 INVASIVE DUCTAL CARCINOMA OF BREAST, FEMALE, LEFT: ICD-10-CM

## 2023-07-07 DIAGNOSIS — B37.2 FLEXURAL CANDIDOSIS: ICD-10-CM

## 2023-07-07 DIAGNOSIS — N18.32 CHRONIC KIDNEY DISEASE, STAGE 3B: ICD-10-CM

## 2023-07-07 DIAGNOSIS — B37.2 YEAST INFECTION OF THE SKIN: ICD-10-CM

## 2023-07-07 DIAGNOSIS — E83.42 HYPOMAGNESEMIA: ICD-10-CM

## 2023-07-07 DIAGNOSIS — E11.22 TYPE 2 DIABETES MELLITUS WITH STAGE 3B CHRONIC KIDNEY DISEASE, WITH LONG-TERM CURRENT USE OF INSULIN: ICD-10-CM

## 2023-07-07 DIAGNOSIS — I10 ESSENTIAL HYPERTENSION: ICD-10-CM

## 2023-07-07 DIAGNOSIS — N18.32 TYPE 2 DIABETES MELLITUS WITH STAGE 3B CHRONIC KIDNEY DISEASE, WITH LONG-TERM CURRENT USE OF INSULIN: ICD-10-CM

## 2023-07-07 DIAGNOSIS — D84.821 IMMUNODEFICIENCY DUE TO DRUGS: ICD-10-CM

## 2023-07-07 DIAGNOSIS — E11.41 DIABETIC MONONEUROPATHY ASSOCIATED WITH TYPE 2 DIABETES MELLITUS: Primary | ICD-10-CM

## 2023-07-07 DIAGNOSIS — I74.9 EMBOLISM AND THROMBOSIS: ICD-10-CM

## 2023-07-07 DIAGNOSIS — Z79.899 IMMUNODEFICIENCY DUE TO DRUGS: ICD-10-CM

## 2023-07-07 PROCEDURE — 3008F PR BODY MASS INDEX (BMI) DOCUMENTED: ICD-10-PCS | Mod: CPTII,S$GLB,, | Performed by: INTERNAL MEDICINE

## 2023-07-07 PROCEDURE — 3044F HG A1C LEVEL LT 7.0%: CPT | Mod: CPTII,S$GLB,, | Performed by: INTERNAL MEDICINE

## 2023-07-07 PROCEDURE — 99205 PR OFFICE/OUTPT VISIT, NEW, LEVL V, 60-74 MIN: ICD-10-PCS | Mod: S$GLB,,, | Performed by: INTERNAL MEDICINE

## 2023-07-07 PROCEDURE — 3079F PR MOST RECENT DIASTOLIC BLOOD PRESSURE 80-89 MM HG: ICD-10-PCS | Mod: CPTII,S$GLB,, | Performed by: INTERNAL MEDICINE

## 2023-07-07 PROCEDURE — 3074F SYST BP LT 130 MM HG: CPT | Mod: CPTII,S$GLB,, | Performed by: INTERNAL MEDICINE

## 2023-07-07 PROCEDURE — 4010F PR ACE/ARB THEARPY RXD/TAKEN: ICD-10-PCS | Mod: CPTII,S$GLB,, | Performed by: INTERNAL MEDICINE

## 2023-07-07 PROCEDURE — 4010F ACE/ARB THERAPY RXD/TAKEN: CPT | Mod: CPTII,S$GLB,, | Performed by: INTERNAL MEDICINE

## 2023-07-07 PROCEDURE — 3060F PR POS MICROALBUMINURIA RESULT DOCUMENTED/REVIEW: ICD-10-PCS | Mod: CPTII,S$GLB,, | Performed by: INTERNAL MEDICINE

## 2023-07-07 PROCEDURE — 1159F PR MEDICATION LIST DOCUMENTED IN MEDICAL RECORD: ICD-10-PCS | Mod: CPTII,S$GLB,, | Performed by: INTERNAL MEDICINE

## 2023-07-07 PROCEDURE — 99205 OFFICE O/P NEW HI 60 MIN: CPT | Mod: S$GLB,,, | Performed by: INTERNAL MEDICINE

## 2023-07-07 PROCEDURE — 1160F PR REVIEW ALL MEDS BY PRESCRIBER/CLIN PHARMACIST DOCUMENTED: ICD-10-PCS | Mod: CPTII,S$GLB,, | Performed by: INTERNAL MEDICINE

## 2023-07-07 PROCEDURE — 99999 PR PBB SHADOW E&M-EST. PATIENT-LVL IV: CPT | Mod: PBBFAC,,, | Performed by: INTERNAL MEDICINE

## 2023-07-07 PROCEDURE — 3079F DIAST BP 80-89 MM HG: CPT | Mod: CPTII,S$GLB,, | Performed by: INTERNAL MEDICINE

## 2023-07-07 PROCEDURE — 3066F NEPHROPATHY DOC TX: CPT | Mod: CPTII,S$GLB,, | Performed by: INTERNAL MEDICINE

## 2023-07-07 PROCEDURE — 1159F MED LIST DOCD IN RCRD: CPT | Mod: CPTII,S$GLB,, | Performed by: INTERNAL MEDICINE

## 2023-07-07 PROCEDURE — 3044F PR MOST RECENT HEMOGLOBIN A1C LEVEL <7.0%: ICD-10-PCS | Mod: CPTII,S$GLB,, | Performed by: INTERNAL MEDICINE

## 2023-07-07 PROCEDURE — 3008F BODY MASS INDEX DOCD: CPT | Mod: CPTII,S$GLB,, | Performed by: INTERNAL MEDICINE

## 2023-07-07 PROCEDURE — 1160F RVW MEDS BY RX/DR IN RCRD: CPT | Mod: CPTII,S$GLB,, | Performed by: INTERNAL MEDICINE

## 2023-07-07 PROCEDURE — 3066F PR DOCUMENTATION OF TREATMENT FOR NEPHROPATHY: ICD-10-PCS | Mod: CPTII,S$GLB,, | Performed by: INTERNAL MEDICINE

## 2023-07-07 PROCEDURE — 99999 PR PBB SHADOW E&M-EST. PATIENT-LVL IV: ICD-10-PCS | Mod: PBBFAC,,, | Performed by: INTERNAL MEDICINE

## 2023-07-07 PROCEDURE — 3074F PR MOST RECENT SYSTOLIC BLOOD PRESSURE < 130 MM HG: ICD-10-PCS | Mod: CPTII,S$GLB,, | Performed by: INTERNAL MEDICINE

## 2023-07-07 PROCEDURE — 3060F POS MICROALBUMINURIA REV: CPT | Mod: CPTII,S$GLB,, | Performed by: INTERNAL MEDICINE

## 2023-07-07 RX ORDER — PANCRELIPASE 30000; 6000; 19000 [USP'U]/1; [USP'U]/1; [USP'U]/1
1 CAPSULE, DELAYED RELEASE PELLETS ORAL
Qty: 90 CAPSULE | Refills: 11 | Status: SHIPPED | OUTPATIENT
Start: 2023-07-07 | End: 2024-07-06

## 2023-07-07 RX ORDER — NYSTATIN 100000 [USP'U]/G
POWDER TOPICAL
Qty: 60 G | Refills: 1 | Status: SHIPPED | OUTPATIENT
Start: 2023-07-07 | End: 2024-07-06

## 2023-07-07 RX ORDER — LIDOCAINE AND PRILOCAINE 25; 25 MG/G; MG/G
CREAM TOPICAL
Qty: 30 G | Refills: 3 | Status: SHIPPED | OUTPATIENT
Start: 2023-07-07 | End: 2023-11-22

## 2023-07-07 RX ORDER — DAPAGLIFLOZIN 5 MG/1
5 TABLET, FILM COATED ORAL DAILY
Qty: 90 TABLET | Refills: 3 | Status: SHIPPED | OUTPATIENT
Start: 2023-07-07 | End: 2024-07-06

## 2023-07-07 NOTE — TELEPHONE ENCOUNTER
Called pt to let her know Emla cream was approved.  Pt verbalized understanding.  Also linked labs to lab appt for next week per pt request.

## 2023-07-07 NOTE — PROGRESS NOTES
Subjective:       Patient ID: Josefina Coon is a 58 y.o. White female who presents for initial evaluation of elevated sr cr referred by PCP, dr Jaquez.     Renal function per chart review with sr cr baseline of 0.8 mg/dL prior to starting chemo in 2021, with increased baseline of sr cr 1.3 during chemo and after up to 2 mg/dL. Now sr cr of 1.4 mg/dL. June 2023: Renal US R 11.2 cma nd L 10.3 cm.    - Recent dx of Breast CA in Sept 2022 on chemo with taxol/herceptin in October to December 2022 and radiation. Complicated with diarrhea post chemo.  - PAD on eliquis  - DM type 2 diagnosed in 2010's with initial HgbA1c of 6%. Review of hemoglobin A1C show moderate control with average HgbA1c between 6-9%. Never hospitalized for DKA. With proteinuria,  currently not on RASSi.   - HTN diagnosed about 10 years ago. Patient reports good adherence to the current regiment, which includes none. They are following low salt diet. Previously on Micardis and HCTZ and stopped due to hypotension. Never hospitalized for uncontrolled HTN or hypotension/syncopal episodes.  - Chronic use of NSAIDs: every other day for years    Denies nephrolithiasis or fam Hx of renal disease.      Review of Systems   Constitutional:  Negative for chills, fatigue and fever.   HENT:  Negative for hearing loss, trouble swallowing and voice change.    Respiratory:  Negative for cough, shortness of breath and wheezing.    Cardiovascular:  Positive for leg swelling. Negative for chest pain and palpitations.   Gastrointestinal:  Positive for abdominal pain and diarrhea. Negative for abdominal distention and constipation.   Endocrine: Negative for polydipsia, polyphagia and polyuria.   Genitourinary:  Positive for frequency. Negative for dysuria, flank pain and hematuria.   Musculoskeletal:  Positive for arthralgias and myalgias. Negative for back pain.   Skin:  Negative for rash and wound.   Neurological:  Positive for dizziness and light-headedness.  "Negative for syncope and weakness.   Hematological:  Negative for adenopathy. Does not bruise/bleed easily.     The past medical, family and social histories were reviewed for this encounter.     /82 (BP Location: Right arm, Patient Position: Sitting, BP Method: Large (Manual))   Pulse 72   Ht 5' 4.5" (1.638 m)   SpO2 98%   BMI 49.85 kg/m²     Objective:      Physical Exam  Vitals and nursing note reviewed.   Constitutional:       Appearance: Normal appearance. She is obese. She is not ill-appearing.   HENT:      Head: Normocephalic and atraumatic.      Mouth/Throat:      Mouth: Mucous membranes are moist.      Pharynx: Oropharynx is clear.   Eyes:      Extraocular Movements: Extraocular movements intact.      Conjunctiva/sclera: Conjunctivae normal.   Neck:      Vascular: No carotid bruit.   Cardiovascular:      Rate and Rhythm: Normal rate and regular rhythm.      Heart sounds: Normal heart sounds.   Pulmonary:      Effort: Pulmonary effort is normal. No respiratory distress.      Breath sounds: Normal breath sounds.   Abdominal:      General: Bowel sounds are normal. There is no distension.      Palpations: Abdomen is soft.      Tenderness: There is no abdominal tenderness.   Musculoskeletal:         General: Normal range of motion.      Cervical back: Normal range of motion. No tenderness.      Right lower leg: Edema present.      Left lower leg: Edema present.      Comments: Chronic venostasis   Lymphadenopathy:      Cervical: No cervical adenopathy.   Skin:     General: Skin is warm and dry.      Capillary Refill: Capillary refill takes less than 2 seconds.      Coloration: Skin is not jaundiced.   Neurological:      General: No focal deficit present.      Mental Status: She is alert and oriented to person, place, and time.   Psychiatric:         Mood and Affect: Mood normal.         Behavior: Behavior normal.         Judgment: Judgment normal.       Assessment:       1. Diabetic mononeuropathy " associated with type 2 diabetes mellitus    2. Invasive ductal carcinoma of breast, female, left    3. Chronic kidney disease, stage 3b    4. Essential hypertension    5. Hypomagnesemia    6. Embolism and thrombosis of unspecified site    7. Immunodeficiency due to drugs    8. Type 2 diabetes mellitus with stage 3b chronic kidney disease, with long-term current use of insulin        Plan:   Return to clinic in 3 months    CKD stage G3bA2 in a setting of DM and chemo  Baseline creatinine is 1.4 mg/dL with increased proteinuria  Lab Results   Component Value Date    CREATININE 1.4 07/03/2023      - Euvolemic   - Pt understands importance of blood pressure and glycemic control and is aware that uncontrolled HTN and DM leads to progression of CKD   - On SGLT2i for cario-renal protection   - Complete avoidance of NSAIDs   - Low salt diet with < 2000 mg/day   - Dose adjust medications to GFR of 30 - 45   - Avoid nephrotoxins including IV contrast    HTN   - BP well controlled: continue current diet, avoid hypotension and dehydration    DM   - DM without diabetic retinopathy: well controlled with most recent HgbA1c of 6%, continue yearly optho checks   - Start Farxiga   - Hold Kerendia    Chronic diarrhea   - Will follow with GI   - Metamucil once a day   - Start Creon    Breast CA   - On hormone therapy    Hypomagnesemia   - Stop PPI   - IV infusion with heme    SHPT  Lab Results   Component Value Date    CALCIUM 9.6 07/03/2023    - Cont Vit D    DVT   - On Eliquis   - Ok to take Vit K    Med changes:   Start Creon  Start Farxiga    I have personally review notes from referring physician, laboratory findings and appropriate images.  I have spent more then 60 min on this encounter with 50% of my time spent on face to face patient interaction.

## 2023-07-12 ENCOUNTER — PATIENT MESSAGE (OUTPATIENT)
Dept: NEPHROLOGY | Facility: CLINIC | Age: 58
End: 2023-07-12
Payer: COMMERCIAL

## 2023-07-12 ENCOUNTER — LAB VISIT (OUTPATIENT)
Dept: LAB | Facility: HOSPITAL | Age: 58
End: 2023-07-12
Attending: INTERNAL MEDICINE
Payer: COMMERCIAL

## 2023-07-12 ENCOUNTER — PATIENT MESSAGE (OUTPATIENT)
Dept: HEMATOLOGY/ONCOLOGY | Facility: CLINIC | Age: 58
End: 2023-07-12
Payer: COMMERCIAL

## 2023-07-12 DIAGNOSIS — C50.912 INVASIVE DUCTAL CARCINOMA OF BREAST, FEMALE, LEFT: Primary | ICD-10-CM

## 2023-07-12 DIAGNOSIS — E83.42 HYPOMAGNESEMIA: ICD-10-CM

## 2023-07-12 DIAGNOSIS — E11.41 DIABETIC MONONEUROPATHY ASSOCIATED WITH TYPE 2 DIABETES MELLITUS: ICD-10-CM

## 2023-07-12 DIAGNOSIS — Z79.4 TYPE 2 DIABETES MELLITUS WITH STAGE 3B CHRONIC KIDNEY DISEASE, WITH LONG-TERM CURRENT USE OF INSULIN: ICD-10-CM

## 2023-07-12 DIAGNOSIS — I10 ESSENTIAL HYPERTENSION: ICD-10-CM

## 2023-07-12 DIAGNOSIS — E11.22 TYPE 2 DIABETES MELLITUS WITH STAGE 3B CHRONIC KIDNEY DISEASE, WITH LONG-TERM CURRENT USE OF INSULIN: ICD-10-CM

## 2023-07-12 DIAGNOSIS — N17.9 AKI (ACUTE KIDNEY INJURY): ICD-10-CM

## 2023-07-12 DIAGNOSIS — I74.9 EMBOLISM AND THROMBOSIS: ICD-10-CM

## 2023-07-12 DIAGNOSIS — N18.32 TYPE 2 DIABETES MELLITUS WITH STAGE 3B CHRONIC KIDNEY DISEASE, WITH LONG-TERM CURRENT USE OF INSULIN: ICD-10-CM

## 2023-07-12 DIAGNOSIS — C50.912 INVASIVE DUCTAL CARCINOMA OF BREAST, FEMALE, LEFT: ICD-10-CM

## 2023-07-12 DIAGNOSIS — N18.32 CHRONIC KIDNEY DISEASE, STAGE 3B: Primary | ICD-10-CM

## 2023-07-12 DIAGNOSIS — N18.32 CHRONIC KIDNEY DISEASE, STAGE 3B: ICD-10-CM

## 2023-07-12 DIAGNOSIS — Z79.899 IMMUNODEFICIENCY DUE TO DRUGS: ICD-10-CM

## 2023-07-12 DIAGNOSIS — D84.821 IMMUNODEFICIENCY DUE TO DRUGS: ICD-10-CM

## 2023-07-12 LAB
ALBUMIN SERPL BCP-MCNC: 3.9 G/DL (ref 3.5–5.2)
ALBUMIN SERPL BCP-MCNC: 3.9 G/DL (ref 3.5–5.2)
ALP SERPL-CCNC: 134 U/L (ref 55–135)
ALT SERPL W/O P-5'-P-CCNC: 18 U/L (ref 10–44)
AMM URATE CRY URNS QL MICRO: ABNORMAL
AMORPH CRY URNS QL MICRO: ABNORMAL
ANION GAP SERPL CALC-SCNC: 15 MMOL/L (ref 8–16)
ANION GAP SERPL CALC-SCNC: 15 MMOL/L (ref 8–16)
AST SERPL-CCNC: 15 U/L (ref 10–40)
BACTERIA #/AREA URNS HPF: ABNORMAL /HPF
BACTERIA #/AREA URNS HPF: ABNORMAL /HPF
BASOPHILS # BLD AUTO: 0.06 K/UL (ref 0–0.2)
BASOPHILS NFR BLD: 0.6 % (ref 0–1.9)
BILIRUB SERPL-MCNC: 0.6 MG/DL (ref 0.1–1)
BILIRUB UR QL STRIP: NEGATIVE
BUN SERPL-MCNC: 21 MG/DL (ref 6–20)
BUN SERPL-MCNC: 21 MG/DL (ref 6–20)
CALCIUM SERPL-MCNC: 10.6 MG/DL (ref 8.7–10.5)
CALCIUM SERPL-MCNC: 10.6 MG/DL (ref 8.7–10.5)
CHLORIDE SERPL-SCNC: 101 MMOL/L (ref 95–110)
CHLORIDE SERPL-SCNC: 101 MMOL/L (ref 95–110)
CLARITY UR: ABNORMAL
CO2 SERPL-SCNC: 22 MMOL/L (ref 23–29)
CO2 SERPL-SCNC: 22 MMOL/L (ref 23–29)
COLOR UR: YELLOW
CREAT SERPL-MCNC: 1.7 MG/DL (ref 0.5–1.4)
CREAT SERPL-MCNC: 1.7 MG/DL (ref 0.5–1.4)
CREAT UR-MCNC: 115 MG/DL (ref 15–325)
DIFFERENTIAL METHOD: NORMAL
EOSINOPHIL # BLD AUTO: 0.2 K/UL (ref 0–0.5)
EOSINOPHIL NFR BLD: 2.4 % (ref 0–8)
ERYTHROCYTE [DISTWIDTH] IN BLOOD BY AUTOMATED COUNT: 13.1 % (ref 11.5–14.5)
EST. GFR  (NO RACE VARIABLE): 34.1 ML/MIN/1.73 M^2
EST. GFR  (NO RACE VARIABLE): 34.1 ML/MIN/1.73 M^2
GLUCOSE SERPL-MCNC: 135 MG/DL (ref 70–110)
GLUCOSE SERPL-MCNC: 135 MG/DL (ref 70–110)
GLUCOSE UR QL STRIP: NEGATIVE
GRAN CASTS #/AREA URNS LPF: 4 /LPF
GRAN CASTS #/AREA URNS LPF: 4 /LPF
HCT VFR BLD AUTO: 40.1 % (ref 37–48.5)
HGB BLD-MCNC: 13 G/DL (ref 12–16)
HGB UR QL STRIP: ABNORMAL
HYALINE CASTS #/AREA URNS LPF: 0 /LPF
IMM GRANULOCYTES # BLD AUTO: 0.04 K/UL (ref 0–0.04)
IMM GRANULOCYTES NFR BLD AUTO: 0.4 % (ref 0–0.5)
KETONES UR QL STRIP: NEGATIVE
LEUKOCYTE ESTERASE UR QL STRIP: ABNORMAL
LYMPHOCYTES # BLD AUTO: 1.9 K/UL (ref 1–4.8)
LYMPHOCYTES NFR BLD: 19.8 % (ref 18–48)
MAGNESIUM SERPL-MCNC: 1.7 MG/DL (ref 1.6–2.6)
MCH RBC QN AUTO: 30.1 PG (ref 27–31)
MCHC RBC AUTO-ENTMCNC: 32.4 G/DL (ref 32–36)
MCV RBC AUTO: 93 FL (ref 82–98)
MICROSCOPIC COMMENT: ABNORMAL
MICROSCOPIC COMMENT: ABNORMAL
MONOCYTES # BLD AUTO: 0.6 K/UL (ref 0.3–1)
MONOCYTES NFR BLD: 5.8 % (ref 4–15)
NEUTROPHILS # BLD AUTO: 6.8 K/UL (ref 1.8–7.7)
NEUTROPHILS NFR BLD: 71 % (ref 38–73)
NITRITE UR QL STRIP: NEGATIVE
NRBC BLD-RTO: 0 /100 WBC
PH UR STRIP: 5 [PH] (ref 5–8)
PHOSPHATE SERPL-MCNC: 4.4 MG/DL (ref 2.7–4.5)
PLATELET # BLD AUTO: 273 K/UL (ref 150–450)
PMV BLD AUTO: 9.7 FL (ref 9.2–12.9)
POTASSIUM SERPL-SCNC: 4 MMOL/L (ref 3.5–5.1)
POTASSIUM SERPL-SCNC: 4 MMOL/L (ref 3.5–5.1)
PROT SERPL-MCNC: 8.3 G/DL (ref 6–8.4)
PROT UR QL STRIP: ABNORMAL
PROT UR-MCNC: 74 MG/DL (ref 0–15)
PROT/CREAT UR: 0.64 MG/G{CREAT} (ref 0–0.2)
PTH-INTACT SERPL-MCNC: 59.6 PG/ML (ref 9–77)
RBC # BLD AUTO: 4.32 M/UL (ref 4–5.4)
RBC #/AREA URNS HPF: 10 /HPF (ref 0–4)
RBC #/AREA URNS HPF: 10 /HPF (ref 0–4)
SODIUM SERPL-SCNC: 138 MMOL/L (ref 136–145)
SODIUM SERPL-SCNC: 138 MMOL/L (ref 136–145)
SP GR UR STRIP: 1.01 (ref 1–1.03)
SQUAMOUS #/AREA URNS HPF: 2 /HPF
SQUAMOUS #/AREA URNS HPF: 2 /HPF
URATE SERPL-MCNC: 9 MG/DL (ref 2.4–5.7)
URN SPEC COLLECT METH UR: ABNORMAL
WBC # BLD AUTO: 9.64 K/UL (ref 3.9–12.7)
WBC #/AREA URNS HPF: >100 /HPF (ref 0–5)
WBC #/AREA URNS HPF: >100 /HPF (ref 0–5)

## 2023-07-12 PROCEDURE — 85025 COMPLETE CBC W/AUTO DIFF WBC: CPT | Mod: PN | Performed by: STUDENT IN AN ORGANIZED HEALTH CARE EDUCATION/TRAINING PROGRAM

## 2023-07-12 PROCEDURE — 84550 ASSAY OF BLOOD/URIC ACID: CPT | Mod: PN | Performed by: INTERNAL MEDICINE

## 2023-07-12 PROCEDURE — 36415 COLL VENOUS BLD VENIPUNCTURE: CPT | Mod: PN | Performed by: INTERNAL MEDICINE

## 2023-07-12 PROCEDURE — 83970 ASSAY OF PARATHORMONE: CPT | Performed by: INTERNAL MEDICINE

## 2023-07-12 PROCEDURE — 82570 ASSAY OF URINE CREATININE: CPT | Performed by: INTERNAL MEDICINE

## 2023-07-12 PROCEDURE — 81000 URINALYSIS NONAUTO W/SCOPE: CPT | Mod: 91,PN | Performed by: INTERNAL MEDICINE

## 2023-07-12 PROCEDURE — 36415 COLL VENOUS BLD VENIPUNCTURE: CPT | Mod: PN | Performed by: STUDENT IN AN ORGANIZED HEALTH CARE EDUCATION/TRAINING PROGRAM

## 2023-07-12 PROCEDURE — 84100 ASSAY OF PHOSPHORUS: CPT | Performed by: INTERNAL MEDICINE

## 2023-07-12 PROCEDURE — 83735 ASSAY OF MAGNESIUM: CPT | Mod: PN | Performed by: STUDENT IN AN ORGANIZED HEALTH CARE EDUCATION/TRAINING PROGRAM

## 2023-07-12 PROCEDURE — 80053 COMPREHEN METABOLIC PANEL: CPT | Mod: PN | Performed by: STUDENT IN AN ORGANIZED HEALTH CARE EDUCATION/TRAINING PROGRAM

## 2023-07-12 RX ORDER — NITROFURANTOIN 25; 75 MG/1; MG/1
100 CAPSULE ORAL 2 TIMES DAILY
Qty: 6 EACH | Refills: 0 | Status: SHIPPED | OUTPATIENT
Start: 2023-07-12 | End: 2023-07-15

## 2023-07-13 ENCOUNTER — PATIENT MESSAGE (OUTPATIENT)
Dept: HEMATOLOGY/ONCOLOGY | Facility: CLINIC | Age: 58
End: 2023-07-13
Payer: COMMERCIAL

## 2023-07-14 ENCOUNTER — INFUSION (OUTPATIENT)
Dept: INFUSION THERAPY | Facility: HOSPITAL | Age: 58
End: 2023-07-14
Attending: STUDENT IN AN ORGANIZED HEALTH CARE EDUCATION/TRAINING PROGRAM
Payer: COMMERCIAL

## 2023-07-14 ENCOUNTER — OFFICE VISIT (OUTPATIENT)
Dept: HEMATOLOGY/ONCOLOGY | Facility: CLINIC | Age: 58
End: 2023-07-14
Payer: COMMERCIAL

## 2023-07-14 ENCOUNTER — PATIENT MESSAGE (OUTPATIENT)
Dept: NEPHROLOGY | Facility: CLINIC | Age: 58
End: 2023-07-14
Payer: COMMERCIAL

## 2023-07-14 ENCOUNTER — TELEPHONE (OUTPATIENT)
Dept: HEMATOLOGY/ONCOLOGY | Facility: CLINIC | Age: 58
End: 2023-07-14
Payer: COMMERCIAL

## 2023-07-14 VITALS
RESPIRATION RATE: 16 BRPM | WEIGHT: 280.19 LBS | DIASTOLIC BLOOD PRESSURE: 85 MMHG | BODY MASS INDEX: 46.68 KG/M2 | TEMPERATURE: 97 F | HEART RATE: 74 BPM | SYSTOLIC BLOOD PRESSURE: 125 MMHG | OXYGEN SATURATION: 98 % | HEIGHT: 65 IN

## 2023-07-14 VITALS
SYSTOLIC BLOOD PRESSURE: 101 MMHG | OXYGEN SATURATION: 98 % | BODY MASS INDEX: 46.68 KG/M2 | HEIGHT: 65 IN | WEIGHT: 280.19 LBS | RESPIRATION RATE: 16 BRPM | TEMPERATURE: 97 F | DIASTOLIC BLOOD PRESSURE: 73 MMHG | HEART RATE: 69 BPM

## 2023-07-14 DIAGNOSIS — T45.1X5A CHEMOTHERAPY INDUCED CARDIOMYOPATHY: ICD-10-CM

## 2023-07-14 DIAGNOSIS — E11.9 TYPE 2 DIABETES MELLITUS WITHOUT COMPLICATION, WITHOUT LONG-TERM CURRENT USE OF INSULIN: Primary | ICD-10-CM

## 2023-07-14 DIAGNOSIS — C50.912 INVASIVE DUCTAL CARCINOMA OF BREAST, FEMALE, LEFT: ICD-10-CM

## 2023-07-14 DIAGNOSIS — C50.912 INVASIVE DUCTAL CARCINOMA OF BREAST, FEMALE, LEFT: Primary | ICD-10-CM

## 2023-07-14 DIAGNOSIS — I42.7 CHEMOTHERAPY INDUCED CARDIOMYOPATHY: ICD-10-CM

## 2023-07-14 PROCEDURE — 1159F PR MEDICATION LIST DOCUMENTED IN MEDICAL RECORD: ICD-10-PCS | Mod: CPTII,S$GLB,, | Performed by: STUDENT IN AN ORGANIZED HEALTH CARE EDUCATION/TRAINING PROGRAM

## 2023-07-14 PROCEDURE — 99999 PR PBB SHADOW E&M-EST. PATIENT-LVL IV: ICD-10-PCS | Mod: PBBFAC,,, | Performed by: STUDENT IN AN ORGANIZED HEALTH CARE EDUCATION/TRAINING PROGRAM

## 2023-07-14 PROCEDURE — 3078F PR MOST RECENT DIASTOLIC BLOOD PRESSURE < 80 MM HG: ICD-10-PCS | Mod: CPTII,S$GLB,, | Performed by: STUDENT IN AN ORGANIZED HEALTH CARE EDUCATION/TRAINING PROGRAM

## 2023-07-14 PROCEDURE — 25000003 PHARM REV CODE 250: Mod: PN | Performed by: STUDENT IN AN ORGANIZED HEALTH CARE EDUCATION/TRAINING PROGRAM

## 2023-07-14 PROCEDURE — 3044F PR MOST RECENT HEMOGLOBIN A1C LEVEL <7.0%: ICD-10-PCS | Mod: CPTII,S$GLB,, | Performed by: STUDENT IN AN ORGANIZED HEALTH CARE EDUCATION/TRAINING PROGRAM

## 2023-07-14 PROCEDURE — 96417 CHEMO IV INFUS EACH ADDL SEQ: CPT | Mod: PN

## 2023-07-14 PROCEDURE — 3060F POS MICROALBUMINURIA REV: CPT | Mod: CPTII,S$GLB,, | Performed by: STUDENT IN AN ORGANIZED HEALTH CARE EDUCATION/TRAINING PROGRAM

## 2023-07-14 PROCEDURE — 63600175 PHARM REV CODE 636 W HCPCS: Mod: JZ,JG,PN | Performed by: STUDENT IN AN ORGANIZED HEALTH CARE EDUCATION/TRAINING PROGRAM

## 2023-07-14 PROCEDURE — 3008F BODY MASS INDEX DOCD: CPT | Mod: CPTII,S$GLB,, | Performed by: STUDENT IN AN ORGANIZED HEALTH CARE EDUCATION/TRAINING PROGRAM

## 2023-07-14 PROCEDURE — 1160F PR REVIEW ALL MEDS BY PRESCRIBER/CLIN PHARMACIST DOCUMENTED: ICD-10-PCS | Mod: CPTII,S$GLB,, | Performed by: STUDENT IN AN ORGANIZED HEALTH CARE EDUCATION/TRAINING PROGRAM

## 2023-07-14 PROCEDURE — 1160F RVW MEDS BY RX/DR IN RCRD: CPT | Mod: CPTII,S$GLB,, | Performed by: STUDENT IN AN ORGANIZED HEALTH CARE EDUCATION/TRAINING PROGRAM

## 2023-07-14 PROCEDURE — 99215 OFFICE O/P EST HI 40 MIN: CPT | Mod: S$GLB,,, | Performed by: STUDENT IN AN ORGANIZED HEALTH CARE EDUCATION/TRAINING PROGRAM

## 2023-07-14 PROCEDURE — 4010F ACE/ARB THERAPY RXD/TAKEN: CPT | Mod: CPTII,S$GLB,, | Performed by: STUDENT IN AN ORGANIZED HEALTH CARE EDUCATION/TRAINING PROGRAM

## 2023-07-14 PROCEDURE — 3066F NEPHROPATHY DOC TX: CPT | Mod: CPTII,S$GLB,, | Performed by: STUDENT IN AN ORGANIZED HEALTH CARE EDUCATION/TRAINING PROGRAM

## 2023-07-14 PROCEDURE — 99999 PR PBB SHADOW E&M-EST. PATIENT-LVL IV: CPT | Mod: PBBFAC,,, | Performed by: STUDENT IN AN ORGANIZED HEALTH CARE EDUCATION/TRAINING PROGRAM

## 2023-07-14 PROCEDURE — 1159F MED LIST DOCD IN RCRD: CPT | Mod: CPTII,S$GLB,, | Performed by: STUDENT IN AN ORGANIZED HEALTH CARE EDUCATION/TRAINING PROGRAM

## 2023-07-14 PROCEDURE — 3044F HG A1C LEVEL LT 7.0%: CPT | Mod: CPTII,S$GLB,, | Performed by: STUDENT IN AN ORGANIZED HEALTH CARE EDUCATION/TRAINING PROGRAM

## 2023-07-14 PROCEDURE — 3008F PR BODY MASS INDEX (BMI) DOCUMENTED: ICD-10-PCS | Mod: CPTII,S$GLB,, | Performed by: STUDENT IN AN ORGANIZED HEALTH CARE EDUCATION/TRAINING PROGRAM

## 2023-07-14 PROCEDURE — 3078F DIAST BP <80 MM HG: CPT | Mod: CPTII,S$GLB,, | Performed by: STUDENT IN AN ORGANIZED HEALTH CARE EDUCATION/TRAINING PROGRAM

## 2023-07-14 PROCEDURE — 4010F PR ACE/ARB THEARPY RXD/TAKEN: ICD-10-PCS | Mod: CPTII,S$GLB,, | Performed by: STUDENT IN AN ORGANIZED HEALTH CARE EDUCATION/TRAINING PROGRAM

## 2023-07-14 PROCEDURE — 3074F PR MOST RECENT SYSTOLIC BLOOD PRESSURE < 130 MM HG: ICD-10-PCS | Mod: CPTII,S$GLB,, | Performed by: STUDENT IN AN ORGANIZED HEALTH CARE EDUCATION/TRAINING PROGRAM

## 2023-07-14 PROCEDURE — 3066F PR DOCUMENTATION OF TREATMENT FOR NEPHROPATHY: ICD-10-PCS | Mod: CPTII,S$GLB,, | Performed by: STUDENT IN AN ORGANIZED HEALTH CARE EDUCATION/TRAINING PROGRAM

## 2023-07-14 PROCEDURE — 3060F PR POS MICROALBUMINURIA RESULT DOCUMENTED/REVIEW: ICD-10-PCS | Mod: CPTII,S$GLB,, | Performed by: STUDENT IN AN ORGANIZED HEALTH CARE EDUCATION/TRAINING PROGRAM

## 2023-07-14 PROCEDURE — 96413 CHEMO IV INFUSION 1 HR: CPT | Mod: PN

## 2023-07-14 PROCEDURE — 3074F SYST BP LT 130 MM HG: CPT | Mod: CPTII,S$GLB,, | Performed by: STUDENT IN AN ORGANIZED HEALTH CARE EDUCATION/TRAINING PROGRAM

## 2023-07-14 PROCEDURE — 99215 PR OFFICE/OUTPT VISIT, EST, LEVL V, 40-54 MIN: ICD-10-PCS | Mod: S$GLB,,, | Performed by: STUDENT IN AN ORGANIZED HEALTH CARE EDUCATION/TRAINING PROGRAM

## 2023-07-14 RX ORDER — SODIUM CHLORIDE 9 MG/ML
1000 INJECTION, SOLUTION INTRAVENOUS ONCE
Status: COMPLETED | OUTPATIENT
Start: 2023-07-14 | End: 2023-07-14

## 2023-07-14 RX ORDER — HEPARIN 100 UNIT/ML
500 SYRINGE INTRAVENOUS
Status: CANCELLED | OUTPATIENT
Start: 2023-07-14

## 2023-07-14 RX ORDER — DIPHENHYDRAMINE HYDROCHLORIDE 50 MG/ML
50 INJECTION INTRAMUSCULAR; INTRAVENOUS ONCE AS NEEDED
Status: CANCELLED | OUTPATIENT
Start: 2023-07-14

## 2023-07-14 RX ORDER — EPINEPHRINE 0.3 MG/.3ML
0.3 INJECTION SUBCUTANEOUS ONCE AS NEEDED
Status: DISCONTINUED | OUTPATIENT
Start: 2023-07-14 | End: 2023-07-14 | Stop reason: HOSPADM

## 2023-07-14 RX ORDER — SODIUM CHLORIDE 0.9 % (FLUSH) 0.9 %
10 SYRINGE (ML) INJECTION
Status: DISCONTINUED | OUTPATIENT
Start: 2023-07-14 | End: 2023-07-14 | Stop reason: HOSPADM

## 2023-07-14 RX ORDER — SODIUM CHLORIDE 0.9 % (FLUSH) 0.9 %
10 SYRINGE (ML) INJECTION
Status: CANCELLED | OUTPATIENT
Start: 2023-07-14

## 2023-07-14 RX ORDER — EPINEPHRINE 0.3 MG/.3ML
0.3 INJECTION SUBCUTANEOUS ONCE AS NEEDED
Status: CANCELLED | OUTPATIENT
Start: 2023-07-14

## 2023-07-14 RX ORDER — HEPARIN 100 UNIT/ML
500 SYRINGE INTRAVENOUS
Status: DISCONTINUED | OUTPATIENT
Start: 2023-07-14 | End: 2023-07-14 | Stop reason: HOSPADM

## 2023-07-14 RX ORDER — DIPHENHYDRAMINE HYDROCHLORIDE 50 MG/ML
50 INJECTION INTRAMUSCULAR; INTRAVENOUS ONCE AS NEEDED
Status: DISCONTINUED | OUTPATIENT
Start: 2023-07-14 | End: 2023-07-14 | Stop reason: HOSPADM

## 2023-07-14 RX ADMIN — SODIUM CHLORIDE: 9 INJECTION, SOLUTION INTRAVENOUS at 11:07

## 2023-07-14 RX ADMIN — TRASTUZUMAB 750 MG: 150 INJECTION, POWDER, LYOPHILIZED, FOR SOLUTION INTRAVENOUS at 01:07

## 2023-07-14 RX ADMIN — SODIUM CHLORIDE 1000 ML: 9 INJECTION, SOLUTION INTRAVENOUS at 11:07

## 2023-07-14 RX ADMIN — PERTUZUMAB 420 MG: 30 INJECTION, SOLUTION, CONCENTRATE INTRAVENOUS at 12:07

## 2023-07-14 NOTE — PROGRESS NOTES
PATIENT: Josefina Coon  MRN: [id number]  DATE: 7/14/2023      Diagnosis:   No diagnosis found.      Chief Complaint: Clearance for C14 HP    Subjective:   HPI: Ms. Coon is a 58 y.o. female Ms. Coon is a 58 y.o. female with HTN, HLD, depression, diabetes type 2 with neuropathy, obesity, fatty liver, following up with us for  a diagnosis of invasive ductal carcinoma of the left breast, triple positivity.     She is here today today for consideration of C13D1 of therapy which consists of Trastuzumab/Pertuzumab every 21 days.    Today, tolerating Transtuzumab/pertuzumab.,   - XRT completed 04/13/23  - No fevers, chills, abdominal discomfort, N/V, bleeding, no new lumps or bumps, etc.  - Diarrhea consistent, worsening in the morning, we discussed her FATIMAH and concerning     Oncologic History:   8/25/22 bilateral screening mammogram,,Right neg ,Left central post mass     9/8/22 left diag MMG, left US limited .Left 0300 lateral posterior 6cm FN 1.4cm mass , left axilla LN 2, up to 4mm cortex     9/14/22 left 0300 lateral posterior 6cm FN 1.4cm mass US biopsy .Grade 3 ,1.1cm in biopsy No LVI , ER 84% NV 28% Her 2 3+ pos Ki 52 %    Left axilla LN biopsy ;Benign fatty tissue, no LN, not concordant No MRI of breasts were done      9/21/22 US bilateral complete Right neg, right LN nml , Left 0300 6cm FN [id number] mass Borderline LN left axilla     9/21/22 Left axilla LN biopsy benign LN , so eventually Stage IA triple positive Breast cancer    10/7/22: started neoadjuvant chemo with Taxol + dual HER-2 (tranztuzumab and pertuzumab)    Receiving treatment with Paclitaxel/Trastuzumab/Pertuzumab on D1, weekly Paclitaxel on D8, D15 of a 21 day cycle. Completed weekly Paclitaxel on 12/23/22.    2/2023: s/p B/L mastectomy with CPR;ypT0,pN0,cM0    Oncology History   Invasive ductal carcinoma of breast, female, left   9/23/2022 Initial Diagnosis    Invasive ductal carcinoma of breast, female, left     9/23/2022 Cancer Staged     Staging form: Breast, AJCC 8th Edition  - Clinical stage from 9/23/2022: Stage IA (cT1c, cN0(f), cM0, G3, ER+, MD+, HER2+)     9/29/2022 - 9/29/2022 Chemotherapy    Treatment Summary   Plan Name: OP BREAST TRASTUZUMAB PACLITAXEL WEEKLY  Treatment Goal: Curative  Status: Inactive  Start Date:   End Date:   Provider: Lisa Jaquez MD  Chemotherapy: PACLitaxeL (TAXOL) 80 mg/m2 = 204 mg in sodium chloride 0.9% 250 mL chemo infusion, 80 mg/m2 = 204 mg, Intravenous, Clinic/HOD 1 time, 0 of 12 cycles  pertuzumab (PERJETA) 840 mg in sodium chloride 0.9% 278 mL infusion, 840 mg (original dose ), Intravenous, Clinic/[facility] 1 time, 0 of 17 cycles  Dose modification: 840 mg (Cycle 1, Reason: Other (see comments)), 420 mg (Cycle 4, Reason: Other (see comments))  trastuzumab-dkst (OGIVRI) 591 mg in sodium chloride 0.9% 250 mL chemo infusion, 4 mg/kg = 591 mg, Intravenous, Clinic/H[facility] 1 time, 0 of 25 cycles     10/7/2022 -  Chemotherapy    Treatment Summary   Plan Name: OP BREAST PACLITAXEL TRASTUZUMAB WEEKLY WITH PERTUZUMAB Q3W (THP)  Treatment Goal: Control  Status: Active  Start Date: 10/7/2022  End Date: 10/6/2023 (Planned)  Provider: Lisa Jaquez MD  Chemotherapy: PACLitaxeL (TAXOL) 80 mg/m2 = 210 mg in sodium chloride 0.9% 250 mL chemo infusion, 80 mg/m2 = 210 mg, Intravenous, Clinic/HOD 1 time, 4 of 4 cycles  Administration: 210 mg (10/7/2022), 204 mg (10/14/2022), 204 mg (10/28/2022), 204 mg (11/4/2022), 204 mg (10/21/2022), 204 mg (11/11/2022), 204 mg (11/18/2022), 204 mg (11/25/2022), 198 mg (12/9/2022), 204 mg (12/2/2022), 198 mg (12/16/2022), 198 mg (12/23/2022)  pertuzumab (PERJETA) 840 mg in sodium chloride 0.9% 278 mL infusion, 840 mg, Intravenous, [facility] 1 time, 13 of 18 cycles  Administration: 840 mg (10/7/2022), 420 mg (10/28/2022), 420 mg (11/18/2022), 420 mg (12/9/2022), 420 mg (12/30/2022), 420 mg (1/20/2023), 420 mg (2/17/2023), 420 mg (3/10/2023), 420 mg (3/31/2023), 420 mg (4/21/2023), 420 mg (5/12/2023),  420 mg (6/2/2023), 420 mg (6/23/2023)  trastuzumab-dkst (OGIVRI) 570 mg in sodium chloride 0.9% 250 mL chemo infusion, 593 mg (100 % of original dose 4 mg/kg), Intravenous, Clinic/HOD 1 time, 13 of 18 cycles  Dose modification: 4 mg/kg (original dose 4 mg/kg, Cycle 1, Reason: Other (see comments), Comment: weekly trastuzumab), 2 mg/kg (original dose 2 mg/kg, Cycle 2, Reason: Other (see comments), Comment: weekly maintenance dose), 6 mg/kg (original dose 2 mg/kg, Cycle 2, Reason: MD Discretion, Comment: change to q3w dosing), 2 mg/kg (original dose 2 mg/kg, Cycle 1, Reason: Other (see comments), Comment: maintenance weekly dose)  Administration: 570 mg (10/7/2022), 840 mg (10/28/2022), 288 mg (10/14/2022), 290 mg (10/21/2022), 840 mg (11/18/2022), 831 mg (12/9/2022), 831 mg (12/30/2022), 720 mg (1/20/2023), 806 mg (2/17/2023), 750 mg (3/10/2023), 814 mg (3/31/2023), 750 mg (4/21/2023), 750 mg (5/12/2023), 750 mg (6/2/2023), 750 mg (6/23/2023)     2/13/2023 Cancer Staged    Staging form: Breast, AJCC 8th Edition  - Pathologic stage from 2/13/2023: No Stage Recommended (ypT0, pN0(sn), cM0, GX)     3/14/2023 - 4/14/2023 Radiation Therapy    Treating physician: Shelia Trevino  Total Dose: 52.56 Gy (42.56Gy/16 fractions to whole breast; 10Gy/5 fraction boost)  Fractions: 20  Treatment Site Ref. ID Energy Dose/Fx (Gy) #Fx Dose Correction (Gy) Total Dose (Gy) Start Date End Date Elapsed Days   3D Breast_L NormEZCalc 18X 2.66 16 / 16 0 42.56 3/14/2023 4/6/2023 23   3d BrstBst_L NormBst 18X 2.5 4 / 4 0 10 4/10/2023 4/14/2023 4           Past Medical History:   Past Medical History:   Diagnosis Date    Abnormal liver enzymes     Asymptomatic varicose veins     Breast cancer 09/14/2022    left idc    Cancer     left breast cancer    Depressive disorder, not elsewhere classified     Dyslipidemia     Embolism and thrombosis of unspecified site     superficial venous thrombosis    Encounter for long-term (current) use of other  medications     Family history of ischemic heart disease     Fatty liver     Generalized anxiety disorder     History of chicken pox     Obstructive sleep apnea (adult) (pediatric)     Type II or unspecified type diabetes mellitus without mention of complication, not stated as uncontrolled     Unspecified essential hypertension     Unspecified venous (peripheral) insufficiency     Unspecified vitamin D deficiency        Past Surgical HIstory:   Past Surgical History:   Procedure Laterality Date    BREAST BIOPSY Left 2022    idc    BREAST BIOPSY Left 2022    neg ln    BREAST BIOPSY Left 2022    neg ln     SECTION      COLONOSCOPY      ESOPHAGOGASTRODUODENOSCOPY      INSERTION OF TUNNELED CENTRAL VENOUS CATHETER (CVC) WITH SUBCUTANEOUS PORT Right 10/04/2022    Procedure: [id number]-[id number]-R-CATH;  Surgeon: Dominick Ng MD;  Location: [facility] OR;  Service: General;  Laterality: Right;    LUMPECTOMY, WITH RADAR LOCALIZATION USING TRENTON  Left 2023    Procedure: LUMPECTOMY,WITH RADAR LOCALIZATION USING TRENTON ;  Surgeon: Maria G Mcduffie MD;  Location: [facility] OR;  Service: General;  Laterality: Left;    SENTINEL LYMPH NODE BIOPSY Left 2023    Procedure: BIOPSY, LYMPH NODE, SENTINEL;  Surgeon: Maria G Mcduffie MD;  Location: [facility] OR;  Service: General;  Laterality: Left;    VEIN SURGERY      Laser, Dr. Larsen       Family History:   Family History   Problem Relation Age of Onset    Hyperlipidemia Mother     Hypertension Mother     Vulvar Cancer Mother     Dementia Mother     Atrial fibrillation Father     Parkinsonism Father     Diabetes Brother     Cancer Brother         colon    Hypertension Son     Hyperlipidemia Son     Anxiety disorder Son     Stroke Maternal Grandmother        Social History:  reports that she has never smoked. She has never used smokeless tobacco. She reports that she does not currently use alcohol. She reports that she does not use  drugs.    Allergies:  Review of patient's allergies indicates:   Allergen Reactions    Sitagliptin      Other reaction(s): (januvia) abdominal pain    Sulfa (sulfonamide antibiotics) Hives    Byetta  [exenatide] Rash    Lactose        Medications:  Current Outpatient Medications   Medication Sig Dispense Refill    albuterol (PROVENTIL/VENTOLIN HFA) 90 mcg/actuation inhaler Inhale 2 puffs into the lungs every 6 (six) hours as needed for Wheezing. (Patient not taking: Reported on 7/7/2023) 18 g 6    ALPRAZolam (XANAX) 0.25 MG tablet TAKE ONE TABLET BY MOUTH 1-2 TIMES DAILY AS NEEDED ANXIETY (Patient not taking: Reported on 7/7/2023) 15 tablet 0    anastrozole (ARIMIDEX) 1 mg Tab Take 1 tablet (1 mg total) by mouth once daily. 90 tablet 3    apixaban (ELIQUIS) 5 mg Tab Take 1 tablet (5 mg total) by mouth 2 (two) times daily. 180 tablet 3    azelastine (ASTELIN) 137 mcg (0.1 %) nasal spray PUT 2 SPRAYS INTO EACH NOSTRIL TWICE A DAY AS NEEDED      buPROPion (WELLBUTRIN XL) 150 MG TB24 tablet TAKE 1 TABLET BY MOUTH EVERY DAY 90 tablet 3    cyanocobalamin 1,000 mcg/mL injection Inject 1 ml q 2 weeks x 1 month then 1 ml monthly 30 mL 0    dapagliflozin propanediol (FARXIGA) 5 mg Tab tablet Take 1 tablet (5 mg total) by mouth once daily. 90 tablet 3    dicyclomine (BENTYL) 10 MG capsule TAKE 1 CAPSULE BY MOUTH 4 TIMES DAILY BEFORE MEALS AND NIGHTLY. (Patient not taking: Reported on 7/7/2023) 60 capsule 0    gabapentin (NEURONTIN) 100 MG capsule Take 1 capsule (100 mg total) by mouth every evening. (Patient not taking: Reported on 7/7/2023) 30 capsule 0    LIDOcaine-prilocaine (EMLA) cream Apply to port site 1 hour prior to port access and cover 30 g 3    lipase-protease-amylase 6,000-19,000-30,000 units (CREON) 6,000-19,000 -30,000 unit capsule Take 1 capsule by mouth 3 (three) times daily with meals. 90 capsule 11    metFORMIN (GLUCOPHAGE) 500 MG tablet TAKE 1 TABLET BY MOUTH TWICE A DAY WITH MEALS 180 tablet 1     metoprolol succinate (TOPROL-XL) 25 MG 24 hr tablet Take 1 tablet (25 mg total) by mouth once daily. 30 tablet 11    nitrofurantoin, macrocrystal-monohydrate, (MACROBID) 100 MG capsule Take 1 capsule (100 mg total) by mouth 2 (two) times daily. for 3 days 6 each 0    nystatin (MYCOSTATIN) ointment Apply topically 3 (three) times daily. (Patient not taking: Reported on 7/7/2023) 30 g 2    nystatin (MYCOSTATIN) powder Apply to affected area 3 times daily 60 g 1    pravastatin (PRAVACHOL) 10 MG tablet TAKE 1 TABLET BY MOUTH EVERYDAY AT BEDTIME 90 tablet 1    tirzepatide 7.5 mg/0.5 mL PnIj Inject 7.5 mg into the skin every 7 days. 4 pen 0     No current facility-administered medications for this visit.     Facility-Administered Medications Ordered in Other Visits   Medication Dose Route Frequency Provider Last Rate Last Admin    lactated ringers infusion   Intravenous Continuous Maria G Mcduffie  mL/hr at 02/08/23 0700 New Bag at 02/08/23 0814    midazolam (VERSED) 1 mg/mL injection 2 mg  2 mg Intravenous See admin instructions Maria G Mcduffie MD   2 mg at 02/08/23 0711       Review of Systems   Constitutional:  Negative for chills, fatigue and fever.   HENT:  Negative for trouble swallowing.    Respiratory:  Negative for cough and shortness of breath.    Cardiovascular:  Negative for chest pain and palpitations.   Gastrointestinal:  Positive for diarrhea. Negative for abdominal pain, constipation, nausea and vomiting.   Genitourinary:  Negative for dysuria.   Musculoskeletal:  Negative for arthralgias.   Skin:  Negative for rash.   Neurological:  Negative for headaches.   Hematological:  Negative for adenopathy.     Objective:      Vitals:   There were no vitals filed for this visit.        BMI: There is no height or weight on file to calculate BMI.    Physical Exam  Vitals reviewed.   Constitutional:       General: She is not in acute distress.     Appearance: She is not diaphoretic.   HENT:      Head:  Normocephalic and atraumatic.      Mouth/Throat:      Mouth: Mucous membranes are moist.      Pharynx: No oropharyngeal exudate.   Eyes:      General: No scleral icterus.     Conjunctiva/sclera: Conjunctivae normal.   Cardiovascular:      Rate and Rhythm: Normal rate and regular rhythm.      Heart sounds: Normal heart sounds. No murmur heard.  Pulmonary:      Effort: Pulmonary effort is normal. No respiratory distress.      Breath sounds: No wheezing.   Chest:          Comments: B/L mastectomy   Area of excoriation from irradiation  Abdominal:      General: Bowel sounds are normal.      Palpations: Abdomen is soft.   Musculoskeletal:      Cervical back: Neck supple. No tenderness.      Right lower leg: No edema.      Left lower leg: No edema.   Lymphadenopathy:      Cervical: No cervical adenopathy.      Upper Body:      Right upper body: No supraclavicular or axillary adenopathy.      Left upper body: No supraclavicular or axillary adenopathy.      Lower Body: No right inguinal adenopathy. No left inguinal adenopathy.   Skin:     General: Skin is warm.      Findings: No rash.   Neurological:      Mental Status: She is alert and oriented to person, place, and time.   Psychiatric:         Behavior: Behavior normal.         Thought Content: Thought content normal.       Laboratory Data:  Lab Results   Component Value Date    WBC 7.86 07/12/2023    HGB 11.9 (L) 07/12/2023    HCT 37.0 07/12/2023    MCV 91 07/12/2023     07/12/2023      CMP  Sodium   Date Value Ref Range Status   07/12/2023 136 136 - 145 mmol/L Final   08/08/2015 137 137 - 145 MMOL/L Final     Potassium   Date Value Ref Range Status   07/12/2023 4.0 3.5 - 5.1 mmol/L Final     Comment:     Anion Gap reference range revised on 4/28/2023 08/08/2015 4.4 3.5 - 5.1 MMOL/L Final     Chloride   Date Value Ref Range Status   07/12/2023 101 95 - 110 mmol/L Final   08/08/2015 101 98 - 107 MMOL/L Final     CO2   Date Value Ref Range Status   07/12/2023 25  22 - 31 mmol/L Final     Glucose   Date Value Ref Range Status   07/12/2023 93 70 - 110 mg/dL Final     Comment:     The ADA recommends the following guidelines for fasting glucose:    Normal:       less than 100 mg/dL    Prediabetes:  100 mg/dL to 125 mg/dL    Diabetes:     126 mg/dL or higher       BUN   Date Value Ref Range Status   07/12/2023 21 (H) 7 - 18 mg/dL Final     Creatinine   Date Value Ref Range Status   07/12/2023 1.35 0.50 - 1.40 mg/dL Final   08/08/2015 0.63 0.52 - 1.04 MG/DL Final     Calcium   Date Value Ref Range Status   07/12/2023 9.2 8.4 - 10.2 mg/dL Final     Total Protein   Date Value Ref Range Status   07/12/2023 7.8 6.0 - 8.4 g/dL Final     Albumin   Date Value Ref Range Status   07/12/2023 4.2 3.5 - 5.2 g/dL Final     Total Bilirubin   Date Value Ref Range Status   07/12/2023 0.6 0.2 - 1.3 mg/dL Final     Alkaline Phosphatase   Date Value Ref Range Status   07/12/2023 119 38 - 145 U/L Final     AST   Date Value Ref Range Status   07/12/2023 24 14 - 36 U/L Final     ALT   Date Value Ref Range Status   07/12/2023 20 0 - 35 U/L Final     Anion Gap   Date Value Ref Range Status   07/12/2023 10 5 - 12 mmol/L Final     Comment:     Anion Gap reference range revised on 4/28/2023     eGFR   Date Value Ref Range Status   07/12/2023 45 (A) >60 mL/min/1.73 m^2 Final       Assessment:       No diagnosis found.        Plan:   Invasive ductal carcinoma of the breast, left  - cT1c triple positive breast cancer  (ER 84% UT 28% Her 2 3+ pos Ki 52 %), given her young age and Ki67%, will proceed with TH, adding P to help with a better pCR, will also plan to get ultrasound mid cycle to monitor (3 months)  -9/26/22 Echo with EF 60%; next is scheduled for 01/18/23  -Began TH+P on 10/7/2022; completed 12 weeks of Paclitaxel 12/23/22  - 2/2023; s/p lupmectomy CPR;ypT0,pN0,cM0  -Proceed with Trastuzumab/Pertuzumab   - on adjuvant radiation plan for 16 fraction total - completed 04/13/23  - will need adjuvant  endocrine therapy after finishing up adjuvant radiation   -Echo 5/2023 EF: 60% and global longitudinal 17.0%, cont monitoring with echo in 3 months ;   -Proceed with C13 HP today; will add 2 gm Magnesium to 1000 ml NS (hydrate) over 2 hours  - last period  around~ 50,, post menopausal , on anastrazole  June 2nd /2023 ,  - DEXA scan, osteopenia, cont taking Vit D, hold off  calcium for now given her FATIMAH     FATIMAH possible dehydration:  -establish with nephrologist, improving/stable      Diabetes type 2 with neuropathy   -Taking Metformin, Trulicity  - on gabapentin 100mg night     Anxiety  -Taking Wellbutrin  -Following with Dr. Long     Hypomagnesemia  -Monitor, replacement as needed     Patient queried and all questions were answered.    Route Chart for Scheduling    Med Onc Chart Routing      Follow up with physician . Keep amaris as they are   Follow up with [facility]    Infusion scheduling note   1L of IVF to her infusion   Injection scheduling note    Labs    Imaging    Pharmacy appointment    Other referrals            Treatment Plan Information   OP BREAST PACLITAXEL TRASTUZUMAB WEEKLY WITH PERTUZUMAB Q3W (THP)   Lisa Jaquez MD   Upcoming Treatment Dates - OP BREAST PACLITAXEL TRASTUZUMAB WEEKLY WITH PERTUZUMAB Q3W (THP)    7/14/2023       Chemotherapy       pertuzumab (PERJETA) 420 mg in sodium chloride 0.9% 264 mL infusion       trastuzumab-dkst (OGIVRI) in sodium chloride 0.9% chemo infusion       Supportive Care       magnesium sulfate 2 g in sodium chloride 0.9% 1,000 mL  8/4/2023       Chemotherapy       pertuzumab (PERJETA) 420 mg in sodium chloride 0.9% 264 mL infusion       trastuzumab-dkst (OGIVRI) in sodium chloride 0.9% chemo infusion       Supportive Care       magnesium sulfate 2 g in sodium chloride 0.9% 1,000 mL  8/25/2023       Chemotherapy       pertuzumab (PERJETA) 420 mg in sodium chloride 0.9% 264 mL infusion       trastuzumab-dkst (OGIVRI) in sodium chloride 0.9% chemo infusion       Supportive  Care       magnesium sulfate 2 g in sodium chloride 0.9% 1,000 mL  9/15/2023       Chemotherapy       pertuzumab (PERJETA) 420 mg in sodium chloride 0.9% 264 mL infusion       trastuzumab-dkst (OGIVRI) in sodium chloride 0.9% chemo infusion       Supportive Care       magnesium sulfate 2 g in sodium chloride 0.9% 1,000 mL    Supportive Plan Information  IV FLUIDS AND ELECTROLYTES   Lisa Jaquez MD   Upcoming Treatment Dates - IV FLUIDS AND ELECTROLYTES    No upcoming days in selected categories.     42 minutes were spent in coordination of patient's care, record review and counseling.

## 2023-07-14 NOTE — TELEPHONE ENCOUNTER
----- Message from Daisy Garcia sent at 7/14/2023 10:02 AM CDT -----  Type: Need Medical Advice   Who Called: Patient   Best callback number: [phone]  Additional Information: Patient will be here in about 15min for her 10am  appointment   Please call to further assist with rescheduling, Thanks     Noted

## 2023-07-18 DIAGNOSIS — N18.32 CHRONIC KIDNEY DISEASE, STAGE 3B: Primary | ICD-10-CM

## 2023-07-19 ENCOUNTER — PATIENT MESSAGE (OUTPATIENT)
Dept: NEPHROLOGY | Facility: CLINIC | Age: 58
End: 2023-07-19
Payer: COMMERCIAL

## 2023-07-20 RX ORDER — LEVOFLOXACIN 750 MG/1
750 TABLET ORAL DAILY
Qty: 7 EACH | Refills: 0 | Status: SHIPPED | OUTPATIENT
Start: 2023-07-20 | End: 2023-07-21 | Stop reason: ALTCHOICE

## 2023-07-21 ENCOUNTER — PATIENT MESSAGE (OUTPATIENT)
Dept: NEPHROLOGY | Facility: CLINIC | Age: 58
End: 2023-07-21
Payer: COMMERCIAL

## 2023-07-21 RX ORDER — CEPHALEXIN 250 MG/1
500 CAPSULE ORAL EVERY 12 HOURS
Qty: 20 CAPSULE | Refills: 0 | Status: SHIPPED | OUTPATIENT
Start: 2023-07-21 | End: 2023-07-26

## 2023-07-24 ENCOUNTER — NURSE TRIAGE (OUTPATIENT)
Dept: ADMINISTRATIVE | Facility: CLINIC | Age: 58
End: 2023-07-24
Payer: COMMERCIAL

## 2023-07-24 ENCOUNTER — TELEPHONE (OUTPATIENT)
Dept: CARDIOLOGY | Facility: CLINIC | Age: 58
End: 2023-07-24
Payer: COMMERCIAL

## 2023-07-24 NOTE — TELEPHONE ENCOUNTER
Please advise: pt states she woke up in a fib this morning. It usually goes away after a few hours but she is still in it rate . She confirms taking the Eliquis and metoprolol.   She is currently wearing the event monitor--she did press the button for having symptoms

## 2023-07-24 NOTE — TELEPHONE ENCOUNTER
OOC NT return call -  Pt reports currently wearing holter monitor and this morning HR showing Afib with   HR . NT had pt recheck and reports  and in AFIB. Heart beat protocol followed and pt advised  to go to ED. Pt will have son drive her to ED. Encounter routed to PCP and Dr. YRN Frias    Reason for Disposition   Dizziness, lightheadedness, or weakness    Additional Information   Negative: Passed out (i.e., lost consciousness, collapsed and was not responding)   Negative: Shock suspected (e.g., cold/pale/clammy skin, too weak to stand, low BP, rapid pulse)   Negative: Difficult to awaken or acting confused (e.g., disoriented, slurred speech)   Negative: Visible sweat on face or sweat dripping down face   Negative: Unable to walk, or can only walk with assistance (e.g., requires support)   Negative: [1] Received SHOCK from implantable cardiac defibrillator AND [2] persisting symptoms (i.e., palpitations, lightheadedness)   Negative: [1] Dizziness, lightheadedness, or weakness AND [2] heart beating very rapidly (e.g., > 140 / minute)   Negative: [1] Dizziness, lightheadedness, or weakness AND [2] heart beating very slowly (e.g., < 50 / minute)   Negative: Sounds like a life-threatening emergency to the triager   Negative: Difficulty breathing    Protocols used: Heart Rate and Heartbeat [id number]

## 2023-07-25 PROBLEM — N18.31 STAGE 3A CHRONIC KIDNEY DISEASE: Status: ACTIVE | Noted: 2023-07-25

## 2023-07-25 PROBLEM — I48.91 ATRIAL FIBRILLATION WITH RVR: Status: ACTIVE | Noted: 2023-07-25

## 2023-08-03 ENCOUNTER — OFFICE VISIT (OUTPATIENT)
Dept: HEMATOLOGY/ONCOLOGY | Facility: CLINIC | Age: 58
End: 2023-08-03
Payer: COMMERCIAL

## 2023-08-03 ENCOUNTER — LAB VISIT (OUTPATIENT)
Dept: LAB | Facility: HOSPITAL | Age: 58
End: 2023-08-03
Attending: STUDENT IN AN ORGANIZED HEALTH CARE EDUCATION/TRAINING PROGRAM
Payer: COMMERCIAL

## 2023-08-03 VITALS
BODY MASS INDEX: 47.02 KG/M2 | DIASTOLIC BLOOD PRESSURE: 70 MMHG | TEMPERATURE: 98 F | OXYGEN SATURATION: 97 % | SYSTOLIC BLOOD PRESSURE: 139 MMHG | HEIGHT: 65 IN | RESPIRATION RATE: 16 BRPM | WEIGHT: 282.19 LBS | HEART RATE: 75 BPM

## 2023-08-03 DIAGNOSIS — I48.91 ATRIAL FIBRILLATION WITH RVR: ICD-10-CM

## 2023-08-03 DIAGNOSIS — C50.912 INVASIVE DUCTAL CARCINOMA OF BREAST, FEMALE, LEFT: ICD-10-CM

## 2023-08-03 DIAGNOSIS — D84.821 IMMUNODEFICIENCY DUE TO DRUGS: ICD-10-CM

## 2023-08-03 DIAGNOSIS — Z79.899 IMMUNODEFICIENCY DUE TO DRUGS: ICD-10-CM

## 2023-08-03 DIAGNOSIS — I42.7 CHEMOTHERAPY INDUCED CARDIOMYOPATHY: ICD-10-CM

## 2023-08-03 DIAGNOSIS — E11.9 TYPE 2 DIABETES MELLITUS WITHOUT COMPLICATION, WITHOUT LONG-TERM CURRENT USE OF INSULIN: Primary | ICD-10-CM

## 2023-08-03 DIAGNOSIS — T45.1X5A CHEMOTHERAPY INDUCED CARDIOMYOPATHY: ICD-10-CM

## 2023-08-03 DIAGNOSIS — N18.31 STAGE 3A CHRONIC KIDNEY DISEASE: ICD-10-CM

## 2023-08-03 LAB
ALBUMIN SERPL BCP-MCNC: 3.7 G/DL (ref 3.5–5.2)
ALP SERPL-CCNC: 115 U/L (ref 55–135)
ALT SERPL W/O P-5'-P-CCNC: 13 U/L (ref 10–44)
ANION GAP SERPL CALC-SCNC: 9 MMOL/L (ref 8–16)
AST SERPL-CCNC: 13 U/L (ref 10–40)
BASOPHILS # BLD AUTO: 0.04 K/UL (ref 0–0.2)
BASOPHILS NFR BLD: 0.7 % (ref 0–1.9)
BILIRUB SERPL-MCNC: 0.4 MG/DL (ref 0.1–1)
BUN SERPL-MCNC: 16 MG/DL (ref 6–20)
CALCIUM SERPL-MCNC: 10 MG/DL (ref 8.7–10.5)
CHLORIDE SERPL-SCNC: 110 MMOL/L (ref 95–110)
CO2 SERPL-SCNC: 20 MMOL/L (ref 23–29)
CREAT SERPL-MCNC: 1.3 MG/DL (ref 0.5–1.4)
DIFFERENTIAL METHOD: ABNORMAL
EOSINOPHIL # BLD AUTO: 0.2 K/UL (ref 0–0.5)
EOSINOPHIL NFR BLD: 3.7 % (ref 0–8)
ERYTHROCYTE [DISTWIDTH] IN BLOOD BY AUTOMATED COUNT: 13.2 % (ref 11.5–14.5)
EST. GFR  (NO RACE VARIABLE): 47.1 ML/MIN/1.73 M^2
GLUCOSE SERPL-MCNC: 95 MG/DL (ref 70–110)
HCT VFR BLD AUTO: 36.3 % (ref 37–48.5)
HGB BLD-MCNC: 11.8 G/DL (ref 12–16)
IMM GRANULOCYTES # BLD AUTO: 0.02 K/UL (ref 0–0.04)
IMM GRANULOCYTES NFR BLD AUTO: 0.4 % (ref 0–0.5)
LYMPHOCYTES # BLD AUTO: 1.7 K/UL (ref 1–4.8)
LYMPHOCYTES NFR BLD: 29.5 % (ref 18–48)
MAGNESIUM SERPL-MCNC: 1.6 MG/DL (ref 1.6–2.6)
MCH RBC QN AUTO: 30.3 PG (ref 27–31)
MCHC RBC AUTO-ENTMCNC: 32.5 G/DL (ref 32–36)
MCV RBC AUTO: 93 FL (ref 82–98)
MONOCYTES # BLD AUTO: 0.3 K/UL (ref 0.3–1)
MONOCYTES NFR BLD: 5.3 % (ref 4–15)
NEUTROPHILS # BLD AUTO: 3.4 K/UL (ref 1.8–7.7)
NEUTROPHILS NFR BLD: 60.4 % (ref 38–73)
NRBC BLD-RTO: 0 /100 WBC
PLATELET # BLD AUTO: 227 K/UL (ref 150–450)
PMV BLD AUTO: 9.3 FL (ref 9.2–12.9)
POTASSIUM SERPL-SCNC: 4 MMOL/L (ref 3.5–5.1)
PROT SERPL-MCNC: 7.7 G/DL (ref 6–8.4)
RBC # BLD AUTO: 3.9 M/UL (ref 4–5.4)
SODIUM SERPL-SCNC: 139 MMOL/L (ref 136–145)
WBC # BLD AUTO: 5.69 K/UL (ref 3.9–12.7)

## 2023-08-03 PROCEDURE — 3075F SYST BP GE 130 - 139MM HG: CPT | Mod: CPTII,S$GLB,, | Performed by: STUDENT IN AN ORGANIZED HEALTH CARE EDUCATION/TRAINING PROGRAM

## 2023-08-03 PROCEDURE — 80053 COMPREHEN METABOLIC PANEL: CPT | Mod: PN | Performed by: STUDENT IN AN ORGANIZED HEALTH CARE EDUCATION/TRAINING PROGRAM

## 2023-08-03 PROCEDURE — 1159F MED LIST DOCD IN RCRD: CPT | Mod: CPTII,S$GLB,, | Performed by: STUDENT IN AN ORGANIZED HEALTH CARE EDUCATION/TRAINING PROGRAM

## 2023-08-03 PROCEDURE — 3060F PR POS MICROALBUMINURIA RESULT DOCUMENTED/REVIEW: ICD-10-PCS | Mod: CPTII,S$GLB,, | Performed by: STUDENT IN AN ORGANIZED HEALTH CARE EDUCATION/TRAINING PROGRAM

## 2023-08-03 PROCEDURE — 36415 COLL VENOUS BLD VENIPUNCTURE: CPT | Mod: PN | Performed by: STUDENT IN AN ORGANIZED HEALTH CARE EDUCATION/TRAINING PROGRAM

## 2023-08-03 PROCEDURE — 3008F BODY MASS INDEX DOCD: CPT | Mod: CPTII,S$GLB,, | Performed by: STUDENT IN AN ORGANIZED HEALTH CARE EDUCATION/TRAINING PROGRAM

## 2023-08-03 PROCEDURE — 83735 ASSAY OF MAGNESIUM: CPT | Mod: PN | Performed by: STUDENT IN AN ORGANIZED HEALTH CARE EDUCATION/TRAINING PROGRAM

## 2023-08-03 PROCEDURE — 3075F PR MOST RECENT SYSTOLIC BLOOD PRESS GE 130-139MM HG: ICD-10-PCS | Mod: CPTII,S$GLB,, | Performed by: STUDENT IN AN ORGANIZED HEALTH CARE EDUCATION/TRAINING PROGRAM

## 2023-08-03 PROCEDURE — 99999 PR PBB SHADOW E&M-EST. PATIENT-LVL IV: ICD-10-PCS | Mod: PBBFAC,,, | Performed by: STUDENT IN AN ORGANIZED HEALTH CARE EDUCATION/TRAINING PROGRAM

## 2023-08-03 PROCEDURE — 1160F RVW MEDS BY RX/DR IN RCRD: CPT | Mod: CPTII,S$GLB,, | Performed by: STUDENT IN AN ORGANIZED HEALTH CARE EDUCATION/TRAINING PROGRAM

## 2023-08-03 PROCEDURE — 99999 PR PBB SHADOW E&M-EST. PATIENT-LVL IV: CPT | Mod: PBBFAC,,, | Performed by: STUDENT IN AN ORGANIZED HEALTH CARE EDUCATION/TRAINING PROGRAM

## 2023-08-03 PROCEDURE — 3066F NEPHROPATHY DOC TX: CPT | Mod: CPTII,S$GLB,, | Performed by: STUDENT IN AN ORGANIZED HEALTH CARE EDUCATION/TRAINING PROGRAM

## 2023-08-03 PROCEDURE — 4010F ACE/ARB THERAPY RXD/TAKEN: CPT | Mod: CPTII,S$GLB,, | Performed by: STUDENT IN AN ORGANIZED HEALTH CARE EDUCATION/TRAINING PROGRAM

## 2023-08-03 PROCEDURE — 3078F DIAST BP <80 MM HG: CPT | Mod: CPTII,S$GLB,, | Performed by: STUDENT IN AN ORGANIZED HEALTH CARE EDUCATION/TRAINING PROGRAM

## 2023-08-03 PROCEDURE — 3066F PR DOCUMENTATION OF TREATMENT FOR NEPHROPATHY: ICD-10-PCS | Mod: CPTII,S$GLB,, | Performed by: STUDENT IN AN ORGANIZED HEALTH CARE EDUCATION/TRAINING PROGRAM

## 2023-08-03 PROCEDURE — 3078F PR MOST RECENT DIASTOLIC BLOOD PRESSURE < 80 MM HG: ICD-10-PCS | Mod: CPTII,S$GLB,, | Performed by: STUDENT IN AN ORGANIZED HEALTH CARE EDUCATION/TRAINING PROGRAM

## 2023-08-03 PROCEDURE — 85025 COMPLETE CBC W/AUTO DIFF WBC: CPT | Mod: PN | Performed by: STUDENT IN AN ORGANIZED HEALTH CARE EDUCATION/TRAINING PROGRAM

## 2023-08-03 PROCEDURE — 4010F PR ACE/ARB THEARPY RXD/TAKEN: ICD-10-PCS | Mod: CPTII,S$GLB,, | Performed by: STUDENT IN AN ORGANIZED HEALTH CARE EDUCATION/TRAINING PROGRAM

## 2023-08-03 PROCEDURE — 3044F HG A1C LEVEL LT 7.0%: CPT | Mod: CPTII,S$GLB,, | Performed by: STUDENT IN AN ORGANIZED HEALTH CARE EDUCATION/TRAINING PROGRAM

## 2023-08-03 PROCEDURE — 1160F PR REVIEW ALL MEDS BY PRESCRIBER/CLIN PHARMACIST DOCUMENTED: ICD-10-PCS | Mod: CPTII,S$GLB,, | Performed by: STUDENT IN AN ORGANIZED HEALTH CARE EDUCATION/TRAINING PROGRAM

## 2023-08-03 PROCEDURE — 3044F PR MOST RECENT HEMOGLOBIN A1C LEVEL <7.0%: ICD-10-PCS | Mod: CPTII,S$GLB,, | Performed by: STUDENT IN AN ORGANIZED HEALTH CARE EDUCATION/TRAINING PROGRAM

## 2023-08-03 PROCEDURE — 3008F PR BODY MASS INDEX (BMI) DOCUMENTED: ICD-10-PCS | Mod: CPTII,S$GLB,, | Performed by: STUDENT IN AN ORGANIZED HEALTH CARE EDUCATION/TRAINING PROGRAM

## 2023-08-03 PROCEDURE — 1159F PR MEDICATION LIST DOCUMENTED IN MEDICAL RECORD: ICD-10-PCS | Mod: CPTII,S$GLB,, | Performed by: STUDENT IN AN ORGANIZED HEALTH CARE EDUCATION/TRAINING PROGRAM

## 2023-08-03 PROCEDURE — 3060F POS MICROALBUMINURIA REV: CPT | Mod: CPTII,S$GLB,, | Performed by: STUDENT IN AN ORGANIZED HEALTH CARE EDUCATION/TRAINING PROGRAM

## 2023-08-03 PROCEDURE — 99215 PR OFFICE/OUTPT VISIT, EST, LEVL V, 40-54 MIN: ICD-10-PCS | Mod: S$GLB,,, | Performed by: STUDENT IN AN ORGANIZED HEALTH CARE EDUCATION/TRAINING PROGRAM

## 2023-08-03 PROCEDURE — 99215 OFFICE O/P EST HI 40 MIN: CPT | Mod: S$GLB,,, | Performed by: STUDENT IN AN ORGANIZED HEALTH CARE EDUCATION/TRAINING PROGRAM

## 2023-08-04 ENCOUNTER — INFUSION (OUTPATIENT)
Dept: INFUSION THERAPY | Facility: HOSPITAL | Age: 58
End: 2023-08-04
Attending: STUDENT IN AN ORGANIZED HEALTH CARE EDUCATION/TRAINING PROGRAM
Payer: COMMERCIAL

## 2023-08-04 VITALS
BODY MASS INDEX: 47.02 KG/M2 | HEIGHT: 65 IN | RESPIRATION RATE: 16 BRPM | WEIGHT: 282.19 LBS | TEMPERATURE: 98 F | SYSTOLIC BLOOD PRESSURE: 140 MMHG | HEART RATE: 97 BPM | DIASTOLIC BLOOD PRESSURE: 80 MMHG

## 2023-08-04 DIAGNOSIS — C50.912 INVASIVE DUCTAL CARCINOMA OF BREAST, FEMALE, LEFT: Primary | ICD-10-CM

## 2023-08-04 PROCEDURE — 63600175 PHARM REV CODE 636 W HCPCS: Mod: PN | Performed by: STUDENT IN AN ORGANIZED HEALTH CARE EDUCATION/TRAINING PROGRAM

## 2023-08-04 PROCEDURE — 96367 TX/PROPH/DG ADDL SEQ IV INF: CPT

## 2023-08-04 PROCEDURE — 96365 THER/PROPH/DIAG IV INF INIT: CPT | Mod: PN

## 2023-08-04 PROCEDURE — 96413 CHEMO IV INFUSION 1 HR: CPT | Mod: PN

## 2023-08-04 PROCEDURE — 25000003 PHARM REV CODE 250: Mod: PN | Performed by: INTERNAL MEDICINE

## 2023-08-04 PROCEDURE — 96417 CHEMO IV INFUS EACH ADDL SEQ: CPT | Mod: PN

## 2023-08-04 PROCEDURE — 63600175 PHARM REV CODE 636 W HCPCS: Mod: JZ,JG,PN | Performed by: INTERNAL MEDICINE

## 2023-08-04 RX ORDER — DIPHENHYDRAMINE HYDROCHLORIDE 50 MG/ML
50 INJECTION INTRAMUSCULAR; INTRAVENOUS ONCE AS NEEDED
Status: DISCONTINUED | OUTPATIENT
Start: 2023-08-04 | End: 2023-08-04 | Stop reason: HOSPADM

## 2023-08-04 RX ORDER — HEPARIN 100 UNIT/ML
500 SYRINGE INTRAVENOUS
Status: CANCELLED | OUTPATIENT
Start: 2023-08-04

## 2023-08-04 RX ORDER — SODIUM CHLORIDE 0.9 % (FLUSH) 0.9 %
10 SYRINGE (ML) INJECTION
Status: DISCONTINUED | OUTPATIENT
Start: 2023-08-04 | End: 2023-08-04 | Stop reason: HOSPADM

## 2023-08-04 RX ORDER — EPINEPHRINE 0.3 MG/.3ML
0.3 INJECTION SUBCUTANEOUS ONCE AS NEEDED
Status: DISCONTINUED | OUTPATIENT
Start: 2023-08-04 | End: 2023-08-04 | Stop reason: HOSPADM

## 2023-08-04 RX ORDER — SODIUM CHLORIDE 0.9 % (FLUSH) 0.9 %
10 SYRINGE (ML) INJECTION
Status: CANCELLED | OUTPATIENT
Start: 2023-08-04

## 2023-08-04 RX ORDER — DIPHENHYDRAMINE HYDROCHLORIDE 50 MG/ML
50 INJECTION INTRAMUSCULAR; INTRAVENOUS ONCE AS NEEDED
Status: CANCELLED | OUTPATIENT
Start: 2023-08-04

## 2023-08-04 RX ORDER — EPINEPHRINE 0.3 MG/.3ML
0.3 INJECTION SUBCUTANEOUS ONCE AS NEEDED
Status: CANCELLED | OUTPATIENT
Start: 2023-08-04

## 2023-08-04 RX ORDER — MAGNESIUM SULFATE HEPTAHYDRATE 40 MG/ML
2 INJECTION, SOLUTION INTRAVENOUS ONCE
Status: COMPLETED | OUTPATIENT
Start: 2023-08-04 | End: 2023-08-04

## 2023-08-04 RX ADMIN — SODIUM CHLORIDE: 9 INJECTION, SOLUTION INTRAVENOUS at 10:08

## 2023-08-04 RX ADMIN — MAGNESIUM SULFATE IN WATER 2 G: 40 INJECTION, SOLUTION INTRAVENOUS at 11:08

## 2023-08-04 RX ADMIN — PERTUZUMAB 420 MG: 30 INJECTION, SOLUTION, CONCENTRATE INTRAVENOUS at 12:08

## 2023-08-04 RX ADMIN — TRASTUZUMAB 750 MG: 150 INJECTION, POWDER, LYOPHILIZED, FOR SOLUTION INTRAVENOUS at 02:08

## 2023-08-04 NOTE — PLAN OF CARE
Problem: Adult Inpatient Plan of Care  Goal: Plan of Care Review  Outcome: Ongoing, Progressing  Flowsheets (Taken 8/4/2023 1100)  Plan of Care Reviewed With: patient  Goal: Patient-Specific Goal (Individualized)  Outcome: Ongoing, Progressing  Flowsheets (Taken 8/4/2023 1100)  Anxieties, Fears or Concerns: None  Individualized Care Needs: Recliner, warm blanket, tv, conversation     Problem: Fatigue  Goal: Improved Activity Tolerance  Outcome: Ongoing, Progressing  Intervention: Promote Improved Energy  Flowsheets (Taken 8/4/2023 1100)  Fatigue Management:   frequent rest breaks encouraged   paced activity encouraged  Sleep/Rest Enhancement: regular sleep/rest pattern promoted  Activity Management: Ambulated -L4       Patient to Infusion for Perjeta, Ogivri and Magnesium. Treatment plan reviewed with patient. VSS. Tolerated infusion. Provided with copy of upcoming appointment schedule. Escorted to the front lobby in no distress for discharge to home.

## 2023-08-09 ENCOUNTER — PATIENT MESSAGE (OUTPATIENT)
Dept: CARDIOLOGY | Facility: CLINIC | Age: 58
End: 2023-08-09
Payer: COMMERCIAL

## 2023-08-09 DIAGNOSIS — I48.91 ATRIAL FIBRILLATION WITH RVR: Primary | ICD-10-CM

## 2023-08-10 ENCOUNTER — PATIENT MESSAGE (OUTPATIENT)
Dept: CARDIOLOGY | Facility: CLINIC | Age: 58
End: 2023-08-10
Payer: COMMERCIAL

## 2023-08-10 RX ORDER — METOPROLOL SUCCINATE 50 MG/1
50 TABLET, EXTENDED RELEASE ORAL DAILY
Qty: 90 TABLET | Refills: 2 | Status: SHIPPED | OUTPATIENT
Start: 2023-08-10 | End: 2024-01-19

## 2023-08-11 ENCOUNTER — TELEPHONE (OUTPATIENT)
Dept: HEMATOLOGY/ONCOLOGY | Facility: CLINIC | Age: 58
End: 2023-08-11
Payer: COMMERCIAL

## 2023-08-11 NOTE — PROGRESS NOTES
PATIENT: Josefina Coon  MRN: [id number]  DATE: 8/11/2023      Diagnosis:   1. Type 2 diabetes mellitus without complication, without long-term current use of insulin    2. Chemotherapy induced cardiomyopathy    3. Invasive ductal carcinoma of breast, female, left    4. Immunodeficiency due to drugs    5. Atrial fibrillation with RVR    6. Stage 3a chronic kidney disease          Chief Complaint: Clearance for C15 HP    Subjective:   HPI: Ms. Coon is a 58 y.o. female Ms. Coon is a 58 y.o. female with HTN, HLD, depression, diabetes type 2 with neuropathy, obesity, fatty liver, following up with us for  a diagnosis of invasive ductal carcinoma of the left breast, triple positivity.     She is here today today for consideration of C15D1 of therapy which consists of Trastuzumab/Pertuzumab every 21 days.    Today, tolerating Transtuzumab/pertuzumab.,   - XRT completed 04/13/23  - No fevers, chills, abdominal discomfort, N/V, bleeding, no new lumps or bumps, etc.  - Diarrhea consistent, worsening in the morning, we discussed her FATIMAH and concerning   - Afib episode and discussion about Echo findings and following up closely with Dr Frias     Oncologic History:   8/25/22 bilateral screening mammogram,,Right neg ,Left central post mass     9/8/22 left diag MMG, left US limited .Left 0300 lateral posterior 6cm FN 1.4cm mass , left axilla LN 2, up to 4mm cortex     9/14/22 left 0300 lateral posterior 6cm FN 1.4cm mass US biopsy .Grade 3 ,1.1cm in biopsy No LVI , ER 84% HI 28% Her 2 3+ pos Ki 52 %    Left axilla LN biopsy ;Benign fatty tissue, no LN, not concordant No MRI of breasts were done      9/21/22 US bilateral complete Right neg, right LN nml , Left 0300 6cm FN [id number] mass Borderline LN left axilla     9/21/22 Left axilla LN biopsy benign LN , so eventually Stage IA triple positive Breast cancer    10/7/22: started neoadjuvant chemo with Taxol + dual HER-2 (tranztuzumab and pertuzumab)    Receiving  treatment with Paclitaxel/Trastuzumab/Pertuzumab on D1, weekly Paclitaxel on D8, D15 of a 21 day cycle. Completed weekly Paclitaxel on 12/23/22.    2/2023: s/p B/L mastectomy with CPR;ypT0,pN0,cM0    Oncology History   Invasive ductal carcinoma of breast, female, left   9/23/2022 Initial Diagnosis    Invasive ductal carcinoma of breast, female, left     9/23/2022 Cancer Staged    Staging form: Breast, AJCC 8th Edition  - Clinical stage from 9/23/2022: Stage IA (cT1c, cN0(f), cM0, G3, ER+, HI+, HER2+)     9/29/2022 - 9/29/2022 Chemotherapy    Treatment Summary   Plan Name: OP BREAST TRASTUZUMAB PACLITAXEL WEEKLY  Treatment Goal: Curative  Status: Inactive  Start Date:   End Date:   Provider: Lisa Jaquez MD  Chemotherapy: PACLitaxeL (TAXOL) 80 mg/m2 = 204 mg in sodium chloride 0.9% 250 mL chemo infusion, 80 mg/m2 = 204 mg, Intravenous, Clinic/HOD 1 time, 0 of 12 cycles  pertuzumab (PERJETA) 840 mg in sodium chloride 0.9% 278 mL infusion, 840 mg (original dose ), Intravenous, Clinic/HOD 1 time, 0 of 17 cycles  Dose modification: 840 mg (Cycle 1, Reason: Other (see comments)), 420 mg (Cycle 4, Reason: Other (see comments))  trastuzumab-dkst (OGIVRI) 591 mg in sodium chloride 0.9% 250 mL chemo infusion, 4 mg/kg = 591 mg, Intravenous, Clinic/HOD 1 time, 0 of 25 cycles     10/7/2022 -  Chemotherapy    Treatment Summary   Plan Name: OP BREAST PACLITAXEL TRASTUZUMAB WEEKLY WITH PERTUZUMAB Q3W (THP)  Treatment Goal: Control  Status: Active  Start Date: 10/7/2022  End Date: 10/6/2023 (Planned)  Provider: Lisa Jaquez MD  Chemotherapy: PACLitaxeL (TAXOL) 80 mg/m2 = 210 mg in sodium chloride 0.9% 250 mL chemo infusion, 80 mg/m2 = 210 mg, Intravenous, Clinic/HOD 1 time, 4 of 4 cycles  Administration: 210 mg (10/7/2022), 204 mg (10/14/2022), 204 mg (10/28/2022), 204 mg (11/4/2022), 204 mg (10/21/2022), 204 mg (11/11/2022), 204 mg (11/18/2022), 204 mg (11/25/2022), 198 mg (12/9/2022), 204 mg (12/2/2022), 198 mg (12/16/2022), 198  mg (12/23/2022)  pertuzumab (PERJETA) 840 mg in sodium chloride 0.9% 278 mL infusion, 840 mg, Intravenous, Clinic/HOD 1 time, 15 of 18 cycles  Administration: 840 mg (10/7/2022), 420 mg (10/28/2022), 420 mg (11/18/2022), 420 mg (12/9/2022), 420 mg (12/30/2022), 420 mg (1/20/2023), 420 mg (2/17/2023), 420 mg (3/10/2023), 420 mg (3/31/2023), 420 mg (4/21/2023), 420 mg (5/12/2023), 420 mg (6/2/2023), 420 mg (6/23/2023), 420 mg (7/14/2023), 420 mg (8/4/2023)  trastuzumab-dkst (OGIVRI) 570 mg in sodium chloride 0.9% 250 mL chemo infusion, 593 mg (100 % of original dose 4 mg/kg), Intravenous, Clinic/HOD 1 time, 15 of 18 cycles  Dose modification: 4 mg/kg (original dose 4 mg/kg, Cycle 1, Reason: Other (see comments), Comment: weekly trastuzumab), 2 mg/kg (original dose 2 mg/kg, Cycle 2, Reason: Other (see comments), Comment: weekly maintenance dose), 6 mg/kg (original dose 2 mg/kg, Cycle 2, Reason: MD Discretion, Comment: change to q3w dosing), 2 mg/kg (original dose 2 mg/kg, Cycle 1, Reason: Other (see comments), Comment: maintenance weekly dose)  Administration: 570 mg (10/7/2022), 840 mg (10/28/2022), 288 mg (10/14/2022), 290 mg (10/21/2022), 840 mg (11/18/2022), 831 mg (12/9/2022), 831 mg (12/30/2022), 720 mg (1/20/2023), 806 mg (2/17/2023), 750 mg (3/10/2023), 814 mg (3/31/2023), 750 mg (4/21/2023), 750 mg (5/12/2023), 750 mg (6/2/2023), 750 mg (6/23/2023), 750 mg (7/14/2023), 750 mg (8/4/2023)     2/13/2023 Cancer Staged    Staging form: Breast, AJCC 8th Edition  - Pathologic stage from 2/13/2023: No Stage Recommended (ypT0, pN0(sn), cM0, GX)     3/14/2023 - 4/14/2023 Radiation Therapy    Treating physician: Shelia Trevino  Total Dose: 52.56 Gy (42.56Gy/16 fractions to whole breast; 10Gy/5 fraction boost)  Fractions: 20  Treatment Site Ref. ID Energy Dose/Fx (Gy) #Fx Dose Correction (Gy) Total Dose (Gy) Start Date End Date Elapsed Days   3D Breast_L NormEZCalc 18X 2.66 16 / 16 0 42.56 3/14/2023 4/6/2023 23   3d  BrstBst_L NormBst 18X 2.5  /  0 10 4/10/2023 2023 4         Past Medical History:   Past Medical History:   Diagnosis Date    Abnormal liver enzymes     Asymptomatic varicose veins     Breast cancer 2022    left idc    Cancer     left breast cancer    Depressive disorder, not elsewhere classified     Dyslipidemia     Embolism and thrombosis of unspecified site     superficial venous thrombosis    Encounter for long-term (current) use of other medications     Family history of ischemic heart disease     Fatty liver     Generalized anxiety disorder     History of chicken pox     Obstructive sleep apnea (adult) (pediatric)     Type II or unspecified type diabetes mellitus without mention of complication, not stated as uncontrolled     Unspecified essential hypertension     Unspecified venous (peripheral) insufficiency     Unspecified vitamin D deficiency        Past Surgical HIstory:   Past Surgical History:   Procedure Laterality Date    BREAST BIOPSY Left 2022    idc    BREAST BIOPSY Left 2022    neg ln    BREAST BIOPSY Left 2022    neg ln     SECTION      COLONOSCOPY      ESOPHAGOGASTRODUODENOSCOPY      INSERTION OF TUNNELED CENTRAL VENOUS CATHETER (CVC) WITH SUBCUTANEOUS PORT Right 10/04/2022    Procedure: [id number];  Surgeon: Dominick Ng MD;  Location: [facility] OR;  Service: General;  Laterality: Right;    LUMPECTOMY, WITH RADAR LOCALIZATION USING TR[place]ON  Left 2023    Procedure: LUMPECTOMY,WITH RADAR LOCALIZATION USING TR[place] ;  Surgeon: Maria G Mcduffie MD;  Location: [facility] OR;  Service: General;  Laterality: Left;    SENTINEL LYMPH NODE BIOPSY Left 2023    Procedure: BIOPSY, LYMPH NODE, SENTINEL;  Surgeon: Maria G Mcduffie MD;  Location: [facility] OR;  Service: General;  Laterality: Left;    TRANSESOPHAGEAL ECHOCARDIOGRAM WITH POSSIBLE CARDIOVERSION (LAUREL W/ POSS CARDIOVERSION) N/A 2023    Procedure: TRANSESOPHAGEAL ECHOCARDIOGRAM WITH  POSSIBLE CARDIOVERSION (LAUREL W/ POSS CARDIOVERSION);  Surgeon: Roni Frias MD;  Location: [facility];  Service: Cardiology;  Laterality: N/A;    VEIN SURGERY      Laser, Dr. Larsen       Family History:   Family History   Problem Relation Age of Onset    Hyperlipidemia Mother     Hypertension Mother     Vulvar Cancer Mother     Dementia Mother     Atrial fibrillation Father     Parkinsonism Father     Diabetes Brother     Cancer Brother         colon    Hypertension Son     Hyperlipidemia Son     Anxiety disorder Son     Stroke Maternal Grandmother        Social History:  reports that she has never smoked. She has never used smokeless tobacco. She reports that she does not currently use alcohol. She reports that she does not use drugs.    Allergies:  Review of patient's allergies indicates:   Allergen Reactions    Sitagliptin      Other reaction(s): (januvia) abdominal pain    Sulfa (sulfonamide antibiotics) Hives    Byetta  [exenatide] Rash    Lactose        Medications:  Current Outpatient Medications   Medication Sig Dispense Refill    albuterol (PROVENTIL/VENTOLIN HFA) 90 mcg/actuation inhaler Inhale 2 puffs into the lungs every 6 (six) hours as needed for Wheezing. 18 g 6    ALPRAZolam (XANAX) 0.25 MG tablet TAKE ONE TABLET BY MOUTH 1-2 TIMES DAILY AS NEEDED ANXIETY 15 tablet 0    anastrozole (ARIMIDEX) 1 mg Tab Take 1 tablet (1 mg total) by mouth once daily. 90 tablet 3    apixaban (ELIQUIS) 5 mg Tab Take 1 tablet (5 mg total) by mouth 2 (two) times daily. 180 tablet 3    azelastine (ASTELIN) 137 mcg (0.1 %) nasal spray 2 sprays by Nasal route 2 (two) times daily as needed for Rhinitis.      buPROPion (WELLBUTRIN XL) 150 MG TB24 tablet TAKE 1 TABLET BY MOUTH EVERY DAY (Patient taking differently: Take 150 mg by mouth once daily.) 90 tablet 3    cyanocobalamin 1,000 mcg/mL injection Inject 1 ml q 2 weeks x 1 month then 1 ml monthly 30 mL 0    dapagliflozin propanediol (FARXIGA) 5 mg Tab tablet Take 1  tablet (5 mg total) by mouth once daily. 90 tablet 3    LIDOcaine-prilocaine (EMLA) cream Apply to port site 1 hour prior to port access and cover 30 g 3    lipase-protease-amylase 6,000-19,000-30,000 units (CREON) 6,000-19,000 -30,000 unit capsule Take 1 capsule by mouth 3 (three) times daily with meals. 90 capsule 11    metFORMIN (GLUCOPHAGE) 500 MG tablet TAKE 1 TABLET BY MOUTH TWICE A DAY WITH MEALS (Patient taking differently: Take 500 mg by mouth 2 (two) times daily with meals.) 180 tablet 1    nystatin (MYCOSTATIN) powder Apply to affected area 3 times daily 60 g 1    pravastatin (PRAVACHOL) 10 MG tablet TAKE 1 TABLET BY MOUTH EVERYDAY AT BEDTIME (Patient taking differently: Take 10 mg by mouth every evening.) 90 tablet 1    tirzepatide (MOUNJARO) 7.5 mg/0.5 mL PnIj INJECT 7.5 MG SUBCUTANEOUSLY EVERY 7 DAYS 4 pen 0    metoprolol succinate (TOPROL-XL) 50 MG 24 hr tablet Take 1 tablet (50 mg total) by mouth once daily. 90 tablet 2     No current facility-administered medications for this visit.     Facility-Administered Medications Ordered in Other Visits   Medication Dose Route Frequency Provider Last Rate Last Admin    lactated ringers infusion   Intravenous Continuous Maria G Mcduffie  mL/hr at 02/08/23 0700 New Bag at 02/08/23 0814    midazolam (VERSED) 1 mg/mL injection 2 mg  2 mg Intravenous See admin instructions Maria G Mcduffie MD   2 mg at 02/08/23 0711       Review of Systems   Constitutional:  Negative for chills, fatigue and fever.   HENT:  Negative for trouble swallowing.    Respiratory:  Negative for cough and shortness of breath.    Cardiovascular:  Negative for chest pain and palpitations.   Gastrointestinal:  Negative for abdominal pain, constipation, diarrhea, nausea and vomiting.   Genitourinary:  Negative for dysuria.   Musculoskeletal:  Negative for arthralgias.   Skin:  Negative for rash.   Neurological:  Negative for headaches.   Hematological:  Negative for adenopathy.  "      Objective:      Vitals:   Vitals:    08/03/23 1540   BP: 139/70   BP Location: Right arm   Patient Position: Sitting   BP Method: Medium (Manual)   Pulse: 75   Resp: 16   Temp: 97.6 °F (36.4 °C)   TempSrc: Temporal   SpO2: 97%   Weight: 128 kg (282 lb 3 oz)   Height: 5' 4.5" (1.638 m)           BMI: Body mass index is 47.69 kg/m².    Physical Exam  Vitals reviewed.   Constitutional:       General: She is not in acute distress.     Appearance: She is not diaphoretic.   HENT:      Head: Normocephalic and atraumatic.      Mouth/Throat:      Mouth: Mucous membranes are moist.      Pharynx: No oropharyngeal exudate.   Eyes:      General: No scleral icterus.     Conjunctiva/sclera: Conjunctivae normal.   Cardiovascular:      Rate and Rhythm: Normal rate and regular rhythm.      Heart sounds: Normal heart sounds. No murmur heard.  Pulmonary:      Effort: Pulmonary effort is normal. No respiratory distress.      Breath sounds: No wheezing.   Chest:          Comments: B/L mastectomy   Area of excoriation from irradiation  Abdominal:      General: Bowel sounds are normal.      Palpations: Abdomen is soft.   Musculoskeletal:      Cervical back: Neck supple. No tenderness.      Right lower leg: No edema.      Left lower leg: No edema.   Lymphadenopathy:      Cervical: No cervical adenopathy.      Upper Body:      Right upper body: No supraclavicular or axillary adenopathy.      Left upper body: No supraclavicular or axillary adenopathy.      Lower Body: No right inguinal adenopathy. No left inguinal adenopathy.   Skin:     General: Skin is warm.      Findings: No rash.   Neurological:      Mental Status: She is alert and oriented to person, place, and time.   Psychiatric:         Behavior: Behavior normal.         Thought Content: Thought content normal.         Laboratory Data:  Lab Results   Component Value Date    WBC 5.69 08/03/2023    HGB 11.8 (L) 08/03/2023    HCT 36.3 (L) 08/03/2023    MCV 93 08/03/2023     " 08/03/2023      CMP  Sodium   Date Value Ref Range Status   08/03/2023 139 136 - 145 mmol/L Final   08/08/2015 137 137 - 145 MMOL/L Final     Potassium   Date Value Ref Range Status   08/03/2023 4.0 3.5 - 5.1 mmol/L Final   08/08/2015 4.4 3.5 - 5.1 MMOL/L Final     Chloride   Date Value Ref Range Status   08/03/2023 110 95 - 110 mmol/L Final   08/08/2015 101 98 - 107 MMOL/L Final     CO2   Date Value Ref Range Status   08/03/2023 20 (L) 23 - 29 mmol/L Final     Glucose   Date Value Ref Range Status   08/03/2023 95 70 - 110 mg/dL Final     BUN   Date Value Ref Range Status   08/03/2023 16 6 - 20 mg/dL Final     Creatinine   Date Value Ref Range Status   08/03/2023 1.3 0.5 - 1.4 mg/dL Final   08/08/2015 0.63 0.52 - 1.04 MG/DL Final     Calcium   Date Value Ref Range Status   08/03/2023 10.0 8.7 - 10.5 mg/dL Final     Total Protein   Date Value Ref Range Status   08/03/2023 7.7 6.0 - 8.4 g/dL Final     Albumin   Date Value Ref Range Status   08/03/2023 3.7 3.5 - 5.2 g/dL Final     Total Bilirubin   Date Value Ref Range Status   08/03/2023 0.4 0.1 - 1.0 mg/dL Final     Comment:     For infants and newborns, interpretation of results should be based  on gestational age, weight and in agreement with clinical  observations.    Premature Infant recommended reference ranges:  Up to 24 hours.............<8.0 mg/dL  Up to 48 hours............<12.0 mg/dL  3-5 days..................<15.0 mg/dL  6-29 days.................<15.0 mg/dL       Alkaline Phosphatase   Date Value Ref Range Status   08/03/2023 115 55 - 135 U/L Final     AST   Date Value Ref Range Status   08/03/2023 13 10 - 40 U/L Final     ALT   Date Value Ref Range Status   08/03/2023 13 10 - 44 U/L Final     Anion Gap   Date Value Ref Range Status   08/03/2023 9 8 - 16 mmol/L Final     eGFR   Date Value Ref Range Status   08/03/2023 47.1 (A) >60 mL/min/1.73 m^2 Final       Assessment:       1. Type 2 diabetes mellitus without complication, without long-term current use  of insulin    2. Chemotherapy induced cardiomyopathy    3. Invasive ductal carcinoma of breast, female, left    4. Immunodeficiency due to drugs    5. Atrial fibrillation with RVR    6. Stage 3a chronic kidney disease            Plan:   Invasive ductal carcinoma of the breast, left  - cT1c triple positive breast cancer  (ER 84% LA 28% Her 2 3+ pos Ki 52 %), given her young age and Ki67%, will proceed with TH, adding P to help with a better pCR, will also plan to get ultrasound mid cycle to monitor (3 months)  -9/26/22 Echo with EF 60%; next is scheduled for 01/18/23  -Began TH+P on 10/7/2022; completed 12 weeks of Paclitaxel 12/23/22  - 2/2023; s/p lupmectomy CPR;ypT0,pN0,cM0  -Proceed with Trastuzumab/Pertuzumab   - on adjuvant radiation plan for 16 fraction total - completed 04/13/23  - will need adjuvant endocrine therapy after finishing up adjuvant radiation   -Echo 5/2023 EF: 60% and global longitudinal 17.0%, cont monitoring with echo in 3 months ;   -Proceed with C13 HP today; will add 2 gm Magnesium to 1000 ml NS (hydrate) over 2 hours  - last period  around~ 50,, post menopausal , on anastrazole since  June 2nd /2023 ,  - DEXA scan, osteopenia, cont taking Vit D, hold off  calcium for now given her FATIMAH     Afib with RVR:  -on eliquis, following up with cardiologist, discussed echo findings in details with Dr Frias and less likely due to chemo (risk vs benefit) will proceed with chemo      FATIMAH possible dehydration:  -establish with nephrologist, improving/stable      Diabetes type 2 with neuropathy   -Taking Metformin, Trulicity  - on gabapentin 100mg night     Anxiety  -Taking Wellbutrin  -Following with Dr. Long     Hypomagnesemia  -Monitor, replacement as needed     Patient queried and all questions were answered.    Route Chart for Scheduling    Med Onc Chart Routing      Follow up with physician Other. keep amaris as they are   Follow up with [facility]    Infusion scheduling note    Injection scheduling  note    Labs    Imaging    Pharmacy appointment    Other referrals              Treatment Plan Information   OP BREAST PACLITAXEL TRASTUZUMAB WEEKLY WITH PERTUZUMAB Q3W (THP)   Lisa Jaquez MD   Upcoming Treatment Dates - OP BREAST PACLITAXEL TRASTUZUMAB WEEKLY WITH PERTUZUMAB Q3W (THP)    8/25/2023       Chemotherapy       pertuzumab (PERJETA) 420 mg in sodium chloride 0.9% 264 mL infusion       trastuzumab-dkst (OGIVRI) in sodium chloride 0.9% chemo infusion       Supportive Care       magnesium sulfate 2 g in sodium chloride 0.9% 1,000 mL  9/15/2023       Chemotherapy       pertuzumab (PERJETA) 420 mg in sodium chloride 0.9% 264 mL infusion       trastuzumab-dkst (OGIVRI) in sodium chloride 0.9% chemo infusion       Supportive Care       magnesium sulfate 2 g in sodium chloride 0.9% 1,000 mL  10/6/2023       Chemotherapy       pertuzumab (PERJETA) 420 mg in sodium chloride 0.9% 264 mL infusion       trastuzumab-dkst (OGIVRI) in sodium chloride 0.9% chemo infusion       Supportive Care       magnesium sulfate 2 g in sodium chloride 0.9% 1,000 mL    Supportive Plan Information  IV FLUIDS AND ELECTROLYTES   Lisa Jaquez MD   Upcoming Treatment Dates - IV FLUIDS AND ELECTROLYTES    No upcoming days in selected categories.    41 minutes were spent in coordination of patient's care, record review and counseling.    Lisa Jaquez MD  Hematology and Oncology  [facility]  [facility] [place] [facility]

## 2023-08-11 NOTE — TELEPHONE ENCOUNTER
Verified with patient that Ruy is not in of Fridays and will need ot see her to clear for treatment. Patient verbalized understanding.  ----- Message from Daisy Garcia sent at 8/11/2023  3:49 PM CDT -----  Type: Need Medical Advice   Who Called: Patient  Best callback number: [phone]  Additional Information: Patient called about her appointment on 10/5 its marked to be rescheduled, she stated she usually have her appointments and infusions on Fridays  Please call to further assist, Thanks.

## 2023-08-16 ENCOUNTER — TELEPHONE (OUTPATIENT)
Dept: ELECTROPHYSIOLOGY | Facility: CLINIC | Age: 58
End: 2023-08-16
Payer: COMMERCIAL

## 2023-08-23 ENCOUNTER — CLINICAL SUPPORT (OUTPATIENT)
Dept: CARDIOLOGY | Facility: HOSPITAL | Age: 58
End: 2023-08-23
Attending: STUDENT IN AN ORGANIZED HEALTH CARE EDUCATION/TRAINING PROGRAM
Payer: COMMERCIAL

## 2023-08-23 VITALS — WEIGHT: 282 LBS | BODY MASS INDEX: 46.98 KG/M2 | HEIGHT: 65 IN

## 2023-08-23 DIAGNOSIS — I42.7 CHEMOTHERAPY INDUCED CARDIOMYOPATHY: ICD-10-CM

## 2023-08-23 DIAGNOSIS — C50.912 INVASIVE DUCTAL CARCINOMA OF BREAST, FEMALE, LEFT: ICD-10-CM

## 2023-08-23 DIAGNOSIS — T45.1X5A CHEMOTHERAPY INDUCED CARDIOMYOPATHY: ICD-10-CM

## 2023-08-23 LAB
ASCENDING AORTA: 3.14 CM
AV INDEX (PROSTH): 0.71
AV MEAN GRADIENT: 7 MMHG
AV PEAK GRADIENT: 14 MMHG
AV VALVE AREA BY VELOCITY RATIO: 2.21 CM²
AV VALVE AREA: 2.26 CM²
AV VELOCITY RATIO: 0.69
BSA FOR ECHO PROCEDURE: 2.41 M2
CV ECHO LV RWT: 0.38 CM
DOP CALC AO PEAK VEL: 1.87 M/S
DOP CALC AO VTI: 42.2 CM
DOP CALC LVOT AREA: 3.2 CM2
DOP CALC LVOT DIAMETER: 2.02 CM
DOP CALC LVOT PEAK VEL: 1.29 M/S
DOP CALC LVOT STROKE VOLUME: 95.45 CM3
DOP CALCLVOT PEAK VEL VTI: 29.8 CM
E WAVE DECELERATION TIME: 151.7 MSEC
E/A RATIO: 0.83
E/E' RATIO: 11.65 M/S
ECHO LV POSTERIOR WALL: 0.96 CM (ref 0.6–1.1)
EJECTION FRACTION: 60 %
FRACTIONAL SHORTENING: 29 % (ref 28–44)
GLOBAL LONGITUIDAL STRAIN: -17.6 %
INTERVENTRICULAR SEPTUM: 0.92 CM (ref 0.6–1.1)
LA MAJOR: 6.34 CM
LA MINOR: 6.31 CM
LA WIDTH: 4.7 CM
LEFT ATRIUM SIZE: 4.06 CM
LEFT ATRIUM VOLUME INDEX: 45 ML/M2
LEFT ATRIUM VOLUME: 102.59 CM3
LEFT INTERNAL DIMENSION IN SYSTOLE: 3.63 CM (ref 2.1–4)
LEFT VENTRICLE DIASTOLIC VOLUME INDEX: 54.09 ML/M2
LEFT VENTRICLE DIASTOLIC VOLUME: 123.33 ML
LEFT VENTRICLE MASS INDEX: 76 G/M2
LEFT VENTRICLE SYSTOLIC VOLUME INDEX: 24.4 ML/M2
LEFT VENTRICLE SYSTOLIC VOLUME: 55.6 ML
LEFT VENTRICULAR INTERNAL DIMENSION IN DIASTOLE: 5.09 CM (ref 3.5–6)
LEFT VENTRICULAR MASS: 172.6 G
LV LATERAL E/E' RATIO: 11 M/S
LV SEPTAL E/E' RATIO: 12.38 M/S
LVOT MG: 4.01 MMHG
LVOT MV: 0.96 CM/S
MV PEAK A VEL: 1.19 M/S
MV PEAK E VEL: 0.99 M/S
MV STENOSIS PRESSURE HALF TIME: 43.99 MS
MV VALVE AREA P 1/2 METHOD: 5 CM2
PISA MRMAX VEL: 4.97 M/S
PISA TR MAX VEL: 2.74 M/S
PULM VEIN S/D RATIO: 1.36
PV PEAK D VEL: 0.42 M/S
PV PEAK S VEL: 0.57 M/S
RA MAJOR: 5.67 CM
RA PRESSURE ESTIMATED: 3 MMHG
RA WIDTH: 3.6 CM
RV TB RVSP: 6 MMHG
RV TISSUE DOPPLER FREE WALL SYSTOLIC VELOCITY 1 (APICAL 4 CHAMBER VIEW): 14.28 CM/S
SINUS: 2.72 CM
STJ: 2.65 CM
TDI LATERAL: 0.09 M/S
TDI SEPTAL: 0.08 M/S
TDI: 0.09 M/S
TR MAX PG: 30 MMHG
TRICUSPID ANNULAR PLANE SYSTOLIC EXCURSION: 2.59 CM
TV REST PULMONARY ARTERY PRESSURE: 33 MMHG
Z-SCORE OF LEFT VENTRICULAR DIMENSION IN END DIASTOLE: -5.23
Z-SCORE OF LEFT VENTRICULAR DIMENSION IN END SYSTOLE: -2.83

## 2023-08-23 PROCEDURE — 93306 ECHO (CUPID ONLY): ICD-10-PCS | Mod: 26,,, | Performed by: INTERNAL MEDICINE

## 2023-08-23 PROCEDURE — 93306 TTE W/DOPPLER COMPLETE: CPT | Mod: 26,,, | Performed by: INTERNAL MEDICINE

## 2023-08-23 PROCEDURE — 93306 TTE W/DOPPLER COMPLETE: CPT | Mod: PO

## 2023-08-25 ENCOUNTER — LAB VISIT (OUTPATIENT)
Dept: LAB | Facility: HOSPITAL | Age: 58
End: 2023-08-25
Attending: STUDENT IN AN ORGANIZED HEALTH CARE EDUCATION/TRAINING PROGRAM
Payer: COMMERCIAL

## 2023-08-25 ENCOUNTER — INFUSION (OUTPATIENT)
Dept: INFUSION THERAPY | Facility: HOSPITAL | Age: 58
End: 2023-08-25
Attending: STUDENT IN AN ORGANIZED HEALTH CARE EDUCATION/TRAINING PROGRAM
Payer: COMMERCIAL

## 2023-08-25 ENCOUNTER — OFFICE VISIT (OUTPATIENT)
Dept: HEMATOLOGY/ONCOLOGY | Facility: CLINIC | Age: 58
End: 2023-08-25
Payer: COMMERCIAL

## 2023-08-25 VITALS
OXYGEN SATURATION: 97 % | WEIGHT: 282.19 LBS | HEIGHT: 65 IN | RESPIRATION RATE: 16 BRPM | DIASTOLIC BLOOD PRESSURE: 66 MMHG | SYSTOLIC BLOOD PRESSURE: 91 MMHG | BODY MASS INDEX: 47.02 KG/M2 | HEART RATE: 78 BPM

## 2023-08-25 VITALS
RESPIRATION RATE: 16 BRPM | SYSTOLIC BLOOD PRESSURE: 121 MMHG | OXYGEN SATURATION: 97 % | WEIGHT: 282.19 LBS | TEMPERATURE: 97 F | HEIGHT: 65 IN | BODY MASS INDEX: 47.02 KG/M2 | HEART RATE: 76 BPM | DIASTOLIC BLOOD PRESSURE: 80 MMHG

## 2023-08-25 DIAGNOSIS — I42.7 CHEMOTHERAPY INDUCED CARDIOMYOPATHY: ICD-10-CM

## 2023-08-25 DIAGNOSIS — N18.31 STAGE 3A CHRONIC KIDNEY DISEASE: ICD-10-CM

## 2023-08-25 DIAGNOSIS — C50.912 INVASIVE DUCTAL CARCINOMA OF BREAST, FEMALE, LEFT: ICD-10-CM

## 2023-08-25 DIAGNOSIS — D84.821 IMMUNODEFICIENCY DUE TO DRUGS: ICD-10-CM

## 2023-08-25 DIAGNOSIS — E11.9 TYPE 2 DIABETES MELLITUS WITHOUT COMPLICATION, WITHOUT LONG-TERM CURRENT USE OF INSULIN: Primary | ICD-10-CM

## 2023-08-25 DIAGNOSIS — I48.91 ATRIAL FIBRILLATION WITH RVR: ICD-10-CM

## 2023-08-25 DIAGNOSIS — Z79.899 IMMUNODEFICIENCY DUE TO DRUGS: ICD-10-CM

## 2023-08-25 DIAGNOSIS — C50.912 INVASIVE DUCTAL CARCINOMA OF BREAST, FEMALE, LEFT: Primary | ICD-10-CM

## 2023-08-25 DIAGNOSIS — T45.1X5A CHEMOTHERAPY INDUCED CARDIOMYOPATHY: ICD-10-CM

## 2023-08-25 LAB
ALBUMIN SERPL BCP-MCNC: 3.5 G/DL (ref 3.5–5.2)
ALP SERPL-CCNC: 122 U/L (ref 55–135)
ALT SERPL W/O P-5'-P-CCNC: 14 U/L (ref 10–44)
ANION GAP SERPL CALC-SCNC: 11 MMOL/L (ref 8–16)
AST SERPL-CCNC: 11 U/L (ref 10–40)
BASOPHILS # BLD AUTO: 0.06 K/UL (ref 0–0.2)
BASOPHILS NFR BLD: 1.1 % (ref 0–1.9)
BILIRUB SERPL-MCNC: 0.4 MG/DL (ref 0.1–1)
BUN SERPL-MCNC: 16 MG/DL (ref 6–20)
CALCIUM SERPL-MCNC: 9.8 MG/DL (ref 8.7–10.5)
CHLORIDE SERPL-SCNC: 107 MMOL/L (ref 95–110)
CO2 SERPL-SCNC: 20 MMOL/L (ref 23–29)
CREAT SERPL-MCNC: 1.4 MG/DL (ref 0.5–1.4)
DIFFERENTIAL METHOD: ABNORMAL
EOSINOPHIL # BLD AUTO: 0.2 K/UL (ref 0–0.5)
EOSINOPHIL NFR BLD: 3.6 % (ref 0–8)
ERYTHROCYTE [DISTWIDTH] IN BLOOD BY AUTOMATED COUNT: 13.3 % (ref 11.5–14.5)
EST. GFR  (NO RACE VARIABLE): 43.1 ML/MIN/1.73 M^2
GLUCOSE SERPL-MCNC: 129 MG/DL (ref 70–110)
HCT VFR BLD AUTO: 36.9 % (ref 37–48.5)
HGB BLD-MCNC: 11.7 G/DL (ref 12–16)
IMM GRANULOCYTES # BLD AUTO: 0.02 K/UL (ref 0–0.04)
IMM GRANULOCYTES NFR BLD AUTO: 0.4 % (ref 0–0.5)
LYMPHOCYTES # BLD AUTO: 1.6 K/UL (ref 1–4.8)
LYMPHOCYTES NFR BLD: 29.3 % (ref 18–48)
MAGNESIUM SERPL-MCNC: 1.7 MG/DL (ref 1.6–2.6)
MCH RBC QN AUTO: 30.3 PG (ref 27–31)
MCHC RBC AUTO-ENTMCNC: 31.7 G/DL (ref 32–36)
MCV RBC AUTO: 96 FL (ref 82–98)
MONOCYTES # BLD AUTO: 0.3 K/UL (ref 0.3–1)
MONOCYTES NFR BLD: 5.4 % (ref 4–15)
NEUTROPHILS # BLD AUTO: 3.4 K/UL (ref 1.8–7.7)
NEUTROPHILS NFR BLD: 60.2 % (ref 38–73)
NRBC BLD-RTO: 0 /100 WBC
PLATELET # BLD AUTO: 224 K/UL (ref 150–450)
PMV BLD AUTO: 9.2 FL (ref 9.2–12.9)
POTASSIUM SERPL-SCNC: 4.1 MMOL/L (ref 3.5–5.1)
PROT SERPL-MCNC: 7.4 G/DL (ref 6–8.4)
RBC # BLD AUTO: 3.86 M/UL (ref 4–5.4)
SODIUM SERPL-SCNC: 138 MMOL/L (ref 136–145)
WBC # BLD AUTO: 5.59 K/UL (ref 3.9–12.7)

## 2023-08-25 PROCEDURE — 3074F SYST BP LT 130 MM HG: CPT | Mod: CPTII,S$GLB,, | Performed by: STUDENT IN AN ORGANIZED HEALTH CARE EDUCATION/TRAINING PROGRAM

## 2023-08-25 PROCEDURE — 4010F PR ACE/ARB THEARPY RXD/TAKEN: ICD-10-PCS | Mod: CPTII,S$GLB,, | Performed by: STUDENT IN AN ORGANIZED HEALTH CARE EDUCATION/TRAINING PROGRAM

## 2023-08-25 PROCEDURE — 36415 COLL VENOUS BLD VENIPUNCTURE: CPT | Mod: PN | Performed by: STUDENT IN AN ORGANIZED HEALTH CARE EDUCATION/TRAINING PROGRAM

## 2023-08-25 PROCEDURE — 3044F HG A1C LEVEL LT 7.0%: CPT | Mod: CPTII,S$GLB,, | Performed by: STUDENT IN AN ORGANIZED HEALTH CARE EDUCATION/TRAINING PROGRAM

## 2023-08-25 PROCEDURE — 3079F DIAST BP 80-89 MM HG: CPT | Mod: CPTII,S$GLB,, | Performed by: STUDENT IN AN ORGANIZED HEALTH CARE EDUCATION/TRAINING PROGRAM

## 2023-08-25 PROCEDURE — 3066F PR DOCUMENTATION OF TREATMENT FOR NEPHROPATHY: ICD-10-PCS | Mod: CPTII,S$GLB,, | Performed by: STUDENT IN AN ORGANIZED HEALTH CARE EDUCATION/TRAINING PROGRAM

## 2023-08-25 PROCEDURE — 3008F BODY MASS INDEX DOCD: CPT | Mod: CPTII,S$GLB,, | Performed by: STUDENT IN AN ORGANIZED HEALTH CARE EDUCATION/TRAINING PROGRAM

## 2023-08-25 PROCEDURE — 99214 OFFICE O/P EST MOD 30 MIN: CPT | Mod: S$GLB,,, | Performed by: STUDENT IN AN ORGANIZED HEALTH CARE EDUCATION/TRAINING PROGRAM

## 2023-08-25 PROCEDURE — 3008F PR BODY MASS INDEX (BMI) DOCUMENTED: ICD-10-PCS | Mod: CPTII,S$GLB,, | Performed by: STUDENT IN AN ORGANIZED HEALTH CARE EDUCATION/TRAINING PROGRAM

## 2023-08-25 PROCEDURE — 3074F PR MOST RECENT SYSTOLIC BLOOD PRESSURE < 130 MM HG: ICD-10-PCS | Mod: CPTII,S$GLB,, | Performed by: STUDENT IN AN ORGANIZED HEALTH CARE EDUCATION/TRAINING PROGRAM

## 2023-08-25 PROCEDURE — 3060F PR POS MICROALBUMINURIA RESULT DOCUMENTED/REVIEW: ICD-10-PCS | Mod: CPTII,S$GLB,, | Performed by: STUDENT IN AN ORGANIZED HEALTH CARE EDUCATION/TRAINING PROGRAM

## 2023-08-25 PROCEDURE — 1159F MED LIST DOCD IN RCRD: CPT | Mod: CPTII,S$GLB,, | Performed by: STUDENT IN AN ORGANIZED HEALTH CARE EDUCATION/TRAINING PROGRAM

## 2023-08-25 PROCEDURE — 1160F RVW MEDS BY RX/DR IN RCRD: CPT | Mod: CPTII,S$GLB,, | Performed by: STUDENT IN AN ORGANIZED HEALTH CARE EDUCATION/TRAINING PROGRAM

## 2023-08-25 PROCEDURE — 25000003 PHARM REV CODE 250: Mod: PN | Performed by: STUDENT IN AN ORGANIZED HEALTH CARE EDUCATION/TRAINING PROGRAM

## 2023-08-25 PROCEDURE — 99214 PR OFFICE/OUTPT VISIT, EST, LEVL IV, 30-39 MIN: ICD-10-PCS | Mod: S$GLB,,, | Performed by: STUDENT IN AN ORGANIZED HEALTH CARE EDUCATION/TRAINING PROGRAM

## 2023-08-25 PROCEDURE — 96415 CHEMO IV INFUSION ADDL HR: CPT | Mod: PN

## 2023-08-25 PROCEDURE — 85025 COMPLETE CBC W/AUTO DIFF WBC: CPT | Mod: PN | Performed by: STUDENT IN AN ORGANIZED HEALTH CARE EDUCATION/TRAINING PROGRAM

## 2023-08-25 PROCEDURE — 1159F PR MEDICATION LIST DOCUMENTED IN MEDICAL RECORD: ICD-10-PCS | Mod: CPTII,S$GLB,, | Performed by: STUDENT IN AN ORGANIZED HEALTH CARE EDUCATION/TRAINING PROGRAM

## 2023-08-25 PROCEDURE — 99999 PR PBB SHADOW E&M-EST. PATIENT-LVL IV: CPT | Mod: PBBFAC,,, | Performed by: STUDENT IN AN ORGANIZED HEALTH CARE EDUCATION/TRAINING PROGRAM

## 2023-08-25 PROCEDURE — 99999 PR PBB SHADOW E&M-EST. PATIENT-LVL IV: ICD-10-PCS | Mod: PBBFAC,,, | Performed by: STUDENT IN AN ORGANIZED HEALTH CARE EDUCATION/TRAINING PROGRAM

## 2023-08-25 PROCEDURE — 63600175 PHARM REV CODE 636 W HCPCS: Mod: JZ,JG,PN | Performed by: STUDENT IN AN ORGANIZED HEALTH CARE EDUCATION/TRAINING PROGRAM

## 2023-08-25 PROCEDURE — 4010F ACE/ARB THERAPY RXD/TAKEN: CPT | Mod: CPTII,S$GLB,, | Performed by: STUDENT IN AN ORGANIZED HEALTH CARE EDUCATION/TRAINING PROGRAM

## 2023-08-25 PROCEDURE — 3066F NEPHROPATHY DOC TX: CPT | Mod: CPTII,S$GLB,, | Performed by: STUDENT IN AN ORGANIZED HEALTH CARE EDUCATION/TRAINING PROGRAM

## 2023-08-25 PROCEDURE — 83735 ASSAY OF MAGNESIUM: CPT | Mod: PN | Performed by: STUDENT IN AN ORGANIZED HEALTH CARE EDUCATION/TRAINING PROGRAM

## 2023-08-25 PROCEDURE — 80053 COMPREHEN METABOLIC PANEL: CPT | Mod: PN | Performed by: STUDENT IN AN ORGANIZED HEALTH CARE EDUCATION/TRAINING PROGRAM

## 2023-08-25 PROCEDURE — 3079F PR MOST RECENT DIASTOLIC BLOOD PRESSURE 80-89 MM HG: ICD-10-PCS | Mod: CPTII,S$GLB,, | Performed by: STUDENT IN AN ORGANIZED HEALTH CARE EDUCATION/TRAINING PROGRAM

## 2023-08-25 PROCEDURE — 1160F PR REVIEW ALL MEDS BY PRESCRIBER/CLIN PHARMACIST DOCUMENTED: ICD-10-PCS | Mod: CPTII,S$GLB,, | Performed by: STUDENT IN AN ORGANIZED HEALTH CARE EDUCATION/TRAINING PROGRAM

## 2023-08-25 PROCEDURE — 3060F POS MICROALBUMINURIA REV: CPT | Mod: CPTII,S$GLB,, | Performed by: STUDENT IN AN ORGANIZED HEALTH CARE EDUCATION/TRAINING PROGRAM

## 2023-08-25 PROCEDURE — 3044F PR MOST RECENT HEMOGLOBIN A1C LEVEL <7.0%: ICD-10-PCS | Mod: CPTII,S$GLB,, | Performed by: STUDENT IN AN ORGANIZED HEALTH CARE EDUCATION/TRAINING PROGRAM

## 2023-08-25 PROCEDURE — 96413 CHEMO IV INFUSION 1 HR: CPT | Mod: PN

## 2023-08-25 RX ORDER — EPINEPHRINE 0.3 MG/.3ML
0.3 INJECTION SUBCUTANEOUS ONCE AS NEEDED
Status: CANCELLED | OUTPATIENT
Start: 2023-08-25

## 2023-08-25 RX ORDER — SODIUM CHLORIDE 0.9 % (FLUSH) 0.9 %
10 SYRINGE (ML) INJECTION
Status: DISCONTINUED | OUTPATIENT
Start: 2023-08-25 | End: 2023-08-25 | Stop reason: HOSPADM

## 2023-08-25 RX ORDER — DIPHENHYDRAMINE HYDROCHLORIDE 50 MG/ML
50 INJECTION INTRAMUSCULAR; INTRAVENOUS ONCE AS NEEDED
Status: DISCONTINUED | OUTPATIENT
Start: 2023-08-25 | End: 2023-08-25 | Stop reason: HOSPADM

## 2023-08-25 RX ORDER — HEPARIN 100 UNIT/ML
500 SYRINGE INTRAVENOUS
Status: CANCELLED | OUTPATIENT
Start: 2023-08-25

## 2023-08-25 RX ORDER — SODIUM CHLORIDE 0.9 % (FLUSH) 0.9 %
10 SYRINGE (ML) INJECTION
Status: CANCELLED | OUTPATIENT
Start: 2023-08-25

## 2023-08-25 RX ORDER — DIPHENHYDRAMINE HYDROCHLORIDE 50 MG/ML
50 INJECTION INTRAMUSCULAR; INTRAVENOUS ONCE AS NEEDED
Status: CANCELLED | OUTPATIENT
Start: 2023-08-25

## 2023-08-25 RX ORDER — HEPARIN 100 UNIT/ML
500 SYRINGE INTRAVENOUS
Status: DISCONTINUED | OUTPATIENT
Start: 2023-08-25 | End: 2023-08-25 | Stop reason: HOSPADM

## 2023-08-25 RX ORDER — EPINEPHRINE 0.3 MG/.3ML
0.3 INJECTION SUBCUTANEOUS ONCE AS NEEDED
Status: DISCONTINUED | OUTPATIENT
Start: 2023-08-25 | End: 2023-08-25 | Stop reason: HOSPADM

## 2023-08-25 RX ADMIN — PERTUZUMAB 420 MG: 30 INJECTION, SOLUTION, CONCENTRATE INTRAVENOUS at 12:08

## 2023-08-25 RX ADMIN — SODIUM CHLORIDE: 9 INJECTION, SOLUTION INTRAVENOUS at 11:08

## 2023-08-25 RX ADMIN — TRASTUZUMAB 750 MG: 150 INJECTION, POWDER, LYOPHILIZED, FOR SOLUTION INTRAVENOUS at 01:08

## 2023-08-25 NOTE — PROGRESS NOTES
PATIENT: Josefina Coon  MRN: [id number]  DATE: 9/4/2023      Diagnosis:   1. Type 2 diabetes mellitus without complication, without long-term current use of insulin    2. Chemotherapy induced cardiomyopathy    3. Invasive ductal carcinoma of breast, female, left    4. Immunodeficiency due to drugs    5. Atrial fibrillation with RVR    6. Stage 3a chronic kidney disease        Chief Complaint: Clearance for C16 HP    Subjective:   HPI: Ms. Coon is a 58 y.o. female Ms. Coon is a 58 y.o. female with HTN, HLD, depression, diabetes type 2 with neuropathy, obesity, fatty liver, following up with us for  a diagnosis of invasive ductal carcinoma of the left breast, triple positivity.     She is here today today for consideration of C16D1 of therapy which consists of Trastuzumab/Pertuzumab every 21 days.    Today, tolerating Transtuzumab/pertuzumab.,   - XRT completed 04/13/23  - No fevers, chills, abdominal discomfort, N/V, bleeding, no new lumps or bumps, etc.  - Diarrhea consistent, worsening in the morning, we discussed her FATIMAH and concerning   - Afib episode and discussion about Echo findings and following up closely with Dr Frias     Oncologic History:   8/25/22 bilateral screening mammogram,,Right neg ,Left central post mass     9/8/22 left diag MMG, left US limited .Left 0300 lateral posterior 6cm FN 1.4cm mass , left axilla LN 2, up to 4mm cortex     9/14/22 left 0300 lateral posterior 6cm FN 1.4cm mass US biopsy .Grade 3 ,1.1cm in biopsy No LVI , ER 84% MS 28% Her 2 3+ pos Ki 52 %    Left axilla LN biopsy ;Benign fatty tissue, no LN, not concordant No MRI of breasts were done      9/21/22 US bilateral complete Right neg, right LN nml , Left 0300 6cm FN [id number] mass Borderline LN left axilla     9/21/22 Left axilla LN biopsy benign LN , so eventually Stage IA triple positive Breast cancer    10/7/22: started neoadjuvant chemo with Taxol + dual HER-2 (tranztuzumab and pertuzumab)    Receiving treatment  with Paclitaxel/Trastuzumab/Pertuzumab on D1, weekly Paclitaxel on D8, D15 of a 21 day cycle. Completed weekly Paclitaxel on 12/23/22.    2/2023: s/p B/L mastectomy with CPR;ypT0,pN0,cM0    Oncology History   Invasive ductal carcinoma of breast, female, left   9/23/2022 Initial Diagnosis    Invasive ductal carcinoma of breast, female, left     9/23/2022 Cancer Staged    Staging form: Breast, AJCC 8th Edition  - Clinical stage from 9/23/2022: Stage IA (cT1c, cN0(f), cM0, G3, ER+, RI+, HER2+)     9/29/2022 - 9/29/2022 Chemotherapy    Treatment Summary   Plan Name: OP BREAST TRASTUZUMAB PACLITAXEL WEEKLY  Treatment Goal: Curative  Status: Inactive  Start Date:   End Date:   Provider: Lisa Jaquez MD  Chemotherapy: PACLitaxeL (TAXOL) 80 mg/m2 = 204 mg in sodium chloride 0.9% 250 mL chemo infusion, 80 mg/m2 = 204 mg, Intravenous, Clinic/HOD 1 time, 0 of 12 cycles  pertuzumab (PERJETA) 840 mg in sodium chloride 0.9% 278 mL infusion, 840 mg (original dose ), Intravenous, Clinic/HOD 1 time, 0 of 17 cycles  Dose modification: 840 mg (Cycle 1, Reason: Other (see comments)), 420 mg (Cycle 4, Reason: Other (see comments))  trastuzumab-dkst (OGIVRI) 591 mg in sodium chloride 0.9% 250 mL chemo infusion, 4 mg/kg = 591 mg, Intravenous, Clinic/HOD 1 time, 0 of 25 cycles     10/7/2022 -  Chemotherapy    Treatment Summary   Plan Name: OP BREAST PACLITAXEL TRASTUZUMAB WEEKLY WITH PERTUZUMAB Q3W (THP)  Treatment Goal: Control  Status: Active  Start Date: 10/7/2022  End Date: 10/6/2023 (Planned)  Provider: Lisa Jaquez MD  Chemotherapy: PACLitaxeL (TAXOL) 80 mg/m2 = 210 mg in sodium chloride 0.9% 250 mL chemo infusion, 80 mg/m2 = 210 mg, Intravenous, Clinic/HOD 1 time, 4 of 4 cycles  Administration: 210 mg (10/7/2022), 204 mg (10/14/2022), 204 mg (10/28/2022), 204 mg (11/4/2022), 204 mg (10/21/2022), 204 mg (11/11/2022), 204 mg (11/18/2022), 204 mg (11/25/2022), 198 mg (12/9/2022), 204 mg (12/2/2022), 198 mg (12/16/2022), 198 mg  (12/23/2022)  pertuzumab (PERJETA) 840 mg in sodium chloride 0.9% 278 mL infusion, 840 mg, Intravenous, Clinic/HOD 1 time, 16 of 18 cycles  Administration: 840 mg (10/7/2022), 420 mg (10/28/2022), 420 mg (11/18/2022), 420 mg (12/9/2022), 420 mg (12/30/2022), 420 mg (1/20/2023), 420 mg (2/17/2023), 420 mg (3/10/2023), 420 mg (3/31/2023), 420 mg (4/21/2023), 420 mg (5/12/2023), 420 mg (6/2/2023), 420 mg (6/23/2023), 420 mg (7/14/2023), 420 mg (8/4/2023), 420 mg (8/25/2023)  trastuzumab-dkst (OGIVRI) 570 mg in sodium chloride 0.9% 250 mL chemo infusion, 593 mg (100 % of original dose 4 mg/kg), Intravenous, Clinic/HOD 1 time, 16 of 18 cycles  Dose modification: 4 mg/kg (original dose 4 mg/kg, Cycle 1, Reason: Other (see comments), Comment: weekly trastuzumab), 2 mg/kg (original dose 2 mg/kg, Cycle 2, Reason: Other (see comments), Comment: weekly maintenance dose), 6 mg/kg (original dose 2 mg/kg, Cycle 2, Reason: MD Discretion, Comment: change to q3w dosing), 2 mg/kg (original dose 2 mg/kg, Cycle 1, Reason: Other (see comments), Comment: maintenance weekly dose)  Administration: 570 mg (10/7/2022), 840 mg (10/28/2022), 288 mg (10/14/2022), 290 mg (10/21/2022), 840 mg (11/18/2022), 831 mg (12/9/2022), 831 mg (12/30/2022), 720 mg (1/20/2023), 806 mg (2/17/2023), 750 mg (3/10/2023), 814 mg (3/31/2023), 750 mg (4/21/2023), 750 mg (5/12/2023), 750 mg (6/2/2023), 750 mg (6/23/2023), 750 mg (7/14/2023), 750 mg (8/4/2023), 750 mg (8/25/2023)     2/13/2023 Cancer Staged    Staging form: Breast, AJCC 8th Edition  - Pathologic stage from 2/13/2023: No Stage Recommended (ypT0, pN0(sn), cM0, GX)     3/14/2023 - 4/14/2023 Radiation Therapy    Treating physician: Shelia Trevino  Total Dose: 52.56 Gy (42.56Gy/16 fractions to whole breast; 10Gy/5 fraction boost)  Fractions: 20  Treatment Site Ref. ID Energy Dose/Fx (Gy) #Fx Dose Correction (Gy) Total Dose (Gy) Start Date End Date Elapsed Days   3D Breast_L NormEZCalc 18X 2.66 16 / 16  0 42.56 3/14/2023 2023 23   3d BrstBst_L NormBst 18X 2.5  0 10 4/10/2023 2023 4         Past Medical History:   Past Medical History:   Diagnosis Date    Abnormal liver enzymes     Asymptomatic varicose veins     Breast cancer 2022    left idc    Cancer     left breast cancer    Depressive disorder, not elsewhere classified     Dyslipidemia     Embolism and thrombosis of unspecified site     superficial venous thrombosis    Encounter for long-term (current) use of other medications     Family history of ischemic heart disease     Fatty liver     Generalized anxiety disorder     History of chicken pox     Obstructive sleep apnea (adult) (pediatric)     Type II or unspecified type diabetes mellitus without mention of complication, not stated as uncontrolled     Unspecified essential hypertension     Unspecified venous (peripheral) insufficiency     Unspecified vitamin D deficiency        Past Surgical HIstory:   Past Surgical History:   Procedure Laterality Date    BREAST BIOPSY Left 2022    idc    BREAST BIOPSY Left 2022    neg ln    BREAST BIOPSY Left 2022    neg ln     SECTION      COLONOSCOPY      ESOPHAGOGASTRODUODENOSCOPY      INSERTION OF TUNNELED CENTRAL VENOUS CATHETER (CVC) WITH SUBCUTANEOUS PORT Right 10/04/2022    Procedure: [id number];  Surgeon: Dominick Ng MD;  Location: [facility] OR;  Service: General;  Laterality: Right;    LUMPECTOMY, WITH RADAR LOCALIZATION USING TRENTON  Left 2023    Procedure: LUMPECTOMY,WITH RADAR LOCALIZATION USING TR[place]ON ;  Surgeon: Maria G Mcduffie MD;  Location: [facility] OR;  Service: General;  Laterality: Left;    SENTINEL LYMPH NODE BIOPSY Left 2023    Procedure: BIOPSY, LYMPH NODE, SENTINEL;  Surgeon: Maria G Mcduffie MD;  Location: [facility] OR;  Service: General;  Laterality: Left;    TRANSESOPHAGEAL ECHOCARDIOGRAM WITH POSSIBLE CARDIOVERSION (LAUREL W/ POSS CARDIOVERSION) N/A 2023    Procedure:  TRANSESOPHAGEAL ECHOCARDIOGRAM WITH POSSIBLE CARDIOVERSION (LAUREL W/ POSS CARDIOVERSION);  Surgeon: Roni Frias MD;  Location: [facility];  Service: Cardiology;  Laterality: N/A;    VEIN SURGERY      Laser, Dr. Larsen       Family History:   Family History   Problem Relation Age of Onset    Hyperlipidemia Mother     Hypertension Mother     Vulvar Cancer Mother     Dementia Mother     Atrial fibrillation Father     Parkinsonism Father     Diabetes Brother     Cancer Brother         colon    Hypertension Son     Hyperlipidemia Son     Anxiety disorder Son     Stroke Maternal Grandmother        Social History:  reports that she has never smoked. She has never used smokeless tobacco. She reports that she does not currently use alcohol. She reports that she does not use drugs.    Allergies:  Review of patient's allergies indicates:   Allergen Reactions    Sitagliptin      Other reaction(s): (januvia) abdominal pain    Sulfa (sulfonamide antibiotics) Hives    Byetta  [exenatide] Rash    Lactose        Medications:  Current Outpatient Medications   Medication Sig Dispense Refill    albuterol (PROVENTIL/VENTOLIN HFA) 90 mcg/actuation inhaler Inhale 2 puffs into the lungs every 6 (six) hours as needed for Wheezing. 18 g 6    ALPRAZolam (XANAX) 0.25 MG tablet TAKE ONE TABLET BY MOUTH 1-2 TIMES DAILY AS NEEDED ANXIETY 15 tablet 0    anastrozole (ARIMIDEX) 1 mg Tab Take 1 tablet (1 mg total) by mouth once daily. 90 tablet 3    apixaban (ELIQUIS) 5 mg Tab Take 1 tablet (5 mg total) by mouth 2 (two) times daily. 180 tablet 3    azelastine (ASTELIN) 137 mcg (0.1 %) nasal spray 2 sprays by Nasal route 2 (two) times daily as needed for Rhinitis.      buPROPion (WELLBUTRIN XL) 150 MG TB24 tablet TAKE 1 TABLET BY MOUTH EVERY DAY (Patient taking differently: Take 150 mg by mouth once daily.) 90 tablet 3    cyanocobalamin 1,000 mcg/mL injection Inject 1 ml q 2 weeks x 1 month then 1 ml monthly 30 mL 0    dapagliflozin propanediol  (FARXIGA) 5 mg Tab tablet Take 1 tablet (5 mg total) by mouth once daily. 90 tablet 3    LIDOcaine-prilocaine (EMLA) cream Apply to port site 1 hour prior to port access and cover 30 g 3    lipase-protease-amylase 6,000-19,000-30,000 units (CREON) 6,000-19,000 -30,000 unit capsule Take 1 capsule by mouth 3 (three) times daily with meals. 90 capsule 11    metFORMIN (GLUCOPHAGE) 500 MG tablet TAKE 1 TABLET BY MOUTH TWICE A DAY WITH MEALS 180 tablet 1    metoprolol succinate (TOPROL-XL) 50 MG 24 hr tablet Take 1 tablet (50 mg total) by mouth once daily. 90 tablet 2    nystatin (MYCOSTATIN) powder Apply to affected area 3 times daily 60 g 1    pravastatin (PRAVACHOL) 10 MG tablet TAKE 1 TABLET BY MOUTH EVERYDAY AT BEDTIME (Patient taking differently: Take 10 mg by mouth every evening.) 90 tablet 1    tirzepatide (MOUNJARO) 7.5 mg/0.5 mL PnIj INJECT 7.5 MG SUBCUTANEOUSLY EVERY 7 DAYS 4 pen 0     No current facility-administered medications for this visit.     Facility-Administered Medications Ordered in Other Visits   Medication Dose Route Frequency Provider Last Rate Last Admin    lactated ringers infusion   Intravenous Continuous Maria G Mcduffie  mL/hr at 02/08/23 0700 New Bag at 02/08/23 0814    midazolam (VERSED) 1 mg/mL injection 2 mg  2 mg Intravenous See admin instructions Maria G Mcduffie MD   2 mg at 02/08/23 0711       Review of Systems   Constitutional:  Negative for chills, fatigue and fever.   HENT:  Negative for trouble swallowing.    Respiratory:  Negative for cough and shortness of breath.    Cardiovascular:  Negative for chest pain and palpitations.   Gastrointestinal:  Negative for abdominal pain, constipation, diarrhea, nausea and vomiting.   Genitourinary:  Negative for dysuria.   Musculoskeletal:  Negative for arthralgias.   Skin:  Negative for rash.   Neurological:  Negative for headaches.   Hematological:  Negative for adenopathy.       Objective:      Vitals:   Vitals:    08/25/23  "0958   BP: 121/80   BP Location: Left arm   Patient Position: Sitting   BP Method: Medium (Automatic)   Pulse: 76   Resp: 16   Temp: 96.6 °F (35.9 °C)   TempSrc: Temporal   SpO2: 97%   Weight: 128 kg (282 lb 3 oz)   Height: 5' 4.5" (1.638 m)           BMI: Body mass index is 47.69 kg/m².    Physical Exam  Vitals reviewed.   Constitutional:       General: She is not in acute distress.     Appearance: She is not diaphoretic.   HENT:      Head: Normocephalic and atraumatic.      Mouth/Throat:      Mouth: Mucous membranes are moist.      Pharynx: No oropharyngeal exudate.   Eyes:      General: No scleral icterus.     Conjunctiva/sclera: Conjunctivae normal.   Cardiovascular:      Rate and Rhythm: Normal rate and regular rhythm.      Heart sounds: Normal heart sounds. No murmur heard.  Pulmonary:      Effort: Pulmonary effort is normal. No respiratory distress.      Breath sounds: No wheezing.   Chest:          Comments: B/L mastectomy   Area of excoriation from irradiation  Abdominal:      General: Bowel sounds are normal.      Palpations: Abdomen is soft.   Musculoskeletal:      Cervical back: Neck supple. No tenderness.      Right lower leg: No edema.      Left lower leg: No edema.   Lymphadenopathy:      Cervical: No cervical adenopathy.      Upper Body:      Right upper body: No supraclavicular or axillary adenopathy.      Left upper body: No supraclavicular or axillary adenopathy.      Lower Body: No right inguinal adenopathy. No left inguinal adenopathy.   Skin:     General: Skin is warm.      Findings: No rash.   Neurological:      Mental Status: She is alert and oriented to person, place, and time.   Psychiatric:         Behavior: Behavior normal.         Thought Content: Thought content normal.         Laboratory Data:  Lab Results   Component Value Date    WBC 5.59 08/25/2023    HGB 11.7 (L) 08/25/2023    HCT 36.9 (L) 08/25/2023    MCV 96 08/25/2023     08/25/2023      CMP  Sodium   Date Value Ref Range " Status   08/25/2023 138 136 - 145 mmol/L Final   08/08/2015 137 137 - 145 MMOL/L Final     Potassium   Date Value Ref Range Status   08/25/2023 4.1 3.5 - 5.1 mmol/L Final   08/08/2015 4.4 3.5 - 5.1 MMOL/L Final     Chloride   Date Value Ref Range Status   08/25/2023 107 95 - 110 mmol/L Final   08/08/2015 101 98 - 107 MMOL/L Final     CO2   Date Value Ref Range Status   08/25/2023 20 (L) 23 - 29 mmol/L Final     Glucose   Date Value Ref Range Status   08/25/2023 129 (H) 70 - 110 mg/dL Final     BUN   Date Value Ref Range Status   08/25/2023 16 6 - 20 mg/dL Final     Creatinine   Date Value Ref Range Status   08/25/2023 1.4 0.5 - 1.4 mg/dL Final   08/08/2015 0.63 0.52 - 1.04 MG/DL Final     Calcium   Date Value Ref Range Status   08/25/2023 9.8 8.7 - 10.5 mg/dL Final     Total Protein   Date Value Ref Range Status   08/25/2023 7.4 6.0 - 8.4 g/dL Final     Albumin   Date Value Ref Range Status   08/25/2023 3.5 3.5 - 5.2 g/dL Final     Total Bilirubin   Date Value Ref Range Status   08/25/2023 0.4 0.1 - 1.0 mg/dL Final     Comment:     For infants and newborns, interpretation of results should be based  on gestational age, weight and in agreement with clinical  observations.    Premature Infant recommended reference ranges:  Up to 24 hours.............<8.0 mg/dL  Up to 48 hours............<12.0 mg/dL  3-5 days..................<15.0 mg/dL  6-29 days.................<15.0 mg/dL       Alkaline Phosphatase   Date Value Ref Range Status   08/25/2023 122 55 - 135 U/L Final     AST   Date Value Ref Range Status   08/25/2023 11 10 - 40 U/L Final     ALT   Date Value Ref Range Status   08/25/2023 14 10 - 44 U/L Final     Anion Gap   Date Value Ref Range Status   08/25/2023 11 8 - 16 mmol/L Final     eGFR   Date Value Ref Range Status   08/25/2023 43.1 (A) >60 mL/min/1.73 m^2 Final       Assessment:       1. Type 2 diabetes mellitus without complication, without long-term current use of insulin    2. Chemotherapy induced  cardiomyopathy    3. Invasive ductal carcinoma of breast, female, left    4. Immunodeficiency due to drugs    5. Atrial fibrillation with RVR    6. Stage 3a chronic kidney disease              Plan:   Invasive ductal carcinoma of the breast, left  - cT1c triple positive breast cancer  (ER 84% SD 28% Her 2 3+ pos Ki 52 %), given her young age and Ki67%, will proceed with TH, adding P to help with a better pCR, will also plan to get ultrasound mid cycle to monitor (3 months)  -9/26/22 Echo with EF 60%; next is scheduled for 01/18/23  -Began TH+P on 10/7/2022; completed 12 weeks of Paclitaxel 12/23/22  - 2/2023; s/p lupmectomy CPR;ypT0,pN0,cM0  -Proceed with Trastuzumab/Pertuzumab   - on adjuvant radiation plan for 16 fraction total - completed 04/13/23  - will need adjuvant endocrine therapy after finishing up adjuvant radiation   -Echo 5/2023 EF: 60% and global longitudinal 17.0%, cont monitoring with echo in 3 months ;   -Proceed with C13 HP today; will add 2 gm Magnesium to 1000 ml NS (hydrate) over 2 hours  - last period  around~ 50,, post menopausal , on anastrazole since  June 2nd /2023 ,  - DEXA scan, osteopenia, cont taking Vit D, hold off  calcium for now given her FATIMAH     Afib with RVR:  -on eliquis, following up with cardiologist, discussed echo findings in details with Dr Frias and less likely due to chemo (risk vs benefit) will proceed with chemo      FATIMAH possible dehydration:  -establish with nephrologist, improving/stable      Diabetes type 2 with neuropathy   -Taking Metformin, Trulicity  - on gabapentin 100mg night     Anxiety  -Taking Wellbutrin  -Following with Dr. Long     Hypomagnesemia  -Monitor, replacement as needed     Patient queried and all questions were answered.    Route Chart for Scheduling    Med Onc Chart Routing      Follow up with physician . Keep amaris as they are   Follow up with [facility]    Infusion scheduling note    Injection scheduling note    Labs    Imaging    Pharmacy  appointment    Other referrals            Treatment Plan Information   OP BREAST PACLITAXEL TRASTUZUMAB WEEKLY WITH PERTUZUMAB Q3W (THP)   Lisa Jaquez MD   Upcoming Treatment Dates - OP BREAST PACLITAXEL TRASTUZUMAB WEEKLY WITH PERTUZUMAB Q3W (THP)    9/15/2023       Chemotherapy       pertuzumab (PERJETA) 420 mg in sodium chloride 0.9% 264 mL infusion       trastuzumab-dkst (OGIVRI) in sodium chloride 0.9% chemo infusion       Supportive Care       magnesium sulfate 2 g in sodium chloride 0.9% 1,000 mL  10/6/2023       Chemotherapy       pertuzumab (PERJETA) 420 mg in sodium chloride 0.9% 264 mL infusion       trastuzumab-dkst (OGIVRI) in sodium chloride 0.9% chemo infusion       Supportive Care       magnesium sulfate 2 g in sodium chloride 0.9% 1,000 mL    Supportive Plan Information  IV FLUIDS AND ELECTROLYTES   Lisa Jaquez MD   Upcoming Treatment Dates - IV FLUIDS AND ELECTROLYTES    No upcoming days in selected categories.    41 minutes were spent in coordination of patient's care, record review and counseling.    Lisa Jaquez MD  Hematology and Oncology  [facility]  [facility]

## 2023-08-25 NOTE — PLAN OF CARE
Problem: Fatigue  Goal: Improved Activity Tolerance  Outcome: Ongoing, Progressing  Intervention: Promote Improved Energy  Flowsheets (Taken 8/25/2023 1142)  Fatigue Management:   frequent rest breaks encouraged   paced activity encouraged  Sleep/Rest Enhancement: regular sleep/rest pattern promoted  Activity Management: Ambulated -L4     Problem: Adult Inpatient Plan of Care  Goal: Plan of Care Review  Outcome: Ongoing, Progressing  Flowsheets (Taken 8/25/2023 1142)  Plan of Care Reviewed With: patient  Goal: Patient-Specific Goal (Individualized)  Outcome: Ongoing, Progressing  Flowsheets (Taken 8/25/2023 1142)  Anxieties, Fears or Concerns: None  Individualized Care Needs: Recliner, warm blanket, tv, conversation    Patient to Infusion for Perjeta and Ogivri following appointment with Dr. Jaquez. Treatment plan reviewed with patient. VSS. Tolerated treatment. Provided with copy of upcoming appointment schedule. Escorted to the front lobby in no distress for discharge to home.

## 2023-09-06 PROBLEM — I48.0 PAROXYSMAL ATRIAL FIBRILLATION: Status: ACTIVE | Noted: 2023-07-25

## 2023-09-06 NOTE — PROGRESS NOTES
"Subjective:    Patient ID:  Josefina Coon is a 58 y.o. female who presents for follow-up of Hypertension, Hyperlipidemia, and Atrial Fibrillation      Problem List Items Addressed This Visit          Cardiac/Vascular    Chemotherapy induced cardiomyopathy - Primary    Essential hypertension    Overview     Dx updated per 2019 IMO Load         Family history of ischemic heart disease    Mixed hyperlipidemia    Palpitations    Paroxysmal atrial fibrillation    Overview     S/p LAUREL/DCCV 7/25/23            HPI    Patient was last seen on 02/06/2023 at which time she was doing okay from a cardiac standpoint.  Was optimized prior to surgery with Dr. Mcduffie.  Since that time, she was admitted to [facility] with AFib with RVR.  Underwent LAUREL/cardioversion as detailed above.    On assessment today, the patient states that she feels OK.    No chest pain.  No shortness of breath.    Further AFib?  - Not since August when medication was changed       Objective:     Vitals:    09/07/23 1035   BP: 118/80   BP Location: Right arm   Patient Position: Sitting   BP Method: Large (Automatic)   Pulse: 82   Weight: 127.6 kg (281 lb 4.9 oz)   Height: 5' 4.5" (1.638 m)       BP Readings from Last 5 Encounters:   09/07/23 118/80   08/25/23 91/66   08/25/23 121/80   08/04/23 (!) 140/80   08/03/23 139/70        Physical Exam  Vitals and nursing note reviewed.   Constitutional:       General: She is not in acute distress.     Appearance: She is well-developed.   HENT:      Head: Normocephalic and atraumatic.   Neck:      Vascular: No JVD.   Cardiovascular:      Rate and Rhythm: Normal rate and regular rhythm.      Heart sounds: Normal heart sounds. No murmur heard.     No friction rub. No gallop.   Pulmonary:      Effort: Pulmonary effort is normal. No respiratory distress.      Breath sounds: Normal breath sounds. No wheezing or rales.   Abdominal:      General: Bowel sounds are normal.      Palpations: Abdomen is " soft.      Tenderness: There is no abdominal tenderness. There is no guarding or rebound.   Musculoskeletal:         General: No tenderness.      Cervical back: Neck supple.   Skin:     General: Skin is warm and dry.   Neurological:      Mental Status: She is alert and oriented to person, place, and time.   Psychiatric:         Behavior: Behavior normal.             Current Outpatient Medications   Medication Instructions    albuterol (PROVENTIL/VENTOLIN HFA) 90 mcg/actuation inhaler 2 puffs, Inhalation, Every 6 hours PRN    ALPRAZolam (XANAX) 0.25 MG tablet TAKE ONE TABLET BY MOUTH 1-2 TIMES DAILY AS NEEDED ANXIETY    anastrozole (ARIMIDEX) 1 mg, Oral, Daily    apixaban (ELIQUIS) 5 mg, Oral, 2 times daily    azelastine (ASTELIN) 137 mcg (0.1 %) nasal spray 2 sprays, Nasal, 2 times daily PRN    buPROPion (WELLBUTRIN XL) 150 MG TB24 tablet TAKE 1 TABLET BY MOUTH EVERY DAY    cyanocobalamin 1,000 mcg/mL injection Inject 1 ml q 2 weeks x 1 month then 1 ml monthly    dapagliflozin propanediol (FARXIGA) 5 mg, Oral, Daily    LIDOcaine-prilocaine (EMLA) cream Apply to port site 1 hour prior to port access and cover    lipase-protease-amylase 6,000-19,000-30,000 units (CREON) 6,000-19,000 -30,000 unit capsule 1 capsule, Oral, 3 times daily with meals    metFORMIN (GLUCOPHAGE) 500 MG tablet TAKE 1 TABLET BY MOUTH TWICE A DAY WITH MEALS    metoprolol succinate (TOPROL-XL) 50 mg, Oral, Daily    nystatin (MYCOSTATIN) powder Apply to affected area 3 times daily    pravastatin (PRAVACHOL) 10 MG tablet TAKE 1 TABLET BY MOUTH EVERYDAY AT BEDTIME    tirzepatide (MOUNJARO) 7.5 mg/0.5 mL PnIj INJECT 7.5 MG SUBCUTANEOUSLY EVERY 7 DAYS       Lipid Panel:   Lab Results   Component Value Date    CHOL 162 06/23/2023    HDL 51 06/23/2023    LDLCALC 66.2 06/23/2023    TRIG 224 (H) 06/23/2023    CHOLHDL 31.5 06/23/2023       The 10-year ASCVD risk score (Db MIRANDA, et al., 2019) is: 6.4%    Values used to calculate the score:      Age: 58  years      Sex: Female      Is Non- : No      Diabetic: Yes      Tobacco smoker: No      Systolic Blood Pressure: 118 mmHg      Is BP treated: Yes      HDL Cholesterol: 51 mg/dL      Total Cholesterol: 162 mg/dL    All pertinent labs, imaging, and EKGs reviewed.  Patient's most recent EKG tracing was personally interpreted by this provider.    Most Recent EKG Results  Results for orders placed or performed during the hospital encounter of 07/24/23   EKG 12-lead    Collection Time: 07/25/23 12:34 PM    Narrative    Test Reason : I48.91,    Vent. Rate : 084 BPM     Atrial Rate : 084 BPM     P-R Int : 158 ms          QRS Dur : 112 ms      QT Int : 382 ms       P-R-T Axes : 050 -15 044 degrees     QTc Int : 451 ms    Normal sinus rhythm  Cannot rule out Anterior infarct ,age undetermined  Abnormal ECG  When compared with ECG of 24-JUL-2023 18:04,  Sinus rhythm has replaced Atrial fibrillation  Vent. rate has decreased BY  64 BPM  Confirmed by Rodriguez NEWELL, Roni HERMAN ([id number]) on 7/25/2023 4:32:10 PM    Referred By: ALYSIA   SELF           Confirmed By:Roni Frias MD       Most Recent Echocardiogram Results  Results for orders placed in visit on 08/23/23    Echo    Interpretation Summary    Left Ventricle: The left ventricle is normal in size. Normal wall thickness. Normal wall motion. There is normal systolic function. Ejection fraction by visual approximation is 60%. Global longitudinal strain is -17.6%. Grade I diastolic dysfunction.    Left Atrium: Left atrium is moderately dilated.    Right Ventricle: Normal right ventricular cavity size. Wall thickness is normal. Right ventricle wall motion  is normal. Systolic function is normal.    Aortic Valve: The aortic valve is a trileaflet valve.    Pulmonary Artery: The estimated pulmonary artery systolic pressure is 33 mmHg.    IVC/SVC: Normal venous pressure at 3 mmHg.      Most Recent Nuclear Stress Test Results  Results for orders placed  during the hospital encounter of 03/03/20    Nuclear Stress - Cardiology Interpreted    Interpretation Summary    The perfusion scan is free of evidence from myocardial ischemia or injury.    There is a mild intensity defect in the anteroseptal wall of the left ventricle, secondary to breast attenuation.    Visually estimated ejection fraction is normal at rest.    The EKG portion of this study is negative for ischemia.    The patient reported no chest pain during the stress test.    There are no prior studies for comparison.      Most Recent Cardiac PET Stress Test Results  No results found for this or any previous visit.      Most Recent Cardiovascular Angiogram results  No results found for this or any previous visit.      Other Most Recent Cardiology Results  Results for orders placed during the hospital encounter of 07/12/23    CARDIAC MONITORING STRIPS        Assessment:       1. Chemotherapy induced cardiomyopathy    2. Essential hypertension    3. Family history of ischemic heart disease    4. Mixed hyperlipidemia    5. Palpitations    6. Paroxysmal atrial fibrillation         Plan:     Symptoms OK today  BP/Pulse OK today  Most recent echocardiogram reviewed personally   Rhythm - Regular today     Continue Eliquis 5 mg PO BID  Continue metoprolol succinate 50 mg PO Daily   Continue pravastatin 10 mg PO Daily   Continue CPAP use  If no further AFib is seen by next visit, will consider loop recorder and cessation of Eliquis   Has appointment with EP coming up, she may or may not keep it at her preference    Continue other cardiac medications  Mediterranean Diet/Cardiovascular Exercise Program    Patient queried and all questions were answered.    F/u in 6 months to reassess      Signed:    Roni Frias MD  9/7/2023 1:30 PM

## 2023-09-07 ENCOUNTER — OFFICE VISIT (OUTPATIENT)
Dept: CARDIOLOGY | Facility: CLINIC | Age: 58
End: 2023-09-07
Payer: COMMERCIAL

## 2023-09-07 VITALS
BODY MASS INDEX: 46.87 KG/M2 | DIASTOLIC BLOOD PRESSURE: 80 MMHG | SYSTOLIC BLOOD PRESSURE: 118 MMHG | HEIGHT: 65 IN | HEART RATE: 82 BPM | WEIGHT: 281.31 LBS

## 2023-09-07 DIAGNOSIS — I48.0 PAROXYSMAL ATRIAL FIBRILLATION: ICD-10-CM

## 2023-09-07 DIAGNOSIS — Z82.49 FAMILY HISTORY OF ISCHEMIC HEART DISEASE: ICD-10-CM

## 2023-09-07 DIAGNOSIS — E78.2 MIXED HYPERLIPIDEMIA: ICD-10-CM

## 2023-09-07 DIAGNOSIS — T45.1X5A CHEMOTHERAPY INDUCED CARDIOMYOPATHY: Primary | ICD-10-CM

## 2023-09-07 DIAGNOSIS — R00.2 PALPITATIONS: ICD-10-CM

## 2023-09-07 DIAGNOSIS — I10 ESSENTIAL HYPERTENSION: ICD-10-CM

## 2023-09-07 DIAGNOSIS — I42.7 CHEMOTHERAPY INDUCED CARDIOMYOPATHY: Primary | ICD-10-CM

## 2023-09-07 PROCEDURE — 3044F HG A1C LEVEL LT 7.0%: CPT | Mod: CPTII,S$GLB,, | Performed by: INTERNAL MEDICINE

## 2023-09-07 PROCEDURE — 99999 PR PBB SHADOW E&M-EST. PATIENT-LVL IV: CPT | Mod: PBBFAC,,, | Performed by: INTERNAL MEDICINE

## 2023-09-07 PROCEDURE — 3008F BODY MASS INDEX DOCD: CPT | Mod: CPTII,S$GLB,, | Performed by: INTERNAL MEDICINE

## 2023-09-07 PROCEDURE — 3066F NEPHROPATHY DOC TX: CPT | Mod: CPTII,S$GLB,, | Performed by: INTERNAL MEDICINE

## 2023-09-07 PROCEDURE — 1159F MED LIST DOCD IN RCRD: CPT | Mod: CPTII,S$GLB,, | Performed by: INTERNAL MEDICINE

## 2023-09-07 PROCEDURE — 3074F PR MOST RECENT SYSTOLIC BLOOD PRESSURE < 130 MM HG: ICD-10-PCS | Mod: CPTII,S$GLB,, | Performed by: INTERNAL MEDICINE

## 2023-09-07 PROCEDURE — 99214 PR OFFICE/OUTPT VISIT, EST, LEVL IV, 30-39 MIN: ICD-10-PCS | Mod: S$GLB,,, | Performed by: INTERNAL MEDICINE

## 2023-09-07 PROCEDURE — 3079F DIAST BP 80-89 MM HG: CPT | Mod: CPTII,S$GLB,, | Performed by: INTERNAL MEDICINE

## 2023-09-07 PROCEDURE — 3060F POS MICROALBUMINURIA REV: CPT | Mod: CPTII,S$GLB,, | Performed by: INTERNAL MEDICINE

## 2023-09-07 PROCEDURE — 1160F PR REVIEW ALL MEDS BY PRESCRIBER/CLIN PHARMACIST DOCUMENTED: ICD-10-PCS | Mod: CPTII,S$GLB,, | Performed by: INTERNAL MEDICINE

## 2023-09-07 PROCEDURE — 3079F PR MOST RECENT DIASTOLIC BLOOD PRESSURE 80-89 MM HG: ICD-10-PCS | Mod: CPTII,S$GLB,, | Performed by: INTERNAL MEDICINE

## 2023-09-07 PROCEDURE — 3008F PR BODY MASS INDEX (BMI) DOCUMENTED: ICD-10-PCS | Mod: CPTII,S$GLB,, | Performed by: INTERNAL MEDICINE

## 2023-09-07 PROCEDURE — 99999 PR PBB SHADOW E&M-EST. PATIENT-LVL IV: ICD-10-PCS | Mod: PBBFAC,,, | Performed by: INTERNAL MEDICINE

## 2023-09-07 PROCEDURE — 3060F PR POS MICROALBUMINURIA RESULT DOCUMENTED/REVIEW: ICD-10-PCS | Mod: CPTII,S$GLB,, | Performed by: INTERNAL MEDICINE

## 2023-09-07 PROCEDURE — 4010F PR ACE/ARB THEARPY RXD/TAKEN: ICD-10-PCS | Mod: CPTII,S$GLB,, | Performed by: INTERNAL MEDICINE

## 2023-09-07 PROCEDURE — 3066F PR DOCUMENTATION OF TREATMENT FOR NEPHROPATHY: ICD-10-PCS | Mod: CPTII,S$GLB,, | Performed by: INTERNAL MEDICINE

## 2023-09-07 PROCEDURE — 1159F PR MEDICATION LIST DOCUMENTED IN MEDICAL RECORD: ICD-10-PCS | Mod: CPTII,S$GLB,, | Performed by: INTERNAL MEDICINE

## 2023-09-07 PROCEDURE — 4010F ACE/ARB THERAPY RXD/TAKEN: CPT | Mod: CPTII,S$GLB,, | Performed by: INTERNAL MEDICINE

## 2023-09-07 PROCEDURE — 1160F RVW MEDS BY RX/DR IN RCRD: CPT | Mod: CPTII,S$GLB,, | Performed by: INTERNAL MEDICINE

## 2023-09-07 PROCEDURE — 3074F SYST BP LT 130 MM HG: CPT | Mod: CPTII,S$GLB,, | Performed by: INTERNAL MEDICINE

## 2023-09-07 PROCEDURE — 3044F PR MOST RECENT HEMOGLOBIN A1C LEVEL <7.0%: ICD-10-PCS | Mod: CPTII,S$GLB,, | Performed by: INTERNAL MEDICINE

## 2023-09-07 PROCEDURE — 99214 OFFICE O/P EST MOD 30 MIN: CPT | Mod: S$GLB,,, | Performed by: INTERNAL MEDICINE

## 2023-09-13 ENCOUNTER — PATIENT MESSAGE (OUTPATIENT)
Dept: RADIATION ONCOLOGY | Facility: CLINIC | Age: 58
End: 2023-09-13
Payer: COMMERCIAL

## 2023-09-15 ENCOUNTER — OFFICE VISIT (OUTPATIENT)
Dept: HEMATOLOGY/ONCOLOGY | Facility: CLINIC | Age: 58
End: 2023-09-15
Payer: COMMERCIAL

## 2023-09-15 ENCOUNTER — TELEPHONE (OUTPATIENT)
Dept: HEMATOLOGY/ONCOLOGY | Facility: CLINIC | Age: 58
End: 2023-09-15
Payer: COMMERCIAL

## 2023-09-15 ENCOUNTER — DOCUMENTATION ONLY (OUTPATIENT)
Dept: INFUSION THERAPY | Facility: HOSPITAL | Age: 58
End: 2023-09-15
Payer: COMMERCIAL

## 2023-09-15 ENCOUNTER — LAB VISIT (OUTPATIENT)
Dept: LAB | Facility: HOSPITAL | Age: 58
End: 2023-09-15
Attending: NURSE PRACTITIONER
Payer: COMMERCIAL

## 2023-09-15 ENCOUNTER — INFUSION (OUTPATIENT)
Dept: INFUSION THERAPY | Facility: HOSPITAL | Age: 58
End: 2023-09-15
Attending: STUDENT IN AN ORGANIZED HEALTH CARE EDUCATION/TRAINING PROGRAM
Payer: COMMERCIAL

## 2023-09-15 VITALS
HEART RATE: 76 BPM | SYSTOLIC BLOOD PRESSURE: 108 MMHG | RESPIRATION RATE: 16 BRPM | BODY MASS INDEX: 47.06 KG/M2 | WEIGHT: 282.44 LBS | HEIGHT: 65 IN | DIASTOLIC BLOOD PRESSURE: 63 MMHG | OXYGEN SATURATION: 100 % | TEMPERATURE: 97 F

## 2023-09-15 VITALS
BODY MASS INDEX: 47.73 KG/M2 | SYSTOLIC BLOOD PRESSURE: 115 MMHG | TEMPERATURE: 97 F | RESPIRATION RATE: 18 BRPM | DIASTOLIC BLOOD PRESSURE: 76 MMHG | HEART RATE: 76 BPM | WEIGHT: 282.44 LBS | OXYGEN SATURATION: 100 %

## 2023-09-15 DIAGNOSIS — E83.42 HYPOMAGNESEMIA: ICD-10-CM

## 2023-09-15 DIAGNOSIS — N18.32 CHRONIC KIDNEY DISEASE, STAGE 3B: ICD-10-CM

## 2023-09-15 DIAGNOSIS — I74.9 EMBOLISM AND THROMBOSIS: ICD-10-CM

## 2023-09-15 DIAGNOSIS — Z79.899 IMMUNODEFICIENCY DUE TO DRUGS: ICD-10-CM

## 2023-09-15 DIAGNOSIS — C50.912 INVASIVE DUCTAL CARCINOMA OF BREAST, FEMALE, LEFT: ICD-10-CM

## 2023-09-15 DIAGNOSIS — Z79.4 TYPE 2 DIABETES MELLITUS WITH STAGE 3B CHRONIC KIDNEY DISEASE, WITH LONG-TERM CURRENT USE OF INSULIN: ICD-10-CM

## 2023-09-15 DIAGNOSIS — E11.22 TYPE 2 DIABETES MELLITUS WITH STAGE 3B CHRONIC KIDNEY DISEASE, WITH LONG-TERM CURRENT USE OF INSULIN: ICD-10-CM

## 2023-09-15 DIAGNOSIS — N18.31 STAGE 3A CHRONIC KIDNEY DISEASE: ICD-10-CM

## 2023-09-15 DIAGNOSIS — E11.41 DIABETIC MONONEUROPATHY ASSOCIATED WITH TYPE 2 DIABETES MELLITUS: ICD-10-CM

## 2023-09-15 DIAGNOSIS — C50.912 INVASIVE DUCTAL CARCINOMA OF BREAST, FEMALE, LEFT: Primary | ICD-10-CM

## 2023-09-15 DIAGNOSIS — N18.32 TYPE 2 DIABETES MELLITUS WITH STAGE 3B CHRONIC KIDNEY DISEASE, WITH LONG-TERM CURRENT USE OF INSULIN: ICD-10-CM

## 2023-09-15 DIAGNOSIS — D84.821 IMMUNODEFICIENCY DUE TO DRUGS: ICD-10-CM

## 2023-09-15 DIAGNOSIS — I10 ESSENTIAL HYPERTENSION: ICD-10-CM

## 2023-09-15 LAB
ALBUMIN SERPL BCP-MCNC: 3.5 G/DL (ref 3.5–5.2)
ALBUMIN SERPL BCP-MCNC: 3.5 G/DL (ref 3.5–5.2)
ALP SERPL-CCNC: 106 U/L (ref 55–135)
ALT SERPL W/O P-5'-P-CCNC: 10 U/L (ref 10–44)
ANION GAP SERPL CALC-SCNC: 12 MMOL/L (ref 8–16)
ANION GAP SERPL CALC-SCNC: 12 MMOL/L (ref 8–16)
AST SERPL-CCNC: 10 U/L (ref 10–40)
BACTERIA #/AREA URNS HPF: ABNORMAL /HPF
BASOPHILS # BLD AUTO: 0.05 K/UL (ref 0–0.2)
BASOPHILS # BLD AUTO: 0.05 K/UL (ref 0–0.2)
BASOPHILS NFR BLD: 0.8 % (ref 0–1.9)
BASOPHILS NFR BLD: 0.8 % (ref 0–1.9)
BILIRUB SERPL-MCNC: 0.3 MG/DL (ref 0.1–1)
BILIRUB UR QL STRIP: NEGATIVE
BUN SERPL-MCNC: 23 MG/DL (ref 6–20)
BUN SERPL-MCNC: 23 MG/DL (ref 6–20)
CALCIUM SERPL-MCNC: 9.7 MG/DL (ref 8.7–10.5)
CALCIUM SERPL-MCNC: 9.7 MG/DL (ref 8.7–10.5)
CHLORIDE SERPL-SCNC: 108 MMOL/L (ref 95–110)
CHLORIDE SERPL-SCNC: 108 MMOL/L (ref 95–110)
CLARITY UR: CLEAR
CO2 SERPL-SCNC: 19 MMOL/L (ref 23–29)
CO2 SERPL-SCNC: 19 MMOL/L (ref 23–29)
COLOR UR: YELLOW
CREAT SERPL-MCNC: 1.4 MG/DL (ref 0.5–1.4)
CREAT SERPL-MCNC: 1.4 MG/DL (ref 0.5–1.4)
CREAT UR-MCNC: 81 MG/DL (ref 15–325)
DIFFERENTIAL METHOD: ABNORMAL
DIFFERENTIAL METHOD: ABNORMAL
EOSINOPHIL # BLD AUTO: 0.2 K/UL (ref 0–0.5)
EOSINOPHIL # BLD AUTO: 0.2 K/UL (ref 0–0.5)
EOSINOPHIL NFR BLD: 3.3 % (ref 0–8)
EOSINOPHIL NFR BLD: 3.3 % (ref 0–8)
ERYTHROCYTE [DISTWIDTH] IN BLOOD BY AUTOMATED COUNT: 13.5 % (ref 11.5–14.5)
ERYTHROCYTE [DISTWIDTH] IN BLOOD BY AUTOMATED COUNT: 13.5 % (ref 11.5–14.5)
EST. GFR  (NO RACE VARIABLE): 43.1 ML/MIN/1.73 M^2
EST. GFR  (NO RACE VARIABLE): 43.1 ML/MIN/1.73 M^2
GLUCOSE SERPL-MCNC: 158 MG/DL (ref 70–110)
GLUCOSE SERPL-MCNC: 158 MG/DL (ref 70–110)
GLUCOSE UR QL STRIP: ABNORMAL
HCT VFR BLD AUTO: 38 % (ref 37–48.5)
HCT VFR BLD AUTO: 38 % (ref 37–48.5)
HGB BLD-MCNC: 12.1 G/DL (ref 12–16)
HGB BLD-MCNC: 12.1 G/DL (ref 12–16)
HGB UR QL STRIP: NEGATIVE
IMM GRANULOCYTES # BLD AUTO: 0.03 K/UL (ref 0–0.04)
IMM GRANULOCYTES # BLD AUTO: 0.03 K/UL (ref 0–0.04)
IMM GRANULOCYTES NFR BLD AUTO: 0.5 % (ref 0–0.5)
IMM GRANULOCYTES NFR BLD AUTO: 0.5 % (ref 0–0.5)
KETONES UR QL STRIP: NEGATIVE
LEUKOCYTE ESTERASE UR QL STRIP: NEGATIVE
LYMPHOCYTES # BLD AUTO: 1.3 K/UL (ref 1–4.8)
LYMPHOCYTES # BLD AUTO: 1.3 K/UL (ref 1–4.8)
LYMPHOCYTES NFR BLD: 21.8 % (ref 18–48)
LYMPHOCYTES NFR BLD: 21.8 % (ref 18–48)
MAGNESIUM SERPL-MCNC: 1.5 MG/DL (ref 1.6–2.6)
MCH RBC QN AUTO: 30.6 PG (ref 27–31)
MCH RBC QN AUTO: 30.6 PG (ref 27–31)
MCHC RBC AUTO-ENTMCNC: 31.8 G/DL (ref 32–36)
MCHC RBC AUTO-ENTMCNC: 31.8 G/DL (ref 32–36)
MCV RBC AUTO: 96 FL (ref 82–98)
MCV RBC AUTO: 96 FL (ref 82–98)
MICROSCOPIC COMMENT: ABNORMAL
MONOCYTES # BLD AUTO: 0.3 K/UL (ref 0.3–1)
MONOCYTES # BLD AUTO: 0.3 K/UL (ref 0.3–1)
MONOCYTES NFR BLD: 4.4 % (ref 4–15)
MONOCYTES NFR BLD: 4.4 % (ref 4–15)
NEUTROPHILS # BLD AUTO: 4.2 K/UL (ref 1.8–7.7)
NEUTROPHILS # BLD AUTO: 4.2 K/UL (ref 1.8–7.7)
NEUTROPHILS NFR BLD: 69.2 % (ref 38–73)
NEUTROPHILS NFR BLD: 69.2 % (ref 38–73)
NITRITE UR QL STRIP: NEGATIVE
NRBC BLD-RTO: 0 /100 WBC
NRBC BLD-RTO: 0 /100 WBC
PH UR STRIP: 6 [PH] (ref 5–8)
PHOSPHATE SERPL-MCNC: 3.8 MG/DL (ref 2.7–4.5)
PLATELET # BLD AUTO: 245 K/UL (ref 150–450)
PLATELET # BLD AUTO: 245 K/UL (ref 150–450)
PMV BLD AUTO: 9.2 FL (ref 9.2–12.9)
PMV BLD AUTO: 9.2 FL (ref 9.2–12.9)
POTASSIUM SERPL-SCNC: 3.8 MMOL/L (ref 3.5–5.1)
POTASSIUM SERPL-SCNC: 3.8 MMOL/L (ref 3.5–5.1)
PROT SERPL-MCNC: 7.4 G/DL (ref 6–8.4)
PROT UR QL STRIP: NEGATIVE
PROT UR-MCNC: 16 MG/DL (ref 0–15)
PROT/CREAT UR: 0.2 MG/G{CREAT} (ref 0–0.2)
PTH-INTACT SERPL-MCNC: 26.3 PG/ML (ref 9–77)
RBC # BLD AUTO: 3.95 M/UL (ref 4–5.4)
RBC # BLD AUTO: 3.95 M/UL (ref 4–5.4)
SODIUM SERPL-SCNC: 139 MMOL/L (ref 136–145)
SODIUM SERPL-SCNC: 139 MMOL/L (ref 136–145)
SP GR UR STRIP: 1.01 (ref 1–1.03)
SQUAMOUS #/AREA URNS HPF: 8 /HPF
URATE SERPL-MCNC: 7 MG/DL (ref 2.4–5.7)
URN SPEC COLLECT METH UR: ABNORMAL
WBC # BLD AUTO: 6.1 K/UL (ref 3.9–12.7)
WBC # BLD AUTO: 6.1 K/UL (ref 3.9–12.7)
WBC #/AREA URNS HPF: 6 /HPF (ref 0–5)
YEAST URNS QL MICRO: ABNORMAL

## 2023-09-15 PROCEDURE — 3008F PR BODY MASS INDEX (BMI) DOCUMENTED: ICD-10-PCS | Mod: CPTII,S$GLB,, | Performed by: NURSE PRACTITIONER

## 2023-09-15 PROCEDURE — 99213 PR OFFICE/OUTPT VISIT, EST, LEVL III, 20-29 MIN: ICD-10-PCS | Mod: S$GLB,,, | Performed by: NURSE PRACTITIONER

## 2023-09-15 PROCEDURE — 83970 ASSAY OF PARATHORMONE: CPT | Performed by: INTERNAL MEDICINE

## 2023-09-15 PROCEDURE — 3060F PR POS MICROALBUMINURIA RESULT DOCUMENTED/REVIEW: ICD-10-PCS | Mod: CPTII,S$GLB,, | Performed by: NURSE PRACTITIONER

## 2023-09-15 PROCEDURE — 99999 PR PBB SHADOW E&M-EST. PATIENT-LVL IV: ICD-10-PCS | Mod: PBBFAC,,, | Performed by: NURSE PRACTITIONER

## 2023-09-15 PROCEDURE — 96413 CHEMO IV INFUSION 1 HR: CPT | Mod: PN

## 2023-09-15 PROCEDURE — 25000003 PHARM REV CODE 250: Mod: PN | Performed by: NURSE PRACTITIONER

## 2023-09-15 PROCEDURE — 83735 ASSAY OF MAGNESIUM: CPT | Mod: PN | Performed by: STUDENT IN AN ORGANIZED HEALTH CARE EDUCATION/TRAINING PROGRAM

## 2023-09-15 PROCEDURE — 3066F NEPHROPATHY DOC TX: CPT | Mod: CPTII,S$GLB,, | Performed by: NURSE PRACTITIONER

## 2023-09-15 PROCEDURE — 3074F SYST BP LT 130 MM HG: CPT | Mod: CPTII,S$GLB,, | Performed by: NURSE PRACTITIONER

## 2023-09-15 PROCEDURE — 84100 ASSAY OF PHOSPHORUS: CPT | Performed by: INTERNAL MEDICINE

## 2023-09-15 PROCEDURE — 99999 PR PBB SHADOW E&M-EST. PATIENT-LVL IV: CPT | Mod: PBBFAC,,, | Performed by: NURSE PRACTITIONER

## 2023-09-15 PROCEDURE — 3044F PR MOST RECENT HEMOGLOBIN A1C LEVEL <7.0%: ICD-10-PCS | Mod: CPTII,S$GLB,, | Performed by: NURSE PRACTITIONER

## 2023-09-15 PROCEDURE — 3008F BODY MASS INDEX DOCD: CPT | Mod: CPTII,S$GLB,, | Performed by: NURSE PRACTITIONER

## 2023-09-15 PROCEDURE — 63600175 PHARM REV CODE 636 W HCPCS: Mod: PN | Performed by: NURSE PRACTITIONER

## 2023-09-15 PROCEDURE — 85025 COMPLETE CBC W/AUTO DIFF WBC: CPT | Mod: PN | Performed by: STUDENT IN AN ORGANIZED HEALTH CARE EDUCATION/TRAINING PROGRAM

## 2023-09-15 PROCEDURE — 4010F ACE/ARB THERAPY RXD/TAKEN: CPT | Mod: CPTII,S$GLB,, | Performed by: NURSE PRACTITIONER

## 2023-09-15 PROCEDURE — 84550 ASSAY OF BLOOD/URIC ACID: CPT | Mod: PN | Performed by: INTERNAL MEDICINE

## 2023-09-15 PROCEDURE — 3078F DIAST BP <80 MM HG: CPT | Mod: CPTII,S$GLB,, | Performed by: NURSE PRACTITIONER

## 2023-09-15 PROCEDURE — 96367 TX/PROPH/DG ADDL SEQ IV INF: CPT | Mod: PN

## 2023-09-15 PROCEDURE — 4010F PR ACE/ARB THEARPY RXD/TAKEN: ICD-10-PCS | Mod: CPTII,S$GLB,, | Performed by: NURSE PRACTITIONER

## 2023-09-15 PROCEDURE — 84156 ASSAY OF PROTEIN URINE: CPT | Performed by: INTERNAL MEDICINE

## 2023-09-15 PROCEDURE — 81000 URINALYSIS NONAUTO W/SCOPE: CPT | Mod: PN | Performed by: INTERNAL MEDICINE

## 2023-09-15 PROCEDURE — 99213 OFFICE O/P EST LOW 20 MIN: CPT | Mod: S$GLB,,, | Performed by: NURSE PRACTITIONER

## 2023-09-15 PROCEDURE — 80053 COMPREHEN METABOLIC PANEL: CPT | Mod: PN | Performed by: STUDENT IN AN ORGANIZED HEALTH CARE EDUCATION/TRAINING PROGRAM

## 2023-09-15 PROCEDURE — 3066F PR DOCUMENTATION OF TREATMENT FOR NEPHROPATHY: ICD-10-PCS | Mod: CPTII,S$GLB,, | Performed by: NURSE PRACTITIONER

## 2023-09-15 PROCEDURE — 3060F POS MICROALBUMINURIA REV: CPT | Mod: CPTII,S$GLB,, | Performed by: NURSE PRACTITIONER

## 2023-09-15 PROCEDURE — 3074F PR MOST RECENT SYSTOLIC BLOOD PRESSURE < 130 MM HG: ICD-10-PCS | Mod: CPTII,S$GLB,, | Performed by: NURSE PRACTITIONER

## 2023-09-15 PROCEDURE — 36415 COLL VENOUS BLD VENIPUNCTURE: CPT | Mod: PN | Performed by: STUDENT IN AN ORGANIZED HEALTH CARE EDUCATION/TRAINING PROGRAM

## 2023-09-15 PROCEDURE — 3044F HG A1C LEVEL LT 7.0%: CPT | Mod: CPTII,S$GLB,, | Performed by: NURSE PRACTITIONER

## 2023-09-15 PROCEDURE — 3078F PR MOST RECENT DIASTOLIC BLOOD PRESSURE < 80 MM HG: ICD-10-PCS | Mod: CPTII,S$GLB,, | Performed by: NURSE PRACTITIONER

## 2023-09-15 PROCEDURE — A4216 STERILE WATER/SALINE, 10 ML: HCPCS | Mod: PN | Performed by: NURSE PRACTITIONER

## 2023-09-15 PROCEDURE — 96417 CHEMO IV INFUS EACH ADDL SEQ: CPT | Mod: PN

## 2023-09-15 RX ORDER — SODIUM CHLORIDE 0.9 % (FLUSH) 0.9 %
10 SYRINGE (ML) INJECTION
Status: DISCONTINUED | OUTPATIENT
Start: 2023-09-15 | End: 2023-09-15 | Stop reason: HOSPADM

## 2023-09-15 RX ORDER — MAGNESIUM SULFATE HEPTAHYDRATE 40 MG/ML
2 INJECTION, SOLUTION INTRAVENOUS ONCE
Status: COMPLETED | OUTPATIENT
Start: 2023-09-15 | End: 2023-09-15

## 2023-09-15 RX ORDER — SODIUM CHLORIDE 0.9 % (FLUSH) 0.9 %
10 SYRINGE (ML) INJECTION
Status: CANCELLED | OUTPATIENT
Start: 2023-09-15

## 2023-09-15 RX ORDER — HEPARIN 100 UNIT/ML
500 SYRINGE INTRAVENOUS
Status: CANCELLED | OUTPATIENT
Start: 2023-09-15

## 2023-09-15 RX ORDER — DIPHENHYDRAMINE HYDROCHLORIDE 50 MG/ML
50 INJECTION INTRAMUSCULAR; INTRAVENOUS ONCE AS NEEDED
Status: CANCELLED | OUTPATIENT
Start: 2023-09-15

## 2023-09-15 RX ORDER — EPINEPHRINE 0.3 MG/.3ML
0.3 INJECTION SUBCUTANEOUS ONCE AS NEEDED
Status: CANCELLED | OUTPATIENT
Start: 2023-09-15

## 2023-09-15 RX ADMIN — TRASTUZUMAB 750 MG: 150 INJECTION, POWDER, LYOPHILIZED, FOR SOLUTION INTRAVENOUS at 02:09

## 2023-09-15 RX ADMIN — Medication 10 ML: at 03:09

## 2023-09-15 RX ADMIN — PERTUZUMAB 420 MG: 30 INJECTION, SOLUTION, CONCENTRATE INTRAVENOUS at 01:09

## 2023-09-15 RX ADMIN — MAGNESIUM SULFATE IN WATER 2 G: 2 INJECTION, SOLUTION INTRAVENOUS at 12:09

## 2023-09-15 NOTE — TELEPHONE ENCOUNTER
Spoke with the patient to schedule a f/u appt in [place] per Rony Jung to discuss acupuncture. She voiced acceptance of appt next week.

## 2023-09-15 NOTE — PROGRESS NOTES
Oncology Nutrition   Chemotherapy Infusion Visit    Nutrition Follow Up   RD met with pt at chairside during infusion tx.  Pt present for cycle 17 of perjeta/ogivri. Pt recently prescribed creon for her diarrhea. Pt states it is helping tremendously. Pt shared with RD that she is experiencing afib and now has stage 3 chronic kidney disease. Pt asking for information on foods low in phosphorus and potassium. RD provided handouts with low/high foods in these nutrients. Pt weight has been stable the past 2 months.     Wt Readings from Last 10 Encounters:   09/15/23 128.1 kg (282 lb 6.6 oz)   09/15/23 128.1 kg (282 lb 6.6 oz)   09/07/23 127.6 kg (281 lb 4.9 oz)   08/25/23 128 kg (282 lb 3 oz)   08/25/23 128 kg (282 lb 3 oz)   08/23/23 127.9 kg (282 lb)   08/04/23 128 kg (282 lb 3 oz)   08/03/23 128 kg (282 lb 3 oz)   07/24/23 132.2 kg (291 lb 7.2 oz)   07/14/23 127.1 kg (280 lb 3.3 oz)       All other nutrition questions/concerns addressed as appropriate. Will continue to monitor prn throughout treatment.     Jerri Capellan, THERESAN, LDN  09/15/2023  2:20 PM

## 2023-09-15 NOTE — PROGRESS NOTES
PATIENT: Josefina Coon  MRN: [id number]  DATE: 9/15/2023    Diagnosis:   1. Invasive ductal carcinoma of breast, female, left    2. Stage 3a chronic kidney disease    3. Hypomagnesemia      Chief Complaint: Clearance for C17 HP    Subjective:   HPI: Ms. Coon is a 58 y.o. female Ms. Coon is a 58 y.o. female with HTN, HLD, depression, diabetes type 2 with neuropathy, obesity, fatty liver, following up with us for  a diagnosis of invasive ductal carcinoma of the left breast, triple positivity.     She is here today today for consideration of C17D1 of therapy which consists of Trastuzumab/Pertuzumab every 21 days.    Today, tolerating Transtuzumab/pertuzumab overall.   - To note:  XRT completed 04/13/23 & Arimidex started thereafter inn 06/2023.  Recent stomach virus with vomiting & diarrhea.  C/o difficulties with hot flashes & neuropathy to feet.  Discussed Acupuncture for both & is agreeable.  Was hesitant to try antidepressant for hot flashes due to s/e of weight gain.  - No fevers, chills, abdominal discomfort, N/V, bleeding, no new lumps or bumps, etc.       Oncologic History:   8/25/22 bilateral screening mammogram,,Right neg ,Left central post mass     9/8/22 left diag MMG, left US limited .Left 0300 lateral posterior 6cm FN 1.4cm mass , left axilla LN 2, up to 4mm cortex     9/14/22 left 0300 lateral posterior 6cm FN 1.4cm mass US biopsy .Grade 3 ,1.1cm in biopsy No LVI , ER 84% CA 28% Her 2 3+ pos Ki 52 %    Left axilla LN biopsy ;Benign fatty tissue, no LN, not concordant No MRI of breasts were done      9/21/22 US bilateral complete Right neg, right LN nml , Left 0300 6cm FN [id number] mass Borderline LN left axilla     9/21/22 Left axilla LN biopsy benign LN , so eventually Stage IA triple positive Breast cancer    10/7/22: started neoadjuvant chemo with Taxol + dual HER-2 (tranztuzumab and pertuzumab)    Receiving treatment with Paclitaxel/Trastuzumab/Pertuzumab on D1, weekly Paclitaxel on D8, D15 of  a 21 day cycle. Completed weekly Paclitaxel on 12/23/22.    2/2023: s/p B/L mastectomy with CPR;ypT0,pN0,cM0    Oncology History   Invasive ductal carcinoma of breast, female, left   9/23/2022 Initial Diagnosis    Invasive ductal carcinoma of breast, female, left     9/23/2022 Cancer Staged    Staging form: Breast, AJCC 8th Edition  - Clinical stage from 9/23/2022: Stage IA (cT1c, cN0(f), cM0, G3, ER+, DE+, HER2+)     9/29/2022 - 9/29/2022 Chemotherapy    Treatment Summary   Plan Name: OP BREAST TRASTUZUMAB PACLITAXEL WEEKLY  Treatment Goal: Curative  Status: Inactive  Start Date:   End Date:   Provider: Lisa Jaquez MD  Chemotherapy: PACLitaxeL (TAXOL) 80 mg/m2 = 204 mg in sodium chloride 0.9% 250 mL chemo infusion, 80 mg/m2 = 204 mg, Intravenous, Clinic/HOD 1 time, 0 of 12 cycles  pertuzumab (PERJETA) 840 mg in sodium chloride 0.9% 278 mL infusion, 840 mg (original dose ), Intravenous, Clinic/HOD 1 time, 0 of 17 cycles  Dose modification: 840 mg (Cycle 1, Reason: Other (see comments)), 420 mg (Cycle 4, Reason: Other (see comments))  trastuzumab-dkst (OGIVRI) 591 mg in sodium chloride 0.9% 250 mL chemo infusion, 4 mg/kg = 591 mg, Intravenous, Clinic/HOD 1 time, 0 of 25 cycles     10/7/2022 -  Chemotherapy    Treatment Summary   Plan Name: OP BREAST PACLITAXEL TRASTUZUMAB WEEKLY WITH PERTUZUMAB Q3W (THP)  Treatment Goal: Control  Status: Active  Start Date: 10/7/2022  End Date: 10/6/2023 (Planned)  Provider: Lisa Jaquez MD  Chemotherapy: PACLitaxeL (TAXOL) 80 mg/m2 = 210 mg in sodium chloride 0.9% 250 mL chemo infusion, 80 mg/m2 = 210 mg, Intravenous, Clinic/HOD 1 time, 4 of 4 cycles  Administration: 210 mg (10/7/2022), 204 mg (10/14/2022), 204 mg (10/28/2022), 204 mg (11/4/2022), 204 mg (10/21/2022), 204 mg (11/11/2022), 204 mg (11/18/2022), 204 mg (11/25/2022), 198 mg (12/9/2022), 204 mg (12/2/2022), 198 mg (12/16/2022), 198 mg (12/23/2022)  pertuzumab (PERJETA) 840 mg in sodium chloride 0.9% 278 mL infusion,  840 mg, Intravenous, Clinic/HOD 1 time, 16 of 18 cycles  Administration: 840 mg (10/7/2022), 420 mg (10/28/2022), 420 mg (11/18/2022), 420 mg (12/9/2022), 420 mg (12/30/2022), 420 mg (1/20/2023), 420 mg (2/17/2023), 420 mg (3/10/2023), 420 mg (3/31/2023), 420 mg (4/21/2023), 420 mg (5/12/2023), 420 mg (6/2/2023), 420 mg (6/23/2023), 420 mg (7/14/2023), 420 mg (8/4/2023), 420 mg (8/25/2023)  trastuzumab-dkst (OGIVRI) 570 mg in sodium chloride 0.9% 250 mL chemo infusion, 593 mg (100 % of original dose 4 mg/kg), Intravenous, Clinic/HOD 1 time, 16 of 18 cycles  Dose modification: 4 mg/kg (original dose 4 mg/kg, Cycle 1, Reason: Other (see comments), Comment: weekly trastuzumab), 2 mg/kg (original dose 2 mg/kg, Cycle 2, Reason: Other (see comments), Comment: weekly maintenance dose), 6 mg/kg (original dose 2 mg/kg, Cycle 2, Reason: MD Discretion, Comment: change to q3w dosing), 2 mg/kg (original dose 2 mg/kg, Cycle 1, Reason: Other (see comments), Comment: maintenance weekly dose)  Administration: 570 mg (10/7/2022), 840 mg (10/28/2022), 288 mg (10/14/2022), 290 mg (10/21/2022), 840 mg (11/18/2022), 831 mg (12/9/2022), 831 mg (12/30/2022), 720 mg (1/20/2023), 806 mg (2/17/2023), 750 mg (3/10/2023), 814 mg (3/31/2023), 750 mg (4/21/2023), 750 mg (5/12/2023), 750 mg (6/2/2023), 750 mg (6/23/2023), 750 mg (7/14/2023), 750 mg (8/4/2023), 750 mg (8/25/2023)     2/13/2023 Cancer Staged    Staging form: Breast, AJCC 8th Edition  - Pathologic stage from 2/13/2023: No Stage Recommended (ypT0, pN0(sn), cM0, GX)     3/14/2023 - 4/14/2023 Radiation Therapy    Treating physician: Shelia Trevino  Total Dose: 52.56 Gy (42.56Gy/16 fractions to whole breast; 10Gy/5 fraction boost)  Fractions: 20  Treatment Site Ref. ID Energy Dose/Fx (Gy) #Fx Dose Correction (Gy) Total Dose (Gy) Start Date End Date Elapsed Days   3D Breast_L NormEZCalc 18X 2.66 16 / 16 0 42.56 3/14/2023 4/6/2023 23   3d BrstBst_L NormBst 18X 2.5 4 / 4 0 10 4/10/2023  2023 4           Past Medical History:   Past Medical History:   Diagnosis Date    Abnormal liver enzymes     Asymptomatic varicose veins     Breast cancer 2022    left idc    Cancer     left breast cancer    Depressive disorder, not elsewhere classified     Dyslipidemia     Embolism and thrombosis of unspecified site     superficial venous thrombosis    Encounter for long-term (current) use of other medications     Family history of ischemic heart disease     Fatty liver     Generalized anxiety disorder     History of chicken pox     Obstructive sleep apnea (adult) (pediatric)     Type II or unspecified type diabetes mellitus without mention of complication, not stated as uncontrolled     Unspecified essential hypertension     Unspecified venous (peripheral) insufficiency     Unspecified vitamin D deficiency        Past Surgical HIstory:   Past Surgical History:   Procedure Laterality Date    BREAST BIOPSY Left 2022    idc    BREAST BIOPSY Left 2022    neg ln    BREAST BIOPSY Left 2022    neg ln     SECTION      COLONOSCOPY      ESOPHAGOGASTRODUODENOSCOPY      INSERTION OF TUNNELED CENTRAL VENOUS CATHETER (CVC) WITH SUBCUTANEOUS PORT Right 10/04/2022    Procedure: [id number];  Surgeon: Dominick Ng MD;  Location: [facility] OR;  Service: General;  Laterality: Right;    LUMPECTOMY, WITH RADAR LOCALIZATION USING TRENTON  Left 2023    Procedure: LUMPECTOMY,WITH RADAR LOCALIZATION USING TR[place]ON ;  Surgeon: Maria G Mcduffie MD;  Location: [facility] OR;  Service: General;  Laterality: Left;    SENTINEL LYMPH NODE BIOPSY Left 2023    Procedure: BIOPSY, LYMPH NODE, SENTINEL;  Surgeon: Maria G Mcduffie MD;  Location: [facility] OR;  Service: General;  Laterality: Left;    TRANSESOPHAGEAL ECHOCARDIOGRAM WITH POSSIBLE CARDIOVERSION (LAUREL W/ POSS CARDIOVERSION) N/A 2023    Procedure: TRANSESOPHAGEAL ECHOCARDIOGRAM WITH POSSIBLE CARDIOVERSION (LAUREL W/ POSS  CARDIOVERSION);  Surgeon: Roni Frias MD;  Location: [facility];  Service: Cardiology;  Laterality: N/A;    VEIN SURGERY      Laser, Dr. Larsen       Family History:   Family History   Problem Relation Age of Onset    Hyperlipidemia Mother     Hypertension Mother     Vulvar Cancer Mother     Dementia Mother     Atrial fibrillation Father     Parkinsonism Father     Diabetes Brother     Cancer Brother         colon    Hypertension Son     Hyperlipidemia Son     Anxiety disorder Son     Stroke Maternal Grandmother        Social History:  reports that she has never smoked. She has never used smokeless tobacco. She reports that she does not currently use alcohol. She reports that she does not use drugs.    Allergies:  Review of patient's allergies indicates:   Allergen Reactions    Sitagliptin      Other reaction(s): (januvia) abdominal pain    Sulfa (sulfonamide antibiotics) Hives    Byetta  [exenatide] Rash    Lactose        Medications:  Current Outpatient Medications   Medication Sig Dispense Refill    albuterol (PROVENTIL/VENTOLIN HFA) 90 mcg/actuation inhaler Inhale 2 puffs into the lungs every 6 (six) hours as needed for Wheezing. 18 g 6    ALPRAZolam (XANAX) 0.25 MG tablet TAKE ONE TABLET BY MOUTH 1-2 TIMES DAILY AS NEEDED ANXIETY 15 tablet 0    anastrozole (ARIMIDEX) 1 mg Tab Take 1 tablet (1 mg total) by mouth once daily. 90 tablet 3    apixaban (ELIQUIS) 5 mg Tab Take 1 tablet (5 mg total) by mouth 2 (two) times daily. 180 tablet 3    azelastine (ASTELIN) 137 mcg (0.1 %) nasal spray 2 sprays by Nasal route 2 (two) times daily as needed for Rhinitis.      buPROPion (WELLBUTRIN XL) 150 MG TB24 tablet TAKE 1 TABLET BY MOUTH EVERY DAY (Patient taking differently: Take 150 mg by mouth once daily.) 90 tablet 3    cyanocobalamin 1,000 mcg/mL injection Inject 1 ml q 2 weeks x 1 month then 1 ml monthly 30 mL 0    dapagliflozin propanediol (FARXIGA) 5 mg Tab tablet Take 1 tablet (5 mg total) by mouth once  daily. 90 tablet 3    LIDOcaine-prilocaine (EMLA) cream Apply to port site 1 hour prior to port access and cover 30 g 3    lipase-protease-amylase 6,000-19,000-30,000 units (CREON) 6,000-19,000 -30,000 unit capsule Take 1 capsule by mouth 3 (three) times daily with meals. 90 capsule 11    metFORMIN (GLUCOPHAGE) 500 MG tablet TAKE 1 TABLET BY MOUTH TWICE A DAY WITH MEALS 180 tablet 1    metoprolol succinate (TOPROL-XL) 50 MG 24 hr tablet Take 1 tablet (50 mg total) by mouth once daily. 90 tablet 2    pravastatin (PRAVACHOL) 10 MG tablet TAKE 1 TABLET BY MOUTH EVERYDAY AT BEDTIME (Patient taking differently: Take 10 mg by mouth every evening.) 90 tablet 1    tirzepatide (MOUNJARO) 7.5 mg/0.5 mL PnIj INJECT 7.5 MG SUBCUTANEOUSLY EVERY 7 DAYS 4 pen 0    nystatin (MYCOSTATIN) powder Apply to affected area 3 times daily (Patient not taking: Reported on 9/7/2023) 60 g 1     No current facility-administered medications for this visit.     Facility-Administered Medications Ordered in Other Visits   Medication Dose Route Frequency Provider Last Rate Last Admin    lactated ringers infusion   Intravenous Continuous Maria G Mcduffie  mL/hr at 02/08/23 0700 New Bag at 02/08/23 0814    midazolam (VERSED) 1 mg/mL injection 2 mg  2 mg Intravenous See admin instructions Maria G Mcduffie MD   2 mg at 02/08/23 0711       Review of Systems   Constitutional:  Negative for chills, fatigue and fever.   HENT:  Negative for trouble swallowing.    Respiratory:  Negative for cough and shortness of breath.    Cardiovascular:  Negative for chest pain and palpitations.   Gastrointestinal:  Positive for diarrhea. Negative for abdominal pain, constipation, nausea and vomiting.   Genitourinary:  Negative for dysuria.   Musculoskeletal:  Negative for arthralgias.   Skin:  Negative for rash.   Neurological:  Negative for headaches.   Hematological:  Negative for adenopathy.       Objective:      Vitals: Weight stable  Vitals:     09/15/23 1106   BP: 115/76   Pulse: 76   Resp: 18   Temp: 96.8 °F (36 °C)   TempSrc: Temporal   SpO2: 100%   Weight: 128.1 kg (282 lb 6.6 oz)       BMI: Body mass index is 47.73 kg/m².    Physical Exam  Vitals reviewed.   Constitutional:       Appearance: She is obese.   HENT:      Head: Normocephalic.      Mouth/Throat:      Pharynx: Oropharynx is clear.   Eyes:      Conjunctiva/sclera: Conjunctivae normal.   Cardiovascular:      Rate and Rhythm: Normal rate and regular rhythm.      Pulses: Normal pulses.   Pulmonary:      Effort: Pulmonary effort is normal. No respiratory distress.      Breath sounds: No wheezing.   Abdominal:      General: Bowel sounds are normal.      Palpations: Abdomen is soft.      Tenderness: There is no abdominal tenderness.   Musculoskeletal:      Cervical back: Neck supple.      Right lower leg: Edema present.      Left lower leg: Edema present.   Lymphadenopathy:      Cervical: No cervical adenopathy.   Skin:     General: Skin is warm and dry.   Neurological:      Mental Status: She is alert and oriented to person, place, and time.   Psychiatric:         Behavior: Behavior normal.         Thought Content: Thought content normal.           Laboratory Data:  Lab Results   Component Value Date    WBC 6.10 09/15/2023    WBC 6.10 09/15/2023    HGB 12.1 09/15/2023    HGB 12.1 09/15/2023    HCT 38.0 09/15/2023    HCT 38.0 09/15/2023    MCV 96 09/15/2023    MCV 96 09/15/2023     09/15/2023     09/15/2023      CMP  Sodium   Date Value Ref Range Status   09/15/2023 139 136 - 145 mmol/L Final   09/15/2023 139 136 - 145 mmol/L Final   08/08/2015 137 137 - 145 MMOL/L Final     Potassium   Date Value Ref Range Status   09/15/2023 3.8 3.5 - 5.1 mmol/L Final   09/15/2023 3.8 3.5 - 5.1 mmol/L Final   08/08/2015 4.4 3.5 - 5.1 MMOL/L Final     Chloride   Date Value Ref Range Status   09/15/2023 108 95 - 110 mmol/L Final   09/15/2023 108 95 - 110 mmol/L Final   08/08/2015 101 98 - 107 MMOL/L  Final     CO2   Date Value Ref Range Status   09/15/2023 19 (L) 23 - 29 mmol/L Final   09/15/2023 19 (L) 23 - 29 mmol/L Final     Glucose   Date Value Ref Range Status   09/15/2023 158 (H) 70 - 110 mg/dL Final   09/15/2023 158 (H) 70 - 110 mg/dL Final     BUN   Date Value Ref Range Status   09/15/2023 23 (H) 6 - 20 mg/dL Final   09/15/2023 23 (H) 6 - 20 mg/dL Final     Creatinine   Date Value Ref Range Status   09/15/2023 1.4 0.5 - 1.4 mg/dL Final   09/15/2023 1.4 0.5 - 1.4 mg/dL Final   08/08/2015 0.63 0.52 - 1.04 MG/DL Final     Calcium   Date Value Ref Range Status   09/15/2023 9.7 8.7 - 10.5 mg/dL Final   09/15/2023 9.7 8.7 - 10.5 mg/dL Final     Total Protein   Date Value Ref Range Status   09/15/2023 7.4 6.0 - 8.4 g/dL Final     Albumin   Date Value Ref Range Status   09/15/2023 3.5 3.5 - 5.2 g/dL Final   09/15/2023 3.5 3.5 - 5.2 g/dL Final     Total Bilirubin   Date Value Ref Range Status   09/15/2023 0.3 0.1 - 1.0 mg/dL Final     Comment:     For infants and newborns, interpretation of results should be based  on gestational age, weight and in agreement with clinical  observations.    Premature Infant recommended reference ranges:  Up to 24 hours.............<8.0 mg/dL  Up to 48 hours............<12.0 mg/dL  3-5 days..................<15.0 mg/dL  6-29 days.................<15.0 mg/dL       Alkaline Phosphatase   Date Value Ref Range Status   09/15/2023 106 55 - 135 U/L Final     AST   Date Value Ref Range Status   09/15/2023 10 10 - 40 U/L Final     ALT   Date Value Ref Range Status   09/15/2023 10 10 - 44 U/L Final     Anion Gap   Date Value Ref Range Status   09/15/2023 12 8 - 16 mmol/L Final   09/15/2023 12 8 - 16 mmol/L Final     eGFR   Date Value Ref Range Status   09/15/2023 43.1 (A) >60 mL/min/1.73 m^2 Final   09/15/2023 43.1 (A) >60 mL/min/1.73 m^2 Final     Magnesium:  1.5  Assessment:       1. Invasive ductal carcinoma of breast, female, left    2. Stage 3a chronic kidney disease    3.  Hypomagnesemia            Plan:   Invasive ductal carcinoma of the breast, left  - cT1c triple positive breast cancer  (ER 84% IN 28% Her 2 3+ pos Ki 52 %), given her young age and Ki67%, will proceed with TH, adding P to help with a better pCR, will also plan to get ultrasound mid cycle to monitor (3 months)  -9/26/22 Echo with EF 60%; next is scheduled for 01/18/23  -Began TH+P on 10/7/2022; completed 12 weeks of Paclitaxel 12/23/22  - 2/2023; s/p lupmectomy CPR;ypT0,pN0,cM0  -Proceed with Trastuzumab/Pertuzumab   - on adjuvant radiation plan for 16 fraction total - completed 04/13/23  - will need adjuvant endocrine therapy after finishing up adjuvant radiation   -Echo 5/2023 EF: 60% and global longitudinal 17.0%, cont monitoring with echo in 3 months ;   Echo 08/23/23 - EF 60%  - last period  around~ 50,, post menopausal , on anastrazole  June 2nd /2023 ,  -Proceed with C17 HP today; will add 2 gm Magnesium to 1000 ml NS (hydrate) over 2 hours  F/u on 10/05 as scheduled to establish with Dr. Vásquez with labs prior; requesting infusion stay on Friday 10/06 & not 10/05    - DEXA scan, osteopenia, cont taking Vit D, hold off  calcium for now given her FATIMAH     FATIMAH possible dehydration:  -established with nephrologist, Dr. Crandall, improving/stable      Diabetes type 2 with neuropathy   -Taking Metformin, Trulicity  - on gabapentin 100 mg night     Anxiety  -Taking Wellbutrin     Hypomagnesemia  -Monitor, replacement as needed     Hot flashes & PN  -reach out to I/O in regards to Acupuncture.    Patient queried and all questions were answered.    Route Chart for Scheduling    Med Onc Chart Routing      Follow up with physician . F/U WITH Dr. Vásquez in 3 weeks as scheduled on 10/05 with labs prior-   Follow up with BRANDIE    Infusion scheduling note   Proceed with HP; REQUESTS NEXT HP be on 10/06, NOT 10/05   Injection scheduling note    Labs    Imaging    Pharmacy appointment    Other referrals       Needs an apt with  Eugenia Tello NP I/O to discuss Acupuncture           Treatment Plan Information   OP BREAST PACLITAXEL TRASTUZUMAB WEEKLY WITH PERTUZUMAB Q3W (THP)   Lisa Jauqez MD   Upcoming Treatment Dates - OP BREAST PACLITAXEL TRASTUZUMAB WEEKLY WITH PERTUZUMAB Q3W (THP)    9/15/2023       Chemotherapy       pertuzumab (PERJETA) 420 mg in sodium chloride 0.9% 264 mL infusion       trastuzumab-dkst (OGIVRI) in sodium chloride 0.9% 250 mL chemo infusion       Supportive Care       magnesium sulfate 2 g in sodium chloride 0.9% 1,000 mL  10/6/2023       Chemotherapy       pertuzumab (PERJETA) 420 mg in sodium chloride 0.9% 264 mL infusion       trastuzumab-dkst (OGIVRI) in sodium chloride 0.9% chemo infusion       Supportive Care       magnesium sulfate 2 g in sodium chloride 0.9% 1,000 mL    Supportive Plan Information  IV FLUIDS AND ELECTROLYTES   Lisa Jaquez MD   Upcoming Treatment Dates - IV FLUIDS AND ELECTROLYTES    No upcoming days in selected categories.    21 minutes were spent in coordination of patient's care, record review and counseling.

## 2023-09-15 NOTE — PLAN OF CARE
Pt tolerated Perjeta/Ogivri/Magnesium infusions well.  No s/s of infusion reaction noted.  Instructed to call MD with any problems

## 2023-09-19 ENCOUNTER — TELEPHONE (OUTPATIENT)
Dept: HEMATOLOGY/ONCOLOGY | Facility: CLINIC | Age: 58
End: 2023-09-19
Payer: COMMERCIAL

## 2023-09-20 ENCOUNTER — OFFICE VISIT (OUTPATIENT)
Dept: HEMATOLOGY/ONCOLOGY | Facility: CLINIC | Age: 58
End: 2023-09-20
Payer: COMMERCIAL

## 2023-09-20 VITALS
WEIGHT: 287.38 LBS | SYSTOLIC BLOOD PRESSURE: 122 MMHG | DIASTOLIC BLOOD PRESSURE: 87 MMHG | HEIGHT: 64 IN | BODY MASS INDEX: 49.06 KG/M2 | TEMPERATURE: 97 F | OXYGEN SATURATION: 99 % | HEART RATE: 70 BPM

## 2023-09-20 DIAGNOSIS — C50.912 INVASIVE DUCTAL CARCINOMA OF BREAST, FEMALE, LEFT: Primary | ICD-10-CM

## 2023-09-20 DIAGNOSIS — M54.2 NECK PAIN: ICD-10-CM

## 2023-09-20 DIAGNOSIS — G62.0 CHEMOTHERAPY-INDUCED PERIPHERAL NEUROPATHY: ICD-10-CM

## 2023-09-20 DIAGNOSIS — T45.1X5A CHEMOTHERAPY-INDUCED PERIPHERAL NEUROPATHY: ICD-10-CM

## 2023-09-20 DIAGNOSIS — G89.29 CHRONIC MIDLINE LOW BACK PAIN WITHOUT SCIATICA: Primary | ICD-10-CM

## 2023-09-20 DIAGNOSIS — M54.50 CHRONIC MIDLINE LOW BACK PAIN WITHOUT SCIATICA: Primary | ICD-10-CM

## 2023-09-20 DIAGNOSIS — C50.912 INVASIVE DUCTAL CARCINOMA OF BREAST, FEMALE, LEFT: ICD-10-CM

## 2023-09-20 DIAGNOSIS — F41.1 GENERALIZED ANXIETY DISORDER: ICD-10-CM

## 2023-09-20 PROCEDURE — 3008F PR BODY MASS INDEX (BMI) DOCUMENTED: ICD-10-PCS | Mod: CPTII,S$GLB,, | Performed by: NURSE PRACTITIONER

## 2023-09-20 PROCEDURE — 3060F PR POS MICROALBUMINURIA RESULT DOCUMENTED/REVIEW: ICD-10-PCS | Mod: CPTII,S$GLB,, | Performed by: NURSE PRACTITIONER

## 2023-09-20 PROCEDURE — 3066F PR DOCUMENTATION OF TREATMENT FOR NEPHROPATHY: ICD-10-PCS | Mod: CPTII,S$GLB,, | Performed by: NURSE PRACTITIONER

## 2023-09-20 PROCEDURE — 99215 OFFICE O/P EST HI 40 MIN: CPT | Mod: S$GLB,,, | Performed by: NURSE PRACTITIONER

## 2023-09-20 PROCEDURE — 3044F HG A1C LEVEL LT 7.0%: CPT | Mod: CPTII,S$GLB,, | Performed by: NURSE PRACTITIONER

## 2023-09-20 PROCEDURE — 1160F PR REVIEW ALL MEDS BY PRESCRIBER/CLIN PHARMACIST DOCUMENTED: ICD-10-PCS | Mod: CPTII,S$GLB,, | Performed by: NURSE PRACTITIONER

## 2023-09-20 PROCEDURE — 1159F MED LIST DOCD IN RCRD: CPT | Mod: CPTII,S$GLB,, | Performed by: NURSE PRACTITIONER

## 2023-09-20 PROCEDURE — 3066F NEPHROPATHY DOC TX: CPT | Mod: CPTII,S$GLB,, | Performed by: NURSE PRACTITIONER

## 2023-09-20 PROCEDURE — 1159F PR MEDICATION LIST DOCUMENTED IN MEDICAL RECORD: ICD-10-PCS | Mod: CPTII,S$GLB,, | Performed by: NURSE PRACTITIONER

## 2023-09-20 PROCEDURE — 4010F ACE/ARB THERAPY RXD/TAKEN: CPT | Mod: CPTII,S$GLB,, | Performed by: NURSE PRACTITIONER

## 2023-09-20 PROCEDURE — 3060F POS MICROALBUMINURIA REV: CPT | Mod: CPTII,S$GLB,, | Performed by: NURSE PRACTITIONER

## 2023-09-20 PROCEDURE — 3074F SYST BP LT 130 MM HG: CPT | Mod: CPTII,S$GLB,, | Performed by: NURSE PRACTITIONER

## 2023-09-20 PROCEDURE — 99999 PR PBB SHADOW E&M-EST. PATIENT-LVL IV: ICD-10-PCS | Mod: PBBFAC,,, | Performed by: NURSE PRACTITIONER

## 2023-09-20 PROCEDURE — 1160F RVW MEDS BY RX/DR IN RCRD: CPT | Mod: CPTII,S$GLB,, | Performed by: NURSE PRACTITIONER

## 2023-09-20 PROCEDURE — 3079F PR MOST RECENT DIASTOLIC BLOOD PRESSURE 80-89 MM HG: ICD-10-PCS | Mod: CPTII,S$GLB,, | Performed by: NURSE PRACTITIONER

## 2023-09-20 PROCEDURE — 3079F DIAST BP 80-89 MM HG: CPT | Mod: CPTII,S$GLB,, | Performed by: NURSE PRACTITIONER

## 2023-09-20 PROCEDURE — 99215 PR OFFICE/OUTPT VISIT, EST, LEVL V, 40-54 MIN: ICD-10-PCS | Mod: S$GLB,,, | Performed by: NURSE PRACTITIONER

## 2023-09-20 PROCEDURE — 3074F PR MOST RECENT SYSTOLIC BLOOD PRESSURE < 130 MM HG: ICD-10-PCS | Mod: CPTII,S$GLB,, | Performed by: NURSE PRACTITIONER

## 2023-09-20 PROCEDURE — 99999 PR PBB SHADOW E&M-EST. PATIENT-LVL IV: CPT | Mod: PBBFAC,,, | Performed by: NURSE PRACTITIONER

## 2023-09-20 PROCEDURE — 3044F PR MOST RECENT HEMOGLOBIN A1C LEVEL <7.0%: ICD-10-PCS | Mod: CPTII,S$GLB,, | Performed by: NURSE PRACTITIONER

## 2023-09-20 PROCEDURE — 3008F BODY MASS INDEX DOCD: CPT | Mod: CPTII,S$GLB,, | Performed by: NURSE PRACTITIONER

## 2023-09-20 PROCEDURE — 4010F PR ACE/ARB THEARPY RXD/TAKEN: ICD-10-PCS | Mod: CPTII,S$GLB,, | Performed by: NURSE PRACTITIONER

## 2023-09-20 NOTE — PROGRESS NOTES
Subjective:   Patient ID:  Josefina Coon is a 58 y.o. female who presents for evaluation of Atrial Fibrillation      HPI 59 yo female with atrial fibrillation, Htn, invasive ductal Ca s/p left lumpectomy + XRT + chemotherapy, DM, CKD III, Sleep apnea (on CPAP)  Primary cardiologist is Dr. Frias.  Has had palpitations over the past 3 years, more so over the past year.   Presented with AF with RVR >> LAUREL/DCCV 7/25/23. Placed on Toprol and Eliquis.    Continues to note symptoms c/w AF. Toprol increased to 50 mg daily >> still having episodes. Generally lasting all day.    Echo 5/1/23 EF 60% severe LAE (ALEXSANDRA 50.2).    ECG today reviewed by me, reveals nsr with incomplete LBBB.    No alcohol.  Minimal caffeine.    Review of Systems   Constitutional: Negative. Negative for fever and malaise/fatigue.   HENT:  Negative for congestion and sore throat.    Cardiovascular:  Positive for palpitations. Negative for chest pain, dyspnea on exertion, irregular heartbeat, leg swelling, near-syncope, orthopnea, paroxysmal nocturnal dyspnea and syncope.   Respiratory:  Negative for cough and shortness of breath.    Gastrointestinal:  Negative for abdominal pain, constipation and diarrhea.   Neurological:  Negative for dizziness, light-headedness and weakness.   Psychiatric/Behavioral:  Negative for depression. The patient is not nervous/anxious.        Objective:   Physical Exam  Constitutional:       Appearance: She is well-developed.   Eyes:      General: No scleral icterus.     Conjunctiva/sclera: Conjunctivae normal.   Neck:      Vascular: No JVD.      Trachea: No tracheal deviation.   Cardiovascular:      Rate and Rhythm: Normal rate and regular rhythm.      Chest Wall: PMI is not displaced.   Pulmonary:      Effort: Pulmonary effort is normal. No respiratory distress.      Breath sounds: Normal breath sounds.   Abdominal:      Palpations: Abdomen is soft.      Tenderness: There is no abdominal tenderness.    Musculoskeletal:         General: No tenderness.   Skin:     General: Skin is warm and dry.      Findings: No rash.   Neurological:      Mental Status: She is alert and oriented to person, place, and time.   Psychiatric:         Behavior: Behavior normal.         Assessment:      1. Paroxysmal atrial fibrillation    2. Essential hypertension    3. Generalized anxiety disorder    4. Stage 3a chronic kidney disease    5. Invasive ductal carcinoma of breast, female, left    6. Type 2 diabetes mellitus without complication, without long-term current use of insulin    7. Obstructive sleep apnea (adult) (pediatric)        Plan:       Paroxysmal symptomatic atrial fibrillation.  Discussed options at length, including anti-arrhythmic therapy, PVI.  Risks and benefits of PVI discussed.  Given she is still undergoing immunotherapy, we elected for trial of anti-arrhythmic therapy first.  Given incomplete LBBB >> defer Class IC.  Multaq 400 mg BID. If has recurrence of does not tolerate, consider Sotalol  ECG in 2 weeks.  F/u in 3 months.

## 2023-09-20 NOTE — PROGRESS NOTES
Josefina Coon  58 y.o. is here to seek an integrative approach to discuss side effects related to breast cancer treatment.    HPI  Mrs. Rocha is here today with her 2 friends, Salena and Ruth. She reports she is sleeping ok and taking melatonin at times. She does get tired around 3 pm and will take a break from work and take a nap. She states she watches TV at night and then stays up later than she should. She reports food does not taste good right now, but she is eating and drinking protein shakes. She continues to work remotely Mondays-Thursdays and then comes to treatment on Fridays. She takes tramadol occasionally for aching joints. She reports her feet will have a feeling of walking on rocks at times and other times they are numb and tingling intermittently. She went to the [employer] today but states she feels like the wigs look silly. She does plan on styling them with her friends who are encouraging.     Today's VIsit  Mrs. Rocha reports she only has 1 immunotherapy treatment left. She had chemo, left sided lumpectomy, radiation, and immunotherapy. She has burning neuropathy pain to her feet with slight numbness. She states a few nights a week she has anxiety and can't relax to fall asleep. Melatonin helps at times. She reports she has a good appetite and continues to crave sugar. She is on mounjaro, but doesn't notice she is losing weight. She has been walking her dog and has been to [employer] a few times. It has been difficult with her many doctors appointments to get into an exercise routine. She is having hot flashes 3 or more times a day and her entire body gets hot. She states the hot flashes are uncomfortable. At night she takes her covers off and on throughout the night. She has neck pain and low back pain. She reports her low back pain comes and goes and she has had this for many years.She is working on relaxing her back and neck muscles with meditation.  She has increased neck tension due  to anxiety.  She is currently on short term disability for 6 weeks to finish treatment and get through her appointments. She will go back .   She has twin sons who are 24 years old, they both live with her.     Pillars Assessment    Sleep  How many hours of sleep per night? 7 hours  Do you have trouble falling asleep, staying asleep or waking up earlier than you need to? no  Do you have daytime fatigue? yes  Do you need medication for sleep? no  Do you use any supplements or other interventions for sleep? yes melatonin as needed    Nutrition   Food allergies or sensitivities: no  Do you adhere to a particular type of diet? no  Do you have any concerns with your eating habits? no    Exercise  How would you describe your physical activity level? Low to moderate  Do you work at a sedentary job? yes      Past Medical History  Past Medical History:   Diagnosis Date    Abnormal liver enzymes     Asymptomatic varicose veins     Breast cancer 2022    left idc    Cancer     left breast cancer    Depressive disorder, not elsewhere classified     Dyslipidemia     Embolism and thrombosis of unspecified site     superficial venous thrombosis    Encounter for long-term (current) use of other medications     Family history of ischemic heart disease     Fatty liver     Generalized anxiety disorder     History of chicken pox     Obstructive sleep apnea (adult) (pediatric)     Type II or unspecified type diabetes mellitus without mention of complication, not stated as uncontrolled     Unspecified essential hypertension     Unspecified venous (peripheral) insufficiency     Unspecified vitamin D deficiency         Past Surgical History   Past Surgical History:   Procedure Laterality Date    BREAST BIOPSY Left 2022    idc    BREAST BIOPSY Left 2022    neg ln    BREAST BIOPSY Left 2022    neg ln     SECTION      COLONOSCOPY      ESOPHAGOGASTRODUODENOSCOPY      INSERTION OF TUNNELED CENTRAL  VENOUS CATHETER (CVC) WITH SUBCUTANEOUS PORT Right 10/04/2022    Procedure: MAYCNECLI-MEPW-I-CATH;  Surgeon: Dominick Ng MD;  Location: [facility] OR;  Service: General;  Laterality: Right;    LUMPECTOMY, WITH RADAR LOCALIZATION USING [place]  Left 2/8/2023    Procedure: LUMPECTOMY,WITH RADAR LOCALIZATION USING [place] ;  Surgeon: Maria G Mcduffie MD;  Location: [facility] OR;  Service: General;  Laterality: Left;    SENTINEL LYMPH NODE BIOPSY Left 2/8/2023    Procedure: BIOPSY, LYMPH NODE, SENTINEL;  Surgeon: Maria G Mcduffie MD;  Location: [facility] OR;  Service: General;  Laterality: Left;    TRANSESOPHAGEAL ECHOCARDIOGRAM WITH POSSIBLE CARDIOVERSION (LAUREL W/ POSS CARDIOVERSION) N/A 7/25/2023    Procedure: TRANSESOPHAGEAL ECHOCARDIOGRAM WITH POSSIBLE CARDIOVERSION (LAUREL W/ POSS CARDIOVERSION);  Surgeon: Roni Frias MD;  Location: [facility];  Service: Cardiology;  Laterality: N/A;    VEIN SURGERY      Laser, Dr. Larsen        Family History   Family History   Problem Relation Age of Onset    Hyperlipidemia Mother     Hypertension Mother     Vulvar Cancer Mother     Dementia Mother     Atrial fibrillation Father     Parkinsonism Father     Diabetes Brother     Cancer Brother         colon    Hypertension Son     Hyperlipidemia Son     Anxiety disorder Son     Stroke Maternal Grandmother         Social History  Social History     Socioeconomic History    Marital status:    Tobacco Use    Smoking status: Never    Smokeless tobacco: Never   Substance and Sexual Activity    Alcohol use: Not Currently     Comment: rare     Drug use: Never    Sexual activity: Not Currently        Allergies  Review of patient's allergies indicates:   Allergen Reactions    Sitagliptin      Other reaction(s): (januvia) abdominal pain    Sulfa (sulfonamide antibiotics) Hives    Byetta  [exenatide] Rash    Lactose         Current Medications:    Current Outpatient Medications:     albuterol (PROVENTIL/VENTOLIN HFA) 90  mcg/actuation inhaler, Inhale 2 puffs into the lungs every 6 (six) hours as needed for Wheezing., Disp: 18 g, Rfl: 6    ALPRAZolam (XANAX) 0.25 MG tablet, TAKE ONE TABLET BY MOUTH 1-2 TIMES DAILY AS NEEDED ANXIETY, Disp: 15 tablet, Rfl: 0    anastrozole (ARIMIDEX) 1 mg Tab, Take 1 tablet (1 mg total) by mouth once daily., Disp: 90 tablet, Rfl: 3    apixaban (ELIQUIS) 5 mg Tab, Take 1 tablet (5 mg total) by mouth 2 (two) times daily., Disp: 180 tablet, Rfl: 3    azelastine (ASTELIN) 137 mcg (0.1 %) nasal spray, 2 sprays by Nasal route 2 (two) times daily as needed for Rhinitis., Disp: , Rfl:     buPROPion (WELLBUTRIN XL) 150 MG TB24 tablet, TAKE 1 TABLET BY MOUTH EVERY DAY (Patient taking differently: Take 150 mg by mouth once daily.), Disp: 90 tablet, Rfl: 3    cyanocobalamin 1,000 mcg/mL injection, Inject 1 ml q 2 weeks x 1 month then 1 ml monthly, Disp: 30 mL, Rfl: 0    dapagliflozin propanediol (FARXIGA) 5 mg Tab tablet, Take 1 tablet (5 mg total) by mouth once daily., Disp: 90 tablet, Rfl: 3    LIDOcaine-prilocaine (EMLA) cream, Apply to port site 1 hour prior to port access and cover, Disp: 30 g, Rfl: 3    lipase-protease-amylase 6,000-19,000-30,000 units (CREON) 6,000-19,000 -30,000 unit capsule, Take 1 capsule by mouth 3 (three) times daily with meals., Disp: 90 capsule, Rfl: 11    metFORMIN (GLUCOPHAGE) 500 MG tablet, TAKE 1 TABLET BY MOUTH TWICE A DAY WITH MEALS, Disp: 180 tablet, Rfl: 1    metoprolol succinate (TOPROL-XL) 50 MG 24 hr tablet, Take 1 tablet (50 mg total) by mouth once daily., Disp: 90 tablet, Rfl: 2    nystatin (MYCOSTATIN) powder, Apply to affected area 3 times daily (Patient not taking: Reported on 9/7/2023), Disp: 60 g, Rfl: 1    pravastatin (PRAVACHOL) 10 MG tablet, TAKE 1 TABLET BY MOUTH EVERYDAY AT BEDTIME (Patient taking differently: Take 10 mg by mouth every evening.), Disp: 90 tablet, Rfl: 1    tirzepatide (MOUNJARO) 7.5 mg/0.5 mL PnIj, INJECT 7.5 MG SUBCUTANEOUSLY EVERY 7 DAYS,  "Disp: 4 pen , Rfl: 0  No current facility-administered medications for this visit.    Facility-Administered Medications Ordered in Other Visits:     lactated ringers infusion, , Intravenous, Continuous, Maria G Mcduffie MD, Last Rate: 125 mL/hr at 02/08/23 0700, New Bag at 02/08/23 0814    midazolam (VERSED) 1 mg/mL injection 2 mg, 2 mg, Intravenous, See admin instructions, Maria G Mcduffie MD, 2 mg at 02/08/23 0711     Review of Systems  Review of Systems   Constitutional:  Positive for malaise/fatigue.        Hot flashes   HENT: Negative.     Eyes: Negative.    Respiratory: Negative.     Cardiovascular: Negative.    Gastrointestinal: Negative.    Genitourinary: Negative.    Musculoskeletal:  Positive for back pain and neck pain.   Skin: Negative.    Neurological: Negative.         Burning to feet   Endo/Heme/Allergies: Negative.    Psychiatric/Behavioral:  The patient is nervous/anxious.         Physical Exam      Vitals:    09/20/23 1041   BP: 122/87   Pulse: 70   Temp: 96.7 °F (35.9 °C)   TempSrc: Temporal   SpO2: 99%   Weight: 130.4 kg (287 lb 6.4 oz)   Height: 5' 4" (1.626 m)   Body mass index is 49.33 kg/m².      Physical Exam  Vitals reviewed.   Constitutional:       Appearance: Normal appearance.   Neurological:      Mental Status: She is alert.   Psychiatric:         Mood and Affect: Mood normal.         Behavior: Behavior normal.        ASSESSMENT :  1. Chronic midline low back pain without sciatica    2. Neck pain    3. Chemotherapy-induced peripheral neuropathy    4. Generalized anxiety disorder    5. Invasive ductal carcinoma of breast, female, left        PLAN:  Reviewed all information discussed at today's visit and all questions were answered.    Counseled on healthy lifestyle and behavior modifications including the role of diet and exercise in relation to hot flashes.    Referral to Acupunture  Continue to see Nutrition during treatment  Continue to see therapist as needed  Continue to " work on sleep hygiene  I discussed and recommended the following support services:  [employer] and Yoga   Music and Relaxation therapy   Meditation    Follow up with [facility] in 3 months for survivorship visit.    I spent a total of 42 minutes on the day of the visit.This includes face to face time and non-face to face time preparing to see the patient (eg, review of tests), obtaining and/or reviewing separately obtained history, documenting clinical information in the electronic or other health record, independently interpreting results and communicating results to the patient/family/caregiver, or care coordinator.

## 2023-09-22 ENCOUNTER — TELEPHONE (OUTPATIENT)
Dept: HEMATOLOGY/ONCOLOGY | Facility: CLINIC | Age: 58
End: 2023-09-22
Payer: COMMERCIAL

## 2023-09-22 NOTE — TELEPHONE ENCOUNTER
Spoke with the patient to schedule the first acupuncture appt. Insurance approved 12 visits. They voiced acceptance of date/time. They were made aware to arrive early to allow time for check in and complete paperwork. Location was reviewed.

## 2023-09-25 ENCOUNTER — OFFICE VISIT (OUTPATIENT)
Dept: CARDIOLOGY | Facility: CLINIC | Age: 58
End: 2023-09-25
Payer: COMMERCIAL

## 2023-09-25 VITALS
SYSTOLIC BLOOD PRESSURE: 120 MMHG | HEIGHT: 64 IN | DIASTOLIC BLOOD PRESSURE: 80 MMHG | WEIGHT: 287 LBS | HEART RATE: 72 BPM | BODY MASS INDEX: 49 KG/M2

## 2023-09-25 DIAGNOSIS — E11.9 TYPE 2 DIABETES MELLITUS WITHOUT COMPLICATION, WITHOUT LONG-TERM CURRENT USE OF INSULIN: ICD-10-CM

## 2023-09-25 DIAGNOSIS — I48.0 PAF (PAROXYSMAL ATRIAL FIBRILLATION): ICD-10-CM

## 2023-09-25 DIAGNOSIS — I48.0 PAROXYSMAL ATRIAL FIBRILLATION: Primary | ICD-10-CM

## 2023-09-25 DIAGNOSIS — I10 ESSENTIAL HYPERTENSION: ICD-10-CM

## 2023-09-25 DIAGNOSIS — C50.912 INVASIVE DUCTAL CARCINOMA OF BREAST, FEMALE, LEFT: ICD-10-CM

## 2023-09-25 DIAGNOSIS — N18.31 STAGE 3A CHRONIC KIDNEY DISEASE: ICD-10-CM

## 2023-09-25 DIAGNOSIS — F41.1 GENERALIZED ANXIETY DISORDER: ICD-10-CM

## 2023-09-25 DIAGNOSIS — I48.0 PAF (PAROXYSMAL ATRIAL FIBRILLATION): Primary | ICD-10-CM

## 2023-09-25 DIAGNOSIS — I48.91 ATRIAL FIBRILLATION WITH RVR: ICD-10-CM

## 2023-09-25 DIAGNOSIS — G47.33 OBSTRUCTIVE SLEEP APNEA (ADULT) (PEDIATRIC): ICD-10-CM

## 2023-09-25 PROBLEM — N17.9 AKI (ACUTE KIDNEY INJURY): Status: RESOLVED | Noted: 2023-06-23 | Resolved: 2023-09-25

## 2023-09-25 PROCEDURE — 99999 PR PBB SHADOW E&M-EST. PATIENT-LVL III: ICD-10-PCS | Mod: PBBFAC,,, | Performed by: INTERNAL MEDICINE

## 2023-09-25 PROCEDURE — 3008F PR BODY MASS INDEX (BMI) DOCUMENTED: ICD-10-PCS | Mod: CPTII,S$GLB,, | Performed by: INTERNAL MEDICINE

## 2023-09-25 PROCEDURE — 1159F PR MEDICATION LIST DOCUMENTED IN MEDICAL RECORD: ICD-10-PCS | Mod: CPTII,S$GLB,, | Performed by: INTERNAL MEDICINE

## 2023-09-25 PROCEDURE — 3060F PR POS MICROALBUMINURIA RESULT DOCUMENTED/REVIEW: ICD-10-PCS | Mod: CPTII,S$GLB,, | Performed by: INTERNAL MEDICINE

## 2023-09-25 PROCEDURE — 93005 ELECTROCARDIOGRAM TRACING: CPT | Mod: PO

## 2023-09-25 PROCEDURE — 99205 PR OFFICE/OUTPT VISIT, NEW, LEVL V, 60-74 MIN: ICD-10-PCS | Mod: S$GLB,,, | Performed by: INTERNAL MEDICINE

## 2023-09-25 PROCEDURE — 3066F PR DOCUMENTATION OF TREATMENT FOR NEPHROPATHY: ICD-10-PCS | Mod: CPTII,S$GLB,, | Performed by: INTERNAL MEDICINE

## 2023-09-25 PROCEDURE — 99999 PR PBB SHADOW E&M-EST. PATIENT-LVL III: CPT | Mod: PBBFAC,,, | Performed by: INTERNAL MEDICINE

## 2023-09-25 PROCEDURE — 3074F PR MOST RECENT SYSTOLIC BLOOD PRESSURE < 130 MM HG: ICD-10-PCS | Mod: CPTII,S$GLB,, | Performed by: INTERNAL MEDICINE

## 2023-09-25 PROCEDURE — 93010 ELECTROCARDIOGRAM REPORT: CPT | Mod: S$GLB,,, | Performed by: INTERNAL MEDICINE

## 2023-09-25 PROCEDURE — 4010F PR ACE/ARB THEARPY RXD/TAKEN: ICD-10-PCS | Mod: CPTII,S$GLB,, | Performed by: INTERNAL MEDICINE

## 2023-09-25 PROCEDURE — 3044F PR MOST RECENT HEMOGLOBIN A1C LEVEL <7.0%: ICD-10-PCS | Mod: CPTII,S$GLB,, | Performed by: INTERNAL MEDICINE

## 2023-09-25 PROCEDURE — 3074F SYST BP LT 130 MM HG: CPT | Mod: CPTII,S$GLB,, | Performed by: INTERNAL MEDICINE

## 2023-09-25 PROCEDURE — 3066F NEPHROPATHY DOC TX: CPT | Mod: CPTII,S$GLB,, | Performed by: INTERNAL MEDICINE

## 2023-09-25 PROCEDURE — 1159F MED LIST DOCD IN RCRD: CPT | Mod: CPTII,S$GLB,, | Performed by: INTERNAL MEDICINE

## 2023-09-25 PROCEDURE — 4010F ACE/ARB THERAPY RXD/TAKEN: CPT | Mod: CPTII,S$GLB,, | Performed by: INTERNAL MEDICINE

## 2023-09-25 PROCEDURE — 3079F DIAST BP 80-89 MM HG: CPT | Mod: CPTII,S$GLB,, | Performed by: INTERNAL MEDICINE

## 2023-09-25 PROCEDURE — 93010 RHYTHM STRIP: ICD-10-PCS | Mod: S$GLB,,, | Performed by: INTERNAL MEDICINE

## 2023-09-25 PROCEDURE — 3008F BODY MASS INDEX DOCD: CPT | Mod: CPTII,S$GLB,, | Performed by: INTERNAL MEDICINE

## 2023-09-25 PROCEDURE — 3060F POS MICROALBUMINURIA REV: CPT | Mod: CPTII,S$GLB,, | Performed by: INTERNAL MEDICINE

## 2023-09-25 PROCEDURE — 3044F HG A1C LEVEL LT 7.0%: CPT | Mod: CPTII,S$GLB,, | Performed by: INTERNAL MEDICINE

## 2023-09-25 PROCEDURE — 99205 OFFICE O/P NEW HI 60 MIN: CPT | Mod: S$GLB,,, | Performed by: INTERNAL MEDICINE

## 2023-09-25 PROCEDURE — 3079F PR MOST RECENT DIASTOLIC BLOOD PRESSURE 80-89 MM HG: ICD-10-PCS | Mod: CPTII,S$GLB,, | Performed by: INTERNAL MEDICINE

## 2023-09-25 RX ORDER — DRONEDARONE 400 MG/1
400 TABLET, FILM COATED ORAL 2 TIMES DAILY WITH MEALS
Qty: 60 TABLET | Refills: 11 | Status: SHIPPED | OUTPATIENT
Start: 2023-09-25 | End: 2024-09-24

## 2023-10-03 ENCOUNTER — CLINICAL SUPPORT (OUTPATIENT)
Dept: REHABILITATION | Facility: HOSPITAL | Age: 58
End: 2023-10-03
Payer: COMMERCIAL

## 2023-10-03 DIAGNOSIS — M54.2 NECK PAIN: ICD-10-CM

## 2023-10-03 DIAGNOSIS — M54.50 CHRONIC MIDLINE LOW BACK PAIN WITHOUT SCIATICA: ICD-10-CM

## 2023-10-03 DIAGNOSIS — G62.0 CHEMOTHERAPY-INDUCED PERIPHERAL NEUROPATHY: ICD-10-CM

## 2023-10-03 DIAGNOSIS — T45.1X5A CHEMOTHERAPY-INDUCED PERIPHERAL NEUROPATHY: ICD-10-CM

## 2023-10-03 DIAGNOSIS — G89.29 CHRONIC MIDLINE LOW BACK PAIN WITHOUT SCIATICA: ICD-10-CM

## 2023-10-03 PROCEDURE — 97810 ACUP 1/> WO ESTIM 1ST 15 MIN: CPT | Mod: PN | Performed by: ACUPUNCTURIST

## 2023-10-03 PROCEDURE — 97811 ACUP 1/> W/O ESTIM EA ADD 15: CPT | Mod: PN | Performed by: ACUPUNCTURIST

## 2023-10-04 ENCOUNTER — PATIENT MESSAGE (OUTPATIENT)
Dept: HEMATOLOGY/ONCOLOGY | Facility: CLINIC | Age: 58
End: 2023-10-04
Payer: COMMERCIAL

## 2023-10-04 DIAGNOSIS — K52.1 CHEMOTHERAPY INDUCED DIARRHEA: Primary | ICD-10-CM

## 2023-10-04 DIAGNOSIS — T45.1X5A CHEMOTHERAPY INDUCED DIARRHEA: Primary | ICD-10-CM

## 2023-10-05 ENCOUNTER — INFUSION (OUTPATIENT)
Dept: INFUSION THERAPY | Facility: HOSPITAL | Age: 58
End: 2023-10-05
Attending: STUDENT IN AN ORGANIZED HEALTH CARE EDUCATION/TRAINING PROGRAM
Payer: COMMERCIAL

## 2023-10-05 ENCOUNTER — OFFICE VISIT (OUTPATIENT)
Dept: NEPHROLOGY | Facility: CLINIC | Age: 58
End: 2023-10-05
Payer: COMMERCIAL

## 2023-10-05 ENCOUNTER — LAB VISIT (OUTPATIENT)
Dept: LAB | Facility: HOSPITAL | Age: 58
End: 2023-10-05
Attending: INTERNAL MEDICINE
Payer: COMMERCIAL

## 2023-10-05 ENCOUNTER — OFFICE VISIT (OUTPATIENT)
Dept: HEMATOLOGY/ONCOLOGY | Facility: CLINIC | Age: 58
End: 2023-10-05
Payer: COMMERCIAL

## 2023-10-05 ENCOUNTER — PATIENT MESSAGE (OUTPATIENT)
Dept: NEPHROLOGY | Facility: CLINIC | Age: 58
End: 2023-10-05

## 2023-10-05 ENCOUNTER — TELEPHONE (OUTPATIENT)
Dept: HEMATOLOGY/ONCOLOGY | Facility: CLINIC | Age: 58
End: 2023-10-05
Payer: COMMERCIAL

## 2023-10-05 VITALS
HEIGHT: 64 IN | HEART RATE: 75 BPM | OXYGEN SATURATION: 99 % | TEMPERATURE: 98 F | RESPIRATION RATE: 16 BRPM | WEIGHT: 283.06 LBS | DIASTOLIC BLOOD PRESSURE: 77 MMHG | BODY MASS INDEX: 48.32 KG/M2 | SYSTOLIC BLOOD PRESSURE: 110 MMHG

## 2023-10-05 VITALS
SYSTOLIC BLOOD PRESSURE: 112 MMHG | HEIGHT: 64 IN | TEMPERATURE: 98 F | BODY MASS INDEX: 48.32 KG/M2 | WEIGHT: 283.06 LBS | OXYGEN SATURATION: 99 % | HEART RATE: 70 BPM | DIASTOLIC BLOOD PRESSURE: 79 MMHG | RESPIRATION RATE: 16 BRPM

## 2023-10-05 VITALS
HEART RATE: 70 BPM | SYSTOLIC BLOOD PRESSURE: 120 MMHG | OXYGEN SATURATION: 96 % | HEIGHT: 64 IN | BODY MASS INDEX: 49.26 KG/M2 | DIASTOLIC BLOOD PRESSURE: 80 MMHG

## 2023-10-05 DIAGNOSIS — N18.32 STAGE 3B CHRONIC KIDNEY DISEASE: ICD-10-CM

## 2023-10-05 DIAGNOSIS — C50.912 INVASIVE DUCTAL CARCINOMA OF BREAST, FEMALE, LEFT: ICD-10-CM

## 2023-10-05 DIAGNOSIS — I48.0 PAROXYSMAL ATRIAL FIBRILLATION: ICD-10-CM

## 2023-10-05 DIAGNOSIS — N17.9 AKI (ACUTE KIDNEY INJURY): ICD-10-CM

## 2023-10-05 DIAGNOSIS — G47.33 OBSTRUCTIVE SLEEP APNEA (ADULT) (PEDIATRIC): ICD-10-CM

## 2023-10-05 DIAGNOSIS — E66.01 MORBID OBESITY: ICD-10-CM

## 2023-10-05 DIAGNOSIS — T45.1X5A CHEMOTHERAPY INDUCED CARDIOMYOPATHY: ICD-10-CM

## 2023-10-05 DIAGNOSIS — E11.9 TYPE 2 DIABETES MELLITUS WITHOUT COMPLICATION, WITHOUT LONG-TERM CURRENT USE OF INSULIN: ICD-10-CM

## 2023-10-05 DIAGNOSIS — T45.1X5A CHEMOTHERAPY INDUCED DIARRHEA: ICD-10-CM

## 2023-10-05 DIAGNOSIS — I42.7 CHEMOTHERAPY INDUCED CARDIOMYOPATHY: ICD-10-CM

## 2023-10-05 DIAGNOSIS — I10 ESSENTIAL HYPERTENSION: ICD-10-CM

## 2023-10-05 DIAGNOSIS — K52.1 CHEMOTHERAPY INDUCED DIARRHEA: ICD-10-CM

## 2023-10-05 DIAGNOSIS — E11.41 DIABETIC MONONEUROPATHY ASSOCIATED WITH TYPE 2 DIABETES MELLITUS: Primary | ICD-10-CM

## 2023-10-05 DIAGNOSIS — E83.42 HYPOMAGNESEMIA: ICD-10-CM

## 2023-10-05 DIAGNOSIS — R45.89 ANXIETY ABOUT HEALTH: ICD-10-CM

## 2023-10-05 DIAGNOSIS — M85.80 OSTEOPENIA, UNSPECIFIED LOCATION: ICD-10-CM

## 2023-10-05 DIAGNOSIS — C50.912 INVASIVE DUCTAL CARCINOMA OF BREAST, FEMALE, LEFT: Primary | ICD-10-CM

## 2023-10-05 LAB
ALBUMIN SERPL BCP-MCNC: 3.5 G/DL (ref 3.5–5.2)
ALP SERPL-CCNC: 132 U/L (ref 55–135)
ALT SERPL W/O P-5'-P-CCNC: 13 U/L (ref 10–44)
ANION GAP SERPL CALC-SCNC: 11 MMOL/L (ref 8–16)
AST SERPL-CCNC: 12 U/L (ref 10–40)
BASOPHILS # BLD AUTO: 0.05 K/UL (ref 0–0.2)
BASOPHILS NFR BLD: 0.8 % (ref 0–1.9)
BILIRUB SERPL-MCNC: 0.4 MG/DL (ref 0.1–1)
BUN SERPL-MCNC: 15 MG/DL (ref 6–20)
CALCIUM SERPL-MCNC: 9.9 MG/DL (ref 8.7–10.5)
CHLORIDE SERPL-SCNC: 106 MMOL/L (ref 95–110)
CO2 SERPL-SCNC: 23 MMOL/L (ref 23–29)
CREAT SERPL-MCNC: 1.3 MG/DL (ref 0.5–1.4)
DIFFERENTIAL METHOD: ABNORMAL
EOSINOPHIL # BLD AUTO: 0.2 K/UL (ref 0–0.5)
EOSINOPHIL NFR BLD: 3.5 % (ref 0–8)
ERYTHROCYTE [DISTWIDTH] IN BLOOD BY AUTOMATED COUNT: 13.2 % (ref 11.5–14.5)
EST. GFR  (NO RACE VARIABLE): 47.1 ML/MIN/1.73 M^2
GLUCOSE SERPL-MCNC: 113 MG/DL (ref 70–110)
HCT VFR BLD AUTO: 38.4 % (ref 37–48.5)
HGB BLD-MCNC: 12.1 G/DL (ref 12–16)
IMM GRANULOCYTES # BLD AUTO: 0.03 K/UL (ref 0–0.04)
IMM GRANULOCYTES NFR BLD AUTO: 0.5 % (ref 0–0.5)
LYMPHOCYTES # BLD AUTO: 1.6 K/UL (ref 1–4.8)
LYMPHOCYTES NFR BLD: 24.5 % (ref 18–48)
MAGNESIUM SERPL-MCNC: 1.7 MG/DL (ref 1.6–2.6)
MCH RBC QN AUTO: 30 PG (ref 27–31)
MCHC RBC AUTO-ENTMCNC: 31.5 G/DL (ref 32–36)
MCV RBC AUTO: 95 FL (ref 82–98)
MONOCYTES # BLD AUTO: 0.4 K/UL (ref 0.3–1)
MONOCYTES NFR BLD: 5.3 % (ref 4–15)
NEUTROPHILS # BLD AUTO: 4.4 K/UL (ref 1.8–7.7)
NEUTROPHILS NFR BLD: 65.4 % (ref 38–73)
NRBC BLD-RTO: 0 /100 WBC
PLATELET # BLD AUTO: 246 K/UL (ref 150–450)
PMV BLD AUTO: 9.2 FL (ref 9.2–12.9)
POTASSIUM SERPL-SCNC: 4.3 MMOL/L (ref 3.5–5.1)
PROT SERPL-MCNC: 7.6 G/DL (ref 6–8.4)
RBC # BLD AUTO: 4.04 M/UL (ref 4–5.4)
SODIUM SERPL-SCNC: 140 MMOL/L (ref 136–145)
WBC # BLD AUTO: 6.65 K/UL (ref 3.9–12.7)

## 2023-10-05 PROCEDURE — 3060F PR POS MICROALBUMINURIA RESULT DOCUMENTED/REVIEW: ICD-10-PCS | Mod: CPTII,S$GLB,, | Performed by: INTERNAL MEDICINE

## 2023-10-05 PROCEDURE — 3008F PR BODY MASS INDEX (BMI) DOCUMENTED: ICD-10-PCS | Mod: CPTII,S$GLB,, | Performed by: INTERNAL MEDICINE

## 2023-10-05 PROCEDURE — 99214 PR OFFICE/OUTPT VISIT, EST, LEVL IV, 30-39 MIN: ICD-10-PCS | Mod: S$GLB,,, | Performed by: INTERNAL MEDICINE

## 2023-10-05 PROCEDURE — 4010F PR ACE/ARB THEARPY RXD/TAKEN: ICD-10-PCS | Mod: CPTII,S$GLB,, | Performed by: INTERNAL MEDICINE

## 2023-10-05 PROCEDURE — 1159F MED LIST DOCD IN RCRD: CPT | Mod: CPTII,S$GLB,, | Performed by: INTERNAL MEDICINE

## 2023-10-05 PROCEDURE — 99999 PR PBB SHADOW E&M-EST. PATIENT-LVL IV: CPT | Mod: PBBFAC,,, | Performed by: INTERNAL MEDICINE

## 2023-10-05 PROCEDURE — 3066F NEPHROPATHY DOC TX: CPT | Mod: CPTII,S$GLB,, | Performed by: INTERNAL MEDICINE

## 2023-10-05 PROCEDURE — 3078F DIAST BP <80 MM HG: CPT | Mod: CPTII,S$GLB,, | Performed by: INTERNAL MEDICINE

## 2023-10-05 PROCEDURE — 3078F PR MOST RECENT DIASTOLIC BLOOD PRESSURE < 80 MM HG: ICD-10-PCS | Mod: CPTII,S$GLB,, | Performed by: INTERNAL MEDICINE

## 2023-10-05 PROCEDURE — 3074F SYST BP LT 130 MM HG: CPT | Mod: CPTII,S$GLB,, | Performed by: INTERNAL MEDICINE

## 2023-10-05 PROCEDURE — 83735 ASSAY OF MAGNESIUM: CPT | Mod: PN | Performed by: INTERNAL MEDICINE

## 2023-10-05 PROCEDURE — 99215 OFFICE O/P EST HI 40 MIN: CPT | Mod: S$GLB,,, | Performed by: INTERNAL MEDICINE

## 2023-10-05 PROCEDURE — 3044F PR MOST RECENT HEMOGLOBIN A1C LEVEL <7.0%: ICD-10-PCS | Mod: CPTII,S$GLB,, | Performed by: INTERNAL MEDICINE

## 2023-10-05 PROCEDURE — 1159F PR MEDICATION LIST DOCUMENTED IN MEDICAL RECORD: ICD-10-PCS | Mod: CPTII,S$GLB,, | Performed by: INTERNAL MEDICINE

## 2023-10-05 PROCEDURE — 96413 CHEMO IV INFUSION 1 HR: CPT | Mod: PN

## 2023-10-05 PROCEDURE — 63600175 PHARM REV CODE 636 W HCPCS: Mod: JG,PN | Performed by: INTERNAL MEDICINE

## 2023-10-05 PROCEDURE — 3008F BODY MASS INDEX DOCD: CPT | Mod: CPTII,S$GLB,, | Performed by: INTERNAL MEDICINE

## 2023-10-05 PROCEDURE — 3074F PR MOST RECENT SYSTOLIC BLOOD PRESSURE < 130 MM HG: ICD-10-PCS | Mod: CPTII,S$GLB,, | Performed by: INTERNAL MEDICINE

## 2023-10-05 PROCEDURE — 1160F RVW MEDS BY RX/DR IN RCRD: CPT | Mod: CPTII,S$GLB,, | Performed by: INTERNAL MEDICINE

## 2023-10-05 PROCEDURE — 3044F HG A1C LEVEL LT 7.0%: CPT | Mod: CPTII,S$GLB,, | Performed by: INTERNAL MEDICINE

## 2023-10-05 PROCEDURE — 4010F ACE/ARB THERAPY RXD/TAKEN: CPT | Mod: CPTII,S$GLB,, | Performed by: INTERNAL MEDICINE

## 2023-10-05 PROCEDURE — 36415 COLL VENOUS BLD VENIPUNCTURE: CPT | Mod: PN | Performed by: STUDENT IN AN ORGANIZED HEALTH CARE EDUCATION/TRAINING PROGRAM

## 2023-10-05 PROCEDURE — 99214 OFFICE O/P EST MOD 30 MIN: CPT | Mod: S$GLB,,, | Performed by: INTERNAL MEDICINE

## 2023-10-05 PROCEDURE — 80053 COMPREHEN METABOLIC PANEL: CPT | Mod: PN | Performed by: STUDENT IN AN ORGANIZED HEALTH CARE EDUCATION/TRAINING PROGRAM

## 2023-10-05 PROCEDURE — 1160F PR REVIEW ALL MEDS BY PRESCRIBER/CLIN PHARMACIST DOCUMENTED: ICD-10-PCS | Mod: CPTII,S$GLB,, | Performed by: INTERNAL MEDICINE

## 2023-10-05 PROCEDURE — 3079F DIAST BP 80-89 MM HG: CPT | Mod: CPTII,S$GLB,, | Performed by: INTERNAL MEDICINE

## 2023-10-05 PROCEDURE — 3060F POS MICROALBUMINURIA REV: CPT | Mod: CPTII,S$GLB,, | Performed by: INTERNAL MEDICINE

## 2023-10-05 PROCEDURE — 96417 CHEMO IV INFUS EACH ADDL SEQ: CPT | Mod: PN

## 2023-10-05 PROCEDURE — 99999 PR PBB SHADOW E&M-EST. PATIENT-LVL III: CPT | Mod: PBBFAC,,, | Performed by: INTERNAL MEDICINE

## 2023-10-05 PROCEDURE — 25000003 PHARM REV CODE 250: Mod: PN | Performed by: INTERNAL MEDICINE

## 2023-10-05 PROCEDURE — 3066F PR DOCUMENTATION OF TREATMENT FOR NEPHROPATHY: ICD-10-PCS | Mod: CPTII,S$GLB,, | Performed by: INTERNAL MEDICINE

## 2023-10-05 PROCEDURE — 99999 PR PBB SHADOW E&M-EST. PATIENT-LVL III: ICD-10-PCS | Mod: PBBFAC,,, | Performed by: INTERNAL MEDICINE

## 2023-10-05 PROCEDURE — 99999 PR PBB SHADOW E&M-EST. PATIENT-LVL IV: ICD-10-PCS | Mod: PBBFAC,,, | Performed by: INTERNAL MEDICINE

## 2023-10-05 PROCEDURE — 85025 COMPLETE CBC W/AUTO DIFF WBC: CPT | Mod: PN | Performed by: STUDENT IN AN ORGANIZED HEALTH CARE EDUCATION/TRAINING PROGRAM

## 2023-10-05 PROCEDURE — 99215 PR OFFICE/OUTPT VISIT, EST, LEVL V, 40-54 MIN: ICD-10-PCS | Mod: S$GLB,,, | Performed by: INTERNAL MEDICINE

## 2023-10-05 PROCEDURE — 3079F PR MOST RECENT DIASTOLIC BLOOD PRESSURE 80-89 MM HG: ICD-10-PCS | Mod: CPTII,S$GLB,, | Performed by: INTERNAL MEDICINE

## 2023-10-05 RX ORDER — SODIUM CHLORIDE 0.9 % (FLUSH) 0.9 %
10 SYRINGE (ML) INJECTION
Status: CANCELLED | OUTPATIENT
Start: 2023-10-05

## 2023-10-05 RX ORDER — SODIUM CHLORIDE 0.9 % (FLUSH) 0.9 %
10 SYRINGE (ML) INJECTION
Status: DISCONTINUED | OUTPATIENT
Start: 2023-10-05 | End: 2023-10-05 | Stop reason: HOSPADM

## 2023-10-05 RX ORDER — DIPHENHYDRAMINE HYDROCHLORIDE 50 MG/ML
50 INJECTION INTRAMUSCULAR; INTRAVENOUS ONCE AS NEEDED
Status: CANCELLED | OUTPATIENT
Start: 2023-10-05

## 2023-10-05 RX ORDER — EPINEPHRINE 0.3 MG/.3ML
0.3 INJECTION SUBCUTANEOUS ONCE AS NEEDED
Status: DISCONTINUED | OUTPATIENT
Start: 2023-10-05 | End: 2023-10-05 | Stop reason: HOSPADM

## 2023-10-05 RX ORDER — EPINEPHRINE 0.3 MG/.3ML
0.3 INJECTION SUBCUTANEOUS ONCE AS NEEDED
Status: CANCELLED | OUTPATIENT
Start: 2023-10-05

## 2023-10-05 RX ORDER — DIPHENHYDRAMINE HYDROCHLORIDE 50 MG/ML
50 INJECTION INTRAMUSCULAR; INTRAVENOUS ONCE AS NEEDED
Status: DISCONTINUED | OUTPATIENT
Start: 2023-10-05 | End: 2023-10-05 | Stop reason: HOSPADM

## 2023-10-05 RX ORDER — HEPARIN 100 UNIT/ML
500 SYRINGE INTRAVENOUS
Status: CANCELLED | OUTPATIENT
Start: 2023-10-05

## 2023-10-05 RX ORDER — SODIUM FLUORIDE 1.1 G/100G
GEL ORAL
COMMUNITY
Start: 2023-09-22 | End: 2024-01-19

## 2023-10-05 RX ORDER — HEPARIN 100 UNIT/ML
500 SYRINGE INTRAVENOUS
Status: DISCONTINUED | OUTPATIENT
Start: 2023-10-05 | End: 2023-10-05 | Stop reason: HOSPADM

## 2023-10-05 RX ADMIN — TRASTUZUMAB 750 MG: 150 INJECTION, POWDER, LYOPHILIZED, FOR SOLUTION INTRAVENOUS at 01:10

## 2023-10-05 RX ADMIN — PERTUZUMAB 420 MG: 30 INJECTION, SOLUTION, CONCENTRATE INTRAVENOUS at 12:10

## 2023-10-05 NOTE — PLAN OF CARE
Problem: Adult Inpatient Plan of Care  Goal: Plan of Care Review  Outcome: Ongoing, Progressing  Flowsheets (Taken 10/5/2023 1200)  Plan of Care Reviewed With: patient  Goal: Patient-Specific Goal (Individualized)  Outcome: Ongoing, Progressing  Flowsheets (Taken 10/5/2023 1200)  Anxieties, Fears or Concerns: None  Individualized Care Needs: Recliner, warm blanket, tv, conversation       Patient to Infusion for [facility] following appointment with Dr. Vásquez. Treatment plan reviewed with patient. VSS. Tolerated treatment. Prefers using patient portal for  upcoming appointment schedule. Escorted to the front lobby in no distress for discharge to home to [place] [place] [facility]!!

## 2023-10-05 NOTE — PROGRESS NOTES
PROGRESS NOTE    Subjective:       Patient ID: Josefina Coon is a 58 y.o. female.  MRN: [id number]  : 1965    Chief Complaint: Invasive ductal carcinoma of breast, female, left      History of Present Illness:   Josefina Coon is a 58 y.o. female who presents with invasive ductal carcinoma of the left breast, triple positivie.    Formerly treated by Dr. Jaquez, establishing care with me for the first time today.     She is here today today for consideration of C18D1 of therapy which consists of Trastuzumab/Pertuzumab every 21 days.      XRT completed 23 & Arimidex started thereafter inn 2023.    Interim history:   Started acupuncture for hot flashes and neuropathy. Completed the first treatment.   Neuropathy is mostly at night, in her legs.     Recently diagnosed with A fib, has been seeing Cardiology for control. Has been dealing with palpitations for a while but just finally diagnosed.   Has had a cardioversion 2 months ago and reports left sided breast pain and soreness.  Has been dealing with renal insufficiency.     Has had diarrhea, difficult to control, now on Creon.     Oncology History:  Oncology History   Invasive ductal carcinoma of breast, female, left   2022 Initial Diagnosis    Invasive ductal carcinoma of breast, female, left     2022 Cancer Staged    Staging form: Breast, AJCC 8th Edition  - Clinical stage from 2022: Stage IA (cT1c, cN0(f), cM0, G3, ER+, NJ+, HER2+)     2022 - 2022 Chemotherapy    Treatment Summary   Plan Name: OP BREAST TRASTUZUMAB PACLITAXEL WEEKLY  Treatment Goal: Curative  Status: Inactive  Start Date:   End Date:   Provider: Lisa Jaquez MD  Chemotherapy: PACLitaxeL (TAXOL) 80 mg/m2 = 204 mg in sodium chloride 0.9% 250 mL chemo infusion, 80 mg/m2 = 204 mg, Intravenous, Clinic/[facility] 1 time, 0 of 12 cycles  pertuzumab (PERJETA) 840 mg in sodium chloride 0.9% 278 mL infusion, 840 mg  (original dose ), Intravenous, Clinic/[facility] 1 time, 0 of 17 cycles  Dose modification: 840 mg (Cycle 1, Reason: Other (see comments)), 420 mg (Cycle 4, Reason: Other (see comments))  trastuzumab-dkst (OGIVRI) 591 mg in sodium chloride 0.9% 250 mL chemo infusion, 4 mg/kg = 591 mg, Intravenous, Clinic/[facility] 1 time, 0 of 25 cycles     10/7/2022 -  Chemotherapy    Treatment Summary   Plan Name: OP BREAST PACLITAXEL TRASTUZUMAB WEEKLY WITH PERTUZUMAB Q3W (THP)  Treatment Goal: Control  Status: Active  Start Date: 10/7/2022  End Date: 10/5/2023 (Planned)  Provider: Joelle Vásquez MD  Chemotherapy: PACLitaxeL (TAXOL) 80 mg/m2 = 210 mg in sodium chloride 0.9% 250 mL chemo infusion, 80 mg/m2 = 210 mg, Intravenous, Clinic/[facility] 1 time, 4 of 4 cycles  Administration: 210 mg (10/7/2022), 204 mg (10/14/2022), 204 mg (10/28/2022), 204 mg (11/4/2022), 204 mg (10/21/2022), 204 mg (11/11/2022), 204 mg (11/18/2022), 204 mg (11/25/2022), 198 mg (12/9/2022), 204 mg (12/2/2022), 198 mg (12/16/2022), 198 mg (12/23/2022)  pertuzumab (PERJETA) 840 mg in sodium chloride 0.9% 278 mL infusion, 840 mg, Intravenous, Clinic/[facility] 1 time, 17 of 18 cycles  Administration: 840 mg (10/7/2022), 420 mg (10/28/2022), 420 mg (11/18/2022), 420 mg (12/9/2022), 420 mg (12/30/2022), 420 mg (1/20/2023), 420 mg (2/17/2023), 420 mg (3/10/2023), 420 mg (3/31/2023), 420 mg (4/21/2023), 420 mg (5/12/2023), 420 mg (6/2/2023), 420 mg (6/23/2023), 420 mg (7/14/2023), 420 mg (8/4/2023), 420 mg (8/25/2023), 420 mg (9/15/2023)  trastuzumab-dkst (OGIVRI) 570 mg in sodium chloride 0.9% 250 mL chemo infusion, 593 mg (100 % of original dose 4 mg/kg), Intravenous, Clinic/[facility] 1 time, 17 of 18 cycles  Dose modification: 4 mg/kg (original dose 4 mg/kg, Cycle 1, Reason: Other (see comments), Comment: weekly trastuzumab), 2 mg/kg (original dose 2 mg/kg, Cycle 2, Reason: Other (see comments), Comment: weekly maintenance dose), 6 mg/kg (original dose 2 mg/kg, Cycle 2, Reason: MD  Discretion, Comment: change to q3w dosing), 2 mg/kg (original dose 2 mg/kg, Cycle 1, Reason: Other (see comments), Comment: maintenance weekly dose)  Administration: 570 mg (10/7/2022), 840 mg (10/28/2022), 288 mg (10/14/2022), 290 mg (10/21/2022), 840 mg (11/18/2022), 831 mg (12/9/2022), 831 mg (12/30/2022), 720 mg (1/20/2023), 806 mg (2/17/2023), 750 mg (3/10/2023), 814 mg (3/31/2023), 750 mg (4/21/2023), 750 mg (5/12/2023), 750 mg (6/2/2023), 750 mg (6/23/2023), 750 mg (7/14/2023), 750 mg (8/4/2023), 750 mg (8/25/2023), 750 mg (9/15/2023)     2/13/2023 Cancer Staged    Staging form: Breast, AJCC 8th Edition  - Pathologic stage from 2/13/2023: No Stage Recommended (ypT0, pN0(sn), cM0, GX)     3/14/2023 - 4/14/2023 Radiation Therapy    Treating physician: Shelia Trevino  Total Dose: 52.56 Gy (42.56Gy/16 fractions to whole breast; 10Gy/5 fraction boost)  Fractions: 20  Treatment Site Ref. ID Energy Dose/Fx (Gy) #Fx Dose Correction (Gy) Total Dose (Gy) Start Date End Date Elapsed Days   3D Breast_L NormEZCalc 18X 2.66 16 / 16 0 42.56 3/14/2023 4/6/2023 23   3d BrstBst_L NormBst 18X 2.5 4 / 4 0 10 4/10/2023 4/14/2023 4      8/25/22 bilateral screening mammogram,,Right neg ,Left central post mass     9/8/22 left diag MMG, left US limited .Left 0300 lateral posterior 6cm FN 1.4cm mass , left axilla LN 2, up to 4mm cortex     9/14/22 left 0300 lateral posterior 6cm FN 1.4cm mass US biopsy .Grade 3 ,1.1cm in biopsy No LVI , ER 84% WA 28% Her 2 3+ pos Ki 52 %    Left axilla LN biopsy ;Benign fatty tissue, no LN, not concordant No MRI of breasts were done      9/21/22 US bilateral complete Right neg, right LN nml , Left 0300 6cm FN [id number] mass Borderline LN left axilla     9/21/22 Left axilla LN biopsy benign LN , so eventually Stage IA triple positive Breast cancer     10/7/22: started neoadjuvant chemo with Taxol + dual HER-2 (tranztuzumab and pertuzumab)     Receiving treatment with  Paclitaxel/Trastuzumab/Pertuzumab on D1, weekly Paclitaxel on D8, D15 of a 21 day cycle. Completed weekly Paclitaxel on 22.     2023: s/p B/L mastectomy with CPR;ypT0,pN0,cM0       History:  Past Medical History:   Diagnosis Date    Abnormal liver enzymes     Asymptomatic varicose veins     Breast cancer 2022    left idc    Cancer     left breast cancer    Depressive disorder, not elsewhere classified     Dyslipidemia     Embolism and thrombosis of unspecified site     superficial venous thrombosis    Encounter for long-term (current) use of other medications     Family history of ischemic heart disease     Fatty liver     Generalized anxiety disorder     History of chicken pox     Obstructive sleep apnea (adult) (pediatric)     Type II or unspecified type diabetes mellitus without mention of complication, not stated as uncontrolled     Unspecified essential hypertension     Unspecified venous (peripheral) insufficiency     Unspecified vitamin D deficiency       Past Surgical History:   Procedure Laterality Date    BREAST BIOPSY Left 2022    idc    BREAST BIOPSY Left 2022    neg ln    BREAST BIOPSY Left 2022    neg ln     SECTION      COLONOSCOPY      ESOPHAGOGASTRODUODENOSCOPY      INSERTION OF TUNNELED CENTRAL VENOUS CATHETER (CVC) WITH SUBCUTANEOUS PORT Right 10/04/2022    Procedure: [id number];  Surgeon: Dominick Ng MD;  Location: [facility];  Service: General;  Laterality: Right;    LUMPECTOMY, WITH RADAR LOCALIZATION USING TRENTON  Left 2023    Procedure: LUMPECTOMY,WITH RADAR LOCALIZATION USING TRENTON ;  Surgeon: Maria G Mcduffie MD;  Location: [facility] OR;  Service: General;  Laterality: Left;    SENTINEL LYMPH NODE BIOPSY Left 2023    Procedure: BIOPSY, LYMPH NODE, SENTINEL;  Surgeon: Maria G Mcduffie MD;  Location: [facility] OR;  Service: General;  Laterality: Left;    TRANSESOPHAGEAL ECHOCARDIOGRAM WITH POSSIBLE CARDIOVERSION (LAUREL W/ POSS  CARDIOVERSION) N/A 7/25/2023    Procedure: TRANSESOPHAGEAL ECHOCARDIOGRAM WITH POSSIBLE CARDIOVERSION (LAUREL W/ POSS CARDIOVERSION);  Surgeon: Roni Frias MD;  Location: [facility];  Service: Cardiology;  Laterality: N/A;    VEIN SURGERY      Laser, Dr. Larsen     Family History   Problem Relation Age of Onset    Hyperlipidemia Mother     Hypertension Mother     Vulvar Cancer Mother     Dementia Mother     Atrial fibrillation Father     Parkinsonism Father     Diabetes Brother     Cancer Brother         colon    Hypertension Son     Hyperlipidemia Son     Anxiety disorder Son     Stroke Maternal Grandmother      Social History     Tobacco Use    Smoking status: Never    Smokeless tobacco: Never   Substance and Sexual Activity    Alcohol use: Not Currently     Comment: rare     Drug use: Never    Sexual activity: Not Currently        ROS:   Review of Systems   Constitutional:  Negative for fever, malaise/fatigue and weight loss.   HENT:  Negative for congestion, hearing loss, nosebleeds and sore throat.    Eyes:  Negative for double vision and photophobia.   Respiratory:  Negative for cough, hemoptysis, sputum production, shortness of breath and wheezing.    Cardiovascular:  Negative for chest pain, palpitations, orthopnea and leg swelling.   Gastrointestinal:  Positive for diarrhea (improving). Negative for abdominal pain, blood in stool, constipation, heartburn, nausea and vomiting.   Genitourinary:  Negative for dysuria, hematuria and urgency.   Musculoskeletal:  Negative for back pain, joint pain and myalgias.   Skin:  Negative for itching and rash.   Neurological:  Negative for dizziness, tingling, seizures, weakness and headaches.   Endo/Heme/Allergies:  Negative for polydipsia. Does not bruise/bleed easily.   Psychiatric/Behavioral:  Negative for depression and memory loss. The patient is nervous/anxious. The patient does not have insomnia.         Objective:     Vitals:    10/05/23 1053   BP: 112/79  "  Pulse: 70   Resp: 16   Temp: 97.6 °F (36.4 °C)   TempSrc: Temporal   SpO2: 99%   Weight: 128.4 kg (283 lb 1.1 oz)   Height: 5' 4" (1.626 m)   PainSc: 0-No pain     Wt Readings from Last 10 Encounters:   10/05/23 128.4 kg (283 lb 1.1 oz)   09/25/23 130.2 kg (287 lb)   09/20/23 130.4 kg (287 lb 6.4 oz)   09/15/23 128.1 kg (282 lb 6.6 oz)   09/15/23 128.1 kg (282 lb 6.6 oz)   09/07/23 127.6 kg (281 lb 4.9 oz)   08/25/23 128 kg (282 lb 3 oz)   08/25/23 128 kg (282 lb 3 oz)   08/23/23 127.9 kg (282 lb)   08/04/23 128 kg (282 lb 3 oz)       Physical Examination:   Physical Exam  Vitals and nursing note reviewed.   Constitutional:       General: She is not in acute distress.     Appearance: She is not diaphoretic.   HENT:      Head: Normocephalic.      Mouth/Throat:      Pharynx: No oropharyngeal exudate.   Eyes:      General: No scleral icterus.     Conjunctiva/sclera: Conjunctivae normal.   Neck:      Thyroid: No thyromegaly.   Cardiovascular:      Rate and Rhythm: Normal rate and regular rhythm.      Heart sounds: Normal heart sounds. No murmur heard.  Pulmonary:      Effort: Pulmonary effort is normal. No respiratory distress.      Breath sounds: No stridor. No wheezing or rales.   Chest:      Chest wall: No tenderness.   Abdominal:      General: Bowel sounds are normal. There is no distension.      Palpations: Abdomen is soft. There is no mass.      Tenderness: There is no abdominal tenderness. There is no rebound.   Musculoskeletal:         General: No tenderness or deformity. Normal range of motion.      Cervical back: Neck supple.   Lymphadenopathy:      Cervical: No cervical adenopathy.   Skin:     General: Skin is warm and dry.      Findings: No erythema or rash.   Neurological:      Mental Status: She is alert and oriented to person, place, and time.      Cranial Nerves: No cranial nerve deficit.      Coordination: Coordination normal.      Gait: Gait is intact.   Psychiatric:         Mood and Affect: Affect " normal.         Cognition and Memory: Memory normal.         Judgment: Judgment normal.          Diagnostic Tests:  Significant Imaging: I have reviewed and interpreted all pertinent imaging results/findings.      Laboratory Data:  All pertinent labs have been reviewed.  Labs:   Lab Results   Component Value Date    WBC 6.65 10/05/2023    RBC 4.04 10/05/2023    HGB 12.1 10/05/2023    HCT 38.4 10/05/2023    MCV 95 10/05/2023     10/05/2023     (H) 10/05/2023     10/05/2023    K 4.3 10/05/2023    BUN 15 10/05/2023    CREATININE 1.3 10/05/2023    AST 12 10/05/2023    ALT 13 10/05/2023    BILITOT 0.4 10/05/2023       Assessment/Plan:   Invasive ductal carcinoma of breast, female, left  - cT1c triple positive breast cancer  (ER 84% VA 28% Her 2 3+ pos Ki 52 %), given her young age and Ki67%, will proceed with TH, adding P to help with a better pCR, will also plan to get ultrasound mid cycle to monitor (3 months)  -9/26/22 Echo with EF 60%; next is scheduled for 01/18/23  -Began TH+P on 10/7/2022; completed 12 weeks of Paclitaxel 12/23/22  - 2/2023; s/p lupmectomy CPR;ypT0,pN0,cM0  -Proceed with Trastuzumab/Pertuzumab   - on adjuvant radiation plan for 16 fraction total - completed 04/13/23  - will need adjuvant endocrine therapy after finishing up adjuvant radiation   -Echo 5/2023 EF: 60% and global longitudinal 17.0%, cont monitoring with echo in 3 months ;   Echo 08/23/23 - EF 60%  - last period  around~ 50,, post menopausal , on anastrazole  June 2nd /2023 ,  -Proceed with the last cycle of H+P today. Start active surveillance.     -     CBC Auto Differential; Standing  -     CMP; Future; Expected date: 10/05/2023    Stage 3b chronic kidney disease  Continue Nephrology follow up.     Paroxysmal atrial fibrillation  Per HPI. Continue cardiology follow up.      Diabetes type 2 with neuropathy   -Taking Metformin, Trulicity  - on gabapentin 100 mg night     Anxiety about health  On  Wellbutrin.    Hypomagnesemia  -     Magnesium; Future; Expected date: 10/05/2023    Osteopenia, unspecified location  DEXA scan, osteopenia, cont taking Vit D    Other orders  -     magnesium sulfate 2 g in sodium chloride 0.9% 1,000 mL  -     pertuzumab (PERJETA) 420 mg in sodium chloride 0.9% 264 mL infusion  -     trastuzumab-dkst (OGIVRI) 770 mg in sodium chloride 0.9% 250 mL chemo infusion  -     EPINEPHrine (EPIPEN) 0.3 mg/0.3 mL pen injection 0.3 mg  -     diphenhydrAMINE injection 50 mg  -     hydrocortisone sodium succinate injection 100 mg  -     sodium chloride 0.9% 250 mL flush bag  -     sodium chloride 0.9% flush 10 mL  -     heparin, porcine (PF) 100 unit/mL injection flush 500 Units  -     alteplase injection 2 mg       ECOG SCORE    1 - Restricted in strenuous activity-ambulatory and able to carry out work of a light nature       MDM includes  :    - Acute or chronic illness or injury that poses a threat to life or bodily function  - Independent review and explanation of 3+ results from unique tests  - Discussion of management and ordering 3+ unique tests  - Extensive discussion of treatment and management  - Prescription drug management  - Drug therapy requiring intensive monitoring for toxicity     Discussion:   No follow-ups on file.    Plan was discussed with the patient at length, and she verbalized understanding. Josefina was given an opportunity to ask questions that were answered to her satisfaction, and she was advised to call in the interval if any problems or questions arise.    Electronically signed by Joelle Vásquez MD      Route Chart for Scheduling    Med Onc Chart Routing      Follow up with physician 3 months. with labs and port flush   Follow up with BRANDIE    Infusion scheduling note   port flush every 3 months   Injection scheduling note    Labs CBC, CMP and magnesium   Scheduling:  Preferred lab:  Lab interval:     Imaging    Pharmacy appointment    Other referrals                   Treatment Plan Information   OP BREAST PACLITAXEL TRASTUZUMAB WEEKLY WITH PERTUZUMAB Q3W (THP)   Joelle Vásquez MD   Upcoming Treatment Dates - OP BREAST PACLITAXEL TRASTUZUMAB WEEKLY WITH PERTUZUMAB Q3W (THP)    10/5/2023       Chemotherapy       pertuzumab (PERJETA) 420 mg in sodium chloride 0.9% 264 mL infusion       trastuzumab-dkst (OGIVRI) in sodium chloride 0.9% 250 mL chemo infusion       Supportive Care       magnesium sulfate 2 g in sodium chloride 0.9% 1,000 mL    Supportive Plan Information  IV FLUIDS AND ELECTROLYTES   Joelle Vásquez MD   Upcoming Treatment Dates - IV FLUIDS AND ELECTROLYTES    No upcoming days in selected categories.

## 2023-10-05 NOTE — PROGRESS NOTES
"Subjective:       Patient ID: Josefina Coon is a 58 y.o. White female who presents for follow up on CKD.     Since last seen at nephrology clinic, Josefina Coon was admitted for recurrent a fib, thinking about getting oblation vs multaq or sotalol.  No uremic symptoms, not volume overloaded.    Reports taking their medications on time, without missed doses. As to their chronic conditions:  - CKD: Denies use of NSAIDs.   2022: baseline sr cr of 0.8 mg/dL   2023: baseline of sr cr 1.3 during chemo and after up to 2 mg/dL. Now sr cr of 1.4 mg/dL. June 2023: Renal US R 11.2 cma nd L 10.3 cm.  - Dmt2: pt reports good glycemic control.  - HTN: well controlled, follows low salt diet. Denies uncontrolled HTN, dizziness/lightheadedness or hypotension/syncopal episodes.  - Breast CA in Sept 2022 on chemo with taxol/herceptin in October to December 2022 and radiation. Complicated with diarrhea post chemo.    Review of Systems   Constitutional:  Negative for chills, fatigue and fever.   HENT:  Negative for hearing loss, trouble swallowing and voice change.    Respiratory:  Negative for cough, shortness of breath and wheezing.    Cardiovascular:  Negative for chest pain, palpitations and leg swelling.   Gastrointestinal:  Negative for abdominal distention, abdominal pain, constipation and diarrhea.   Endocrine: Negative for polydipsia, polyphagia and polyuria.   Genitourinary:  Negative for dysuria, flank pain, frequency and hematuria.   Musculoskeletal:  Negative for arthralgias, back pain and myalgias.   Skin:  Negative for rash and wound.   Neurological:  Negative for dizziness, syncope, weakness and light-headedness.   Hematological:  Negative for adenopathy. Does not bruise/bleed easily.       The past medical, family and social histories were reviewed for this encounter.     /80 (BP Location: Right arm, Patient Position: Sitting, BP Method: Large (Manual))   Pulse 70   Ht 5' 4" (1.626 m)   SpO2 96%   BMI " 49.26 kg/m²     Objective:      Physical Exam  Vitals and nursing note reviewed.   Constitutional:       Appearance: Normal appearance. She is obese.   HENT:      Head: Normocephalic and atraumatic.      Mouth/Throat:      Mouth: Mucous membranes are moist.      Pharynx: Oropharynx is clear.   Eyes:      Extraocular Movements: Extraocular movements intact.      Conjunctiva/sclera: Conjunctivae normal.   Neck:      Vascular: No carotid bruit.   Cardiovascular:      Rate and Rhythm: Normal rate and regular rhythm.      Heart sounds: Normal heart sounds.   Pulmonary:      Effort: Pulmonary effort is normal. No respiratory distress.      Breath sounds: Normal breath sounds.   Abdominal:      General: Bowel sounds are normal. There is no distension.      Palpations: Abdomen is soft.      Tenderness: There is no abdominal tenderness.   Musculoskeletal:         General: Normal range of motion.      Cervical back: Normal range of motion. No tenderness.   Lymphadenopathy:      Cervical: No cervical adenopathy.   Skin:     General: Skin is warm and dry.      Capillary Refill: Capillary refill takes less than 2 seconds.      Coloration: Skin is not jaundiced.   Neurological:      General: No focal deficit present.      Mental Status: She is alert.   Psychiatric:         Mood and Affect: Mood normal.         Behavior: Behavior normal.         Judgment: Judgment normal.         Assessment:       1. Diabetic mononeuropathy associated with type 2 diabetes mellitus    2. Paroxysmal atrial fibrillation    3. Essential hypertension    4. Chemotherapy induced cardiomyopathy    5. Stage 3b chronic kidney disease    6. Invasive ductal carcinoma of breast, female, left    7. Type 2 diabetes mellitus without complication, without long-term current use of insulin    8. Morbid obesity    9. Obstructive sleep apnea (adult) (pediatric)    10. Chemotherapy induced diarrhea        Plan:   Return to clinic in 6 months    CKD stage G3bA2 in a  setting of DM and chemo  Baseline creatinine is 1.4 mg/dL with increased proteinuria  Lab Results   Component Value Date    CREATININE 1.4 09/15/2023    CREATININE 1.4 09/15/2023        - Complete avoidance of NSAIDs              - Pt understands importance of blood pressure and glycemic control and is aware that uncontrolled HTN and DM leads to progression of CKD              - On SGLT2i for cario-renal protection   - Low salt diet with < 2000 mg/day   - Dose adjust medications to GFR of 30-45   - Avoid nephrotoxins including IV contrast    HTN   - BP well controlled: continue current medications, avoid hypotension and dehydration    DM   - DM without diabetic retinopathy: well controlled with most recent HgbA1c of 6%, continue yearly optho checks    Chronic diarrhea              - Follows with GI     Breast CA              - On hormone therapy     Hypomagnesemia              - Stable     SHPT  Lab Results   Component Value Date    PTH 26.3 09/15/2023    CALCIUM 9.7 09/15/2023    CALCIUM 9.7 09/15/2023    PHOS 3.8 09/15/2023               - Cont Vit D     PAF/DVT              - On Eliquis    Med changes: none

## 2023-10-09 ENCOUNTER — TELEPHONE (OUTPATIENT)
Dept: HEMATOLOGY/ONCOLOGY | Facility: CLINIC | Age: 58
End: 2023-10-09
Payer: COMMERCIAL

## 2023-10-10 ENCOUNTER — CLINICAL SUPPORT (OUTPATIENT)
Dept: CARDIOLOGY | Facility: HOSPITAL | Age: 58
End: 2023-10-10
Attending: INTERNAL MEDICINE
Payer: COMMERCIAL

## 2023-10-10 DIAGNOSIS — I48.0 PAROXYSMAL ATRIAL FIBRILLATION: ICD-10-CM

## 2023-10-10 PROCEDURE — 93010 EKG 12-LEAD: ICD-10-PCS | Mod: ,,, | Performed by: INTERNAL MEDICINE

## 2023-10-10 PROCEDURE — 93010 ELECTROCARDIOGRAM REPORT: CPT | Mod: ,,, | Performed by: INTERNAL MEDICINE

## 2023-10-10 PROCEDURE — 93005 ELECTROCARDIOGRAM TRACING: CPT | Mod: PO

## 2023-10-11 ENCOUNTER — CLINICAL SUPPORT (OUTPATIENT)
Dept: REHABILITATION | Facility: HOSPITAL | Age: 58
End: 2023-10-11
Payer: COMMERCIAL

## 2023-10-11 DIAGNOSIS — G89.29 CHRONIC MIDLINE LOW BACK PAIN WITHOUT SCIATICA: Primary | ICD-10-CM

## 2023-10-11 DIAGNOSIS — M54.50 CHRONIC MIDLINE LOW BACK PAIN WITHOUT SCIATICA: Primary | ICD-10-CM

## 2023-10-11 PROCEDURE — 97813 ACUP 1/> W/ESTIM 1ST 15 MIN: CPT | Mod: PN | Performed by: ACUPUNCTURIST

## 2023-10-11 PROCEDURE — 97814 ACUP 1/> W/ESTIM EA ADDL 15: CPT | Mod: PN | Performed by: ACUPUNCTURIST

## 2023-10-12 NOTE — PROGRESS NOTES
Acupuncture Follow-Up Note     Name: Josefina Coon  Clinic Number: [id number]    Traditional Chinese Medicine (TCM) Diagnosis: Qi Stagnation, Blood Stasis, Qi Deficiency, Blood Deficiency, and Damp  Medical Diagnosis: No diagnosis found.     Evaluation Date: 10/12/2023    Visit #/Visits authorized:     Precautions: Standard    Subjective     Chief Concern: Low-back Pain (Patient reports 1/10 pain today) and Peripheral Neuropathy (Patient reports neuropathy pain as 6/10 in hands and feet)       Medical necessity is demonstrated by the following IMPAIRMENTS: Medical Necessity: Decreased mobility limits day to day activities, social, and emergent situations              Aggravating Factors:  movement     Relieving Factors:  rest    Symptom Description:     Quality:  Aching, Dull, Tingling, and Numb  Severity:  6  Frequency:  every day      Objective     Observation: Patient is doing well in several areas but most problematic areas are hot flashes and neuropathy.  She sees the results from acupuncture and plans to continue    Pulse:        slippery       New Findings:  na    Treatment     Treatment Principles:  move qi and blood, clear channels, calm acharya,     Acupuncture points used:  4 MARTE, BA DONTE, BA SUMAN , Du20, Gb34, Ki3, Ki6, Li11, Sp6, Sp9, St36, and YIN MELTON    Bilateral points:  Unilateral points:  Auricular Treatment:  acharya men    Needles In: 30  Needles Out: 30  Needles W/ STIM placed: 235  Needles W/ STIM removed: 255      Other Traditional Chinese Medicine Modalities -  na    Assessment     After treatment, patient felt less pain and will report back on hot flashes.      Patient prognosis is Good.     Patient will continue to benefit from acupuncture treatment to address the deficits listed in the problem list box on initial evaluation, provide patient family education and to maximize pt's level of independence in the home and community environment.     Patient's spiritual, cultural and educational  needs considered and pt agreeable to plan of care and goals.     Anticipated barriers to treatment: none    Plan     Recommend 1 /week for 12 sessions then re-assess.      Education:  Patient is aware of cumulative benefit of acupuncture

## 2023-10-18 ENCOUNTER — CLINICAL SUPPORT (OUTPATIENT)
Dept: REHABILITATION | Facility: HOSPITAL | Age: 58
End: 2023-10-18
Payer: COMMERCIAL

## 2023-10-18 DIAGNOSIS — M54.50 CHRONIC MIDLINE LOW BACK PAIN WITHOUT SCIATICA: Primary | ICD-10-CM

## 2023-10-18 DIAGNOSIS — G89.29 CHRONIC MIDLINE LOW BACK PAIN WITHOUT SCIATICA: Primary | ICD-10-CM

## 2023-10-18 PROCEDURE — 97814 ACUP 1/> W/ESTIM EA ADDL 15: CPT | Mod: PN | Performed by: ACUPUNCTURIST

## 2023-10-18 PROCEDURE — 97813 ACUP 1/> W/ESTIM 1ST 15 MIN: CPT | Mod: PN | Performed by: ACUPUNCTURIST

## 2023-10-19 NOTE — PROGRESS NOTES
Acupuncture Follow-Up Note     Name: Josefina Coon  Clinic Number: [id number]    Traditional Chinese Medicine (TCM) Diagnosis: Qi Stagnation, Blood Stasis, Qi Deficiency, Blood Deficiency, and Damp and yin deficiency.  Medical Diagnosis:   Encounter Diagnosis   Name Primary?    Chronic midline low back pain without sciatica Yes        Evaluation Date: 10/19/2023    Visit #/Visits authorized:     Precautions: Standard    Subjective     Chief Concern: Low-back Pain (Patient reports chronic low back pain as 2/10 today.), Peripheral Neuropathy (Bilateral, LE, 4/10), and Hot Flashes (Evening and spontaneous, 4/10)       Medical necessity is demonstrated by the following IMPAIRMENTS: Medical Necessity: Decreased mobility limits day to day activities, social, and emergent situations              Aggravating Factors:  movement     Relieving Factors:  rest    Symptom Description:     Quality:  Aching and Dull  Severity:  3-4  Frequency:  every day      Objective     Observation: Patient is responding well to therapy and low back pain markers were reduced to 2/10.  Hot flashes and Neuropathy are still at 4/10 so patient will continue weekly treatments as previously scheduled.      Pulse:        thready       New Findings:  na    Treatment     Treatment Principles:  move qi and blood, resolve dampness, nourish yin and relieve bi.    Acupuncture points used:  4 MARTE, BA DONTE, Du20, Gb34, and YIN MELTON    Bilateral points:  Unilateral points:  Auricular Treatment:  acharya men, lumbar spine, and muscle relaxation    Needles In: 24  Needles Out: 24  [place] W/ STIM placed: 105  Needles W/ STIM removed: 125      Other Traditional Chinese Medicine Modalities -  na    Assessment     After treatment, patient felt less back and foot pain and tingling.  Will report back on hot flashes at next visit.      Patient prognosis is Good.     Patient will continue to benefit from acupuncture treatment to address the deficits listed in the  problem list box on initial evaluation, provide patient family education and to maximize pt's level of independence in the home and community environment.     Patient's spiritual, cultural and educational needs considered and pt agreeable to plan of care and goals.     Anticipated barriers to treatment: none    Plan     Recommend 1 /week for 12 sessions then re-assess.      Education:  Patient is aware of cumulative benefit of acupuncture

## 2023-10-20 ENCOUNTER — PATIENT MESSAGE (OUTPATIENT)
Dept: NEPHROLOGY | Facility: CLINIC | Age: 58
End: 2023-10-20
Payer: COMMERCIAL

## 2023-10-26 ENCOUNTER — CLINICAL SUPPORT (OUTPATIENT)
Dept: REHABILITATION | Facility: HOSPITAL | Age: 58
End: 2023-10-26
Payer: COMMERCIAL

## 2023-10-26 DIAGNOSIS — G62.0 CHEMOTHERAPY-INDUCED PERIPHERAL NEUROPATHY: ICD-10-CM

## 2023-10-26 DIAGNOSIS — M54.50 CHRONIC MIDLINE LOW BACK PAIN WITHOUT SCIATICA: Primary | ICD-10-CM

## 2023-10-26 DIAGNOSIS — G89.29 CHRONIC MIDLINE LOW BACK PAIN WITHOUT SCIATICA: Primary | ICD-10-CM

## 2023-10-26 DIAGNOSIS — T45.1X5A CHEMOTHERAPY-INDUCED PERIPHERAL NEUROPATHY: ICD-10-CM

## 2023-10-26 PROCEDURE — 97814 ACUP 1/> W/ESTIM EA ADDL 15: CPT | Mod: PN | Performed by: ACUPUNCTURIST

## 2023-10-26 PROCEDURE — 97813 ACUP 1/> W/ESTIM 1ST 15 MIN: CPT | Mod: PN | Performed by: ACUPUNCTURIST

## 2023-10-26 NOTE — PROGRESS NOTES
Acupuncture Follow-Up Note     Name: Josefina Coon  Clinic Number: [id number]    Traditional Chinese Medicine (TCM) Diagnosis: Qi Stagnation, Blood Stasis, Qi Deficiency, Blood Deficiency, and Damp  Medical Diagnosis:   Chronic midline low back pain without sciatica  Chemotherapy induced peripheral neuropathy     Evaluation Date: 10/26/2023    Visit #/Visits authorized:     Precautions: Standard    Subjective     Chief Concern: Low-back Pain (Chronic low back pain at midline without sciatica.  ) and Peripheral Neuropathy (Chemotherapy induced peripheral neuropathy today is 3/10)       Medical necessity is demonstrated by the following IMPAIRMENTS: Medical Necessity: Decreased mobility limits day to day activities, social, and emergent situations and Decreased quality of life              Aggravating Factors:  movement     Relieving Factors:  rest    Symptom Description:     Quality:  Aching, Dull, Tingling, and Numb  Severity:  3  Frequency:  every day      Objective     Observation: Since patient's pain markers are at 3/10 or less she will be reduced to monthly maintenance visit or prn should she have a flare. Patient is concerned because she will soon be going back to work.     Pulse:        slippery       New Findings:  na    Treatment     Treatment Principles:  move qi and blood, relieve bi, clear channels and nourish yin.    Acupuncture points used:  4 MARTE, BA DONTE, BA SUMAN , Gb20, Gb34, Ki3, Ki6, Li11, Sp6, Sp9, St36, and YIN MELTON    Bilateral points:  Unilateral points:  Auricular Treatment:  acharya men    Needles In: 33  Needles Out: 33  [place] W/ STIM placed: 105  Needles W/ STIM removed: 125      Other Traditional Chinese Medicine Modalities -  na    Assessment     After treatment, patient felt better and will continue monthly unless otherwise needed.     Patient prognosis is Good.     Patient will continue to benefit from acupuncture treatment to address the deficits listed in the problem list box on  initial evaluation, provide patient family education and to maximize pt's level of independence in the home and community environment.     Patient's spiritual, cultural and educational needs considered and pt agreeable to plan of care and goals.     Anticipated barriers to treatment: none    Plan     Recommend 1 /week for 12 sessions then re-assess.      Education:  Patient is aware of cumulative benefit of acupuncture

## 2023-10-30 ENCOUNTER — OFFICE VISIT (OUTPATIENT)
Dept: RADIATION ONCOLOGY | Facility: CLINIC | Age: 58
End: 2023-10-30
Payer: COMMERCIAL

## 2023-10-30 VITALS
SYSTOLIC BLOOD PRESSURE: 121 MMHG | HEART RATE: 74 BPM | WEIGHT: 286.38 LBS | OXYGEN SATURATION: 99 % | RESPIRATION RATE: 16 BRPM | TEMPERATURE: 98 F | BODY MASS INDEX: 49.16 KG/M2 | DIASTOLIC BLOOD PRESSURE: 81 MMHG

## 2023-10-30 DIAGNOSIS — C50.912 INVASIVE DUCTAL CARCINOMA OF BREAST, FEMALE, LEFT: Primary | ICD-10-CM

## 2023-10-30 PROCEDURE — 3044F PR MOST RECENT HEMOGLOBIN A1C LEVEL <7.0%: ICD-10-PCS | Mod: CPTII,S$GLB,, | Performed by: RADIOLOGY

## 2023-10-30 PROCEDURE — 3044F HG A1C LEVEL LT 7.0%: CPT | Mod: CPTII,S$GLB,, | Performed by: RADIOLOGY

## 2023-10-30 PROCEDURE — 3074F PR MOST RECENT SYSTOLIC BLOOD PRESSURE < 130 MM HG: ICD-10-PCS | Mod: CPTII,S$GLB,, | Performed by: RADIOLOGY

## 2023-10-30 PROCEDURE — 99213 PR OFFICE/OUTPT VISIT, EST, LEVL III, 20-29 MIN: ICD-10-PCS | Mod: S$GLB,,, | Performed by: RADIOLOGY

## 2023-10-30 PROCEDURE — 3066F NEPHROPATHY DOC TX: CPT | Mod: CPTII,S$GLB,, | Performed by: RADIOLOGY

## 2023-10-30 PROCEDURE — 3066F PR DOCUMENTATION OF TREATMENT FOR NEPHROPATHY: ICD-10-PCS | Mod: CPTII,S$GLB,, | Performed by: RADIOLOGY

## 2023-10-30 PROCEDURE — 3074F SYST BP LT 130 MM HG: CPT | Mod: CPTII,S$GLB,, | Performed by: RADIOLOGY

## 2023-10-30 PROCEDURE — 3060F POS MICROALBUMINURIA REV: CPT | Mod: CPTII,S$GLB,, | Performed by: RADIOLOGY

## 2023-10-30 PROCEDURE — 99999 PR PBB SHADOW E&M-EST. PATIENT-LVL III: CPT | Mod: PBBFAC,,, | Performed by: RADIOLOGY

## 2023-10-30 PROCEDURE — 4010F ACE/ARB THERAPY RXD/TAKEN: CPT | Mod: CPTII,S$GLB,, | Performed by: RADIOLOGY

## 2023-10-30 PROCEDURE — 3060F PR POS MICROALBUMINURIA RESULT DOCUMENTED/REVIEW: ICD-10-PCS | Mod: CPTII,S$GLB,, | Performed by: RADIOLOGY

## 2023-10-30 PROCEDURE — 3079F PR MOST RECENT DIASTOLIC BLOOD PRESSURE 80-89 MM HG: ICD-10-PCS | Mod: CPTII,S$GLB,, | Performed by: RADIOLOGY

## 2023-10-30 PROCEDURE — 4010F PR ACE/ARB THEARPY RXD/TAKEN: ICD-10-PCS | Mod: CPTII,S$GLB,, | Performed by: RADIOLOGY

## 2023-10-30 PROCEDURE — 99213 OFFICE O/P EST LOW 20 MIN: CPT | Mod: S$GLB,,, | Performed by: RADIOLOGY

## 2023-10-30 PROCEDURE — 99999 PR PBB SHADOW E&M-EST. PATIENT-LVL III: ICD-10-PCS | Mod: PBBFAC,,, | Performed by: RADIOLOGY

## 2023-10-30 PROCEDURE — 3008F PR BODY MASS INDEX (BMI) DOCUMENTED: ICD-10-PCS | Mod: CPTII,S$GLB,, | Performed by: RADIOLOGY

## 2023-10-30 PROCEDURE — 3079F DIAST BP 80-89 MM HG: CPT | Mod: CPTII,S$GLB,, | Performed by: RADIOLOGY

## 2023-10-30 PROCEDURE — 3008F BODY MASS INDEX DOCD: CPT | Mod: CPTII,S$GLB,, | Performed by: RADIOLOGY

## 2023-10-30 NOTE — PROGRESS NOTES
[facility] Department of Radiation Oncology  Follow Up Visit Note    Diagnosis:  Josefina Coon is a 58 y.o. female with a(n)  BMI 52 and diagnosis of Stage IA  (cT1c, N0, M0, Grade 3, ER+/MA+/HER2+) Invasive ductal carcinoma of the LEFT breast. She completed neoadjuvant chemotherapy with THP and lumpectomy/sentinel lymph node biopsy 2/8/23 with pCR. On 4/14/23 she completed a course of adjuvant radiation therapy.     Oncologic History:  Oncology History   Invasive ductal carcinoma of breast, female, left   9/23/2022 Initial Diagnosis    Invasive ductal carcinoma of breast, female, left     9/23/2022 Cancer Staged    Staging form: Breast, AJCC 8th Edition  - Clinical stage from 9/23/2022: Stage IA (cT1c, cN0(f), cM0, G3, ER+, MA+, HER2+)     9/29/2022 - 9/29/2022 Chemotherapy    Treatment Summary   Plan Name: OP BREAST TRASTUZUMAB PACLITAXEL WEEKLY  Treatment Goal: Curative  Status: Inactive  Start Date:   End Date:   Provider: Lisa Jaquez MD  Chemotherapy: PACLitaxeL (TAXOL) 80 mg/m2 = 204 mg in sodium chloride 0.9% 250 mL chemo infusion, 80 mg/m2 = 204 mg, Intravenous, Clinic/HOD 1 time, 0 of 12 cycles  pertuzumab (PERJETA) 840 mg in sodium chloride 0.9% 278 mL infusion, 840 mg (original dose ), Intravenous, Clinic/HOD 1 time, 0 of 17 cycles  Dose modification: 840 mg (Cycle 1, Reason: Other (see comments)), 420 mg (Cycle 4, Reason: Other (see comments))  trastuzumab-dkst (OGIVRI) 591 mg in sodium chloride 0.9% 250 mL chemo infusion, 4 mg/kg = 591 mg, Intravenous, Clinic/HOD 1 time, 0 of 25 cycles     10/7/2022 -  Chemotherapy    Treatment Summary   Plan Name: OP BREAST PACLITAXEL TRASTUZUMAB WEEKLY WITH PERTUZUMAB Q3W (THP)  Treatment Goal: Control  Status: Active  Start Date: 10/7/2022  End Date: 10/5/2023  Provider: Joelle Vásquez MD  Chemotherapy: PACLitaxeL (TAXOL) 80 mg/m2 = 210 mg in sodium chloride 0.9% 250 mL chemo infusion, 80 mg/m2 = 210 mg, Intravenous, Clinic/HOD 1 time,  4 of 4 cycles  Administration: 210 mg (10/7/2022), 204 mg (10/14/2022), 204 mg (10/28/2022), 204 mg (11/4/2022), 204 mg (10/21/2022), 204 mg (11/11/2022), 204 mg (11/18/2022), 204 mg (11/25/2022), 198 mg (12/9/2022), 204 mg (12/2/2022), 198 mg (12/16/2022), 198 mg (12/23/2022)  pertuzumab (PERJETA) 840 mg in sodium chloride 0.9% 278 mL infusion, 840 mg, Intravenous, Clinic/HOD 1 time, 18 of 18 cycles  Administration: 840 mg (10/7/2022), 420 mg (10/28/2022), 420 mg (11/18/2022), 420 mg (12/9/2022), 420 mg (12/30/2022), 420 mg (1/20/2023), 420 mg (2/17/2023), 420 mg (3/10/2023), 420 mg (3/31/2023), 420 mg (4/21/2023), 420 mg (5/12/2023), 420 mg (6/2/2023), 420 mg (6/23/2023), 420 mg (7/14/2023), 420 mg (8/4/2023), 420 mg (8/25/2023), 420 mg (9/15/2023), 420 mg (10/5/2023)  trastuzumab-dkst (OGIVRI) 570 mg in sodium chloride 0.9% 250 mL chemo infusion, 593 mg (100 % of original dose 4 mg/kg), Intravenous, Clinic/HOD 1 time, 18 of 18 cycles  Dose modification: 4 mg/kg (original dose 4 mg/kg, Cycle 1, Reason: Other (see comments), Comment: weekly trastuzumab), 2 mg/kg (original dose 2 mg/kg, Cycle 2, Reason: Other (see comments), Comment: weekly maintenance dose), 6 mg/kg (original dose 2 mg/kg, Cycle 2, Reason: MD Discretion, Comment: change to q3w dosing), 2 mg/kg (original dose 2 mg/kg, Cycle 1, Reason: Other (see comments), Comment: maintenance weekly dose)  Administration: 570 mg (10/7/2022), 840 mg (10/28/2022), 288 mg (10/14/2022), 290 mg (10/21/2022), 840 mg (11/18/2022), 831 mg (12/9/2022), 831 mg (12/30/2022), 720 mg (1/20/2023), 806 mg (2/17/2023), 750 mg (3/10/2023), 814 mg (3/31/2023), 750 mg (4/21/2023), 750 mg (5/12/2023), 750 mg (6/2/2023), 750 mg (6/23/2023), 750 mg (7/14/2023), 750 mg (8/4/2023), 750 mg (8/25/2023), 750 mg (9/15/2023), 750 mg (10/5/2023)     2/13/2023 Cancer Staged    Staging form: Breast, AJCC 8th Edition  - Pathologic stage from 2/13/2023: No Stage Recommended (ypT0, pN0(sn), cM0,  GX)     3/14/2023 - 4/14/2023 Radiation Therapy    Treating physician: Shelia Trevino  Total Dose: 52.56 Gy (42.56Gy/16 fractions to whole breast; 10Gy/5 fraction boost)  Fractions: 20  Treatment Site Ref. ID Energy Dose/Fx (Gy) #Fx Dose Correction (Gy) Total Dose (Gy) Start Date End Date Elapsed Days   3D Breast_L NormEZCalc 18X 2.66 16 / 16 0 42.56 3/14/2023 4/6/2023 23   3d BrstBst_L NormBst 18X 2.5 4 / 4 0 10 4/10/2023 4/14/2023 4           Interval History:  The patient presents today for a regularly scheduled follow up visit.  She was last seen in our clinic on 6/2/23.   At that time, she had recovered from patchy moist desquamation in the IM fold and inferior/posterior breast, managed with radiagel sheets.  Skin was doing much better/completely healed. Herceptin/Perjeta completed earlier this month.    Diagnosed with Afib and status post cardioversion ~7/26/2023. Sees Dr. Frias    10/19/23 Bilat mammogram: BI RADS 2    Notes some ongoing central chest tenderness.      HPI: The patient was a 57 year old female with a new diagnosis of breast cancer. She initially presented due to abnormal mammogram.     8/25/22 Screening mammogram:  LEFT high density irregularly shaped mass in central region posterior     9/8/22 Left Diagnostic mammogram/ultrasound: 14 x 12 x 10mm mass at posterior 3:00 6cmfn; 2 axillary level 1 lymph nodes with slight cortical thickening up to 4mm     9/14/22 LEFT 3:00 US-g biopsy: invasive ductal carcinoma, G3, +/+/+, Ki 67 52%  9/14/22 LEFT dmitri lymph node excision: negative for lymph node tissue     9/21/22 re-biopsy L axillary us-g biopsy: fragments of benign lymph node   completed THP with Dr. Jaquez (rash and diarrhea) she underwent definitive surgery on 2/8/23 consisting of lumpectomy and SNLBx.  Pathology demonstrated pCR.      Review of Systems   Review of Systems   Constitutional:  Negative for chills, fever and malaise/fatigue.   Eyes:  Negative for double vision.    Respiratory:  Negative for cough and shortness of breath.    Cardiovascular:  Positive for palpitations and leg swelling (chronic). Negative for chest pain.   Skin:  Negative for rash.   Neurological:  Positive for dizziness (balance feels off, chronic). Negative for weakness.       Social History:  Social History     Tobacco Use    Smoking status: Never    Smokeless tobacco: Never   Substance Use Topics    Alcohol use: Not Currently     Comment: rare     Drug use: Never       Family History:  Cancer-related family history includes Cancer in her brother.    Exam:  Vitals:    10/30/23 1009   BP: 121/81   Pulse: 74   Resp: 16   Temp: 97.7 °F (36.5 °C)   SpO2: 99%   Weight: 129.9 kg (286 lb 6 oz)       Constitutional: Pleasant 58 y.o. female in no acute distress.  Well nourished. Well groomed.   HEENT: Normocephalic and atraumatic   Lungs: No audible wheezing.  Normal effort.   Breast/Chest wall: No significant skin changes within treatment field.  No masses; firmness within lower outer quadrant left breast and some at axillary incision consistent with scar tissue seen on mammogram.  Musculoskeletal: No gross MSK deformities. Ambulates   Skin: No rashes appreciated.   Psych: Alert and oriented with appropriate mood and affect.  Neuro:   Grossly normal.      Assessment:  Resolution of acute radiation therapy related toxicities  No evidence of disease persistence or recurrence in treated region  ECOG: (1) Restricted in physically strenuous activity, ambulatory and able to do work of light nature    Plan:  Routine annual mammogram in 10/2024  RTC after next mammogram in 1  year  Continue skincare/sun protections  Ongoing systemic therapy per Dr. Vásquez (prev. Dr. Jaquez)  Follow up with other providers as directed

## 2023-10-31 ENCOUNTER — TELEPHONE (OUTPATIENT)
Dept: HEMATOLOGY/ONCOLOGY | Facility: CLINIC | Age: 58
End: 2023-10-31
Payer: COMMERCIAL

## 2023-10-31 ENCOUNTER — PATIENT MESSAGE (OUTPATIENT)
Dept: HEMATOLOGY/ONCOLOGY | Facility: CLINIC | Age: 58
End: 2023-10-31
Payer: COMMERCIAL

## 2023-10-31 DIAGNOSIS — C50.912 INVASIVE DUCTAL CARCINOMA OF BREAST, FEMALE, LEFT: Primary | ICD-10-CM

## 2023-10-31 NOTE — TELEPHONE ENCOUNTER
Returned call to pt rescheduled her Acup apt to 11/2/23 at 10am    ----- Message from Kiana Washington sent at 10/31/2023  1:12 PM CDT -----  Contact: Pt  Type:  Needs Medical Advice    Who Called: Pt  Would the patient rather a call back or a response via [employer]? call  Best Call Back Number: [phone]  Additional Information: Pt has an appt on 11/02/2023 with Dr. Temple, and would like to reschedule. Please call pt back to advise.

## 2023-11-02 ENCOUNTER — CLINICAL SUPPORT (OUTPATIENT)
Dept: REHABILITATION | Facility: HOSPITAL | Age: 58
End: 2023-11-02
Payer: COMMERCIAL

## 2023-11-02 DIAGNOSIS — M54.2 NECK PAIN: ICD-10-CM

## 2023-11-02 DIAGNOSIS — G62.0 CHEMOTHERAPY-INDUCED PERIPHERAL NEUROPATHY: ICD-10-CM

## 2023-11-02 DIAGNOSIS — G89.29 CHRONIC MIDLINE LOW BACK PAIN WITHOUT SCIATICA: Primary | ICD-10-CM

## 2023-11-02 DIAGNOSIS — M54.50 CHRONIC MIDLINE LOW BACK PAIN WITHOUT SCIATICA: Primary | ICD-10-CM

## 2023-11-02 DIAGNOSIS — T45.1X5A CHEMOTHERAPY-INDUCED PERIPHERAL NEUROPATHY: ICD-10-CM

## 2023-11-02 PROCEDURE — 97810 ACUP 1/> WO ESTIM 1ST 15 MIN: CPT | Mod: PN | Performed by: ACUPUNCTURIST

## 2023-11-02 PROCEDURE — 97811 ACUP 1/> W/O ESTIM EA ADD 15: CPT | Mod: PN | Performed by: ACUPUNCTURIST

## 2023-11-02 NOTE — PROGRESS NOTES
Acupuncture Follow-Up Note     Name: Josefina Coon  Clinic Number: [id number]    Traditional Chinese Medicine (TCM) Diagnosis: Qi Stagnation, Blood Stasis, Qi Deficiency, Blood Deficiency, Damp, and Phlegm  Medical Diagnosis:   1. Chronic midline low back pain without sciatica    2. Neck pain    3. Chemotherapy-induced peripheral neuropathy         Evaluation Date: 11/2/2023    Visit #/Visits authorized:     Precautions: none    Subjective     Chief Concern: Low-back Pain (Pain today is 1/10)       Medical necessity is demonstrated by the following IMPAIRMENTS: Medical Necessity: Decreased mobility limits day to day activities, social, and emergent situations              Aggravating Factors:  movement     Relieving Factors:  rest    Symptom Description:     Quality:  Aching and Dull  Severity:  1  Frequency:  every day      Objective     Observation: Patient progressing as expected. Being reduced to monthly/prn.     Pulse:        thready       New Findings:  na    Treatment     Treatment Principles:  move qi and blood, relieve bi, drain damp and resolve phlegm.      Acupuncture points used:  4 MARTE, Du20, Gb34, Ki3, Ki6, Li11, Sp6, Sp9, St36, and YIN MELTON    Bilateral points:  Unilateral points:  Auricular Treatment:  acharya men    Needles In: 22  Needles Out: 22  Needles W/O STIM placed: 1005  Needles W/O STIM removed: 1025      Other Traditional Chinese Medicine Modalities -  na    Assessment     After treatment, patient felt that progress is being made and is returning to work next week so is still interested in monthly maintenance program to keep pain under control.      Patient prognosis is Good.     Patient will continue to benefit from acupuncture treatment to address the deficits listed in the problem list box on initial evaluation, provide patient family education and to maximize pt's level of independence in the home and community environment.     Patient's spiritual, cultural and educational needs  considered and pt agreeable to plan of care and goals.     Anticipated barriers to treatment: none    Plan     Recommend 1 /month for 6 sessions then re-assess.      Education:  Patient is aware of cumulative benefit of acupuncture

## 2023-11-03 ENCOUNTER — TELEPHONE (OUTPATIENT)
Dept: HEMATOLOGY/ONCOLOGY | Facility: CLINIC | Age: 58
End: 2023-11-03
Payer: COMMERCIAL

## 2023-11-06 ENCOUNTER — TELEPHONE (OUTPATIENT)
Dept: CARDIOLOGY | Facility: CLINIC | Age: 58
End: 2023-11-06
Payer: COMMERCIAL

## 2023-11-06 ENCOUNTER — OFFICE VISIT (OUTPATIENT)
Dept: HEMATOLOGY/ONCOLOGY | Facility: CLINIC | Age: 58
End: 2023-11-06
Payer: COMMERCIAL

## 2023-11-06 VITALS
WEIGHT: 290.88 LBS | DIASTOLIC BLOOD PRESSURE: 76 MMHG | SYSTOLIC BLOOD PRESSURE: 100 MMHG | BODY MASS INDEX: 49.66 KG/M2 | TEMPERATURE: 97 F | OXYGEN SATURATION: 97 % | HEART RATE: 86 BPM | HEIGHT: 64 IN

## 2023-11-06 DIAGNOSIS — M54.50 CHRONIC MIDLINE LOW BACK PAIN WITHOUT SCIATICA: ICD-10-CM

## 2023-11-06 DIAGNOSIS — C50.912 INVASIVE DUCTAL CARCINOMA OF BREAST, FEMALE, LEFT: ICD-10-CM

## 2023-11-06 DIAGNOSIS — N95.1 MENOPAUSAL SYMPTOMS: ICD-10-CM

## 2023-11-06 DIAGNOSIS — G89.29 CHRONIC MIDLINE LOW BACK PAIN WITHOUT SCIATICA: ICD-10-CM

## 2023-11-06 DIAGNOSIS — F41.1 GENERALIZED ANXIETY DISORDER: ICD-10-CM

## 2023-11-06 DIAGNOSIS — R60.0 BILATERAL LOWER EXTREMITY EDEMA: Primary | ICD-10-CM

## 2023-11-06 PROCEDURE — 1160F RVW MEDS BY RX/DR IN RCRD: CPT | Mod: CPTII,S$GLB,, | Performed by: NURSE PRACTITIONER

## 2023-11-06 PROCEDURE — 99999 PR PBB SHADOW E&M-EST. PATIENT-LVL V: ICD-10-PCS | Mod: PBBFAC,,, | Performed by: NURSE PRACTITIONER

## 2023-11-06 PROCEDURE — 4010F PR ACE/ARB THEARPY RXD/TAKEN: ICD-10-PCS | Mod: CPTII,S$GLB,, | Performed by: NURSE PRACTITIONER

## 2023-11-06 PROCEDURE — 3066F NEPHROPATHY DOC TX: CPT | Mod: CPTII,S$GLB,, | Performed by: NURSE PRACTITIONER

## 2023-11-06 PROCEDURE — 3060F PR POS MICROALBUMINURIA RESULT DOCUMENTED/REVIEW: ICD-10-PCS | Mod: CPTII,S$GLB,, | Performed by: NURSE PRACTITIONER

## 2023-11-06 PROCEDURE — 3078F DIAST BP <80 MM HG: CPT | Mod: CPTII,S$GLB,, | Performed by: NURSE PRACTITIONER

## 2023-11-06 PROCEDURE — 3078F PR MOST RECENT DIASTOLIC BLOOD PRESSURE < 80 MM HG: ICD-10-PCS | Mod: CPTII,S$GLB,, | Performed by: NURSE PRACTITIONER

## 2023-11-06 PROCEDURE — 3008F BODY MASS INDEX DOCD: CPT | Mod: CPTII,S$GLB,, | Performed by: NURSE PRACTITIONER

## 2023-11-06 PROCEDURE — 3066F PR DOCUMENTATION OF TREATMENT FOR NEPHROPATHY: ICD-10-PCS | Mod: CPTII,S$GLB,, | Performed by: NURSE PRACTITIONER

## 2023-11-06 PROCEDURE — 1160F PR REVIEW ALL MEDS BY PRESCRIBER/CLIN PHARMACIST DOCUMENTED: ICD-10-PCS | Mod: CPTII,S$GLB,, | Performed by: NURSE PRACTITIONER

## 2023-11-06 PROCEDURE — 3074F PR MOST RECENT SYSTOLIC BLOOD PRESSURE < 130 MM HG: ICD-10-PCS | Mod: CPTII,S$GLB,, | Performed by: NURSE PRACTITIONER

## 2023-11-06 PROCEDURE — 99215 PR OFFICE/OUTPT VISIT, EST, LEVL V, 40-54 MIN: ICD-10-PCS | Mod: S$GLB,,, | Performed by: NURSE PRACTITIONER

## 2023-11-06 PROCEDURE — 1159F MED LIST DOCD IN RCRD: CPT | Mod: CPTII,S$GLB,, | Performed by: NURSE PRACTITIONER

## 2023-11-06 PROCEDURE — 3008F PR BODY MASS INDEX (BMI) DOCUMENTED: ICD-10-PCS | Mod: CPTII,S$GLB,, | Performed by: NURSE PRACTITIONER

## 2023-11-06 PROCEDURE — 99999 PR PBB SHADOW E&M-EST. PATIENT-LVL V: CPT | Mod: PBBFAC,,, | Performed by: NURSE PRACTITIONER

## 2023-11-06 PROCEDURE — 99417 PR PROLONGED SVC, OUTPT, W/WO DIRECT PT CONTACT,  EA ADDTL 15 MIN: ICD-10-PCS | Mod: S$GLB,,, | Performed by: NURSE PRACTITIONER

## 2023-11-06 PROCEDURE — 4010F ACE/ARB THERAPY RXD/TAKEN: CPT | Mod: CPTII,S$GLB,, | Performed by: NURSE PRACTITIONER

## 2023-11-06 PROCEDURE — 3060F POS MICROALBUMINURIA REV: CPT | Mod: CPTII,S$GLB,, | Performed by: NURSE PRACTITIONER

## 2023-11-06 PROCEDURE — 99417 PROLNG OP E/M EACH 15 MIN: CPT | Mod: S$GLB,,, | Performed by: NURSE PRACTITIONER

## 2023-11-06 PROCEDURE — 99215 OFFICE O/P EST HI 40 MIN: CPT | Mod: S$GLB,,, | Performed by: NURSE PRACTITIONER

## 2023-11-06 PROCEDURE — 1159F PR MEDICATION LIST DOCUMENTED IN MEDICAL RECORD: ICD-10-PCS | Mod: CPTII,S$GLB,, | Performed by: NURSE PRACTITIONER

## 2023-11-06 PROCEDURE — 3044F PR MOST RECENT HEMOGLOBIN A1C LEVEL <7.0%: ICD-10-PCS | Mod: CPTII,S$GLB,, | Performed by: NURSE PRACTITIONER

## 2023-11-06 PROCEDURE — 3044F HG A1C LEVEL LT 7.0%: CPT | Mod: CPTII,S$GLB,, | Performed by: NURSE PRACTITIONER

## 2023-11-06 PROCEDURE — 3074F SYST BP LT 130 MM HG: CPT | Mod: CPTII,S$GLB,, | Performed by: NURSE PRACTITIONER

## 2023-11-06 NOTE — PROGRESS NOTES
"PATIENT: Josefina Coon  MRN: [id number]  DATE: 11/6/2023    Chief Complaint: Survivorship Clinic    Subjective:   Oncology History: Ms. Coon is a 58 y.o. female  with history of Breast cancer who presents for survivorship clinic visit.    Survivorship Assessment:  1. Cardiac Toxicity- She was diagnosed with Afib and states she was having heart palpitations before she was diagnosed with cancer. She states she knows she had gone into afib before starting treatment.   2. Anxiety/Depression/Distress-She reports she has always had anxiety. She states, "Occasionally I will funky or down, but in a day or so I feel better." She states having to take so many medications is depressing.  3. Cognitive function-She feels like she is more forgetful, especially peoples names.   4. Fatigue-Occasional fatigue, but states she is feeling better.   5. Lymphedema- none to her arms, She continues to have swelling to her legs.   6. Sexual Function/Hormone related symptoms- She has vaginal dryness and hot flashes. She has 2-3 hot flashes a day/night.   7. Pain- Left Breast area if she presses on the area. She continues to have off and on low back pain, laying flat makes it worse.   8. Sleep- She reports some days she sleeps well and other days she has difficulty. She is having a bathroom remodel which has caused some stress. Melatonin as needed   9. Exercise/Dietary Assessment: She reports she is gaining weight and has sugar cravings. She is concerned about gaining the weight back that she lost and she wants t continue to lose weight. She does walk her dog, but wants to walk more. She belongs to [employer] and wants to try to incorporate going to the gym as she is going back to work.     PCP: Dr. Echols  GYN: Dr. Khan    Last Colonoscopy: Completed 3/10/2023 by Dr. Mc Marrero Recommendation: Repeat in 5 years for surveillance   Last WWE/Pelvic Exam:5/2022    CVD Risk Assessment:  The 10-year ASCVD risk score (Db DK, et " al., 2019) is: 4.6%    Values used to calculate the score:      Age: 58 years      Sex: Female      Is Non- : No      Diabetic: Yes      Tobacco smoker: No      Systolic Blood Pressure: 100 mmHg      Is BP treated: Yes      HDL Cholesterol: 51 mg/dL      Total Cholesterol: 162 mg/dL    Past Medical History:   Past Medical History:   Diagnosis Date    Abnormal liver enzymes     Asymptomatic varicose veins     Breast cancer 2022    chemo 10/2022-2022; radiation 3/2023 6 weeks; immunotherapy 10/2022-10/5/2023    Cancer     left breast cancer    Depressive disorder, not elsewhere classified     Dyslipidemia     Embolism and thrombosis of unspecified site     superficial venous thrombosis    Encounter for long-term (current) use of other medications     Family history of ischemic heart disease     Fatty liver     Generalized anxiety disorder     History of chicken pox     Obstructive sleep apnea (adult) (pediatric)     Type II or unspecified type diabetes mellitus without mention of complication, not stated as uncontrolled     Unspecified essential hypertension     Unspecified venous (peripheral) insufficiency     Unspecified vitamin D deficiency      Past Surgical History:   Past Surgical History:   Procedure Laterality Date    BREAST BIOPSY Left 2022    idc    BREAST BIOPSY Left 2022    neg ln    BREAST BIOPSY Left 2022    neg ln    BREAST LUMPECTOMY Left 2023    2 lymph nodes removed     SECTION      COLONOSCOPY      ESOPHAGOGASTRODUODENOSCOPY      INSERTION OF TUNNELED CENTRAL VENOUS CATHETER (CVC) WITH SUBCUTANEOUS PORT Right 10/04/2022    Procedure: [id number];  Surgeon: Dominick Ng MD;  Location: [facility];  Service: General;  Laterality: Right;    LUMPECTOMY, WITH RADAR LOCALIZATION USING TRENTON  Left 2023    Procedure: LUMPECTOMY,WITH RADAR LOCALIZATION USING TR[place]ON ;  Surgeon: Maria G Mcduffie MD;  Location: [facility] OR;   Service: General;  Laterality: Left;    SENTINEL LYMPH NODE BIOPSY Left 02/08/2023    Procedure: BIOPSY, LYMPH NODE, SENTINEL;  Surgeon: Maria G Mcduffie MD;  Location: [facility];  Service: General;  Laterality: Left;    TRANSESOPHAGEAL ECHOCARDIOGRAM WITH POSSIBLE CARDIOVERSION (LAUREL W/ POSS CARDIOVERSION) N/A 07/25/2023    Procedure: TRANSESOPHAGEAL ECHOCARDIOGRAM WITH POSSIBLE CARDIOVERSION (LAUREL W/ POSS CARDIOVERSION);  Surgeon: Roni Frias MD;  Location: [facility];  Service: Cardiology;  Laterality: N/A;    VEIN SURGERY      Laser, Dr. Larsen     Family History:   Family History   Problem Relation Age of Onset    Hyperlipidemia Mother     Hypertension Mother     Vulvar Cancer Mother     Dementia Mother     Atrial fibrillation Father     Parkinsonism Father     Diabetes Brother     Cancer Brother         colon    Hypertension Son     Hyperlipidemia Son     Anxiety disorder Son     Stroke Maternal Grandmother      Social History:  reports that she has never smoked. She has never used smokeless tobacco. She reports that she does not currently use alcohol. She reports that she does not use drugs.    Allergies:  Review of patient's allergies indicates:   Allergen Reactions    Sitagliptin      Other reaction(s): (januvia) abdominal pain    Sulfa (sulfonamide antibiotics) Hives    Byetta  [exenatide] Rash    Lactose      Medications:  Current Outpatient Medications   Medication Sig Dispense Refill    albuterol (PROVENTIL/VENTOLIN HFA) 90 mcg/actuation inhaler Inhale 2 puffs into the lungs every 6 (six) hours as needed for Wheezing. 18 g 6    ALPRAZolam (XANAX) 0.25 MG tablet TAKE ONE TABLET BY MOUTH 1-2 TIMES DAILY AS NEEDED ANXIETY 15 tablet 0    anastrozole (ARIMIDEX) 1 mg Tab Take 1 tablet (1 mg total) by mouth once daily. 90 tablet 3    apixaban (ELIQUIS) 5 mg Tab Take 1 tablet (5 mg total) by mouth 2 (two) times daily. 180 tablet 3    azelastine (ASTELIN) 137 mcg (0.1 %) nasal spray 2 sprays by Nasal  route 2 (two) times daily as needed for Rhinitis.      buPROPion (WELLBUTRIN XL) 150 MG TB24 tablet TAKE 1 TABLET BY MOUTH EVERY DAY (Patient taking differently: Take 150 mg by mouth once daily.) 90 tablet 3    cyanocobalamin 1,000 mcg/mL injection Inject 1 ml q 2 weeks x 1 month then 1 ml monthly 30 mL 0    dapagliflozin propanediol (FARXIGA) 5 mg Tab tablet Take 1 tablet (5 mg total) by mouth once daily. 90 tablet 3    DENTAGEL 1.1 % Gel BRUSH ON BEPORE BEDTIME AND EXPECTORATE. DO NOT RINSE      dronedarone (MULTAQ) 400 mg Tab Take 1 tablet (400 mg total) by mouth 2 (two) times daily with meals. (Patient not taking: Reported on 10/5/2023) 60 tablet 11    LIDOcaine-prilocaine (EMLA) cream Apply to port site 1 hour prior to port access and cover 30 g 3    lipase-protease-amylase 6,000-19,000-30,000 units (CREON) 6,000-19,000 -30,000 unit capsule Take 1 capsule by mouth 3 (three) times daily with meals. 90 capsule 11    metFORMIN (GLUCOPHAGE) 500 MG tablet TAKE 1 TABLET BY MOUTH TWICE A DAY WITH MEALS 180 tablet 1    metoprolol succinate (TOPROL-XL) 50 MG 24 hr tablet Take 1 tablet (50 mg total) by mouth once daily. 90 tablet 2    nystatin (MYCOSTATIN) powder Apply to affected area 3 times daily 60 g 1    pravastatin (PRAVACHOL) 10 MG tablet TAKE 1 TABLET BY MOUTH EVERYDAY AT BEDTIME 90 tablet 1    tirzepatide (MOUNJARO) 7.5 mg/0.5 mL PnIj INJECT 7.5 MG SUBCUTANEOUSLY EVERY 7 DAYS 4 Pen 0    tirzepatide 10 mg/0.5 mL PnIj Inject 10 mg into the skin every 7 days. 4 Pen 2     No current facility-administered medications for this visit.     Facility-Administered Medications Ordered in Other Visits   Medication Dose Route Frequency Provider Last Rate Last Admin    lactated ringers infusion   Intravenous Continuous Maria G Mcduffie  mL/hr at 02/08/23 0700 New Bag at 02/08/23 0814    midazolam (VERSED) 1 mg/mL injection 2 mg  2 mg Intravenous See admin instructions Maria G Mcduffie MD   2 mg at 02/08/23 0711  "       Review of Systems:  Review of Systems   Constitutional:  Positive for malaise/fatigue.        Vaginal dryness, hot flashes   HENT: Negative.     Eyes: Negative.    Respiratory: Negative.     Cardiovascular:  Positive for leg swelling.   Gastrointestinal: Negative.    Genitourinary: Negative.    Musculoskeletal:  Positive for back pain.   Skin: Negative.    Neurological: Negative.    Endo/Heme/Allergies: Negative.    Psychiatric/Behavioral:  The patient is nervous/anxious.         Objective:      Vitals:   Vitals:    11/06/23 1111   BP: 100/76   Pulse: 86   Temp: 97.1 °F (36.2 °C)   TempSrc: Temporal   SpO2: 97%   Weight: 132 kg (290 lb 14.4 oz)   Height: 5' 4" (1.626 m)     BMI: Body mass index is 49.93 kg/m².    Physical Exam:   Physical Exam  Vitals reviewed.   Constitutional:       Appearance: Normal appearance.   Neurological:      Mental Status: She is alert.   Psychiatric:         Mood and Affect: Mood normal.         Behavior: Behavior normal.        Assessment:     1. Bilateral lower extremity edema    2. Chronic midline low back pain without sciatica    3. Generalized anxiety disorder    4. Menopausal symptoms    5. Invasive ductal carcinoma of breast, female, left       Plan:   Reviewed Cancer Treatment Summary with patient. Care Plan was given to the patient and all questions were answered. Survivorship handout was also reviewed and given to patient.   Counseled on healthy lifestyle and behavior modifications that could reduce risk of recurrence.  Limit alcohol to less than one drink per day, Exercise at least 150 minutes per week of moderate intensity aerobic activity or at least 75 minutes of vigorous activity, Maintaining a healthy weight, Limit red meat to no more than 2-3x per week, Reduce processed foods and meat. Also encouraged wide variety of fruits and vegetables, specifically cruciferous vegetables.    Follow ups with Breast Surgery and Medical Oncology Scheduled.  Mrs. Coon will " schedule yearly gynecolgoy appointment  Referral to Nutrition  I discussed a nutritional consult with our dietician. The dietician can  help you work on weight management during treatment and further discuss lifestyle changes. Weight loss can impact long-term survival, particularly in certain types of cancer and our dieticians are skilled at helping you through these changes  Referral to Physical Therapy    Follow up with Survivorship clinic  in 1 year    I spent a total of 75 minutes on the day of the visit.This includes face to face time and non-face to face time preparing to see the patient (eg, review of tests), obtaining and/or reviewing separately obtained history, documenting clinical information in the electronic or other health record, independently interpreting results and communicating results to the patient/family/caregiver, or care coordinator.

## 2023-11-16 ENCOUNTER — PATIENT MESSAGE (OUTPATIENT)
Dept: HEMATOLOGY/ONCOLOGY | Facility: CLINIC | Age: 58
End: 2023-11-16
Payer: COMMERCIAL

## 2023-12-01 ENCOUNTER — TELEPHONE (OUTPATIENT)
Dept: NEPHROLOGY | Facility: CLINIC | Age: 58
End: 2023-12-01
Payer: COMMERCIAL

## 2023-12-01 ENCOUNTER — CLINICAL SUPPORT (OUTPATIENT)
Dept: REHABILITATION | Facility: HOSPITAL | Age: 58
End: 2023-12-01
Payer: COMMERCIAL

## 2023-12-01 ENCOUNTER — CLINICAL SUPPORT (OUTPATIENT)
Dept: HEMATOLOGY/ONCOLOGY | Facility: CLINIC | Age: 58
End: 2023-12-01
Payer: COMMERCIAL

## 2023-12-01 VITALS — BODY MASS INDEX: 49.85 KG/M2 | HEIGHT: 64 IN | WEIGHT: 292 LBS

## 2023-12-01 DIAGNOSIS — M54.50 CHRONIC MIDLINE LOW BACK PAIN WITHOUT SCIATICA: Primary | ICD-10-CM

## 2023-12-01 DIAGNOSIS — G89.29 CHRONIC MIDLINE LOW BACK PAIN WITHOUT SCIATICA: Primary | ICD-10-CM

## 2023-12-01 DIAGNOSIS — M54.2 NECK PAIN: ICD-10-CM

## 2023-12-01 DIAGNOSIS — E11.9 TYPE 2 DIABETES MELLITUS WITHOUT COMPLICATION, WITHOUT LONG-TERM CURRENT USE OF INSULIN: ICD-10-CM

## 2023-12-01 DIAGNOSIS — G62.0 CHEMOTHERAPY-INDUCED PERIPHERAL NEUROPATHY: ICD-10-CM

## 2023-12-01 DIAGNOSIS — C50.912 INVASIVE DUCTAL CARCINOMA OF BREAST, FEMALE, LEFT: Primary | ICD-10-CM

## 2023-12-01 DIAGNOSIS — Z71.3 NUTRITIONAL COUNSELING: ICD-10-CM

## 2023-12-01 DIAGNOSIS — T45.1X5A CHEMOTHERAPY-INDUCED PERIPHERAL NEUROPATHY: ICD-10-CM

## 2023-12-01 PROCEDURE — 99999 PR PBB SHADOW E&M-EST. PATIENT-LVL I: ICD-10-PCS | Mod: PBBFAC,,,

## 2023-12-01 PROCEDURE — 97802 MEDICAL NUTRITION INDIV IN: CPT | Mod: S$GLB,,,

## 2023-12-01 PROCEDURE — 97802 PR MED NUTR THER, 1ST, INDIV, EA 15 MIN: ICD-10-PCS | Mod: S$GLB,,,

## 2023-12-01 PROCEDURE — 97814 ACUP 1/> W/ESTIM EA ADDL 15: CPT | Mod: PN | Performed by: ACUPUNCTURIST

## 2023-12-01 PROCEDURE — 99999 PR PBB SHADOW E&M-EST. PATIENT-LVL I: CPT | Mod: PBBFAC,,,

## 2023-12-01 PROCEDURE — 97813 ACUP 1/> W/ESTIM 1ST 15 MIN: CPT | Mod: PN | Performed by: ACUPUNCTURIST

## 2023-12-01 NOTE — PROGRESS NOTES
Acupuncture Follow-Up Note     Name: Josefina Coon  Clinic Number: [id number]    Traditional Chinese Medicine (TCM) Diagnosis: Qi Stagnation, Blood Stasis, Qi Deficiency, Blood Deficiency, Damp, Yin Deficiency, and Phlegm  Medical Diagnosis:   1. Chronic midline low back pain without sciatica    2. Neck pain    3. Chemotherapy-induced peripheral neuropathy         Evaluation Date: 12/1/2023    Visit #/Visits authorized:     Precautions: Standard    Subjective     Chief Concern: Low-back Pain (Patient reports 1/10 back pain today) and Peripheral Neuropathy (Neuropathy is 3/10  with edema.)       Medical necessity is demonstrated by the following IMPAIRMENTS: Medical Necessity: Decreased mobility limits day to day activities, social, and emergent situations              Aggravating Factors:  movement     Relieving Factors:  rest    Symptom Description:     Quality:  Aching, Dull, and Variable  Severity:  1-3/10  Frequency:  every day      Objective     Observation: Patient reports slight improvement overall but swelling is still an issue which she will address today with the NP.    Pulse:        irregular       New Findings:  na    Treatment     Treatment Principles:  move qi and blood, relieve bi    Acupuncture points used:  4 MARTE, BA DONTE, Du20, Gb34, and Li11    Bilateral points:UB 58-60  Unilateral points:  Auricular Treatment:  acharya men    Needles In: 27  Needles Out: 27  Needles W/ STIM placed: 835  Needles W/ STIM removed: 855      Other Traditional Chinese Medicine Modalities -  na    Assessment     After treatment, patient felt better     Patient prognosis is Fair.     Patient will continue to benefit from acupuncture treatment to address the deficits listed in the problem list box on initial evaluation, provide patient family education and to maximize pt's level of independence in the home and community environment.     Patient's spiritual, cultural and educational needs considered and pt agreeable to  plan of care and goals.     Anticipated barriers to treatment: none    Plan     Recommend 1 /week for 12 sessions then re-assess.      Education:  Patient is aware of cumulative benefit of acupuncture

## 2023-12-01 NOTE — TELEPHONE ENCOUNTER
----- Message from Radha Curry sent at 12/1/2023  1:30 PM CST -----  Contact: patient  Type:  Needs Medical Advice    Who Called: patient     Would the patient rather a call back or a response via [facility]? Call     Best Call Back Number: [phone] (home)      Additional Information: Patient would like to speak with the nurse in regards to a follow up appointment.     Please call to advise

## 2023-12-01 NOTE — PROGRESS NOTES
Oncology Nutrition Assessment for Medical Nutrition Therapy  Initial Visit    Josefina Coon   1965    Referring Provider:  No ref. provider found      Reason for Visit: Pt in for education and nutrition counseling     PMHx:   Past Medical History:   Diagnosis Date    Abnormal liver enzymes     Asymptomatic varicose veins     Breast cancer 09/14/2022    chemo 10/2022-12/2022; radiation 3/2023 6 weeks; immunotherapy 10/2022-10/5/2023    Cancer     left breast cancer    Depressive disorder, not elsewhere classified     Dyslipidemia     Embolism and thrombosis of unspecified site     superficial venous thrombosis    Encounter for long-term (current) use of other medications     Family history of ischemic heart disease     Fatty liver     Generalized anxiety disorder     History of chicken pox     Obstructive sleep apnea (adult) (pediatric)     Type II or unspecified type diabetes mellitus without mention of complication, not stated as uncontrolled     Unspecified essential hypertension     Unspecified venous (peripheral) insufficiency     Unspecified vitamin D deficiency        Nutrition Assessment    This is a 58 y.o.female with an oncology history of breast cancer who has completed chemotherapy and radiation. Pt states she did have her port removed this week. Pt states she is having intense sugar cravings. She does have type 2 diabetes. Pt states she has been on Metformin for years. She was on Mounjaro and did not feel like it was working so she kept increasing the dosage but unfortunately it came with a lot of side effects. Pt decided to get off of it and is trying to decide if she is going to get back on it or go back to Trulicity; which did not have any side effects but did not help with weight gain. Encouraged pt to keep communicating with PCP on that.   Pt states she started eating sour, sweet candy, such a sour patch kids, during treatment when her taste was off and cannot get off of them. She picks them  "up every time she goes to the grocery store.    Diet recall:  Breakfast- cereal (Honey Nut Cheerios, Special K, Frosted Mini Wheats) with almond milk  AM snack- apple  Lunch- Leftover or  something such as [employer] or [employer]  Dinner- Cook Monday-Thursday (meat, starch, vegetables), Weekend nights usually go out or   HS Snack- pie, ice cream, bowl of cereal    Weight:132.5 kg (292 lb)  Height:5' 4" (1.626 m)  BMI:Body mass index is 50.12 kg/m².   IBW: Ideal body weight: 54.7 kg (120 lb 9.5 oz)  Adjusted ideal body weight: 85.8 kg (189 lb 2.5 oz)      Allergies: Sitagliptin, Byetta  [exenatide], Lactose, and Sulfa (sulfonamide antibiotics)    Current Medications:    Current Outpatient Medications:     albuterol (PROVENTIL/VENTOLIN HFA) 90 mcg/actuation inhaler, Inhale 2 puffs into the lungs every 6 (six) hours as needed for Wheezing., Disp: 18 g, Rfl: 6    ALPRAZolam (XANAX) 0.25 MG tablet, TAKE ONE TABLET BY MOUTH 1-2 TIMES DAILY AS NEEDED ANXIETY, Disp: 15 tablet, Rfl: 0    anastrozole (ARIMIDEX) 1 mg Tab, Take 1 tablet (1 mg total) by mouth once daily., Disp: 90 tablet, Rfl: 3    apixaban (ELIQUIS) 5 mg Tab, Take 1 tablet (5 mg total) by mouth 2 (two) times daily., Disp: 180 tablet, Rfl: 3    azelastine (ASTELIN) 137 mcg (0.1 %) nasal spray, 2 sprays by Nasal route 2 (two) times daily as needed for Rhinitis., Disp: , Rfl:     buPROPion (WELLBUTRIN XL) 150 MG TB24 tablet, TAKE 1 TABLET BY MOUTH EVERY DAY (Patient taking differently: Take 150 mg by mouth once daily.), Disp: 90 tablet, Rfl: 3    cyanocobalamin 1,000 mcg/mL injection, Inject 1 ml q 2 weeks x 1 month then 1 ml monthly, Disp: 30 mL, Rfl: 0    dapagliflozin propanediol (FARXIGA) 5 mg Tab tablet, Take 1 tablet (5 mg total) by mouth once daily., Disp: 90 tablet, Rfl: 3    DENTAGEL 1.1 % Gel, BRUSH ON BEPORE BEDTIME AND EXPECTORATE. DO NOT RINSE, Disp: , Rfl:     dronedarone (MULTAQ) 400 mg Tab, Take 1 tablet (400 mg total) by mouth " 2 (two) times daily with meals., Disp: 60 tablet, Rfl: 11    lipase-protease-amylase 6,000-19,000-30,000 units (CREON) 6,000-19,000 -30,000 unit capsule, Take 1 capsule by mouth 3 (three) times daily with meals., Disp: 90 capsule, Rfl: 11    metFORMIN (GLUCOPHAGE) 500 MG tablet, TAKE 1 TABLET BY MOUTH TWICE A DAY WITH MEALS, Disp: 180 tablet, Rfl: 1    metoprolol succinate (TOPROL-XL) 50 MG 24 hr tablet, Take 1 tablet (50 mg total) by mouth once daily., Disp: 90 tablet, Rfl: 2    nystatin (MYCOSTATIN) powder, Apply to affected area 3 times daily, Disp: 60 g, Rfl: 1    pravastatin (PRAVACHOL) 10 MG tablet, TAKE 1 TABLET BY MOUTH EVERYDAY AT BEDTIME, Disp: 90 tablet, Rfl: 1    tirzepatide (MOUNJARO) 7.5 mg/0.5 mL PnIj, INJECT 7.5 MG SUBCUTANEOUSLY EVERY 7 DAYS, Disp: 4 Pen, Rfl: 0    tirzepatide 10 mg/0.5 mL PnIj, Inject 10 mg into the skin every 7 days., Disp: 4 Pen, Rfl: 2  No current facility-administered medications for this visit.    Facility-Administered Medications Ordered in Other Visits:     lactated ringers infusion, , Intravenous, Continuous, Maria G Mcduffie MD, Last Rate: 125 mL/hr at 02/08/23 0700, New Bag at 02/08/23 0814    midazolam (VERSED) 1 mg/mL injection 2 mg, 2 mg, Intravenous, See admin instructions, Maria G Mcduffie MD, 2 mg at 02/08/23 0711    Vitamins/Supplements: Reviewed     Labs: Reviewed     Nutrition Diagnosis    Problem: excessive protein/calorie intake   Etiology (related to): dietary non-compliance  Signs/Symptoms (as evidenced by): BMI = 54.7    Nutrition Intervention    Nutrition Prescription   1855 Kcals (14 kcal/kg CBW)   76g protein (1.4 g/kg IBW)   1855 mL fluid (1 mL/kg CBW)    Recommendations:  No more than 25 grabs added sugar per day  Protein with every meal and snack  Limit white, refined carbohydrates and choose 100% whole grains  Vegetables should take up 1/2 plate  Choose healthier snacks- examples discussed     Materials Provided/Reviewed    Survivorship/Weight Loss  Plant-Based Diet  Nutrition Guidance for Superfoods  Cancer Myths  Healthy Snacks    Nutrition Monitoring and Evaluation    Monitor: energy intake, weight, and adherence to nutrition recommendations     Goals: 1-2# weight loss per week until BMI WNL    Follow up In 8 weeks     Communication to referring provider/care team: note available in chart     Counseling time: 1 hour    Eloisa Perales MS, RD, LDN  [phone]

## 2024-01-02 ENCOUNTER — CLINICAL SUPPORT (OUTPATIENT)
Dept: REHABILITATION | Facility: HOSPITAL | Age: 59
End: 2024-01-02
Payer: COMMERCIAL

## 2024-01-02 DIAGNOSIS — G62.0 CHEMOTHERAPY-INDUCED PERIPHERAL NEUROPATHY: ICD-10-CM

## 2024-01-02 DIAGNOSIS — G89.29 CHRONIC MIDLINE LOW BACK PAIN WITHOUT SCIATICA: Primary | ICD-10-CM

## 2024-01-02 DIAGNOSIS — T45.1X5A CHEMOTHERAPY-INDUCED PERIPHERAL NEUROPATHY: ICD-10-CM

## 2024-01-02 DIAGNOSIS — M54.50 CHRONIC MIDLINE LOW BACK PAIN WITHOUT SCIATICA: Primary | ICD-10-CM

## 2024-01-02 DIAGNOSIS — M54.2 NECK PAIN: ICD-10-CM

## 2024-01-02 PROCEDURE — 97810 ACUP 1/> WO ESTIM 1ST 15 MIN: CPT | Mod: PN | Performed by: ACUPUNCTURIST

## 2024-01-02 PROCEDURE — 97811 ACUP 1/> W/O ESTIM EA ADD 15: CPT | Mod: PN | Performed by: ACUPUNCTURIST

## 2024-01-02 NOTE — PROGRESS NOTES
Acupuncture Follow-Up Note     Name: Josefina Coon  Clinic Number: [id number]    Traditional Chinese Medicine (TCM) Diagnosis: Qi Stagnation, Blood Stasis, Qi Deficiency, Blood Deficiency, Damp, and Yin Deficiency  Medical Diagnosis:   1. Chronic midline low back pain without sciatica    2. Neck pain    3. Chemotherapy-induced peripheral neuropathy         Evaluation Date: 1/2/2024    Visit #/Visits authorized:     Precautions: Standard    Subjective     Chief Concern: Low-back Pain (Low back pain is 1/10), Peripheral Neuropathy (Patient reports neuropathy as bilateral upper and lower extremities and 3/10), Neck Pain (Patient reports neck pain as 1/10), and Hot Flashes (Hot flashes today are 3/10)       Medical necessity is demonstrated by the following IMPAIRMENTS: Medical Necessity: Decreased mobility limits day to day activities, social, and emergent situations              Aggravating Factors:  movement     Relieving Factors:  rest    Symptom Description:     Quality:  Aching, Dull, Burning, Tingling, and Numb  Severity:  1-3/10  Frequency:  every day      Objective     Observation: Patient is spending numerous hours on her feet.  Today the exam noted increased vascularity in the left foot.  The pooling of blood and swelling around her ankles is slightly less but still significant and present.        Pulse:        wiry       New Findings:  na    Treatment     Treatment Principles:  move qi and blood, relieve bi, nourish yin, clear channels    Acupuncture points used:  4 MARTE, Du20, ER FE, Gb34, Ht7, Pc6, Lu9 , Ki3, Ki6, Li11, Sp6, Sp9, St36, and YIN MELTON    Bilateral points:  Unilateral points:  Auricular Treatment:  acharya men    Needles In: 30  Needles Out: 30  Needles W/O STIM placed: 1005  Needles W/O STIM removed: 1025      Other Traditional Chinese Medicine Modalities -  na    Assessment     After treatment, patient felt relief     Patient prognosis is Good.     Patient will continue to benefit from  acupuncture treatment to address the deficits listed in the problem list box on initial evaluation, provide patient family education and to maximize pt's level of independence in the home and community environment.     Patient's spiritual, cultural and educational needs considered and pt agreeable to plan of care and goals.     Anticipated barriers to treatment: none    Plan     Recommend 1 /week for 12 sessions then re-assess.      Education:  Patient is aware of cumulative benefit of acupuncture

## 2024-01-08 ENCOUNTER — PATIENT MESSAGE (OUTPATIENT)
Dept: HEMATOLOGY/ONCOLOGY | Facility: CLINIC | Age: 59
End: 2024-01-08
Payer: COMMERCIAL

## 2024-01-22 ENCOUNTER — LAB VISIT (OUTPATIENT)
Dept: LAB | Facility: HOSPITAL | Age: 59
End: 2024-01-22
Attending: INTERNAL MEDICINE
Payer: COMMERCIAL

## 2024-01-22 ENCOUNTER — OFFICE VISIT (OUTPATIENT)
Dept: HEMATOLOGY/ONCOLOGY | Facility: CLINIC | Age: 59
End: 2024-01-22
Payer: COMMERCIAL

## 2024-01-22 VITALS
SYSTOLIC BLOOD PRESSURE: 120 MMHG | RESPIRATION RATE: 18 BRPM | BODY MASS INDEX: 50.02 KG/M2 | HEIGHT: 64 IN | TEMPERATURE: 97 F | DIASTOLIC BLOOD PRESSURE: 80 MMHG | WEIGHT: 293 LBS

## 2024-01-22 DIAGNOSIS — E11.9 TYPE 2 DIABETES MELLITUS WITHOUT COMPLICATION, WITHOUT LONG-TERM CURRENT USE OF INSULIN: ICD-10-CM

## 2024-01-22 DIAGNOSIS — C50.912 INVASIVE DUCTAL CARCINOMA OF BREAST, FEMALE, LEFT: ICD-10-CM

## 2024-01-22 DIAGNOSIS — I48.0 PAROXYSMAL ATRIAL FIBRILLATION: ICD-10-CM

## 2024-01-22 DIAGNOSIS — C50.912 INVASIVE DUCTAL CARCINOMA OF BREAST, FEMALE, LEFT: Primary | ICD-10-CM

## 2024-01-22 DIAGNOSIS — G62.0 CHEMOTHERAPY-INDUCED PERIPHERAL NEUROPATHY: ICD-10-CM

## 2024-01-22 DIAGNOSIS — N18.32 STAGE 3B CHRONIC KIDNEY DISEASE: ICD-10-CM

## 2024-01-22 DIAGNOSIS — T45.1X5A CHEMOTHERAPY-INDUCED PERIPHERAL NEUROPATHY: ICD-10-CM

## 2024-01-22 DIAGNOSIS — E83.42 HYPOMAGNESEMIA: ICD-10-CM

## 2024-01-22 LAB
ALBUMIN SERPL BCP-MCNC: 3.6 G/DL (ref 3.5–5.2)
ALP SERPL-CCNC: 86 U/L (ref 55–135)
ALT SERPL W/O P-5'-P-CCNC: 27 U/L (ref 10–44)
ANION GAP SERPL CALC-SCNC: 9 MMOL/L (ref 8–16)
AST SERPL-CCNC: 15 U/L (ref 10–40)
BASOPHILS # BLD AUTO: 0.03 K/UL (ref 0–0.2)
BASOPHILS NFR BLD: 0.5 % (ref 0–1.9)
BILIRUB SERPL-MCNC: 0.4 MG/DL (ref 0.1–1)
BUN SERPL-MCNC: 31 MG/DL (ref 8–23)
CALCIUM SERPL-MCNC: 9.8 MG/DL (ref 8.7–10.5)
CHLORIDE SERPL-SCNC: 104 MMOL/L (ref 95–110)
CO2 SERPL-SCNC: 26 MMOL/L (ref 23–29)
CREAT SERPL-MCNC: 1.5 MG/DL (ref 0.5–1.4)
DIFFERENTIAL METHOD BLD: NORMAL
EOSINOPHIL # BLD AUTO: 0.2 K/UL (ref 0–0.5)
EOSINOPHIL NFR BLD: 2.9 % (ref 0–8)
ERYTHROCYTE [DISTWIDTH] IN BLOOD BY AUTOMATED COUNT: 13.2 % (ref 11.5–14.5)
EST. GFR  (NO RACE VARIABLE): 39.4 ML/MIN/1.73 M^2
GLUCOSE SERPL-MCNC: 132 MG/DL (ref 70–110)
HCT VFR BLD AUTO: 37.8 % (ref 37–48.5)
HGB BLD-MCNC: 12.4 G/DL (ref 12–16)
IMM GRANULOCYTES # BLD AUTO: 0.02 K/UL (ref 0–0.04)
IMM GRANULOCYTES NFR BLD AUTO: 0.3 % (ref 0–0.5)
LYMPHOCYTES # BLD AUTO: 2.2 K/UL (ref 1–4.8)
LYMPHOCYTES NFR BLD: 36.3 % (ref 18–48)
MAGNESIUM SERPL-MCNC: 2 MG/DL (ref 1.6–2.6)
MCH RBC QN AUTO: 30.4 PG (ref 27–31)
MCHC RBC AUTO-ENTMCNC: 32.8 G/DL (ref 32–36)
MCV RBC AUTO: 93 FL (ref 82–98)
MONOCYTES # BLD AUTO: 0.4 K/UL (ref 0.3–1)
MONOCYTES NFR BLD: 6.8 % (ref 4–15)
NEUTROPHILS # BLD AUTO: 3.3 K/UL (ref 1.8–7.7)
NEUTROPHILS NFR BLD: 53.2 % (ref 38–73)
NRBC BLD-RTO: 0 /100 WBC
PLATELET # BLD AUTO: 221 K/UL (ref 150–450)
PMV BLD AUTO: 9.4 FL (ref 9.2–12.9)
POTASSIUM SERPL-SCNC: 4.9 MMOL/L (ref 3.5–5.1)
PROT SERPL-MCNC: 7.4 G/DL (ref 6–8.4)
RBC # BLD AUTO: 4.08 M/UL (ref 4–5.4)
SODIUM SERPL-SCNC: 139 MMOL/L (ref 136–145)
WBC # BLD AUTO: 6.17 K/UL (ref 3.9–12.7)

## 2024-01-22 PROCEDURE — 99999 PR PBB SHADOW E&M-EST. PATIENT-LVL III: CPT | Mod: PBBFAC,,, | Performed by: INTERNAL MEDICINE

## 2024-01-22 PROCEDURE — 3044F HG A1C LEVEL LT 7.0%: CPT | Mod: CPTII,S$GLB,, | Performed by: INTERNAL MEDICINE

## 2024-01-22 PROCEDURE — 83735 ASSAY OF MAGNESIUM: CPT | Mod: PN | Performed by: INTERNAL MEDICINE

## 2024-01-22 PROCEDURE — 3074F SYST BP LT 130 MM HG: CPT | Mod: CPTII,S$GLB,, | Performed by: INTERNAL MEDICINE

## 2024-01-22 PROCEDURE — 36415 COLL VENOUS BLD VENIPUNCTURE: CPT | Mod: PN | Performed by: INTERNAL MEDICINE

## 2024-01-22 PROCEDURE — 3079F DIAST BP 80-89 MM HG: CPT | Mod: CPTII,S$GLB,, | Performed by: INTERNAL MEDICINE

## 2024-01-22 PROCEDURE — 85025 COMPLETE CBC W/AUTO DIFF WBC: CPT | Mod: PN | Performed by: INTERNAL MEDICINE

## 2024-01-22 PROCEDURE — 3008F BODY MASS INDEX DOCD: CPT | Mod: CPTII,S$GLB,, | Performed by: INTERNAL MEDICINE

## 2024-01-22 PROCEDURE — 99215 OFFICE O/P EST HI 40 MIN: CPT | Mod: S$GLB,,, | Performed by: INTERNAL MEDICINE

## 2024-01-22 PROCEDURE — 80053 COMPREHEN METABOLIC PANEL: CPT | Mod: PN | Performed by: INTERNAL MEDICINE

## 2024-01-22 PROCEDURE — 1160F RVW MEDS BY RX/DR IN RCRD: CPT | Mod: CPTII,S$GLB,, | Performed by: INTERNAL MEDICINE

## 2024-01-22 PROCEDURE — 1159F MED LIST DOCD IN RCRD: CPT | Mod: CPTII,S$GLB,, | Performed by: INTERNAL MEDICINE

## 2024-01-22 RX ORDER — ANASTROZOLE 1 MG/1
1 TABLET ORAL DAILY
Qty: 90 TABLET | Refills: 3 | Status: SHIPPED | OUTPATIENT
Start: 2024-01-22 | End: 2025-01-21

## 2024-01-22 NOTE — PROGRESS NOTES
PROGRESS NOTE    Subjective:       Patient ID: Josefina Coon is a 59 y.o. female.  MRN: [id number]  : 1965    Chief Complaint: Invasive ductal carcinoma of breast, female, left      History of Present Illness:   Josefina Coon is a 59 y.o. female who presents with invasive ductal carcinoma of the left breast, triple positivie.   Treated by Dr. Jaquez.    Neoadjuvant TCHP with pCR   XRT completed 23 & Arimidex started thereafter inn 2023.  She completed adjuvant Herceptin and Perjeta on 10/5/23.     Interim history:   Has remained on anastrozole. Has frequent hot flashes, bothersome.   On monthly acupuncture, does not find it very helpful.   Has mild neuropathy, numbness in her toes, mostly at night.     Recently diagnosed with A fib, has been seeing Cardiology for control. On Eliquis. Has had intermittent palpitations.Has had cardioversion once.      Has had RSV recently, followed by allergies, then recent tinnitus.  Concerned about her declining renal function, will see a new nephrologist as the previous doctor left.     Diarrhea has improved.     Had a mammogram in 2023. Still has breast tenderness.     Oncology History:  Oncology History   Invasive ductal carcinoma of breast, female, left   2022 Initial Diagnosis    Invasive ductal carcinoma of breast, female, left     2022 Cancer Staged    Staging form: Breast, AJCC 8th Edition  - Clinical stage from 2022: Stage IA (cT1c, cN0(f), cM0, G3, ER+, WA+, HER2+)     2022 - 2022 Chemotherapy    Treatment Summary   Plan Name: OP BREAST TRASTUZUMAB PACLITAXEL WEEKLY  Treatment Goal: Curative  Status: Inactive  Start Date:   End Date:   Provider: Lisa Jaquez MD  Chemotherapy: PACLitaxeL (TAXOL) 80 mg/m2 = 204 mg in sodium chloride 0.9% 250 mL chemo infusion, 80 mg/m2 = 204 mg, Intravenous, Clinic/HOD 1 time, 0 of 12 cycles  pertuzumab (PERJETA) 840 mg in sodium  chloride 0.9% 278 mL infusion, 840 mg (original dose ), Intravenous, Clinic/HOD 1 time, 0 of 17 cycles  Dose modification: 840 mg (Cycle 1, Reason: Other (see comments)), 420 mg (Cycle 4, Reason: Other (see comments))  trastuzumab-dkst (OGIVRI) 591 mg in sodium chloride 0.9% 250 mL chemo infusion, 4 mg/kg = 591 mg, Intravenous, Clinic/HOD 1 time, 0 of 25 cycles     10/7/2022 -  Chemotherapy    Treatment Summary   Plan Name: OP BREAST PACLITAXEL TRASTUZUMAB WEEKLY WITH PERTUZUMAB Q3W (THP)  Treatment Goal: Control  Status: Active  Start Date: 10/7/2022  End Date: 10/5/2023  Provider: Joelle Vásquez MD  Chemotherapy: PACLitaxeL (TAXOL) 80 mg/m2 = 210 mg in sodium chloride 0.9% 250 mL chemo infusion, 80 mg/m2 = 210 mg, Intravenous, Clinic/HOD 1 time, 4 of 4 cycles  Administration: 210 mg (10/7/2022), 204 mg (10/14/2022), 204 mg (10/28/2022), 204 mg (11/4/2022), 204 mg (10/21/2022), 204 mg (11/11/2022), 204 mg (11/18/2022), 204 mg (11/25/2022), 198 mg (12/9/2022), 204 mg (12/2/2022), 198 mg (12/16/2022), 198 mg (12/23/2022)  pertuzumab (PERJETA) 840 mg in sodium chloride 0.9% 278 mL infusion, 840 mg, Intravenous, Clinic/HOD 1 time, 18 of 18 cycles  Administration: 840 mg (10/7/2022), 420 mg (10/28/2022), 420 mg (11/18/2022), 420 mg (12/9/2022), 420 mg (12/30/2022), 420 mg (1/20/2023), 420 mg (2/17/2023), 420 mg (3/10/2023), 420 mg (3/31/2023), 420 mg (4/21/2023), 420 mg (5/12/2023), 420 mg (6/2/2023), 420 mg (6/23/2023), 420 mg (7/14/2023), 420 mg (8/4/2023), 420 mg (8/25/2023), 420 mg (9/15/2023), 420 mg (10/5/2023)  trastuzumab-dkst (OGIVRI) 570 mg in sodium chloride 0.9% 250 mL chemo infusion, 593 mg (100 % of original dose 4 mg/kg), Intravenous, Clinic/HOD 1 time, 18 of 18 cycles  Dose modification: 4 mg/kg (original dose 4 mg/kg, Cycle 1, Reason: Other (see comments), Comment: weekly trastuzumab), 2 mg/kg (original dose 2 mg/kg, Cycle 2, Reason: Other (see comments), Comment: weekly maintenance dose), 6 mg/kg  (original dose 2 mg/kg, Cycle 2, Reason: MD Discretion, Comment: change to q3w dosing), 2 mg/kg (original dose 2 mg/kg, Cycle 1, Reason: Other (see comments), Comment: maintenance weekly dose)  Administration: 570 mg (10/7/2022), 840 mg (10/28/2022), 288 mg (10/14/2022), 290 mg (10/21/2022), 840 mg (11/18/2022), 831 mg (12/9/2022), 831 mg (12/30/2022), 720 mg (1/20/2023), 806 mg (2/17/2023), 750 mg (3/10/2023), 814 mg (3/31/2023), 750 mg (4/21/2023), 750 mg (5/12/2023), 750 mg (6/2/2023), 750 mg (6/23/2023), 750 mg (7/14/2023), 750 mg (8/4/2023), 750 mg (8/25/2023), 750 mg (9/15/2023), 750 mg (10/5/2023)     2/13/2023 Cancer Staged    Staging form: Breast, AJCC 8th Edition  - Pathologic stage from 2/13/2023: No Stage Recommended (ypT0, pN0(sn), cM0, GX)     3/14/2023 - 4/14/2023 Radiation Therapy    Treating physician: Shelia Trevino  Total Dose: 52.56 Gy (42.56Gy/16 fractions to whole breast; 10Gy/5 fraction boost)  Fractions: 20  Treatment Site Ref. ID Energy Dose/Fx (Gy) #Fx Dose Correction (Gy) Total Dose (Gy) Start Date End Date Elapsed Days   3D Breast_L NormEZCalc 18X 2.66 16 / 16 0 42.56 3/14/2023 4/6/2023 23   3d BrstBst_L NormBst 18X 2.5 4 / 4 0 10 4/10/2023 4/14/2023 4      8/25/22 bilateral screening mammogram,,Right neg ,Left central post mass     9/8/22 left diag MMG, left US limited .Left 0300 lateral posterior 6cm FN 1.4cm mass , left axilla LN 2, up to 4mm cortex     9/14/22 left 0300 lateral posterior 6cm FN 1.4cm mass US biopsy .Grade 3 ,1.1cm in biopsy No LVI , ER 84% MD 28% Her 2 3+ pos Ki 52 %    Left axilla LN biopsy ;Benign fatty tissue, no LN, not concordant No MRI of breasts were done      9/21/22 US bilateral complete Right neg, right LN nml , Left 0300 6cm FN [id number] mass Borderline LN left axilla     9/21/22 Left axilla LN biopsy benign LN , so eventually Stage IA triple positive Breast cancer     10/7/22: started neoadjuvant chemo with Taxol + dual HER-2 (tranztuzumab and  pertuzumab)     Receiving treatment with Paclitaxel/Trastuzumab/Pertuzumab on D1, weekly Paclitaxel on D8, D15 of a 21 day cycle. Completed weekly Paclitaxel on 22.     2023: s/p B/L mastectomy with CPR;ypT0,pN0,cM0       History:  Past Medical History:   Diagnosis Date    Abnormal liver enzymes     Asymptomatic varicose veins     Breast cancer 2022    chemo 10/2022-2022; radiation 3/2023 6 weeks; immunotherapy 10/2022-10/5/2023    Cancer     left breast cancer    Depressive disorder, not elsewhere classified     Dyslipidemia     Embolism and thrombosis of unspecified site     superficial venous thrombosis    Encounter for long-term (current) use of other medications     Family history of ischemic heart disease     Fatty liver     Generalized anxiety disorder     History of chicken pox     Obstructive sleep apnea (adult) (pediatric)     Type II or unspecified type diabetes mellitus without mention of complication, not stated as uncontrolled     Unspecified essential hypertension     Unspecified venous (peripheral) insufficiency     Unspecified vitamin D deficiency       Past Surgical History:   Procedure Laterality Date    BREAST BIOPSY Left 2022    idc    BREAST BIOPSY Left 2022    neg ln    BREAST BIOPSY Left 2022    neg ln    BREAST LUMPECTOMY Left 2023    2 lymph nodes removed     SECTION      COLONOSCOPY      ESOPHAGOGASTRODUODENOSCOPY      INSERTION OF TUNNELED CENTRAL VENOUS CATHETER (CVC) WITH SUBCUTANEOUS PORT Right 10/04/2022    Procedure: [id number];  Surgeon: Dominick Ng MD;  Location: [facility] OR;  Service: General;  Laterality: Right;    LUMPECTOMY, WITH RADAR LOCALIZATION USING TR[place]ON  Left 2023    Procedure: LUMPECTOMY,WITH RADAR LOCALIZATION USING [place] ;  Surgeon: Maria G Mcduffie MD;  Location: [facility] OR;  Service: General;  Laterality: Left;    SENTINEL LYMPH NODE BIOPSY Left 2023    Procedure: BIOPSY, LYMPH NODE,  SENTINEL;  Surgeon: Maria G Mcduffie MD;  Location: [facility];  Service: General;  Laterality: Left;    TRANSESOPHAGEAL ECHOCARDIOGRAM WITH POSSIBLE CARDIOVERSION (LAUREL W/ POSS CARDIOVERSION) N/A 07/25/2023    Procedure: TRANSESOPHAGEAL ECHOCARDIOGRAM WITH POSSIBLE CARDIOVERSION (LAUREL W/ POSS CARDIOVERSION);  Surgeon: Roni Frias MD;  Location: [facility];  Service: Cardiology;  Laterality: N/A;    VEIN SURGERY      [place], Dr. Larsen     Family History   Problem Relation Age of Onset    Hyperlipidemia Mother     Hypertension Mother     Vulvar Cancer Mother     Dementia Mother     Atrial fibrillation Father     Parkinsonism Father     Diabetes Brother     Cancer Brother         colon    Hypertension Son     Hyperlipidemia Son     Anxiety disorder Son     Stroke Maternal Grandmother      Social History     Tobacco Use    Smoking status: Never    Smokeless tobacco: Never   Substance and Sexual Activity    Alcohol use: Not Currently     Comment: rare     Drug use: Never    Sexual activity: Not Currently        ROS:   Review of Systems   Constitutional:  Negative for fever, malaise/fatigue and weight loss.   HENT:  Negative for congestion, hearing loss, nosebleeds and sore throat.    Eyes:  Negative for double vision and photophobia.   Respiratory:  Negative for cough, hemoptysis, sputum production, shortness of breath and wheezing.    Cardiovascular:  Positive for palpitations. Negative for chest pain, orthopnea and leg swelling.   Gastrointestinal:  Negative for abdominal pain, blood in stool, constipation, diarrhea, heartburn, nausea and vomiting.   Genitourinary:  Negative for dysuria, hematuria and urgency.   Musculoskeletal:  Negative for back pain, joint pain and myalgias.   Skin:  Negative for itching and rash.   Neurological:  Negative for dizziness, tingling, seizures, weakness and headaches.   Endo/Heme/Allergies:  Negative for polydipsia. Does not bruise/bleed easily.   Psychiatric/Behavioral:   "Negative for depression and memory loss. The patient is nervous/anxious. The patient does not have insomnia.         Objective:     Vitals:    01/22/24 0854   BP: 120/80   Resp: 18   Temp: 96.5 °F (35.8 °C)   TempSrc: Temporal   Weight: (!) 137 kg (302 lb 0.5 oz)   Height: 5' 4" (1.626 m)   PainSc: 0-No pain     Wt Readings from Last 10 Encounters:   01/22/24 (!) 137 kg (302 lb 0.5 oz)   01/19/24 (!) 136.5 kg (301 lb)   12/13/23 131.5 kg (290 lb)   12/01/23 132.5 kg (292 lb)   11/22/23 132 kg (291 lb 0.1 oz)   11/17/23 132 kg (291 lb 0.1 oz)   11/06/23 132 kg (290 lb 14.4 oz)   10/30/23 129.9 kg (286 lb 6 oz)   10/19/23 127 kg (280 lb)   10/12/23 127 kg (280 lb)       Physical Examination:   Physical Exam  Vitals and nursing note reviewed.   Constitutional:       General: She is not in acute distress.     Appearance: She is not diaphoretic.   HENT:      Head: Normocephalic.      Mouth/Throat:      Pharynx: No oropharyngeal exudate.   Eyes:      General: No scleral icterus.     Conjunctiva/sclera: Conjunctivae normal.   Neck:      Thyroid: No thyromegaly.   Cardiovascular:      Rate and Rhythm: Normal rate and regular rhythm.      Heart sounds: Normal heart sounds. No murmur heard.  Pulmonary:      Effort: Pulmonary effort is normal. No respiratory distress.      Breath sounds: No stridor. No wheezing or rales.   Chest:      Chest wall: No tenderness.      Comments: Post surgical changes to the left breast. No other breat mass or axillary adenopathy   Abdominal:      General: Bowel sounds are normal. There is no distension.      Palpations: Abdomen is soft. There is no mass.      Tenderness: There is no abdominal tenderness. There is no rebound.   Musculoskeletal:         General: No tenderness or deformity. Normal range of motion.      Cervical back: Neck supple.   Lymphadenopathy:      Cervical: No cervical adenopathy.   Skin:     General: Skin is warm and dry.      Findings: No erythema or rash.   Neurological:    "   Mental Status: She is alert and oriented to person, place, and time.      Cranial Nerves: No cranial nerve deficit.      Coordination: Coordination normal.      Gait: Gait is intact.   Psychiatric:         Mood and Affect: Affect normal.         Cognition and Memory: Memory normal.         Judgment: Judgment normal.          Diagnostic Tests:  Significant Imaging: I have reviewed and interpreted all pertinent imaging results/findings.      Laboratory Data:  All pertinent labs have been reviewed.  Labs:   Lab Results   Component Value Date    WBC 6.17 01/22/2024    RBC 4.08 01/22/2024    HGB 12.4 01/22/2024    HCT 37.8 01/22/2024    MCV 93 01/22/2024     01/22/2024     (H) 01/22/2024     01/22/2024    K 4.9 01/22/2024    BUN 31 (H) 01/22/2024    CREATININE 1.5 (H) 01/22/2024    AST 15 01/22/2024    ALT 27 01/22/2024    BILITOT 0.4 01/22/2024       Assessment/Plan:   Invasive ductal carcinoma of breast, female, left  - cT1c triple positive breast cancer  (ER 84% [place] 28% Her 2 3+ pos Ki 52 %), given her young age and Ki67%, will proceed with TH, adding P to help with a better pCR, will also plan to get ultrasound mid cycle to monitor (3 months)  -9/26/22 Echo with EF 60%; next is scheduled for 01/18/23  -Began TH+P on 10/7/2022; completed 12 weeks of Paclitaxel 12/23/22  - 2/2023; s/p lupmectomy CPR;ypT0,pN0,cM0  -completed Trastuzumab/Pertuzumab   -  adjuvant radiation plan for 16 fraction total - completed 04/13/23  - last period  around~ 50,, post menopausal , on anastrazole  June 2nd /2023 ,  -Continue active surveillance. Normal mammogram in October 2023, repeat in one year.     Hot flashes   Continue acupuncture, declined pharmacologic interventions.     Stage 3b chronic kidney disease  Continue Nephrology follow up.   Wants to avoid taking any new medications for now for hot flashes or neuropathy at this time.     Paroxysmal atrial fibrillation  Per HPI. Continue cardiology follow up.      Diabetes type 2 with neuropathy   Taking Metformin, Breonnarey  has stopped gabapentin.     Anxiety about health  On Wellbutrin. Follows up with a therapist.     Osteopenia, unspecified location  DEXA scan, osteopenia, cont taking Vit D      ECOG SCORE    1 - Restricted in strenuous activity-ambulatory and able to carry out work of a light nature       MDM includes  :    - Acute or chronic illness or injury that poses a threat to life or bodily function  - Independent review and explanation of 3+ results from unique tests  - Discussion of management and ordering 3+ unique tests  - Extensive discussion of treatment and management  - Prescription drug management  - Drug therapy requiring intensive monitoring for toxicity     Discussion:   No follow-ups on file.    Plan was discussed with the patient at length, and she verbalized understanding. Josefina was given an opportunity to ask questions that were answered to her satisfaction, and she was advised to call in the interval if any problems or questions arise.    Electronically signed by Joelle Vásquez MD      Route Chart for Scheduling    Med Onc Chart Routing      Follow up with physician 3 months. with Dr. Dee or Dr. Holder   Follow up with BRANDIE    Infusion scheduling note    Injection scheduling note    Labs    Imaging    Pharmacy appointment    Other referrals                  Treatment Plan Information   OP BREAST PACLITAXEL TRASTUZUMAB WEEKLY WITH PERTUZUMAB Q3W (THP)   Joelle Vásquez MD   Upcoming Treatment Dates - OP BREAST PACLITAXEL TRASTUZUMAB WEEKLY WITH PERTUZUMAB Q3W (THP)    No upcoming days in selected categories.    Supportive Plan Information  IV FLUIDS AND ELECTROLYTES   Joelle Vásquez MD   Upcoming Treatment Dates - IV FLUIDS AND ELECTROLYTES    No upcoming days in selected categories.

## 2024-01-26 ENCOUNTER — CLINICAL SUPPORT (OUTPATIENT)
Dept: HEMATOLOGY/ONCOLOGY | Facility: CLINIC | Age: 59
End: 2024-01-26
Payer: COMMERCIAL

## 2024-01-26 VITALS — WEIGHT: 293 LBS | BODY MASS INDEX: 50.02 KG/M2 | HEIGHT: 64 IN

## 2024-01-26 DIAGNOSIS — E11.9 TYPE 2 DIABETES MELLITUS WITHOUT COMPLICATION, WITHOUT LONG-TERM CURRENT USE OF INSULIN: ICD-10-CM

## 2024-01-26 DIAGNOSIS — Z71.3 NUTRITIONAL COUNSELING: ICD-10-CM

## 2024-01-26 DIAGNOSIS — C50.912 INVASIVE DUCTAL CARCINOMA OF BREAST, FEMALE, LEFT: Primary | ICD-10-CM

## 2024-01-26 DIAGNOSIS — E66.01 MORBID OBESITY: ICD-10-CM

## 2024-01-26 PROCEDURE — 99999 PR PBB SHADOW E&M-EST. PATIENT-LVL I: CPT | Mod: PBBFAC,,,

## 2024-01-26 PROCEDURE — 97803 MED NUTRITION INDIV SUBSEQ: CPT | Mod: S$GLB,,,

## 2024-01-26 NOTE — PROGRESS NOTES
"Oncology Nutrition Assessment for Medical Nutrition Therapy  Follow Up Visit    Josefina Coon   1965    Referring Provider:  Eugenia RENEE    Reason for Visit: Pt in for education and nutrition counseling     PMHx:   Past Medical History:   Diagnosis Date    Abnormal liver enzymes     Asymptomatic varicose veins     Breast cancer 09/14/2022    chemo 10/2022-12/2022; radiation 3/2023 6 weeks; immunotherapy 10/2022-10/5/2023    Cancer     left breast cancer    Depressive disorder, not elsewhere classified     Dyslipidemia     Embolism and thrombosis of unspecified site     superficial venous thrombosis    Encounter for long-term (current) use of other medications     Family history of ischemic heart disease     Fatty liver     Generalized anxiety disorder     History of chicken pox     Obstructive sleep apnea (adult) (pediatric)     Type II or unspecified type diabetes mellitus without mention of complication, not stated as uncontrolled     Unspecified essential hypertension     Unspecified venous (peripheral) insufficiency     Unspecified vitamin D deficiency        Nutrition Assessment    Today  Pt states she has been struggling since last RD visit and has really had difficulty with breaking this "sugar addiction." Pt feels it has actually gotten worse since she was last here. Pt attributes this to the holidays, her birthday, and she got sick with RSV then vertigo. Pt reports feeling of defeat in terms of her relationship with food. She is seeing a counselor regularly.   Pt states she is back on Trulicity. She has been on 1.5 mg for a month but her PCP is increasing to 3 mg.  Pt reports to RD she has tried many things in the past for dieting including Ideal Protein and Weight Watchers, but nothing seems to help her for long.   RD spent a lot of time talking with patient about her motivation, which includes her grown son with autism, and finding accountability at home through a family member or " "friend.     Weight:(!) 138.5 kg (305 lb 5.4 oz)  Height:5' 4" (1.626 m)  BMI:Body mass index is 52.41 kg/m².   IBW: Ideal body weight: 54.7 kg (120 lb 9.5 oz)  Adjusted ideal body weight: 88.2 kg (194 lb 7.8 oz)    12/1/2023  This is a 59 y.o.female with an oncology history of breast cancer who has completed chemotherapy and radiation. Pt states she did have her port removed this week. Pt states she is having intense sugar cravings. She does have type 2 diabetes. Pt states she has been on Metformin for years. She was on Mounjaro and did not feel like it was working so she kept increasing the dosage but unfortunately it came with a lot of side effects. Pt decided to get off of it and is trying to decide if she is going to get back on it or go back to Trulicity; which did not have any side effects but did not help with weight gain. Encouraged pt to keep communicating with PCP on that.   Pt states she started eating sour, sweet candy, such a sour patch kids, during treatment when her taste was off and cannot get off of them. She picks them up every time she goes to the grocery store.    Diet recall:  Breakfast- cereal (Honey Nut Cheerios, Special K, Frosted Mini Wheats) with almond milk  AM snack- apple  Lunch- Leftover or  something such as [employer] or [employer]  Dinner- Cook Monday-Thursday (meat, starch, vegetables), Weekend nights usually go out or   HS Snack- pie, ice cream, bowl of cereal    Allergies: Sitagliptin, Byetta  [exenatide], Lactose, and Sulfa (sulfonamide antibiotics)    Current Medications:    Current Outpatient Medications:     albuterol (PROVENTIL/VENTOLIN HFA) 90 mcg/actuation inhaler, Inhale 2 puffs into the lungs every 6 (six) hours as needed for Wheezing., Disp: 18 g, Rfl: 6    ALPRAZolam (XANAX) 0.25 MG tablet, TAKE ONE TABLET BY MOUTH 1-2 TIMES DAILY AS NEEDED ANXIETY, Disp: 15 tablet, Rfl: 0    anastrozole (ARIMIDEX) 1 mg Tab, Take 1 tablet (1 mg total) by mouth once " daily., Disp: 90 tablet, Rfl: 3    apixaban (ELIQUIS) 5 mg Tab, Take 1 tablet (5 mg total) by mouth 2 (two) times daily., Disp: 180 tablet, Rfl: 3    azelastine (ASTELIN) 137 mcg (0.1 %) nasal spray, 2 sprays by Nasal route 2 (two) times daily as needed for Rhinitis., Disp: , Rfl:     buPROPion (WELLBUTRIN XL) 150 MG TB24 tablet, TAKE 1 TABLET BY MOUTH EVERY DAY (Patient taking differently: Take 150 mg by mouth once daily.), Disp: 90 tablet, Rfl: 3    cyanocobalamin 1,000 mcg/mL injection, Inject 1 ml q 2 weeks x 1 month then 1 ml monthly (Patient taking differently: Inject 1,000 mcg into the muscle every 30 days. Inject 1 ml q 2 weeks x 1 month then 1 ml monthly), Disp: 30 mL, Rfl: 0    dapagliflozin propanediol (FARXIGA) 5 mg Tab tablet, Take 1 tablet (5 mg total) by mouth once daily., Disp: 90 tablet, Rfl: 3    dronedarone (MULTAQ) 400 mg Tab, Take 1 tablet (400 mg total) by mouth 2 (two) times daily with meals., Disp: 60 tablet, Rfl: 11    dulaglutide (TRULICITY) 1.5 mg/0.5 mL pen injector, Inject 1.5 mg into the skin every 7 days., Disp: 4 pen , Rfl: 0    dulaglutide (TRULICITY) 3 mg/0.5 mL pen injector, Inject 3 mg into the skin every 7 days., Disp: 4 pen , Rfl: 11    lipase-protease-amylase 6,000-19,000-30,000 units (CREON) 6,000-19,000 -30,000 unit capsule, Take 1 capsule by mouth 3 (three) times daily with meals. (Patient not taking: Reported on 1/22/2024), Disp: 90 capsule, Rfl: 11    meclizine (ANTIVERT) 12.5 mg tablet, Take 1 tablet (12.5 mg total) by mouth 3 (three) times daily as needed for Dizziness., Disp: 30 tablet, Rfl: 0    metFORMIN (GLUCOPHAGE) 500 MG tablet, TAKE 1 TABLET BY MOUTH TWICE A DAY WITH MEALS, Disp: 180 tablet, Rfl: 1    nystatin (MYCOSTATIN) powder, Apply to affected area 3 times daily (Patient not taking: Reported on 1/22/2024), Disp: 60 g, Rfl: 1    pravastatin (PRAVACHOL) 10 MG tablet, TAKE 1 TABLET BY MOUTH EVERYDAY AT BEDTIME, Disp: 90 tablet, Rfl: 1    Current  Facility-Administered Medications:     cyanocobalamin injection 1,000 mcg, 1,000 mcg, Intramuscular, Once, Raya Echols MD    Facility-Administered Medications Ordered in Other Visits:     lactated ringers infusion, , Intravenous, Continuous, Maria G Mcduffie MD, Last Rate: 125 mL/hr at 02/08/23 0700, New Bag at 02/08/23 0814    midazolam (VERSED) 1 mg/mL injection 2 mg, 2 mg, Intravenous, See admin instructions, Maria G Mcduffie MD, 2 mg at 02/08/23 0711    Vitamins/Supplements: Reviewed     Labs: Reviewed     Nutrition Diagnosis    Problem: excessive protein/calorie intake   Etiology (related to): dietary non-compliance  Signs/Symptoms (as evidenced by): BMI = 52.41    Nutrition Intervention    Nutrition Prescription   1855 Kcals (14 kcal/kg CBW)   76g protein (1.4 g/kg IBW)   1855 mL fluid (1 mL/kg CBW)    Recommendations:  Today:  Reviewed previous recommendations  Find motivation and place picture or word around the house as reminder  Finding accountability partner  Having family grocery shop so will not get tempted in grocery store to buy off grocery list  150 minutes of activity per week    12/1/2024  No more than 25 grabs added sugar per day  Protein with every meal and snack  Limit white, refined carbohydrates and choose 100% whole grains  Vegetables should take up 1/2 plate  Choose healthier snacks- examples discussed     Materials Provided/Reviewed   Eat Fit Grocery Shopping List  Different Names for Sugar  Carbohydrate Counting for People with Diabetes  Nutrition for Breast Cancer Survivors    Nutrition Monitoring and Evaluation    Monitor: energy intake, weight, and adherence to nutrition recommendations     Goals: 1-2# weight loss per week until BMI WNL    Follow up In 8 weeks     Communication to referring provider/care team: note available in chart     Counseling time: 1 hour    Eloisa Perales MS, RD, LDN  [phone]

## 2024-02-02 ENCOUNTER — CLINICAL SUPPORT (OUTPATIENT)
Dept: REHABILITATION | Facility: HOSPITAL | Age: 59
End: 2024-02-02
Payer: COMMERCIAL

## 2024-02-02 DIAGNOSIS — M54.50 CHRONIC MIDLINE LOW BACK PAIN WITHOUT SCIATICA: Primary | ICD-10-CM

## 2024-02-02 DIAGNOSIS — M54.2 NECK PAIN: ICD-10-CM

## 2024-02-02 DIAGNOSIS — R60.0 BILATERAL LOWER EXTREMITY EDEMA: ICD-10-CM

## 2024-02-02 DIAGNOSIS — T45.1X5A CHEMOTHERAPY-INDUCED PERIPHERAL NEUROPATHY: ICD-10-CM

## 2024-02-02 DIAGNOSIS — C50.912 INVASIVE DUCTAL CARCINOMA OF BREAST, FEMALE, LEFT: Primary | ICD-10-CM

## 2024-02-02 DIAGNOSIS — I87.2 VENOUS (PERIPHERAL) INSUFFICIENCY: ICD-10-CM

## 2024-02-02 DIAGNOSIS — G62.0 CHEMOTHERAPY-INDUCED PERIPHERAL NEUROPATHY: ICD-10-CM

## 2024-02-02 DIAGNOSIS — G89.29 CHRONIC MIDLINE LOW BACK PAIN WITHOUT SCIATICA: Primary | ICD-10-CM

## 2024-02-02 PROCEDURE — 97810 ACUP 1/> WO ESTIM 1ST 15 MIN: CPT | Mod: PN | Performed by: ACUPUNCTURIST

## 2024-02-02 PROCEDURE — 97811 ACUP 1/> W/O ESTIM EA ADD 15: CPT | Mod: PN | Performed by: ACUPUNCTURIST

## 2024-02-02 NOTE — PROGRESS NOTES
Acupuncture Follow-Up Note     Name: Josefina Coon  Clinic Number: [id number]    Traditional Chinese Medicine (TCM) Diagnosis: Qi Stagnation, Blood Stasis, Qi Deficiency, Blood Deficiency, Damp, and Phlegm  Medical Diagnosis:   1. Chronic midline low back pain without sciatica    2. Neck pain    3. Chemotherapy-induced peripheral neuropathy         Evaluation Date: 2/2/2024    Visit #/Visits authorized:     Precautions: Standard    Subjective     Chief Concern: Peripheral Neuropathy (CIPN bilaterally in hands and feet.  Patient doesn't seem to be getting any results, but she is also overweight and doesn't get enough cardiovascular exercise to help with fluid retention. )       Medical necessity is demonstrated by the following IMPAIRMENTS: Medical Necessity: Decreased mobility limits day to day activities, social, and emergent situations              Aggravating Factors:  movement     Relieving Factors:  rest    Symptom Description:     Quality:  Aching, Dull, and Edematous  Severity:  3  Frequency:  every day      Objective     Observation: Patient has edematous lower extremities, with pooling around both ankles.  Acupuncture is not aiding swelling but is helping with other symptoms.  Many pain markers are 3/10 or less except Hot flashes which she rates 4/10.  I recommended aquatic physical therapy so she can be more active with less pain.     Pulse:        thready       New Findings:  na    Treatment     Treatment Principles:  move qi and blood, relieve bi, clear channels, drain dampness    Acupuncture points used:  BA DONTE, Gb34, Ki3, Li11, Sp6, Sp9, St36, and YIN MELTON    Bilateral points:  Unilateral points:  Auricular Treatment:  acharya men    Needles In: 25  Needles Out: 25  Needles W/O STIM placed: 1035  Needles W/O STIM removed: 1055      Other Traditional Chinese Medicine Modalities -  na    Assessment     After treatment, patient felt relief and plans to continue.  She also plans to work with aquatic  physical therapist.     Patient prognosis is Good.     Patient will continue to benefit from acupuncture treatment to address the deficits listed in the problem list box on initial evaluation, provide patient family education and to maximize pt's level of independence in the home and community environment.     Patient's spiritual, cultural and educational needs considered and pt agreeable to plan of care and goals.     Anticipated barriers to treatment: none    Plan     Recommend 1 /week for 12 sessions then re-assess.      Education:  Patient is aware of cumulative benefit of acupuncture

## 2024-02-05 ENCOUNTER — TELEPHONE (OUTPATIENT)
Dept: HEMATOLOGY/ONCOLOGY | Facility: CLINIC | Age: 59
End: 2024-02-05
Payer: COMMERCIAL

## 2024-02-21 ENCOUNTER — CLINICAL SUPPORT (OUTPATIENT)
Dept: REHABILITATION | Facility: HOSPITAL | Age: 59
End: 2024-02-21
Payer: COMMERCIAL

## 2024-02-21 DIAGNOSIS — C50.912 INVASIVE DUCTAL CARCINOMA OF BREAST, FEMALE, LEFT: ICD-10-CM

## 2024-02-21 DIAGNOSIS — R60.0 BILATERAL LOWER EXTREMITY EDEMA: Primary | ICD-10-CM

## 2024-02-21 DIAGNOSIS — R26.89 FUNCTIONAL GAIT ABNORMALITY: ICD-10-CM

## 2024-02-21 DIAGNOSIS — R60.0 BILATERAL LOWER EXTREMITY EDEMA: ICD-10-CM

## 2024-02-21 DIAGNOSIS — I87.2 VENOUS (PERIPHERAL) INSUFFICIENCY: ICD-10-CM

## 2024-02-21 DIAGNOSIS — C50.912 INVASIVE DUCTAL CARCINOMA OF BREAST, FEMALE, LEFT: Primary | ICD-10-CM

## 2024-02-21 PROCEDURE — 97162 PT EVAL MOD COMPLEX 30 MIN: CPT | Mod: PN

## 2024-02-21 PROCEDURE — 97535 SELF CARE MNGMENT TRAINING: CPT | Mod: PN

## 2024-02-21 NOTE — PATIENT INSTRUCTIONS
"  Lymph Drainage Exercises for Lower Extremity    Decongestive exercises are important to do to help manage your lymphedema, especially in the early phase of treatment. The exercises should be done in the following order: trunk, neck, shoulder, legs. Do 3 to 5 repetitions of each exercise. You may choose to do more, but it is important to follow them in order. It does not have to be a strong muscle contraction to work; the idea is to simply contract the muscle. If you have problems with doing these exercises, talk to your physical therapist about the changes that can be made. Your bandages or stockings should be worn during the exercises. Exercises should be done with both the affected and non-affected limb.    Abdominal Breathing Exercises      Get comfortable and relax your neck and shoulders. You can sit or lie down. Place one hand on your upper chest and place the other hand on your belly button. Use your hands to feel the movements as you breathe in and out.  Take a deep breath in through your nose and feel the hand on your stomach move out. Do not let your shoulders move up. The hand on your chest should not move.   Breathe out slow and gentle through your mouth. Pucker your lips as if you were going to whistle or blow out a candle. The hand on your stomach should move in as you breathe out. Breathe out as long as you can until all the air is gone.   To help keep the lymphatic system moving well, practice two breaths every hour using the steps for abdominal breathing exercises.      Pelvic Tilt     Lie on your back with knees slightly bent and feet flat. Using your stomach muscles "tilt" your pelvis and flatten your lower back into the floor or bed. Imagine pressing down on a stephen with the small of your back and count to ten. Release and repeat. Repeat 10 times.     Toe Curls/Toe Splays    Curl toes under, then spread toes apart. Repeat 10 times. Repeat with other foot.      Ankle Pumps    Move your foot up " "and down as if pushing down or letting up on a gas pedal in a car. Repeat 10 times. Repeat with other foot.      Ankle Inversion / Eversion    Move your foot side to side. Repeat 10 times. Repeat with other foot.      Ankle Circles    Make small circles and try to move only from the ankle. Perform Craig motion in both directions. Repeat 10 times. Repeat with other foot.      Quad Set    Lie with one leg straight and one leg bent with foot flat. With the straight leg, slowly tighten muscles on thigh. Switch leg positions and repeat on with other leg. Repeat 10 times.     Flexion    Lie with both legs straight. Slide one leg up bending the knee and slide it back down. Repeat with other leg. Repeat 10 times.     Leg Falls    Bend both knees and keep your feet flat on the floor or bed. Keep one leg in place and slowly lower your other leg out to the side. Bring your leg back to center. Repeat with the other leg. Repeat 10 times.     Leg Slides    Lie with both legs straight. Slide your leg out to the side and return it to the center. Keep your knees straight and pointing up during the exercise. Repeat with the other leg.      Gluteal Sets    Squeeze "rear end" and hold tightening you bottom. Repeat 10 times.     You may want to end your exercise session with more abdominal breathing. It may also be a good idea to continue lying down and elevate your leg on a pillow while relaxing for a few minutes.      This handout is for informational purposes only. Talk with your doctor or health care team if you have any questions about your care.  © January 27, 2022. The [facility] [address].   [email or web address]       "

## 2024-02-21 NOTE — PROGRESS NOTES
[facility]  Physical Therapy Initial Evaluation  Lymphedema Therapy    Visit Date: 2/21/2024     Name: Josefina Coon  Clinic Number: [id number]  Therapy Diagnosis:   Encounter Diagnoses   Name Primary?    Bilateral lower extremity edema     Unspecified venous (peripheral) insufficiency     Invasive ductal carcinoma of breast, female, left      Physician: Raya Echols MD  Physician Orders: PT Eval and Treat   Medical Diagnosis from Referral: Bilateral lower extremity edema, unspecified venous (peripheral) insufficieny. Invasive ductal carcinoma of breast, female, left  Evaluation Date: 2/21/2024  Authorization: pending  Plan of Care Expiration: 02/21/2024-04/21/2024  Reassessment Due: 03/21/2024  Fact-G Completed: 1 / 2    Visit: 1 / 12  PTA Visit: - / 5  Time In: 03:02 PM  Time Out: 04:15 PM  Total Billable Time: 68 minutes    Precautions: Standard, Fall and cancer    Subjective     Pt reports: Legs seem to be getting more swollen and darker. Don't want any sores or blisters. I have had at least 6 vein procedures and has been about 3-4 years since last ablation . At time had the ablations they order thigh high compression stockings but would roll down, and the knee highs would cut into the legs. Pt reports she elevates the legs at night and notices a little difference, legs will feel tired and heavy by the end of the day.  Pt also reports her balance hasn't been great, since having chemo and is interested in doing aquatic PT in the future after her lymphedema has been addressed. Pt states has been working with  dieticians for weight loss as well.      Pain  Location: bilateral lower legs  Current 4/10, Worst 8/10, Best 2/10   Description: discomfort with deep pressure, achy, heaviness      Past Medical History:   Past Medical History:   Diagnosis Date    Abnormal liver enzymes     Asymptomatic varicose veins     Breast cancer 09/14/2022    chemo 10/2022-12/2022; radiation  3/2023 6 weeks; immunotherapy 10/2022-10/5/2023    Cancer     left breast cancer    Depressive disorder, not elsewhere classified     Dyslipidemia     Embolism and thrombosis of unspecified site     superficial venous thrombosis    Encounter for long-term (current) use of other medications     Family history of ischemic heart disease     Fatty liver     Generalized anxiety disorder     History of chicken pox     Obstructive sleep apnea (adult) (pediatric)     Type II or unspecified type diabetes mellitus without mention of complication, not stated as uncontrolled     Unspecified essential hypertension     Unspecified venous (peripheral) insufficiency     Unspecified vitamin D deficiency        Past Surgical History:  has a past surgical history that includes  section; Vein Surgery; Insertion of tunneled central venous catheter (CVC) with subcutaneous port (Right, 10/04/2022); Breast biopsy (Left, 2022); Breast biopsy (Left, 2022); Breast biopsy (Left, 2022); Colonoscopy; Esophagogastroduodenoscopy; lumpectomy, with radar localization using mike  (Left, 2023); [place] lymph node biopsy (Left, 2023); transesophageal echocardiogram with possible cardioversion (michel w/ poss cardioversion) (N/A, 2023); and Breast lumpectomy (Left, 2023).    Medications: has a current medication list which includes the following prescription(s): albuterol, alprazolam, anastrozole, apixaban, azelastine, bupropion, cyanocobalamin, dapagliflozin propanediol, multaq, trulicity, trulicity, creon, meclizine, metformin, nystatin, and pravastatin, and the following Facility-Administered Medications: cyanocobalamin, lactated ringers, and midazolam.    Allergies:   Review of patient's allergies indicates:   Allergen Reactions    Sitagliptin      Other reaction(s): (januvia) abdominal pain    Byetta  [exenatide] Rash    Lactose     Sulfa (sulfonamide antibiotics) Hives and Rash          Diet:  Has been working with dietician, drinking plenty water  Habitus: overweight  BMI 52.41 kg    Prior Therapy/Previous treatment included: No PT this calendar year.   DME owned: has bench but not currently using  Social History: lives with 2 sons  Place of Residence (Steps/Adaptations): 4-5 steps to raised home.  Occupation: works as a home health nurse  Prior Exercise Routine: Try do walking twice a day 15 minutes each, walking the dog.   Prior Level of Function: independent  Current Level of Function: see above    Patient's Goals: decrease swelling, decrease pain, increase mobility    Objective     Mental Status: Alert/Oriented    Observation  Posture: rounded shoulders, increased REGAN    Integumentary System  Skin Integrity: cuffing at ankles, hemosiderin staining to anterior left lower leg, no wounds or sores noted. Varicosities,   Circulation: intact   Palpation: decreased tissue pliability with fibrosis to left lower leg.   Edema   Amount: stage 2   Location: B LE's    Sensation  Light Touch: intact bilat  Proprioception: intact bilat    A/PROM  (L) LE: hips all planes WFL, knee flexion ~100 deg, knee ext 0 deg, ankle DF 5 deg, PF WNL  (R) LE:  hips all planes WFL, knee flexion ~100 deg, knee ext 0 deg, ankle DF 5 deg, PF WNL  Limitations:      STRENGTH  (L) LE: grossly 4/5  (R) LE: grossly 4/5  Limitations: extra time and effort with sit to stand transfers        Measurements  LANDMARK LEFT LE (cm) RIGHT LE (cm)   H + 15 in 54.7 52.5   H + 10 in 46.9 47.5   H + 5 in 31.5 24.0   10 cm from great toe 24.3 24.3   MTP Jt line 25.0 24.0   Leg Length  39 cm   Garments recommended: anticipating need for B LE inelastic velcro wraps with transitional liner socks. Pt has previously purchased bioflect leggings, has misplaced them.   Recommending future need for home pneumatic compression pump, Pt in agreement for PT to send pt demographics to [employer] to check insurance coverage for pump.     Functional Mobility   Bed  mobility: independent   Roll to left: independent   Roll to right: independent   Supine to prone: independent   Scooting to edge of bed: independent   Supine to sit: independent   Sit to supine: independent   Transfers to bed: independent   Transfers to toilet: independent   Sit to stand: independent   Stand pivot: independent   Car transfers: independent     Gait Assessment  AD used: none  Assistance: independent  Distance: community distances  Endurance: fair    Gait Pattern: Decreased susan, increased REGAN, guarded gait.       Treatment     Treatment Time In: 03:02 Pm  Treatment Time Out: 04;15 PM  Total Treatment time separate from Evaluation: 30 minutes    Patient received trial of pneumatic compression pump after being cleared for contraindications.   Pt received trial of pneumatic compression pump set at 35mmHg for 15 minutes each leg with good tissue response and tolerance. Pt without adverse response. While receiving trial of pump, pt was educated on CDT and what to expect for next session. Advised pt of wearing wide tennis shoes (to accommodate bandages) with good support as pt feel unsteady with walking at times.  Pt was educated on LE lymph flow exercises and encouraged to perform daily while legs elevated. Handout provided.  Pt received TG  G to B LE's, advised to remove at night when sleeping and omega first thing in am before walking around, will be used as your temporary compression socks while not in gradient compression bandages.        Education: Instructed on general anatomy/physiology, lymphedema information (definitions, signs, symptoms, precautions), role of therapy in multi-disciplinary team, purpose of lymphedema physical therapy and the benefits/risks of treatment, risks of refusing treatment, POC, and goals for therapy were discussed with the pt.    Written Home Exercises Provided: yes.  Exercises were reviewed and Josefina was able to demonstrate them prior to the end of the session. Josefina  demonstrated good  understanding of the education provided.     See EMR under Patient Instructions for exercises provided 2/21/2024.    Assessment     Josefina is a 59 y.o. female referred to outpatient physical therapy with a medical diagnosis of Bilateral lower extremity edema, unspecified venous (peripheral) insufficieny. Invasive ductal carcinoma of breast, female, left. Patient presents with Stage 2 bilateral lower extremity lymphedema secondary to venous insufficiency. This patient presents with increased pain, increased stiffness in the knees, ankles, as well as difficulty donning compression thigh high and knee high stockings and limitations with tolerating wearing garments, pt is also at risk of higher infection. This pt would benefit from skilled therapy services to address the above impairments. Patient has tried elevating LE's, wearing compression however swelling is persistent and unresolved.    Feel patient would benefit from alternative compression garments such as inelastic velcro compression as well as a home pneumatic compression pump to assist in managing symptoms and reducing risk for infections.     Plan of care discussed with patient: Yes  Pt's spiritual, cultural and educational needs considered and patient is agreeable to the plan of care and goals as stated below:     Anticipated barriers for therapy: scheduling availability    Medical Necessity is demonstrated by the following:  History  Co-morbidities and personal factors that may impact the plan of care Co-morbidities:   high BMI, history of cancer, HTN, and level of undertstanding of current condition    Personal Factors:   level of understanding of current condition     moderate   Examination  Body Structures and Functions, activity limitations and participation restrictions that may impact the plan of care Body Systems:    gross symmetry  ROM  strength  balance  edema  skin integrity  skin color    Activity limitations:    Mobility  walking    Self care  no deficits    Domestic Life  no deficits    Participation Restrictions:   none         moderate   Clinical Presentation evolving clinical presentation with changing clinical characteristics moderate   Decision Making/ Complexity Score: moderate       GOALS  Short Term Goals: 3 weeks  Patient will demonstrate 100% knowledge of lymphedema precautions and signs of infection.   Patient will tolerate iADLs with multilayered bandaging for 24-48 hours.   Patient will perform self-bandaging techniques without correction for progress towards compression garments.  Decrease girth by average 1 cm for improved mobility and safety with iADLs.  Patient to report compliance sleeping on 30 degree incline for lymphatic protection.   Patient will demonstrate proper posture with sitting and standing to decrease detrimental affects to adjacent structures.   Patient will tolerate HEP for better progression toward LTGs and self-management of presentation.     Long Term Goals: 6 weeks  Patient will be independent with HEP for self-management of symptoms and current status.   Decrease girth by average 2 cm for improved mobility and safety with iADLs.  Patient will perform self lymphatic drainage techniques for long term management of lymphedema.   Patient to don/doff compression garments for self management of lymphedema.   Patient to obtain pneumatic compression pump for daily home use to assist in maintaining reduced swelling.   Patient will report pain reduction to < or = 3 with AROM for improved safety with iADLs (driving, household chores, yard work, job duties).   Patient to be able to lift legs on/off mat/bed independently at 50% less effort for improved safety with navigating bed mobility.     Plan   PT to send pt information/demographics to [employer] to check insurance coverage for a home pneumatic compression pump.   PT to request MD order/prescription for garments to be faxed to [employer].    Plan of Care Certification: 2/21/2024 to 04/21/2024    Outpatient Physical Therapy 2-3 time(s) a week for 6 weeks to include the following interventions: patient education, HEP, therapeutic exercises, neuromuscular re-education, therapeutic activity, manual therapy including pneumatic compression pump and lymphatouch, self care/home management, modalities, gait training, decongestive massage, multi-layered bandaging, self massage, self bandaging, and assistance in obtaining appropriate compression garment.    Patient may be seen by a PTA as part of the rehabilitation team.    Catalina Cerda, PT , CLT

## 2024-03-01 ENCOUNTER — CLINICAL SUPPORT (OUTPATIENT)
Dept: REHABILITATION | Facility: HOSPITAL | Age: 59
End: 2024-03-01
Payer: COMMERCIAL

## 2024-03-01 DIAGNOSIS — I87.2 VENOUS (PERIPHERAL) INSUFFICIENCY: ICD-10-CM

## 2024-03-01 DIAGNOSIS — C50.912 INVASIVE DUCTAL CARCINOMA OF BREAST, FEMALE, LEFT: ICD-10-CM

## 2024-03-01 DIAGNOSIS — R60.0 BILATERAL LOWER EXTREMITY EDEMA: ICD-10-CM

## 2024-03-01 PROCEDURE — 97110 THERAPEUTIC EXERCISES: CPT | Mod: PN,CQ

## 2024-03-01 PROCEDURE — 97140 MANUAL THERAPY 1/> REGIONS: CPT | Mod: PN,CQ

## 2024-03-01 NOTE — PROGRESS NOTES
[facility] Outpatient  Physical Therapy Progress Note  Lymphedema Therapy    Visit Date: 3/1/2024    Name: Josefina Coon  MRN: [id number]  Therapy Diagnosis:   Encounter Diagnoses   Name Primary?    Bilateral lower extremity edema     Venous (peripheral) insufficiency     Invasive ductal carcinoma of breast, female, left        Physician: Raya Echols MD  Physician Orders: PT Eval and Treat   Medical Diagnosis from Referral: Bilateral lower extremity edema, unspecified venous (peripheral) insufficieny. Invasive ductal carcinoma of breast, female, left  Evaluation Date: 2/21/2024  Authorization: pending  Plan of Care Expiration: 02/21/2024-04/21/2024  Reassessment Due: 03/21/2024  Fact-G Completed: 1 / 2     Visit: 2 / 12  PTA Visit: 1 / 5  Time In: 08:00 AM  Time Out: 09:00 PM  Total Billable Time: 60 minutes     Precautions: Standard, Fall and cancer      Subjective     Patient states: that she only has one pair of shoes that fit so hopes that the bandages will fit into her shoes. Left leg is more swollen that right. Pt states that she didn't realize that she needed to wear the tubigrips on her lower legs when out of bed, only wore them that one time. Have started water aerobics at [facility], and really enjoying it.     Pain: 2/10   Location: bilateral lower legs (heaviness)    Objective   Pt to clinic with tennis shoes  Josefina participated in / received the following treatment:  Discussed the purpose, mechanism, and indications of MLD with pt. Pt was cleared of all contraindications and precautions, including but not limited to cardiac edema, renal failure, acute infection, acute bronchitis, acute DVT, malignancies, bronchial asthma, HTN, pregnancy, ileus, aortic aneurysm, recent abdominal surgery, IBD, diverticulosis, XRT fibrosis or cystitis, colitis. Risks and benefits of tx were reviewed with patient, to which patient was in agreement to continue with tx today.      Discussed the purpose, mechanism, and indications of Compression Therapy with pt. Pt was cleared of all contraindications and precautions, including but not limited to cardiac edema, PAD, acute infection, HTN, cardiac arrhythmia, hyposensitivity, paralysis, CHF, diabetes, malignancy. Risks and benefits of tx were reviewed with patient, to which patient was in agreement to continue with tx today.        Manual Therapy to develop flexibility, extensibility, desensitization, pliability, and contour for 50 minutes including:  Pt received MLD to bilateral lower extremities, with pre treatment short neck series to include:  terminus, sternal notch, lateral and posterior neck, right axilla, abdomen, and bilateral inguinal triangle, followed by full leg sequence with rework accessing all watershed areas on trunk. Concurrent use of pneumatic compression pump at 30 mmHg, with pt tolerating well and expressing comfort. Lymphatouch was applied to left lower extremity at 60 mmHg to increase angiomotoricity. Eucerin lotion applied to dry skin, followed by application of compression bandages to left lower leg as follows:  tubigrip G to left lower leg, comprefoam and cast padding applied to achieve a cylindrical shape, (2) 10 cm Rosidal short stretch bandages, (1) 12 cm Rosidal short stretch bandage applied with gentle, gradient compression. Pt advised to wear compression bandages for 24 hrs, remove and wash, bring to next appointment for re use. Tubigrips are to be worn when not bandaged, in daytime only. Remove bandages if she experiences pain, SOB, or vascular compromise. Pt verbalized understanding.     Therapeutic Exercise to develop strength, endurance, ROM, and flexibility for 10 minutes including:  Ankle pumps  Ankle inv/ev  Ankle circles  Toe curls/flares  QS/GS    Education Provided  [] Progress toward goals   [] Role of therapy   [] Activity modification  [x] Reviewed HEP      Home Exercises Provided: Patient  instructed to continue previously provided HEP.  Exercises were reviewed and Josefina was able to demonstrate them prior to the end of the session.  Josefina demonstrated good  understanding of the education provided.   See EMR under Patient Instructions for exercises provided  2/21/2024 .    Assessment     Patient tolerated treatment well without visible adverse affects or acute distress. Pt was able to fit bandage foot into athletic shoe. Will bandage bilateral lower legs next session and trial of Rock tape to upper thigh area.     Josefina is a 59 y.o. female referred to outpatient physical therapy with a medical diagnosis of Bilateral lower extremity edema, unspecified venous (peripheral) insufficieny. Invasive ductal carcinoma of breast, female, left. Patient presents with Stage 2 bilateral lower extremity lymphedema secondary to venous insufficiency. This patient presents with increased pain, increased stiffness in the knees, ankles, as well as difficulty donning compression thigh high and knee high stockings and limitations with tolerating wearing garments, pt is also at risk of higher infection. This pt would benefit from skilled therapy services to address the above impairments. Patient has tried elevating LE's, wearing compression however swelling is persistent and unresolved.    Feel patient would benefit from alternative compression garments such as inelastic velcro compression as well as a home pneumatic compression pump to assist in managing symptoms and reducing risk for infections.      Plan of care discussed with patient: Yes  Pt's spiritual, cultural and educational needs considered and patient is agreeable to the plan of care and goals as stated below:     Anticipated barriers for therapy: scheduling availability       GOALS  Short Term Goals: 3 weeks  Patient will demonstrate 100% knowledge of lymphedema precautions and signs of infection.   Patient will tolerate iADLs with multilayered bandaging for 24-48  hours.   Patient will perform self-bandaging techniques without correction for progress towards compression garments.  Decrease girth by average 1 cm for improved mobility and safety with iADLs.  Patient to report compliance sleeping on 30 degree incline for lymphatic protection.   Patient will demonstrate proper posture with sitting and standing to decrease detrimental affects to adjacent structures.   Patient will tolerate HEP for better progression toward LTGs and self-management of presentation.      Long Term Goals: 6 weeks  Patient will be independent with HEP for self-management of symptoms and current status.   Decrease girth by average 2 cm for improved mobility and safety with iADLs.  Patient will perform self lymphatic drainage techniques for long term management of lymphedema.   Patient to don/doff compression garments for self management of lymphedema.   Patient to obtain pneumatic compression pump for daily home use to assist in maintaining reduced swelling.   Patient will report pain reduction to < or = 3 with AROM for improved safety with iADLs (driving, household chores, yard work, job duties).   Patient to be able to lift legs on/off mat/bed independently at 50% less effort for improved safety with navigating bed mobility.      Plan     Continue with established plan of care working toward PT goals.    Roxie Archibald, PTA

## 2024-03-05 ENCOUNTER — TELEPHONE (OUTPATIENT)
Dept: HEMATOLOGY/ONCOLOGY | Facility: CLINIC | Age: 59
End: 2024-03-05
Payer: COMMERCIAL

## 2024-03-05 ENCOUNTER — CLINICAL SUPPORT (OUTPATIENT)
Dept: REHABILITATION | Facility: HOSPITAL | Age: 59
End: 2024-03-05
Payer: COMMERCIAL

## 2024-03-05 DIAGNOSIS — R60.0 BILATERAL LOWER EXTREMITY EDEMA: Primary | ICD-10-CM

## 2024-03-05 DIAGNOSIS — I87.2 VENOUS (PERIPHERAL) INSUFFICIENCY: ICD-10-CM

## 2024-03-05 DIAGNOSIS — C50.912 INVASIVE DUCTAL CARCINOMA OF BREAST, FEMALE, LEFT: ICD-10-CM

## 2024-03-05 PROCEDURE — 97140 MANUAL THERAPY 1/> REGIONS: CPT | Mod: PN,CQ

## 2024-03-05 NOTE — PROGRESS NOTES
[facility] Out[facility]  Physical Therapy Progress Note  Lymphedema Therapy    Visit Date: 3/5/2024    Name: Josefina Coon  MRN: [id number]  Therapy Diagnosis:   Encounter Diagnoses   Name Primary?    Bilateral lower extremity edema Yes    Venous (peripheral) insufficiency     Invasive ductal carcinoma of breast, female, left        Physician: Raya Echols MD  Physician Orders: PT Eval and Treat   Medical Diagnosis from Referral: Bilateral lower extremity edema, unspecified venous (peripheral) insufficieny. Invasive ductal carcinoma of breast, female, left  Evaluation Date: 2/21/2024  Authorization: pending  Plan of Care Expiration: 02/21/2024-04/21/2024  Reassessment Due: 03/21/2024  Fact-G Completed: 1 / 2     Visit: 3 / 12  PTA Visit: 2 / 5  Time In: 08:00 AM  Time Out: 09:00 PM  Total Billable Time: 60 minutes     Precautions: Standard, Fall and cancer      Subjective     Patient states: that she only has one pair of shoes that fit. Have been doing water aerobics at [facility]] and really enjoying it. Did ok with bandaging left lower leg, but had some puffiness right above the top of the bandage. Pt agreeable to bandaging both lower legs today.    Pain: 2/10   Location: bilateral lower legs (heaviness)    Objective   Pt to clinic with tennis shoes  Josefina participated in / received the following treatment:  Discussed the purpose, mechanism, and indications of MLD with pt. Pt was cleared of all contraindications and precautions, including but not limited to cardiac edema, renal failure, acute infection, acute bronchitis, acute DVT, malignancies, bronchial asthma, HTN, pregnancy, ileus, aortic aneurysm, recent abdominal surgery, IBD, diverticulosis, XRT fibrosis or cystitis, colitis. Risks and benefits of tx were reviewed with patient, to which patient was in agreement to continue with tx today.     Discussed the purpose, mechanism, and indications of Compression Therapy with pt.  Pt was cleared of all contraindications and precautions, including but not limited to cardiac edema, PAD, acute infection, HTN, cardiac arrhythmia, hyposensitivity, paralysis, CHF, diabetes, malignancy. Risks and benefits of tx were reviewed with patient, to which patient was in agreement to continue with tx today.        Manual Therapy to develop flexibility, extensibility, desensitization, pliability, and contour for 60 minutes including:  Pt received MLD to bilateral lower extremities, with pre treatment short neck series to include:  terminus, sternal notch, lateral and posterior neck, right axilla, abdomen, and bilateral inguinal triangle, followed by full leg sequence with rework accessing all watershed areas on trunk. Concurrent use of pneumatic compression pump at 30 mmHg, with pt tolerating well and expressing comfort. Lymphatouch was applied to left lower extremity at 60 mmHg to increase angiomotoricity. Eucerin lotion applied to dry skin, followed by application of compression bandages to bilateral lower leg as follows:  tubigrip G to left lower leg, comprefoam and cast padding applied to achieve a cylindrical shape, (2) 10 cm Rosidal short stretch bandages, (1) 12 cm Rosidal short stretch bandage applied with gentle, gradient compression. Rock tape applied in edema technique to bilateral upper legs. Pt advised to wear compression bandages for 24 hrs, remove and wash, bring to next appointment for re use, remove tape if painful but can remain taped up to 72 hrs. Tubigrips are to be worn when not bandaged, in daytime only. Remove bandages if she experiences pain, SOB, or vascular compromise. Pt verbalized understanding.     Deferred:  Therapeutic Exercise to develop strength, endurance, ROM,   and flexibility for 10 minutes including:  Ankle pumps  Ankle inv/ev  Ankle circles  Toe curls/flares  QS/GS    Education Provided  [] Progress toward goals   [] Role of therapy   [] Activity modification  [x]  Reviewed HEP      Home Exercises Provided: Patient instructed to continue previously provided HEP.  Exercises were reviewed and Josefina was able to demonstrate them prior to the end of the session.  Josefina demonstrated good  understanding of the education provided.   See EMR under Patient Instructions for exercises provided  2/21/2024 .    Assessment     Patient tolerated treatment well without visible adverse affects or acute distress. Pt was able to fit bandage foot into athletic shoe, but very tight fit. Hemosiderin staining more prevalent on left lower leg than right. Will monitor response to bandaging and taping next session. Doing water aerobics at [facility].     Josefina is a 59 y.o. female referred to outpatient physical therapy with a medical diagnosis of Bilateral lower extremity edema, unspecified venous (peripheral) insufficieny. Invasive ductal carcinoma of breast, female, left. Patient presents with Stage 2 bilateral lower extremity lymphedema secondary to venous insufficiency. This patient presents with increased pain, increased stiffness in the knees, ankles, as well as difficulty donning compression thigh high and knee high stockings and limitations with tolerating wearing garments, pt is also at risk of higher infection. This pt would benefit from skilled therapy services to address the above impairments. Patient has tried elevating LE's, wearing compression however swelling is persistent and unresolved.    Feel patient would benefit from alternative compression garments such as inelastic velcro compression as well as a home pneumatic compression pump to assist in managing symptoms and reducing risk for infections.      Plan of care discussed with patient: Yes  Pt's spiritual, cultural and educational needs considered and patient is agreeable to the plan of care and goals as stated below:     Anticipated barriers for therapy: scheduling availability       GOALS  Short Term Goals: 3 weeks  Patient will  demonstrate 100% knowledge of lymphedema precautions and signs of infection.   Patient will tolerate iADLs with multilayered bandaging for 24-48 hours.   Patient will perform self-bandaging techniques without correction for progress towards compression garments.  Decrease girth by average 1 cm for improved mobility and safety with iADLs.  Patient to report compliance sleeping on 30 degree incline for lymphatic protection.   Patient will demonstrate proper posture with sitting and standing to decrease detrimental affects to adjacent structures.   Patient will tolerate HEP for better progression toward LTGs and self-management of presentation.      Long Term Goals: 6 weeks  Patient will be independent with HEP for self-management of symptoms and current status.   Decrease girth by average 2 cm for improved mobility and safety with iADLs.  Patient will perform self lymphatic drainage techniques for long term management of lymphedema.   Patient to don/doff compression garments for self management of lymphedema.   Patient to obtain pneumatic compression pump for daily home use to assist in maintaining reduced swelling.   Patient will report pain reduction to < or = 3 with AROM for improved safety with iADLs (driving, household chores, yard work, job duties).   Patient to be able to lift legs on/off mat/bed independently at 50% less effort for improved safety with navigating bed mobility.      Plan     Continue with established plan of care working toward PT goals.    Roxie Archibald, PTA

## 2024-03-05 NOTE — TELEPHONE ENCOUNTER
LVM for patient regarding requests for PT appts.      ----- Message from Niesha Ornelas, Patient Care Assistant sent at 3/5/2024  9:59 AM CST -----  Type: Needs Medical Advice  Who Called:  Josefina  Best Call Back Number: [phone]    Additional Information: Josefina would like to move her 3/11 to Tuesday 3/12 and her 3/13 to Thursday 3/14 if possible please call before cancel any appointments  to further discuss

## 2024-03-05 NOTE — TELEPHONE ENCOUNTER
Did not return call to pt, pt stated she will keep same appt.  ----- Message from Niesha Ornelas, Patient Care Assistant sent at 3/5/2024  2:56 PM CST -----  Regarding: rtn call  Type:  Patient Returning Call    Who Called:  Josefina  Who Left Message for Patient:  alessio  Does the patient know what this is regarding?:    Best Call Back Number:  [phone]    Additional Information:  Josefina is returning alessio  call  , states she will keep same appointment

## 2024-03-08 ENCOUNTER — CLINICAL SUPPORT (OUTPATIENT)
Dept: REHABILITATION | Facility: HOSPITAL | Age: 59
End: 2024-03-08
Payer: COMMERCIAL

## 2024-03-08 DIAGNOSIS — R60.0 BILATERAL LOWER EXTREMITY EDEMA: Primary | ICD-10-CM

## 2024-03-08 DIAGNOSIS — C50.912 INVASIVE DUCTAL CARCINOMA OF BREAST, FEMALE, LEFT: ICD-10-CM

## 2024-03-08 DIAGNOSIS — I87.2 VENOUS (PERIPHERAL) INSUFFICIENCY: ICD-10-CM

## 2024-03-08 PROCEDURE — 97140 MANUAL THERAPY 1/> REGIONS: CPT | Mod: PN,CQ

## 2024-03-08 NOTE — PROGRESS NOTES
[facility] Outpatient  Physical Therapy Progress Note  Lymphedema Therapy    Visit Date: 3/8/2024    Name: Josefina Coon  MRN: [id number]  Therapy Diagnosis:   Encounter Diagnoses   Name Primary?    Bilateral lower extremity edema Yes    Venous (peripheral) insufficiency     Invasive ductal carcinoma of breast, female, left        Physician: Raya Echols MD  Physician Orders: PT Eval and Treat   Medical Diagnosis from Referral: Bilateral lower extremity edema, unspecified venous (peripheral) insufficieny. Invasive ductal carcinoma of breast, female, left  Evaluation Date: 2/21/2024  Authorization: pending  Plan of Care Expiration: 02/21/2024-04/21/2024  Reassessment Due: 03/21/2024  Fact-G Completed: 1 / 2     Visit: 4 / 12  PTA Visit: 3 / 5  Time In: 09:00 AM  Time Out: 09:05 AM  Total Billable Time: 60 minutes     Precautions: Standard, Fall and cancer      Subjective     Patient states: that she only has one pair of shoes that fit. Have been doing water aerobics at [facility] and really enjoying it. Did ok with bandaging both legs, can tell that the swelling is down in the lower legs and upper legs feels softer. Taping to upper legs did fine. Have tried finding other shoes that bandages can be worn with but not having any luck.     Pain: 2/10   Location: bilateral lower legs (heaviness)    Objective   Pt to clinic with tennis shoes  Josefina participated in / received the following treatment:  Discussed the purpose, mechanism, and indications of MLD with pt. Pt was cleared of all contraindications and precautions, including but not limited to cardiac edema, renal failure, acute infection, acute bronchitis, acute DVT, malignancies, bronchial asthma, HTN, pregnancy, ileus, aortic aneurysm, recent abdominal surgery, IBD, diverticulosis, XRT fibrosis or cystitis, colitis. Risks and benefits of tx were reviewed with patient, to which patient was in agreement to continue with tx  today.     Discussed the purpose, mechanism, and indications of Compression Therapy with pt. Pt was cleared of all contraindications and precautions, including but not limited to cardiac edema, PAD, acute infection, HTN, cardiac arrhythmia, hyposensitivity, paralysis, CHF, diabetes, malignancy. Risks and benefits of tx were reviewed with patient, to which patient was in agreement to continue with tx today.        Manual Therapy to develop flexibility, extensibility, desensitization, pliability, and contour for 60 minutes including:  Pt received MLD to bilateral lower extremities, with pre treatment short neck series to include:  terminus, sternal notch, lateral and posterior neck, right axilla, abdomen, and bilateral inguinal triangle, followed by full leg sequence with rework accessing all watershed areas on trunk. Concurrent use of pneumatic compression pump at 30 mmHg, with pt tolerating well and expressing comfort. Lymphatouch was applied to left lower extremity at 60 mmHg to increase angiomotoricity. Eucerin lotion applied to dry skin, followed by application of compression bandages to bilateral lower leg as follows:  tubigrip G to left lower leg, comprefoam and cast padding applied to achieve a cylindrical shape, (2) 10 cm Rosidal short stretch bandages, (1) 12 cm Rosidal short stretch bandage applied with gentle, gradient compression. Rock tape applied in edema technique to bilateral upper legs. Pt advised to wear compression bandages for 24 hrs, remove and wash, bring to next appointment for re use, remove tape if painful but can remain taped up to 72 hrs. Tubigrips are to be worn when not bandaged, in daytime only. Remove bandages if she experiences pain, SOB, or vascular compromise. Pt verbalized understanding.     Deferred:  Therapeutic Exercise to develop strength, endurance, ROM,   and flexibility for 10 minutes including:  Ankle pumps  Ankle inv/ev  Ankle circles  Toe curls/flares  QS/GS    Education  Provided  [] Progress toward goals   [] Role of therapy   [] Activity modification  [x] Reviewed HEP      Home Exercises Provided: Patient instructed to continue previously provided HEP.  Exercises were reviewed and Josefina was able to demonstrate them prior to the end of the session.  Josefina demonstrated good  understanding of the education provided.   See EMR under Patient Instructions for exercises provided  2/21/2024 .    Assessment     Patient tolerated treatment well without visible adverse affects or acute distress. Pt was able to fit bandage foot into athletic shoe, but very tight fit. Hemosiderin staining more prevalent on left lower leg than right. Pt doing fine with bandaging lower legs, with taping to upper legs; decreased swelling in lower legs and decreased induration in upper legs.  Doing water aerobics at [facility] twice a week and compliant with wearing tubigrips when not bandaged.     Josefina is a 59 y.o. female referred to outpatient physical therapy with a medical diagnosis of Bilateral lower extremity edema, unspecified venous (peripheral) insufficieny. Invasive ductal carcinoma of breast, female, left. Patient presents with Stage 2 bilateral lower extremity lymphedema secondary to venous insufficiency. This patient presents with increased pain, increased stiffness in the knees, ankles, as well as difficulty donning compression thigh high and knee high stockings and limitations with tolerating wearing garments, pt is also at risk of higher infection. This pt would benefit from skilled therapy services to address the above impairments. Patient has tried elevating LE's, wearing compression however swelling is persistent and unresolved.    Feel patient would benefit from alternative compression garments such as inelastic velcro compression as well as a home pneumatic compression pump to assist in managing symptoms and reducing risk for infections.      Plan of care discussed with patient: Yes  Pt's  spiritual, cultural and educational needs considered and patient is agreeable to the plan of care and goals as stated below:     Anticipated barriers for therapy: scheduling availability       GOALS  Short Term Goals: 3 weeks  Patient will demonstrate 100% knowledge of lymphedema precautions and signs of infection.   Patient will tolerate iADLs with multilayered bandaging for 24-48 hours.   Patient will perform self-bandaging techniques without correction for progress towards compression garments.  Decrease girth by average 1 cm for improved mobility and safety with iADLs.  Patient to report compliance sleeping on 30 degree incline for lymphatic protection.   Patient will demonstrate proper posture with sitting and standing to decrease detrimental affects to adjacent structures.   Patient will tolerate HEP for better progression toward LTGs and self-management of presentation.      Long Term Goals: 6 weeks  Patient will be independent with HEP for self-management of symptoms and current status.   Decrease girth by average 2 cm for improved mobility and safety with iADLs.  Patient will perform self lymphatic drainage techniques for long term management of lymphedema.   Patient to don/doff compression garments for self management of lymphedema.   Patient to obtain pneumatic compression pump for daily home use to assist in maintaining reduced swelling.   Patient will report pain reduction to < or = 3 with AROM for improved safety with iADLs (driving, household chores, yard work, job duties).   Patient to be able to lift legs on/off mat/bed independently at 50% less effort for improved safety with navigating bed mobility.      Plan     Continue with established plan of care working toward PT goals.    Roxie Archibald, PTA

## 2024-03-09 NOTE — PROGRESS NOTES
"Subjective:    Patient ID:  Josefina Coon is a 59 y.o. female who presents for follow-up of Hyperlipidemia and Hypertension      Problem List Items Addressed This Visit          Cardiac/Vascular    Asymptomatic varicose veins - Primary    Chemotherapy induced cardiomyopathy    Essential hypertension    Overview     Dx updated per 2019 IMO Load         Family history of ischemic heart disease    Mixed hyperlipidemia    Palpitations    Paroxysmal atrial fibrillation    Overview     S/p LAUREL/DCCV 7/25/23         Unspecified venous (peripheral) insufficiency    Overview     Dx updated per 2019 IMO Load            Hematology    Embolism and thrombosis of unspecified site    Overview     superficial venous thrombosis  Dx updated per 2019 IMO Load            HPI    Patient was last seen on 09/07/2023 at which time she was doing okay from a cardiac standpoint.  Follow with electrophysiology for AFib.    On assessment today, the patient states that she feels OK.    No chest pain.  Further AFib - None since starting Multaq  Continue to see EP - going to transition to Dr. Landry  CPAP use - Consistent       Objective:     Vitals:    03/14/24 0926   BP: (!) 140/91   BP Location: Right forearm   Patient Position: Sitting   BP Method: Medium (Automatic)   Pulse: 74   Weight: (!) 141.3 kg (311 lb 8.2 oz)   Height: 5' 4" (1.626 m)       BP Readings from Last 5 Encounters:   03/14/24 (!) 140/91   01/22/24 120/80   01/19/24 110/70   11/22/23 136/76   11/17/23 113/63        Physical Exam  Vitals and nursing note reviewed.   Constitutional:       General: She is not in acute distress.     Appearance: She is well-developed.   HENT:      Head: Normocephalic and atraumatic.   Neck:      Vascular: No JVD.   Cardiovascular:      Rate and Rhythm: Normal rate and regular rhythm.      Heart sounds: Normal heart sounds. No murmur heard.     No friction rub. No gallop.   Pulmonary:      Effort: Pulmonary effort is normal. No respiratory " distress.      Breath sounds: Normal breath sounds. No wheezing or rales.   Abdominal:      General: Bowel sounds are normal.      Palpations: Abdomen is soft.      Tenderness: There is no abdominal tenderness. There is no guarding or rebound.   Musculoskeletal:         General: No tenderness.      Cervical back: Neck supple.   Skin:     General: Skin is warm and dry.   Neurological:      Mental Status: She is alert and oriented to person, place, and time.   Psychiatric:         Behavior: Behavior normal.             Current Outpatient Medications   Medication Instructions    albuterol (PROVENTIL/VENTOLIN HFA) 90 mcg/actuation inhaler 2 puffs, Inhalation, Every 6 hours PRN    ALPRAZolam (XANAX) 0.25 MG tablet TAKE ONE TABLET BY MOUTH 1-2 TIMES DAILY AS NEEDED ANXIETY    anastrozole (ARIMIDEX) 1 mg, Oral, Daily    apixaban (ELIQUIS) 5 mg, Oral, 2 times daily    azelastine (ASTELIN) 137 mcg (0.1 %) nasal spray 2 sprays, Nasal, 2 times daily PRN    buPROPion (WELLBUTRIN XL) 150 MG TB24 tablet TAKE 1 TABLET BY MOUTH EVERY DAY    cyanocobalamin 1,000 mcg/mL injection Inject 1 ml q 2 weeks x 1 month then 1 ml monthly    dapagliflozin propanediol (FARXIGA) 5 mg, Oral, Daily    lipase-protease-amylase 6,000-19,000-30,000 units (CREON) 6,000-19,000 -30,000 unit capsule 1 capsule, Oral, 3 times daily with meals    meclizine (ANTIVERT) 12.5 mg, Oral, 3 times daily PRN    metFORMIN (GLUCOPHAGE) 500 MG tablet TAKE 1 TABLET BY MOUTH TWICE A DAY WITH MEALS    MULTAQ 400 mg, Oral, 2 times daily with meals    nystatin (MYCOSTATIN) powder Apply to affected area 3 times daily    pravastatin (PRAVACHOL) 10 MG tablet TAKE 1 TABLET BY MOUTH EVERYDAY AT BEDTIME    TRULICITY 1.5 mg, Subcutaneous, Every 7 days    TRULICITY 3 mg, Subcutaneous, Every 7 days       Lipid Panel:   Lab Results   Component Value Date    CHOL 184 01/13/2024    HDL 72 01/13/2024    LDLCALC 89.4 01/13/2024    TRIG 113 01/13/2024    CHOLHDL 39.1 01/13/2024       The  10-year ASCVD risk score (Db MIRANDA, et al., 2019) is: 6.6%    Values used to calculate the score:      Age: 59 years      Sex: Female      Is Non- : No      Diabetic: Yes      Tobacco smoker: No      Systolic Blood Pressure: 140 mmHg      Is BP treated: No      HDL Cholesterol: 72 mg/dL      Total Cholesterol: 184 mg/dL    Most Recent EKG Results  Results for orders placed or performed in visit on 10/10/23   EKG 12-lead    Collection Time: 10/10/23  1:54 PM    Narrative    Test Reason : I48.0    Vent. Rate : 077 BPM     Atrial Rate : 077 BPM     P-R Int : 154 ms          QRS Dur : 114 ms      QT Int : 380 ms       P-R-T Axes : 065 -13 012 degrees     QTc Int : 430 ms    Normal sinus rhythm  Incomplete left bundle branch block  Borderline Abnormal ECG  When compared with ECG of 25-SEP-2023 08:26,  No significant change was found  Confirmed by Trice Kaufman MD ([id number]) on 10/10/2023 3:44:17 PM    Referred By: SARITHA PINTO           Confirmed By:Trice Kaufman MD       Most Recent Echocardiogram Results  Results for orders placed in visit on 08/23/23    Echo    Interpretation Summary    Left Ventricle: The left ventricle is normal in size. Normal wall thickness. Normal wall motion. There is normal systolic function. Ejection fraction by visual approximation is 60%. Global longitudinal strain is -17.6%. Grade I diastolic dysfunction.    Left Atrium: Left atrium is moderately dilated.    Right Ventricle: Normal right ventricular cavity size. Wall thickness is normal. Right ventricle wall motion  is normal. Systolic function is normal.    Aortic Valve: The aortic valve is a trileaflet valve.    Pulmonary Artery: The estimated pulmonary artery systolic pressure is 33 mmHg.    IVC/SVC: Normal venous pressure at 3 mmHg.      Most Recent Nuclear Stress Test Results  Results for orders placed during the hospital encounter of 03/03/20    Nuclear Stress - Cardiology Interpreted    Interpretation Summary    The  perfusion scan is free of evidence from myocardial ischemia or injury.    There is a mild intensity defect in the anteroseptal wall of the left ventricle, secondary to breast attenuation.    Visually estimated ejection fraction is normal at rest.    The EKG portion of this study is negative for ischemia.    The patient reported no chest pain during the stress test.    There are no prior studies for comparison.      Most Recent Cardiac PET Stress Test Results  No results found for this or any previous visit.      Most Recent Cardiovascular Angiogram results  No results found for this or any previous visit.      Other Most Recent Cardiology Results  Results for orders placed during the hospital encounter of 07/12/23    CARDIAC MONITORING STRIPS        All pertinent data including labs, imaging, EKGs, and studies listed above were reviewed.  Patient's most recent EKG tracing was personally interpreted by this provider.    Assessment:       1. Asymptomatic varicose veins    2. Chemotherapy induced cardiomyopathy    3. Embolism and thrombosis    4. Essential hypertension    5. Family history of ischemic heart disease    6. Mixed hyperlipidemia    7. Palpitations    8. Unspecified venous (peripheral) insufficiency    9. Paroxysmal atrial fibrillation         Plan for treatment of the above diagnoses:     Symptoms OK today  BP borderline today  Most recent echocardiogram reviewed personally     Continue Eliquis 5 mg PO BID  Continue Multaq 400 mg PO BID   Continue pravastatin 10 mg PO Daily  30 day event monitor. If event monitor negative, will schedule placement of implantable loop recorder.  Stop Eliquis 48 hours prior to loop recorder implantation. Will only have to restart Eliquis if recurrence of aFib is appreciated on loop recorder interrogation.    This procedure has been fully reviewed with the patient.     Continue other cardiac medications  Mediterranean Diet/Cardiovascular Exercise Program    Patient queried and  all questions were answered.    F/u in 9 months to reassess      Signed:    Roni Frias MD  3/14/2024 1:37 PM

## 2024-03-11 ENCOUNTER — CLINICAL SUPPORT (OUTPATIENT)
Dept: REHABILITATION | Facility: HOSPITAL | Age: 59
End: 2024-03-11
Payer: COMMERCIAL

## 2024-03-11 DIAGNOSIS — I87.2 VENOUS (PERIPHERAL) INSUFFICIENCY: ICD-10-CM

## 2024-03-11 DIAGNOSIS — R60.0 BILATERAL LOWER EXTREMITY EDEMA: Primary | ICD-10-CM

## 2024-03-11 PROCEDURE — 97110 THERAPEUTIC EXERCISES: CPT | Mod: PN

## 2024-03-11 NOTE — PROGRESS NOTES
[facility] Outpatient  Physical Therapy Progress Note  Lymphedema Therapy    Visit Date: 3/11/2024    Name: Josefina Coon  MRN: [id number]  Therapy Diagnosis:   No diagnosis found.      Physician: Raya Echols MD  Physician Orders: PT Eval and Treat   Medical Diagnosis from Referral: Bilateral lower extremity edema, unspecified venous (peripheral) insufficieny. Invasive ductal carcinoma of breast, female, left  Evaluation Date: 2/21/2024  Authorization: pending  Plan of Care Expiration: 02/21/2024-04/21/2024  Reassessment Due: 03/21/2024  Fact-G Completed: 1 / 2     Visit: 5 / 12  PTA Visit: 0 / 5  Time In: 03:08 PM  Time Out: 04:05 PM  Total Billable Time: 57 minutes     Precautions: Standard, Fall and cancer      Subjective     Patient states: The rep from [employer] came out to measure for pump, thinks my insurance will cover it, may take about 2-3 weeks for the entire process.   that she only has one pair of shoes that fit. Have been doing water aerobics at [facility] and really enjoying it. Did ok with bandaging both legs. Taping to upper legs did fine, not sure if I can tell a difference with taping. Legs are feeling better not as heavy.  Have tried finding other shoes that bandages can be worn with but not having any luck.     Pain: 2/10   Location: bilateral lower legs (heaviness)    Objective   Pt to clinic with tennis shoes and TG .    Josefina participated in / received the following treatment:  Discussed the purpose, mechanism, and indications of MLD with pt. Pt was cleared of all contraindications and precautions, including but not limited to cardiac edema, renal failure, acute infection, acute bronchitis, acute DVT, malignancies, bronchial asthma, HTN, pregnancy, ileus, aortic aneurysm, recent abdominal surgery, IBD, diverticulosis, XRT fibrosis or cystitis, colitis. Risks and benefits of tx were reviewed with patient, to which patient was in agreement to continue  with tx today.     Discussed the purpose, mechanism, and indications of Compression Therapy with pt. Pt was cleared of all contraindications and precautions, including but not limited to cardiac edema, PAD, acute infection, HTN, cardiac arrhythmia, hyposensitivity, paralysis, CHF, diabetes, malignancy. Risks and benefits of tx were reviewed with patient, to which patient was in agreement to continue with tx today.        Manual Therapy to develop flexibility, extensibility, desensitization, pliability, and contour for 57 minutes including:  Pt received MLD to bilateral lower extremities, with pre treatment short neck series to include:  terminus, sternal notch, lateral and posterior neck, right axilla, abdomen, and bilateral inguinal triangle, followed by full leg sequence with rework accessing all watershed areas on trunk. Concurrent use of pneumatic compression pump at 30 mmHg, with pt tolerating well and expressing comfort. Lymphatouch was applied to left lower extremity at 60 mmHg to increase angiomotoricity. Eucerin lotion applied to dry skin, followed by application of compression bandages to bilateral lower leg as follows:  tubigrip G to left lower leg, comprefoam and cast padding applied to achieve a cylindrical shape, (1) 10 cm Rosidal short stretch bandages, (1) 12 cm Rosidal short stretch bandage applied with gentle, gradient compression. Pt advised to wear compression bandages for 24 hrs, remove and wash, bring to next appointment for re use, remove tape if painful but can remain taped up to 72 hrs. Tubigrips are to be worn when not bandaged, in daytime only. Remove bandages if she experiences pain, SOB, or vascular compromise. Pt verbalized understanding.     Deferred today: Rock tape applied in edema technique to bilateral upper legs.     Deferred:  Therapeutic Exercise to develop strength, endurance, ROM,   and flexibility for 10 minutes including:  Ankle pumps  Ankle inv/ev  Ankle circles  Toe  curls/flares  QS/GS    Education Provided  [] Progress toward goals   [] Role of therapy   [] Activity modification  [x] Reviewed HEP      Home Exercises Provided: Patient instructed to continue previously provided HEP.  Exercises were reviewed and Josefina was able to demonstrate them prior to the end of the session.  Josefina demonstrated good  understanding of the education provided.   See EMR under Patient Instructions for exercises provided  2/21/2024 .    Assessment     Patient tolerated treatment well without visible adverse affects or acute distress. Pt was able to fit bandage foot into athletic shoe, but very tight fit. Hemosiderin staining more prevalent on left lower leg than right. Pt doing fine with bandaging lower legs, with taping to upper legs; decreased swelling in lower legs and decreased induration in upper legs.  Doing water aerobics at [facility] twice a week and compliant with wearing tubigrips when not bandaged.     Josefina is a 59 y.o. female referred to outpatient physical therapy with a medical diagnosis of Bilateral lower extremity edema, unspecified venous (peripheral) insufficieny. Invasive ductal carcinoma of breast, female, left. Patient presents with Stage 2 bilateral lower extremity lymphedema secondary to venous insufficiency. This patient presents with increased pain, increased stiffness in the knees, ankles, as well as difficulty donning compression thigh high and knee high stockings and limitations with tolerating wearing garments, pt is also at risk of higher infection. This pt would benefit from skilled therapy services to address the above impairments. Patient has tried elevating LE's, wearing compression however swelling is persistent and unresolved.    Feel patient would benefit from alternative compression garments such as inelastic velcro compression as well as a home pneumatic compression pump to assist in managing symptoms and reducing risk for infections.      Plan of care  discussed with patient: Yes  Pt's spiritual, cultural and educational needs considered and patient is agreeable to the plan of care and goals as stated below:     Anticipated barriers for therapy: scheduling availability       GOALS  Short Term Goals: 3 weeks  Patient will demonstrate 100% knowledge of lymphedema precautions and signs of infection.   Patient will tolerate iADLs with multilayered bandaging for 24-48 hours. MET goal for 24 hrs 03/11/2024   Patient will perform self-bandaging techniques without correction for progress towards compression garments.  Decrease girth by average 1 cm for improved mobility and safety with iADLs.  Patient to report compliance sleeping on 30 degree incline for lymphatic protection.   Patient will demonstrate proper posture with sitting and standing to decrease detrimental affects to adjacent structures.   Patient will tolerate HEP for better progression toward LTGs and self-management of presentation.      Long Term Goals: 6 weeks  Patient will be independent with HEP for self-management of symptoms and current status.   Decrease girth by average 2 cm for improved mobility and safety with iADLs.  Patient will perform self lymphatic drainage techniques for long term management of lymphedema.   Patient to don/doff compression garments for self management of lymphedema.   Patient to obtain pneumatic compression pump for daily home use to assist in maintaining reduced swelling.   Patient will report pain reduction to < or = 3 with AROM for improved safety with iADLs (driving, household chores, yard work, job duties).   Patient to be able to lift legs on/off mat/bed independently at 50% less effort for improved safety with navigating bed mobility.      Plan   Reassess next visit.  Continue with established plan of care working toward PT goals.    Catalina Cerda, PT , CLT

## 2024-03-13 ENCOUNTER — CLINICAL SUPPORT (OUTPATIENT)
Dept: REHABILITATION | Facility: HOSPITAL | Age: 59
End: 2024-03-13
Payer: COMMERCIAL

## 2024-03-13 DIAGNOSIS — M54.2 NECK PAIN: ICD-10-CM

## 2024-03-13 DIAGNOSIS — G62.0 CHEMOTHERAPY-INDUCED PERIPHERAL NEUROPATHY: ICD-10-CM

## 2024-03-13 DIAGNOSIS — C50.912 INVASIVE DUCTAL CARCINOMA OF BREAST, FEMALE, LEFT: ICD-10-CM

## 2024-03-13 DIAGNOSIS — G89.29 CHRONIC MIDLINE LOW BACK PAIN WITHOUT SCIATICA: Primary | ICD-10-CM

## 2024-03-13 DIAGNOSIS — I87.2 VENOUS (PERIPHERAL) INSUFFICIENCY: ICD-10-CM

## 2024-03-13 DIAGNOSIS — T45.1X5A CHEMOTHERAPY-INDUCED PERIPHERAL NEUROPATHY: ICD-10-CM

## 2024-03-13 DIAGNOSIS — R26.89 FUNCTIONAL GAIT ABNORMALITY: ICD-10-CM

## 2024-03-13 DIAGNOSIS — M54.50 CHRONIC MIDLINE LOW BACK PAIN WITHOUT SCIATICA: Primary | ICD-10-CM

## 2024-03-13 DIAGNOSIS — R60.0 BILATERAL LOWER EXTREMITY EDEMA: Primary | ICD-10-CM

## 2024-03-13 PROCEDURE — 97813 ACUP 1/> W/ESTIM 1ST 15 MIN: CPT | Mod: PN | Performed by: ACUPUNCTURIST

## 2024-03-13 PROCEDURE — 97814 ACUP 1/> W/ESTIM EA ADDL 15: CPT | Mod: PN | Performed by: ACUPUNCTURIST

## 2024-03-13 PROCEDURE — 97110 THERAPEUTIC EXERCISES: CPT | Mod: PN

## 2024-03-13 NOTE — PROGRESS NOTES
[facility]  Physical Therapy Progress Note/ Reassessment  Lymphedema Therapy    Visit Date: 3/13/2024    Name: Josefina Coon  MRN: [id number]  Therapy Diagnosis:   Encounter Diagnoses   Name Primary?    Bilateral lower extremity edema Yes    Venous (peripheral) insufficiency     Functional gait abnormality     Invasive ductal carcinoma of breast, female, left          Physician: Raya Echols MD  Physician Orders: PT Eval and Treat   Medical Diagnosis from Referral: Bilateral lower extremity edema, unspecified venous (peripheral) insufficieny. Invasive ductal carcinoma of breast, female, left  Evaluation Date: 2/21/2024  Authorization: pending  Plan of Care Expiration: 02/21/2024-04/21/2024  Reassessment Due: 03/21/2024  Fact-G Completed: 1 / 2     Visit: 6 / 12  PTA Visit: 0 / 5  Time In: 03:10 PM  Time Out: 04:15 PM  Total Billable Time: 57 minutes     Precautions: Standard, Fall and cancer      Subjective     Patient states: Discomfort has been better, currently 3/10. Heaviness has improved. Did not have any compression on my legs today, I usually try and wear the TG . Found my bioflect leggings and try to wear those but its difficult because I get hot flashes.   The rep from [employer] came out to measure for pump, thinks my insurance will cover it, may take about 2-3 weeks for the entire process.     Pain: 2/10   Location: bilateral lower legs (heaviness)    Objective   Pt to clinic with tennis shoes, no compression o legs.      Measurements  LANDMARK LEFT LE (cm)  02/21/2024 RIGHT LE (cm)  02/21/2024 LEFT LE (cm)  03/13/2024  reassess LEFT LE  After MLD/pump session RIGHT LE (cm)  03/13/2024  reassess RIGHT LE   After MLD/pump session   H + 15 in 54.7 52.5 55.0 54.9 54.0 52.8   H + 10 in 46.9 47.5 46.4 46.8 47.5 46.9   H + 5 in 31.5 24.0 33.5 31.8 31.5 31.2   10 cm from great toe 24.3 24.3 24.5 24.0 24.0 24.0   MTP Jt line 25.0 24.0 24.3 24.8 23.8 23.5   Leg  Length   39 cm       In comparison to initial eval 3 weeks ago, Increased girth noted to 3 areas of the left leg highlighted in bold, 2 areas of the R LE increased. Pt however demonstrates a positive response to reduction in most all areas after MLD concurrently with pneumatic compression pump today.     Josefina participated in / received the following treatment:  Discussed the purpose, mechanism, and indications of MLD with pt. Pt was cleared of all contraindications and precautions, including but not limited to cardiac edema, renal failure, acute infection, acute bronchitis, acute DVT, malignancies, bronchial asthma, HTN, pregnancy, ileus, aortic aneurysm, recent abdominal surgery, IBD, diverticulosis, XRT fibrosis or cystitis, colitis. Risks and benefits of tx were reviewed with patient, to which patient was in agreement to continue with tx today.     Discussed the purpose, mechanism, and indications of Compression Therapy with pt. Pt was cleared of all contraindications and precautions, including but not limited to cardiac edema, PAD, acute infection, HTN, cardiac arrhythmia, hyposensitivity, paralysis, CHF, diabetes, malignancy. Risks and benefits of tx were reviewed with patient, to which patient was in agreement to continue with tx today.        Manual Therapy to develop flexibility, extensibility, desensitization, pliability, and contour for 60 minutes including:  Reassessment of girth measures today.  Diaphragm breathing throughout MLD.  Pt received MLD to bilateral lower extremities, with pre treatment short neck series to include:  terminus, sternal notch, lateral and posterior neck, right axilla, abdomen, and bilateral inguinal triangle, followed by full leg sequence with rework accessing all watershed areas on trunk. Concurrent use of pneumatic compression pump at 30 mmHg, with pt tolerating well and expressing comfort. Eucerin lotion applied to dry skin, followed by application of compression bandages to  bilateral lower leg as follows:  tubigrip G to B LE's, comprefoam and cast padding applied to achieve a cylindrical shape, (1) 8 cm, (1) 10 cm, (1) 12 cm Rosidal short stretch bandages, Pt advised to wear compression bandages for 24 hrs, remove and wash, bring to next appointment for re use, remove tape if painful but can remain taped up to 72 hrs. Tubigrips are to be worn when not bandaged, in daytime only. Remove bandages if she experiences pain, SOB, or vascular compromise. Pt verbalized understanding. PT required to assist pt with donning shoes.      Deferred today: Lymphatouch was applied to left lower extremity at 60 mmHg to increase angiomotoricity. Rock tape applied in edema technique to bilateral upper legs.     Deferred:  Therapeutic Exercise to develop strength, endurance, ROM,   and flexibility for 10 minutes including:  Ankle pumps  Ankle inv/ev  Ankle circles  Toe curls/flares  QS/GS    Education Provided  [x] Progress toward goals   [] Role of therapy   [x] Activity modification  [x] Reviewed HEP      Home Exercises Provided: Patient instructed to continue previously provided HEP.  Exercises were reviewed and Josefina was able to demonstrate them prior to the end of the session.  Josefina demonstrated good  understanding of the education provided.   See EMR under Patient Instructions for exercises provided  2/21/2024 .    Assessment   5 STG's met, progressing toward LTG's. Slow progress noted with reduction in girth measures. Patient verbalizes improvement in heaviness sensation since beginning treatment. In comparison to initial eval 3 weeks ago, Increased girth noted to 3 areas of the left leg highlighted in bold, 2 areas of the R LE increased. Pt however demonstrates a positive response to reduction in most all areas after MLD concurrently with pneumatic compression pump today. Encouraged pt to maintain consistency with wearing either her bioflect leggings of TG  when not in bandages.     Patient  tolerated treatment well without visible adverse affects or acute distress. Pt was able to fit bandage foot into athletic shoe, but very tight fit. Hemosiderin staining more prevalent on left lower leg than right. Pt doing fine with bandaging lower legs, with taping to upper legs; decreased swelling in lower legs and decreased induration in upper legs.  Doing water aerobics at [facility] twice a week and compliant with wearing tubigrips when not bandaged.     Josefina is a 59 y.o. female referred to outpatient physical therapy with a medical diagnosis of Bilateral lower extremity edema, unspecified venous (peripheral) insufficieny. Invasive ductal carcinoma of breast, female, left. Patient presents with Stage 2 bilateral lower extremity lymphedema secondary to venous insufficiency. This patient presents with increased pain, increased stiffness in the knees, ankles, as well as difficulty donning compression thigh high and knee high stockings and limitations with tolerating wearing garments, pt is also at risk of higher infection. This pt would benefit from skilled therapy services to address the above impairments. Patient has tried elevating LE's, wearing compression however swelling is persistent and unresolved.    Feel patient would benefit from alternative compression garments such as inelastic velcro compression as well as a home pneumatic compression pump to assist in managing symptoms and reducing risk for infections.      Plan of care discussed with patient: Yes  Pt's spiritual, cultural and educational needs considered and patient is agreeable to the plan of care and goals as stated below:     Anticipated barriers for therapy: scheduling availability       GOALS  Short Term Goals: 3 weeks  Patient will demonstrate 100% knowledge of lymphedema precautions and signs of infection. Met 03/13/2024  Patient will tolerate iADLs with multilayered bandaging for 24-48 hours. MET goal for 24 hrs 03/11/2024   Patient will  perform self-bandaging techniques without correction for progress towards compression garments.  Decrease girth by average 1 cm for improved mobility and safety with iADLs. In progress, not met.  Patient to report compliance sleeping on 30 degree incline for lymphatic protection. Met 03/13/2024  Patient will demonstrate proper posture with sitting and standing to decrease detrimental affects to adjacent structures. Met 03/13/2024  Patient will tolerate HEP for better progression toward LTGs and self-management of presentation. Met 03/13/2024     Long Term Goals: 6 weeks In progress.  Patient will be independent with HEP for self-management of symptoms and current status.   Decrease girth by average 2 cm for improved mobility and safety with iADLs.  Patient will perform self lymphatic drainage techniques for long term management of lymphedema.   Patient to don/doff compression garments for self management of lymphedema.   Patient to obtain pneumatic compression pump for daily home use to assist in maintaining reduced swelling.   Patient will report pain reduction to < or = 3 with AROM for improved safety with iADLs (driving, household chores, yard work, job duties).   Patient to be able to lift legs on/off mat/bed independently at 50% less effort for improved safety with navigating bed mobility.      Plan   Continue remaining 3 weeks of POC, Discussed with pt anticipating measuring for inelastic velcro garments in next week or 2.   Continue with established plan of care working toward PT goals.    Catalina Cerda, PT , CLT

## 2024-03-14 ENCOUNTER — LAB VISIT (OUTPATIENT)
Dept: LAB | Facility: HOSPITAL | Age: 59
End: 2024-03-14
Attending: INTERNAL MEDICINE
Payer: COMMERCIAL

## 2024-03-14 ENCOUNTER — TELEPHONE (OUTPATIENT)
Dept: NEPHROLOGY | Facility: CLINIC | Age: 59
End: 2024-03-14
Payer: COMMERCIAL

## 2024-03-14 ENCOUNTER — OFFICE VISIT (OUTPATIENT)
Dept: CARDIOLOGY | Facility: CLINIC | Age: 59
End: 2024-03-14
Payer: COMMERCIAL

## 2024-03-14 VITALS
BODY MASS INDEX: 50.02 KG/M2 | WEIGHT: 293 LBS | SYSTOLIC BLOOD PRESSURE: 140 MMHG | DIASTOLIC BLOOD PRESSURE: 91 MMHG | HEART RATE: 74 BPM | HEIGHT: 64 IN

## 2024-03-14 DIAGNOSIS — Z82.49 FAMILY HISTORY OF ISCHEMIC HEART DISEASE: ICD-10-CM

## 2024-03-14 DIAGNOSIS — N18.32 STAGE 3B CHRONIC KIDNEY DISEASE: ICD-10-CM

## 2024-03-14 DIAGNOSIS — I10 ESSENTIAL HYPERTENSION: ICD-10-CM

## 2024-03-14 DIAGNOSIS — T45.1X5A CHEMOTHERAPY INDUCED CARDIOMYOPATHY: ICD-10-CM

## 2024-03-14 DIAGNOSIS — R00.2 PALPITATIONS: ICD-10-CM

## 2024-03-14 DIAGNOSIS — I42.7 CHEMOTHERAPY INDUCED CARDIOMYOPATHY: ICD-10-CM

## 2024-03-14 DIAGNOSIS — I48.0 PAROXYSMAL ATRIAL FIBRILLATION: ICD-10-CM

## 2024-03-14 DIAGNOSIS — E78.2 MIXED HYPERLIPIDEMIA: ICD-10-CM

## 2024-03-14 DIAGNOSIS — I87.2 VENOUS (PERIPHERAL) INSUFFICIENCY: ICD-10-CM

## 2024-03-14 DIAGNOSIS — I83.90 ASYMPTOMATIC VARICOSE VEINS: Primary | ICD-10-CM

## 2024-03-14 DIAGNOSIS — E11.41 DIABETIC MONONEUROPATHY ASSOCIATED WITH TYPE 2 DIABETES MELLITUS: ICD-10-CM

## 2024-03-14 DIAGNOSIS — I74.9 EMBOLISM AND THROMBOSIS: ICD-10-CM

## 2024-03-14 LAB
BACTERIA #/AREA URNS HPF: NORMAL /HPF
BILIRUB UR QL STRIP: NEGATIVE
CLARITY UR: CLEAR
COLOR UR: YELLOW
CREAT UR-MCNC: 84 MG/DL (ref 15–325)
GLUCOSE UR QL STRIP: ABNORMAL
HGB UR QL STRIP: NEGATIVE
KETONES UR QL STRIP: NEGATIVE
LEUKOCYTE ESTERASE UR QL STRIP: ABNORMAL
MICROSCOPIC COMMENT: NORMAL
NITRITE UR QL STRIP: NEGATIVE
PH UR STRIP: 6 [PH] (ref 5–8)
PROT UR QL STRIP: NEGATIVE
PROT UR-MCNC: 17 MG/DL (ref 0–15)
PROT/CREAT UR: 0.2 MG/G{CREAT} (ref 0–0.2)
SP GR UR STRIP: 1.02 (ref 1–1.03)
URN SPEC COLLECT METH UR: ABNORMAL
WBC #/AREA URNS HPF: 4 /HPF (ref 0–5)
YEAST URNS QL MICRO: NORMAL

## 2024-03-14 PROCEDURE — 1159F MED LIST DOCD IN RCRD: CPT | Mod: CPTII,S$GLB,, | Performed by: INTERNAL MEDICINE

## 2024-03-14 PROCEDURE — 1160F RVW MEDS BY RX/DR IN RCRD: CPT | Mod: CPTII,S$GLB,, | Performed by: INTERNAL MEDICINE

## 2024-03-14 PROCEDURE — 3008F BODY MASS INDEX DOCD: CPT | Mod: CPTII,S$GLB,, | Performed by: INTERNAL MEDICINE

## 2024-03-14 PROCEDURE — 3044F HG A1C LEVEL LT 7.0%: CPT | Mod: CPTII,S$GLB,, | Performed by: INTERNAL MEDICINE

## 2024-03-14 PROCEDURE — 99999 PR PBB SHADOW E&M-EST. PATIENT-LVL IV: CPT | Mod: PBBFAC,,, | Performed by: INTERNAL MEDICINE

## 2024-03-14 PROCEDURE — 82570 ASSAY OF URINE CREATININE: CPT | Performed by: INTERNAL MEDICINE

## 2024-03-14 PROCEDURE — 3077F SYST BP >= 140 MM HG: CPT | Mod: CPTII,S$GLB,, | Performed by: INTERNAL MEDICINE

## 2024-03-14 PROCEDURE — 81000 URINALYSIS NONAUTO W/SCOPE: CPT | Mod: PO | Performed by: INTERNAL MEDICINE

## 2024-03-14 PROCEDURE — 3080F DIAST BP >= 90 MM HG: CPT | Mod: CPTII,S$GLB,, | Performed by: INTERNAL MEDICINE

## 2024-03-14 PROCEDURE — 99215 OFFICE O/P EST HI 40 MIN: CPT | Mod: S$GLB,,, | Performed by: INTERNAL MEDICINE

## 2024-03-14 NOTE — TELEPHONE ENCOUNTER
----- Message from Giovanny Gonzalez sent at 3/14/2024  2:11 PM CDT -----  Type: Needs Medical Advice  Who Called:  pt  Best Call Back Number: [phone]  Additional Information: pt is requesting to speak with the nurse about who will be her dr since Dr Crandall left, pl call bk to advise thanks

## 2024-03-15 NOTE — PROGRESS NOTES
Acupuncture Follow-Up Note     Name: Josefina Coon  Clinic Number: [id number]    Traditional Chinese Medicine (TCM) Diagnosis: Qi Stagnation, Blood Stasis, Qi Deficiency, Blood Deficiency, Damp, Yin Deficiency, and Phlegm  Medical Diagnosis:   1. Chronic midline low back pain without sciatica    2. Neck pain    3. Chemotherapy-induced peripheral neuropathy         Evaluation Date: 3/15/2024    Visit #/Visits authorized:     Precautions: Standard    Subjective     Chief Concern: Peripheral Neuropathy and Low-back Pain (Patient presents with LBP of 1/10, intermittent, central with no sciatica.)       Medical necessity is demonstrated by the following IMPAIRMENTS: Medical Necessity: Decreased mobility limits day to day activities, social, and emergent situations and Decreased quality of life              Aggravating Factors:  movement     Relieving Factors:  rest    Symptom Description:     Quality:  Aching, Dull, Throbbing, Tight, and Variable  Severity:  2  Frequency:  every day      Objective     Observation: Patient still struggling with lower extremity edema and venous insufficiency.  Patient is Obese and needs to engage in some form of aquatic or recumbent biking therapy to improve circulation and reduce load on lower extremities.      Pulse:        thready       New Findings:  na    Treatment     Treatment Principles:  move qi and blood, relieve bi, drain damp, clear stagnation.    Acupuncture points used:  4 MATRE, Du20, Gb34, Ki3, Li11, Sp6, Sp9, St36, and YIN MELTON    Bilateral points:  Unilateral points:  Auricular Treatment:  acharya men    Needles In: 20  Needles Out: 20  Needles W/ STIM placed: 205  Dolliver W/ STIM removed: 225      Other Traditional Chinese Medicine Modalities -  na    Assessment     After treatment, patient felt relief and plans to continue     Patient prognosis is Fair.     Patient will continue to benefit from acupuncture treatment to address the deficits listed in the problem list box  on initial evaluation, provide patient family education and to maximize pt's level of independence in the home and community environment.     Patient's spiritual, cultural and educational needs considered and pt agreeable to plan of care and goals.     Anticipated barriers to treatment: none    Plan     Recommend 1 /month for 12 sessions then re-assess.      Education:  Patient is aware of cumulative benefit of acupuncture

## 2024-03-19 ENCOUNTER — CLINICAL SUPPORT (OUTPATIENT)
Dept: REHABILITATION | Facility: HOSPITAL | Age: 59
End: 2024-03-19
Payer: COMMERCIAL

## 2024-03-19 DIAGNOSIS — C50.912 INVASIVE DUCTAL CARCINOMA OF BREAST, FEMALE, LEFT: ICD-10-CM

## 2024-03-19 DIAGNOSIS — R60.0 BILATERAL LOWER EXTREMITY EDEMA: Primary | ICD-10-CM

## 2024-03-19 DIAGNOSIS — I87.2 VENOUS (PERIPHERAL) INSUFFICIENCY: ICD-10-CM

## 2024-03-19 DIAGNOSIS — R26.89 FUNCTIONAL GAIT ABNORMALITY: ICD-10-CM

## 2024-03-19 PROCEDURE — 97140 MANUAL THERAPY 1/> REGIONS: CPT | Mod: PN,CQ

## 2024-03-19 NOTE — PROGRESS NOTES
[facility]  Physical Therapy Progress Note  Lymphedema Therapy    Visit Date: 3/19/2024    Name: Josefina Coon  MRN: [id number]  Therapy Diagnosis:   Encounter Diagnoses   Name Primary?    Bilateral lower extremity edema Yes    Venous (peripheral) insufficiency     Functional gait abnormality     Invasive ductal carcinoma of breast, female, left          Physician: Raya Echols MD  Physician Orders: PT Eval and Treat   Medical Diagnosis from Referral: Bilateral lower extremity edema, unspecified venous (peripheral) insufficieny. Invasive ductal carcinoma of breast, female, left  Evaluation Date: 2/21/2024  Authorization: pending  Plan of Care Expiration: 02/21/2024-04/21/2024  Reassessment Due: 03/21/2024  Fact-G Completed: 1 / 2     Visit: 7 / 12  PTA Visit: 1 / 5  Time In: 08:05 AM  Time Out: 09:05 PM  Total Billable Time: 60 minutes     Precautions: Standard, Fall and cancer      Subjective     Patient states: haven't heard anything about the pump yet, was able to go to water aerobics yesterday, had to skip last week due to being compressed when the classes were scheduled. Trying to be consistent with wearing compression when she is not bandaged, walk her dog daily but want to start a walking program. Would like to get into compression garments soon.   Pain: 2/10   Location: bilateral lower legs (heaviness)    Objective   Pt to clinic with tennis shoes, no compression to legs.      Josefina participated in / received the following treatment:  Discussed the purpose, mechanism, and indications of MLD with pt. Pt was cleared of all contraindications and precautions, including but not limited to cardiac edema, renal failure, acute infection, acute bronchitis, acute DVT, malignancies, bronchial asthma, HTN, pregnancy, ileus, aortic aneurysm, recent abdominal surgery, IBD, diverticulosis, XRT fibrosis or cystitis, colitis. Risks and benefits of tx were reviewed with  patient, to which patient was in agreement to continue with tx today.     Discussed the purpose, mechanism, and indications of Compression Therapy with pt. Pt was cleared of all contraindications and precautions, including but not limited to cardiac edema, PAD, acute infection, HTN, cardiac arrhythmia, hyposensitivity, paralysis, CHF, diabetes, malignancy. Risks and benefits of tx were reviewed with patient, to which patient was in agreement to continue with tx today.        Manual Therapy to develop flexibility, extensibility, desensitization, pliability, and contour for 60 minutes including:  Diaphragm breathing throughout MLD.  Pt received MLD to bilateral lower extremities, with pre treatment short neck series to include:  terminus, sternal notch, lateral and posterior neck, right axilla, abdomen, and bilateral inguinal triangle, followed by full leg sequence with rework accessing all watershed areas on trunk. Concurrent use of pneumatic compression pump at 30 mmHg, with pt tolerating well and expressing comfort. Eucerin lotion applied to dry skin, followed by application of compression bandages to bilateral lower leg as follows:  tubigrip G to B LE's, comprefoam and cast padding applied to achieve a cylindrical shape, (1) 8 cm, (1) 10 cm, (1) 12 cm Rosidal short stretch bandages, Pt advised to wear compression bandages for 24 hrs, remove and wash, bring to next appointment for re use, remove tape if painful but can remain taped up to 72 hrs. Tubigrips are to be worn when not bandaged, in daytime only. Remove bandages if she experiences pain, SOB, or vascular compromise. Pt verbalized understanding. PT required to assist pt with donning shoes.    Lymphatouch was applied to left lower extremity at 60 mmHg to increase angiomotoricity. Rock tape applied in edema technique to bilateral upper legs. Based on measurements taken at reassessment, pt will need Compreflex calf with transitional liner in size XL standard  length for bilateral lower legs. Will see if we can get patient's lower legs to decongest further for definitive garment.     Deferred:  Therapeutic Exercise to develop strength, endurance, ROM,   and flexibility for 10 minutes including:  Ankle pumps  Ankle inv/ev  Ankle circles  Toe curls/flares  QS/GS    Education Provided  [x] Progress toward goals   [] Role of therapy   [x] Activity modification  [x] Reviewed HEP      Home Exercises Provided: Patient instructed to continue previously provided HEP.  Exercises were reviewed and Josefina was able to demonstrate them prior to the end of the session.  Josefina demonstrated good  understanding of the education provided.   See EMR under Patient Instructions for exercises provided  2/21/2024 .    Assessment   Patient verbalizes improvement in heaviness sensation since beginning treatment. Patient tolerated treatment well without visible adverse affects or acute distress. Pt was able to fit bandage foot into athletic shoe, but very tight fit. Hemosiderin staining more prevalent on left lower leg than right. Pt doing fine with bandaging lower legs, with taping to upper legs; decreased swelling in lower legs and decreased induration in upper legs.  Doing water aerobics at [facility] twice a week and compliant with wearing tubigrips when not bandaged.     Josefina is a 59 y.o. female referred to outpatient physical therapy with a medical diagnosis of Bilateral lower extremity edema, unspecified venous (peripheral) insufficieny. Invasive ductal carcinoma of breast, female, left. Patient presents with Stage 2 bilateral lower extremity lymphedema secondary to venous insufficiency. This patient presents with increased pain, increased stiffness in the knees, ankles, as well as difficulty donning compression thigh high and knee high stockings and limitations with tolerating wearing garments, pt is also at risk of higher infection. This pt would benefit from skilled therapy services to  address the above impairments. Patient has tried elevating LE's, wearing compression however swelling is persistent and unresolved.    Feel patient would benefit from alternative compression garments such as inelastic velcro compression as well as a home pneumatic compression pump to assist in managing symptoms and reducing risk for infections.      Plan of care discussed with patient: Yes  Pt's spiritual, cultural and educational needs considered and patient is agreeable to the plan of care and goals as stated below:     Anticipated barriers for therapy: scheduling availability       GOALS  Short Term Goals: 3 weeks  Patient will demonstrate 100% knowledge of lymphedema precautions and signs of infection. Met 03/13/2024  Patient will tolerate iADLs with multilayered bandaging for 24-48 hours. MET goal for 24 hrs 03/11/2024   Patient will perform self-bandaging techniques without correction for progress towards compression garments.  Decrease girth by average 1 cm for improved mobility and safety with iADLs. In progress, not met.  Patient to report compliance sleeping on 30 degree incline for lymphatic protection. Met 03/13/2024  Patient will demonstrate proper posture with sitting and standing to decrease detrimental affects to adjacent structures. Met 03/13/2024  Patient will tolerate HEP for better progression toward LTGs and self-management of presentation. Met 03/13/2024     Long Term Goals: 6 weeks In progress.  Patient will be independent with HEP for self-management of symptoms and current status.   Decrease girth by average 2 cm for improved mobility and safety with iADLs.  Patient will perform self lymphatic drainage techniques for long term management of lymphedema.   Patient to don/doff compression garments for self management of lymphedema.   Patient to obtain pneumatic compression pump for daily home use to assist in maintaining reduced swelling.   Patient will report pain reduction to < or = 3 with  AROM for improved safety with iADLs (driving, household chores, yard work, job duties).   Patient to be able to lift legs on/off mat/bed independently at 50% less effort for improved safety with navigating bed mobility.      Plan   Continue remaining 3 weeks of POC, Discussed with pt anticipating measuring for inelastic velcro garments in next week or 2.   Continue with established plan of care working toward PT goals.    Roxie Archibald, PTA , CLT

## 2024-03-20 ENCOUNTER — TELEPHONE (OUTPATIENT)
Dept: CARDIOLOGY | Facility: CLINIC | Age: 59
End: 2024-03-20
Payer: COMMERCIAL

## 2024-03-20 NOTE — TELEPHONE ENCOUNTER
----- Message from Livier Cox sent at 3/20/2024  2:18 PM CDT -----  Type: Needs Medical Advice  Who Called:  Patient   Symptoms (please be specific):    How long has patient had these symptoms:    Pharmacy name and phone #:    Best Call Back Number: [phone]  Additional Information: Patient is requesting a call back to reschedule her appt. on 3/28 for monitor.

## 2024-03-22 ENCOUNTER — CLINICAL SUPPORT (OUTPATIENT)
Dept: REHABILITATION | Facility: HOSPITAL | Age: 59
End: 2024-03-22
Payer: COMMERCIAL

## 2024-03-22 ENCOUNTER — CLINICAL SUPPORT (OUTPATIENT)
Dept: HEMATOLOGY/ONCOLOGY | Facility: CLINIC | Age: 59
End: 2024-03-22
Payer: COMMERCIAL

## 2024-03-22 DIAGNOSIS — R60.0 BILATERAL LOWER EXTREMITY EDEMA: Primary | ICD-10-CM

## 2024-03-22 DIAGNOSIS — I87.2 VENOUS (PERIPHERAL) INSUFFICIENCY: ICD-10-CM

## 2024-03-22 DIAGNOSIS — R26.89 FUNCTIONAL GAIT ABNORMALITY: ICD-10-CM

## 2024-03-22 DIAGNOSIS — C50.912 INVASIVE DUCTAL CARCINOMA OF BREAST, FEMALE, LEFT: ICD-10-CM

## 2024-03-22 DIAGNOSIS — Z71.3 NUTRITIONAL COUNSELING: ICD-10-CM

## 2024-03-22 DIAGNOSIS — C50.912 INVASIVE DUCTAL CARCINOMA OF BREAST, FEMALE, LEFT: Primary | ICD-10-CM

## 2024-03-22 PROCEDURE — 97803 MED NUTRITION INDIV SUBSEQ: CPT | Mod: S$GLB,,,

## 2024-03-22 PROCEDURE — 97140 MANUAL THERAPY 1/> REGIONS: CPT | Mod: PN,CQ

## 2024-03-22 NOTE — PROGRESS NOTES
"Oncology Nutrition Assessment for Medical Nutrition Therapy  Follow Up Visit    Josefina Coon   1965    Referring Provider:  Eugenia MELÉNDEZ-JORGE    Reason for Visit: Pt in for education and nutrition counseling     PMHx:   Past Medical History:   Diagnosis Date    Abnormal liver enzymes     Asymptomatic varicose veins     Breast cancer 09/14/2022    chemo 10/2022-12/2022; radiation 3/2023 6 weeks; immunotherapy 10/2022-10/5/2023    Cancer     left breast cancer    Depressive disorder, not elsewhere classified     Dyslipidemia     Embolism and thrombosis of unspecified site     superficial venous thrombosis    Encounter for long-term (current) use of other medications     Family history of ischemic heart disease     Fatty liver     Generalized anxiety disorder     History of chicken pox     Obstructive sleep apnea (adult) (pediatric)     Type II or unspecified type diabetes mellitus without mention of complication, not stated as uncontrolled     Unspecified essential hypertension     Unspecified venous (peripheral) insufficiency     Unspecified vitamin D deficiency        Nutrition Assessment    Today  Pt states today she is doing much better. Pt reports she is doing physical therapy a couple times a week and doing water aerobics at [facility] twice a week. Pt states her sugar cravings are not gone but are much improved. She has had a few "slip ups" with Easter candy but otherwise really doing well. Pt chose not to get weighed today as she just had her legs wrapped by PT for lymphedema.     1/26/2024  Pt states she has been struggling since last RD visit and has really had difficulty with breaking this "sugar addiction." Pt feels it has actually gotten worse since she was last here. Pt attributes this to the holidays, her birthday, and she got sick with RSV then vertigo. Pt reports feeling of defeat in terms of her relationship with food. She is seeing a counselor regularly.   Pt states she is back on " "Trulicity. She has been on 1.5 mg for a month but her PCP is increasing to 3 mg.  Pt reports to RD she has tried many things in the past for dieting including Ideal Protein and Weight Watchers, but nothing seems to help her for long.   RD spent a lot of time talking with patient about her motivation, which includes her grown son with autism, and finding accountability at home through a family member or friend.     Weight:(!) 138.5 kg (305 lb 5.4 oz)  Height:5' 4" (1.626 m)  BMI:Body mass index is 52.41 kg/m².   IBW: Ideal body weight: 54.7 kg (120 lb 9.5 oz)  Adjusted ideal body weight: 88.2 kg (194 lb 7.8 oz)    12/1/2023  This is a 59 y.o.female with an oncology history of breast cancer who has completed chemotherapy and radiation. Pt states she did have her port removed this week. Pt states she is having intense sugar cravings. She does have type 2 diabetes. Pt states she has been on Metformin for years. She was on Mounjaro and did not feel like it was working so she kept increasing the dosage but unfortunately it came with a lot of side effects. Pt decided to get off of it and is trying to decide if she is going to get back on it or go back to Trulicity; which did not have any side effects but did not help with weight gain. Encouraged pt to keep communicating with PCP on that.   Pt states she started eating sour, sweet candy, such a sour patch kids, during treatment when her taste was off and cannot get off of them. She picks them up every time she goes to the grocery store.    Diet recall:  Breakfast- cereal (Honey Nut Cheerios, Special K, Frosted Mini Wheats) with almond milk  AM snack- apple  Lunch- Leftover or  something such as [employer] or [employer]  Dinner- Cook Monday-Thursday (meat, starch, vegetables), Weekend nights usually go out or   HS Snack- pie, ice cream, bowl of cereal    Allergies: Sitagliptin, Byetta  [exenatide], Lactose, and Sulfa (sulfonamide antibiotics)    Current " Medications:    Current Outpatient Medications:     albuterol (PROVENTIL/VENTOLIN HFA) 90 mcg/actuation inhaler, Inhale 2 puffs into the lungs every 6 (six) hours as needed for Wheezing., Disp: 18 g, Rfl: 6    ALPRAZolam (XANAX) 0.25 MG tablet, TAKE ONE TABLET BY MOUTH 1-2 TIMES DAILY AS NEEDED ANXIETY, Disp: 15 tablet, Rfl: 0    anastrozole (ARIMIDEX) 1 mg Tab, Take 1 tablet (1 mg total) by mouth once daily., Disp: 90 tablet, Rfl: 3    apixaban (ELIQUIS) 5 mg Tab, Take 1 tablet (5 mg total) by mouth 2 (two) times daily., Disp: 180 tablet, Rfl: 3    azelastine (ASTELIN) 137 mcg (0.1 %) nasal spray, 2 sprays by Nasal route 2 (two) times daily as needed for Rhinitis., Disp: , Rfl:     buPROPion (WELLBUTRIN XL) 150 MG TB24 tablet, TAKE 1 TABLET BY MOUTH EVERY DAY (Patient taking differently: Take 150 mg by mouth once daily.), Disp: 90 tablet, Rfl: 3    cyanocobalamin 1,000 mcg/mL injection, Inject 1 ml q 2 weeks x 1 month then 1 ml monthly (Patient taking differently: Inject 1,000 mcg into the muscle every 30 days. Inject 1 ml q 2 weeks x 1 month then 1 ml monthly), Disp: 30 mL, Rfl: 0    dapagliflozin propanediol (FARXIGA) 5 mg Tab tablet, Take 1 tablet (5 mg total) by mouth once daily., Disp: 90 tablet, Rfl: 3    dronedarone (MULTAQ) 400 mg Tab, Take 1 tablet (400 mg total) by mouth 2 (two) times daily with meals., Disp: 60 tablet, Rfl: 11    dulaglutide (TRULICITY) 1.5 mg/0.5 mL pen injector, Inject 1.5 mg into the skin every 7 days. (Patient not taking: Reported on 3/14/2024), Disp: 4 pen , Rfl: 0    dulaglutide (TRULICITY) 3 mg/0.5 mL pen injector, Inject 3 mg into the skin every 7 days., Disp: 4 pen , Rfl: 11    lipase-protease-amylase 6,000-19,000-30,000 units (CREON) 6,000-19,000 -30,000 unit capsule, Take 1 capsule by mouth 3 (three) times daily with meals., Disp: 90 capsule, Rfl: 11    meclizine (ANTIVERT) 12.5 mg tablet, Take 1 tablet (12.5 mg total) by mouth 3 (three) times daily as needed for Dizziness.  (Patient not taking: Reported on 3/14/2024), Disp: 30 tablet, Rfl: 0    metFORMIN (GLUCOPHAGE) 500 MG tablet, TAKE 1 TABLET BY MOUTH TWICE A DAY WITH MEALS, Disp: 180 tablet, Rfl: 1    nystatin (MYCOSTATIN) powder, Apply to affected area 3 times daily (Patient not taking: Reported on 3/14/2024), Disp: 60 g, Rfl: 1    pravastatin (PRAVACHOL) 10 MG tablet, TAKE 1 TABLET BY MOUTH EVERYDAY AT BEDTIME, Disp: 90 tablet, Rfl: 1    Current Facility-Administered Medications:     cyanocobalamin injection 1,000 mcg, 1,000 mcg, Intramuscular, Once, Raya Echols MD    Facility-Administered Medications Ordered in Other Visits:     lactated ringers infusion, , Intravenous, Continuous, Maria G Mcduffie MD, Last Rate: 125 mL/hr at 02/08/23 0700, New Bag at 02/08/23 0814    midazolam (VERSED) 1 mg/mL injection 2 mg, 2 mg, Intravenous, See admin instructions, Maria G Mcduffie MD, 2 mg at 02/08/23 0711    Vitamins/Supplements: Reviewed     Labs: Reviewed     Nutrition Diagnosis    Problem: excessive protein/calorie intake   Etiology (related to): dietary non-compliance  Signs/Symptoms (as evidenced by): BMI = 52.41    Nutrition Intervention    Nutrition Prescription   1855 Kcals (14 kcal/kg CBW)   76g protein (1.4 g/kg IBW)   1855 mL fluid (1 mL/kg CBW)    Recommendations:  Today:  Reinforced below    1/26/24:  Reviewed previous recommendations  Find motivation and place picture or word around the house as reminder  Finding accountability partner  Having family grocery shop so will not get tempted in grocery store to buy off grocery list  150 minutes of activity per week    12/1/2024  No more than 25 grabs added sugar per day  Protein with every meal and snack  Limit white, refined carbohydrates and choose 100% whole grains  Vegetables should take up 1/2 plate  Choose healthier snacks- examples discussed       Nutrition Monitoring and Evaluation    Monitor: energy intake, weight, and adherence to nutrition  recommendations     Goals: 1-2# weight loss per week until BMI WNL    Follow up In 6 months    Communication to referring provider/care team: note available in chart     Counseling time: 15 minutes    Eloisa Perales MS, RD, LDN  [phone]

## 2024-03-22 NOTE — PROGRESS NOTES
[facility] Outpatient  Physical Therapy Progress Note  Lymphedema Therapy    Visit Date: 3/22/2024    Name: Josefina Coon  MRN: [id number]  Therapy Diagnosis:   Encounter Diagnoses   Name Primary?    Bilateral lower extremity edema Yes    Venous (peripheral) insufficiency     Functional gait abnormality     Invasive ductal carcinoma of breast, female, left          Physician: Raya Echols MD  Physician Orders: PT Eval and Treat   Medical Diagnosis from Referral: Bilateral lower extremity edema, unspecified venous (peripheral) insufficieny. Invasive ductal carcinoma of breast, female, left  Evaluation Date: 2/21/2024  Authorization: pending  Plan of Care Expiration: 02/21/2024-04/21/2024  Reassessment Due: 03/21/2024  Fact-G Completed: 1 / 2     Visit: 8 / 12  PTA Visit: 2 / 5  Time In: 08:00 AM  Time Out: 09:00 AM  Total Billable Time: 60 minutes     Precautions: Standard, Fall and cancer      Subjective     Patient states: haven't heard anything about the pump yet, was able to go to water aerobics twice last week and am really enjoying it. Going to [place] in April and need to start a walking program to prepare for all the walking that she will be doing. Wear the waffle weave leggings some, but find that it makes her sweat and chafe in her groin. Sweat a lot due to hormone blocking medication that she takes. Notice that the varicose veins are more prominent in the left lower leg and unsure why. Less swollen when bandages are removed, but swell up again by the next day. Tubigrips are worn out.  Would like to get into compression garments soon.  Unsure if taping helps to the upper leg.   Pain: 2/10   Location: bilateral lower legs (heaviness)    Objective   Pt to clinic with tennis shoes, no compression to legs.      Josefina participated in / received the following treatment:  Discussed the purpose, mechanism, and indications of MLD with pt. Pt was cleared of all contraindications  and precautions, including but not limited to cardiac edema, renal failure, acute infection, acute bronchitis, acute DVT, malignancies, bronchial asthma, HTN, pregnancy, ileus, aortic aneurysm, recent abdominal surgery, IBD, diverticulosis, XRT fibrosis or cystitis, colitis. Risks and benefits of tx were reviewed with patient, to which patient was in agreement to continue with tx today.     Discussed the purpose, mechanism, and indications of Compression Therapy with pt. Pt was cleared of all contraindications and precautions, including but not limited to cardiac edema, PAD, acute infection, HTN, cardiac arrhythmia, hyposensitivity, paralysis, CHF, diabetes, malignancy. Risks and benefits of tx were reviewed with patient, to which patient was in agreement to continue with tx today.        Manual Therapy to develop flexibility, extensibility, desensitization, pliability, and contour for 60 minutes including:  Diaphragm breathing throughout MLD.  Pt received MLD to bilateral lower extremities, with pre treatment short neck series to include:  terminus, sternal notch, lateral and posterior neck, right axilla, abdomen, and bilateral inguinal triangle, followed by full leg sequence with rework accessing all watershed areas on trunk. Concurrent use of pneumatic compression pump at 30 mmHg, with pt tolerating well and expressing comfort. Eucerin lotion applied to dry skin, followed by application of compression bandages to bilateral lower leg as follows:  tubigrip G to B LE's, comprefoam and cast padding applied to achieve a cylindrical shape, (1) 8 cm, (1) 10 cm, (1) 12 cm Rosidal short stretch bandages, Pt advised to wear compression bandages for 24 hrs, remove and wash, bring to next appointment for re use, remove tape if painful but can remain taped up to 72 hrs. Tubigrips are to be worn when not bandaged, in daytime only. Remove bandages if she experiences pain, SOB, or vascular compromise. Pt verbalized  understanding. PT required to assist pt with donning shoes.    Lymphatouch was applied to left lower extremity at 60 mmHg to increase angiomotoricity.  Based on measurements taken at reassessment, pt will need Compreflex calf with transitional liner in size XL standard length in beige for bilateral lower legs. Will see if we can get patient's lower legs to decongest further for definitive garment.     Deferred:  Therapeutic Exercise to develop strength, endurance, ROM,   and flexibility for 10 minutes including:  Ankle pumps  Ankle inv/ev  Ankle circles  Toe curls/flares  QS/GS  Rock tape applied in edema technique to bilateral upper legs.  Education Provided  [x] Progress toward goals   [] Role of therapy   [x] Activity modification  [x] Reviewed HEP      Home Exercises Provided: Patient instructed to continue previously provided HEP.  Exercises were reviewed and Josefina was able to demonstrate them prior to the end of the session.  Josefina demonstrated good  understanding of the education provided.   See EMR under Patient Instructions for exercises provided  2/21/2024 .    Assessment   Patient verbalizes improvement in heaviness sensation since beginning treatment. Patient tolerated treatment well without visible adverse affects or acute distress. Pt was able to fit bandage foot into athletic shoe, but very tight fit. Hemosiderin staining more prevalent on left lower leg than right. Pt doing fine with bandaging lower legs, decreased swelling in lower legs and decreased induration in upper legs.  Doing water aerobics at [facility] twice a week and compliant with wearing tubigrips when not bandaged.   Issued new tubigrips this session.     Josefina is a 59 y.o. female referred to outpatient physical therapy with a medical diagnosis of Bilateral lower extremity edema, unspecified venous (peripheral) insufficieny. Invasive ductal carcinoma of breast, female, left. Patient presents with Stage 2 bilateral lower extremity  lymphedema secondary to venous insufficiency. This patient presents with increased pain, increased stiffness in the knees, ankles, as well as difficulty donning compression thigh high and knee high stockings and limitations with tolerating wearing garments, pt is also at risk of higher infection. This pt would benefit from skilled therapy services to address the above impairments. Patient has tried elevating LE's, wearing compression however swelling is persistent and unresolved.    Feel patient would benefit from alternative compression garments such as inelastic velcro compression as well as a home pneumatic compression pump to assist in managing symptoms and reducing risk for infections.      Plan of care discussed with patient: Yes  Pt's spiritual, cultural and educational needs considered and patient is agreeable to the plan of care and goals as stated below:     Anticipated barriers for therapy: scheduling availability       GOALS  Short Term Goals: 3 weeks  Patient will demonstrate 100% knowledge of lymphedema precautions and signs of infection. Met 03/13/2024  Patient will tolerate iADLs with multilayered bandaging for 24-48 hours. MET goal for 24 hrs 03/11/2024   Patient will perform self-bandaging techniques without correction for progress towards compression garments.  Decrease girth by average 1 cm for improved mobility and safety with iADLs. In progress, not met.  Patient to report compliance sleeping on 30 degree incline for lymphatic protection. Met 03/13/2024  Patient will demonstrate proper posture with sitting and standing to decrease detrimental affects to adjacent structures. Met 03/13/2024  Patient will tolerate HEP for better progression toward LTGs and self-management of presentation. Met 03/13/2024     Long Term Goals: 6 weeks In progress.  Patient will be independent with HEP for self-management of symptoms and current status.   Decrease girth by average 2 cm for improved mobility and safety  with iADLs.  Patient will perform self lymphatic drainage techniques for long term management of lymphedema.   Patient to don/doff compression garments for self management of lymphedema.   Patient to obtain pneumatic compression pump for daily home use to assist in maintaining reduced swelling.   Patient will report pain reduction to < or = 3 with AROM for improved safety with iADLs (driving, household chores, yard work, job duties).   Patient to be able to lift legs on/off mat/bed independently at 50% less effort for improved safety with navigating bed mobility.      Plan   Continue remaining 3 weeks of POC, Discussed with pt anticipating measuring for inelastic velcro garments in next week or 2.   Continue with established plan of care working toward PT goals.    Roxie Archibald, PTA , CLT

## 2024-03-25 ENCOUNTER — OFFICE VISIT (OUTPATIENT)
Dept: NEPHROLOGY | Facility: CLINIC | Age: 59
End: 2024-03-25
Payer: COMMERCIAL

## 2024-03-25 VITALS
SYSTOLIC BLOOD PRESSURE: 124 MMHG | HEIGHT: 64 IN | OXYGEN SATURATION: 97 % | WEIGHT: 293 LBS | HEART RATE: 76 BPM | DIASTOLIC BLOOD PRESSURE: 78 MMHG | BODY MASS INDEX: 50.02 KG/M2

## 2024-03-25 DIAGNOSIS — E11.41 DIABETIC MONONEUROPATHY ASSOCIATED WITH TYPE 2 DIABETES MELLITUS: ICD-10-CM

## 2024-03-25 DIAGNOSIS — N18.32 STAGE 3B CHRONIC KIDNEY DISEASE: ICD-10-CM

## 2024-03-25 DIAGNOSIS — I10 ESSENTIAL HYPERTENSION: ICD-10-CM

## 2024-03-25 DIAGNOSIS — N18.32 TYPE 2 DIABETES MELLITUS WITH STAGE 3B CHRONIC KIDNEY DISEASE, WITH LONG-TERM CURRENT USE OF INSULIN: Primary | ICD-10-CM

## 2024-03-25 DIAGNOSIS — E11.22 TYPE 2 DIABETES MELLITUS WITH STAGE 3B CHRONIC KIDNEY DISEASE, WITH LONG-TERM CURRENT USE OF INSULIN: Primary | ICD-10-CM

## 2024-03-25 DIAGNOSIS — G47.33 OBSTRUCTIVE SLEEP APNEA (ADULT) (PEDIATRIC): ICD-10-CM

## 2024-03-25 DIAGNOSIS — E66.01 MORBID OBESITY: ICD-10-CM

## 2024-03-25 DIAGNOSIS — E11.9 TYPE 2 DIABETES MELLITUS WITHOUT COMPLICATION, WITHOUT LONG-TERM CURRENT USE OF INSULIN: ICD-10-CM

## 2024-03-25 DIAGNOSIS — Z79.4 TYPE 2 DIABETES MELLITUS WITH STAGE 3B CHRONIC KIDNEY DISEASE, WITH LONG-TERM CURRENT USE OF INSULIN: Primary | ICD-10-CM

## 2024-03-25 PROCEDURE — 3044F HG A1C LEVEL LT 7.0%: CPT | Mod: CPTII,S$GLB,, | Performed by: STUDENT IN AN ORGANIZED HEALTH CARE EDUCATION/TRAINING PROGRAM

## 2024-03-25 PROCEDURE — 3078F DIAST BP <80 MM HG: CPT | Mod: CPTII,S$GLB,, | Performed by: STUDENT IN AN ORGANIZED HEALTH CARE EDUCATION/TRAINING PROGRAM

## 2024-03-25 PROCEDURE — 3074F SYST BP LT 130 MM HG: CPT | Mod: CPTII,S$GLB,, | Performed by: STUDENT IN AN ORGANIZED HEALTH CARE EDUCATION/TRAINING PROGRAM

## 2024-03-25 PROCEDURE — 1159F MED LIST DOCD IN RCRD: CPT | Mod: CPTII,S$GLB,, | Performed by: STUDENT IN AN ORGANIZED HEALTH CARE EDUCATION/TRAINING PROGRAM

## 2024-03-25 PROCEDURE — 99999 PR PBB SHADOW E&M-EST. PATIENT-LVL IV: CPT | Mod: PBBFAC,,, | Performed by: STUDENT IN AN ORGANIZED HEALTH CARE EDUCATION/TRAINING PROGRAM

## 2024-03-25 PROCEDURE — 99214 OFFICE O/P EST MOD 30 MIN: CPT | Mod: S$GLB,,, | Performed by: STUDENT IN AN ORGANIZED HEALTH CARE EDUCATION/TRAINING PROGRAM

## 2024-03-25 PROCEDURE — 3066F NEPHROPATHY DOC TX: CPT | Mod: CPTII,S$GLB,, | Performed by: STUDENT IN AN ORGANIZED HEALTH CARE EDUCATION/TRAINING PROGRAM

## 2024-03-25 PROCEDURE — 3008F BODY MASS INDEX DOCD: CPT | Mod: CPTII,S$GLB,, | Performed by: STUDENT IN AN ORGANIZED HEALTH CARE EDUCATION/TRAINING PROGRAM

## 2024-03-25 RX ORDER — METOPROLOL SUCCINATE 25 MG/1
25 TABLET, EXTENDED RELEASE ORAL
COMMUNITY
Start: 2024-01-29

## 2024-03-25 RX ORDER — METOPROLOL SUCCINATE 50 MG/1
50 TABLET, EXTENDED RELEASE ORAL
COMMUNITY
Start: 2024-02-06 | End: 2024-04-24

## 2024-03-25 NOTE — PATIENT INSTRUCTIONS
I put together a guide for patients which includes some healthy tips from the [employer]:   Avoiding fluctuations in blood pressure (high and low spikes)  Maintain good glucose control if you have diabetes  Reduce/avoid extra salt intake  Avoid over the counter pain medications (NSAIDs)  Aerobic exercise at least 3x weekly as tolerated  Eat more whole foods (vegetables, fruit, fish, chicken, pork, beef, dairy) and less processed food  Control weight  Avoid smoking  Stay hydrated, avoid dehydration.  Annual flu shot and routine preventative cancer screening via your primary care doctor.     -Raffy Cutler MD

## 2024-03-25 NOTE — PROGRESS NOTES
"Subjective:        Patient ID: Josefina Coon is a 59 y.o. White female who presents for return patient evaluation for chronic renal failure.  She has a pertinent past medical history of diabetes type 2, hypertension, history of invasive ductal carcinoma of the left breast diagnosis September 2022 managed with a regimen of Taxol and Herceptin from October to December 2022 with follow up radiation.  She presents for ongoing evaluation and management of chronic kidney disease stage IIIB.    Interval history:  03/25/2024 clinic visit- presented today for ongoing evaluation and management of chronic kidney disease.  She feels well today he denies any acute complaints.  We discussed her recent lab results at chair side.  She was recently taken off angiotensin receptor blocker due to recurrent issues with hypotension.  She has no uremic or urinary symptoms and is in her usual state of health.              Review of Systems   Constitutional:  Negative for chills, diaphoresis and fever.   Respiratory:  Negative for cough and shortness of breath.    Cardiovascular:  Negative for chest pain and leg swelling.   Gastrointestinal:  Negative for abdominal pain, diarrhea, nausea and vomiting.   Genitourinary:  Negative for difficulty urinating, dysuria and hematuria.   Musculoskeletal:  Negative for myalgias.   Neurological:  Negative for headaches.   Hematological:  Does not bruise/bleed easily.   All other systems reviewed and are negative.        The past medical, family and social histories were reviewed for this encounter.         Objective:   /78 (BP Location: Right forearm, Patient Position: Sitting, BP Method: Medium (Manual))   Pulse 76   Ht 5' 4" (1.626 m)   Wt (!) 141.3 kg (311 lb 8.2 oz)   SpO2 97%   BMI 53.47 kg/m²        Physical Exam  Vitals reviewed.   Constitutional:       General: She is not in acute distress.     Appearance: Normal appearance. She is obese.   HENT:      Head: Normocephalic and " atraumatic.      Right Ear: External ear normal.      Left Ear: External ear normal.      Nose: Nose normal. No congestion.      Mouth/Throat:      Mouth: Mucous membranes are moist.      Pharynx: Oropharynx is clear. No oropharyngeal exudate or posterior oropharyngeal erythema.   Eyes:      General: No scleral icterus.     Extraocular Movements: Extraocular movements intact.   Cardiovascular:      Rate and Rhythm: Normal rate and regular rhythm.      Pulses: Normal pulses.      Heart sounds: Normal heart sounds.      No friction rub.   Pulmonary:      Effort: Pulmonary effort is normal. No respiratory distress.      Breath sounds: Normal breath sounds.   Abdominal:      General: Abdomen is flat.      Palpations: Abdomen is soft.   Musculoskeletal:         General: No swelling.      Cervical back: Normal range of motion. No tenderness.      Right lower leg: No edema.      Left lower leg: No edema.   Neurological:      General: No focal deficit present.      Mental Status: She is oriented to person, place, and time.      Motor: No weakness.   Psychiatric:         Mood and Affect: Mood normal.         Behavior: Behavior normal.         Assessment:     Lab Results   Component Value Date    CREATININE 1.2 03/14/2024    BUN 18 03/14/2024     03/14/2024    K 4.3 03/14/2024     03/14/2024    CO2 26 03/14/2024     Lab Results   Component Value Date    PTH 59.3 03/14/2024    CALCIUM 10.0 03/14/2024    PHOS 3.8 03/14/2024     Lab Results   Component Value Date    HCT 41.7 03/14/2024     Prot/Creat Ratio, Urine   Date Value Ref Range Status   03/14/2024 0.20 0.00 - 0.20 Final   09/15/2023 0.20 0.00 - 0.20 Final   07/12/2023 0.64 (H) 0.00 - 0.20 Final      Lab Results   Component Value Date    MICALBCREAT 72.6 (H) 06/23/2023           1. Type 2 diabetes mellitus with stage 3b chronic kidney disease, with long-term current use of insulin    2. Stage 3b chronic kidney disease    3. Diabetic mononeuropathy associated  with type 2 diabetes mellitus    4. Essential hypertension    5. Type 2 diabetes mellitus without complication, without long-term current use of insulin    6. Morbid obesity    7. Obstructive sleep apnea (adult) (pediatric)        Plan:   Return to clinic in 6 months.  Labs for next visit include PTH, RFP, UPCR.  Baseline creatinine is 1.2.    CKD IIIB A2  -risk from DM type II, HTN, obesity  -on SGLT2-I for CKD-MACE risk reduction, proteinuria reduction and trent-protection  -low risk of renal progression based on TANGRI margarita  -continue discussion and adjustment of modifiable risk factors. Educated patient on the importance of BP, glycemic and lipid control, weight loss.  -discussed if her renal function remains stable at next visit we will consider restart of angiotensin receptor blocker but at a very low dose.    DM type II - per PCP    Hypertension - controlled on the current regiment at this time. Not on RASI d/t hypotension.    Obesity - We discussed making lifestyle changes and using a Mediterranean diet for health benefits and weight loss.  This diet also is beneficial in improving HTN, DM, protein in urine as well as kidney function. aerobic exercise at least 3x weekly as tolerated    JASON - per CPAP prescription    Hyperuricemia - improving. Taking cranberry extract      Murphy Cutler MD  [facility]    KFRE 2-Year: 0.3% at 3/14/2024 10:12 AM  Calculated from:  Serum Creatinine: 1.2 mg/dL at 3/14/2024 10:12 AM  Urine Albumin Creatinine Ratio: 72.6 ug/mg at 6/23/2023  9:35 AM  Age: 59 years  Sex: Female at 3/14/2024 10:12 AM  Has CKD-3 to CKD-5: Yes    KFRE 5-Year: 1% at 3/14/2024 10:12 AM  Calculated from:  Serum Creatinine: 1.2 mg/dL at 3/14/2024 10:12 AM  Urine Albumin Creatinine Ratio: 72.6 ug/mg at 6/23/2023  9:35 AM  Age: 59 years  Sex: Female at 3/14/2024 10:12 AM  Has CKD-3 to CKD-5: Yes

## 2024-03-26 ENCOUNTER — CLINICAL SUPPORT (OUTPATIENT)
Dept: REHABILITATION | Facility: HOSPITAL | Age: 59
End: 2024-03-26
Payer: COMMERCIAL

## 2024-03-26 DIAGNOSIS — R60.0 BILATERAL LOWER EXTREMITY EDEMA: Primary | ICD-10-CM

## 2024-03-26 DIAGNOSIS — I87.2 VENOUS (PERIPHERAL) INSUFFICIENCY: ICD-10-CM

## 2024-03-26 DIAGNOSIS — C50.912 INVASIVE DUCTAL CARCINOMA OF BREAST, FEMALE, LEFT: ICD-10-CM

## 2024-03-26 DIAGNOSIS — R26.89 FUNCTIONAL GAIT ABNORMALITY: ICD-10-CM

## 2024-03-26 PROCEDURE — 97140 MANUAL THERAPY 1/> REGIONS: CPT | Mod: PN,CQ

## 2024-03-26 NOTE — PROGRESS NOTES
[facility]  Physical Therapy Progress Note  Lymphedema Therapy    Visit Date: 3/26/2024    Name: Josefina Coon  MRN: [id number]  Therapy Diagnosis:   Encounter Diagnoses   Name Primary?    Bilateral lower extremity edema Yes    Venous (peripheral) insufficiency     Functional gait abnormality     Invasive ductal carcinoma of breast, female, left          Physician: Raya Echols MD  Physician Orders: PT Eval and Treat   Medical Diagnosis from Referral: Bilateral lower extremity edema, unspecified venous (peripheral) insufficieny. Invasive ductal carcinoma of breast, female, left  Evaluation Date: 2/21/2024  Authorization: pending  Plan of Care Expiration: 02/21/2024-04/21/2024  Reassessment Due: 03/21/2024  Fact-G Completed: 1 / 2     Visit: 9 / 12  PTA Visit: 3 / 5  Time In: 08:05 AM  Time Out: 09:05 AM  Total Billable Time: 60 minutes     Precautions: Standard, Fall and cancer      Subjective     Patient states: haven't heard anything about the pump yet, was able to go to water aerobics twice last week and am really enjoying it. Went to the kidney doctor and he is glad that I am going.  Going to [place] in April and need to start a walking program to prepare for all the walking that she will be doing. Wear the waffle weave leggings some, but find that it makes her sweat and chafe in her groin. Sweat a lot due to hormone blocking medication that she takes. Went to salon to get a hair cut and was wearing slippers due to being bandaged and tripped and fell in the salon. I am ok, just sore. Take blood thinners, so have to be careful. Have noticed that legs don't swell as much when bandages are taken off.   Pain: 2/10   Location: bilateral lower legs (heaviness)    Objective   Pt to clinic with tennis shoes, no compression to legs.      Josefina participated in / received the following treatment:  Discussed the purpose, mechanism, and indications of MLD with pt. Pt was  cleared of all contraindications and precautions, including but not limited to cardiac edema, renal failure, acute infection, acute bronchitis, acute DVT, malignancies, bronchial asthma, HTN, pregnancy, ileus, aortic aneurysm, recent abdominal surgery, IBD, diverticulosis, XRT fibrosis or cystitis, colitis. Risks and benefits of tx were reviewed with patient, to which patient was in agreement to continue with tx today.     Discussed the purpose, mechanism, and indications of Compression Therapy with pt. Pt was cleared of all contraindications and precautions, including but not limited to cardiac edema, PAD, acute infection, HTN, cardiac arrhythmia, hyposensitivity, paralysis, CHF, diabetes, malignancy. Risks and benefits of tx were reviewed with patient, to which patient was in agreement to continue with tx today.        Manual Therapy to develop flexibility, extensibility, desensitization, pliability, and contour for 60 minutes including:  Diaphragm breathing throughout MLD.  Pt received MLD to bilateral lower extremities, with pre treatment short neck series to include:  terminus, sternal notch, lateral and posterior neck, right axilla, abdomen, and bilateral inguinal triangle, followed by full leg sequence with rework accessing all watershed areas on trunk. Concurrent use of pneumatic compression pump at 30 mmHg, with pt tolerating well and expressing comfort. Eucerin lotion applied to dry skin, followed by application of compression bandages to bilateral lower leg as follows:  tubigrip G to B LE's, comprefoam and cast padding applied to achieve a cylindrical shape, (1) 8 cm, (1) 10 cm, (1) 12 cm Rosidal short stretch bandages, Pt advised to wear compression bandages for 24 hrs, remove and wash, bring to next appointment for re use, remove tape if painful but can remain taped up to 72 hrs. Tubigrips are to be worn when not bandaged, in daytime only. Remove bandages if she experiences pain, SOB, or vascular  compromise. Pt verbalized understanding. PT required to assist pt with donning shoes.    Lymphatouch was applied to left lower extremity at 60 mmHg to increase angiomotoricity.  Based on measurements taken at reassessment, pt will need Compreflex calf with transitional liner in size XL standard length in beige for bilateral lower legs. Will see if we can get patient's lower legs to decongest further for definitive garment.     Deferred:  Therapeutic Exercise to develop strength, endurance, ROM,   and flexibility for 10 minutes including:  Ankle pumps  Ankle inv/ev  Ankle circles  Toe curls/flares  QS/GS  Rock tape applied in edema technique to bilateral upper legs.  Education Provided  [x] Progress toward goals   [] Role of therapy   [x] Activity modification  [x] Reviewed HEP      Home Exercises Provided: Patient instructed to continue previously provided HEP.  Exercises were reviewed and Josefina was able to demonstrate them prior to the end of the session.  Josefina demonstrated good  understanding of the education provided.   See EMR under Patient Instructions for exercises provided  2/21/2024 .    Assessment   Patient verbalizes improvement in heaviness sensation since beginning treatment. Patient tolerated treatment well without visible adverse affects or acute distress. Pt was able to fit bandage foot into athletic shoe, but very tight fit. Hemosiderin staining more prevalent on left lower leg than right. Pt doing fine with bandaging lower legs, decreased swelling in lower legs and decreased induration in upper legs.  Doing water aerobics at [facility] twice a week and compliant with wearing tubigrips when not bandaged.   Issued new tubigrips this session.     Josefina is a 59 y.o. female referred to outpatient physical therapy with a medical diagnosis of Bilateral lower extremity edema, unspecified venous (peripheral) insufficieny. Invasive ductal carcinoma of breast, female, left. Patient presents with Stage 2  bilateral lower extremity lymphedema secondary to venous insufficiency. This patient presents with increased pain, increased stiffness in the knees, ankles, as well as difficulty donning compression thigh high and knee high stockings and limitations with tolerating wearing garments, pt is also at risk of higher infection. This pt would benefit from skilled therapy services to address the above impairments. Patient has tried elevating LE's, wearing compression however swelling is persistent and unresolved.    Feel patient would benefit from alternative compression garments such as inelastic velcro compression as well as a home pneumatic compression pump to assist in managing symptoms and reducing risk for infections.      Plan of care discussed with patient: Yes  Pt's spiritual, cultural and educational needs considered and patient is agreeable to the plan of care and goals as stated below:     Anticipated barriers for therapy: scheduling availability       GOALS  Short Term Goals: 3 weeks  Patient will demonstrate 100% knowledge of lymphedema precautions and signs of infection. Met 03/13/2024  Patient will tolerate iADLs with multilayered bandaging for 24-48 hours. MET goal for 24 hrs 03/11/2024   Patient will perform self-bandaging techniques without correction for progress towards compression garments.  Decrease girth by average 1 cm for improved mobility and safety with iADLs. In progress, not met.  Patient to report compliance sleeping on 30 degree incline for lymphatic protection. Met 03/13/2024  Patient will demonstrate proper posture with sitting and standing to decrease detrimental affects to adjacent structures. Met 03/13/2024  Patient will tolerate HEP for better progression toward LTGs and self-management of presentation. Met 03/13/2024     Long Term Goals: 6 weeks In progress.  Patient will be independent with HEP for self-management of symptoms and current status.   Decrease girth by average 2 cm for  improved mobility and safety with iADLs.  Patient will perform self lymphatic drainage techniques for long term management of lymphedema.   Patient to don/doff compression garments for self management of lymphedema.   Patient to obtain pneumatic compression pump for daily home use to assist in maintaining reduced swelling.   Patient will report pain reduction to < or = 3 with AROM for improved safety with iADLs (driving, household chores, yard work, job duties).   Patient to be able to lift legs on/off mat/bed independently at 50% less effort for improved safety with navigating bed mobility.      Plan   Continue remaining 3 weeks of POC, Discussed with pt anticipating measuring for inelastic velcro garments in next week or 2.   Continue with established plan of care working toward PT goals.    Roxie Archibald, PTA , CLT

## 2024-03-28 ENCOUNTER — CLINICAL SUPPORT (OUTPATIENT)
Dept: REHABILITATION | Facility: HOSPITAL | Age: 59
End: 2024-03-28
Payer: COMMERCIAL

## 2024-03-28 DIAGNOSIS — R26.89 FUNCTIONAL GAIT ABNORMALITY: ICD-10-CM

## 2024-03-28 DIAGNOSIS — R60.0 BILATERAL LOWER EXTREMITY EDEMA: Primary | ICD-10-CM

## 2024-03-28 DIAGNOSIS — I87.2 VENOUS (PERIPHERAL) INSUFFICIENCY: ICD-10-CM

## 2024-03-28 DIAGNOSIS — C50.912 INVASIVE DUCTAL CARCINOMA OF BREAST, FEMALE, LEFT: ICD-10-CM

## 2024-03-28 PROCEDURE — 97140 MANUAL THERAPY 1/> REGIONS: CPT | Mod: PN,CQ

## 2024-03-28 NOTE — PROGRESS NOTES
[facility]  Physical Therapy Progress Note  Lymphedema Therapy    Visit Date: 3/28/2024    Name: Josefina Coon  MRN: [id number]  Therapy Diagnosis:   Encounter Diagnoses   Name Primary?    Bilateral lower extremity edema Yes    Venous (peripheral) insufficiency     Functional gait abnormality     Invasive ductal carcinoma of breast, female, left          Physician: Raya Echols MD  Physician Orders: PT Eval and Treat   Medical Diagnosis from Referral: Bilateral lower extremity edema, unspecified venous (peripheral) insufficieny. Invasive ductal carcinoma of breast, female, left  Evaluation Date: 2/21/2024  Authorization: pending  Plan of Care Expiration: 02/21/2024-04/21/2024  Reassessment Due: 03/21/2024  Fact-G Completed: 1 / 2     Visit: 10 / 12  PTA Visit: 4 / 5  Time In: 08:00 AM  Time Out: 09:00 AM  Total Billable Time: 60 minutes     Precautions: Standard, Fall and cancer      Subjective     Patient states: haven't heard anything about the pump yet, was able to go to water aerobics twice last week and am really enjoying it. Went to the kidney doctor and he is glad that I am going.  Going to [place] in April and need to start a walking program to prepare for all the walking that she will be doing. Wear the waffle weave leggings some, but find that it makes her sweat and chafe in her groin. Sweat a lot due to hormone blocking medication that she takes. Went to salon to get a hair cut and was wearing slippers due to being bandaged and tripped and fell in the salon. I am ok, just sore. Take blood thinners, so have to be careful. Have noticed that legs don't swell as much when bandages are taken off.   Pain: 2/10   Location: bilateral lower legs (heaviness)    Objective   Pt to clinic with tennis shoes, no compression to legs. Signed fitter form to fax to [employer].       Josefina participated in / received the following treatment:  Discussed the purpose, mechanism,  and indications of MLD with pt. Pt was cleared of all contraindications and precautions, including but not limited to cardiac edema, renal failure, acute infection, acute bronchitis, acute DVT, malignancies, bronchial asthma, HTN, pregnancy, ileus, aortic aneurysm, recent abdominal surgery, IBD, diverticulosis, XRT fibrosis or cystitis, colitis. Risks and benefits of tx were reviewed with patient, to which patient was in agreement to continue with tx today.     Discussed the purpose, mechanism, and indications of Compression Therapy with pt. Pt was cleared of all contraindications and precautions, including but not limited to cardiac edema, PAD, acute infection, HTN, cardiac arrhythmia, hyposensitivity, paralysis, CHF, diabetes, malignancy. Risks and benefits of tx were reviewed with patient, to which patient was in agreement to continue with tx today.        Manual Therapy to develop flexibility, extensibility, desensitization, pliability, and contour for 60 minutes including:  Diaphragm breathing throughout MLD.  Pt received MLD to bilateral lower extremities, with pre treatment short neck series to include:  terminus, sternal notch, lateral and posterior neck, right axilla, abdomen, and bilateral inguinal triangle, followed by full leg sequence with rework accessing all watershed areas on trunk. Concurrent use of pneumatic compression pump at 30 mmHg, with pt tolerating well and expressing comfort. Eucerin lotion applied to dry skin, followed by application of compression bandages to bilateral lower leg as follows:  tubigrip G to B LE's, comprefoam and cast padding applied to achieve a cylindrical shape, (1) 8 cm, (1) 10 cm, (1) 12 cm Rosidal short stretch bandages, Pt advised to wear compression bandages for 24 hrs, remove and wash, bring to next appointment for re use.  Tubigrips are to be worn when not bandaged, in daytime only. Remove bandages if she experiences pain, SOB, or vascular compromise. Pt  verbalized understanding. PT required to assist pt with donning shoes.    Lymphatouch was applied to left lower extremity at 60 mmHg to increase angiomotoricity.  Based on measurements taken at reassessment, pt will need Compreflex calf with transitional liner in size XL standard length in beige for bilateral lower legs.     Deferred:  Therapeutic Exercise to develop strength, endurance, ROM,   and flexibility for 10 minutes including:  Ankle pumps  Ankle inv/ev  Ankle circles  Toe curls/flares  QS/GS  Rock tape applied in edema technique to bilateral upper legs.  Education Provided  [x] Progress toward goals   [] Role of therapy   [x] Activity modification  [x] Reviewed HEP      Home Exercises Provided: Patient instructed to continue previously provided HEP.  Exercises were reviewed and Josefina was able to demonstrate them prior to the end of the session.  Josefina demonstrated good  understanding of the education provided.   See EMR under Patient Instructions for exercises provided  2/21/2024 .    Assessment   Patient verbalizes improvement in heaviness sensation since beginning treatment. Patient tolerated treatment well without visible adverse affects or acute distress. Pt was able to fit bandage foot into athletic shoe, but very tight fit. Hemosiderin staining more prevalent on left lower leg than right. Pt doing fine with bandaging lower legs, decreased swelling in lower legs and decreased induration in upper legs.  Doing water aerobics at [facility]] twice a week and compliant with wearing tubigrips when not bandaged.   Issued new tubigrips this session.     Josefina is a 59 y.o. female referred to outpatient physical therapy with a medical diagnosis of Bilateral lower extremity edema, unspecified venous (peripheral) insufficieny. Invasive ductal carcinoma of breast, female, left. Patient presents with Stage 2 bilateral lower extremity lymphedema secondary to venous insufficiency. This patient presents with increased  pain, increased stiffness in the knees, ankles, as well as difficulty donning compression thigh high and knee high stockings and limitations with tolerating wearing garments, pt is also at risk of higher infection. This pt would benefit from skilled therapy services to address the above impairments. Patient has tried elevating LE's, wearing compression however swelling is persistent and unresolved.    Feel patient would benefit from alternative compression garments such as inelastic velcro compression as well as a home pneumatic compression pump to assist in managing symptoms and reducing risk for infections.      Plan of care discussed with patient: Yes  Pt's spiritual, cultural and educational needs considered and patient is agreeable to the plan of care and goals as stated below:     Anticipated barriers for therapy: scheduling availability       GOALS  Short Term Goals: 3 weeks  Patient will demonstrate 100% knowledge of lymphedema precautions and signs of infection. Met 03/13/2024  Patient will tolerate iADLs with multilayered bandaging for 24-48 hours. MET goal for 24 hrs 03/11/2024   Patient will perform self-bandaging techniques without correction for progress towards compression garments.  Decrease girth by average 1 cm for improved mobility and safety with iADLs. In progress, not met.  Patient to report compliance sleeping on 30 degree incline for lymphatic protection. Met 03/13/2024  Patient will demonstrate proper posture with sitting and standing to decrease detrimental affects to adjacent structures. Met 03/13/2024  Patient will tolerate HEP for better progression toward LTGs and self-management of presentation. Met 03/13/2024     Long Term Goals: 6 weeks In progress.  Patient will be independent with HEP for self-management of symptoms and current status.   Decrease girth by average 2 cm for improved mobility and safety with iADLs.  Patient will perform self lymphatic drainage techniques for long term  management of lymphedema.   Patient to don/doff compression garments for self management of lymphedema.   Patient to obtain pneumatic compression pump for daily home use to assist in maintaining reduced swelling.   Patient will report pain reduction to < or = 3 with AROM for improved safety with iADLs (driving, household chores, yard work, job duties).   Patient to be able to lift legs on/off mat/bed independently at 50% less effort for improved safety with navigating bed mobility.      Plan   Continue remaining 1 weeks of POC, faxed information to [employer]  Continue with established plan of care working toward PT goals.    Roxie Archibald, PTA , CLT

## 2024-04-02 ENCOUNTER — CLINICAL SUPPORT (OUTPATIENT)
Dept: REHABILITATION | Facility: HOSPITAL | Age: 59
End: 2024-04-02
Payer: COMMERCIAL

## 2024-04-02 DIAGNOSIS — R60.0 BILATERAL LOWER EXTREMITY EDEMA: Primary | ICD-10-CM

## 2024-04-02 PROCEDURE — 97140 MANUAL THERAPY 1/> REGIONS: CPT | Mod: PN

## 2024-04-02 NOTE — PROGRESS NOTES
[facility]  Physical Therapy Progress Note  Lymphedema Therapy Re-assessment    Visit Date: 4/2/2024    Name: Josefina Coon  MRN: [id number]  Therapy Diagnosis:   Encounter Diagnosis   Name Primary?    Bilateral lower extremity edema Yes         Physician: Raya Echols MD  Physician Orders: PT Eval and Treat   Medical Diagnosis from Referral: Bilateral lower extremity edema, unspecified venous (peripheral) insufficieny. Invasive ductal carcinoma of breast, female, left  Evaluation Date: 2/21/2024  Re-assessment: 3/13/2024   Re-assess: 4/2/2024 continue with POC 1-2 x week x 3 weeks   Authorization: pending  Plan of Care Expiration: 02/21/2024-04/21/2024  Reassessment Due: 5/2/2024  Fact-G Completed: 1 / 2     Visit: 11 / 12 added 6 visits: 11/18  PTA Visit: 0 / 5  Time In: 09:00 AM  Time Out: 10:15 AM  Total Billable Time: 70 minutes     Precautions: Standard, Fall and cancer      Subjective     Patient states: feel a litlle swollen in legs today, I think I had more sodium yesterday.   I think the compression wraps will be in real soon.   Pain: 2/10   Location: bilateral lower legs (heaviness)    Objective   Pt to clinic with tennis shoes, no compression to legs.       Josefina participated in / received the following treatment:  Discussed the purpose, mechanism, and indications of MLD with pt. Pt was cleared of all contraindications and precautions, including but not limited to cardiac edema, renal failure, acute infection, acute bronchitis, acute DVT, malignancies, bronchial asthma, HTN, pregnancy, ileus, aortic aneurysm, recent abdominal surgery, IBD, diverticulosis, XRT fibrosis or cystitis, colitis. Risks and benefits of tx were reviewed with patient, to which patient was in agreement to continue with tx today.     Discussed the purpose, mechanism, and indications of Compression Therapy with pt. Pt was cleared of all contraindications and precautions, including but  not limited to cardiac edema, PAD, acute infection, HTN, cardiac arrhythmia, hyposensitivity, paralysis, CHF, diabetes, malignancy. Risks and benefits of tx were reviewed with patient, to which patient was in agreement to continue with tx today.        Manual Therapy to develop flexibility, extensibility, desensitization, pliability, and contour for 70 minutes including:  Diaphragm breathing throughout MLD.  Pt received MLD to bilateral lower extremities, with pre treatment short neck series to include:  terminus, sternal notch, lateral and posterior neck, right axilla, abdomen, and bilateral inguinal triangle, followed by full leg sequence with rework accessing all watershed areas on trunk. Concurrent use of pneumatic compression pump at 30 mmHg, with pt tolerating well and expressing comfort. Eucerin lotion applied to dry skin, followed by application of compression bandages to bilateral lower leg as follows:  tubigrip G to B LE's, comprefoam and cast padding applied to achieve a cylindrical shape, (1) 8 cm, (1) 10 cm, (1) 12 cm Rosidal short stretch bandages, Pt advised to wear compression bandages for 24 hrs, remove and wash, bring to next appointment for re use.  Tubigrips are to be worn when not bandaged, in daytime only. Remove bandages if she experiences pain, SOB, or vascular compromise. Pt verbalized understanding. PT required to assist pt with donning shoes.    Lymphatouch was applied to left lower extremity at 60 mmHg to increase angiomotoricity.  Based on measurements taken at reassessment, pt will need Compreflex calf with transitional liner in size XL standard length in beige for bilateral lower legs. Garments have been ordered.     Deferred:  Therapeutic Exercise to develop strength, endurance, ROM,   and flexibility for 10 minutes including:  Ankle pumps  Ankle inv/ev  Ankle circles  Toe curls/flares  QS/GS  Rock tape applied in edema technique to bilateral upper legs.  Education Provided  [x]  Progress toward goals   [] Role of therapy   [x] Activity modification  [x] Reviewed HEP      Home Exercises Provided: Patient instructed to continue previously provided HEP.  Exercises were reviewed and Josefina was able to demonstrate them prior to the end of the session.  Josefina demonstrated good  understanding of the education provided.   See EMR under Patient Instructions for exercises provided  2/21/2024 .    Assessment   Patient verbalizes improvement in heaviness sensation since beginning treatment. Patient tolerated treatment well without visible adverse affects or acute distress. Pt was able to fit bandage foot into athletic shoe, but very tight fit. Hemosiderin staining more prevalent on left lower leg than right. Pt doing fine with bandaging lower legs, decreased swelling in lower legs and decreased induration in upper legs.  Doing water aerobics at [facility] twice a week and compliant with wearing tubigrips when not bandaged.       Josefina is a 59 y.o. female referred to outpatient physical therapy with a medical diagnosis of Bilateral lower extremity edema, unspecified venous (peripheral) insufficieny. Invasive ductal carcinoma of breast, female, left. Patient presents with Stage 2 bilateral lower extremity lymphedema secondary to venous insufficiency. This patient presents with increased pain, increased stiffness in the knees, ankles, as well as difficulty donning compression thigh high and knee high stockings and limitations with tolerating wearing garments, pt is also at risk of higher infection. This pt would benefit from skilled therapy services to address the above impairments. Patient has tried elevating LE's, wearing compression however swelling is persistent and unresolved.    Feel patient would benefit from alternative compression garments such as inelastic velcro compression as well as a home pneumatic compression pump to assist in managing symptoms and reducing risk for infections.      Plan of  care discussed with patient: Yes  Pt's spiritual, cultural and educational needs considered and patient is agreeable to the plan of care and goals as stated below:     Anticipated barriers for therapy: scheduling availability       GOALS  Short Term Goals: 3 weeks  Patient will demonstrate 100% knowledge of lymphedema precautions and signs of infection. Met 03/13/2024  Patient will tolerate iADLs with multilayered bandaging for 24-48 hours. MET goal for 24 hrs 03/11/2024   Patient will perform self-bandaging techniques without correction for progress towards compression garments.  Decrease girth by average 1 cm for improved mobility and safety with iADLs. In progress, not met.  Patient to report compliance sleeping on 30 degree incline for lymphatic protection. Met 03/13/2024  Patient will demonstrate proper posture with sitting and standing to decrease detrimental affects to adjacent structures. Met 03/13/2024  Patient will tolerate HEP for better progression toward LTGs and self-management of presentation. Met 03/13/2024     Long Term Goals: 6 weeks In progress.  Patient will be independent with HEP for self-management of symptoms and current status.   Decrease girth by average 2 cm for improved mobility and safety with iADLs.  Patient will perform self lymphatic drainage techniques for long term management of lymphedema.   Patient to don/doff compression garments for self management of lymphedema.   Patient to obtain pneumatic compression pump for daily home use to assist in maintaining reduced swelling.   Patient will report pain reduction to < or = 3 with AROM for improved safety with iADLs (driving, household chores, yard work, job duties).   Patient to be able to lift legs on/off mat/bed independently at 50% less effort for improved safety with navigating bed mobility.      Plan   Continue with skilled PT services , 1-2 x week x 3 weeks  as pt will need education on how to omega compression garments  Continue  with established plan of care working toward PT goals.    Dang Richter, PT , CLT

## 2024-04-05 ENCOUNTER — CLINICAL SUPPORT (OUTPATIENT)
Dept: REHABILITATION | Facility: HOSPITAL | Age: 59
End: 2024-04-05
Payer: COMMERCIAL

## 2024-04-05 DIAGNOSIS — R26.89 FUNCTIONAL GAIT ABNORMALITY: ICD-10-CM

## 2024-04-05 DIAGNOSIS — G89.29 CHRONIC MIDLINE LOW BACK PAIN WITHOUT SCIATICA: ICD-10-CM

## 2024-04-05 DIAGNOSIS — M54.50 CHRONIC MIDLINE LOW BACK PAIN WITHOUT SCIATICA: ICD-10-CM

## 2024-04-05 DIAGNOSIS — G62.0 CHEMOTHERAPY-INDUCED PERIPHERAL NEUROPATHY: Primary | ICD-10-CM

## 2024-04-05 DIAGNOSIS — M54.2 NECK PAIN: ICD-10-CM

## 2024-04-05 DIAGNOSIS — R60.0 BILATERAL LOWER EXTREMITY EDEMA: Primary | ICD-10-CM

## 2024-04-05 DIAGNOSIS — I87.2 CHRONIC VENOUS INSUFFICIENCY: ICD-10-CM

## 2024-04-05 DIAGNOSIS — T45.1X5A CHEMOTHERAPY-INDUCED PERIPHERAL NEUROPATHY: Primary | ICD-10-CM

## 2024-04-05 PROCEDURE — 97814 ACUP 1/> W/ESTIM EA ADDL 15: CPT | Mod: PN | Performed by: ACUPUNCTURIST

## 2024-04-05 PROCEDURE — 97140 MANUAL THERAPY 1/> REGIONS: CPT | Mod: PN,CQ

## 2024-04-05 PROCEDURE — 97813 ACUP 1/> W/ESTIM 1ST 15 MIN: CPT | Mod: PN | Performed by: ACUPUNCTURIST

## 2024-04-05 NOTE — PROGRESS NOTES
Acupuncture Follow-Up Note     Name: Josefina Coon  Clinic Number: [id number]    Traditional Chinese Medicine (TCM) Diagnosis: Qi Stagnation, Blood Stasis, Qi Deficiency, Blood Deficiency, Damp, Yin Deficiency, and Phlegm  Medical Diagnosis:   1. Chemotherapy-induced peripheral neuropathy    2. Chronic midline low back pain without sciatica    3. Neck pain         Evaluation Date: 4/5/2024    Visit #/Visits authorized:     Precautions: Standard    Subjective     Chief Concern: Low-back Pain (Low back pain is 1/10 achy and central.), Hot Flashes (Hot flashes are 3/10, intermittent but much better since treatment began.), and Peripheral Neuropathy (Neuropathy today is 2/10, hand and feet.  )       Medical necessity is demonstrated by the following IMPAIRMENTS: Medical Necessity: Decreased mobility limits day to day activities, social, and emergent situations              Aggravating Factors:  movement     Relieving Factors:  acupuncture    Symptom Description:     Quality:  Aching, Dull, Tingling, and Numb  Severity:  2-3/10  Frequency:  every day      Objective     Observation: Patient in need of more cardio exercise.  Hot flashes and Neuropathy have improved since treatment began. Recommendation she speak with dieticians  relative to foods that help with hot flashes. Patient overall has responded very well to treatments and will be released to PRN moving forward.         Pulse:        thready       New Findings:  na    Treatment     Treatment Principles:  move qi and blood, relieve bi, drain dampness, lift Ki qi, nourish yin.    Acupuncture points used:  4 MARTE, Du20, ER FE, Gb34, Ht7, Pc6, Lu9 , Ki3, Ki6, Li11, Sp6, Sp9, St36, and YIN MELTON    Bilateral points:K7, Lu5  Unilateral points:  Auricular Treatment:  acharya men    Needles In: 34  Needles Out: 34  Needles W/ STIM placed: 835  Needles W/ STIM removed: 855      Other Traditional Chinese Medicine Modalities -  na    Assessment     After treatment, patient  felt relief and plans to meet with dieticians relative to foods which exacerbate hot flashes.  She has also been reduced to PRN since her pain markers are all 3/10 ahd have been since her last monthly visit.     Patient prognosis is Good.     Patient will continue to benefit from acupuncture treatment to address the deficits listed in the problem list box on initial evaluation, provide patient family education and to maximize pt's level of independence in the home and community environment.     Patient's spiritual, cultural and educational needs considered and pt agreeable to plan of care and goals.     Anticipated barriers to treatment: none    Plan     Recommend PRN for remainder of sessions.    Education:  Patient is aware of cumulative benefit of acupuncture           clothing

## 2024-04-05 NOTE — PROGRESS NOTES
"[facility] Outpatient  Physical Therapy Progress Note  Lymphedema Therapy     Visit Date: 4/5/2024    Name: Josefina Coon  MRN: [id number]  Therapy Diagnosis:   Encounter Diagnoses   Name Primary?    Bilateral lower extremity edema Yes    Functional gait abnormality     Chronic venous insufficiency          Physician: Raya Echols MD  Physician Orders: PT Eval and Treat   Medical Diagnosis from Referral: Bilateral lower extremity edema, unspecified venous (peripheral) insufficieny. Invasive ductal carcinoma of breast, female, left  Evaluation Date: 2/21/2024  Re-assessment: 3/13/2024   Re-assess: 4/2/2024 continue with POC 1-2 x week x 3 weeks   Authorization: pending  Plan of Care Expiration: 02/21/2024-04/21/2024  Reassessment Due: 5/2/2024  Fact-G Completed: 1 / 2     Visit: 11 / 12 added 6 visits: 12/18  PTA Visit: 1 / 5  Time In: 10:00 AM  Time Out: 11:05 AM  Total Billable Time: 65 minutes     Precautions: Standard, Fall and cancer      Subjective     Patient states: tubigrip rolled down on left and it left an area of swelling, "It looks kind of wonky." Pump and garments are to be delivered soon, ready to not have to bandage anymore. Still doing water aerobics and enjoying it.   I think the compression wraps will be in real soon.   Pain: 2/10   Location: bilateral lower legs (heaviness)    Objective   Pt to clinic with tennis shoes, no compression to legs.       Josefina participated in / received the following treatment:  Discussed the purpose, mechanism, and indications of MLD with pt. Pt was cleared of all contraindications and precautions, including but not limited to cardiac edema, renal failure, acute infection, acute bronchitis, acute DVT, malignancies, bronchial asthma, HTN, pregnancy, ileus, aortic aneurysm, recent abdominal surgery, IBD, diverticulosis, XRT fibrosis or cystitis, colitis. Risks and benefits of tx were reviewed with patient, to which patient was in " agreement to continue with tx today.     Discussed the purpose, mechanism, and indications of Compression Therapy with pt. Pt was cleared of all contraindications and precautions, including but not limited to cardiac edema, PAD, acute infection, HTN, cardiac arrhythmia, hyposensitivity, paralysis, CHF, diabetes, malignancy. Risks and benefits of tx were reviewed with patient, to which patient was in agreement to continue with tx today.        Manual Therapy to develop flexibility, extensibility, desensitization, pliability, and contour for 70 minutes including:  Diaphragm breathing throughout MLD.  Pt received MLD to bilateral lower extremities, with pre treatment short neck series to include:  terminus, sternal notch, lateral and posterior neck, right axilla, abdomen, and bilateral inguinal triangle, followed by full leg sequence with rework accessing all watershed areas on trunk. Concurrent use of pneumatic compression pump at 30 mmHg, with pt tolerating well and expressing comfort. Eucerin lotion applied to dry skin, followed by application of compression bandages to bilateral lower leg as follows:  tubigrip G to B LE's, comprefoam and cast padding applied to achieve a cylindrical shape, (1) 8 cm, (1) 10 cm, (1) 12 cm Rosidal short stretch bandages, Pt advised to wear compression bandages for 24 hrs, remove and wash, bring to next appointment for re use.  Tubigrips are to be worn when not bandaged, in daytime only. Remove bandages if she experiences pain, SOB, or vascular compromise. Pt verbalized understanding. PT required to assist pt with donning shoes.    Lymphatouch was applied to left lower extremity at 60 mmHg to increase angiomotoricity.  Based on measurements taken at reassessment, pt will need Compreflex calf with transitional liner in size XL standard length in beige for bilateral lower legs. Garments have been ordered.     Deferred:  Therapeutic Exercise to develop strength, endurance, ROM,   and  flexibility for 10 minutes including:  Ankle pumps  Ankle inv/ev  Ankle circles  Toe curls/flares  QS/GS  Rock tape applied in edema technique to bilateral upper legs.  Education Provided  [x] Progress toward goals   [] Role of therapy   [x] Activity modification  [x] Reviewed HEP      Home Exercises Provided: Patient instructed to continue previously provided HEP.  Exercises were reviewed and Josefina was able to demonstrate them prior to the end of the session.  Josefina demonstrated good  understanding of the education provided.   See EMR under Patient Instructions for exercises provided  2/21/2024 .    Assessment   Patient verbalizes improvement in heaviness sensation since beginning treatment. Patient tolerated treatment well without visible adverse affects or acute distress. Pt was able to fit bandage foot into athletic shoe, but very tight fit. Hemosiderin staining more prevalent on left lower leg than right. Pt doing fine with bandaging lower legs, decreased swelling in lower legs and decreased induration in upper legs.  Doing water aerobics at [facility] twice a week and compliant with wearing tubigrips when not bandaged.       Josefina is a 59 y.o. female referred to outpatient physical therapy with a medical diagnosis of Bilateral lower extremity edema, unspecified venous (peripheral) insufficieny. Invasive ductal carcinoma of breast, female, left. Patient presents with Stage 2 bilateral lower extremity lymphedema secondary to venous insufficiency. This patient presents with increased pain, increased stiffness in the knees, ankles, as well as difficulty donning compression thigh high and knee high stockings and limitations with tolerating wearing garments, pt is also at risk of higher infection. This pt would benefit from skilled therapy services to address the above impairments. Patient has tried elevating LE's, wearing compression however swelling is persistent and unresolved.    Feel patient would benefit from  alternative compression garments such as inelastic velcro compression as well as a home pneumatic compression pump to assist in managing symptoms and reducing risk for infections.      Plan of care discussed with patient: Yes  Pt's spiritual, cultural and educational needs considered and patient is agreeable to the plan of care and goals as stated below:     Anticipated barriers for therapy: scheduling availability       GOALS  Short Term Goals: 3 weeks  Patient will demonstrate 100% knowledge of lymphedema precautions and signs of infection. Met 03/13/2024  Patient will tolerate iADLs with multilayered bandaging for 24-48 hours. MET goal for 24 hrs 03/11/2024   Patient will perform self-bandaging techniques without correction for progress towards compression garments.  Decrease girth by average 1 cm for improved mobility and safety with iADLs. In progress, not met.  Patient to report compliance sleeping on 30 degree incline for lymphatic protection. Met 03/13/2024  Patient will demonstrate proper posture with sitting and standing to decrease detrimental affects to adjacent structures. Met 03/13/2024  Patient will tolerate HEP for better progression toward LTGs and self-management of presentation. Met 03/13/2024     Long Term Goals: 6 weeks In progress.  Patient will be independent with HEP for self-management of symptoms and current status.   Decrease girth by average 2 cm for improved mobility and safety with iADLs.  Patient will perform self lymphatic drainage techniques for long term management of lymphedema.   Patient to don/doff compression garments for self management of lymphedema.   Patient to obtain pneumatic compression pump for daily home use to assist in maintaining reduced swelling.   Patient will report pain reduction to < or = 3 with AROM for improved safety with iADLs (driving, household chores, yard work, job duties).   Patient to be able to lift legs on/off mat/bed independently at 50% less effort  for improved safety with navigating bed mobility.      Plan   Continue with skilled PT services , 1-2 x week x 3 weeks  as pt will need education on how to omega compression garments  Continue with established plan of care working toward PT goals.    Roxie Archibald, PTA , CLT

## 2024-04-07 DIAGNOSIS — I48.91 ATRIAL FIBRILLATION, UNSPECIFIED TYPE: ICD-10-CM

## 2024-04-08 RX ORDER — APIXABAN 5 MG/1
5 TABLET, FILM COATED ORAL 2 TIMES DAILY
Qty: 180 TABLET | Refills: 3 | Status: SHIPPED | OUTPATIENT
Start: 2024-04-08

## 2024-04-09 ENCOUNTER — TELEPHONE (OUTPATIENT)
Dept: ELECTROPHYSIOLOGY | Facility: CLINIC | Age: 59
End: 2024-04-09
Payer: COMMERCIAL

## 2024-04-09 ENCOUNTER — CLINICAL SUPPORT (OUTPATIENT)
Dept: REHABILITATION | Facility: HOSPITAL | Age: 59
End: 2024-04-09
Payer: COMMERCIAL

## 2024-04-09 DIAGNOSIS — R26.89 FUNCTIONAL GAIT ABNORMALITY: ICD-10-CM

## 2024-04-09 DIAGNOSIS — I87.2 CHRONIC VENOUS INSUFFICIENCY: ICD-10-CM

## 2024-04-09 DIAGNOSIS — R60.0 BILATERAL LOWER EXTREMITY EDEMA: Primary | ICD-10-CM

## 2024-04-09 PROCEDURE — 97140 MANUAL THERAPY 1/> REGIONS: CPT | Mod: PN,CQ

## 2024-04-09 NOTE — PROGRESS NOTES
[facility]  Physical Therapy Progress Note  Lymphedema Therapy     Visit Date: 4/9/2024    Name: Josefina Coon  MRN: [id number]  Therapy Diagnosis:   Encounter Diagnoses   Name Primary?    Bilateral lower extremity edema Yes    Functional gait abnormality     Chronic venous insufficiency          Physician: Raya Echols MD  Physician Orders: PT Eval and Treat   Medical Diagnosis from Referral: Bilateral lower extremity edema, unspecified venous (peripheral) insufficieny. Invasive ductal carcinoma of breast, female, left  Evaluation Date: 2/21/2024  Re-assessment: 3/13/2024   Re-assess: 4/2/2024 continue with POC 1-2 x week x 3 weeks   Authorization: pending  Plan of Care Expiration: 02/21/2024-04/21/2024  Reassessment Due: 5/2/2024  Fact-G Completed: 1 / 2     Visit: 11 / 12 added 6 visits: 13/18  PTA Visit: 2 / 5  Time In: 10:00 AM  Time Out: 11:05 AM  Total Billable Time: 65 minutes     Precautions: Standard, Fall and cancer      Subjective     Patient states: That she received her pump, but haven't used it yet. Still waiting on garments, not sure why they aren't in. Still doing water aerobics and enjoying it.     Pain: 2/10   Location: bilateral lower legs (heaviness)    Objective   Pt to clinic with tennis shoes, no compression to legs.       Josefina participated in / received the following treatment:  Discussed the purpose, mechanism, and indications of MLD with pt. Pt was cleared of all contraindications and precautions, including but not limited to cardiac edema, renal failure, acute infection, acute bronchitis, acute DVT, malignancies, bronchial asthma, HTN, pregnancy, ileus, aortic aneurysm, recent abdominal surgery, IBD, diverticulosis, XRT fibrosis or cystitis, colitis. Risks and benefits of tx were reviewed with patient, to which patient was in agreement to continue with tx today.     Discussed the purpose, mechanism, and indications of Compression Therapy  with pt. Pt was cleared of all contraindications and precautions, including but not limited to cardiac edema, PAD, acute infection, HTN, cardiac arrhythmia, hyposensitivity, paralysis, CHF, diabetes, malignancy. Risks and benefits of tx were reviewed with patient, to which patient was in agreement to continue with tx today.        Manual Therapy to develop flexibility, extensibility, desensitization, pliability, and contour for 65 minutes including:  Diaphragm breathing throughout MLD.  Pt received MLD to bilateral lower extremities, with pre treatment short neck series to include:  terminus, sternal notch, lateral and posterior neck, right axilla, abdomen, and bilateral inguinal triangle, followed by full leg sequence with rework accessing all watershed areas on trunk. Concurrent use of pneumatic compression pump at 30 mmHg, with pt tolerating well and expressing comfort. Eucerin lotion applied to dry skin, followed by application of compression bandages to bilateral lower leg as follows:  tubigrip G to B LE's, comprefoam and cast padding applied to achieve a cylindrical shape, (1) 8 cm, (1) 10 cm, (1) 12 cm Rosidal short stretch bandages, Pt advised to wear compression bandages for 24 hrs, remove and wash, bring to next appointment for re use.  Tubigrips are to be worn when not bandaged, in daytime only. Remove bandages if she experiences pain, SOB, or vascular compromise. Pt verbalized understanding. PT required to assist pt with donning shoes.    Lymphatouch was applied to left lower extremity at 60 mmHg to increase angiomotoricity.  Based on measurements taken at reassessment, pt will need Compreflex calf with transitional liner in size XL standard length in beige for bilateral lower legs. Garments have been ordered.     Deferred:  Therapeutic Exercise to develop strength, endurance, ROM,   and flexibility for 10 minutes including:  Ankle pumps  Ankle inv/ev  Ankle circles  Toe curls/flares  QS/GS  Rock tape  applied in edema technique to bilateral upper legs.  Education Provided  [x] Progress toward goals   [] Role of therapy   [x] Activity modification  [x] Reviewed HEP      Home Exercises Provided: Patient instructed to continue previously provided HEP.  Exercises were reviewed and Josefina was able to demonstrate them prior to the end of the session.  Josefina demonstrated good  understanding of the education provided.   See EMR under Patient Instructions for exercises provided  2/21/2024 .    Assessment   Patient verbalizes improvement in heaviness sensation since beginning treatment. Patient tolerated treatment well without visible adverse affects or acute distress. Pt was able to fit bandage foot into athletic shoe, but very tight fit. Hemosiderin staining more prevalent on left lower leg than right. Pt doing fine with bandaging lower legs, decreased swelling in lower legs and decreased induration in upper legs.  Doing water aerobics at [facility] twice a week and compliant with wearing tubigrips when not bandaged. Pump has been delivered, awaiting garments. Pt given number to [employer] and will call today to check status.     Josefina is a 59 y.o. female referred to outpatient physical therapy with a medical diagnosis of Bilateral lower extremity edema, unspecified venous (peripheral) insufficieny. Invasive ductal carcinoma of breast, female, left. Patient presents with Stage 2 bilateral lower extremity lymphedema secondary to venous insufficiency. This patient presents with increased pain, increased stiffness in the knees, ankles, as well as difficulty donning compression thigh high and knee high stockings and limitations with tolerating wearing garments, pt is also at risk of higher infection. This pt would benefit from skilled therapy services to address the above impairments. Patient has tried elevating LE's, wearing compression however swelling is persistent and unresolved.    Feel patient would benefit from alternative  compression garments such as inelastic velcro compression as well as a home pneumatic compression pump to assist in managing symptoms and reducing risk for infections.      Plan of care discussed with patient: Yes  Pt's spiritual, cultural and educational needs considered and patient is agreeable to the plan of care and goals as stated below:     Anticipated barriers for therapy: scheduling availability       GOALS  Short Term Goals: 3 weeks  Patient will demonstrate 100% knowledge of lymphedema precautions and signs of infection. Met 03/13/2024  Patient will tolerate iADLs with multilayered bandaging for 24-48 hours. MET goal for 24 hrs 03/11/2024   Patient will perform self-bandaging techniques without correction for progress towards compression garments.  Decrease girth by average 1 cm for improved mobility and safety with iADLs. In progress, not met.  Patient to report compliance sleeping on 30 degree incline for lymphatic protection. Met 03/13/2024  Patient will demonstrate proper posture with sitting and standing to decrease detrimental affects to adjacent structures. Met 03/13/2024  Patient will tolerate HEP for better progression toward LTGs and self-management of presentation. Met 03/13/2024     Long Term Goals: 6 weeks In progress.  Patient will be independent with HEP for self-management of symptoms and current status.   Decrease girth by average 2 cm for improved mobility and safety with iADLs.  Patient will perform self lymphatic drainage techniques for long term management of lymphedema.   Patient to don/doff compression garments for self management of lymphedema.   Patient to obtain pneumatic compression pump for daily home use to assist in maintaining reduced swelling.   Patient will report pain reduction to < or = 3 with AROM for improved safety with iADLs (driving, household chores, yard work, job duties).   Patient to be able to lift legs on/off mat/bed independently at 50% less effort for  improved safety with navigating bed mobility.      Plan   Continue with skilled PT services , 1-2 x week x 3 weeks  as pt will need education on how to omega compression garments  Continue with established plan of care working toward PT goals.    Roxie Archibald, PTA , CLT

## 2024-04-10 ENCOUNTER — TELEPHONE (OUTPATIENT)
Dept: ELECTROPHYSIOLOGY | Facility: CLINIC | Age: 59
End: 2024-04-10
Payer: COMMERCIAL

## 2024-04-11 ENCOUNTER — TELEPHONE (OUTPATIENT)
Dept: CARDIOLOGY | Facility: CLINIC | Age: 59
End: 2024-04-11
Payer: COMMERCIAL

## 2024-04-11 NOTE — TELEPHONE ENCOUNTER
Called patient to schedule over due follow up. She wishes to be seen on the [facility]. I advised her that it would not be until July and Dr. Landry may be running the clinic then. She agreed. Sent message to [facility] team to call and schedule her.

## 2024-04-12 ENCOUNTER — CLINICAL SUPPORT (OUTPATIENT)
Dept: REHABILITATION | Facility: HOSPITAL | Age: 59
End: 2024-04-12
Payer: COMMERCIAL

## 2024-04-12 DIAGNOSIS — R26.89 FUNCTIONAL GAIT ABNORMALITY: ICD-10-CM

## 2024-04-12 DIAGNOSIS — R60.0 BILATERAL LOWER EXTREMITY EDEMA: Primary | ICD-10-CM

## 2024-04-12 DIAGNOSIS — I87.2 CHRONIC VENOUS INSUFFICIENCY: ICD-10-CM

## 2024-04-12 PROCEDURE — 97140 MANUAL THERAPY 1/> REGIONS: CPT | Mod: PN,CQ

## 2024-04-12 NOTE — PROGRESS NOTES
[facility]  Physical Therapy Progress Note  Lymphedema Therapy     Visit Date: 4/12/2024    Name: Josefina Coon  MRN: [id number]  Therapy Diagnosis:   Encounter Diagnoses   Name Primary?    Bilateral lower extremity edema Yes    Functional gait abnormality     Chronic venous insufficiency          Physician: Raya Echols MD  Physician Orders: PT Eval and Treat   Medical Diagnosis from Referral: Bilateral lower extremity edema, unspecified venous (peripheral) insufficieny. Invasive ductal carcinoma of breast, female, left  Evaluation Date: 2/21/2024  Re-assessment: 3/13/2024   Re-assess: 4/2/2024 continue with POC 1-2 x week x 3 weeks   Authorization: pending  Plan of Care Expiration: 02/21/2024-04/21/2024  Reassessment Due: 5/2/2024  Fact-G Completed: 1 / 2     Visit: 11 / 12 added 6 visits: 14/18  PTA Visit: 3 / 5  Time In: 9:00 AM  Time Out: 10:05 AM  Total Billable Time: 65 minutes     Precautions: Standard, Fall and cancer      Subjective     Patient states: That she received her pump, but haven't used it yet. Pt called [employer] and found out that her garments will be delivered on Saturday. Leaving on a trip to [place] on Wednesday and am feeling anxious about it. Went to a concert at the [employer] and did a lot of walking, legs felt heavy and feet hurt afterwards. Hopefully will do ok on her trip to [place].    Pain: 2/10   Location: bilateral lower legs (heaviness)    Objective   Pt to clinic with tennis shoes, no compression to legs.       Josefina participated in / received the following treatment:  Discussed the purpose, mechanism, and indications of MLD with pt. Pt was cleared of all contraindications and precautions, including but not limited to cardiac edema, renal failure, acute infection, acute bronchitis, acute DVT, malignancies, bronchial asthma, HTN, pregnancy, ileus, aortic aneurysm, recent abdominal surgery, IBD, diverticulosis, XRT fibrosis or  cystitis, colitis. Risks and benefits of tx were reviewed with patient, to which patient was in agreement to continue with tx today.     Discussed the purpose, mechanism, and indications of Compression Therapy with pt. Pt was cleared of all contraindications and precautions, including but not limited to cardiac edema, PAD, acute infection, HTN, cardiac arrhythmia, hyposensitivity, paralysis, CHF, diabetes, malignancy. Risks and benefits of tx were reviewed with patient, to which patient was in agreement to continue with tx today.        Manual Therapy to develop flexibility, extensibility, desensitization, pliability, and contour for 65 minutes including:  Diaphragm breathing throughout MLD.  Pt received MLD to bilateral lower extremities, with pre treatment short neck series to include:  terminus, sternal notch, lateral and posterior neck, right axilla, abdomen, and bilateral inguinal triangle, followed by full leg sequence with rework accessing all watershed areas on trunk. Concurrent use of pneumatic compression pump at 30 mmHg, with pt tolerating well and expressing comfort. Eucerin lotion applied to dry skin, followed by application of compression bandages to bilateral lower leg as follows:  tubigrip G to B LE's, comprefoam and cast padding applied to achieve a cylindrical shape, (1) 8 cm, (1) 10 cm, (1) 12 cm Rosidal short stretch bandages, Pt advised to wear compression bandages for 24 hrs, remove and wash, bring to next appointment for re use.  Tubigrips are to be worn when not bandaged, in daytime only. Remove bandages if she experiences pain, SOB, or vascular compromise. Pt verbalized understanding. PT required to assist pt with donning shoes.    Lymphatouch was applied to left lower extremity at 60 mmHg to increase angiomotoricity.  Based on measurements taken at reassessment, pt will need Compreflex calf with transitional liner in size XL standard length in beige for bilateral lower legs. Garments have  been ordered.     Deferred:  Therapeutic Exercise to develop strength, endurance, ROM,   and flexibility for 10 minutes including:  Ankle pumps  Ankle inv/ev  Ankle circles  Toe curls/flares  QS/GS  Rock tape applied in edema technique to bilateral upper legs.  Education Provided  [x] Progress toward goals   [] Role of therapy   [x] Activity modification  [x] Reviewed HEP      Home Exercises Provided: Patient instructed to continue previously provided HEP.  Exercises were reviewed and Josefina was able to demonstrate them prior to the end of the session.  Josefina demonstrated good  understanding of the education provided.   See EMR under Patient Instructions for exercises provided  2/21/2024 .    Assessment   Patient verbalizes improvement in heaviness sensation since beginning treatment. Patient tolerated treatment well without visible adverse affects or acute distress. Pt was able to fit bandage foot into athletic shoe, but very tight fit. Hemosiderin staining more prevalent on left lower leg than right. Pt doing fine with bandaging lower legs, decreased swelling in lower legs and decreased induration in upper legs.  Doing water aerobics at [facility] twice a week and compliant with wearing tubigrips when not bandaged. Pump has been delivered, awaiting garments and expected delivery on Saturday.     Josefina is a 59 y.o. female referred to outpatient physical therapy with a medical diagnosis of Bilateral lower extremity edema, unspecified venous (peripheral) insufficieny. Invasive ductal carcinoma of breast, female, left. Patient presents with Stage 2 bilateral lower extremity lymphedema secondary to venous insufficiency. This patient presents with increased pain, increased stiffness in the knees, ankles, as well as difficulty donning compression thigh high and knee high stockings and limitations with tolerating wearing garments, pt is also at risk of higher infection. This pt would benefit from skilled therapy services to  address the above impairments. Patient has tried elevating LE's, wearing compression however swelling is persistent and unresolved.    Feel patient would benefit from alternative compression garments such as inelastic velcro compression as well as a home pneumatic compression pump to assist in managing symptoms and reducing risk for infections.      Plan of care discussed with patient: Yes  Pt's spiritual, cultural and educational needs considered and patient is agreeable to the plan of care and goals as stated below:     Anticipated barriers for therapy: scheduling availability       GOALS  Short Term Goals: 3 weeks  Patient will demonstrate 100% knowledge of lymphedema precautions and signs of infection. Met 03/13/2024  Patient will tolerate iADLs with multilayered bandaging for 24-48 hours. MET goal for 24 hrs 03/11/2024   Patient will perform self-bandaging techniques without correction for progress towards compression garments.  Decrease girth by average 1 cm for improved mobility and safety with iADLs. In progress, not met.  Patient to report compliance sleeping on 30 degree incline for lymphatic protection. Met 03/13/2024  Patient will demonstrate proper posture with sitting and standing to decrease detrimental affects to adjacent structures. Met 03/13/2024  Patient will tolerate HEP for better progression toward LTGs and self-management of presentation. Met 03/13/2024     Long Term Goals: 6 weeks In progress.  Patient will be independent with HEP for self-management of symptoms and current status.   Decrease girth by average 2 cm for improved mobility and safety with iADLs.  Patient will perform self lymphatic drainage techniques for long term management of lymphedema.   Patient to don/doff compression garments for self management of lymphedema.   Patient to obtain pneumatic compression pump for daily home use to assist in maintaining reduced swelling.   Patient will report pain reduction to < or = 3 with  AROM for improved safety with iADLs (driving, household chores, yard work, job duties).   Patient to be able to lift legs on/off mat/bed independently at 50% less effort for improved safety with navigating bed mobility.      Plan   Continue with skilled PT services , 1-2 x week x 3 weeks  as pt will need education on how to omega compression garments  Continue with established plan of care working toward PT goals.    Roxie Archibald, PTA , CLT

## 2024-04-16 NOTE — PLAN OF CARE
Problem: Adult Inpatient Plan of Care  Goal: Plan of Care Review  Outcome: Ongoing, Progressing  Flowsheets (Taken 7/14/2023 1200)  Plan of Care Reviewed With: patient  Goal: Patient-Specific Goal (Individualized)  Outcome: Ongoing, Progressing  Flowsheets (Taken 7/14/2023 1200)  Anxieties, Fears or Concerns: None  Individualized Care Needs: Recliner, warm blanket, tv, conversation     Problem: Fatigue  Goal: Improved Activity Tolerance  Outcome: Ongoing, Progressing  Intervention: Promote Improved Energy  Flowsheets (Taken 7/14/2023 1200)  Fatigue Management:   frequent rest breaks encouraged   paced activity encouraged  Sleep/Rest Enhancement: regular sleep/rest pattern promoted  Activity Management: Ambulated -L4   Patient to Infusion for [facility] [place] following appointment with the provider. Treatment plan reviewed with patient. IV fluids to be added today. VSS. Tolerated treatment. Provided with copy of upcoming appointment schedule. Escorted to the front lobby in no distress for discharge to home.    
English

## 2024-04-23 ENCOUNTER — CLINICAL SUPPORT (OUTPATIENT)
Dept: REHABILITATION | Facility: HOSPITAL | Age: 59
End: 2024-04-23
Payer: COMMERCIAL

## 2024-04-23 DIAGNOSIS — R60.0 BILATERAL LOWER EXTREMITY EDEMA: Primary | ICD-10-CM

## 2024-04-23 DIAGNOSIS — I48.91 UNSPECIFIED ATRIAL FIBRILLATION: ICD-10-CM

## 2024-04-23 PROCEDURE — 97530 THERAPEUTIC ACTIVITIES: CPT | Mod: PN

## 2024-04-23 NOTE — PROGRESS NOTES
[facility]  Physical Therapy Progress Note  Lymphedema Therapy     Visit Date: 4/23/2024    Name: Josefina Coon  MRN: [id number]  Therapy Diagnosis:   Encounter Diagnosis   Name Primary?    Bilateral lower extremity edema Yes   Lymphedema      Physician: Raya Echols MD  Physician Orders: PT Eval and Treat   Medical Diagnosis from Referral: Bilateral lower extremity edema, unspecified venous (peripheral) insufficieny. Invasive ductal carcinoma of breast, female, left  Evaluation Date: 2/21/2024  Re-assessment: 3/13/2024   Re-assess: 4/2/2024 continue with POC 1-2 x week x 3 weeks   Authorization: pending  Plan of Care Expiration: 02/21/2024-04/21/2024  Reassessment Due: 5/2/2024  Fact-G Completed: 1 / 2     Visit: 11 / 12 added 6 visits: 15/18  PTA Visit: 3 / 5  Time In: 3 :02 PM  Time Out: 4:47PM  Total Billable Time: 45 minutes     Precautions: Standard, Fall and cancer      Subjective     Patient states: lots of walking in [place], pt reports she got her garments the day before she left so she did not wear them. Needs training to put on.    Pain: 2/10   Location: bilateral lower legs (heaviness)    Objective   Pt to clinic with tennis shoes, no compression to legs.       Josefina participated in / received the following treatment:  Discussed the purpose, mechanism, and indications of MLD with pt. Pt was cleared of all contraindications and precautions, including but not limited to cardiac edema, renal failure, acute infection, acute bronchitis, acute DVT, malignancies, bronchial asthma, HTN, pregnancy, ileus, aortic aneurysm, recent abdominal surgery, IBD, diverticulosis, XRT fibrosis or cystitis, colitis. Risks and benefits of tx were reviewed with patient, to which patient was in agreement to continue with tx today.     Discussed the purpose, mechanism, and indications of Compression Therapy with pt. Pt was cleared of all contraindications and precautions,  including but not limited to cardiac edema, PAD, acute infection, HTN, cardiac arrhythmia, hyposensitivity, paralysis, CHF, diabetes, malignancy. Risks and benefits of tx were reviewed with patient, to which patient was in agreement to continue with tx today.      deferred  Manual Therapy to develop flexibility, extensibility, desensitization, pliability, and contour for 65 minutes including:  Diaphragm breathing throughout MLD.  Pt received MLD to bilateral lower extremities, with pre treatment short neck series to include:  terminus, sternal notch, lateral and posterior neck, right axilla, abdomen, and bilateral inguinal triangle, followed by full leg sequence with rework accessing all watershed areas on trunk. Concurrent use of pneumatic compression pump at 30 mmHg, with pt tolerating well and expressing comfort. Eucerin lotion applied to dry skin, followed by application of compression bandages to bilateral lower leg as follows:  tubigrip G to B LE's, comprefoam and cast padding applied to achieve a cylindrical shape, (1) 8 cm, (1) 10 cm, (1) 12 cm Rosidal short stretch bandages, Pt advised to wear compression bandages for 24 hrs, remove and wash, bring to next appointment for re use.  Tubigrips are to be worn when not bandaged, in daytime only. Remove bandages if she experiences pain, SOB, or vascular compromise. Pt verbalized understanding. PT required to assist pt with donning shoes.    Lymphatouch was applied to left lower extremity at 60 mmHg to increase angiomotoricity.  Based on measurements taken at reassessment, pt will need Compreflex calf with transitional liner in size XL standard length in beige for bilateral lower legs. Garments have been ordered.     Deferred:  Therapeutic Exercise to develop strength, endurance, ROM,   and flexibility for 10 minutes including:  Ankle pumps  Ankle inv/ev  Ankle circles  Toe curls/flares  QS/GS  Rock tape applied in edema technique to bilateral upper  legs.  Education Provided  [x] Progress toward goals   [] Role of therapy   [x] Activity modification  [x] Reviewed HEP    Pt received therapeutic activities: 45 min learning how to proper omega and doff her B LE leg garments. Excellent fit. Review of different postures to help support back/legs while she places each wrap on. Provided tips on caring for her garments. Pt demonstrated excellent learning on how to tell if they are on correctly. Encouraged pt to be patient and stay in cool places. Elevate legs as well.   Review of use of pump each day, and also lymphatic flow exercises and self MLD as well.   Home Exercises Provided: Patient instructed to continue previously provided HEP.  Exercises were reviewed and Josefina was able to demonstrate them prior to the end of the session.  Josefina demonstrated good  understanding of the education provided.   See EMR under Patient Instructions for exercises provided  2/21/2024 .    Assessment   Patient demonstrated excellent fit of garments as well as able to demonstrate on her own with verbal cues how to put wrap on correctly. Hemosiderin staining more prevalent on left lower leg than right. Pt doing fine with bandaging lower legs, decreased swelling in lower legs and decreased induration in upper legs.  Doing water aerobics at [facility] twice a week and compliant with wearing tubigrips when not bandaged. Pump has been delivered. Will re-assess pt in2 weeks to see if she is donning garments correctly and expect a reduction in girth at that point.     Josefina is a 59 y.o. female referred to outpatient physical therapy with a medical diagnosis of Bilateral lower extremity edema, unspecified venous (peripheral) insufficieny. Invasive ductal carcinoma of breast, female, left. Patient presents with Stage 2 bilateral lower extremity lymphedema secondary to venous insufficiency. This patient presents with increased pain, increased stiffness in the knees, ankles, as well as difficulty  donning compression thigh high and knee high stockings and limitations with tolerating wearing garments, pt is also at risk of higher infection. This pt would benefit from skilled therapy services to address the above impairments. Patient has tried elevating LE's, wearing compression however swelling is persistent and unresolved.    Feel patient would benefit from alternative compression garments such as inelastic velcro compression as well as a home pneumatic compression pump to assist in managing symptoms and reducing risk for infections.      Plan of care discussed with patient: Yes  Pt's spiritual, cultural and educational needs considered and patient is agreeable to the plan of care and goals as stated below:     Anticipated barriers for therapy: scheduling availability       GOALS  Short Term Goals: 3 weeks  Patient will demonstrate 100% knowledge of lymphedema precautions and signs of infection. Met 03/13/2024  Patient will tolerate iADLs with multilayered bandaging for 24-48 hours. MET goal for 24 hrs 03/11/2024   Patient will perform self-bandaging techniques without correction for progress towards compression garments.  Decrease girth by average 1 cm for improved mobility and safety with iADLs. In progress, not met.  Patient to report compliance sleeping on 30 degree incline for lymphatic protection. Met 03/13/2024  Patient will demonstrate proper posture with sitting and standing to decrease detrimental affects to adjacent structures. Met 03/13/2024  Patient will tolerate HEP for better progression toward LTGs and self-management of presentation. Met 03/13/2024     Long Term Goals: 6 weeks In progress.  Patient will be independent with HEP for self-management of symptoms and current status. GOAL MET 4/23/24  Decrease girth by average 2 cm for improved mobility and safety with iADLs.  Patient will perform self lymphatic drainage techniques for long term management of lymphedema.   Patient to don/doff  compression garments for self management of lymphedema.   Patient to obtain pneumatic compression pump for daily home use to assist in maintaining reduced swelling. GOAL MET 4/23/24  Patient will report pain reduction to < or = 3 with AROM for improved safety with iADLs (driving, household chores, yard work, job duties).   Patient to be able to lift legs on/off mat/bed independently at 50% less effort for improved safety with navigating bed mobility. GOAL MET 4/23/24     Plan   Continue with skilled PT services , reassess in 2 weeks for readiness for discharge  for donning compression garments   Continue with established plan of care working toward PT goals.    Dang Richter, PT , CLT

## 2024-04-24 RX ORDER — METOPROLOL SUCCINATE 50 MG/1
50 TABLET, EXTENDED RELEASE ORAL
Qty: 90 TABLET | Refills: 2 | Status: SHIPPED | OUTPATIENT
Start: 2024-04-24

## 2024-04-26 ENCOUNTER — HOSPITAL ENCOUNTER (OUTPATIENT)
Dept: CARDIOLOGY | Facility: HOSPITAL | Age: 59
Discharge: HOME | End: 2024-04-26
Attending: INTERNAL MEDICINE
Payer: COMMERCIAL

## 2024-04-26 DIAGNOSIS — I48.0 PAROXYSMAL ATRIAL FIBRILLATION: ICD-10-CM

## 2024-04-26 DIAGNOSIS — R00.2 PALPITATIONS: ICD-10-CM

## 2024-04-26 PROCEDURE — 93272 ECG/REVIEW INTERPRET ONLY: CPT | Mod: ,,, | Performed by: INTERNAL MEDICINE

## 2024-04-26 PROCEDURE — 93271 ECG/MONITORING AND ANALYSIS: CPT | Mod: PO

## 2024-05-02 ENCOUNTER — OFFICE VISIT (OUTPATIENT)
Dept: HEMATOLOGY/ONCOLOGY | Facility: CLINIC | Age: 59
End: 2024-05-02
Payer: COMMERCIAL

## 2024-05-02 ENCOUNTER — LAB VISIT (OUTPATIENT)
Dept: LAB | Facility: HOSPITAL | Age: 59
End: 2024-05-02
Attending: INTERNAL MEDICINE
Payer: COMMERCIAL

## 2024-05-02 VITALS
DIASTOLIC BLOOD PRESSURE: 81 MMHG | BODY MASS INDEX: 50.02 KG/M2 | TEMPERATURE: 98 F | WEIGHT: 293 LBS | HEART RATE: 70 BPM | RESPIRATION RATE: 20 BRPM | SYSTOLIC BLOOD PRESSURE: 127 MMHG | OXYGEN SATURATION: 98 % | HEIGHT: 64 IN

## 2024-05-02 DIAGNOSIS — N18.32 STAGE 3B CHRONIC KIDNEY DISEASE: ICD-10-CM

## 2024-05-02 DIAGNOSIS — I48.0 PAROXYSMAL ATRIAL FIBRILLATION: ICD-10-CM

## 2024-05-02 DIAGNOSIS — T45.1X5A CHEMOTHERAPY-INDUCED PERIPHERAL NEUROPATHY: ICD-10-CM

## 2024-05-02 DIAGNOSIS — N95.1 MENOPAUSAL SYMPTOMS: ICD-10-CM

## 2024-05-02 DIAGNOSIS — C50.912 INVASIVE DUCTAL CARCINOMA OF BREAST, FEMALE, LEFT: ICD-10-CM

## 2024-05-02 DIAGNOSIS — E66.01 MORBID OBESITY: ICD-10-CM

## 2024-05-02 DIAGNOSIS — M85.80 OSTEOPENIA, UNSPECIFIED LOCATION: ICD-10-CM

## 2024-05-02 DIAGNOSIS — R45.89 ANXIETY ABOUT HEALTH: ICD-10-CM

## 2024-05-02 DIAGNOSIS — C50.912 INVASIVE DUCTAL CARCINOMA OF BREAST, FEMALE, LEFT: Primary | ICD-10-CM

## 2024-05-02 DIAGNOSIS — G62.0 CHEMOTHERAPY-INDUCED PERIPHERAL NEUROPATHY: ICD-10-CM

## 2024-05-02 PROBLEM — E83.42 HYPOMAGNESEMIA: Status: RESOLVED | Noted: 2022-10-23 | Resolved: 2024-05-02

## 2024-05-02 LAB
ALBUMIN SERPL BCP-MCNC: 3.4 G/DL (ref 3.5–5.2)
ALP SERPL-CCNC: 87 U/L (ref 55–135)
ALT SERPL W/O P-5'-P-CCNC: 22 U/L (ref 10–44)
ANION GAP SERPL CALC-SCNC: 11 MMOL/L (ref 8–16)
AST SERPL-CCNC: 17 U/L (ref 10–40)
BASOPHILS # BLD AUTO: 0.04 K/UL (ref 0–0.2)
BASOPHILS NFR BLD: 0.8 % (ref 0–1.9)
BILIRUB SERPL-MCNC: 0.4 MG/DL (ref 0.1–1)
BUN SERPL-MCNC: 22 MG/DL (ref 8–23)
CALCIUM SERPL-MCNC: 9.7 MG/DL (ref 8.7–10.5)
CHLORIDE SERPL-SCNC: 104 MMOL/L (ref 95–110)
CO2 SERPL-SCNC: 24 MMOL/L (ref 23–29)
CREAT SERPL-MCNC: 1.4 MG/DL (ref 0.5–1.4)
DIFFERENTIAL METHOD BLD: ABNORMAL
EOSINOPHIL # BLD AUTO: 0.2 K/UL (ref 0–0.5)
EOSINOPHIL NFR BLD: 4.2 % (ref 0–8)
ERYTHROCYTE [DISTWIDTH] IN BLOOD BY AUTOMATED COUNT: 12.9 % (ref 11.5–14.5)
EST. GFR  (NO RACE VARIABLE): 42.8 ML/MIN/1.73 M^2
GLUCOSE SERPL-MCNC: 156 MG/DL (ref 70–110)
HCT VFR BLD AUTO: 38.8 % (ref 37–48.5)
HGB BLD-MCNC: 12.9 G/DL (ref 12–16)
IMM GRANULOCYTES # BLD AUTO: 0.01 K/UL (ref 0–0.04)
IMM GRANULOCYTES NFR BLD AUTO: 0.2 % (ref 0–0.5)
LYMPHOCYTES # BLD AUTO: 1.8 K/UL (ref 1–4.8)
LYMPHOCYTES NFR BLD: 35.3 % (ref 18–48)
MCH RBC QN AUTO: 30.5 PG (ref 27–31)
MCHC RBC AUTO-ENTMCNC: 33.2 G/DL (ref 32–36)
MCV RBC AUTO: 92 FL (ref 82–98)
MONOCYTES # BLD AUTO: 0.3 K/UL (ref 0.3–1)
MONOCYTES NFR BLD: 6.4 % (ref 4–15)
NEUTROPHILS # BLD AUTO: 2.6 K/UL (ref 1.8–7.7)
NEUTROPHILS NFR BLD: 53.1 % (ref 38–73)
NRBC BLD-RTO: 0 /100 WBC
PLATELET # BLD AUTO: 229 K/UL (ref 150–450)
PMV BLD AUTO: 9.1 FL (ref 9.2–12.9)
POTASSIUM SERPL-SCNC: 4.7 MMOL/L (ref 3.5–5.1)
PROT SERPL-MCNC: 7.3 G/DL (ref 6–8.4)
RBC # BLD AUTO: 4.23 M/UL (ref 4–5.4)
SODIUM SERPL-SCNC: 139 MMOL/L (ref 136–145)
WBC # BLD AUTO: 4.98 K/UL (ref 3.9–12.7)

## 2024-05-02 PROCEDURE — 3044F HG A1C LEVEL LT 7.0%: CPT | Mod: CPTII,S$GLB,, | Performed by: INTERNAL MEDICINE

## 2024-05-02 PROCEDURE — 99999 PR PBB SHADOW E&M-EST. PATIENT-LVL V: CPT | Mod: PBBFAC,,, | Performed by: INTERNAL MEDICINE

## 2024-05-02 PROCEDURE — 3066F NEPHROPATHY DOC TX: CPT | Mod: CPTII,S$GLB,, | Performed by: INTERNAL MEDICINE

## 2024-05-02 PROCEDURE — 3074F SYST BP LT 130 MM HG: CPT | Mod: CPTII,S$GLB,, | Performed by: INTERNAL MEDICINE

## 2024-05-02 PROCEDURE — 80053 COMPREHEN METABOLIC PANEL: CPT | Mod: PN | Performed by: INTERNAL MEDICINE

## 2024-05-02 PROCEDURE — 36415 COLL VENOUS BLD VENIPUNCTURE: CPT | Mod: PN | Performed by: INTERNAL MEDICINE

## 2024-05-02 PROCEDURE — 1160F RVW MEDS BY RX/DR IN RCRD: CPT | Mod: CPTII,S$GLB,, | Performed by: INTERNAL MEDICINE

## 2024-05-02 PROCEDURE — 3079F DIAST BP 80-89 MM HG: CPT | Mod: CPTII,S$GLB,, | Performed by: INTERNAL MEDICINE

## 2024-05-02 PROCEDURE — 3008F BODY MASS INDEX DOCD: CPT | Mod: CPTII,S$GLB,, | Performed by: INTERNAL MEDICINE

## 2024-05-02 PROCEDURE — 99215 OFFICE O/P EST HI 40 MIN: CPT | Mod: S$GLB,,, | Performed by: INTERNAL MEDICINE

## 2024-05-02 PROCEDURE — 1159F MED LIST DOCD IN RCRD: CPT | Mod: CPTII,S$GLB,, | Performed by: INTERNAL MEDICINE

## 2024-05-02 PROCEDURE — 85025 COMPLETE CBC W/AUTO DIFF WBC: CPT | Mod: PN | Performed by: INTERNAL MEDICINE

## 2024-05-02 NOTE — PROGRESS NOTES
PROGRESS NOTE    Subjective:       Patient ID: Josefina Coon is a 59 y.o. female.  MRN: [id number]  : 1965    Chief Complaint: Invasive ductal carcinoma of breast, female, left      History of Present Illness:   Josefina Coon is a 59 y.o. female who presents with invasive ductal carcinoma of the left breast, triple positivie.   Treated by Dr. Jaquez.    Neoadjuvant TCHP with pCR   XRT completed 23 & Arimidex started thereafter inn 2023.  She completed adjuvant Herceptin and Perjeta on 10/5/23.     Interim history:   She present to the office by herself. This is my first encounter with the patient.  She was seen by Dr. Vásquez  She remains on anastrozole. The hot flushes are less intense.  She is on Trulicity for weight loss. She is concerned about her kidney function.  She is swimming twice a week.  She is still experiencing mild neuropathy, numbness in her toes, mostly at night.   She stopped the acupuncture due to conflict with her job and due to low efficacy.  She denies any other complaints.    Had a mammogram in 2023.   Oncology History:  Oncology History   Invasive ductal carcinoma of breast, female, left   2022 Initial Diagnosis    Invasive ductal carcinoma of breast, female, left     2022 Cancer Staged    Staging form: Breast, AJCC 8th Edition  - Clinical stage from 2022: Stage IA (cT1c, cN0(f), cM0, G3, ER+, SC+, HER2+)     2022 - 2022 Chemotherapy    Treatment Summary   Plan Name: OP BREAST TRASTUZUMAB PACLITAXEL WEEKLY  Treatment Goal: Curative  Status: Inactive  Start Date:   End Date:   Provider: Lisa Jaquez MD  Chemotherapy: PACLitaxeL (TAXOL) 80 mg/m2 = 204 mg in sodium chloride 0.9% 250 mL chemo infusion, 80 mg/m2 = 204 mg, Intravenous, Clinic/HOD 1 time, 0 of 12 cycles  pertuzumab (PERJETA) 840 mg in sodium chloride 0.9% 278 mL infusion, 840 mg (original dose ), Intravenous, Clinic/HOD 1  time, 0 of 17 cycles  Dose modification: 840 mg (Cycle 1, Reason: Other (see comments)), 420 mg (Cycle 4, Reason: Other (see comments))  trastuzumab-dkst (OGIVRI) 591 mg in sodium chloride 0.9% 250 mL chemo infusion, 4 mg/kg = 591 mg, Intravenous, Clinic/HOD 1 time, 0 of 25 cycles     10/7/2022 - 10/5/2023 Chemotherapy    Treatment Summary   Plan Name: OP BREAST PACLITAXEL TRASTUZUMAB WEEKLY WITH PERTUZUMAB Q3W (THP)  Treatment Goal: Control  Status: Inactive  Start Date: 10/7/2022  End Date: 10/5/2023  Provider: Joelle Vásquez MD  Chemotherapy: PACLitaxeL (TAXOL) 80 mg/m2 = 210 mg in sodium chloride 0.9% 250 mL chemo infusion, 80 mg/m2 = 210 mg, Intravenous, Clinic/HOD 1 time, 4 of 4 cycles  Administration: 210 mg (10/7/2022), 204 mg (10/14/2022), 204 mg (10/28/2022), 204 mg (11/4/2022), 204 mg (10/21/2022), 204 mg (11/11/2022), 204 mg (11/18/2022), 204 mg (11/25/2022), 198 mg (12/9/2022), 204 mg (12/2/2022), 198 mg (12/16/2022), 198 mg (12/23/2022)  pertuzumab (PERJETA) 840 mg in sodium chloride 0.9% 278 mL infusion, 840 mg, Intravenous, Clinic/HOD 1 time, 18 of 18 cycles  Administration: 840 mg (10/7/2022), 420 mg (10/28/2022), 420 mg (11/18/2022), 420 mg (12/9/2022), 420 mg (12/30/2022), 420 mg (1/20/2023), 420 mg (2/17/2023), 420 mg (3/10/2023), 420 mg (3/31/2023), 420 mg (4/21/2023), 420 mg (5/12/2023), 420 mg (6/2/2023), 420 mg (6/23/2023), 420 mg (7/14/2023), 420 mg (8/4/2023), 420 mg (8/25/2023), 420 mg (9/15/2023), 420 mg (10/5/2023)  trastuzumab-dkst (OGIVRI) 570 mg in sodium chloride 0.9% 250 mL chemo infusion, 593 mg (100 % of original dose 4 mg/kg), Intravenous, Clinic/HOD 1 time, 18 of 18 cycles  Dose modification: 4 mg/kg (original dose 4 mg/kg, Cycle 1, Reason: Other (see comments), Comment: weekly trastuzumab), 2 mg/kg (original dose 2 mg/kg, Cycle 2, Reason: Other (see comments), Comment: weekly maintenance dose), 6 mg/kg (original dose 2 mg/kg, Cycle 2, Reason: MD Discretion, Comment: change to  q3w dosing), 2 mg/kg (original dose 2 mg/kg, Cycle 1, Reason: Other (see comments), Comment: maintenance weekly dose)  Administration: 570 mg (10/7/2022), 840 mg (10/28/2022), 288 mg (10/14/2022), 290 mg (10/21/2022), 840 mg (11/18/2022), 831 mg (12/9/2022), 831 mg (12/30/2022), 720 mg (1/20/2023), 806 mg (2/17/2023), 750 mg (3/10/2023), 814 mg (3/31/2023), 750 mg (4/21/2023), 750 mg (5/12/2023), 750 mg (6/2/2023), 750 mg (6/23/2023), 750 mg (7/14/2023), 750 mg (8/4/2023), 750 mg (8/25/2023), 750 mg (9/15/2023), 750 mg (10/5/2023)     2/13/2023 Cancer Staged    Staging form: Breast, AJCC 8th Edition  - Pathologic stage from 2/13/2023: No Stage Recommended (ypT0, pN0(sn), cM0, GX)     3/14/2023 - 4/14/2023 Radiation Therapy    Treating physician: Shelia Trevino  Total Dose: 52.56 Gy (42.56Gy/16 fractions to whole breast; 10Gy/5 fraction boost)  Fractions: 20  Treatment Site Ref. ID Energy Dose/Fx (Gy) #Fx Dose Correction (Gy) Total Dose (Gy) Start Date End Date Elapsed Days   3D Breast_L NormEZCalc 18X 2.66 16 / 16 0 42.56 3/14/2023 4/6/2023 23   3d BrstBst_L NormBst 18X 2.5 4 / 4 0 10 4/10/2023 4/14/2023 4        8/25/22 bilateral screening mammogram,,Right neg ,Left central post mass     9/8/22 left diag MMG, left US limited .Left 0300 lateral posterior 6cm FN 1.4cm mass , left axilla LN 2, up to 4mm cortex     9/14/22 left 0300 lateral posterior 6cm FN 1.4cm mass US biopsy .Grade 3 ,1.1cm in biopsy No LVI , ER 84% KS 28% Her 2 3+ pos Ki 52 %    Left axilla LN biopsy ;Benign fatty tissue, no LN, not concordant No MRI of breasts were done      9/21/22 US bilateral complete Right neg, right LN nml , Left 0300 6cm FN [id number] mass Borderline LN left axilla     9/21/22 Left axilla LN biopsy benign LN , so eventually Stage IA triple positive Breast cancer     10/7/22: started neoadjuvant chemo with Taxol + dual HER-2 (tranztuzumab and pertuzumab)     Receiving treatment with Paclitaxel/Trastuzumab/Pertuzumab on D1,  weekly Paclitaxel on D8, D15 of a 21 day cycle. Completed weekly Paclitaxel on 22.     2023: s/p B/L mastectomy with CPR;ypT0,pN0,cM0       History:  Past Medical History:   Diagnosis Date    Abnormal liver enzymes     Asymptomatic varicose veins     Breast cancer 2022    chemo 10/2022-2022; radiation 3/2023 6 weeks; immunotherapy 10/2022-10/5/2023    Cancer     left breast cancer    Depressive disorder, not elsewhere classified     Dyslipidemia     Embolism and thrombosis of unspecified site     superficial venous thrombosis    Encounter for long-term (current) use of other medications     Family history of ischemic heart disease     Fatty liver     Generalized anxiety disorder     History of chicken pox     Obstructive sleep apnea (adult) (pediatric)     Type II or unspecified type diabetes mellitus without mention of complication, not stated as uncontrolled     Unspecified essential hypertension     Unspecified venous (peripheral) insufficiency     Unspecified vitamin D deficiency       Past Surgical History:   Procedure Laterality Date    BREAST BIOPSY Left 2022    idc    BREAST BIOPSY Left 2022    neg ln    BREAST BIOPSY Left 2022    neg ln    BREAST LUMPECTOMY Left 2023    2 lymph nodes removed     SECTION      COLONOSCOPY      ESOPHAGOGASTRODUODENOSCOPY      INSERTION OF TUNNELED CENTRAL VENOUS CATHETER (CVC) WITH SUBCUTANEOUS PORT Right 10/04/2022    Procedure: [id number];  Surgeon: Dominick Ng MD;  Location: [facility] OR;  Service: General;  Laterality: Right;    LUMPECTOMY, WITH RADAR LOCALIZATION USING TR[place]ON  Left 2023    Procedure: LUMPECTOMY,WITH RADAR LOCALIZATION USING TR[place] ;  Surgeon: Maria G Mcduffie MD;  Location: [facility] OR;  Service: General;  Laterality: Left;    SENTINEL LYMPH NODE BIOPSY Left 2023    Procedure: BIOPSY, LYMPH NODE, SENTINEL;  Surgeon: Maria G Mcduffie MD;  Location: [facility] OR;  Service: General;   Laterality: Left;    TRANSESOPHAGEAL ECHOCARDIOGRAM WITH POSSIBLE CARDIOVERSION (LAUREL W/ POSS CARDIOVERSION) N/A 07/25/2023    Procedure: TRANSESOPHAGEAL ECHOCARDIOGRAM WITH POSSIBLE CARDIOVERSION (LUAREL W/ POSS CARDIOVERSION);  Surgeon: Roni Frias MD;  Location: [facility];  Service: Cardiology;  Laterality: N/A;    VEIN SURGERY      [place], Dr. Larsen     Family History   Problem Relation Name Age of Onset    Hyperlipidemia Mother      Hypertension Mother      Vulvar Cancer Mother      Dementia Mother      Atrial fibrillation Father      Parkinsonism Father      Diabetes Brother      Cancer Brother          colon    Hypertension Son      Hyperlipidemia Son      Anxiety disorder Son      Stroke Maternal Grandmother       Social History     Tobacco Use    Smoking status: Never    Smokeless tobacco: Never   Substance and Sexual Activity    Alcohol use: Not Currently     Comment: rare     Drug use: Never    Sexual activity: Not Currently        ROS:   Review of Systems   Constitutional:  Negative for fever, malaise/fatigue and weight loss.   HENT:  Negative for congestion, hearing loss, nosebleeds and sore throat.    Eyes:  Negative for double vision and photophobia.   Respiratory:  Negative for cough, hemoptysis, sputum production, shortness of breath and wheezing.    Cardiovascular:  Negative for chest pain, palpitations, orthopnea and leg swelling.   Gastrointestinal:  Negative for abdominal pain, blood in stool, constipation, diarrhea, heartburn, nausea and vomiting.   Genitourinary:  Negative for dysuria, hematuria and urgency.   Musculoskeletal:  Negative for back pain, joint pain and myalgias.   Skin:  Negative for itching and rash.   Neurological:  Positive for tingling. Negative for dizziness, seizures, weakness and headaches.   Endo/Heme/Allergies:  Negative for polydipsia. Does not bruise/bleed easily.   Psychiatric/Behavioral:  Negative for depression and memory loss. The patient is nervous/anxious.  "The patient does not have insomnia.         Objective:     Vitals:    05/02/24 0809   BP: 127/81   Pulse: 70   Resp: 20   Temp: 97.5 °F (36.4 °C)   TempSrc: Temporal   SpO2: 98%   Weight: (!) 144 kg (317 lb 7.4 oz)   Height: 5' 4" (1.626 m)   PainSc: 0-No pain       Wt Readings from Last 10 Encounters:   05/02/24 (!) 144 kg (317 lb 7.4 oz)   03/25/24 (!) 141.3 kg (311 lb 8.2 oz)   03/14/24 (!) 141.3 kg (311 lb 8.2 oz)   01/26/24 (!) 138.5 kg (305 lb 5.4 oz)   01/22/24 (!) 137 kg (302 lb 0.5 oz)   01/19/24 (!) 136.5 kg (301 lb)   12/13/23 131.5 kg (290 lb)   12/01/23 132.5 kg (292 lb)   11/22/23 132 kg (291 lb 0.1 oz)   11/17/23 132 kg (291 lb 0.1 oz)       Physical Examination:   Physical Exam  Vitals and nursing note reviewed.   Constitutional:       General: She is not in acute distress.     Appearance: She is not diaphoretic.   HENT:      Head: Normocephalic.      Mouth/Throat:      Pharynx: No oropharyngeal exudate.   Eyes:      General: No scleral icterus.     Conjunctiva/sclera: Conjunctivae normal.   Neck:      Thyroid: No thyromegaly.   Cardiovascular:      Rate and Rhythm: Normal rate and regular rhythm.      Heart sounds: Normal heart sounds. No murmur heard.  Pulmonary:      Effort: Pulmonary effort is normal. No respiratory distress.      Breath sounds: No stridor. No wheezing or rales.   Chest:      Chest wall: No tenderness.      Comments: Post surgical changes to the left breast. No other breat mass or axillary adenopathy   Abdominal:      General: Bowel sounds are normal. There is no distension.      Palpations: Abdomen is soft. There is no mass.      Tenderness: There is no abdominal tenderness. There is no rebound.   Musculoskeletal:         General: No tenderness or deformity. Normal range of motion.      Cervical back: Neck supple.   Lymphadenopathy:      Cervical: No cervical adenopathy.   Skin:     General: Skin is warm and dry.      Findings: No erythema or rash.   Neurological:      Mental " Status: She is alert and oriented to person, place, and time.      Cranial Nerves: No cranial nerve deficit.      Coordination: Coordination normal.      Gait: Gait is intact.   Psychiatric:         Mood and Affect: Affect normal.         Cognition and Memory: Memory normal.         Judgment: Judgment normal.          Diagnostic Tests:  Significant Imaging: I have reviewed and interpreted all pertinent imaging results/findings.      Laboratory Data:  All pertinent labs have been reviewed.  Labs:   Lab Results   Component Value Date    WBC 4.98 05/02/2024    RBC 4.23 05/02/2024    HGB 12.9 05/02/2024    HCT 38.8 05/02/2024    MCV 92 05/02/2024     05/02/2024     (H) 05/02/2024     05/02/2024    K 4.7 05/02/2024    BUN 22 05/02/2024    CREATININE 1.4 05/02/2024    AST 17 05/02/2024    ALT 22 05/02/2024    BILITOT 0.4 05/02/2024     ECOG SCORE    1 - Restricted in strenuous activity-ambulatory and able to carry out work of a light nature          Assessment/Plan:   Invasive ductal carcinoma of breast, female, left  - cT1c triple positive breast cancer  (ER 84% NM 28% Her 2 3+ pos Ki 52 %), given her young age and Ki67%, will proceed with TH, adding P to help with a better pCR, will also plan to get ultrasound mid cycle to monitor (3 months)  -9/26/22 Echo with EF 60%; next is scheduled for 01/18/23  -Began TH+P on 10/7/2022; completed 12 weeks of Paclitaxel 12/23/22  - 2/2023; s/p lupmectomy CPR;ypT0,pN0,cM0  -completed Trastuzumab/Pertuzumab   -  adjuvant radiation plan for 16 fraction total - completed 04/13/23  - last period  around~ 50,, post menopausal , on anastrazole  June 2nd /2023   -Continue active surveillance. Normal mammogram in October 2023, repeat in one year.   -f/up in 4 months.    Hot flashes   Improved.  declined pharmacologic interventions.     Stage 3b chronic kidney disease  Crea:1.4 stable  I had a long conversation with the patient about how to prevent the progression of her  chronic kidney disease.  This included with the hydration losing weight as well control of her comorbidity including her diabetes.  Avoid nephrotoxic medication  Continue Nephrology follow up.     Paroxysmal atrial fibrillation  Rate controlled.  Continue cardiology follow up.  She was advised to let her cardiologist or primary care monitor as well her lipid profile.    Diabetes type 2 with neuropathy   Lab Results   Component Value Date    HGBA1C 6.2 (H) 01/13/2024   Taking Metformin, Forxiga, Trulicity  has stopped gabapentin.     Anxiety about health  On Wellbutrin.   Follows up with a therapist.     Osteopenia, unspecified location  DEXA scan done 6/23/2023 showed osteopenia.  We will repeat bone density in 2025  cont taking calcium and Vit D    Morbid obesity BMI 54.49  -A higher BMI and/or perimenopausal weight gain have been consistently associated with a higher risk of breast cancer among postmenopausal women.The association between a higher BMI and postmenopausal breast cancer risk may be mediated by higher estrogen levels resulting from the peripheral conversion of estrogen precursors (from adipose tissue) to estrogen. Overweight, Obesity, and Postmenopausal Invasive Breast Cancer Risk: A Secondary Analysis of the Women's Health Initiative Randomized Clinical Trials.  AUGIE Oncol. 2015;1(5):611.   In I discussed these findings the patient was advised to exercise maintain healthy lifestyle and to lose weight.       MDM includes  :    - Acute or chronic illness or injury that poses a threat to life or bodily function  - Independent review and explanation of 3+ results from unique tests  - Discussion of management and ordering 3+ unique tests  - Extensive discussion of treatment and management  - Prescription drug management  - Drug therapy requiring intensive monitoring for toxicity       Plan was discussed with the patient at length, and she verbalized understanding. Josefina was given an opportunity to ask  questions that were answered to her satisfaction, and she was advised to call in the interval if any problems or questions arise.    Electronically signed by Renetta Holder MD      Route Chart for Scheduling    Med Onc Chart Routing      Follow up with physician 4 months.   Follow up with BRANDIE    Infusion scheduling note    Injection scheduling note    Labs    Imaging    Pharmacy appointment    Other referrals                    Supportive Plan Information  IV FLUIDS AND ELECTROLYTES   Joelle Vásquez MD   Upcoming Treatment Dates - IV FLUIDS AND ELECTROLYTES    No upcoming days in selected categories.

## 2024-05-07 ENCOUNTER — TELEPHONE (OUTPATIENT)
Dept: HEMATOLOGY/ONCOLOGY | Facility: CLINIC | Age: 59
End: 2024-05-07
Payer: COMMERCIAL

## 2024-05-07 NOTE — TELEPHONE ENCOUNTER
Pt returned my call to reschedule her PT apt; scheduled her on Thurs 5/9/24 at 9:00 am as requested

## 2024-05-07 NOTE — TELEPHONE ENCOUNTER
Returned call to pt to cancel her PT apt for today as requested; no ans left voicemail and cancelled the apt as requested

## 2024-05-09 ENCOUNTER — CLINICAL SUPPORT (OUTPATIENT)
Dept: REHABILITATION | Facility: HOSPITAL | Age: 59
End: 2024-05-09
Payer: COMMERCIAL

## 2024-05-09 DIAGNOSIS — R60.0 BILATERAL LOWER EXTREMITY EDEMA: Primary | ICD-10-CM

## 2024-05-09 PROCEDURE — 97110 THERAPEUTIC EXERCISES: CPT | Mod: PN

## 2024-05-09 PROCEDURE — 97530 THERAPEUTIC ACTIVITIES: CPT | Mod: PN

## 2024-05-09 NOTE — PROGRESS NOTES
[facility] Outpatient  Physical Therapy Progress Note  Lymphedema Therapy / Discharge Summary     Visit Date: 5/9/2024    Name: Josefina Coon  MRN: [id number]  Therapy Diagnosis:   Encounter Diagnosis   Name Primary?    Bilateral lower extremity edema Yes   Lymphedema      Physician: Raya Echols MD  Physician Orders: PT Eval and Treat   Medical Diagnosis from Referral: Bilateral lower extremity edema, unspecified venous (peripheral) insufficieny. Invasive ductal carcinoma of breast, female, left  Evaluation Date: 2/21/2024  Re-assessment: 3/13/2024   Re-assess: 4/2/2024 continue with POC 1-2 x week x 3 weeks   Authorization: pending  Plan of Care Expiration: 02/21/2024-04/21/2024  Reassessment Due: 5/2/2024  Fact-G Completed: 1 / 2     Visit: 11 / 12 added 6 visits: 16/18  PTA Visit: 0 / 5  Time In: 9 AM  Time Out: 10 AM  Total Billable Time: 60 minutes     Precautions: Standard, Fall and cancer      Subjective     Patient states: not sure I am putting these on correctly    Pain: 0/10       Objective   Pt to clinic with tennis shoes, no compression to legs.   Vitals 93% O2 and 72 bpm at rest   Measurements  LANDMARK LEFT LE (cm)  02/21/2024 RIGHT LE (cm)  02/21/2024 LEFT LE (cm)  03/13/2024  reassess LEFT LE  After MLD/pump session LEFT LE DC Re-assess 5/9 RIGHT LE (cm)  03/13/2024  reassess RIGHT LE   After MLD/pump session RIGHT LE DC Re-assess 5/9   H + 15 in 54.7 52.5 55.0 54.9 54.7 54.0 52.8 52.3   H + 10 in 46.9 47.5 46.4 46.8 46.6 47.5 46.9 48.1   H + 5 in 31.5 24.0 33.5 31.8 31.5 31.5 31.2 31.5   10 cm from great toe 24.3 24.3 24.5 24.0 24.5 24.0 24.0 25.0   MTP Jt line 25.0 24.0 24.3 24.8 24.5 23.8 23.5 24.2   Leg Length   39 cm               Josefina bey in / received the following treatment:    deferred  Manual Therapy to develop flexibility, extensibility, desensitization, pliability, and contour for 65 minutes including:  Diaphragm breathing throughout  "MLD.  Pt received MLD to bilateral lower extremities, with pre treatment short neck series to include:  terminus, sternal notch, lateral and posterior neck, right axilla, abdomen, and bilateral inguinal triangle, followed by full leg sequence with rework accessing all watershed areas on trunk. Concurrent use of pneumatic compression pump at 30 mmHg, with pt tolerating well and expressing comfort. Eucerin lotion applied to dry skin, followed by application of compression bandages to bilateral lower leg as follows:  tubigrip G to B LE's, comprefoam and cast padding applied to achieve a cylindrical shape, (1) 8 cm, (1) 10 cm, (1) 12 cm Rosidal short stretch bandages, Pt advised to wear compression bandages for 24 hrs, remove and wash, bring to next appointment for re use.  Tubigrips are to be worn when not bandaged, in daytime only. Remove bandages if she experiences pain, SOB, or vascular compromise. Pt verbalized understanding. PT required to assist pt with donning shoes.    Lymphatouch was applied to left lower extremity at 60 mmHg to increase angiomotoricity.  Based on measurements taken at reassessment, pt will need Compreflex calf with transitional liner in size XL standard length in beige for bilateral lower legs. Garments have been ordered.     Therapeutic Exercise to develop strength, endurance, ROM,   and flexibility for 10 minutes includinMWT: 1169 with HR 99 bpm, O2 at 96%   Review of HEP:  Ankle pumps  Ankle inv/ev  Ankle [place]  Toe curls/flares  QS/GS  Deferred [place] tape applied in edema technique to bilateral upper legs.  Education Provided  [x] Progress toward goals   [] Role of therapy   [x] Activity modification  [x] Reviewed HEP    Pt received therapeutic activities: 45 min learning how to proper omega and doff her B LE leg garments. Garments donned were not applied with proper compression, all were below the "blue" 20-30mmHg line. PT provided further education on importance of pulling the tab " to cover the blue, pt worked with PT together to omega compression garments.Excellent fit. Review of different postures to help support back/legs while she places each wrap on. Provided tips on caring for her garments. Pt demonstrated excellent learning on how to tell if they are on correctly. Encouraged pt to be patient and stay in cool places. Elevate legs as well.   Review of use of pump each day, and also lymphatic flow exercises and self MLD as well.   Home Exercises Provided: Patient instructed to continue previously provided HEP.  Exercises were reviewed and Josefina was able to demonstrate them prior to the end of the session.  Josefina demonstrated good  understanding of the education provided.   See EMR under Patient Instructions for exercises provided  2/21/2024 .    Assessment   Majority of goals met as listed below. Pt with good understanding of why it is important to have the garments placed correctly as well as allowance of time to get garments on correctly. Patient demonstrated excellent fit of garments as well as able to demonstrate on her own with verbal cues how to put wrap on correctly. Hemosiderin staining more prevalent on left lower leg than right. Pt doing fine with bandaging lower legs, decreased swelling in lower legs and decreased induration in upper legs.  Doing water aerobics at [facility] twice a week.Pump has been delivered.     Josefina is a 59 y.o. female referred to outpatient physical therapy with a medical diagnosis of Bilateral lower extremity edema, unspecified venous (peripheral) insufficieny. Invasive ductal carcinoma of breast, female, left. Patient presents with Stage 2 bilateral lower extremity lymphedema secondary to venous insufficiency. This patient presents with increased pain, increased stiffness in the knees, ankles, as well as difficulty donning compression thigh high and knee high stockings and limitations with tolerating wearing garments, pt is also at risk of higher infection.  This pt would benefit from skilled therapy services to address the above impairments. Patient has tried elevating LE's, wearing compression however swelling is persistent and unresolved.    Feel patient would benefit from alternative compression garments such as inelastic velcro compression as well as a home pneumatic compression pump to assist in managing symptoms and reducing risk for infections.      Plan of care discussed with patient: Yes  Pt's spiritual, cultural and educational needs considered and patient is agreeable to the plan of care and goals as stated below:     Anticipated barriers for therapy: scheduling availability       GOALS  Short Term Goals: 3 weeks  Patient will demonstrate 100% knowledge of lymphedema precautions and signs of infection. Met 03/13/2024  Patient will tolerate iADLs with multilayered bandaging for 24-48 hours. MET goal for 24 hrs 03/11/2024   Patient will perform self-bandaging techniques without correction for progress towards compression garments.  Decrease girth by average 1 cm for improved mobility and safety with iADLs. In progress, not met.  Patient to report compliance sleeping on 30 degree incline for lymphatic protection. Met 03/13/2024  Patient will demonstrate proper posture with sitting and standing to decrease detrimental affects to adjacent structures. Met 03/13/2024  Patient will tolerate HEP for better progression toward LTGs and self-management of presentation. Met 03/13/2024     Long Term Goals: 6 weeks In progress.  Patient will be independent with HEP for self-management of symptoms and current status. GOAL MET 4/23/24  Decrease girth by average 2 cm for improved mobility and safety with iADLs. NOT MET 5/9/2024  Patient will perform self lymphatic drainage techniques for long term management of lymphedema.  GOAL MET 4/23/24  Patient to don/doff compression garments for self management of lymphedema.  GOAL MET 4/23/24  Patient to obtain pneumatic compression  pump for daily home use to assist in maintaining reduced swelling. GOAL MET 4/23/24  Patient will report pain reduction to < or = 3 with AROM for improved safety with iADLs (driving, household chores, yard work, job duties).  GOAL MET 4/23/24  Patient to be able to lift legs on/off mat/bed independently at 50% less effort for improved safety with navigating bed mobility. GOAL MET 4/23/24     Plan   Discharge to [facility] and pt to wear garments daily and pump daily.    Dang Richter, PT , CLT

## 2024-05-21 ENCOUNTER — TELEPHONE (OUTPATIENT)
Dept: CARDIOLOGY | Facility: CLINIC | Age: 59
End: 2024-05-21
Payer: COMMERCIAL

## 2024-05-21 NOTE — TELEPHONE ENCOUNTER
Spoke with pt regarding alert for Afib/flutter. Pt states she knew when she went in and she has since converted back to NSR. No complaints from pt and pt remains on medications.

## 2024-06-20 ENCOUNTER — PATIENT MESSAGE (OUTPATIENT)
Dept: CARDIOLOGY | Facility: CLINIC | Age: 59
End: 2024-06-20
Payer: COMMERCIAL

## 2024-06-20 NOTE — TELEPHONE ENCOUNTER
Please advise: pt has been in a fib for 24 hours; rate has been as high as 120. After extra 25mg of Toprol yesterday and today rate is in the 90s. Usually the extra 25mg will take her out of a fib but it has not; No SOB or dizziness or lightheadedness, she feels weak and has a pain in her head and neck.  She says this is the second episode since she turned in the monitor.

## 2024-06-25 RX ORDER — DRONEDARONE 400 MG/1
1 TABLET, FILM COATED ORAL 2 TIMES DAILY WITH MEALS
Qty: 180 TABLET | Refills: 0 | Status: SHIPPED | OUTPATIENT
Start: 2024-06-25

## 2024-07-01 ENCOUNTER — PATIENT MESSAGE (OUTPATIENT)
Dept: NEPHROLOGY | Facility: CLINIC | Age: 59
End: 2024-07-01
Payer: COMMERCIAL

## 2024-07-01 RX ORDER — METRONIDAZOLE 500 MG/1
500 TABLET ORAL 2 TIMES DAILY
COMMUNITY
Start: 2024-06-30

## 2024-07-01 RX ORDER — FLUCONAZOLE 200 MG/1
TABLET ORAL
COMMUNITY
Start: 2024-06-30

## 2024-07-01 RX ORDER — CEFDINIR 300 MG/1
300 CAPSULE ORAL 2 TIMES DAILY
COMMUNITY
Start: 2024-06-30

## 2024-08-02 ENCOUNTER — PATIENT MESSAGE (OUTPATIENT)
Dept: CARDIOLOGY | Facility: CLINIC | Age: 59
End: 2024-08-02
Payer: COMMERCIAL

## 2024-08-11 NOTE — PROGRESS NOTES
SUBJECTIVE:    Patient ID:  Josefina Coon is a 59 y.o. female who presents for evaluation of     PCP: Raya Echols MD  Cardiologist: Roni Frias MD    Problem list:  Atrial fibrillation  Incomplete LBBB  JASON on CPAP  HTN  CKD IIIB (eGFR 39 most recently)  DM2  Breast CA (invasive CA s/p left lumpectomy + chemo/XRT)    Has had palpitations over the past 3 years, more so over the past year.   Presented with AF with RVR >> LAUREL/DCCV 7/25/23. Placed on Toprol and Eliquis.  Continues to note symptoms c/w AF. Toprol increased to 50 mg daily >> still having episodes. Generally lasting all day.  Echo 5/1/23 EF 60% severe LAE (ALEXSANDRA 50.2).    8/12/2024:  Last visit 1 year prior with Dr. Turcios > started on Multaq. PVI deferred secondary to undergoing immunotherapy. Class IC deferred secondary to incomplete LBBB.  30 day event monitor 4/24: breakthrough AF on Multaq. 1% AF burden (6 hours and 39 min total). Longest AF episode 1 hr 11 min  Tolerating Multaq which she has been on for ~6 months  Eliquis 5 mg BID  Creatinein 1.52 (eGFR 39)    Reports recurrence every 2 weeks which can lasts up to 12 hours  No longer on chemo or immunotherapy    Lifestyle & modifiable risk factors  Alcohol?: no  Caffeine?: rare  JASON symptoms or treatment?: Yes, compliant with their CPAP  Obesity?  Yes (BMI 55.44)  - has gained around 40 lbs in the past year    I personally reviewed the ECG/telemetry strip today. My interpretation is NSR.  ms.     Past Medical History:   Diagnosis Date    Abnormal liver enzymes     Asymptomatic varicose veins     Breast cancer 09/14/2022    chemo 10/2022-12/2022; radiation 3/2023 6 weeks; immunotherapy 10/2022-10/5/2023    Cancer     left breast cancer    Depressive disorder, not elsewhere classified     Dyslipidemia     Embolism and thrombosis of unspecified site     superficial venous thrombosis    Encounter for long-term (current) use of other medications     Family history of ischemic  heart disease     Fatty liver     Generalized anxiety disorder     History of chicken pox     Obstructive sleep apnea (adult) (pediatric)     Type II or unspecified type diabetes mellitus without mention of complication, not stated as uncontrolled     Unspecified essential hypertension     Unspecified venous (peripheral) insufficiency     Unspecified vitamin D deficiency        Past Surgical History:   Procedure Laterality Date    BREAST BIOPSY Left 2022    idc    BREAST BIOPSY Left 2022    neg ln    BREAST BIOPSY Left 2022    neg ln    BREAST LUMPECTOMY Left 2023    2 lymph nodes removed     SECTION      COLONOSCOPY      ESOPHAGOGASTRODUODENOSCOPY      INSERTION OF TUNNELED CENTRAL VENOUS CATHETER (CVC) WITH SUBCUTANEOUS PORT Right 10/04/2022    Procedure: IWBAVOVME-AYNO-T-CATH;  Surgeon: Dominick Ng MD;  Location: [facility];  Service: General;  Laterality: Right;    LUMPECTOMY, WITH RADAR LOCALIZATION USING TRENTON  Left 2023    Procedure: LUMPECTOMY,WITH RADAR LOCALIZATION USING TR[place]ON ;  Surgeon: Maria G Mcduffie MD;  Location: [facility];  Service: General;  Laterality: Left;    SENTINEL LYMPH NODE BIOPSY Left 2023    Procedure: BIOPSY, LYMPH NODE, SENTINEL;  Surgeon: Maria G Mcduffie MD;  Location: [facility];  Service: General;  Laterality: Left;    TRANSESOPHAGEAL ECHOCARDIOGRAM WITH POSSIBLE CARDIOVERSION (LAUREL W/ POSS CARDIOVERSION) N/A 2023    Procedure: TRANSESOPHAGEAL ECHOCARDIOGRAM WITH POSSIBLE CARDIOVERSION (LAUREL W/ POSS CARDIOVERSION);  Surgeon: Roni Frias MD;  Location: [facility];  Service: Cardiology;  Laterality: N/A;    VEIN SURGERY      Laser, Dr. Larsen       Family History   Problem Relation Name Age of Onset    Hyperlipidemia Mother      Hypertension Mother      Vulvar Cancer Mother      Dementia Mother      Atrial fibrillation Father      Parkinsonism Father      Diabetes Brother      Cancer Brother          colon     Hypertension Son      Hyperlipidemia Son      Anxiety disorder Son      Stroke Maternal Grandmother         Social History     Socioeconomic History    Marital status:    Tobacco Use    Smoking status: Never     Passive exposure: Never    Smokeless tobacco: Never   Substance and Sexual Activity    Alcohol use: Not Currently     Comment: rare     Drug use: Never    Sexual activity: Not Currently       Review of patient's allergies indicates:   Allergen Reactions    Sitagliptin      Other reaction(s): (januvia) abdominal pain    Byetta  [exenatide] Rash    Lactose     Sulfa (sulfonamide antibiotics) Hives and Rash       Review of Systems   Constitutional: Negative for chills and fever.   HENT:  Negative for congestion and hearing loss.    Eyes:  Negative for blurred vision and double vision.   Cardiovascular:         See HPI   Respiratory:  Negative for cough, hemoptysis and shortness of breath.    Endocrine: Negative for cold intolerance and heat intolerance.   Musculoskeletal:  Negative for joint pain and joint swelling.   Gastrointestinal:  Negative for abdominal pain, nausea and vomiting.   Genitourinary:  Negative for dysuria and hematuria.   Neurological:  Negative for focal weakness and headaches.   Psychiatric/Behavioral:  Negative for altered mental status.    All other systems reviewed and are negative.           OBJECTIVE:     Vitals:    08/12/24 1111   BP: 129/84   Pulse: 63         BP Readings from Last 5 Encounters:   08/01/24 120/72   05/02/24 127/81   03/25/24 124/78   03/14/24 (!) 140/91   01/22/24 120/80        Physical Exam  Vitals and nursing note reviewed.   Constitutional:       General: She is not in acute distress.     Appearance: She is well-developed. She is obese. She is not diaphoretic.   HENT:      Head: Normocephalic and atraumatic.   Eyes:      General: No scleral icterus.        Right eye: No discharge.         Left eye: No discharge.   Cardiovascular:      Rate and Rhythm:  Normal rate and regular rhythm. No extrasystoles are present.     Pulses: Normal pulses and intact distal pulses.           Radial pulses are 2+ on the right side and 2+ on the left side.      Heart sounds: Normal heart sounds, S1 normal and S2 normal. Heart sounds not distant. No opening snap. No murmur heard.     No friction rub. No gallop. No S3 or S4 sounds.   Pulmonary:      Effort: Pulmonary effort is normal. No respiratory distress.      Breath sounds: No wheezing or rales.   Abdominal:      General: Bowel sounds are normal. There is no distension.      Palpations: Abdomen is soft.      Tenderness: There is no abdominal tenderness.   Musculoskeletal:         General: No deformity. Normal range of motion.      Cervical back: Normal range of motion and neck supple.   Skin:     General: Skin is warm and dry.      Findings: No erythema or rash.   Neurological:      Mental Status: She is alert.             Current Outpatient Medications   Medication Instructions    albuterol (PROVENTIL/VENTOLIN HFA) 90 mcg/actuation inhaler 2 puffs, Inhalation, Every 6 hours PRN    ALPRAZolam (XANAX) 0.25 MG tablet TAKE ONE TABLET BY MOUTH 1-2 TIMES DAILY AS NEEDED ANXIETY    anastrozole (ARIMIDEX) 1 mg, Oral, Daily    azelastine (ASTELIN) 137 mcg (0.1 %) nasal spray 2 sprays, Nasal, 2 times daily PRN    blood-glucose sensor (FREESTYLE ANTONIO 3 SENSOR) Sara 2 each, Misc.(Non-Drug; Combo Route), Every 14 days    buPROPion (WELLBUTRIN XL) 150 MG TB24 tablet TAKE 1 TABLET BY MOUTH EVERY DAY    cyanocobalamin 1,000 mcg/mL injection Inject 1 ml q 2 weeks x 1 month then 1 ml monthly    ELIQUIS 5 mg, Oral, 2 times daily    finerenone (KERENDIA) 20 mg Tab 1 tablet, Oral, Daily    meclizine (ANTIVERT) 12.5 mg, Oral, 3 times daily PRN    metFORMIN (GLUCOPHAGE) 500 mg, Oral, 2 times daily with meals    metoprolol succinate (TOPROL-XL) 25 mg, Oral    metoprolol succinate (TOPROL-XL) 50 mg, Oral    MULTAQ 400 mg, Oral, 2 times daily with meals     OZEMPIC 0.5 mg, Subcutaneous, Every 7 days    OZEMPIC 0.5 mg, Subcutaneous, Every 7 days    pravastatin (PRAVACHOL) 10 MG tablet TAKE 1 TABLET BY MOUTH EVERYDAY AT BEDTIME       Lipid Panel:   Lab Results   Component Value Date    CHOL 166 08/02/2024    HDL 59 08/02/2024    LDLCALC 70.2 08/02/2024    TRIG 184 (H) 08/02/2024    CHOLHDL 35.5 08/02/2024         The 10-year ASCVD risk score (Db MIRANDA, et al., 2019) is: 6.9%    Values used to calculate the score:      Age: 59 years      Sex: Female      Is Non- : No      Diabetic: Yes      Tobacco smoker: No      Systolic Blood Pressure: 120 mmHg      Is BP treated: Yes      HDL Cholesterol: 59 mg/dL      Total Cholesterol: 166 mg/dL    Most Recent EKG Results  Results for orders placed or performed in visit on 10/10/23   EKG 12-lead    Collection Time: 10/10/23  1:54 PM    Narrative    Test Reason : I48.0    Vent. Rate : 077 BPM     Atrial Rate : 077 BPM     P-R Int : 154 ms          QRS Dur : 114 ms      QT Int : 380 ms       P-R-T Axes : 065 -13 012 degrees     QTc Int : 430 ms    Normal sinus rhythm  Incomplete left bundle branch block  Borderline Abnormal ECG  When compared with ECG of 25-SEP-2023 08:26,  No significant change was found  Confirmed by Trice Kaufman MD ([id number]) on 10/10/2023 3:44:17 PM    Referred By: SARITHA PINTO           Confirmed By:Trice Kaufman MD       Most Recent Echocardiogram Results  Results for orders placed in visit on 08/23/23    Echo    Interpretation Summary    Left Ventricle: The left ventricle is normal in size. Normal wall thickness. Normal wall motion. There is normal systolic function. Ejection fraction by visual approximation is 60%. Global longitudinal strain is -17.6%. Grade I diastolic dysfunction.    Left Atrium: Left atrium is moderately dilated.    Right Ventricle: Normal right ventricular cavity size. Wall thickness is normal. Right ventricle wall motion  is normal. Systolic function is normal.     Aortic Valve: The aortic valve is a trileaflet valve.    Pulmonary Artery: The estimated pulmonary artery systolic pressure is 33 mmHg.    IVC/SVC: Normal venous pressure at 3 mmHg.      Most Recent Nuclear Stress Test Results  Results for orders placed during the hospital encounter of 03/03/20    Nuclear Stress - Cardiology Interpreted    Interpretation Summary    The perfusion scan is free of evidence from myocardial ischemia or injury.    There is a mild intensity defect in the anteroseptal wall of the left ventricle, secondary to breast attenuation.    Visually estimated ejection fraction is normal at rest.    The EKG portion of this study is negative for ischemia.    The patient reported no chest pain during the stress test.    There are no prior studies for comparison.      Most Recent Cardiac PET Stress Test Results  No results found for this or any previous visit.      Most Recent Cardiovascular Angiogram results  No results found for this or any previous visit.      Other Most Recent Cardiology Results  Results for orders placed during the hospital encounter of 04/26/24    Cardiac event monitor    Interpretation Summary    Patient monitored for 27d 16h 36m    Total AF [place] 1%    PACs were not found during the monitoring period    PVCs were not found during the monitoring period    Patient reported 1 episode of heart racing that corresponded to atrial fibrillation at 138 beats per minute.    Patient reported 1 episode of symptom other than listed the corresponded to sinus rhythm at 66 beats per minute.    Atrial fibrillation appreciated during the study.        All pertinent data including labs, imaging, EKGs, and studies listed above were reviewed.  Patient's most recent EKG tracing was personally interpreted by this provider.    ASSESSMENT:   Josefina Coon is a 59 y.o. y.o. female who presented today for evaluation of symptomatic, paroxysmal atrial fibrillation. On Multaq 400 mg BID (with  breakthrough on 30 day monitor). Reports recurrence of AF every 2 weeks on Multaq, but overall feels her QoL is well controlled. LVEF normal with ALEXSANDRA 45. Tolerating Eliquis 5 mg BID ([id number] = 3 [HTN, DM, sex]). Limited AAD options given history of incomplete LBBB.     I discussed the pathophysiology and risks of atrial fibrillation and the basic pathophysiology, including their health implications and treatment options. Specifically, I addressed the need for CVA (stroke) prophylaxis with aspirin versus oral anticoagulation (warfarin vs DOACs, discussed bleeding risks, and need to come to the ER for any head trauma for CT scanning even if asymptomatic). Their [id number] score is 3 which is associated with a 3.2% annual risk of stroke. Indefinite anticoagulation is indicated. I also discussed the goal to reduce symptomatic arrhythmic episodes by pharmacologic and/or procedural methods and utilizing a rhythm versus a rate control strategy. Discussed trial of anti-arrhythmic drug therapy versus ablation. I spent about a half hour discussing the nature of PVI including transseptal puncture. We discussed risks and benefits at length. Our discussion included, but was not limited to the risk of death, infection, bleeding, stroke, MI, cardiac perforation, embolism, cardiac tamponade, vascular injury, valvular injury, AE fistula, injury to phrenic nerve, pulmonary vein stenosis and other organic injury including the possibility for need for surgery or pacemaker implantation. All questions answered.    She would like to give a trial of exercise and weight loss and reassess.     1. Paroxysmal atrial fibrillation        2. Essential hypertension        3. Mixed hyperlipidemia        4. Family history of ischemic heart disease        5. Chemotherapy induced cardiomyopathy        6. Stage 3b chronic kidney disease        7. Obstructive sleep apnea (adult) (pediatric)        8. Morbid obesity          PLAN FOR TREATMENT OF  ABOVE DIAGNOSES:     - Continue with lifestyle modifications  - Continue Multaq 400 mg BID  - Continue Toprol 50 mg QD  - Continue Eliquis 5 mg BID  - Continue CPAP for JASON  - Continue Pravasatin 10 mg QD for HLD    - We discussed the 3 pillars for AF management including stroke risk, optimization, and symptom management (rate vs. rhythm control)  - We discussed applicable risk factor management including heart failure, exercise, HTN, diabetes, tobacco, obesity, EtOH, and sleep  - We discussed AHA recommendation of at least 150 minutes of moderate-intensity aerobic activity or 75 minutes per week of vigorous aerobic activity  - Mediterranean diet diet     F/u in 6 months    Monty Landry MD  Cardiac Electrophysiology

## 2024-08-12 ENCOUNTER — OFFICE VISIT (OUTPATIENT)
Dept: CARDIOLOGY | Facility: CLINIC | Age: 59
End: 2024-08-12
Payer: COMMERCIAL

## 2024-08-12 VITALS
DIASTOLIC BLOOD PRESSURE: 84 MMHG | HEART RATE: 63 BPM | SYSTOLIC BLOOD PRESSURE: 129 MMHG | BODY MASS INDEX: 50.02 KG/M2 | WEIGHT: 293 LBS | HEIGHT: 64 IN

## 2024-08-12 DIAGNOSIS — I42.7 CHEMOTHERAPY INDUCED CARDIOMYOPATHY: ICD-10-CM

## 2024-08-12 DIAGNOSIS — I48.0 PAROXYSMAL ATRIAL FIBRILLATION: Primary | ICD-10-CM

## 2024-08-12 DIAGNOSIS — T45.1X5A CHEMOTHERAPY INDUCED CARDIOMYOPATHY: ICD-10-CM

## 2024-08-12 DIAGNOSIS — N18.32 STAGE 3B CHRONIC KIDNEY DISEASE: ICD-10-CM

## 2024-08-12 DIAGNOSIS — E66.01 MORBID OBESITY: ICD-10-CM

## 2024-08-12 DIAGNOSIS — G47.33 OBSTRUCTIVE SLEEP APNEA (ADULT) (PEDIATRIC): ICD-10-CM

## 2024-08-12 DIAGNOSIS — E78.2 MIXED HYPERLIPIDEMIA: ICD-10-CM

## 2024-08-12 DIAGNOSIS — Z82.49 FAMILY HISTORY OF ISCHEMIC HEART DISEASE: ICD-10-CM

## 2024-08-12 DIAGNOSIS — I10 ESSENTIAL HYPERTENSION: ICD-10-CM

## 2024-08-12 LAB
OHS QRS DURATION: 100 MS
OHS QTC CALCULATION: 414 MS

## 2024-08-12 PROCEDURE — 99214 OFFICE O/P EST MOD 30 MIN: CPT | Mod: 25,S$GLB,, | Performed by: INTERNAL MEDICINE

## 2024-08-12 PROCEDURE — 93010 ELECTROCARDIOGRAM REPORT: CPT | Mod: S$GLB,,, | Performed by: INTERNAL MEDICINE

## 2024-08-12 PROCEDURE — 3074F SYST BP LT 130 MM HG: CPT | Mod: CPTII,S$GLB,, | Performed by: INTERNAL MEDICINE

## 2024-08-12 PROCEDURE — 3008F BODY MASS INDEX DOCD: CPT | Mod: CPTII,S$GLB,, | Performed by: INTERNAL MEDICINE

## 2024-08-12 PROCEDURE — 3066F NEPHROPATHY DOC TX: CPT | Mod: CPTII,S$GLB,, | Performed by: INTERNAL MEDICINE

## 2024-08-12 PROCEDURE — 1160F RVW MEDS BY RX/DR IN RCRD: CPT | Mod: CPTII,S$GLB,, | Performed by: INTERNAL MEDICINE

## 2024-08-12 PROCEDURE — 3044F HG A1C LEVEL LT 7.0%: CPT | Mod: CPTII,S$GLB,, | Performed by: INTERNAL MEDICINE

## 2024-08-12 PROCEDURE — 3079F DIAST BP 80-89 MM HG: CPT | Mod: CPTII,S$GLB,, | Performed by: INTERNAL MEDICINE

## 2024-08-12 PROCEDURE — 3060F POS MICROALBUMINURIA REV: CPT | Mod: CPTII,S$GLB,, | Performed by: INTERNAL MEDICINE

## 2024-08-12 PROCEDURE — 93005 ELECTROCARDIOGRAM TRACING: CPT | Mod: PO

## 2024-08-12 PROCEDURE — 1159F MED LIST DOCD IN RCRD: CPT | Mod: CPTII,S$GLB,, | Performed by: INTERNAL MEDICINE

## 2024-08-12 PROCEDURE — 99999 PR PBB SHADOW E&M-EST. PATIENT-LVL IV: CPT | Mod: PBBFAC,,, | Performed by: INTERNAL MEDICINE

## 2024-09-06 ENCOUNTER — PATIENT MESSAGE (OUTPATIENT)
Dept: ELECTROPHYSIOLOGY | Facility: CLINIC | Age: 59
End: 2024-09-06
Payer: COMMERCIAL

## 2024-09-09 ENCOUNTER — OFFICE VISIT (OUTPATIENT)
Dept: HEMATOLOGY/ONCOLOGY | Facility: CLINIC | Age: 59
End: 2024-09-09
Payer: COMMERCIAL

## 2024-09-09 VITALS
OXYGEN SATURATION: 97 % | BODY MASS INDEX: 50.02 KG/M2 | HEIGHT: 64 IN | WEIGHT: 293 LBS | RESPIRATION RATE: 16 BRPM | TEMPERATURE: 97 F | HEART RATE: 65 BPM | DIASTOLIC BLOOD PRESSURE: 78 MMHG | SYSTOLIC BLOOD PRESSURE: 123 MMHG

## 2024-09-09 DIAGNOSIS — C50.912 INVASIVE DUCTAL CARCINOMA OF BREAST, FEMALE, LEFT: ICD-10-CM

## 2024-09-09 DIAGNOSIS — M85.80 OSTEOPENIA, UNSPECIFIED LOCATION: ICD-10-CM

## 2024-09-09 DIAGNOSIS — E66.01 MORBID OBESITY: ICD-10-CM

## 2024-09-09 DIAGNOSIS — I48.0 PAROXYSMAL ATRIAL FIBRILLATION: ICD-10-CM

## 2024-09-09 DIAGNOSIS — N18.32 STAGE 3B CHRONIC KIDNEY DISEASE: Primary | ICD-10-CM

## 2024-09-09 DIAGNOSIS — E11.41 DIABETIC MONONEUROPATHY ASSOCIATED WITH TYPE 2 DIABETES MELLITUS: ICD-10-CM

## 2024-09-09 DIAGNOSIS — N95.1 MENOPAUSAL SYMPTOMS: ICD-10-CM

## 2024-09-09 DIAGNOSIS — R45.89 ANXIETY ABOUT HEALTH: ICD-10-CM

## 2024-09-09 DIAGNOSIS — E11.9 TYPE 2 DIABETES MELLITUS WITHOUT COMPLICATION, WITHOUT LONG-TERM CURRENT USE OF INSULIN: ICD-10-CM

## 2024-09-09 PROBLEM — T45.1X5A CHEMOTHERAPY INDUCED DIARRHEA: Status: RESOLVED | Noted: 2022-11-04 | Resolved: 2024-09-09

## 2024-09-09 PROBLEM — D84.821 IMMUNODEFICIENCY DUE TO DRUGS: Status: RESOLVED | Noted: 2022-11-25 | Resolved: 2024-09-09

## 2024-09-09 PROBLEM — Z79.899 IMMUNODEFICIENCY DUE TO DRUGS: Status: RESOLVED | Noted: 2022-11-25 | Resolved: 2024-09-09

## 2024-09-09 PROBLEM — K52.1 CHEMOTHERAPY INDUCED DIARRHEA: Status: RESOLVED | Noted: 2022-11-04 | Resolved: 2024-09-09

## 2024-09-09 PROCEDURE — G2211 COMPLEX E/M VISIT ADD ON: HCPCS | Mod: S$GLB,,, | Performed by: INTERNAL MEDICINE

## 2024-09-09 PROCEDURE — 3008F BODY MASS INDEX DOCD: CPT | Mod: CPTII,S$GLB,, | Performed by: INTERNAL MEDICINE

## 2024-09-09 PROCEDURE — 3074F SYST BP LT 130 MM HG: CPT | Mod: CPTII,S$GLB,, | Performed by: INTERNAL MEDICINE

## 2024-09-09 PROCEDURE — 99214 OFFICE O/P EST MOD 30 MIN: CPT | Mod: S$GLB,,, | Performed by: INTERNAL MEDICINE

## 2024-09-09 PROCEDURE — 3060F POS MICROALBUMINURIA REV: CPT | Mod: CPTII,S$GLB,, | Performed by: INTERNAL MEDICINE

## 2024-09-09 PROCEDURE — 1160F RVW MEDS BY RX/DR IN RCRD: CPT | Mod: CPTII,S$GLB,, | Performed by: INTERNAL MEDICINE

## 2024-09-09 PROCEDURE — 99999 PR PBB SHADOW E&M-EST. PATIENT-LVL V: CPT | Mod: PBBFAC,,, | Performed by: INTERNAL MEDICINE

## 2024-09-09 PROCEDURE — 4010F ACE/ARB THERAPY RXD/TAKEN: CPT | Mod: CPTII,S$GLB,, | Performed by: INTERNAL MEDICINE

## 2024-09-09 PROCEDURE — 3078F DIAST BP <80 MM HG: CPT | Mod: CPTII,S$GLB,, | Performed by: INTERNAL MEDICINE

## 2024-09-09 PROCEDURE — 3044F HG A1C LEVEL LT 7.0%: CPT | Mod: CPTII,S$GLB,, | Performed by: INTERNAL MEDICINE

## 2024-09-09 PROCEDURE — 1159F MED LIST DOCD IN RCRD: CPT | Mod: CPTII,S$GLB,, | Performed by: INTERNAL MEDICINE

## 2024-09-09 PROCEDURE — 3066F NEPHROPATHY DOC TX: CPT | Mod: CPTII,S$GLB,, | Performed by: INTERNAL MEDICINE

## 2024-09-09 NOTE — PROGRESS NOTES
PROGRESS NOTE    Subjective:       Patient ID: Josefina Coon is a 59 y.o. female.  MRN: [id number]  : 1965    Chief Complaint: Follow-up (Invasive ductal carcinoma of breast, female, left)      History of Present Illness:   Josefina Coon is a 59 y.o. female who presents with invasive ductal carcinoma of the left breast, triple positivie.   Treated by Dr. Jaquez.    Neoadjuvant TCHP with pCR   XRT completed 23 & Arimidex started thereafter inn 2023.  She completed adjuvant Herceptin and Perjeta on 10/5/23.     Interim history: 2024  She present to the office by herself.   She remains on anastrozole. The hot flushes are less intense.  She is on Ozempic for weight loss. She denies any other complaints.    Had a mammogram in 2023.   Oncology History:  Oncology History   Invasive ductal carcinoma of breast, female, left   2022 Initial Diagnosis    Invasive ductal carcinoma of breast, female, left     2022 Cancer Staged    Staging form: Breast, AJCC 8th Edition  - Clinical stage from 2022: Stage IA (cT1c, cN0(f), cM0, G3, ER+, UT+, HER2+)     2022 - 2022 Chemotherapy    Treatment Summary   Plan Name: OP BREAST TRASTUZUMAB PACLITAXEL WEEKLY  Treatment Goal: Curative  Status: Inactive  Start Date:   End Date:   Provider: Lisa Jaquez MD  Chemotherapy: PACLitaxeL (TAXOL) 80 mg/m2 = 204 mg in sodium chloride 0.9% 250 mL chemo infusion, 80 mg/m2 = 204 mg, Intravenous, Clinic/HOD 1 time, 0 of 12 cycles  pertuzumab (PERJETA) 840 mg in sodium chloride 0.9% 278 mL infusion, 840 mg (original dose ), Intravenous, Clinic/HOD 1 time, 0 of 17 cycles  Dose modification: 840 mg (Cycle 1, Reason: Other (see comments)), 420 mg (Cycle 4, Reason: Other (see comments))  trastuzumab-dkst (OGIVRI) 591 mg in sodium chloride 0.9% 250 mL chemo infusion, 4 mg/kg = 591 mg, Intravenous, Clinic/HOD 1 time, 0 of 25 cycles     10/7/2022 -  10/5/2023 Chemotherapy    Treatment Summary   Plan Name: OP BREAST PACLITAXEL TRASTUZUMAB WEEKLY WITH PERTUZUMAB Q3W (THP)  Treatment Goal: Control  Status: Inactive  Start Date: 10/7/2022  End Date: 10/5/2023  Provider: Joelle Vásquez MD  Chemotherapy: PACLitaxeL (TAXOL) 80 mg/m2 = 210 mg in sodium chloride 0.9% 250 mL chemo infusion, 80 mg/m2 = 210 mg, Intravenous, Clinic/HOD 1 time, 4 of 4 cycles  Administration: 210 mg (10/7/2022), 204 mg (10/14/2022), 204 mg (10/28/2022), 204 mg (11/4/2022), 204 mg (10/21/2022), 204 mg (11/11/2022), 204 mg (11/18/2022), 204 mg (11/25/2022), 198 mg (12/9/2022), 204 mg (12/2/2022), 198 mg (12/16/2022), 198 mg (12/23/2022)  pertuzumab (PERJETA) 840 mg in sodium chloride 0.9% 278 mL infusion, 840 mg, Intravenous, Clinic/HOD 1 time, 18 of 18 cycles  Administration: 840 mg (10/7/2022), 420 mg (10/28/2022), 420 mg (11/18/2022), 420 mg (12/9/2022), 420 mg (12/30/2022), 420 mg (1/20/2023), 420 mg (2/17/2023), 420 mg (3/10/2023), 420 mg (3/31/2023), 420 mg (4/21/2023), 420 mg (5/12/2023), 420 mg (6/2/2023), 420 mg (6/23/2023), 420 mg (7/14/2023), 420 mg (8/4/2023), 420 mg (8/25/2023), 420 mg (9/15/2023), 420 mg (10/5/2023)  trastuzumab-dkst (OGIVRI) 570 mg in sodium chloride 0.9% 250 mL chemo infusion, 593 mg (100 % of original dose 4 mg/kg), Intravenous, Clinic/HOD 1 time, 18 of 18 cycles  Dose modification: 4 mg/kg (original dose 4 mg/kg, Cycle 1, Reason: Other (see comments), Comment: weekly trastuzumab), 2 mg/kg (original dose 2 mg/kg, Cycle 2, Reason: Other (see comments), Comment: weekly maintenance dose), 6 mg/kg (original dose 2 mg/kg, Cycle 2, Reason: MD Discretion, Comment: change to q3w dosing), 2 mg/kg (original dose 2 mg/kg, Cycle 1, Reason: Other (see comments), Comment: maintenance weekly dose)  Administration: 570 mg (10/7/2022), 840 mg (10/28/2022), 288 mg (10/14/2022), 290 mg (10/21/2022), 840 mg (11/18/2022), 831 mg (12/9/2022), 831 mg (12/30/2022), 720 mg  (1/20/2023), 806 mg (2/17/2023), 750 mg (3/10/2023), 814 mg (3/31/2023), 750 mg (4/21/2023), 750 mg (5/12/2023), 750 mg (6/2/2023), 750 mg (6/23/2023), 750 mg (7/14/2023), 750 mg (8/4/2023), 750 mg (8/25/2023), 750 mg (9/15/2023), 750 mg (10/5/2023)     2/13/2023 Cancer Staged    Staging form: Breast, AJCC 8th Edition  - Pathologic stage from 2/13/2023: No Stage Recommended (ypT0, pN0(sn), cM0, GX)     3/14/2023 - 4/14/2023 Radiation Therapy    Treating physician: Shelia Trevino  Total Dose: 52.56 Gy (42.56Gy/16 fractions to whole breast; 10Gy/5 fraction boost)  Fractions: 20  Treatment Site Ref. ID Energy Dose/Fx (Gy) #Fx Dose Correction (Gy) Total Dose (Gy) Start Date End Date Elapsed Days   3D Breast_L NormEZCalc 18X 2.66 16 / 16 0 42.56 3/14/2023 4/6/2023 23   3d BrstBst_L NormBst 18X 2.5 4 / 4 0 10 4/10/2023 4/14/2023 4        8/25/22 bilateral screening mammogram,,Right neg ,Left central post mass     9/8/22 left diag MMG, left US limited .Left 0300 lateral posterior 6cm FN 1.4cm mass , left axilla LN 2, up to 4mm cortex     9/14/22 left 0300 lateral posterior 6cm FN 1.4cm mass US biopsy .Grade 3 ,1.1cm in biopsy No LVI , ER 84% SD 28% Her 2 3+ pos Ki 52 %    Left axilla LN biopsy ;Benign fatty tissue, no LN, not concordant No MRI of breasts were done      9/21/22 US bilateral complete Right neg, right LN nml , Left 0300 6cm FN [id number] mass Borderline LN left axilla     9/21/22 Left axilla LN biopsy benign LN , so eventually Stage IA triple positive Breast cancer     10/7/22: started neoadjuvant chemo with Taxol + dual HER-2 (tranztuzumab and pertuzumab)     Receiving treatment with Paclitaxel/Trastuzumab/Pertuzumab on D1, weekly Paclitaxel on D8, D15 of a 21 day cycle. Completed weekly Paclitaxel on 12/23/22.     2/2023: s/p B/L mastectomy with CPR;ypT0,pN0,cM0       History:  Past Medical History:   Diagnosis Date    Abnormal liver enzymes     Asymptomatic varicose veins     Breast cancer 09/14/2022     chemo 10/2022-2022; radiation 3/2023 6 weeks; immunotherapy 10/2022-10/5/2023    Cancer     left breast cancer    Depressive disorder, not elsewhere classified     Dyslipidemia     Embolism and thrombosis of unspecified site     superficial venous thrombosis    Encounter for long-term (current) use of other medications     Family history of ischemic heart disease     Fatty liver     Generalized anxiety disorder     History of chicken pox     Obstructive sleep apnea (adult) (pediatric)     Type II or unspecified type diabetes mellitus without mention of complication, not stated as uncontrolled     Unspecified essential hypertension     Unspecified venous (peripheral) insufficiency     Unspecified vitamin D deficiency       Past Surgical History:   Procedure Laterality Date    BREAST BIOPSY Left 2022    idc    BREAST BIOPSY Left 2022    neg ln    BREAST BIOPSY Left 2022    neg ln    BREAST LUMPECTOMY Left 2023    2 lymph nodes removed     SECTION      COLONOSCOPY      ESOPHAGOGASTRODUODENOSCOPY      INSERTION OF TUNNELED CENTRAL VENOUS CATHETER (CVC) WITH SUBCUTANEOUS PORT Right 10/04/2022    Procedure: [id number]-[id number];  Surgeon: Dominick Ng MD;  Location: [facility];  Service: General;  Laterality: Right;    LUMPECTOMY, WITH RADAR LOCALIZATION USING TRENTON  Left 2023    Procedure: LUMPECTOMY,WITH RADAR LOCALIZATION USING TRENTON ;  Surgeon: Maria G Mcduffie MD;  Location: [facility];  Service: General;  Laterality: Left;    SENTINEL LYMPH NODE BIOPSY Left 2023    Procedure: BIOPSY, LYMPH NODE, SENTINEL;  Surgeon: Maria G Mcduffie MD;  Location: [facility];  Service: General;  Laterality: Left;    TRANSESOPHAGEAL ECHOCARDIOGRAM WITH POSSIBLE CARDIOVERSION (LAUREL W/ POSS CARDIOVERSION) N/A 2023    Procedure: TRANSESOPHAGEAL ECHOCARDIOGRAM WITH POSSIBLE CARDIOVERSION (LAUREL W/ POSS CARDIOVERSION);  Surgeon: Roni Frias MD;  Location: [facility];   "Service: Cardiology;  Laterality: N/A;    VEIN SURGERY      [place], Dr. Larsen     Family History   Problem Relation Name Age of Onset    Hyperlipidemia Mother      Hypertension Mother      Vulvar Cancer Mother      Dementia Mother      Atrial fibrillation Father      Parkinsonism Father      Diabetes Brother      Cancer Brother          colon    Hypertension Son      Hyperlipidemia Son      Anxiety disorder Son      Stroke Maternal Grandmother       Social History     Tobacco Use    Smoking status: Never     Passive exposure: Never    Smokeless tobacco: Never   Substance and Sexual Activity    Alcohol use: Not Currently     Comment: rare     Drug use: Never    Sexual activity: Not Currently        ROS:   Review of Systems   Constitutional:  Negative for fever, malaise/fatigue and weight loss.   HENT:  Negative for congestion, hearing loss, nosebleeds and sore throat.    Eyes:  Negative for double vision and photophobia.   Respiratory:  Negative for cough, hemoptysis, sputum production, shortness of breath and wheezing.    Cardiovascular:  Negative for chest pain, palpitations, orthopnea and leg swelling.   Gastrointestinal:  Negative for abdominal pain, blood in stool, constipation, diarrhea, heartburn, nausea and vomiting.   Genitourinary:  Negative for dysuria, hematuria and urgency.   Musculoskeletal:  Negative for back pain, joint pain and myalgias.   Skin:  Negative for itching and rash.   Neurological:  Positive for tingling. Negative for dizziness, seizures, weakness and headaches.   Endo/Heme/Allergies:  Negative for polydipsia. Does not bruise/bleed easily.   Psychiatric/Behavioral:  Negative for depression and memory loss. The patient is nervous/anxious. The patient does not have insomnia.         Objective:     Vitals:    09/09/24 1049   BP: 123/78   Pulse: 65   Resp: 16   Temp: 97.4 °F (36.3 °C)   TempSrc: Temporal   SpO2: 97%   Weight: (!) 148 kg (326 lb 4.5 oz)   Height: 5' 4" (1.626 m)   PainSc: 0-No " pain       Wt Readings from Last 10 Encounters:   09/09/24 (!) 148 kg (326 lb 4.5 oz)   08/13/24 (!) 145.2 kg (320 lb)   08/12/24 (!) 146.5 kg (322 lb 15.6 oz)   08/01/24 (!) 146.1 kg (322 lb)   05/02/24 (!) 144 kg (317 lb 7.4 oz)   03/25/24 (!) 141.3 kg (311 lb 8.2 oz)   03/14/24 (!) 141.3 kg (311 lb 8.2 oz)   01/26/24 (!) 138.5 kg (305 lb 5.4 oz)   01/22/24 (!) 137 kg (302 lb 0.5 oz)   01/19/24 (!) 136.5 kg (301 lb)       Physical Examination:   Physical Exam  Vitals and nursing note reviewed.   Constitutional:       General: She is not in acute distress.     Appearance: She is not diaphoretic.   HENT:      Head: Normocephalic.      Mouth/Throat:      Pharynx: No oropharyngeal exudate.   Eyes:      General: No scleral icterus.     Conjunctiva/sclera: Conjunctivae normal.   Neck:      Thyroid: No thyromegaly.   Cardiovascular:      Rate and Rhythm: Normal rate and regular rhythm.      Heart sounds: Normal heart sounds. No murmur heard.  Pulmonary:      Effort: Pulmonary effort is normal. No respiratory distress.      Breath sounds: No stridor. No wheezing or rales.   Chest:      Chest wall: No tenderness.      Comments: Post surgical changes to the left breast. No other breat mass or axillary adenopathy   Abdominal:      General: Bowel sounds are normal. There is no distension.      Palpations: Abdomen is soft. There is no mass.      Tenderness: There is no abdominal tenderness. There is no rebound.   Musculoskeletal:         General: No tenderness or deformity. Normal range of motion.      Cervical back: Neck supple.   Lymphadenopathy:      Cervical: No cervical adenopathy.   Skin:     General: Skin is warm and dry.      Findings: No erythema or rash.   Neurological:      Mental Status: She is alert and oriented to person, place, and time.      Cranial Nerves: No cranial nerve deficit.      Coordination: Coordination normal.      Gait: Gait is intact.   Psychiatric:         Mood and Affect: Affect normal.          Cognition and Memory: Memory normal.         Judgment: Judgment normal.          Diagnostic Tests:  Significant Imaging: I have reviewed and interpreted all pertinent imaging results/findings.      Laboratory Data:  All pertinent labs have been reviewed.  Labs:   Lab Results   Component Value Date    WBC 4.71 08/02/2024    RBC 4.14 08/02/2024    HGB 12.6 08/02/2024    HCT 39.0 08/02/2024    MCV 94 08/02/2024     08/02/2024     (H) 08/02/2024     08/02/2024    K 4.7 08/02/2024    BUN 28 (H) 08/02/2024    CREATININE 1.52 (H) 08/02/2024    AST 25 08/02/2024    ALT 25 08/02/2024    BILITOT 0.7 08/02/2024     ECOG SCORE    1 - Restricted in strenuous activity-ambulatory and able to carry out work of a light nature          Assessment/Plan:   Invasive ductal carcinoma of breast, female, left  - cT1c triple positive breast cancer  (ER 84% TN 28% Her 2 3+ pos Ki 52 %), given her young age and Ki67%, will proceed with TH, adding P to help with a better pCR, will also plan to get ultrasound mid cycle to monitor (3 months)  -9/26/22 Echo with EF 60%; next is scheduled for 01/18/23  -Began TH+P on 10/7/2022; completed 12 weeks of Paclitaxel 12/23/22  - 2/2023; s/p lupmectomy CPR;ypT0,pN0,cM0  -completed Trastuzumab/Pertuzumab   -  adjuvant radiation plan for 16 fraction total - completed 04/13/23  - last period  around~ 50,, post menopausal , on anastrazole  June 2nd /2023   -Continue active surveillance. Normal mammogram in October 2023, repeat in one year.   -f/up in 4 months.    Hot flashes   Improved.  declined pharmacologic interventions.     Stage 3b chronic kidney disease  Crea:1.52 8/2/2024  I had a long conversation with the patient about how to prevent the progression of her chronic kidney disease.  This included with the hydration losing weight as well control of her comorbidity including her diabetes.  Avoid nephrotoxic medication  Continue Nephrology follow up.     Paroxysmal atrial  fibrillation  Rate controlled.  Continue cardiology follow up.  She was advised to let her cardiologist or primary care monitor as well her lipid profile.    Diabetes type 2 with neuropathy   Lab Results   Component Value Date    HGBA1C 6.4 (H) 08/02/2024   Taking Metformin and Ozempic.      Anxiety about health  On Wellbutrin.   Follows up with a therapist.     Osteopenia, unspecified location  DEXA scan done 6/23/2023 showed osteopenia.  We will repeat bone density in 2025  cont taking calcium and Vit D    Morbid obesity BMI 56.01 today  -A higher BMI and/or perimenopausal weight gain have been consistently associated with a higher risk of breast cancer among postmenopausal women.The association between a higher BMI and postmenopausal breast cancer risk may be mediated by higher estrogen levels resulting from the peripheral conversion of estrogen precursors (from adipose tissue) to estrogen. Overweight, Obesity, and Postmenopausal Invasive Breast Cancer Risk: A Secondary Analysis of the Women's Health Initiative Randomized Clinical Trials.  AUGIE Oncol. 2015;1(5):611.   In I discussed these findings the patient was advised to exercise maintain healthy lifestyle and to lose weight.       MDM includes  :    - Acute or chronic illness or injury that poses a threat to life or bodily function  - Independent review and explanation of 3+ results from unique tests  - Discussion of management and ordering 3+ unique tests  - Extensive discussion of treatment and management  - Prescription drug management  - Drug therapy requiring intensive monitoring for toxicity       Plan was discussed with the patient at length, and she verbalized understanding. Josefina was given an opportunity to ask questions that were answered to her satisfaction, and she was advised to call in the interval if any problems or questions arise.    Electronically signed by Renetta Holder MD      Route Chart for Scheduling    Med Onc Chart Routing      Follow  up with physician 4 months. with repeat CBC CMP Vitamin D   Follow up with BRANDIE    Infusion scheduling note    Injection scheduling note    Labs    Imaging    Pharmacy appointment    Other referrals

## 2024-09-19 ENCOUNTER — TELEPHONE (OUTPATIENT)
Dept: NEPHROLOGY | Facility: CLINIC | Age: 59
End: 2024-09-19
Payer: COMMERCIAL

## 2024-09-19 NOTE — TELEPHONE ENCOUNTER
----- Message from Star Marcos sent at 9/19/2024  9:30 AM CDT -----  Regarding: reschedule  Contact: patient  Type:  Patient Returning Call    Who Called:patient  Who Left Message for Patient:office nurse  Does the patient know what this is regarding?:rescheduling today's appointment/ patient took the wrong medication and is now in ER being monitored  Would the patient rather a call back or a response via [facility]? Please call to reschedule  Best Call Back Number:[phone]  Additional Information:

## 2024-09-19 NOTE — TELEPHONE ENCOUNTER
Called and spoke to patient. She needed to reschedule her appointment. New appointment made for 10/21/24 at 1420.

## 2024-09-26 ENCOUNTER — PATIENT MESSAGE (OUTPATIENT)
Dept: CARDIOLOGY | Facility: CLINIC | Age: 59
End: 2024-09-26
Payer: COMMERCIAL

## 2024-09-27 RX ORDER — DRONEDARONE 400 MG/1
1 TABLET, FILM COATED ORAL 2 TIMES DAILY WITH MEALS
Qty: 180 TABLET | Refills: 0 | Status: SHIPPED | OUTPATIENT
Start: 2024-09-27

## 2024-10-10 ENCOUNTER — TELEPHONE (OUTPATIENT)
Dept: NEPHROLOGY | Facility: CLINIC | Age: 59
End: 2024-10-10
Payer: COMMERCIAL

## 2024-10-10 NOTE — TELEPHONE ENCOUNTER
Called and spoke to patient to offer a sooner appointment. Patient declined and wanted to keep their current appointment.

## 2024-10-21 ENCOUNTER — OFFICE VISIT (OUTPATIENT)
Dept: NEPHROLOGY | Facility: CLINIC | Age: 59
End: 2024-10-21
Payer: COMMERCIAL

## 2024-10-21 VITALS
HEIGHT: 64 IN | DIASTOLIC BLOOD PRESSURE: 64 MMHG | SYSTOLIC BLOOD PRESSURE: 110 MMHG | BODY MASS INDEX: 55.96 KG/M2 | HEART RATE: 78 BPM | OXYGEN SATURATION: 97 %

## 2024-10-21 DIAGNOSIS — Z79.4 TYPE 2 DIABETES MELLITUS WITH STAGE 3B CHRONIC KIDNEY DISEASE, WITH LONG-TERM CURRENT USE OF INSULIN: ICD-10-CM

## 2024-10-21 DIAGNOSIS — E55.9 HYPOVITAMINOSIS D: ICD-10-CM

## 2024-10-21 DIAGNOSIS — I48.0 PAROXYSMAL ATRIAL FIBRILLATION: ICD-10-CM

## 2024-10-21 DIAGNOSIS — G47.33 OBSTRUCTIVE SLEEP APNEA (ADULT) (PEDIATRIC): ICD-10-CM

## 2024-10-21 DIAGNOSIS — I10 ESSENTIAL HYPERTENSION: ICD-10-CM

## 2024-10-21 DIAGNOSIS — E11.22 TYPE 2 DIABETES MELLITUS WITH STAGE 3B CHRONIC KIDNEY DISEASE, WITH LONG-TERM CURRENT USE OF INSULIN: ICD-10-CM

## 2024-10-21 DIAGNOSIS — N18.32 STAGE 3B CHRONIC KIDNEY DISEASE: Primary | ICD-10-CM

## 2024-10-21 DIAGNOSIS — N18.32 TYPE 2 DIABETES MELLITUS WITH STAGE 3B CHRONIC KIDNEY DISEASE, WITH LONG-TERM CURRENT USE OF INSULIN: ICD-10-CM

## 2024-10-21 DIAGNOSIS — E66.01 MORBID OBESITY: ICD-10-CM

## 2024-10-21 PROCEDURE — 99999 PR PBB SHADOW E&M-EST. PATIENT-LVL IV: CPT | Mod: PBBFAC,,, | Performed by: STUDENT IN AN ORGANIZED HEALTH CARE EDUCATION/TRAINING PROGRAM

## 2024-10-21 PROCEDURE — 99214 OFFICE O/P EST MOD 30 MIN: CPT | Mod: S$GLB,,, | Performed by: STUDENT IN AN ORGANIZED HEALTH CARE EDUCATION/TRAINING PROGRAM

## 2024-10-21 PROCEDURE — 1159F MED LIST DOCD IN RCRD: CPT | Mod: CPTII,S$GLB,, | Performed by: STUDENT IN AN ORGANIZED HEALTH CARE EDUCATION/TRAINING PROGRAM

## 2024-10-21 PROCEDURE — 3066F NEPHROPATHY DOC TX: CPT | Mod: CPTII,S$GLB,, | Performed by: STUDENT IN AN ORGANIZED HEALTH CARE EDUCATION/TRAINING PROGRAM

## 2024-10-21 PROCEDURE — 4010F ACE/ARB THERAPY RXD/TAKEN: CPT | Mod: CPTII,S$GLB,, | Performed by: STUDENT IN AN ORGANIZED HEALTH CARE EDUCATION/TRAINING PROGRAM

## 2024-10-21 PROCEDURE — 3008F BODY MASS INDEX DOCD: CPT | Mod: CPTII,S$GLB,, | Performed by: STUDENT IN AN ORGANIZED HEALTH CARE EDUCATION/TRAINING PROGRAM

## 2024-10-21 PROCEDURE — 3078F DIAST BP <80 MM HG: CPT | Mod: CPTII,S$GLB,, | Performed by: STUDENT IN AN ORGANIZED HEALTH CARE EDUCATION/TRAINING PROGRAM

## 2024-10-21 PROCEDURE — 3061F NEG MICROALBUMINURIA REV: CPT | Mod: CPTII,S$GLB,, | Performed by: STUDENT IN AN ORGANIZED HEALTH CARE EDUCATION/TRAINING PROGRAM

## 2024-10-21 PROCEDURE — 3044F HG A1C LEVEL LT 7.0%: CPT | Mod: CPTII,S$GLB,, | Performed by: STUDENT IN AN ORGANIZED HEALTH CARE EDUCATION/TRAINING PROGRAM

## 2024-10-21 PROCEDURE — 3074F SYST BP LT 130 MM HG: CPT | Mod: CPTII,S$GLB,, | Performed by: STUDENT IN AN ORGANIZED HEALTH CARE EDUCATION/TRAINING PROGRAM

## 2024-10-21 RX ORDER — CHOLECALCIFEROL (VITAMIN D3) 50 MCG
2000 TABLET ORAL DAILY
Qty: 30 TABLET | Refills: 11 | Status: SHIPPED | OUTPATIENT
Start: 2024-10-21 | End: 2025-10-16

## 2024-10-21 NOTE — PROGRESS NOTES
Subjective:        Patient ID: Josefina Coon is a 59 y.o. White female who presents for return patient evaluation for chronic renal failure.  She has a pertinent past medical history of diabetes type 2, hypertension, history of invasive ductal carcinoma of the left breast diagnosis September 2022 managed with a regimen of Taxol and Herceptin from October to December 2022 with follow up radiation.  She presents for ongoing evaluation and management of chronic kidney disease stage IIIB.    Interval history:  03/25/2024 clinic visit- presented today for ongoing evaluation and management of chronic kidney disease.  She feels well today he denies any acute complaints.  We discussed her recent lab results at chair side.  She was recently taken off angiotensin receptor blocker due to recurrent issues with hypotension.  She has no uremic or urinary symptoms and is in her usual state of health.      10/21/24 -   Here today for CKD f/u. She is doing well today and denies acute complaints. Her renal fxn based on sCr trend looks stable. She has been taken off SGLT2-I in the interim d/t recurrent UTI. She remains on GLP1a, Non-steroidal Mineralocorticoid Receptor Antagonist, and stable dose of ARB for GDMT of CKD.      Review of Systems   Constitutional:  Negative for chills, diaphoresis and fever.   Respiratory:  Negative for cough and shortness of breath.    Cardiovascular:  Negative for chest pain and leg swelling.   Gastrointestinal:  Negative for abdominal pain, diarrhea, nausea and vomiting.   Genitourinary:  Negative for difficulty urinating, dysuria and hematuria.   Musculoskeletal:  Negative for myalgias.   Neurological:  Negative for headaches.   Hematological:  Does not bruise/bleed easily.   All other systems reviewed and are negative.        The past medical, family and social histories were reviewed for this encounter.         Objective:   /64 (BP Location: Right forearm, Patient Position: Sitting)    "Pulse 78   Ht 5' 4" (1.626 m)   SpO2 97%   BMI 55.96 kg/m²        Physical Exam  Vitals reviewed.   Constitutional:       General: She is not in acute distress.     Appearance: Normal appearance. She is obese.   Cardiovascular:      Rate and Rhythm: Normal rate and regular rhythm.      Pulses: Normal pulses.      Heart sounds: Normal heart sounds.      No friction rub.   Pulmonary:      Effort: Pulmonary effort is normal. No respiratory distress.      Breath sounds: Normal breath sounds.   Abdominal:      General: Abdomen is protuberant.      Palpations: Abdomen is soft.   Musculoskeletal:         General: No swelling.      Cervical back: Normal range of motion. No tenderness.      Right lower leg: No edema.      Left lower leg: No edema.   Neurological:      General: No focal deficit present.      Mental Status: She is oriented to person, place, and time.      Motor: No weakness.   Psychiatric:         Mood and Affect: Mood normal.         Behavior: Behavior normal.         Assessment:     Lab Results   Component Value Date    CREATININE 1.44 (H) 09/10/2024    CREATININE 1.44 (H) 09/10/2024    BUN 29 (H) 09/10/2024    BUN 29 (H) 09/10/2024     09/10/2024     09/10/2024    K 4.5 09/10/2024    K 4.5 09/10/2024     09/10/2024     09/10/2024    CO2 26 09/10/2024    CO2 26 09/10/2024     Lab Results   Component Value Date    PTH 58.7 09/10/2024    CALCIUM 10.0 09/10/2024    CALCIUM 10.0 09/10/2024    PHOS 4.4 09/10/2024     Lab Results   Component Value Date    HCT 39.0 08/02/2024     Prot/Creat Ratio, Urine   Date Value Ref Range Status   09/10/2024 90.5 10.0 - 107.0 mg/g Final   03/14/2024 0.20 0.00 - 0.20 Final   09/15/2023 0.20 0.00 - 0.20 Final      Lab Results   Component Value Date    MICALBCREAT 27.3 09/10/2024    MICALBCREAT 30 (H) 08/01/2024    MICALBCREAT 72.6 (H) 06/23/2023           1. Stage 3b chronic kidney disease    2. Type 2 diabetes mellitus with stage 3b chronic kidney " disease, with long-term current use of insulin    3. Essential hypertension    4. Obstructive sleep apnea (adult) (pediatric)    5. Paroxysmal atrial fibrillation    6. Morbid obesity    7. Hypovitaminosis D          Plan:   Return to clinic in 6 months.  Labs for next visit include PTH, RFP, UPCR.  Baseline creatinine is 1.2-1.4mg/dL.    CKD IIIB A2  -risk from DM type II, HTN, obesity  -on RASI, GLP1a, NRA for CKD-MACE risk reduction, proteinuria reduction and trent-protection  -low risk of renal progression based on KFRE model.  -continue discussion and adjustment of modifiable risk factors. Educated patient on the importance of BP, glycemic and lipid control, weight loss.    DM type II - per PCP. Did not tolerate SGLT2-I    Hypertension - controlled on the current regiment at this time, including low dose RASI    Obesity - We discussed making lifestyle changes and using a Mediterranean diet for health benefits and weight loss.  This diet also is beneficial in improving HTN, DM, protein in urine as well as kidney function. aerobic exercise at least 3x weekly as tolerated    JASON - per CPAP prescription    Hyperuricemia - monitoring. Reviewed diet and will decrease sweets/fructose sources. No Gout history. Will avoid allopurinol for now unless worsens further or develops GOUT episode. (EBM does not show change in DKD outcomes). Taking cranberry extract      Murphy Cutler MD  [facility]    KFRE 2-Year: 0.7% at 9/10/2024 10:31 AM  Calculated from:  Serum Creatinine: 1.44 mg/dL at 9/10/2024 10:24 AM  Urine Albumin Creatinine Ratio: 27.3 ug/mg at 9/10/2024 10:31 AM  Age: 59 years  Sex: Female at 9/10/2024 10:31 AM  Has CKD-3 to CKD-5: Yes    KFRE 5-Year: 2.1% at 9/10/2024 10:31 AM  Calculated from:  Serum Creatinine: 1.44 mg/dL at 9/10/2024 10:24 AM  Urine Albumin Creatinine Ratio: 27.3 ug/mg at 9/10/2024 10:31 AM  Age: 59 years  Sex: Female at 9/10/2024 10:31 AM  Has CKD-3 to CKD-5: Yes

## 2024-11-19 ENCOUNTER — TELEPHONE (OUTPATIENT)
Dept: CARDIOLOGY | Facility: CLINIC | Age: 59
End: 2024-11-19
Payer: COMMERCIAL

## 2024-11-20 ENCOUNTER — PATIENT MESSAGE (OUTPATIENT)
Dept: RADIATION ONCOLOGY | Facility: CLINIC | Age: 59
End: 2024-11-20
Payer: COMMERCIAL

## 2025-01-07 ENCOUNTER — TELEPHONE (OUTPATIENT)
Dept: CARDIOLOGY | Facility: CLINIC | Age: 60
End: 2025-01-07
Payer: COMMERCIAL

## 2025-01-07 NOTE — TELEPHONE ENCOUNTER
Spoke with pt. I informed pt per Dr. Tamayo,     As long as no significant chest pain or shortness of breath with exertion, patient is at acceptable risk to proceed from a Cardiology standpoint.     No evidence seen in Epic of DCCV within the last month.     Okay to hold Eliquis 48 hours prior to procedure, restart as soon as safe postprocedure.     -Roni     Pt has no c/o chest pain or SOB. Pt has VU blood thinner instructions.

## 2025-01-07 NOTE — TELEPHONE ENCOUNTER
----- Message from Star sent at 1/7/2025 12:52 PM CST -----  Regarding: return call  Contact: patient  Type:  Patient Returning Call    Who Called:patient  Who Left Message for Patient:  Does the patient know what this is regarding?:returning office call  Would the patient rather a call back or a response via [facility]?   Best Call Back Number:[phone]  Additional Information:

## 2025-01-13 ENCOUNTER — OFFICE VISIT (OUTPATIENT)
Dept: HEMATOLOGY/ONCOLOGY | Facility: CLINIC | Age: 60
End: 2025-01-13
Payer: COMMERCIAL

## 2025-01-13 ENCOUNTER — LAB VISIT (OUTPATIENT)
Dept: LAB | Facility: HOSPITAL | Age: 60
End: 2025-01-13
Attending: INTERNAL MEDICINE
Payer: COMMERCIAL

## 2025-01-13 VITALS
WEIGHT: 293 LBS | OXYGEN SATURATION: 98 % | SYSTOLIC BLOOD PRESSURE: 130 MMHG | BODY MASS INDEX: 50.02 KG/M2 | TEMPERATURE: 98 F | DIASTOLIC BLOOD PRESSURE: 81 MMHG | HEIGHT: 64 IN | RESPIRATION RATE: 16 BRPM | HEART RATE: 71 BPM

## 2025-01-13 DIAGNOSIS — M85.80 OSTEOPENIA, UNSPECIFIED LOCATION: ICD-10-CM

## 2025-01-13 DIAGNOSIS — Z79.811 LONG TERM (CURRENT) USE OF AROMATASE INHIBITORS: ICD-10-CM

## 2025-01-13 DIAGNOSIS — E66.01 MORBID OBESITY: ICD-10-CM

## 2025-01-13 DIAGNOSIS — C50.912 INVASIVE DUCTAL CARCINOMA OF BREAST, FEMALE, LEFT: Primary | ICD-10-CM

## 2025-01-13 DIAGNOSIS — E11.9 TYPE 2 DIABETES MELLITUS WITHOUT COMPLICATION, WITHOUT LONG-TERM CURRENT USE OF INSULIN: ICD-10-CM

## 2025-01-13 DIAGNOSIS — R45.89 ANXIETY ABOUT HEALTH: ICD-10-CM

## 2025-01-13 DIAGNOSIS — I48.91 UNSPECIFIED ATRIAL FIBRILLATION: ICD-10-CM

## 2025-01-13 DIAGNOSIS — N95.1 MENOPAUSAL SYMPTOMS: ICD-10-CM

## 2025-01-13 PROBLEM — L27.0 DRUG-INDUCED SKIN RASH: Status: RESOLVED | Noted: 2022-10-23 | Resolved: 2025-01-13

## 2025-01-13 LAB
25(OH)D3+25(OH)D2 SERPL-MCNC: 31 NG/ML (ref 30–96)
ALBUMIN SERPL BCP-MCNC: 3.4 G/DL (ref 3.5–5.2)
ALP SERPL-CCNC: 91 U/L (ref 40–150)
ALT SERPL W/O P-5'-P-CCNC: 26 U/L (ref 10–44)
ANION GAP SERPL CALC-SCNC: 10 MMOL/L (ref 8–16)
AST SERPL-CCNC: 17 U/L (ref 10–40)
BASOPHILS # BLD AUTO: 0.04 K/UL (ref 0–0.2)
BASOPHILS NFR BLD: 0.9 % (ref 0–1.9)
BILIRUB SERPL-MCNC: 0.3 MG/DL (ref 0.1–1)
BUN SERPL-MCNC: 23 MG/DL (ref 8–23)
CALCIUM SERPL-MCNC: 9.6 MG/DL (ref 8.7–10.5)
CHLORIDE SERPL-SCNC: 107 MMOL/L (ref 95–110)
CO2 SERPL-SCNC: 21 MMOL/L (ref 23–29)
CREAT SERPL-MCNC: 1.3 MG/DL (ref 0.5–1.4)
DIFFERENTIAL METHOD BLD: ABNORMAL
EOSINOPHIL # BLD AUTO: 0.2 K/UL (ref 0–0.5)
EOSINOPHIL NFR BLD: 3.7 % (ref 0–8)
ERYTHROCYTE [DISTWIDTH] IN BLOOD BY AUTOMATED COUNT: 12.5 % (ref 11.5–14.5)
EST. GFR  (NO RACE VARIABLE): 46.5 ML/MIN/1.73 M^2
GLUCOSE SERPL-MCNC: 120 MG/DL (ref 70–110)
HCT VFR BLD AUTO: 37 % (ref 37–48.5)
HGB BLD-MCNC: 12.4 G/DL (ref 12–16)
IMM GRANULOCYTES # BLD AUTO: 0.03 K/UL (ref 0–0.04)
IMM GRANULOCYTES NFR BLD AUTO: 0.6 % (ref 0–0.5)
LYMPHOCYTES # BLD AUTO: 1.7 K/UL (ref 1–4.8)
LYMPHOCYTES NFR BLD: 36.8 % (ref 18–48)
MCH RBC QN AUTO: 31.9 PG (ref 27–31)
MCHC RBC AUTO-ENTMCNC: 33.5 G/DL (ref 32–36)
MCV RBC AUTO: 95 FL (ref 82–98)
MONOCYTES # BLD AUTO: 0.3 K/UL (ref 0.3–1)
MONOCYTES NFR BLD: 7.3 % (ref 4–15)
NEUTROPHILS # BLD AUTO: 2.4 K/UL (ref 1.8–7.7)
NEUTROPHILS NFR BLD: 50.7 % (ref 38–73)
NRBC BLD-RTO: 1 /100 WBC
PLATELET # BLD AUTO: 214 K/UL (ref 150–450)
PMV BLD AUTO: 9.2 FL (ref 9.2–12.9)
POTASSIUM SERPL-SCNC: 4.7 MMOL/L (ref 3.5–5.1)
PROT SERPL-MCNC: 7.5 G/DL (ref 6–8.4)
RBC # BLD AUTO: 3.89 M/UL (ref 4–5.4)
SODIUM SERPL-SCNC: 138 MMOL/L (ref 136–145)
WBC # BLD AUTO: 4.65 K/UL (ref 3.9–12.7)

## 2025-01-13 PROCEDURE — 99214 OFFICE O/P EST MOD 30 MIN: CPT | Mod: S$GLB,,, | Performed by: INTERNAL MEDICINE

## 2025-01-13 PROCEDURE — 85025 COMPLETE CBC W/AUTO DIFF WBC: CPT | Mod: PN | Performed by: INTERNAL MEDICINE

## 2025-01-13 PROCEDURE — 82306 VITAMIN D 25 HYDROXY: CPT | Performed by: INTERNAL MEDICINE

## 2025-01-13 PROCEDURE — 99999 PR PBB SHADOW E&M-EST. PATIENT-LVL V: CPT | Mod: PBBFAC,,, | Performed by: INTERNAL MEDICINE

## 2025-01-13 PROCEDURE — G2211 COMPLEX E/M VISIT ADD ON: HCPCS | Mod: S$GLB,,, | Performed by: INTERNAL MEDICINE

## 2025-01-13 PROCEDURE — 3079F DIAST BP 80-89 MM HG: CPT | Mod: CPTII,S$GLB,, | Performed by: INTERNAL MEDICINE

## 2025-01-13 PROCEDURE — 80053 COMPREHEN METABOLIC PANEL: CPT | Mod: PN | Performed by: INTERNAL MEDICINE

## 2025-01-13 PROCEDURE — 36415 COLL VENOUS BLD VENIPUNCTURE: CPT | Mod: PN | Performed by: INTERNAL MEDICINE

## 2025-01-13 PROCEDURE — 3008F BODY MASS INDEX DOCD: CPT | Mod: CPTII,S$GLB,, | Performed by: INTERNAL MEDICINE

## 2025-01-13 PROCEDURE — 3075F SYST BP GE 130 - 139MM HG: CPT | Mod: CPTII,S$GLB,, | Performed by: INTERNAL MEDICINE

## 2025-01-13 PROCEDURE — 1159F MED LIST DOCD IN RCRD: CPT | Mod: CPTII,S$GLB,, | Performed by: INTERNAL MEDICINE

## 2025-01-13 RX ORDER — ANASTROZOLE 1 MG/1
1 TABLET ORAL DAILY
Qty: 90 TABLET | Refills: 3 | Status: SHIPPED | OUTPATIENT
Start: 2025-01-13 | End: 2026-01-13

## 2025-01-13 RX ORDER — ACETAMINOPHEN 500 MG
2000 TABLET ORAL
COMMUNITY
Start: 2024-10-21

## 2025-01-13 RX ORDER — METOPROLOL SUCCINATE 50 MG/1
50 TABLET, EXTENDED RELEASE ORAL
Qty: 90 TABLET | Refills: 1 | Status: SHIPPED | OUTPATIENT
Start: 2025-01-13

## 2025-01-13 NOTE — PROGRESS NOTES
PROGRESS NOTE    Subjective:       Patient ID: Josefina Cono is a 60 y.o. female.  MRN: [id number]  : 1965    Chief Complaint: Follow-up (Invasive ductal carcinoma of breast, female, left)      History of Present Illness:   Josefina Coon is a 60 y.o. female who presents with invasive ductal carcinoma of the left breast, triple positivie.   Treated by Dr. Jaquez.    Neoadjuvant TCHP with pCR   XRT completed 23 & Arimidex started thereafter inn 2023.  She completed adjuvant Herceptin and Perjeta on 10/5/23.     Interim history: 2025  She present to the office by herself.   She remains on anastrozole. The hot flushes are less intense.    The patient denies CP, cough, SOB, abdominal pain, nausea, vomiting, constipation.  The patient denies fever, chills, night sweats, weight loss, new lumps or bumps, easy bruising or bleeding.      Oncology History:  Oncology History   Invasive ductal carcinoma of breast, female, left   2022 Initial Diagnosis    Invasive ductal carcinoma of breast, female, left     2022 Cancer Staged    Staging form: Breast, AJCC 8th Edition  - Clinical stage from 2022: Stage IA (cT1c, cN0(f), cM0, G3, ER+, HI+, HER2+)     2022 - 2022 Chemotherapy    Treatment Summary   Plan Name: OP BREAST TRASTUZUMAB PACLITAXEL WEEKLY  Treatment Goal: Curative  Status: Inactive  Start Date:   End Date:   Provider: Lisa Jaquez MD  Chemotherapy: PACLitaxeL (TAXOL) 80 mg/m2 = 204 mg in sodium chloride 0.9% 250 mL chemo infusion, 80 mg/m2 = 204 mg, Intravenous, Clinic/HOD 1 time, 0 of 12 cycles  pertuzumab (PERJETA) 840 mg in sodium chloride 0.9% 278 mL infusion, 840 mg (original dose ), Intravenous, Clinic/HOD 1 time, 0 of 17 cycles  Dose modification: 840 mg (Cycle 1, Reason: Other (see comments)), 420 mg (Cycle 4, Reason: Other (see comments))  trastuzumab-dkst (OGIVRI) 591 mg in sodium chloride 0.9% 250 mL  chemo infusion, 4 mg/kg = 591 mg, Intravenous, Clinic/HOD 1 time, 0 of 25 cycles     10/7/2022 - 10/5/2023 Chemotherapy    Treatment Summary   Plan Name: OP BREAST PACLITAXEL TRASTUZUMAB WEEKLY WITH PERTUZUMAB Q3W (THP)  Treatment Goal: Control  Status: Inactive  Start Date: 10/7/2022  End Date: 10/5/2023  Provider: Joelle Vásquez MD  Chemotherapy: PACLitaxeL (TAXOL) 80 mg/m2 = 210 mg in sodium chloride 0.9% 250 mL chemo infusion, 80 mg/m2 = 210 mg, Intravenous, Clinic/HOD 1 time, 4 of 4 cycles  Administration: 210 mg (10/7/2022), 204 mg (10/14/2022), 204 mg (10/28/2022), 204 mg (11/4/2022), 204 mg (10/21/2022), 204 mg (11/11/2022), 204 mg (11/18/2022), 204 mg (11/25/2022), 198 mg (12/9/2022), 204 mg (12/2/2022), 198 mg (12/16/2022), 198 mg (12/23/2022)  pertuzumab (PERJETA) 840 mg in sodium chloride 0.9% 278 mL infusion, 840 mg, Intravenous, Clinic/HOD 1 time, 18 of 18 cycles  Administration: 840 mg (10/7/2022), 420 mg (10/28/2022), 420 mg (11/18/2022), 420 mg (12/9/2022), 420 mg (12/30/2022), 420 mg (1/20/2023), 420 mg (2/17/2023), 420 mg (3/10/2023), 420 mg (3/31/2023), 420 mg (4/21/2023), 420 mg (5/12/2023), 420 mg (6/2/2023), 420 mg (6/23/2023), 420 mg (7/14/2023), 420 mg (8/4/2023), 420 mg (8/25/2023), 420 mg (9/15/2023), 420 mg (10/5/2023)  trastuzumab-dkst (OGIVRI) 570 mg in sodium chloride 0.9% 250 mL chemo infusion, 593 mg (100 % of original dose 4 mg/kg), Intravenous, Clinic/HOD 1 time, 18 of 18 cycles  Dose modification: 4 mg/kg (original dose 4 mg/kg, Cycle 1, Reason: Other (see comments), Comment: weekly trastuzumab), 2 mg/kg (original dose 2 mg/kg, Cycle 2, Reason: Other (see comments), Comment: weekly maintenance dose), 6 mg/kg (original dose 2 mg/kg, Cycle 2, Reason: MD Discretion, Comment: change to q3w dosing), 2 mg/kg (original dose 2 mg/kg, Cycle 1, Reason: Other (see comments), Comment: maintenance weekly dose)  Administration: 570 mg (10/7/2022), 840 mg (10/28/2022), 288 mg (10/14/2022), 290  mg (10/21/2022), 840 mg (11/18/2022), 831 mg (12/9/2022), 831 mg (12/30/2022), 720 mg (1/20/2023), 806 mg (2/17/2023), 750 mg (3/10/2023), 814 mg (3/31/2023), 750 mg (4/21/2023), 750 mg (5/12/2023), 750 mg (6/2/2023), 750 mg (6/23/2023), 750 mg (7/14/2023), 750 mg (8/4/2023), 750 mg (8/25/2023), 750 mg (9/15/2023), 750 mg (10/5/2023)     2/13/2023 Cancer Staged    Staging form: Breast, AJCC 8th Edition  - Pathologic stage from 2/13/2023: No Stage Recommended (ypT0, pN0(sn), cM0, GX)     3/14/2023 - 4/14/2023 Radiation Therapy    Treating physician: Shelia Trevino  Total Dose: 52.56 Gy (42.56Gy/16 fractions to whole breast; 10Gy/5 fraction boost)  Fractions: 20  Treatment Site Ref. ID Energy Dose/Fx (Gy) #Fx Dose Correction (Gy) Total Dose (Gy) Start Date End Date Elapsed Days   3D Breast_L NormEZCalc 18X 2.66 16 / 16 0 42.56 3/14/2023 4/6/2023 23   3d BrstBst_L NormBst 18X 2.5 4 / 4 0 10 4/10/2023 4/14/2023 4        8/25/22 bilateral screening mammogram,,Right neg ,Left central post mass     9/8/22 left diag MMG, left US limited .Left 0300 lateral posterior 6cm FN 1.4cm mass , left axilla LN 2, up to 4mm cortex     9/14/22 left 0300 lateral posterior 6cm FN 1.4cm mass US biopsy .Grade 3 ,1.1cm in biopsy No LVI , ER 84% OK 28% Her 2 3+ pos Ki 52 %    Left axilla LN biopsy ;Benign fatty tissue, no LN, not concordant No MRI of breasts were done      9/21/22 US bilateral complete Right neg, right LN nml , Left 0300 6cm FN [id number] mass Borderline LN left axilla     9/21/22 Left axilla LN biopsy benign LN , so eventually Stage IA triple positive Breast cancer     10/7/22: started neoadjuvant chemo with Taxol + dual HER-2 (tranztuzumab and pertuzumab)     Receiving treatment with Paclitaxel/Trastuzumab/Pertuzumab on D1, weekly Paclitaxel on D8, D15 of a 21 day cycle. Completed weekly Paclitaxel on 12/23/22.     2/2023: s/p B/L mastectomy with CPR;ypT0,pN0,cM0       History:  Past Medical History:   Diagnosis Date     Abnormal liver enzymes     Asymptomatic varicose veins     Breast cancer 2022    chemo 10/2022-2022; radiation 3/2023 6 weeks; immunotherapy 10/2022-10/5/2023    Cancer     left breast cancer    Depressive disorder, not elsewhere classified     Dyslipidemia     Embolism and thrombosis of unspecified site     superficial venous thrombosis    Encounter for long-term (current) use of other medications     Family history of ischemic heart disease     Fatty liver     Generalized anxiety disorder     History of chicken pox     Obstructive sleep apnea (adult) (pediatric)     Type II or unspecified type diabetes mellitus without mention of complication, not stated as uncontrolled     Unspecified essential hypertension     Unspecified venous (peripheral) insufficiency     Unspecified vitamin D deficiency       Past Surgical History:   Procedure Laterality Date    BREAST BIOPSY Left 2022    idc    BREAST BIOPSY Left 2022    neg ln    BREAST BIOPSY Left 2022    neg ln    BREAST LUMPECTOMY Left 2023    2 lymph nodes removed     SECTION      COLONOSCOPY      ESOPHAGOGASTRODUODENOSCOPY      INSERTION OF TUNNELED CENTRAL VENOUS CATHETER (CVC) WITH SUBCUTANEOUS PORT Right 10/04/2022    Procedure: [id number];  Surgeon: Dominick Ng MD;  Location: [facility] OR;  Service: General;  Laterality: Right;    LUMPECTOMY, WITH RADAR LOCALIZATION USING TR[place]  Left 2023    Procedure: LUMPECTOMY,WITH RADAR LOCALIZATION USING [place] ;  Surgeon: Maria G Mcduffie MD;  Location: [facility] OR;  Service: General;  Laterality: Left;    SENTINEL LYMPH NODE BIOPSY Left 2023    Procedure: BIOPSY, LYMPH NODE, SENTINEL;  Surgeon: Maria G Mcduffie MD;  Location: [facility] OR;  Service: General;  Laterality: Left;    TRANSESOPHAGEAL ECHOCARDIOGRAM WITH POSSIBLE CARDIOVERSION (LAUREL W/ POSS CARDIOVERSION) N/A 2023    Procedure: TRANSESOPHAGEAL ECHOCARDIOGRAM WITH POSSIBLE CARDIOVERSION  (LAUREL W/ POSS CARDIOVERSION);  Surgeon: Roni Frias MD;  Location: [facility];  Service: Cardiology;  Laterality: N/A;    VEIN SURGERY      [place], Dr. Larsen     Family History   Problem Relation Name Age of Onset    Hyperlipidemia Mother      Hypertension Mother      Vulvar Cancer Mother      Dementia Mother      Atrial fibrillation Father      Parkinsonism Father      Diabetes Brother      Cancer Brother          colon    Hypertension Son      Hyperlipidemia Son      Anxiety disorder Son      Stroke Maternal Grandmother       Social History     Tobacco Use    Smoking status: Never     Passive exposure: Never    Smokeless tobacco: Never   Substance and Sexual Activity    Alcohol use: Not Currently     Comment: rare     Drug use: Never    Sexual activity: Not Currently        ROS:   Review of Systems   Constitutional:  Negative for fever, malaise/fatigue and weight loss.   HENT:  Negative for congestion, hearing loss, nosebleeds and sore throat.    Eyes:  Negative for double vision and photophobia.   Respiratory:  Negative for cough, hemoptysis, sputum production, shortness of breath and wheezing.    Cardiovascular:  Negative for chest pain, palpitations, orthopnea and leg swelling.   Gastrointestinal:  Negative for abdominal pain, blood in stool, constipation, diarrhea, heartburn, nausea and vomiting.   Genitourinary:  Negative for dysuria, hematuria and urgency.   Musculoskeletal:  Negative for back pain, joint pain and myalgias.   Skin:  Negative for itching and rash.   Neurological:  Positive for tingling. Negative for dizziness, seizures, weakness and headaches.   Endo/Heme/Allergies:  Negative for polydipsia. Does not bruise/bleed easily.   Psychiatric/Behavioral:  Negative for depression and memory loss. The patient is nervous/anxious. The patient does not have insomnia.         Objective:     Vitals:    01/13/25 1256   BP: 130/81   Pulse: 71   Resp: 16   Temp: 97.7 °F (36.5 °C)   TempSrc: Temporal  "  SpO2: 98%   Weight: (!) 151.4 kg (333 lb 12.4 oz)   Height: 5' 4" (1.626 m)   PainSc: 0-No pain         Wt Readings from Last 10 Encounters:   01/13/25 (!) 151.4 kg (333 lb 12.4 oz)   11/15/24 (!) 147.4 kg (324 lb 15.3 oz)   10/25/24 (!) 147.4 kg (325 lb)   09/20/24 (!) 147.9 kg (326 lb)   09/09/24 (!) 148 kg (326 lb 4.5 oz)   08/13/24 (!) 145.2 kg (320 lb)   08/12/24 (!) 146.5 kg (322 lb 15.6 oz)   08/01/24 (!) 146.1 kg (322 lb)   05/02/24 (!) 144 kg (317 lb 7.4 oz)   03/25/24 (!) 141.3 kg (311 lb 8.2 oz)       Physical Examination:   Physical Exam  Vitals and nursing note reviewed.   Constitutional:       General: She is not in acute distress.     Appearance: She is not diaphoretic.   HENT:      Head: Normocephalic.      Mouth/Throat:      Pharynx: No oropharyngeal exudate.   Eyes:      General: No scleral icterus.     Conjunctiva/sclera: Conjunctivae normal.   Neck:      Thyroid: No thyromegaly.   Cardiovascular:      Rate and Rhythm: Normal rate and regular rhythm.      Heart sounds: Normal heart sounds. No murmur heard.  Pulmonary:      Effort: Pulmonary effort is normal. No respiratory distress.      Breath sounds: No stridor. No wheezing or rales.   Chest:      Chest wall: No tenderness.      Comments: Post surgical changes to the left breast. No other breat mass or axillary adenopathy   Abdominal:      General: Bowel sounds are normal. There is no distension.      Palpations: Abdomen is soft. There is no mass.      Tenderness: There is no abdominal tenderness. There is no rebound.   Musculoskeletal:         General: No tenderness or deformity. Normal range of motion.      Cervical back: Neck supple.   Lymphadenopathy:      Cervical: No cervical adenopathy.   Skin:     General: Skin is warm and dry.      Findings: No erythema or rash.   Neurological:      Mental Status: She is alert and oriented to person, place, and time.      Cranial Nerves: No cranial nerve deficit.      Coordination: Coordination " normal.      Gait: Gait is intact.   Psychiatric:         Mood and Affect: Affect normal.         Cognition and Memory: Memory normal.         Judgment: Judgment normal.          Diagnostic Tests:  Significant Imaging: I have reviewed and interpreted all pertinent imaging results/findings.      Laboratory Data:  All pertinent labs have been reviewed.  Labs:   Lab Results   Component Value Date    WBC 4.65 01/13/2025    RBC 3.89 (L) 01/13/2025    HGB 12.4 01/13/2025    HCT 37.0 01/13/2025    MCV 95 01/13/2025     01/13/2025     (H) 01/13/2025     01/13/2025    K 4.7 01/13/2025    BUN 23 01/13/2025    CREATININE 1.3 01/13/2025    AST 17 01/13/2025    ALT 26 01/13/2025    BILITOT 0.3 01/13/2025     ECOG SCORE    1 - Restricted in strenuous activity-ambulatory and able to carry out work of a light nature          Assessment/Plan:   Invasive ductal carcinoma of breast, female, left  - cT1c triple positive breast cancer  (ER 84% AL 28% Her 2 3+ pos Ki 52 %), given her young age and Ki67%, will proceed with TH, adding P to help with a better pCR, will also plan to get ultrasound mid cycle to monitor (3 months)  -9/26/22 Echo with EF 60%; next is scheduled for 01/18/23  -Began TH+P on 10/7/2022; completed 12 weeks of Paclitaxel 12/23/22  - 2/2023; s/p lupmectomy CPR;ypT0,pN0,cM0  -completed Trastuzumab/Pertuzumab   -  adjuvant radiation plan for 16 fraction total - completed 04/13/23  - last period  around~ 50,, post menopausal , on anastrazole  June 2nd /2023   -Normal mammogram in October 25, 2024, repeat in one year.   -CBC CMP on 1/13/2025 were WNL. Vitamin D is pending.  -Continue active surveillance. -f/up in 6  months.      Long term use of Aromatase inhibitors:  See as above    Hot flashes   Stable to improved  declined pharmacologic interventions.       Anxiety about health  On Wellbutrin.   Follows up with a therapist.     Osteopenia, unspecified location  DEXA scan done 6/23/2023 showed  osteopenia.   We will repeat bone density in 2025  cont taking calcium and Vit D    RECOMMENDED LIFESTYLE MODIFICATIONS FOR BREAST CANCER PATIENTS:      Reviewed Lifestyle modifications which have shown benefit:  Limit alcohol consumption to less than 1 drink per day (1 ounce liquor, 6 oz wine, 8 oz beer) and nor more than 3 drinks per week.   Avoid smoking.  Exercise at least 150 minutes per week of moderate intensity aerobic activity or at least 75 minutes of vigorous activity. Exercise can lower the relative risk of breast cancer by ~18-20%.  Maintain healthy weight and avoid post-menopausal weight gain. Avoid processed foods and eat more lean proteins, fruits and vegetables.                         Plan was discussed with the patient at length, and she verbalized understanding. Josefina was given an opportunity to ask questions that were answered to her satisfaction, and she was advised to call in the interval if any problems or questions arise.    Electronically signed by Renetta Holder MD      Route Chart for Scheduling    Med Onc Chart Routing      Follow up with physician    Follow up with BRANDIE 6 months. with repeat bone density in 6/2025   Infusion scheduling note    Injection scheduling note    Labs    Imaging    Pharmacy appointment    Other referrals

## 2025-01-22 RX ORDER — DRONEDARONE 400 MG/1
1 TABLET, FILM COATED ORAL 2 TIMES DAILY WITH MEALS
Qty: 180 TABLET | Refills: 0 | OUTPATIENT
Start: 2025-01-22

## 2025-01-23 DIAGNOSIS — I48.91 ATRIAL FIBRILLATION, UNSPECIFIED TYPE: Primary | ICD-10-CM

## 2025-01-23 RX ORDER — DRONEDARONE 400 MG/1
1 TABLET, FILM COATED ORAL 2 TIMES DAILY WITH MEALS
Qty: 180 TABLET | Refills: 0 | Status: SHIPPED | OUTPATIENT
Start: 2025-01-23

## 2025-01-23 NOTE — TELEPHONE ENCOUNTER
----- Message from Rina sent at 1/23/2025 11:14 AM CST -----  Type:  RX Refill Request    Who Called: pt    Refill or New Rx:refill    RX Name and Strength:dronedarone (MULTAQ) 400 mg Tab    How is the patient currently taking it? (ex. 1XDay):as directed, 2 tablets daily    Is this a 30 day or 90 day RX: 90    Preferred Pharmacy with phone number:  [id number] - [place], [place] - [address] [facility] [address]  [address] [address]  Phone: [phone] Fax: [phone]        Local or Mail Order:local    Ordering Provider:Dr Landry    Would the patient rather a call back or a response via [facility]? Call back    Best Call Back Number:[phone]    Additional Information: Read in the portal that her Rx was denied and her PCP gave her 5 tabs to hold her over and will be out by tomorrow, pt has been trying to reach the office and has not been successful. Needs a refilll.    Please call back to advise. Thanks!

## 2025-02-13 ENCOUNTER — OFFICE VISIT (OUTPATIENT)
Dept: CARDIOLOGY | Facility: CLINIC | Age: 60
End: 2025-02-13
Payer: COMMERCIAL

## 2025-02-13 VITALS
BODY MASS INDEX: 50.02 KG/M2 | WEIGHT: 293 LBS | SYSTOLIC BLOOD PRESSURE: 134 MMHG | DIASTOLIC BLOOD PRESSURE: 74 MMHG | HEIGHT: 64 IN

## 2025-02-13 DIAGNOSIS — I10 ESSENTIAL HYPERTENSION: ICD-10-CM

## 2025-02-13 DIAGNOSIS — I48.0 PAROXYSMAL ATRIAL FIBRILLATION: ICD-10-CM

## 2025-02-13 DIAGNOSIS — E11.9 TYPE 2 DIABETES MELLITUS WITHOUT COMPLICATION, WITHOUT LONG-TERM CURRENT USE OF INSULIN: ICD-10-CM

## 2025-02-13 DIAGNOSIS — E78.2 MIXED HYPERLIPIDEMIA: ICD-10-CM

## 2025-02-13 DIAGNOSIS — T45.1X5A CHEMOTHERAPY INDUCED CARDIOMYOPATHY: Primary | ICD-10-CM

## 2025-02-13 DIAGNOSIS — I42.7 CHEMOTHERAPY INDUCED CARDIOMYOPATHY: Primary | ICD-10-CM

## 2025-02-13 DIAGNOSIS — N18.32 STAGE 3B CHRONIC KIDNEY DISEASE: ICD-10-CM

## 2025-02-13 DIAGNOSIS — I87.2 VENOUS (PERIPHERAL) INSUFFICIENCY: ICD-10-CM

## 2025-02-13 PROCEDURE — 99999 PR PBB SHADOW E&M-EST. PATIENT-LVL III: CPT | Mod: PBBFAC,,,

## 2025-02-13 NOTE — PROGRESS NOTES
[facility]  Date: 2/13/25    Patient: Josefina Coon, 1965, [id number]  Primary Care Provider: Raya Echols MD     Chief Complaint: Follow up     Subjective:       Josefina Coon is a 60 y.o. female who presents for follow up. They follow with Dr. Frias.    CBC, CMP, lipid panel stable. At last office visit, patient doing well from cardiac standpoint for which medications were continued as is.      Since then, patient reports few, brief episodes of AF () with palpitations. Last episodes occurred during [employer] and New Years. Required use of additional metoprolol 1-2 times within the last year. Unsure of average SBP at home. Weight stable. Notes 2 mechanical falls within the last 1.5 years. Denies chest discomfort, dyspnea, dizziness, syncope, orthopnea, PND, edema.    Focused Past History includes:  Chemotherapy induced cardiomyopathy  Nuclear stress 3/2020: no ischemia or injury  TTE 8/2023: LVEF 60%, mod LAE, trace MR, trace TR, PASP 33  Paroxysmal atrial fibrillation s/p LAUREL/DCCV 7/2023  Event monitor  4/2024: 1% AF (138 bpm)  Venous insufficiency   Hypertension  Hyperlipidemia   Type 2 diabetes mellitus   Chronic kidney disease   JASON on CPAP  Invasive ductal carcinoma of L breast s/p left lumpectomy + chemo/XRT    Current Outpatient Medications   Medication Sig    albuterol (PROVENTIL/VENTOLIN HFA) 90 mcg/actuation inhaler Inhale 2 puffs into the lungs every 6 (six) hours as needed for Wheezing.    ALPRAZolam (XANAX) 0.25 MG tablet TAKE ONE TABLET BY MOUTH 1-2 TIMES DAILY AS NEEDED ANXIETY    anastrozole (ARIMIDEX) 1 mg Tab Take 1 tablet (1 mg total) by mouth once daily.    azelastine (ASTELIN) 137 mcg (0.1 %) nasal spray 2 sprays by Nasal route 2 (two) times daily as needed for Rhinitis.    blood-glucose sensor (FREESTYLE ANTONIO 3 SENSOR) Sara 2 each by Misc.(Non-Drug; Combo Route) route every 14 (fourteen) days.    buPROPion (WELLBUTRIN XL) 150 MG TB24  tablet TAKE 1 TABLET BY MOUTH EVERY DAY    cholecalciferol, vitamin D3, (VITAMIN D3) 50 mcg (2,000 unit) Cap capsule Take 2,000 Units by mouth.    cholecalciferol, vitamin D3, (VITAMIN D3) 50 mcg (2,000 unit) Tab Take 1 tablet (2,000 Units total) by mouth once daily.    cyanocobalamin 1,000 mcg/mL injection Inject 1 ml q 2 weeks x 1 month then 1 ml monthly    dronedarone (MULTAQ) 400 mg Tab Take 1 tablet (400 mg total) by mouth 2 (two) times daily with meals.    ELIQUIS 5 mg Tab TAKE 1 TABLET BY MOUTH TWICE A DAY    finerenone (KERENDIA) 20 mg Tab Take 1 tablet by mouth once daily.    meclizine (ANTIVERT) 12.5 mg tablet Take 1 tablet (12.5 mg total) by mouth 3 (three) times daily as needed for Dizziness.    metFORMIN (GLUCOPHAGE) 500 MG tablet TAKE 1 TABLET BY MOUTH TWICE A DAY WITH FOOD    metoprolol succinate (TOPROL-XL) 25 MG 24 hr tablet Take 25 mg by mouth.    metoprolol succinate (TOPROL-XL) 50 MG 24 hr tablet TAKE 1 TABLET BY MOUTH EVERY DAY    pravastatin (PRAVACHOL) 10 MG tablet TAKE 1 TABLET BY MOUTH EVERYDAY AT BEDTIME    semaglutide (OZEMPIC) 1 mg/dose (4 mg/3 mL) Inject 1 mg into the skin every 7 days.    telmisartan (MICARDIS) 20 MG Tab TAKE 1/2 TABLET BY MOUTH EVERY DAY     No current facility-administered medications for this visit.     Facility-Administered Medications Ordered in Other Visits   Medication    lactated ringers infusion    midazolam (VERSED) 1 mg/mL injection 2 mg            Objective       Review of Systems  Constitutional: negative for fevers, night sweats, and weight loss  Eyes: negative for visual disturbance, diplopia  Respiratory: negative for cough, hemoptysis, sputum, and wheezing  Cardiovascular: see HPI  Gastrointestinal: negative for abdominal pain, bright red blood per rectum, change in bowel habits, dysphagia, melena, and reflux symptoms  Genitourinary:negative for dysuria, frequency, and hematuria  Hematologic/lymphatic: negative for bleeding, easy bruising, and  lymphadenopathy  Musculoskeletal:negative for arthralgias, back pain, and myalgias  Neurological: negative for gait problems, paresthesia, speech problems, vertigo, and weakness  Behavioral/Psych: negative for excessive alcohol consumption, illegal drug usage, and sleep disturbance    -------------------------------------    Abnormal liver enzymes    Asymptomatic varicose veins    Breast cancer    chemo 10/2022-2022; radiation 3/2023 6 weeks; immunotherapy 10/2022-10/5/2023    Cancer    left breast cancer    Depressive disorder, not elsewhere classified    Dyslipidemia    Embolism and thrombosis of unspecified site    superficial venous thrombosis    Encounter for long-term (current) use of other medications    Family history of ischemic heart disease    Fatty liver    Generalized anxiety disorder    History of chicken pox    Obstructive sleep apnea (adult) (pediatric)    Type II or unspecified type diabetes mellitus without mention of complication, not stated as uncontrolled    Unspecified essential hypertension    Unspecified venous (peripheral) insufficiency    Unspecified vitamin D deficiency     ----------------------------    Breast biopsy    idc    Breast biopsy    neg ln    Breast biopsy    neg ln    Breast lumpectomy    2 lymph nodes removed     section    Colonoscopy    Esophagogastroduodenoscopy    Insertion of tunneled central venous catheter (cvc) with subcutaneous port    Procedure: [id number];  Surgeon: Dominick Ng MD;  Location: [facility];  Service: General;  Laterality: Right;    Lumpectomy, with radar localization using mike     Procedure: LUMPECTOMY,WITH RADAR LOCALIZATION USING MIKE ;  Surgeon: Maria G Mcduffie MD;  Location: [facility] OR;  Service: General;  Laterality: Left;    [place] lymph node biopsy    Procedure: BIOPSY, LYMPH NODE, SENTINEL;  Surgeon: Maria G Mcduffie MD;  Location: [facility] OR;  Service: General;  Laterality: Left;    Transesophageal  "echocardiogram with possible cardioversion (michel w/ poss cardioversion)    Procedure: TRANSESOPHAGEAL ECHOCARDIOGRAM WITH POSSIBLE CARDIOVERSION (MICHEL W/ POSS CARDIOVERSION);  Surgeon: Roni Frias MD;  Location: [facility];  Service: Cardiology;  Laterality: N/A;    Vein surgery    Laser, Dr. Larsen        Family History   Problem Relation Name Age of Onset    Hyperlipidemia Mother      Hypertension Mother      Vulvar Cancer Mother      Dementia Mother      Atrial fibrillation Father      Parkinsonism Father      Diabetes Brother      Cancer Brother          colon    Hypertension Son      Hyperlipidemia Son      Anxiety disorder Son      Stroke Maternal Grandmother       Social History     Tobacco Use    Smoking status: Never     Passive exposure: Never    Smokeless tobacco: Never   Substance Use Topics    Alcohol use: Not Currently     Comment: rare     Drug use: Never       Physical Exam  /74 (BP Location: Right forearm, Patient Position: Sitting)   Ht 5' 4" (1.626 m)   Wt (!) 151.4 kg (333 lb 12.4 oz)   BMI 57.29 kg/m²   Body surface area is 2.61 meters squared.  Body mass index is 57.29 kg/m².    General appearance: alert, appears stated age, cooperative, and no distress  Head: Normocephalic, without obvious abnormality, atraumatic  Neck: no carotid bruit, no JVD, and supple, symmetrical, trachea midline  Lungs: clear to auscultation bilaterally  Heart: regular rate and rhythm; S1, S2 normal, no murmur, click, rub or gallop  Abdomen: soft, non-tender, no distended  Extremities: extremities atraumatic, trace edema bilaterally, hyperpigmentation up to the mid calf bilaterally   Skin: warm, no cyanosis, no pathologic ecchymosis in exposed portions  Neurologic: Grossly normal. A&O x3      Lab Review   Lab Results   Component Value Date    WBC 4.65 01/13/2025    HGB 12.4 01/13/2025    HCT 37.0 01/13/2025    MCV 95 01/13/2025     01/13/2025         BMP  Lab Results   Component Value Date    NA " 138 01/13/2025    K 4.7 01/13/2025     01/13/2025    CO2 21 (L) 01/13/2025    BUN 23 01/13/2025    CREATININE 1.3 01/13/2025    CALCIUM 9.6 01/13/2025    ANIONGAP 10 01/13/2025    ESTGFRAFRICA >60 04/08/2022    EGFRNONAA >60 04/08/2022       Lab Results   Component Value Date    ALBUMIN 3.4 (L) 01/13/2025       Lab Results   Component Value Date    ALT 26 01/13/2025    AST 17 01/13/2025    ALKPHOS 91 01/13/2025    BILITOT 0.3 01/13/2025       Lab Results   Component Value Date    TSH 0.460 06/23/2023       Lab Results   Component Value Date    CHOL 166 08/02/2024    CHOL 184 01/13/2024    CHOL 162 06/23/2023     Lab Results   Component Value Date    HDL 59 08/02/2024    HDL 72 01/13/2024    HDL 51 06/23/2023     Lab Results   Component Value Date    LDLCALC 70.2 08/02/2024    LDLCALC 89.4 01/13/2024    LDLCALC 66.2 06/23/2023     Lab Results   Component Value Date    TRIG 184 (H) 08/02/2024    TRIG 113 01/13/2024    TRIG 224 (H) 06/23/2023     Lab Results   Component Value Date    CHOLHDL 35.5 08/02/2024    CHOLHDL 39.1 01/13/2024    CHOLHDL 31.5 06/23/2023                Assessment & Plan:     This is a 60 y.o. female with CM, PAF, venous insufficiency, HTN, HLD, type 2 DM, CKD, invasive ductal carcinoma of L breast. Few brief episodes of AF (highest HR 110s) with palpitations. Much more controlled since last office visit- has only required use of additional metoprolol 1-2 times this past year.     1. Chemotherapy induced cardiomyopathy   - Euvolemic on exam  - Continue metoprolol succinate 50 mg qd   - Continue telmisartan 20 mg qd     2. Paroxysmal atrial fibrillation s/p LAUREL/DCCV 7/2023  - RRR today in clinic  - Stable   - Continue metoprolol succinate 50 mg qd   - Continue dronedarone 400 mg BID  - Continue apixaban 5 mg BID  - Continue to follow EP    3. Venous insufficiency  - Asymptomatic   - Leg elevation   - Compression stockings     4. Essential hypertension  - Well controlled  - Continue metoprolol  succinate 50 mg qd   - Continue telmisartan 20 mg qd     5. Mixed hyperlipidemia  - LDL 70.2, HDL 59  - Continue pravastatin 10 mg qd     6. Type 2 diabetes mellitus   - A1c controlled at 6.4  - Continue routine labs and management per primary care     7. Stage 3b chronic kidney disease  - Cr stable at 1.3  - Continue routine labs and management per nephrology         Emphasized the importance of modifying lifestyle related risk factors including limiting alcohol intake, exercise, diet most resembling a Mediterranean diet.    Please follow up in 6-9 months or sooner if needed.      Dominique Dolese, PA-C [facility]  Office: [phone]

## 2025-02-13 NOTE — PROGRESS NOTES
SUBJECTIVE:    Patient ID:  Josefina Coon is a 60 y.o. female who presents for follow of     Medical history:  Atrial fibrillation  Incomplete LBBB  JASON on CPAP  HTN  CKD IIIB (eGFR 39 most recently)  DM2  Breast CA (invasive CA s/p left lumpectomy + chemo/XRT)     Has had palpitations over the past 3 years, more so over the past year.   Presented with AF with RVR >> LAUREL/DCCV 7/25/23. Placed on Toprol and Eliquis.  Continues to note symptoms c/w AF. Toprol increased to 50 mg daily >> still having episodes. Generally lasting all day.  Echo 5/1/23 EF 60% severe LAE (ALEXSANDRA 50.2).     8/12/2024:  Last visit 1 year prior with Dr. Turcios > started on Multaq. PVI deferred secondary to undergoing immunotherapy. Class IC deferred secondary to incomplete LBBB.  30 day event monitor 4/24: breakthrough AF on Multaq. 1% AF burden (6 hours and 39 min total). Longest AF episode 1 hr 11 min  Tolerating Multaq which she has been on for ~6 months  Eliquis 5 mg BID  Creatinein 1.52 (eGFR 39)     Reports recurrence every 2 weeks which can lasts up to 12 hours  No longer on chemo or immunotherapy    02/14/2025  Reports significant fatigue with AF  Reports having a few months of no AF, but has subsequently had recurrence for a few weeks off an don. For the past 6 weeks approximately 2 episodes. Overall, happy with QoL  Tolerating OAC without bleeding  Tolerating Multaq    I personally reviewed the ECG/telemetry strip today. My interpretation is NSR at 71 bpm. QTc 428 ms.    Past Medical History:   Diagnosis Date    Abnormal liver enzymes     Asymptomatic varicose veins     Breast cancer 09/14/2022    chemo 10/2022-12/2022; radiation 3/2023 6 weeks; immunotherapy 10/2022-10/5/2023    Cancer     left breast cancer    Depressive disorder, not elsewhere classified     Dyslipidemia     Embolism and thrombosis of unspecified site     superficial venous thrombosis    Encounter for long-term (current) use of other medications     Family  history of ischemic heart disease     Fatty liver     Generalized anxiety disorder     History of chicken pox     Obstructive sleep apnea (adult) (pediatric)     Type II or unspecified type diabetes mellitus without mention of complication, not stated as uncontrolled     Unspecified essential hypertension     Unspecified venous (peripheral) insufficiency     Unspecified vitamin D deficiency        Past Surgical History:   Procedure Laterality Date    BREAST BIOPSY Left 2022    idc    BREAST BIOPSY Left 2022    neg ln    BREAST BIOPSY Left 2022    neg ln    BREAST LUMPECTOMY Left 2023    2 lymph nodes removed     SECTION      COLONOSCOPY      ESOPHAGOGASTRODUODENOSCOPY      INSERTION OF TUNNELED CENTRAL VENOUS CATHETER (CVC) WITH SUBCUTANEOUS PORT Right 10/04/2022    Procedure: [id number];  Surgeon: Dominick Ng MD;  Location: [facility];  Service: General;  Laterality: Right;    LUMPECTOMY, WITH RADAR LOCALIZATION USING TRENTON  Left 2023    Procedure: LUMPECTOMY,WITH RADAR LOCALIZATION USING TRENTON ;  Surgeon: Maria G Mcduffie MD;  Location: [facility] OR;  Service: General;  Laterality: Left;    SENTINEL LYMPH NODE BIOPSY Left 2023    Procedure: BIOPSY, LYMPH NODE, SENTINEL;  Surgeon: Maria G Mcduffie MD;  Location: [facility] OR;  Service: General;  Laterality: Left;    TRANSESOPHAGEAL ECHOCARDIOGRAM WITH POSSIBLE CARDIOVERSION (LAUREL W/ POSS CARDIOVERSION) N/A 2023    Procedure: TRANSESOPHAGEAL ECHOCARDIOGRAM WITH POSSIBLE CARDIOVERSION (LAUREL W/ POSS CARDIOVERSION);  Surgeon: Roni Frias MD;  Location: [facility];  Service: Cardiology;  Laterality: N/A;    VEIN SURGERY      Laser, Dr. Larsen       Family History   Problem Relation Name Age of Onset    Hyperlipidemia Mother      Hypertension Mother      Vulvar Cancer Mother      Dementia Mother      Atrial fibrillation Father      Parkinsonism Father      Diabetes Brother      Cancer Brother       "    colon    Hypertension Son      Hyperlipidemia Son      Anxiety disorder Son      Stroke Maternal Grandmother         Social History     Socioeconomic History    Marital status:    Tobacco Use    Smoking status: Never     Passive exposure: Never    Smokeless tobacco: Never   Substance and Sexual Activity    Alcohol use: Not Currently     Comment: rare     Drug use: Never    Sexual activity: Not Currently       Review of patient's allergies indicates:   Allergen Reactions    Sitagliptin      Other reaction(s): (januvia) abdominal pain    Byetta  [exenatide] Rash    Lactose     Sulfa (sulfonamide antibiotics) Hives and Rash       Review of Systems   All other systems reviewed and are negative.         OBJECTIVE:     Vitals:    02/14/25 0910   BP: 118/78   BP Location: Right arm   Patient Position: Sitting   Pulse: 71   Weight: (!) 151.9 kg (334 lb 14.1 oz)   Height: 5' 4" (1.626 m)       BP Readings from Last 5 Encounters:   02/14/25 118/78   02/13/25 134/74   01/13/25 130/81   11/15/24 112/80   10/21/24 110/64        Physical Exam  Vitals and nursing note reviewed.   Constitutional:       General: She is not in acute distress.     Appearance: She is well-developed. She is obese. She is not diaphoretic.   HENT:      Head: Normocephalic and atraumatic.   Eyes:      General: No scleral icterus.        Right eye: No discharge.         Left eye: No discharge.   Cardiovascular:      Rate and Rhythm: Normal rate and regular rhythm. No extrasystoles are present.     Pulses: Normal pulses and intact distal pulses.           Radial pulses are 2+ on the right side and 2+ on the left side.      Heart sounds: Normal heart sounds, S1 normal and S2 normal. Heart sounds not distant. No opening snap. No murmur heard.     No friction rub. No gallop. No S3 or S4 sounds.   Pulmonary:      Effort: Pulmonary effort is normal. No respiratory distress.      Breath sounds: No wheezing or rales.   Abdominal:      General: Bowel " sounds are normal. There is no distension.      Palpations: Abdomen is soft.      Tenderness: There is no abdominal tenderness.   Musculoskeletal:         General: No deformity. Normal range of motion.      Cervical back: Normal range of motion and neck supple.   Skin:     General: Skin is warm and dry.      Findings: No erythema or rash.   Neurological:      Mental Status: She is alert.             Current Outpatient Medications   Medication Instructions    albuterol (PROVENTIL/VENTOLIN HFA) 90 mcg/actuation inhaler 2 puffs, Inhalation, Every 6 hours PRN    ALPRAZolam (XANAX) 0.25 MG tablet TAKE ONE TABLET BY MOUTH 1-2 TIMES DAILY AS NEEDED ANXIETY    anastrozole (ARIMIDEX) 1 mg, Oral, Daily    azelastine (ASTELIN) 137 mcg (0.1 %) nasal spray 2 sprays, 2 times daily PRN    blood-glucose sensor (FREESTYLE ANTONIO 3 SENSOR) Sara 2 each, Misc.(Non-Drug; Combo Route), Every 14 days    buPROPion (WELLBUTRIN XL) 150 MG TB24 tablet TAKE 1 TABLET BY MOUTH EVERY DAY    cholecalciferol (vitamin D3) (VITAMIN D3) 2,000 Units, Oral, Daily    cholecalciferol (vitamin D3) (VITAMIN D3) 2,000 Units    cyanocobalamin 1,000 mcg/mL injection Inject 1 ml q 2 weeks x 1 month then 1 ml monthly    ELIQUIS 5 mg, Oral, 2 times daily    finerenone (KERENDIA) 20 mg Tab 1 tablet, Oral, Daily    meclizine (ANTIVERT) 12.5 mg, Oral, 3 times daily PRN    metFORMIN (GLUCOPHAGE) 500 mg, Oral, 2 times daily with meals    metoprolol succinate (TOPROL-XL) 25 mg    metoprolol succinate (TOPROL-XL) 50 mg, Oral    MULTAQ 400 mg, Oral, 2 times daily with meals    OZEMPIC 1 mg, Subcutaneous, Every 7 days    pravastatin (PRAVACHOL) 10 MG tablet TAKE 1 TABLET BY MOUTH EVERYDAY AT BEDTIME    telmisartan (MICARDIS) 20 MG Tab TAKE 1/2 TABLET BY MOUTH EVERY DAY       Lipid Panel:   Lab Results   Component Value Date    CHOL 166 08/02/2024    HDL 59 08/02/2024    LDLCALC 70.2 08/02/2024    TRIG 184 (H) 08/02/2024    CHOLHDL 35.5 08/02/2024       The 10-year ASCVD  risk score (Db MIRANDA, et al., 2019) is: 7.5%    Values used to calculate the score:      Age: 60 years      Sex: Female      Is Non- : No      Diabetic: Yes      Tobacco smoker: No      Systolic Blood Pressure: 118 mmHg      Is BP treated: Yes      HDL Cholesterol: 59 mg/dL      Total Cholesterol: 166 mg/dL    Most Recent EKG Results  Results for orders placed or performed in visit on 08/12/24   IN OFFICE EKG 12-LEAD (to Eleva)    Collection Time: 08/12/24 11:00 AM   Result Value Ref Range    QRS Duration 100 ms    [facility] QTC Calculation 414 ms    Narrative    Test Reason : I48.0,    Vent. Rate : 064 BPM     Atrial Rate : 064 BPM     P-R Int : 156 ms          QRS Dur : 100 ms      QT Int : 402 ms       P-R-T Axes : 064 001 -04 degrees     QTc Int : 414 ms    Normal sinus rhythm  When compared with ECG of 10-OCT-2023 13:54,  Incomplete left bundle branch block is no longer Present  Confirmed by Brian Landry MD ([id number]) on 8/12/2024 11:40:17 AM    Referred By:             Confirmed By:Brian Landry MD       Most Recent Echocardiogram Results  Results for orders placed in visit on 08/23/23    Echo    Interpretation Summary    Left Ventricle: The left ventricle is normal in size. Normal wall thickness. Normal wall motion. There is normal systolic function. Ejection fraction by visual approximation is 60%. Global longitudinal strain is -17.6%. Grade I diastolic dysfunction.    Left Atrium: Left atrium is moderately dilated.    Right Ventricle: Normal right ventricular cavity size. Wall thickness is normal. Right ventricle wall motion  is normal. Systolic function is normal.    Aortic Valve: The aortic valve is a trileaflet valve.    Pulmonary Artery: The estimated pulmonary artery systolic pressure is 33 mmHg.    IVC/SVC: Normal venous pressure at 3 mmHg.      Most Recent Nuclear Stress Test Results  Results for orders placed during the hospital encounter of 03/03/20    Nuclear Stress - Cardiology  Interpreted    Interpretation Summary    The perfusion scan is free of evidence from myocardial ischemia or injury.    There is a mild intensity defect in the anteroseptal wall of the left ventricle, secondary to breast attenuation.    Visually estimated ejection fraction is normal at rest.    The EKG portion of this study is negative for ischemia.    The patient reported no chest pain during the stress test.    There are no prior studies for comparison.      Most Recent Cardiac PET Stress Test Results  No results found for this or any previous visit.      Most Recent Cardiovascular Angiogram results  No results found for this or any previous visit.      Other Most Recent Cardiology Results  Results for orders placed during the hospital encounter of 04/26/24    Cardiac event monitor    Interpretation Summary    Patient monitored for 27d 16h 36m    Total AF [place] 1%    PACs were not found during the monitoring period    PVCs were not found during the monitoring period    Patient reported 1 episode of heart racing that corresponded to atrial fibrillation at 138 beats per minute.    Patient reported 1 episode of symptom other than listed the corresponded to sinus rhythm at 66 beats per minute.    Atrial fibrillation appreciated during the study.        All pertinent data including labs, imaging, EKGs, and studies listed above were reviewed.  All of the patients ECG tracings since most recent visit were personally interpreted by this provider    ASSESSMENT:   Josefina Coon is a 60 y.o. female who presents for follow of atrial fibrillation. Overall, happy with QoL.    1. Paroxysmal atrial fibrillation  IN OFFICE EKG 12-LEAD (to Muse)    Cardiac Monitor - 3-15 Day Adult (Cupid Only)      2. Essential hypertension  IN OFFICE EKG 12-LEAD (to Muse)      3. Mixed hyperlipidemia  IN OFFICE EKG 12-LEAD (to Muse)      4. Palpitations          PLAN FOR TREATMENT OF ABOVE DIAGNOSES:     Ten day cardiac event monitor    Continue Multaq 400 mg b.i.d.   Continue metoprolol 25 mg p.r.n.   Continue Eliquis 5 mg b.i.d.   We discussed the importance of weight loss for overall health as well as AFib management.    Continue pravastatin 10 mg daily      F/u 6 months      Monty Landry MD  Cardiac Electrophysiology    This note was partially generated using voice recognition and generative artificial intelligence software. While every effort has been made to ensure its accuracy, transcription errors may exist. Please reach out to me with any questions or if clarification is needed.

## 2025-02-14 ENCOUNTER — OFFICE VISIT (OUTPATIENT)
Dept: CARDIOLOGY | Facility: CLINIC | Age: 60
End: 2025-02-14
Payer: COMMERCIAL

## 2025-02-14 VITALS
SYSTOLIC BLOOD PRESSURE: 118 MMHG | BODY MASS INDEX: 50.02 KG/M2 | HEART RATE: 71 BPM | WEIGHT: 293 LBS | DIASTOLIC BLOOD PRESSURE: 78 MMHG | HEIGHT: 64 IN

## 2025-02-14 DIAGNOSIS — E78.2 MIXED HYPERLIPIDEMIA: ICD-10-CM

## 2025-02-14 DIAGNOSIS — I48.0 PAROXYSMAL ATRIAL FIBRILLATION: Primary | ICD-10-CM

## 2025-02-14 DIAGNOSIS — I10 ESSENTIAL HYPERTENSION: ICD-10-CM

## 2025-02-14 DIAGNOSIS — R00.2 PALPITATIONS: ICD-10-CM

## 2025-02-14 PROBLEM — I50.22 CHRONIC SYSTOLIC (CONGESTIVE) HEART FAILURE: Status: ACTIVE | Noted: 2025-02-14

## 2025-02-14 PROBLEM — I50.22 CHRONIC SYSTOLIC (CONGESTIVE) HEART FAILURE: Status: RESOLVED | Noted: 2025-02-14 | Resolved: 2025-02-14

## 2025-02-14 LAB
OHS QRS DURATION: 108 MS
OHS QTC CALCULATION: 428 MS

## 2025-02-14 PROCEDURE — 99999 PR PBB SHADOW E&M-EST. PATIENT-LVL IV: CPT | Mod: PBBFAC,,, | Performed by: INTERNAL MEDICINE

## 2025-02-14 PROCEDURE — 93010 ELECTROCARDIOGRAM REPORT: CPT | Mod: S$GLB,,, | Performed by: INTERNAL MEDICINE

## 2025-02-14 PROCEDURE — 93005 ELECTROCARDIOGRAM TRACING: CPT | Mod: PO

## 2025-02-18 ENCOUNTER — HOSPITAL ENCOUNTER (OUTPATIENT)
Dept: CARDIOLOGY | Facility: HOSPITAL | Age: 60
Discharge: HOME | End: 2025-02-18
Attending: INTERNAL MEDICINE
Payer: COMMERCIAL

## 2025-02-18 DIAGNOSIS — I48.0 PAROXYSMAL ATRIAL FIBRILLATION: ICD-10-CM

## 2025-02-18 PROCEDURE — 93246 EXT ECG>7D<15D RECORDING: CPT | Mod: PO

## 2025-03-17 NOTE — PROGRESS NOTES
"  Acupuncture Evaluation Note     Name: Josefina Coon  Clinic Number: [id number]    Traditional Chinese Medicine (TCM) Diagnosis: Qi Stagnation, Blood Stasis, Qi Deficiency, Blood Deficiency, and Damp and Yin deficiency  Medical Diagnosis:   Encounter Diagnoses   Name Primary?    Chronic midline low back pain without sciatica     Neck pain     Chemotherapy-induced peripheral neuropathy         Evaluation Date: 10/4/2023    Visit #/Visits authorized:     Precautions: Standard    Subjective     Chief Concern: Low-back Pain (Patient reports 5/10 low back pain today), Hot Flashes (Patient reports 10/10 hot flashes today), and Peripheral Neuropathy (Today pain is 10/10 and patient would like this addressed with her low back pain/)        Medical necessity is demonstrated by the following IMPAIRMENTS: Medical Necessity: Decreased mobility limits day to day activities, social, and emergent situations              Aggravating Factors:  movement   Relieving Factors:  rest    Symptom Description:     Quality:  Aching, Dull, and Tight  Severity:  5  Frequency:  every day    Previous Treatments Tried:  Medication    Digestion:     Diet: in general, a "healthy" diet     Fluids: drinks moderate amounts of fluids    Sleep: 8/10 insomnia    Energy Levels:  7/10 fatigue    Psychological Symptoms:  7/10 Anxiety, 5/10 depression    Other Symptoms: 10/10 neuropathy    GYN Symptoms: 10/10 hot flashes    Objective     Observation: Patient has swelling at ankles and pooling blood from a dx of venous insufficiency and numerous vein stripping procedures.      Pulse:        bouncing       New Findings:  na    Treatment     Treatment Principles:  move qi and blood, drain dampness, nourish yin    Acupuncture points used:  4 MARTE, Du20, Gb34, Ki3, Ki6, Li11, Sp6, Sp9, St36, and YIN MELTON    Bilateral points:  Unilateral points:  Auricular Treatment:  acharya men    Needles In: 22  Needles Out: 22  Needles W/O STIM placed: 105  [place] W/O STIM " removed: 125      Other Traditional Chinese Medicine Modalities -  na    Assessment     After treatment, patient felt slightly better but needs regular exercise and reduce swelling around ankles for proper treatment.     Patient prognosis is Good.     Patient will continue to benefit from acupuncture treatment to address the deficits listed in the problem list box on initial evaluation, provide patient family education and to maximize pt's level of independence in the home and community environment.     Patient's spiritual, cultural and educational needs considered and pt agreeable to plan of care and goals.     Anticipated barriers to treatment: none    Plan     Recommend 1 /week for 12 sessions then re-assess.      Education:  Patient is aware of cumulative benefit of acupuncture           No

## 2025-03-24 ENCOUNTER — PATIENT MESSAGE (OUTPATIENT)
Dept: CARDIOLOGY | Facility: CLINIC | Age: 60
End: 2025-03-24
Payer: COMMERCIAL

## 2025-03-24 DIAGNOSIS — I48.91 ATRIAL FIBRILLATION, UNSPECIFIED TYPE: ICD-10-CM

## 2025-03-25 RX ORDER — DRONEDARONE 400 MG/1
1 TABLET, FILM COATED ORAL 2 TIMES DAILY WITH MEALS
Qty: 180 TABLET | Refills: 0 | Status: SHIPPED | OUTPATIENT
Start: 2025-03-25

## 2025-04-01 PROBLEM — Z86.0101 HISTORY OF ADENOMATOUS POLYP OF COLON: Status: ACTIVE | Noted: 2025-04-01

## 2025-04-22 ENCOUNTER — TELEPHONE (OUTPATIENT)
Dept: NEPHROLOGY | Facility: CLINIC | Age: 60
End: 2025-04-22
Payer: COMMERCIAL

## 2025-04-22 NOTE — TELEPHONE ENCOUNTER
----- Message from Rina sent at 4/22/2025  9:04 AM CDT -----  Type: Reschedule Appointment RequestCaller is requesting a sooner appointment.  Name of Caller:pt When is the pt's appointment?04/25/25 at 2:00Would the patient rather a call back or a response via [facility]? Call Patty Call Back Number:[phone] Information: Pt needs to reschedule this appt for the following week, Thurs or Friday late afternoon.  Please call back to advise. Thanks!

## 2025-04-22 NOTE — TELEPHONE ENCOUNTER
Received call from patient. Patient requested to reschedule appointment. Virtual appointment scheduled for 05/01/25 at 1500.

## 2025-05-01 ENCOUNTER — OFFICE VISIT (OUTPATIENT)
Dept: NEPHROLOGY | Facility: CLINIC | Age: 60
End: 2025-05-01
Payer: COMMERCIAL

## 2025-05-01 DIAGNOSIS — E66.01 MORBID OBESITY: ICD-10-CM

## 2025-05-01 DIAGNOSIS — N18.32 STAGE 3B CHRONIC KIDNEY DISEASE: Primary | ICD-10-CM

## 2025-05-01 DIAGNOSIS — E79.0 HYPERURICEMIA: ICD-10-CM

## 2025-05-01 DIAGNOSIS — I10 ESSENTIAL HYPERTENSION: ICD-10-CM

## 2025-05-01 DIAGNOSIS — G47.33 OBSTRUCTIVE SLEEP APNEA (ADULT) (PEDIATRIC): ICD-10-CM

## 2025-05-01 DIAGNOSIS — N18.32 TYPE 2 DIABETES MELLITUS WITH STAGE 3B CHRONIC KIDNEY DISEASE, WITHOUT LONG-TERM CURRENT USE OF INSULIN: ICD-10-CM

## 2025-05-01 DIAGNOSIS — E11.22 TYPE 2 DIABETES MELLITUS WITH STAGE 3B CHRONIC KIDNEY DISEASE, WITHOUT LONG-TERM CURRENT USE OF INSULIN: ICD-10-CM

## 2025-05-01 NOTE — PROGRESS NOTES
[facility]  Nephrology Clinic    Subjective:      HPI ID: Josefina Coon is a 60 y.o. female who presents for return patient evaluation for chronic renal failure.  She has a pertinent past medical history of diabetes type 2, hypertension, history of invasive ductal carcinoma of the left breast diagnosis September 2022 managed with a regimen of Taxol and Herceptin from October to December 2022 with follow up radiation.  She presents for ongoing evaluation and management of chronic kidney disease stage IIIB.    Interval history:  03/25/2024 clinic visit- presented today for ongoing evaluation and management of chronic kidney disease.  She feels well today he denies any acute complaints.  We discussed her recent lab results at chair side.  She was recently taken off angiotensin receptor blocker due to recurrent issues with hypotension.  She has no uremic or urinary symptoms and is in her usual state of health.      10/21/24 -   Here today for CKD f/u. She is doing well today and denies acute complaints. Her renal fxn based on sCr trend looks stable. She has been taken off SGLT2-I in the interim d/t recurrent UTI. She remains on GLP1a, Non-steroidal Mineralocorticoid Receptor Antagonist, and stable dose of ARB for GDMT of CKD.    5/1/25 - seen via telemedicine visit. Required brief hospitalization in the interim for severe bronchitis. Otherwise feeling well today. Appetite is good. Avoiding dehydration. Remains on GLP1, NSMRA, and ARB for ongoing management of CKD. K+ trend is uptrending on NSMRA, will decrease finerenone to 10mg for now.      Review of Systems   Constitutional:  Negative for chills, diaphoresis and fever.   Respiratory:  Negative for cough and shortness of breath.    Cardiovascular:  Negative for chest pain and leg swelling.   Gastrointestinal:  Negative for abdominal pain, diarrhea, nausea and vomiting.   Genitourinary:  Negative for difficulty urinating, dysuria and  hematuria.   Musculoskeletal:  Negative for myalgias.   Neurological:  Negative for headaches.   Hematological:  Does not bruise/bleed easily.   All other systems reviewed and are negative.        The past medical, family and social histories were reviewed for this encounter.         Objective:   There were no vitals taken for this visit.       Physical Exam    Assessment:     Lab Results   Component Value Date    CREATININE 1.32 04/26/2025    CREATININE 1.32 04/26/2025    BUN 20 04/26/2025    BUN 20 04/26/2025     04/26/2025     04/26/2025    K 5.2 (H) 04/26/2025    K 5.2 (H) 04/26/2025     04/26/2025     04/26/2025    CO2 24 04/26/2025    CO2 24 04/26/2025     Lab Results   Component Value Date    PTH 58.7 09/10/2024    CALCIUM 9.3 04/26/2025    CALCIUM 9.3 04/26/2025    PHOS 4.0 04/26/2025     Lab Results   Component Value Date    HCT 35.3 (L) 04/26/2025     Prot/Creat Ratio, Urine   Date Value Ref Range Status   09/10/2024 90.5 10.0 - 107.0 mg/g Final   03/14/2024 0.20 0.00 - 0.20 Final   09/15/2023 0.20 0.00 - 0.20 Final      Lab Results   Component Value Date    MICALBCREAT 18.9 04/26/2025    MICALBCREAT 27.3 09/10/2024    MICALBCREAT 30 (H) 08/01/2024    MICALBCREAT 72.6 (H) 06/23/2023           1. Stage 3b chronic kidney disease    2. Type 2 diabetes mellitus with stage 3b chronic kidney disease, without long-term current use of insulin    3. Essential hypertension    4. Obstructive sleep apnea (adult) (pediatric)    5. Morbid obesity    6. Hyperuricemia            Plan:   Return to clinic in 6 months.  Labs for next visit include PTH, RFP, UPCR.  Baseline creatinine is 1.2-1.4mg/dL.    CKD G3b / A2 PLAN  -risk from DM type II, HTN, obesity  -of note, also on dronedarone re: sCr  -continue discussion and adjustment of modifiable risk factors. Educated patient on the importance of BP, glycemic and lipid control, weight loss.  -avoid dehydration  -on RASI, GLP1a, NRA for CKD-MACE risk  reduction, proteinuria reduction and trent-protection  -estimated low risk of renal progression based on KFRE model    DM type II - per PCP. Did not tolerate SGLT2-I    Hypertension - controlled on the current regiment at this time, including low dose RASI    Obesity - We discussed using a Mediterranean diet for health benefits and weight loss.    JASON - per CPAP prescription    Hyperuricemia - monitoring. Reviewed diet and continue to decrease sweets/fructose sources. No Gout history. Will avoid allopurinol for now unless worsens further or develops GOUT episode. (EBM does not show change in DKD outcomes). Taking cranberry extract    __________________________  Murphy Cutler MD  [facility] [place]    Part of this note has been created using [employer] dictation system. Errors in transcription may not be completely avoided.      KFRE 2-Year: 0.3% at 4/26/2025 10:48 AM  Calculated from:  Serum Creatinine: 1.32 mg/dL at 4/26/2025 10:33 AM  Urine Albumin Creatinine Ratio: 18.9 ug/mg at 4/26/2025 10:48 AM  Age: 60 years  Sex: Female at 4/26/2025 10:48 AM  Has CKD-3 to CKD-5: Yes    KFRE 5-Year: 1% at 4/26/2025 10:48 AM  Calculated from:  Serum Creatinine: 1.32 mg/dL at 4/26/2025 10:33 AM  Urine Albumin Creatinine Ratio: 18.9 ug/mg at 4/26/2025 10:48 AM  Age: 60 years  Sex: Female at 4/26/2025 10:48 AM  Has CKD-3 to CKD-5: Yes

## 2025-05-12 DIAGNOSIS — I48.91 ATRIAL FIBRILLATION, UNSPECIFIED TYPE: ICD-10-CM

## 2025-05-12 RX ORDER — APIXABAN 5 MG/1
5 TABLET, FILM COATED ORAL 2 TIMES DAILY
Qty: 180 TABLET | Refills: 3 | Status: SHIPPED | OUTPATIENT
Start: 2025-05-12

## 2025-05-22 ENCOUNTER — PATIENT MESSAGE (OUTPATIENT)
Dept: OBSTETRICS AND GYNECOLOGY | Facility: CLINIC | Age: 60
End: 2025-05-22
Payer: COMMERCIAL

## 2025-06-20 ENCOUNTER — TELEPHONE (OUTPATIENT)
Dept: PHARMACY | Facility: CLINIC | Age: 60
End: 2025-06-20
Payer: COMMERCIAL

## 2025-06-20 NOTE — TELEPHONE ENCOUNTER
[facility]/Population Health Chart Review & Patient Outreach Details For Medication Adherence Project    Reason for Outreach Encounter: 3rd Party payor non-compliance report ([employer], [employer], [employer], etc)  2.  Patient Outreach Method: Reviewed patient chart   3.   Medication in question:    Hyperlipidemia Medications              pravastatin (PRAVACHOL) 10 MG tablet TAKE 1 TABLET BY MOUTH EVERYDAY AT BEDTIME                  pravastatin  last filled  4/16/25 for 90 day supply      4.  Reviewed and or Updates Made To: Patient Chart  5. Outreach Outcomes and/or actions taken: Patient filled medication and is on track to be adherent  Additional Notes:

## 2025-06-24 ENCOUNTER — HOSPITAL ENCOUNTER (OUTPATIENT)
Dept: RADIOLOGY | Facility: HOSPITAL | Age: 60
Discharge: HOME | End: 2025-06-24
Attending: INTERNAL MEDICINE
Payer: COMMERCIAL

## 2025-06-24 DIAGNOSIS — M85.80 OSTEOPENIA, UNSPECIFIED LOCATION: ICD-10-CM

## 2025-06-24 PROCEDURE — 77080 DXA BONE DENSITY AXIAL: CPT | Mod: 26,,, | Performed by: RADIOLOGY

## 2025-06-24 PROCEDURE — 77080 DXA BONE DENSITY AXIAL: CPT | Mod: TC,PO

## 2025-07-02 ENCOUNTER — TELEPHONE (OUTPATIENT)
Dept: CARDIOLOGY | Facility: CLINIC | Age: 60
End: 2025-07-02
Payer: COMMERCIAL

## 2025-07-05 DIAGNOSIS — I48.91 UNSPECIFIED ATRIAL FIBRILLATION: ICD-10-CM

## 2025-07-07 RX ORDER — METOPROLOL SUCCINATE 50 MG/1
50 TABLET, EXTENDED RELEASE ORAL
Qty: 90 TABLET | Refills: 1 | Status: SHIPPED | OUTPATIENT
Start: 2025-07-07

## 2025-07-14 ENCOUNTER — OFFICE VISIT (OUTPATIENT)
Dept: HEMATOLOGY/ONCOLOGY | Facility: CLINIC | Age: 60
End: 2025-07-14
Payer: COMMERCIAL

## 2025-07-14 VITALS
RESPIRATION RATE: 18 BRPM | DIASTOLIC BLOOD PRESSURE: 98 MMHG | HEIGHT: 64 IN | TEMPERATURE: 97 F | OXYGEN SATURATION: 97 % | WEIGHT: 293 LBS | SYSTOLIC BLOOD PRESSURE: 139 MMHG | HEART RATE: 85 BPM | BODY MASS INDEX: 50.02 KG/M2

## 2025-07-14 DIAGNOSIS — C50.912 INVASIVE DUCTAL CARCINOMA OF BREAST, FEMALE, LEFT: Primary | ICD-10-CM

## 2025-07-14 DIAGNOSIS — M94.9 DISORDER OF BONE AND CARTILAGE: ICD-10-CM

## 2025-07-14 DIAGNOSIS — M85.89 OSTEOPENIA OF MULTIPLE SITES: ICD-10-CM

## 2025-07-14 DIAGNOSIS — Z79.811 LONG TERM (CURRENT) USE OF AROMATASE INHIBITORS: ICD-10-CM

## 2025-07-14 DIAGNOSIS — M89.9 DISORDER OF BONE AND CARTILAGE: ICD-10-CM

## 2025-07-14 PROCEDURE — 3066F NEPHROPATHY DOC TX: CPT | Mod: CPTII,S$GLB,, | Performed by: NURSE PRACTITIONER

## 2025-07-14 PROCEDURE — 4010F ACE/ARB THERAPY RXD/TAKEN: CPT | Mod: CPTII,S$GLB,, | Performed by: NURSE PRACTITIONER

## 2025-07-14 PROCEDURE — 1159F MED LIST DOCD IN RCRD: CPT | Mod: CPTII,S$GLB,, | Performed by: NURSE PRACTITIONER

## 2025-07-14 PROCEDURE — 3075F SYST BP GE 130 - 139MM HG: CPT | Mod: CPTII,S$GLB,, | Performed by: NURSE PRACTITIONER

## 2025-07-14 PROCEDURE — G2211 COMPLEX E/M VISIT ADD ON: HCPCS | Mod: S$GLB,,, | Performed by: NURSE PRACTITIONER

## 2025-07-14 PROCEDURE — 3008F BODY MASS INDEX DOCD: CPT | Mod: CPTII,S$GLB,, | Performed by: NURSE PRACTITIONER

## 2025-07-14 PROCEDURE — 3061F NEG MICROALBUMINURIA REV: CPT | Mod: CPTII,S$GLB,, | Performed by: NURSE PRACTITIONER

## 2025-07-14 PROCEDURE — 99214 OFFICE O/P EST MOD 30 MIN: CPT | Mod: S$GLB,,, | Performed by: NURSE PRACTITIONER

## 2025-07-14 PROCEDURE — 99999 PR PBB SHADOW E&M-EST. PATIENT-LVL V: CPT | Mod: PBBFAC,,, | Performed by: NURSE PRACTITIONER

## 2025-07-14 PROCEDURE — 3080F DIAST BP >= 90 MM HG: CPT | Mod: CPTII,S$GLB,, | Performed by: NURSE PRACTITIONER

## 2025-07-14 RX ORDER — TIRZEPATIDE 7.5 MG/.5ML
INJECTION, SOLUTION SUBCUTANEOUS
COMMUNITY
Start: 2025-07-07

## 2025-07-14 RX ORDER — ONDANSETRON 8 MG/1
TABLET, ORALLY DISINTEGRATING ORAL
COMMUNITY
Start: 2025-04-17

## 2025-07-14 RX ORDER — TIRZEPATIDE 2.5 MG/.5ML
INJECTION, SOLUTION SUBCUTANEOUS
COMMUNITY
Start: 2025-04-17

## 2025-07-14 RX ORDER — TIRZEPATIDE 5 MG/.5ML
INJECTION, SOLUTION SUBCUTANEOUS
COMMUNITY
Start: 2025-05-30

## 2025-07-14 NOTE — PROGRESS NOTES
PROGRESS NOTE    Subjective:       Patient ID: Josefina Coon is a 60 y.o. female.  MRN: [id number]  : 1965    Chief Complaint: Establish Care (6mths FU)      History of Present Illness:   Josefina Coon is a 60 y.o. female who presents with invasive ductal carcinoma of the left breast, triple positive.   Treated by Dr. Jaquez; now with Dr. Holder  Neoadjuvant TCHP with pCR  XRT completed 23 & Arimidex started thereafter in 2023.  She completed adjuvant Herceptin and Perjeta on 10/5/23.     Interim history: 25  She present to the office by herself.     She remains compliant on Anastrozole. The hot flushes are infrequent & manageable.  Lab & Imaging reviewed.    The patient denies CP, cough, SOB, abdominal pain, nausea, vomiting, constipation.  The patient denies fever, chills, night sweats, weight loss, new lumps or bumps, easy bruising or bleeding.      Oncology History:  Oncology History   Invasive ductal carcinoma of breast, female, left   2022 Initial Diagnosis    Invasive ductal carcinoma of breast, female, left     2022 Cancer Staged    Staging form: Breast, AJCC 8th Edition  - Clinical stage from 2022: Stage IA (cT1c, cN0(f), cM0, G3, ER+, HI+, HER2+)     2022 - 2022 Chemotherapy    Treatment Summary   Plan Name: OP BREAST TRASTUZUMAB PACLITAXEL WEEKLY  Treatment Goal: Curative  Status: Inactive  Start Date:   End Date:   Provider: Lisa Jaquez MD  Chemotherapy: PACLitaxeL (TAXOL) 80 mg/m2 = 204 mg in sodium chloride 0.9% 250 mL chemo infusion, 80 mg/m2 = 204 mg, Intravenous, Clinic/HOD 1 time, 0 of 12 cycles  pertuzumab (PERJETA) 840 mg in sodium chloride 0.9% 278 mL infusion, 840 mg (original dose ), Intravenous, Clinic/HOD 1 time, 0 of 17 cycles  Dose modification: 840 mg (Cycle 1, Reason: Other (see comments)), 420 mg (Cycle 4, Reason: Other (see comments))  trastuzumab-dkst (OGIVRI) 591 mg in sodium  chloride 0.9% 250 mL chemo infusion, 4 mg/kg = 591 mg, Intravenous, Clinic/HOD 1 time, 0 of 25 cycles     10/7/2022 - 10/5/2023 Chemotherapy    Treatment Summary   Plan Name: OP BREAST PACLITAXEL TRASTUZUMAB WEEKLY WITH PERTUZUMAB Q3W (THP)  Treatment Goal: Control  Status: Inactive  Start Date: 10/7/2022  End Date: 10/5/2023  Provider: Joelle Vásquez MD  Chemotherapy: PACLitaxeL (TAXOL) 80 mg/m2 = 210 mg in sodium chloride 0.9% 250 mL chemo infusion, 80 mg/m2 = 210 mg, Intravenous, Clinic/HOD 1 time, 4 of 4 cycles  Administration: 210 mg (10/7/2022), 204 mg (10/14/2022), 204 mg (10/28/2022), 204 mg (11/4/2022), 204 mg (10/21/2022), 204 mg (11/11/2022), 204 mg (11/18/2022), 204 mg (11/25/2022), 198 mg (12/9/2022), 204 mg (12/2/2022), 198 mg (12/16/2022), 198 mg (12/23/2022)  pertuzumab (PERJETA) 840 mg in sodium chloride 0.9% 278 mL infusion, 840 mg, Intravenous, Clinic/HOD 1 time, 18 of 18 cycles  Administration: 840 mg (10/7/2022), 420 mg (10/28/2022), 420 mg (11/18/2022), 420 mg (12/9/2022), 420 mg (12/30/2022), 420 mg (1/20/2023), 420 mg (2/17/2023), 420 mg (3/10/2023), 420 mg (3/31/2023), 420 mg (4/21/2023), 420 mg (5/12/2023), 420 mg (6/2/2023), 420 mg (6/23/2023), 420 mg (7/14/2023), 420 mg (8/4/2023), 420 mg (8/25/2023), 420 mg (9/15/2023), 420 mg (10/5/2023)  trastuzumab-dkst (OGIVRI) 570 mg in sodium chloride 0.9% 250 mL chemo infusion, 593 mg (100 % of original dose 4 mg/kg), Intravenous, Clinic/HOD 1 time, 18 of 18 cycles  Dose modification: 4 mg/kg (original dose 4 mg/kg, Cycle 1, Reason: Other (see comments), Comment: weekly trastuzumab), 2 mg/kg (original dose 2 mg/kg, Cycle 2, Reason: Other (see comments), Comment: weekly maintenance dose), 6 mg/kg (original dose 2 mg/kg, Cycle 2, Reason: MD Discretion, Comment: change to q3w dosing), 2 mg/kg (original dose 2 mg/kg, Cycle 1, Reason: Other (see comments), Comment: maintenance weekly dose)  Administration: 570 mg (10/7/2022), 840 mg (10/28/2022), 288  mg (10/14/2022), 290 mg (10/21/2022), 840 mg (11/18/2022), 831 mg (12/9/2022), 831 mg (12/30/2022), 720 mg (1/20/2023), 806 mg (2/17/2023), 750 mg (3/10/2023), 814 mg (3/31/2023), 750 mg (4/21/2023), 750 mg (5/12/2023), 750 mg (6/2/2023), 750 mg (6/23/2023), 750 mg (7/14/2023), 750 mg (8/4/2023), 750 mg (8/25/2023), 750 mg (9/15/2023), 750 mg (10/5/2023)     2/13/2023 Cancer Staged    Staging form: Breast, AJCC 8th Edition  - Pathologic stage from 2/13/2023: No Stage Recommended (ypT0, pN0(sn), cM0, GX)     3/14/2023 - 4/14/2023 Radiation Therapy    Treating physician: Shelia Trevino  Total Dose: 52.56 Gy (42.56Gy/16 fractions to whole breast; 10Gy/5 fraction boost)  Fractions: 20  Treatment Site Ref. ID Energy Dose/Fx (Gy) #Fx Dose Correction (Gy) Total Dose (Gy) Start Date End Date Elapsed Days   3D Breast_L NormEZCalc 18X 2.66 16 / 16 0 42.56 3/14/2023 4/6/2023 23   3d BrstBst_L NormBst 18X 2.5 4 / 4 0 10 4/10/2023 4/14/2023 4        8/25/22 bilateral screening mammogram,,Right neg ,Left central post mass     9/8/22 left diag MMG, left US limited .Left 0300 lateral posterior 6cm FN 1.4cm mass , left axilla LN 2, up to 4mm cortex     9/14/22 left 0300 lateral posterior 6cm FN 1.4cm mass US biopsy .Grade 3 ,1.1cm in biopsy No LVI , ER 84% OR 28% Her 2 3+ pos Ki 52 %    Left axilla LN biopsy ;Benign fatty tissue, no LN, not concordant No MRI of breasts were done      9/21/22 US bilateral complete Right neg, right LN nml , Left 0300 6cm FN [id number] mass Borderline LN left axilla     9/21/22 Left axilla LN biopsy benign LN , so eventually Stage IA triple positive Breast cancer     10/7/22: started neoadjuvant chemo with Taxol + dual HER-2 (tranztuzumab and pertuzumab)     Receiving treatment with Paclitaxel/Trastuzumab/Pertuzumab on D1, weekly Paclitaxel on D8, D15 of a 21 day cycle. Completed weekly Paclitaxel on 12/23/22.     2/2023: s/p B/L mastectomy with CPR;ypT0,pN0,cM0    XRT completed  2023:  Started Anastrozole       History:  Past Medical History:   Diagnosis Date    Abnormal liver enzymes     Asymptomatic varicose veins     Breast cancer 2022    chemo 10/2022-2022; radiation 3/2023 6 weeks; immunotherapy 10/2022-10/5/2023    Cancer     left breast cancer    Depressive disorder, not elsewhere classified     Dyslipidemia     Embolism and thrombosis of unspecified site     superficial venous thrombosis    Encounter for long-term (current) use of other medications     Family history of ischemic heart disease     Fatty liver     Generalized anxiety disorder     History of chicken pox     Obstructive sleep apnea (adult) (pediatric)     Type II or unspecified type diabetes mellitus without mention of complication, not stated as uncontrolled     Unspecified essential hypertension     Unspecified venous (peripheral) insufficiency     Unspecified vitamin D deficiency       Past Surgical History:   Procedure Laterality Date    BREAST BIOPSY Left 2022    idc    BREAST BIOPSY Left 2022    neg ln    BREAST BIOPSY Left 2022    neg ln    BREAST LUMPECTOMY Left 2023    2 lymph nodes removed     SECTION      COLONOSCOPY      COLONOSCOPY N/A 2025    Procedure: COLONOSCOPY;  Surgeon: EMMANUELLE Marreor MD;  Location: [facility];  Service: Endoscopy;  Laterality: N/A;    ESOPHAGOGASTRODUODENOSCOPY      INSERTION OF TUNNELED CENTRAL VENOUS CATHETER (CVC) WITH SUBCUTANEOUS PORT Right 10/04/2022    Procedure: [id number];  Surgeon: Dominick Ng MD;  Location: [facility];  Service: General;  Laterality: Right;    LUMPECTOMY, WITH RADAR LOCALIZATION USING TRENTON  Left 2023    Procedure: LUMPECTOMY,WITH RADAR LOCALIZATION USING TRENTON ;  Surgeon: Maria G Mcduffie MD;  Location: [facility];  Service: General;  Laterality: Left;    SENTINEL LYMPH NODE BIOPSY Left 2023    Procedure: BIOPSY, LYMPH NODE, SENTINEL;  Surgeon: Maria G ORTEGA  MD Reta;  Location: [facility];  Service: General;  Laterality: Left;    TRANSESOPHAGEAL ECHOCARDIOGRAM WITH POSSIBLE CARDIOVERSION (LAUREL W/ POSS CARDIOVERSION) N/A 07/25/2023    Procedure: TRANSESOPHAGEAL ECHOCARDIOGRAM WITH POSSIBLE CARDIOVERSION (LAUREL W/ POSS CARDIOVERSION);  Surgeon: Roni Frias MD;  Location: [facility];  Service: Cardiology;  Laterality: N/A;    VEIN SURGERY      [place], Dr. Larsen     Family History   Problem Relation Name Age of Onset    Hyperlipidemia Mother      Hypertension Mother      Vulvar Cancer Mother      Dementia Mother      Atrial fibrillation Father      Parkinsonism Father      Diabetes Brother      Cancer Brother          colon    Hypertension Son      Hyperlipidemia Son      Anxiety disorder Son      Stroke Maternal Grandmother       Social History     Tobacco Use    Smoking status: Never     Passive exposure: Never    Smokeless tobacco: Never   Substance and Sexual Activity    Alcohol use: Not Currently     Comment: rare     Drug use: Never    Sexual activity: Not Currently        ROS:   Review of Systems   Constitutional:  Negative for fever, malaise/fatigue and weight loss.   HENT:  Negative for congestion, hearing loss, nosebleeds and sore throat.    Eyes:  Negative for double vision and photophobia.   Respiratory:  Negative for cough, hemoptysis, sputum production, shortness of breath and wheezing.    Cardiovascular:  Negative for chest pain, palpitations, orthopnea and leg swelling.   Gastrointestinal:  Negative for abdominal pain, blood in stool, constipation, diarrhea, heartburn, nausea and vomiting.   Genitourinary:  Negative for dysuria, hematuria and urgency.   Musculoskeletal:  Negative for back pain, joint pain and myalgias.   Skin:  Negative for itching and rash.   Neurological:  Positive for tingling. Negative for dizziness, seizures, weakness and headaches.   Endo/Heme/Allergies:  Negative for polydipsia. Does not bruise/bleed easily.  "  Psychiatric/Behavioral:  Negative for depression and memory loss. The patient is nervous/anxious. The patient does not have insomnia.         Objective:   Weight:  Gain of 7 1/2 pounds in 6 months  Vitals:    07/14/25 1445   BP: (!) 139/98   Pulse: 85   Resp: 18   Temp: 97.2 °F (36.2 °C)   TempSrc: Temporal   SpO2: 97%   Weight: (!) 154.8 kg (341 lb 4.4 oz)   Height: 5' 4" (1.626 m)   PainSc:   5   PainLoc: Knee         Wt Readings from Last 10 Encounters:   07/14/25 (!) 154.8 kg (341 lb 4.4 oz)   04/01/25 (!) 151.7 kg (334 lb 7 oz)   02/14/25 (!) 151.9 kg (334 lb 14.1 oz)   02/13/25 (!) 151.4 kg (333 lb 12.4 oz)   01/13/25 (!) 151.4 kg (333 lb 12.4 oz)   11/15/24 (!) 147.4 kg (324 lb 15.3 oz)   10/25/24 (!) 147.4 kg (325 lb)   09/20/24 (!) 147.9 kg (326 lb)   09/09/24 (!) 148 kg (326 lb 4.5 oz)   08/13/24 (!) 145.2 kg (320 lb)       Physical Examination:   Physical Exam  Vitals and nursing note reviewed.   Constitutional:       General: She is not in acute distress.     Appearance: She is obese. She is not diaphoretic.   HENT:      Head: Normocephalic.      Mouth/Throat:      Pharynx: No oropharyngeal exudate.   Eyes:      General: No scleral icterus.     Conjunctiva/sclera: Conjunctivae normal.   Neck:      Thyroid: No thyromegaly.   Cardiovascular:      Rate and Rhythm: Normal rate and regular rhythm.      Heart sounds: Normal heart sounds. No murmur heard.  Pulmonary:      Effort: Pulmonary effort is normal. No respiratory distress.      Breath sounds: No stridor. No wheezing or rales.   Chest:      Chest wall: No tenderness.      Comments: Post surgical changes to the left breast. No other breat mass or axillary adenopathy   Abdominal:      General: Bowel sounds are normal. There is no distension.      Palpations: Abdomen is soft. There is no mass.      Tenderness: There is no abdominal tenderness. There is no rebound.   Musculoskeletal:         General: No tenderness or deformity. Normal range of motion.     "  Cervical back: Neck supple.   Lymphadenopathy:      Cervical: No cervical adenopathy.      Upper Body:      Right upper body: No supraclavicular or axillary adenopathy.      Left upper body: No supraclavicular or axillary adenopathy.   Skin:     General: Skin is warm and dry.      Findings: No erythema or rash.   Neurological:      Mental Status: She is alert and oriented to person, place, and time.      Cranial Nerves: No cranial nerve deficit.      Coordination: Coordination normal.      Gait: Gait is intact.   Psychiatric:         Mood and Affect: Affect normal.         Behavior: Behavior normal.         Cognition and Memory: Memory normal.         Judgment: Judgment normal.          Diagnostic Tests:  Significant Imaging: I have reviewed and interpreted all pertinent imaging results/findings.      Laboratory Data:  All pertinent labs have been reviewed.  Labs:   Lab Results   Component Value Date    WBC 4.78 05/14/2025    RBC 3.85 (L) 05/14/2025    HGB 11.8 (L) 05/14/2025    HCT 36.2 (L) 05/14/2025    MCV 94 05/14/2025     05/14/2025     (H) 05/14/2025     05/14/2025    K 4.7 05/14/2025    BUN 20 05/14/2025    CREATININE 1.18 05/14/2025    AST 15 04/26/2025    ALT 16 04/26/2025    BILITOT 0.4 04/26/2025     Lab Results   Component Value Date    IRON 75 05/14/2025    TRANSFERRIN 311 05/14/2025    TIBC 460 (H) 05/14/2025    FESATURATED 16 (L) 05/14/2025      Lab Results   Component Value Date    FERRITIN 44.3 05/14/2025     BMP  Lab Results   Component Value Date     05/14/2025    K 4.7 05/14/2025     05/14/2025    CO2 25 05/14/2025    BUN 20 05/14/2025    CREATININE 1.18 05/14/2025    CALCIUM 9.3 05/14/2025    ANIONGAP 7 (L) 05/14/2025    EGFRNORACEVR 52 (A) 05/14/2025       BMD 06/24/25:  Osteopenia with slightly decreased density in lumbar spine & left femoral neck.    Assessment/Plan:   Invasive ductal carcinoma of breast, female, left  - cT1c triple positive breast cancer  (ER  84% LA 28% Her 2 3+ pos Ki 52 %), given her young age and Ki67%, will proceed with TH, adding P to help with a better pCR, will also plan to get ultrasound mid cycle to monitor (3 months)  -9/26/22 Echo with EF 60%; next is scheduled for 01/18/23  -Began TH+P on 10/7/2022; completed 12 weeks of Paclitaxel 12/23/22  - 2/2023; s/p lupmectomy CPR;ypT0,pN0,cM0  -completed Trastuzumab/Pertuzumab   -  adjuvant radiation plan for 16 fraction total - completed 04/13/23  - last period  around~ 50,, post menopausal , on anastrazole  June 2nd /2023   -Normal mammogram in October 25, 2024, repeat in one year.   -CBC CMP on 1/13/2025 were WNL. Vitamin D is pending.  -Continue active surveillance. -f/up in 6  months.  07/14/25:  APOORVA @ 25 months post start of Anastrozole; Schedule Mammogram on or after 10/26/25; f/u in 6 months with labs prior: CBC, CMP, Vitamin D for 31 month post breast.      Long term use of Aromatase inhibitors:  See as above    Hot flashes   Stable.       Anxiety about health  On Wellbutrin.   Follows up with a therapist.     Osteopenia, unspecified location  DEXA scan done 6/23/2023 showed osteopenia.   We will repeat bone density in 2025  cont taking calcium and Vit D  07/14/25: Slight decline in lumbar & left femoral neck; continue calcium & vitamin D; discussed the role for prevention from further bone loss with Prolia; discussed side effects; dental clearance form given - seeing Dentist in August. Plan submitted for Prolia for authorization (needs dental clearance before given)      RECOMMENDED LIFESTYLE MODIFICATIONS FOR BREAST CANCER PATIENTS:      Reviewed Lifestyle modifications which have shown benefit:  Limit alcohol consumption to less than 1 drink per day (1 ounce liquor, 6 oz wine, 8 oz beer) and nor more than 3 drinks per week.   Avoid smoking.  Exercise at least 150 minutes per week of moderate intensity aerobic activity or at least 75 minutes of vigorous activity. Exercise can lower the  relative risk of breast cancer by ~18-20%.  Maintain healthy weight and avoid post-menopausal weight gain. Avoid processed foods and eat more lean proteins, fruits and vegetables.                         Plan was discussed with the patient at length, and she verbalized understanding. Josefina was given an opportunity to ask questions that were answered to her satisfaction, and she was advised to call in the interval if any problems or questions arise.    NADIA Alexander, FNP-C  [facility]  36 minutes of total time spent on the encounter, which includes face to face time and non-face to face time preparing to see the patient (eg, review of tests), Obtaining and/or reviewing separately obtained history, Documenting clinical information in the electronic or other health record, Independently interpreting results if documented above (not separately reported) and communicating results to the patient/family/caregiver, or Care coordination (not separately reported).         Route Chart for Scheduling    Med Onc Chart Routing      Follow up with physician 6 months. f/u in 6 months with Dr. Holder with CBC, CMP, Vitamin D   Follow up with BRANDIE    Infusion scheduling note    Injection scheduling note    Labs    Imaging   Schedule Mammo on or after 10/26/25 (orders under Ora Rizo NP)   Pharmacy appointment    Other referrals